# Patient Record
Sex: FEMALE | Race: WHITE | NOT HISPANIC OR LATINO
[De-identification: names, ages, dates, MRNs, and addresses within clinical notes are randomized per-mention and may not be internally consistent; named-entity substitution may affect disease eponyms.]

---

## 2017-05-17 ENCOUNTER — APPOINTMENT (OUTPATIENT)
Dept: OPHTHALMOLOGY | Facility: CLINIC | Age: 82
End: 2017-05-17

## 2017-05-17 DIAGNOSIS — H27.132 POSTERIOR DISLOCATION OF LENS, LEFT EYE: ICD-10-CM

## 2017-05-17 DIAGNOSIS — H35.352 CYSTOID MACULAR DEGENERATION, LEFT EYE: ICD-10-CM

## 2017-05-18 PROBLEM — H35.352 CME (CYSTOID MACULAR EDEMA), LEFT: Status: ACTIVE | Noted: 2017-05-18

## 2017-07-30 ENCOUNTER — INPATIENT (INPATIENT)
Facility: HOSPITAL | Age: 82
LOS: 4 days | Discharge: HOME | DRG: 644 | End: 2017-08-04
Attending: GENERAL ACUTE CARE HOSPITAL | Admitting: GENERAL ACUTE CARE HOSPITAL
Payer: MEDICARE

## 2017-07-30 VITALS
HEART RATE: 107 BPM | TEMPERATURE: 99 F | OXYGEN SATURATION: 99 % | SYSTOLIC BLOOD PRESSURE: 98 MMHG | RESPIRATION RATE: 19 BRPM | DIASTOLIC BLOOD PRESSURE: 62 MMHG

## 2017-07-30 DIAGNOSIS — I10 ESSENTIAL (PRIMARY) HYPERTENSION: ICD-10-CM

## 2017-07-30 DIAGNOSIS — N97.1 FEMALE INFERTILITY OF TUBAL ORIGIN: Chronic | ICD-10-CM

## 2017-07-30 DIAGNOSIS — M06.9 RHEUMATOID ARTHRITIS, UNSPECIFIED: ICD-10-CM

## 2017-07-30 DIAGNOSIS — J18.9 PNEUMONIA, UNSPECIFIED ORGANISM: ICD-10-CM

## 2017-07-30 DIAGNOSIS — E11.9 TYPE 2 DIABETES MELLITUS WITHOUT COMPLICATIONS: ICD-10-CM

## 2017-07-30 DIAGNOSIS — H26.40 UNSPECIFIED SECONDARY CATARACT: Chronic | ICD-10-CM

## 2017-07-30 DIAGNOSIS — Z29.9 ENCOUNTER FOR PROPHYLACTIC MEASURES, UNSPECIFIED: ICD-10-CM

## 2017-07-30 DIAGNOSIS — N18.4 CHRONIC KIDNEY DISEASE, STAGE 4 (SEVERE): ICD-10-CM

## 2017-07-30 LAB
ALBUMIN SERPL ELPH-MCNC: 4 G/DL — SIGNIFICANT CHANGE UP (ref 3.3–5)
ALP SERPL-CCNC: 57 U/L — SIGNIFICANT CHANGE UP (ref 40–120)
ALT FLD-CCNC: 13 U/L RC — SIGNIFICANT CHANGE UP (ref 10–45)
ANION GAP SERPL CALC-SCNC: 16 MMOL/L — SIGNIFICANT CHANGE UP (ref 5–17)
APPEARANCE UR: CLEAR — SIGNIFICANT CHANGE UP
APTT BLD: 30.1 SEC — SIGNIFICANT CHANGE UP (ref 27.5–37.4)
AST SERPL-CCNC: 16 U/L — SIGNIFICANT CHANGE UP (ref 10–40)
BASOPHILS # BLD AUTO: 0 K/UL — SIGNIFICANT CHANGE UP (ref 0–0.2)
BASOPHILS NFR BLD AUTO: 0 % — SIGNIFICANT CHANGE UP (ref 0–2)
BILIRUB SERPL-MCNC: 1.1 MG/DL — SIGNIFICANT CHANGE UP (ref 0.2–1.2)
BILIRUB UR-MCNC: NEGATIVE — SIGNIFICANT CHANGE UP
BUN SERPL-MCNC: 48 MG/DL — HIGH (ref 7–23)
CALCIUM SERPL-MCNC: 9.3 MG/DL — SIGNIFICANT CHANGE UP (ref 8.4–10.5)
CHLORIDE SERPL-SCNC: 96 MMOL/L — SIGNIFICANT CHANGE UP (ref 96–108)
CK MB BLD-MCNC: 4.5 % — HIGH (ref 0–3.5)
CK MB BLD-MCNC: 4.7 % — HIGH (ref 0–3.5)
CK MB CFR SERPL CALC: 2.8 NG/ML — SIGNIFICANT CHANGE UP (ref 0–3.8)
CK MB CFR SERPL CALC: 3 NG/ML — SIGNIFICANT CHANGE UP (ref 0–3.8)
CK SERPL-CCNC: 59 U/L — SIGNIFICANT CHANGE UP (ref 25–170)
CK SERPL-CCNC: 67 U/L — SIGNIFICANT CHANGE UP (ref 25–170)
CO2 SERPL-SCNC: 19 MMOL/L — LOW (ref 22–31)
COLOR SPEC: YELLOW — SIGNIFICANT CHANGE UP
CREAT SERPL-MCNC: 1.88 MG/DL — HIGH (ref 0.5–1.3)
DIFF PNL FLD: NEGATIVE — SIGNIFICANT CHANGE UP
EOSINOPHIL # BLD AUTO: 0 K/UL — SIGNIFICANT CHANGE UP (ref 0–0.5)
EOSINOPHIL NFR BLD AUTO: 0.4 % — SIGNIFICANT CHANGE UP (ref 0–6)
EPI CELLS # UR: SIGNIFICANT CHANGE UP /HPF
GAS PNL BLDV: SIGNIFICANT CHANGE UP
GLUCOSE SERPL-MCNC: 219 MG/DL — HIGH (ref 70–99)
GLUCOSE UR QL: NEGATIVE — SIGNIFICANT CHANGE UP
HCT VFR BLD CALC: 36.7 % — SIGNIFICANT CHANGE UP (ref 34.5–45)
HGB BLD-MCNC: 13.1 G/DL — SIGNIFICANT CHANGE UP (ref 11.5–15.5)
INR BLD: 1.17 RATIO — HIGH (ref 0.88–1.16)
KETONES UR-MCNC: NEGATIVE — SIGNIFICANT CHANGE UP
LEUKOCYTE ESTERASE UR-ACNC: NEGATIVE — SIGNIFICANT CHANGE UP
LYMPHOCYTES # BLD AUTO: 1.7 K/UL — SIGNIFICANT CHANGE UP (ref 1–3.3)
LYMPHOCYTES # BLD AUTO: 13.4 % — SIGNIFICANT CHANGE UP (ref 13–44)
MCHC RBC-ENTMCNC: 34.9 PG — HIGH (ref 27–34)
MCHC RBC-ENTMCNC: 35.7 GM/DL — SIGNIFICANT CHANGE UP (ref 32–36)
MCV RBC AUTO: 97.7 FL — SIGNIFICANT CHANGE UP (ref 80–100)
MONOCYTES # BLD AUTO: 0.7 K/UL — SIGNIFICANT CHANGE UP (ref 0–0.9)
MONOCYTES NFR BLD AUTO: 5.6 % — SIGNIFICANT CHANGE UP (ref 2–14)
NEUTROPHILS # BLD AUTO: 10.4 K/UL — HIGH (ref 1.8–7.4)
NEUTROPHILS NFR BLD AUTO: 80.6 % — HIGH (ref 43–77)
NITRITE UR-MCNC: NEGATIVE — SIGNIFICANT CHANGE UP
PH UR: 5.5 — SIGNIFICANT CHANGE UP (ref 5–8)
PLATELET # BLD AUTO: 182 K/UL — SIGNIFICANT CHANGE UP (ref 150–400)
POTASSIUM SERPL-MCNC: 4 MMOL/L — SIGNIFICANT CHANGE UP (ref 3.5–5.3)
POTASSIUM SERPL-SCNC: 4 MMOL/L — SIGNIFICANT CHANGE UP (ref 3.5–5.3)
PROT SERPL-MCNC: 7.5 G/DL — SIGNIFICANT CHANGE UP (ref 6–8.3)
PROT UR-MCNC: NEGATIVE — SIGNIFICANT CHANGE UP
PROTHROM AB SERPL-ACNC: 12.8 SEC — HIGH (ref 9.8–12.7)
RBC # BLD: 3.75 M/UL — LOW (ref 3.8–5.2)
RBC # FLD: 13.7 % — SIGNIFICANT CHANGE UP (ref 10.3–14.5)
RBC CASTS # UR COMP ASSIST: SIGNIFICANT CHANGE UP /HPF (ref 0–2)
SODIUM SERPL-SCNC: 131 MMOL/L — LOW (ref 135–145)
SP GR SPEC: 1.01 — SIGNIFICANT CHANGE UP (ref 1.01–1.02)
TROPONIN T SERPL-MCNC: <0.01 NG/ML — SIGNIFICANT CHANGE UP (ref 0–0.06)
TROPONIN T SERPL-MCNC: <0.01 NG/ML — SIGNIFICANT CHANGE UP (ref 0–0.06)
UROBILINOGEN FLD QL: NEGATIVE — SIGNIFICANT CHANGE UP
WBC # BLD: 12.8 K/UL — HIGH (ref 3.8–10.5)
WBC # FLD AUTO: 12.8 K/UL — HIGH (ref 3.8–10.5)
WBC UR QL: SIGNIFICANT CHANGE UP /HPF (ref 0–5)

## 2017-07-30 PROCEDURE — 99223 1ST HOSP IP/OBS HIGH 75: CPT

## 2017-07-30 PROCEDURE — 70450 CT HEAD/BRAIN W/O DYE: CPT | Mod: 26

## 2017-07-30 PROCEDURE — 71010: CPT | Mod: 26

## 2017-07-30 PROCEDURE — 99285 EMERGENCY DEPT VISIT HI MDM: CPT | Mod: 25,GC

## 2017-07-30 RX ORDER — AZITHROMYCIN 500 MG/1
500 TABLET, FILM COATED ORAL ONCE
Qty: 0 | Refills: 0 | Status: COMPLETED | OUTPATIENT
Start: 2017-07-30 | End: 2017-07-30

## 2017-07-30 RX ORDER — INSULIN LISPRO 100/ML
VIAL (ML) SUBCUTANEOUS
Qty: 0 | Refills: 0 | Status: DISCONTINUED | OUTPATIENT
Start: 2017-07-30 | End: 2017-08-04

## 2017-07-30 RX ORDER — SODIUM CHLORIDE 9 MG/ML
1000 INJECTION, SOLUTION INTRAVENOUS
Qty: 0 | Refills: 0 | Status: DISCONTINUED | OUTPATIENT
Start: 2017-07-30 | End: 2017-08-04

## 2017-07-30 RX ORDER — HEPARIN SODIUM 5000 [USP'U]/ML
5000 INJECTION INTRAVENOUS; SUBCUTANEOUS EVERY 8 HOURS
Qty: 0 | Refills: 0 | Status: DISCONTINUED | OUTPATIENT
Start: 2017-07-30 | End: 2017-08-04

## 2017-07-30 RX ORDER — PIPERACILLIN AND TAZOBACTAM 4; .5 G/20ML; G/20ML
3.38 INJECTION, POWDER, LYOPHILIZED, FOR SOLUTION INTRAVENOUS EVERY 12 HOURS
Qty: 0 | Refills: 0 | Status: DISCONTINUED | OUTPATIENT
Start: 2017-07-30 | End: 2017-07-31

## 2017-07-30 RX ORDER — METHOTREXATE 2.5 MG/1
0 TABLET ORAL
Qty: 0 | Refills: 0 | COMMUNITY

## 2017-07-30 RX ORDER — LACTOBACILLUS ACIDOPHILUS 100MM CELL
1 CAPSULE ORAL
Qty: 0 | Refills: 0 | Status: DISCONTINUED | OUTPATIENT
Start: 2017-07-30 | End: 2017-08-04

## 2017-07-30 RX ORDER — DEXTROSE 50 % IN WATER 50 %
25 SYRINGE (ML) INTRAVENOUS ONCE
Qty: 0 | Refills: 0 | Status: DISCONTINUED | OUTPATIENT
Start: 2017-07-30 | End: 2017-08-04

## 2017-07-30 RX ORDER — DEXTROSE 50 % IN WATER 50 %
12.5 SYRINGE (ML) INTRAVENOUS ONCE
Qty: 0 | Refills: 0 | Status: DISCONTINUED | OUTPATIENT
Start: 2017-07-30 | End: 2017-08-04

## 2017-07-30 RX ORDER — VANCOMYCIN HCL 1 G
VIAL (EA) INTRAVENOUS
Qty: 0 | Refills: 0 | Status: DISCONTINUED | OUTPATIENT
Start: 2017-07-30 | End: 2017-07-31

## 2017-07-30 RX ORDER — GLUCAGON INJECTION, SOLUTION 0.5 MG/.1ML
1 INJECTION, SOLUTION SUBCUTANEOUS ONCE
Qty: 0 | Refills: 0 | Status: DISCONTINUED | OUTPATIENT
Start: 2017-07-30 | End: 2017-08-04

## 2017-07-30 RX ORDER — PIPERACILLIN AND TAZOBACTAM 4; .5 G/20ML; G/20ML
3.38 INJECTION, POWDER, LYOPHILIZED, FOR SOLUTION INTRAVENOUS ONCE
Qty: 0 | Refills: 0 | Status: COMPLETED | OUTPATIENT
Start: 2017-07-30 | End: 2017-07-30

## 2017-07-30 RX ORDER — SODIUM CHLORIDE 9 MG/ML
1000 INJECTION INTRAMUSCULAR; INTRAVENOUS; SUBCUTANEOUS ONCE
Qty: 0 | Refills: 0 | Status: COMPLETED | OUTPATIENT
Start: 2017-07-30 | End: 2017-07-30

## 2017-07-30 RX ORDER — VANCOMYCIN HCL 1 G
1000 VIAL (EA) INTRAVENOUS EVERY 24 HOURS
Qty: 0 | Refills: 0 | Status: DISCONTINUED | OUTPATIENT
Start: 2017-07-31 | End: 2017-07-31

## 2017-07-30 RX ORDER — METHOTREXATE 2.5 MG/1
2.5 TABLET ORAL DAILY
Qty: 0 | Refills: 0 | Status: DISCONTINUED | OUTPATIENT
Start: 2017-07-30 | End: 2017-07-30

## 2017-07-30 RX ORDER — FOLIC ACID 0.8 MG
1 TABLET ORAL DAILY
Qty: 0 | Refills: 0 | Status: DISCONTINUED | OUTPATIENT
Start: 2017-07-30 | End: 2017-08-04

## 2017-07-30 RX ORDER — DEXTROSE 50 % IN WATER 50 %
1 SYRINGE (ML) INTRAVENOUS ONCE
Qty: 0 | Refills: 0 | Status: DISCONTINUED | OUTPATIENT
Start: 2017-07-30 | End: 2017-08-04

## 2017-07-30 RX ORDER — INSULIN GLARGINE 100 [IU]/ML
13 INJECTION, SOLUTION SUBCUTANEOUS AT BEDTIME
Qty: 0 | Refills: 0 | Status: DISCONTINUED | OUTPATIENT
Start: 2017-07-30 | End: 2017-08-03

## 2017-07-30 RX ORDER — INSULIN LISPRO 100/ML
VIAL (ML) SUBCUTANEOUS AT BEDTIME
Qty: 0 | Refills: 0 | Status: DISCONTINUED | OUTPATIENT
Start: 2017-07-30 | End: 2017-08-04

## 2017-07-30 RX ORDER — METHOTREXATE 2.5 MG/1
25 TABLET ORAL
Qty: 0 | Refills: 0 | COMMUNITY

## 2017-07-30 RX ORDER — ESCITALOPRAM OXALATE 10 MG/1
10 TABLET, FILM COATED ORAL DAILY
Qty: 0 | Refills: 0 | Status: DISCONTINUED | OUTPATIENT
Start: 2017-07-30 | End: 2017-08-04

## 2017-07-30 RX ORDER — CHOLECALCIFEROL (VITAMIN D3) 125 MCG
1000 CAPSULE ORAL DAILY
Qty: 0 | Refills: 0 | Status: DISCONTINUED | OUTPATIENT
Start: 2017-07-30 | End: 2017-08-04

## 2017-07-30 RX ORDER — CEFTRIAXONE 500 MG/1
1 INJECTION, POWDER, FOR SOLUTION INTRAMUSCULAR; INTRAVENOUS ONCE
Qty: 0 | Refills: 0 | Status: COMPLETED | OUTPATIENT
Start: 2017-07-30 | End: 2017-07-30

## 2017-07-30 RX ORDER — VANCOMYCIN HCL 1 G
1000 VIAL (EA) INTRAVENOUS ONCE
Qty: 0 | Refills: 0 | Status: COMPLETED | OUTPATIENT
Start: 2017-07-30 | End: 2017-07-30

## 2017-07-30 RX ADMIN — PIPERACILLIN AND TAZOBACTAM 200 GRAM(S): 4; .5 INJECTION, POWDER, LYOPHILIZED, FOR SOLUTION INTRAVENOUS at 22:08

## 2017-07-30 RX ADMIN — INSULIN GLARGINE 13 UNIT(S): 100 INJECTION, SOLUTION SUBCUTANEOUS at 22:21

## 2017-07-30 RX ADMIN — AZITHROMYCIN 250 MILLIGRAM(S): 500 TABLET, FILM COATED ORAL at 20:17

## 2017-07-30 RX ADMIN — CEFTRIAXONE 100 GRAM(S): 500 INJECTION, POWDER, FOR SOLUTION INTRAMUSCULAR; INTRAVENOUS at 19:37

## 2017-07-30 RX ADMIN — ESCITALOPRAM OXALATE 10 MILLIGRAM(S): 10 TABLET, FILM COATED ORAL at 22:20

## 2017-07-30 RX ADMIN — SODIUM CHLORIDE 1000 MILLILITER(S): 9 INJECTION INTRAMUSCULAR; INTRAVENOUS; SUBCUTANEOUS at 19:37

## 2017-07-30 RX ADMIN — Medication 250 MILLIGRAM(S): at 22:47

## 2017-07-30 RX ADMIN — Medication 1 MILLIGRAM(S): at 22:20

## 2017-07-30 RX ADMIN — HEPARIN SODIUM 5000 UNIT(S): 5000 INJECTION INTRAVENOUS; SUBCUTANEOUS at 22:20

## 2017-07-30 NOTE — ED PROVIDER NOTE - NS ED ROS FT
Sharonda Samson, DO: ROS: +lightheadedness, denies HA, fevers/chills, nausea/vomiting, chest pain, diaphoresis, abdominal pain, joint pain, neuro deficits, dysuria/hematuria, rash.

## 2017-07-30 NOTE — ED PROVIDER NOTE - OBJECTIVE STATEMENT
Sharonda Samson, DO: 85F with hx of DM, HTN, RA here for weakness & difficulty ambulating x 2 weeks. Pt unable to get out of bed today. Near-syncope Friday. +NB diarrhea Friday. Denies urinary frequency, urgency, dysuria, hematuria. No weight loss. Pt with mild SOB, worse with exertion. No recent changes in medications or diet. Gait described as short, shuffling gait when not weak. No confusion per family at bedside.    PMD: Dr. Yuriy Tobias.

## 2017-07-30 NOTE — H&P ADULT - PROBLEM SELECTOR PLAN 1
IV Zosyn and IV Vancomycin given patient's steroid and DMA requirements.  Follow up blood, urine assays.

## 2017-07-30 NOTE — ED ADULT NURSE REASSESSMENT NOTE - NS ED NURSE REASSESS COMMENT FT1
Pt straight catheterized using sterile technique.  Pt tolerated procedure well. Sterile specimen collected. UA and Culture sent. 200ml clear yellow urine drained.
1905: Report received from MYA MARX in [facility].

## 2017-07-30 NOTE — ED PROVIDER NOTE - MEDICAL DECISION MAKING DETAILS
Dr. Callejas Note: acute weakness with borderline temp, consider infectious causes, will require admission for likely PT and rehab

## 2017-07-30 NOTE — H&P ADULT - PROBLEM SELECTOR PLAN 2
Steroid and DMA dependent>>would clarify patient's METHOTREXATE dosing with patient's office MD in the AM.

## 2017-07-30 NOTE — H&P ADULT - HISTORY OF PRESENT ILLNESS
NIGHT HOSPITALIST:  Patient UNKNOWN to me previously, assigned to me at this point via the ER and by Dr. Tobias to admit this 86 y/o F--patient seen with spouse in attendance--patient with a history of essential HTN, type 2 diabetes mellitus, presumed asthma, RA with patient on disease modifying agents (DMA) with apparently outpatient parenteral chemotherapy for her RA, steroid dependent and maintained on MTX, last admission to [facility] in 2014 for poor controlled FS, with the patient self referring to [facility] following 2 weeks of generalized weakness and dizziness.  No LOC.  NO HA, no focal weakness.  Patient with an episode of chills for the past week.  No cough.  No chest pain/pressure.  Patient with poor exercise tolerance for several months but self limits activity to the house for the past few weeks.  No fever.  NO abdominal pain, no red blood per rectum or melena.  No diarrhea.  No back pain, no tearing back pain.  No hematuria, no dysuria.  No weight loss or anorexia.  No sick contacts or recent travel.  Remaining review of systems not contributory. NIGHT HOSPITALIST:  Patient UNKNOWN to me previously, assigned to me at this point via the ER and by Dr. Tobias to admit this 84 y/o F--patient seen with spouse in attendance--patient with a history of essential HTN, type 2 diabetes mellitus, presumed asthma, RA with patient on disease modifying agents (DMA) with apparently outpatient parenteral chemotherapy for her RA, steroid dependent and maintained on MTX, last admission to [facility] in 2014 for poor controlled FS, with the patient self referring to [facility] following 2 weeks of generalized weakness and dizziness.  No LOC.  NO HA, no focal weakness.  Patient with an episode of chills for the past week.  No cough.  No chest pain/pressure.  Patient with poor exercise tolerance for several months but self limits activity to the house for the past few weeks.  No fever.  NO abdominal pain, no red blood per rectum or melena.  No diarrhea.  No back pain, no tearing back pain.  No neck pain.  No hematuria, no dysuria.  No weight loss or anorexia.  No sick contacts or recent travel.  Remaining review of systems not contributory.

## 2017-07-30 NOTE — H&P ADULT - ASSESSMENT
NIGHT HOSPITALIST:  Presentation of patient with suspected pneumonia but patient on DMA and steroids in the setting of RA, but with protracted symptoms of generalized weakness>>will upgrade to telemetry to exclude cardiac equivalent in the setting of patient with type 2 diabetes mellitus and radiographic evidence of cerebrovascular disease>>will also upgrade IV antibiotics to IV Zosyn and IV vancomycin in the setting of immune suppression from patient's DMA.  Patient with evidence of chronic kidney disease stage 4>>will HOLD patient's ARB and will HOLD the metformin and sulfonylurea>>will dose conservative Lantus at 0.2 UNITS/KG/24H to avoid excess hyperglycemia.   Will have the DAY PROVIDER clarify patient's MTX dosing>>patient/spouse report she takes a daily dose of MTX (?) versus weekly for her RA.

## 2017-07-30 NOTE — ED PROVIDER NOTE - ATTENDING CONTRIBUTION TO CARE
Pt with 2 weeks of worsening weakness with malaise and unable to walk now.  No focal neuro deficits, more overall weakness, unable to stand due to weakness, nontender abdomen.

## 2017-07-30 NOTE — H&P ADULT - ATTENDING COMMENTS
NIGHT HOSPITALIST:  Patient / spouse aware of course and agree with plan/care as above.  Long term prognosis is guarded, given comorbidities.  Emotional support provided to patient/ spouse in attendance.  Care reviewed with covering NP.  Care assumed by Dr. Davis.

## 2017-07-30 NOTE — ED PROVIDER NOTE - PHYSICAL EXAMINATION
Sharonda Samson, DO:   Gen: Well appearing, NAD, AAOx3  Head: NCAT  HEENT: PERRL, MMM, normal conjunctiva, anicteric, neck supple  Lung: decreased at b/l bases otherwise CTAB, no adventitious sounds  CV: RRR, no murmurs, rubs or gallops  Abd: soft, NTND, no rebound or guarding, no CVAT  MSK: No edema, no visible deformities  Neuro: No focal neurologic deficits. CN II-XII intact. 5/5 strength and normal sensation in all extremities.  Skin: Warm and dry, no evidence of rash  Psych: normal mood and affect

## 2017-07-30 NOTE — H&P ADULT - NSHPPHYSICALEXAM_GEN_ALL_CORE
Physical exam with an elderly, non-toxic, chronically ill appearing F.  BP  117/67 Physical exam with an elderly, non-toxic, chronically ill appearing F.  BP  117/67  Afebrile.  HR  84.  RR 14.  95% on RA.  HEENT, PERRL, EOMI, bitemporal wasting.  Neck supple, chest with decreased breath sounds at the bases, cor s1 s2, declined breast exam.  Abdomen soft, normal bowel sounds, non-tender, nondistended.  Ext with diffuse sarcopenia.  Feet with no ulcers, poor nail hygiene.  Skin dry but intact.  Neurologic exam AxOx3.  Speech fluent.  Cognition intact.   UE/LE 5/5.

## 2017-07-30 NOTE — H&P ADULT - PROBLEM SELECTOR PLAN 5
Upgraded to telemetry to exclude cardiac equivalent.  Renal insufficiency as above>>metformin and sulfonylurea HELD.  Lantus at conservative 0.2 UNITS/kg/24H for glycemic control.

## 2017-07-30 NOTE — ED ADULT NURSE NOTE - OBJECTIVE STATEMENT
86 yo female, PMH DM, HTN, rheumatoid arthritis brought to ED by her family for generalized weakness. Patient has been growing progressively weaker over the last 2 weeks, today was unable to get out of bed or walk, so family brought patient to ED. +Near-syncopal episode on Friday (two days ago) while shopping. +Diarrhea intermittent this week. +SOB on exertion. +Bilateral leg pain, low back pain. No chest pain, fever/chills, dysuria, abdominal pain. No edema. No cough. No changes in medication.

## 2017-07-30 NOTE — ED PROVIDER NOTE - CARE PLAN
Principal Discharge DX:	CAP (community acquired pneumonia) Principal Discharge DX:	CAP (community acquired pneumonia)  Secondary Diagnosis:	Dehydration  Secondary Diagnosis:	Ambulatory dysfunction

## 2017-07-30 NOTE — H&P ADULT - NSHPLABSRESULTS_GEN_ALL_CORE
WBC 12.8K.  80% N.  Hgb 13.1.  Platelets of 182K.  INR 1.1.   K+ 4.0.  Random glucose of 219.  HCO3 19.  Cr 1.8.  Alb 4.0.  e GFR  24 consistent with chronic kidney disease stage 4.  U/A negative.  Chest radiograph reviewed with increased interstitial markings.  head CTT nondiagnostic but with evidence of cerebrovascular disease.  .  EKG tracing reviewed with NSR at 86 with RBBB and isolated Q III.

## 2017-07-31 DIAGNOSIS — J18.1 LOBAR PNEUMONIA, UNSPECIFIED ORGANISM: ICD-10-CM

## 2017-07-31 DIAGNOSIS — R42 DIZZINESS AND GIDDINESS: ICD-10-CM

## 2017-07-31 DIAGNOSIS — E87.1 HYPO-OSMOLALITY AND HYPONATREMIA: ICD-10-CM

## 2017-07-31 DIAGNOSIS — N17.9 ACUTE KIDNEY FAILURE, UNSPECIFIED: ICD-10-CM

## 2017-07-31 DIAGNOSIS — I10 ESSENTIAL (PRIMARY) HYPERTENSION: ICD-10-CM

## 2017-07-31 LAB
ALBUMIN SERPL ELPH-MCNC: 4 G/DL — SIGNIFICANT CHANGE UP (ref 3.3–5)
ALP SERPL-CCNC: 53 U/L — SIGNIFICANT CHANGE UP (ref 40–120)
ALT FLD-CCNC: 14 U/L RC — SIGNIFICANT CHANGE UP (ref 10–45)
ANION GAP SERPL CALC-SCNC: 15 MMOL/L — SIGNIFICANT CHANGE UP (ref 5–17)
AST SERPL-CCNC: 18 U/L — SIGNIFICANT CHANGE UP (ref 10–40)
BASOPHILS # BLD AUTO: 0 K/UL — SIGNIFICANT CHANGE UP (ref 0–0.2)
BASOPHILS NFR BLD AUTO: 0.3 % — SIGNIFICANT CHANGE UP (ref 0–2)
BILIRUB SERPL-MCNC: 0.8 MG/DL — SIGNIFICANT CHANGE UP (ref 0.2–1.2)
BUN SERPL-MCNC: 36 MG/DL — HIGH (ref 7–23)
CALCIUM SERPL-MCNC: 9.2 MG/DL — SIGNIFICANT CHANGE UP (ref 8.4–10.5)
CHLORIDE SERPL-SCNC: 100 MMOL/L — SIGNIFICANT CHANGE UP (ref 96–108)
CK MB BLD-MCNC: 2.2 % — SIGNIFICANT CHANGE UP (ref 0–3.5)
CK MB CFR SERPL CALC: 2.8 NG/ML — SIGNIFICANT CHANGE UP (ref 0–3.8)
CK MB CFR SERPL CALC: 3.4 NG/ML — SIGNIFICANT CHANGE UP (ref 0–3.8)
CK SERPL-CCNC: 155 U/L — SIGNIFICANT CHANGE UP (ref 25–170)
CK SERPL-CCNC: 61 U/L — SIGNIFICANT CHANGE UP (ref 25–170)
CO2 SERPL-SCNC: 22 MMOL/L — SIGNIFICANT CHANGE UP (ref 22–31)
CORTIS AM PEAK SERPL-MCNC: 2 UG/DL — LOW (ref 6–18.4)
CREAT ?TM UR-MCNC: 31 MG/DL — SIGNIFICANT CHANGE UP
CREAT ?TM UR-MCNC: 31 MG/DL — SIGNIFICANT CHANGE UP
CREAT SERPL-MCNC: 1.37 MG/DL — HIGH (ref 0.5–1.3)
EOSINOPHIL # BLD AUTO: 0.1 K/UL — SIGNIFICANT CHANGE UP (ref 0–0.5)
EOSINOPHIL NFR BLD AUTO: 1.2 % — SIGNIFICANT CHANGE UP (ref 0–6)
GLUCOSE SERPL-MCNC: 233 MG/DL — HIGH (ref 70–99)
HBA1C BLD-MCNC: 7.7 % — HIGH (ref 4–5.6)
HCT VFR BLD CALC: 37.1 % — SIGNIFICANT CHANGE UP (ref 34.5–45)
HGB BLD-MCNC: 13.1 G/DL — SIGNIFICANT CHANGE UP (ref 11.5–15.5)
LYMPHOCYTES # BLD AUTO: 1.8 K/UL — SIGNIFICANT CHANGE UP (ref 1–3.3)
LYMPHOCYTES # BLD AUTO: 19 % — SIGNIFICANT CHANGE UP (ref 13–44)
MCHC RBC-ENTMCNC: 34.8 PG — HIGH (ref 27–34)
MCHC RBC-ENTMCNC: 35.3 GM/DL — SIGNIFICANT CHANGE UP (ref 32–36)
MCV RBC AUTO: 98.6 FL — SIGNIFICANT CHANGE UP (ref 80–100)
MICROALBUMIN UR-MCNC: 0.8 MG/DL — SIGNIFICANT CHANGE UP
MICROALBUMIN/CREAT UR-RTO: 26 MG/G — SIGNIFICANT CHANGE UP (ref 0–30)
MONOCYTES # BLD AUTO: 0.8 K/UL — SIGNIFICANT CHANGE UP (ref 0–0.9)
MONOCYTES NFR BLD AUTO: 8.4 % — SIGNIFICANT CHANGE UP (ref 2–14)
NEUTROPHILS # BLD AUTO: 6.5 K/UL — SIGNIFICANT CHANGE UP (ref 1.8–7.4)
NEUTROPHILS NFR BLD AUTO: 71.1 % — SIGNIFICANT CHANGE UP (ref 43–77)
PLATELET # BLD AUTO: 170 K/UL — SIGNIFICANT CHANGE UP (ref 150–400)
POTASSIUM SERPL-MCNC: 4.2 MMOL/L — SIGNIFICANT CHANGE UP (ref 3.5–5.3)
POTASSIUM SERPL-SCNC: 4.2 MMOL/L — SIGNIFICANT CHANGE UP (ref 3.5–5.3)
PROT ?TM UR-MCNC: <4 MG/DL — SIGNIFICANT CHANGE UP (ref 0–12)
PROT SERPL-MCNC: 7.4 G/DL — SIGNIFICANT CHANGE UP (ref 6–8.3)
PROT/CREAT UR-RTO: SIGNIFICANT CHANGE UP (ref 0–0.2)
RBC # BLD: 3.76 M/UL — LOW (ref 3.8–5.2)
RBC # FLD: 13.7 % — SIGNIFICANT CHANGE UP (ref 10.3–14.5)
SODIUM SERPL-SCNC: 137 MMOL/L — SIGNIFICANT CHANGE UP (ref 135–145)
TROPONIN T SERPL-MCNC: <0.01 NG/ML — SIGNIFICANT CHANGE UP (ref 0–0.06)
TROPONIN T SERPL-MCNC: <0.01 NG/ML — SIGNIFICANT CHANGE UP (ref 0–0.06)
WBC # BLD: 9.2 K/UL — SIGNIFICANT CHANGE UP (ref 3.8–10.5)
WBC # FLD AUTO: 9.2 K/UL — SIGNIFICANT CHANGE UP (ref 3.8–10.5)

## 2017-07-31 PROCEDURE — 71250 CT THORAX DX C-: CPT | Mod: 26

## 2017-07-31 RX ORDER — SODIUM CHLORIDE 9 MG/ML
1000 INJECTION INTRAMUSCULAR; INTRAVENOUS; SUBCUTANEOUS
Qty: 0 | Refills: 0 | Status: DISCONTINUED | OUTPATIENT
Start: 2017-07-31 | End: 2017-07-31

## 2017-07-31 RX ORDER — HYDROCORTISONE 20 MG
50 TABLET ORAL EVERY 8 HOURS
Qty: 0 | Refills: 0 | Status: DISCONTINUED | OUTPATIENT
Start: 2017-07-31 | End: 2017-08-01

## 2017-07-31 RX ORDER — PIPERACILLIN AND TAZOBACTAM 4; .5 G/20ML; G/20ML
3.38 INJECTION, POWDER, LYOPHILIZED, FOR SOLUTION INTRAVENOUS EVERY 8 HOURS
Qty: 0 | Refills: 0 | Status: DISCONTINUED | OUTPATIENT
Start: 2017-07-31 | End: 2017-07-31

## 2017-07-31 RX ADMIN — ESCITALOPRAM OXALATE 10 MILLIGRAM(S): 10 TABLET, FILM COATED ORAL at 09:14

## 2017-07-31 RX ADMIN — Medication 1 TABLET(S): at 17:18

## 2017-07-31 RX ADMIN — HEPARIN SODIUM 5000 UNIT(S): 5000 INJECTION INTRAVENOUS; SUBCUTANEOUS at 21:47

## 2017-07-31 RX ADMIN — INSULIN GLARGINE 13 UNIT(S): 100 INJECTION, SOLUTION SUBCUTANEOUS at 21:47

## 2017-07-31 RX ADMIN — Medication 50 MILLIGRAM(S): at 21:47

## 2017-07-31 RX ADMIN — Medication 50 MILLIGRAM(S): at 14:17

## 2017-07-31 RX ADMIN — Medication 5 MILLIGRAM(S): at 06:16

## 2017-07-31 RX ADMIN — Medication 1 MILLIGRAM(S): at 09:13

## 2017-07-31 RX ADMIN — Medication 1 TABLET(S): at 09:10

## 2017-07-31 RX ADMIN — Medication 3: at 18:15

## 2017-07-31 RX ADMIN — PIPERACILLIN AND TAZOBACTAM 25 GRAM(S): 4; .5 INJECTION, POWDER, LYOPHILIZED, FOR SOLUTION INTRAVENOUS at 09:13

## 2017-07-31 RX ADMIN — Medication 1000 UNIT(S): at 09:14

## 2017-07-31 RX ADMIN — HEPARIN SODIUM 5000 UNIT(S): 5000 INJECTION INTRAVENOUS; SUBCUTANEOUS at 13:09

## 2017-07-31 RX ADMIN — HEPARIN SODIUM 5000 UNIT(S): 5000 INJECTION INTRAVENOUS; SUBCUTANEOUS at 06:16

## 2017-07-31 RX ADMIN — SODIUM CHLORIDE 60 MILLILITER(S): 9 INJECTION INTRAMUSCULAR; INTRAVENOUS; SUBCUTANEOUS at 14:14

## 2017-07-31 RX ADMIN — Medication 1: at 09:13

## 2017-07-31 RX ADMIN — Medication 2: at 21:48

## 2017-07-31 NOTE — PHYSICAL THERAPY INITIAL EVALUATION ADULT - ADDITIONAL COMMENTS
Pt lives with her spouse in a private house with 6 steps to enter, + handrail  and hen living area all one level. Pt lives with her spouse in a private house with 6 steps to enter, + handrail  and then living area all one level.

## 2017-07-31 NOTE — CONSULT NOTE ADULT - SUBJECTIVE AND OBJECTIVE BOX
HPI:   Patient is a 85y female with RA on prednisone and methotrexate admitted last night for complaints of weakness, dizziness, poor appetite, started on vanco zosyn we are called. She feels better today. She complains of chronic joint pain, some loose stool on occasion and some back pain that has resolved. she has no fever, her appetite is much better today.     REVIEW OF SYSTEMS:  All other review of systems negative (Comprehensive ROS)    PAST MEDICAL & SURGICAL HISTORY:  RA (rheumatoid arthritis)  Asthma  DM (diabetes mellitus), type 2  HTN (hypertension), benign  After-cataract of left eye  Tubal occlusion      Allergies    No Known Allergies    Intolerances        Antimicrobials Day #  :    Other Medications:  escitalopram 10 milliGRAM(s) Oral daily  folic acid 1 milliGRAM(s) Oral daily  cholecalciferol 1000 Unit(s) Oral daily  insulin lispro (HumaLOG) corrective regimen sliding scale   SubCutaneous three times a day before meals  insulin lispro (HumaLOG) corrective regimen sliding scale   SubCutaneous at bedtime  dextrose 5%. 1000 milliLiter(s) IV Continuous <Continuous>  dextrose Gel 1 Dose(s) Oral once PRN  dextrose 50% Injectable 12.5 Gram(s) IV Push once  dextrose 50% Injectable 25 Gram(s) IV Push once  dextrose 50% Injectable 25 Gram(s) IV Push once  glucagon  Injectable 1 milliGRAM(s) IntraMuscular once PRN  heparin  Injectable 5000 Unit(s) SubCutaneous every 8 hours  insulin glargine Injectable (LANTUS) 13 Unit(s) SubCutaneous at bedtime  lactobacillus acidophilus 1 Tablet(s) Oral two times a day with meals  hydrocortisone sodium succinate Injectable 50 milliGRAM(s) IV Push every 8 hours  sodium chloride 0.9%. 1000 milliLiter(s) IV Continuous <Continuous>      FAMILY HISTORY:  obesity    SOCIAL HISTORY:  Smoking: [ ]Yes [x ]No  ETOH: [ ]Yes [x ]No  Drug Use: [ ]Yes [ x]No   [ ] Single[ ]    T(F): 97.9 (17 @ 12:40), Max: 98.5 (17 @ 00:22)  HR: 81 (17 @ 16:46)  BP: 125/75 (17 @ 16:46)  RR: 18 (17 @ 12:40)  SpO2: 94% (17 @ 16:46)  Wt(kg): --    PHYSICAL EXAM:  General: alert, no acute distress  Eyes:  anicteric, no conjunctival injection, no discharge  Oropharynx: no lesions or injection 	  Neck: supple, without adenopathy  Lungs: clear to auscultation  Heart: regular rate and rhythm; no murmur, rubs or gallops  Abdomen: soft, nondistended, nontender, without mass or organomegaly  Skin: no lesions  Extremities: no clubbing, cyanosis, or edema  Neurologic: alert, oriented, moves all extremities    LAB RESULTS:                        13.1   9.2   )-----------( 170      ( 2017 14:41 )             37.1         137  |  100  |  36<H>  ----------------------------<  233<H>  4.2   |  22  |  1.37<H>    Ca    9.2      2017 14:22    TPro  7.4  /  Alb  4.0  /  TBili  0.8  /  DBili  x   /  AST  18  /  ALT  14  /  AlkPhos  53      LIVER FUNCTIONS - ( 2017 14:22 )  Alb: 4.0 g/dL / Pro: 7.4 g/dL / ALK PHOS: 53 U/L / ALT: 14 U/L RC / AST: 18 U/L / GGT: x           Urinalysis Basic - ( 2017 18:11 )    Color: Yellow / Appearance: Clear / S.011 / pH: x  Gluc: x / Ketone: Negative  / Bili: Negative / Urobili: Negative   Blood: x / Protein: Negative / Nitrite: Negative   Leuk Esterase: Negative / RBC: 0-2 /HPF / WBC 0-2 /HPF   Sq Epi: x / Non Sq Epi: OCC /HPF / Bacteria: x        MICROBIOLOGY:  RECENT CULTURES:        RADIOLOGY REVIEWED:    < from: CT Chest No Cont (17 @ 14:53) >  EXAM:  CT CHEST                            PROCEDURE DATE:  2017            INTERPRETATION:  CLINICAL INFORMATION: Shortness of breath    COMPARISON: CT Chest on 3/23/2014.    PROCEDURE:   CT of the Chest was performed without intravenous contrast.  Sagittal and coronal reformats were performed.      FINDINGS:    CHEST:     LUNGS AND LARGE AIRWAYS: Patent central airways. Bilateral upper lobe   reticular changes consistent with scarring with focal calcifications and   areas of air trapping. No parenchymal consolidation. Stable 6 mm region   of juxtapleural thickening in the right middle lobe.  PLEURA: No pleural effusion.  VESSELS: Normal left-sided aortic arch and descending aorta. No aneurysm.   Atherosclerotic changes.  HEART: Heart size is normal. No pericardial effusion. Atherosclerotic   calcifications involving the mitral annular, aortic valve and left   anterior descending coronary artery.  MEDIASTINUM AND SHILPA: No lymphadenopathy.  CHEST WALL AND LOWER NECK: Within normallimits.  VISUALIZED UPPER ABDOMEN: Within normal limits.  BONES: Multilevel spinal degenerative changes.     IMPRESSION:     Mosaic attenuation of the lungs with scarring as seen previously. Stable   6 mm region of juxtapleural thickening in the right middle lobe.    < from: CT Head No Cont (17 @ 18:09) >  XAM:  CT BRAIN                            PROCEDURE DATE:  2017            INTERPRETATION:  CLINICAL INFORMATION: Moderate to severe weakness for 3   weeks.    TECHNIQUE:  Serial axial images were obtained from the skull base to the   vertexwithout intravenous contrast. Coronal and sagittal reformatted   images were obtained.    COMPARISON: Head CT 2016    FINDINGS:     There is no ventriculomegaly, midline shift, mass effect, or acute   hemorrhage.     The ventricles, and sulci are prominent consistent with volume loss.    Confluent white matter hypodensities again noted compatible severe   microvascular ischemic change.     Mucous retention cyst versus polyp in the right maxillary sinus.  There   is no displaced calvarial fracture.     IMPRESSION:     1.  No acute intracranial hemorrhage, mass effect, or shift.  2.  Severe chronic microvascular ischemic changes.       < end of copied text >      Impression:    Patient with RA on prednisone and methotrexate admitted with complaints of dizziness and weakness but no localizing findings to support infection is apparent. She has some loose stool so will check stool studies but nothing ongoing. She could have a virus with relative adrenal insufficiency.      Recommendations:  Monitor off antibiotics  Check stool c and s o and p  follow up cultures  steroid taper  r/w dr moeller

## 2017-07-31 NOTE — PHYSICAL THERAPY INITIAL EVALUATION ADULT - PERTINENT HX OF CURRENT PROBLEM, REHAB EVAL
86 y/o F--patient seen with spouse in attendance--patient with a history of essential HTN, type 2 diabetes mellitus, presumed asthma, RA with patient on disease modifying agents (DMA) with apparently outpatient parenteral chemotherapy for her RA, steroid dependent and maintained on MTX, last admission to [facility] in 2014 for poor controlled FS, with the patient self referring to [facility] following 2 weeks of generalized weakness and dizziness. 86 y/o F patient with a history of essential HTN, type 2 diabetes mellitus, presumed asthma, RA with patient on disease modifying agents (DMA) with apparently outpatient parenteral chemotherapy for her RA, steroid dependent and maintained on MTX, last admission to [facility] in 2014 for poor controlled FS, with the patient self referring to [facility] following 2 weeks of generalized weakness and dizziness.

## 2017-07-31 NOTE — PROGRESS NOTE ADULT - SUBJECTIVE AND OBJECTIVE BOX
INITIAL [facility]   Pt seen and examined   chart vida   case discussed with Raffy Milton and Dr. Flannery   plan discussed with pt, art , and    Patient is a 85y old  Female who presents with a chief complaint of Two weeks of generalized weakness, dizziness, episode of chills (2017 21:28)  86 y/o fmeale with hx of RA on mtx , prednsion and iv infusion as o/p. DAVID MILTON admitted with 2 months hx of decreased po intake, weakness , fatigue, and dizziness that has been much more pronounced over the past one to two weeks.  no fever or chills , no N/V but has intermittent diarreah   no urinary sx   dizziness NOT vertigo and no cp/ palpiations   no focal neuro complaints     Review of Systems:  Review of Systems: SEE ABOVE.	  Other Review of Systems: All other review of systems negative, except as noted in HPI	  Allergies and Intolerances:        Allergies:  	No Known Allergies:   Home Medications:   · 	folic acid 1 mg oral tablet: 1 tab(s) orally once a day, Last Dose Taken:    · 	Januvia:  orally , Last Dose Taken:    · 	predniSONE 5 mg oral tablet: 1 tab(s) orally once a day, Last Dose Taken:    · 	Cozaar 50 mg oral tablet: 1 tab(s) orally once a day, Last Dose Taken:    · 	Lexapro 10 mg oral tablet: 1 tab(s) orally once a day, Last Dose Taken:    · 	Amaryl 1 mg oral tablet:  orally once a day (at bedtime), Last Dose Taken:    · 	Vitamin D3:  orally , Last Dose Taken:    · 	hydroCHLOROthiazide 25 mg oral tablet: 1 tab(s) orally once a day, Last Dose Taken:    · 	Glumetza 1000 mg oral tablet, extended release:  orally 2 times a day, Last Dose Taken:    Patient History:   Past Medical History:  Asthma    DM (diabetes mellitus), type 2    HTN (hypertension), benign    RA (rheumatoid arthritis).  Past Surgical History:  After-cataract of left eye    Tubal occlusion.    NO ETOH/No smoking  Vital Signs Last 24 Hrs  T(C): 36.6 (2017 12:40), Max: 37.7 (2017 17:30)  T(F): 97.9 (2017 12:40), Max: 99.9 (2017 17:30)  HR: 84 (2017 12:17) (78 - 88)  BP: 102/66 (2017 12:17) (101/65 - 119/79)  BP(mean): --  RR: 18 (2017 12:40) (16 - 18)  SpO2: 96% (2017 12:40) (95% - 98%)  CAPILLARY BLOOD GLUCOSE  138 (2017 13:09)  163 (2017 08:45)  221 (2017 20:45)           @ 07: @ 07:00  --------------------------------------------------------  IN: 100 mL / OUT: 600 mL / NET: -500 mL     @ 07: @ 16:06  --------------------------------------------------------  IN: 360 mL / OUT: 400 mL / NET: -40 mL        Physical Exam:    Daily Height in cm: 152.4 (2017 00:22)    Daily Weight in k.7 (2017 13:20)  General:  elderly in NAD   HEENT:  Nonicteric, PERRLA  CV:  RRR, S1S2   Lungs:  crackles on R base   more pronounce than L   Abdomen:  Soft, non-tender, no distended, positive BS  Extremities:  2+ pulses, no c/c, no edema  Skin:  Warm and dry, no rashes  :  No mason  Neuro:  AAOx3, non-focal      LABS:                        13.1   9.2   )-----------( 170      ( 2017 14:41 )             37.1         137  |  100  |  36<H>  ----------------------------<  233<H>  4.2   |  22  |  1.37<H>    Ca    9.2      2017 14:22    TPro  7.4  /  Alb  4.0  /  TBili  0.8  /  DBili  x   /  AST  18  /  ALT  14  /  AlkPhos  53  07-31    PT/INR - ( 2017 17:43 )   PT: 12.8 sec;   INR: 1.17 ratio         PTT - ( 2017 17:43 )  PTT:30.1 sec  Urinalysis Basic - ( 2017 18:11 )    Color: Yellow / Appearance: Clear / S.011 / pH: x  Gluc: x / Ketone: Negative  / Bili: Negative / Urobili: Negative   Blood: x / Protein: Negative / Nitrite: Negative   Leuk Esterase: Negative / RBC: 0-2 /HPF / WBC 0-2 /HPF   Sq Epi: x / Non Sq Epi: OCC /HPF / Bacteria: x              RADIOLOGY & ADDITIONAL TESTS:    ---------------------------------------------------------------------------  I personally reviewed: [  ]EKG   [  ]CXR    [  ] CT    [  ]Other  ---------------------------------------------------------------------------

## 2017-07-31 NOTE — DIETITIAN INITIAL EVALUATION ADULT. - NS AS NUTRI INTERV ED CONTENT
Heart Healthy Nutrition Therapy,  CHO counting, diabetes label reading tips, Weight Loss Tips/Priority modifications

## 2017-07-31 NOTE — CONSULT NOTE ADULT - SUBJECTIVE AND OBJECTIVE BOX
PULMONARY CONSULT  Abrahan Flannery MD  [phone]    Initial HPI on admission:  HPI:  NIGHT HOSPITALIST:  Patient UNKNOWN to me previously, assigned to me at this point via the ER and by Dr. Tobias to admit this 86 y/o F--patient seen with spouse in attendance--patient with a history of essential HTN, type 2 diabetes mellitus, presumed asthma, RA with patient on disease modifying agents (DMA) with apparently outpatient parenteral chemotherapy for her RA, steroid dependent and maintained on MTX, last admission to [place] in  for poor controlled FS, with the patient self referring to [place] following 2 weeks of generalized weakness and dizziness.  No LOC.  NO HA, no focal weakness.  Patient with an episode of chills for the past week.  No cough.  No chest pain/pressure.  Patient with poor exercise tolerance for several months but self limits activity to the house for the past few weeks.  No fever.  NO abdominal pain, no red blood per rectum or melena.  No diarrhea.  No back pain, no tearing back pain.  No neck pain.  No hematuria, no dysuria.  No weight loss or anorexia.  No sick contacts or recent travel.  Remaining review of systems not contributory. (2017 21:28)    PAST MEDICAL & SURGICAL HISTORY:  RA (rheumatoid arthritis)  Asthma  DM (diabetes mellitus), type 2  HTN (hypertension), benign  After-cataract of left eye  Tubal occlusion    Allergies    No Known Allergies    Intolerances      FAMILY HISTORY:    REVIEW OF SYSTEMS      General:	    Skin/Breast:  	  Ophthalmologic:  	  ENMT:	    Respiratory and Thorax:  	  Cardiovascular:	    Gastrointestinal:	    Genitourinary:	    Musculoskeletal:	    Neurological:	    Psychiatric:	    Hematology/Lymphatics:	    Endocrine:	    Allergic/Immunologic:	  Social history:     Review of Systems:  CONSTITUTIONAL: No fever, chills, or fatigue  EYES: No eye pain, visual disturbances, or discharge  ENMT:  No difficulty hearing, tinnitus, vertigo; No sinus or throat pain  NECK: No pain or stiffness  RESPIRATORY: Per above  CARDIOVASCULAR: No chest pain, palpitations, dizziness, or leg swelling  GASTROINTESTINAL: No abdominal or epigastric pain. No nausea, vomiting, or hematemesis; No diarrhea or constipation. No melena or hematochezia.  GENITOURINARY: No dysuria, frequency, hematuria, or incontinence  NEUROLOGICAL: No headaches, memory loss, loss of strength, numbness, or tremors  SKIN: No itching, burning, rashes, or lesions   MUSCULOSKELETAL: No joint pain or swelling; No muscle, back, or extremity pain  PSYCHIATRIC: No depression, anxiety, mood swings, or difficulty sleeping      Medications:  MEDICATIONS  (STANDING):  escitalopram 10 milliGRAM(s) Oral daily  folic acid 1 milliGRAM(s) Oral daily  cholecalciferol 1000 Unit(s) Oral daily  piperacillin/tazobactam IVPB. 3.375 Gram(s) IV Intermittent every 12 hours  vancomycin  IVPB   IV Intermittent   vancomycin  IVPB 1000 milliGRAM(s) IV Intermittent every 24 hours  insulin lispro (HumaLOG) corrective regimen sliding scale   SubCutaneous three times a day before meals  insulin lispro (HumaLOG) corrective regimen sliding scale   SubCutaneous at bedtime  dextrose 5%. 1000 milliLiter(s) (50 mL/Hr) IV Continuous <Continuous>  dextrose 50% Injectable 12.5 Gram(s) IV Push once  dextrose 50% Injectable 25 Gram(s) IV Push once  dextrose 50% Injectable 25 Gram(s) IV Push once  heparin  Injectable 5000 Unit(s) SubCutaneous every 8 hours  insulin glargine Injectable (LANTUS) 13 Unit(s) SubCutaneous at bedtime  lactobacillus acidophilus 1 Tablet(s) Oral two times a day with meals  hydrocortisone sodium succinate Injectable 50 milliGRAM(s) IV Push every 8 hours  sodium chloride 0.9%. 1000 milliLiter(s) (60 mL/Hr) IV Continuous <Continuous>    MEDICATIONS  (PRN):  dextrose Gel 1 Dose(s) Oral once PRN Blood Glucose LESS THAN 70 milliGRAM(s)/deciliter  glucagon  Injectable 1 milliGRAM(s) IntraMuscular once PRN Glucose LESS THAN 70 milligrams/deciliter    Vital Signs Last 24 Hrs  T(C): 36.6 (2017 12:40), Max: 37.7 (2017 17:30)  T(F): 97.9 (2017 12:40), Max: 99.9 (2017 17:30)  HR: 84 (2017 12:17) (78 - 107)  BP: 102/66 (2017 12:17) (98/62 - 119/79)  BP(mean): --  RR: 18 (2017 12:40) (16 - 19)  SpO2: 96% (2017 12:40) (95% - 99%)           @ 07: @ 07:00  --------------------------------------------------------  IN: 100 mL / OUT: 600 mL / NET: -500 mL      LABS:                        13.1   9.2   )-----------( 170      ( 2017 14:41 )             37.1         137  |  100  |  36<H>  ----------------------------<  233<H>  4.2   |  22  |  1.37<H>    Ca    9.2      2017 14:22    TPro  7.4  /  Alb  4.0  /  TBili  0.8  /  DBili  x   /  AST  18  /  ALT  14  /  AlkPhos  53        PT/INR - ( 2017 17:43 )   PT: 12.8 sec;   INR: 1.17 ratio         PTT - ( 2017 17:43 )  PTT:30.1 sec  Urinalysis Basic - ( 2017 18:11 )    Color: Yellow / Appearance: Clear / S.011 / pH: x  Gluc: x / Ketone: Negative  / Bili: Negative / Urobili: Negative   Blood: x / Protein: Negative / Nitrite: Negative   Leuk Esterase: Negative / RBC: 0-2 /HPF / WBC 0-2 /HPF   Sq Epi: x / Non Sq Epi: OCC /HPF / Bacteria: x      Procalcitonin, Serum: 0.07 ng/mL (17 @ 17:43)    Serum Pro-Brain Natriuretic Peptide: 134 pg/mL (17 @ 17:43)      CULTURES:        Physical Examination:    General: Non-toxic, no acute distress.      HEENT: Pupils equal, reactive to light.  Symmetric.    PULM: crackles R 1/4; no wheezing or rhonchi;    CVS: Regular rate and rhythm, no murmurs, rubs, or gallops    ABD: Soft, nondistended, nontender, normoactive bowel sounds, no masses    EXT: No edema, nontender    SKIN: Warm and well perfused, no rashes noted.    NEURO: Alert, oriented, interactive, nonfocal    RADIOLOGY REVIEWED PERSONALLY  CXR: limited AP: no gross consolidation    CT chest: Pending    TTE:      Assessment:    Plan:

## 2017-07-31 NOTE — CONSULT NOTE ADULT - ASSESSMENT
RA on chronic prednisone, methotrexate, ? rituxan c/o dizziness, weakness. No clinical or radiographic evidence of pneumonia. Would r/o rhuematoid lung disease. Of note, patient not on PCP prophylaxis.     REC:    CT chest ([facility])  Observe off antibiotics  ? TTE

## 2017-07-31 NOTE — PROGRESS NOTE ADULT - ASSESSMENT
Patient is a 85y old  Female who presents with a chief complaint of Two weeks of generalized weakness, dizziness, episode of chills (30 Jul 2017 21:28)  86 y/o fmeale with hx of RA on mtx , prednsion and iv infusion as o/p.  , HTN admitted with 2 months hx of decreased po intake, weakness , fatigue, and dizziness that has been much more pronounced over the past one to two weeks.  no fever or chills , no N/V but has intermittent diarreah   no urinary sx   dizziness NOT vertigo and no cp/ palpiations   no focal neuro complaints

## 2017-07-31 NOTE — CONSULT NOTE ADULT - SUBJECTIVE AND OBJECTIVE BOX
HPI:  NIGHT HOSPITALIST:  Patient UNKNOWN to me previously, assigned to me at this point via the ER and by Dr. Tobias to admit this 86 y/o F--patient seen with spouse in attendance--patient with a history of essential HTN, type 2 diabetes mellitus, presumed asthma, RA with patient on disease modifying agents (DMA) with apparently outpatient parenteral chemotherapy for her RA, steroid dependent and maintained on MTX, last admission to [place] in 2014 for poor controlled FS, with the patient self referring to [place] following 2 weeks of generalized weakness and dizziness.  No LOC.  NO HA, no focal weakness.  Patient with an episode of chills for the past week.  No cough.  No chest pain/pressure.  Patient with poor exercise tolerance for several months but self limits activity to the house for the past few weeks.  No fever.  NO abdominal pain, no red blood per rectum or melena.  No diarrhea.  No back pain, no tearing back pain.  No neck pain.  No hematuria, no dysuria.  No weight loss or anorexia.  No sick contacts or recent travel.  Remaining review of systems not contributory. (30 Jul 2017 21:28)    Patient has no history of adrenal insufficiency, has been on prednisone, admitted with hypotension, Has history of diabetes, no hypos.    PAST MEDICAL & SURGICAL HISTORY:  RA (rheumatoid arthritis)  Asthma  DM (diabetes mellitus), type 2  HTN (hypertension), benign  After-cataract of left eye  Tubal occlusion      FAMILY HISTORY:      Social History:    Outpatient Medications:    MEDICATIONS  (STANDING):  escitalopram 10 milliGRAM(s) Oral daily  folic acid 1 milliGRAM(s) Oral daily  cholecalciferol 1000 Unit(s) Oral daily  insulin lispro (HumaLOG) corrective regimen sliding scale   SubCutaneous three times a day before meals  insulin lispro (HumaLOG) corrective regimen sliding scale   SubCutaneous at bedtime  dextrose 5%. 1000 milliLiter(s) (50 mL/Hr) IV Continuous <Continuous>  dextrose 50% Injectable 12.5 Gram(s) IV Push once  dextrose 50% Injectable 25 Gram(s) IV Push once  dextrose 50% Injectable 25 Gram(s) IV Push once  heparin  Injectable 5000 Unit(s) SubCutaneous every 8 hours  insulin glargine Injectable (LANTUS) 13 Unit(s) SubCutaneous at bedtime  lactobacillus acidophilus 1 Tablet(s) Oral two times a day with meals  hydrocortisone sodium succinate Injectable 50 milliGRAM(s) IV Push every 8 hours  sodium chloride 0.9%. 1000 milliLiter(s) (60 mL/Hr) IV Continuous <Continuous>    MEDICATIONS  (PRN):  dextrose Gel 1 Dose(s) Oral once PRN Blood Glucose LESS THAN 70 milliGRAM(s)/deciliter  glucagon  Injectable 1 milliGRAM(s) IntraMuscular once PRN Glucose LESS THAN 70 milligrams/deciliter      Allergies    No Known Allergies    Intolerances      Review of Systems:  Constitutional: No fever, no chills  Eyes: No blurry vision  Neuro: No tremors  HEENT: No pain, no neck swelling  Cardiovascular: No chest pain, no palpitations  Respiratory: Has SOB, no cough  GI: No nausea, vomiting, abdominal pain  : No dysuria  Skin: no rash  MSK: Has leg swelling, no foot ulcers.  Psych: no depression  Endocrine: no polyuria, polydipsia    ALL OTHER SYSTEMS REVIEWED AND NEGATIVE    UNABLE TO OBTAIN    PHYSICAL EXAM:  VITALS: T(C): 36.6 (07-31-17 @ 12:40)  T(F): 97.9 (07-31-17 @ 12:40), Max: 98.5 (07-31-17 @ 00:22)  HR: 81 (07-31-17 @ 16:46) (78 - 86)  BP: 125/75 (07-31-17 @ 16:46) (101/65 - 125/75)  RR:  (17 - 18)  SpO2:  (94% - 98%)  Wt(kg): --  GENERAL: NAD, well-groomed, well-developed  EYES: No proptosis, no lid lag  HEENT:  Atraumatic, Normocephalic  THYROID: Normal size, no palpable nodules  RESPIRATORY: Clear to auscultation bilaterally; No rales, rhonchi, wheezing  CARDIOVASCULAR: Si S2, No murmurs;  GI: Soft, non distended, normal bowel sounds  SKIN: Dry, intact, No rashes or lesions  MUSCULOSKELETAL: Has BL lower extremity edema.  NEURO:  no tremor, sensation decreased in feet BL,    CAPILLARY BLOOD GLUCOSE  256 (07-31 @ 17:53)  138 (07-31 @ 13:09)  163 (07-31 @ 08:45)  221 (07-30 @ 20:45)                            13.1   9.2   )-----------( 170      ( 31 Jul 2017 14:41 )             37.1       07-31    137  |  100  |  36<H>  ----------------------------<  233<H>  4.2   |  22  |  1.37<H>    EGFR if : 41<L>  EGFR if non : 35<L>    Ca    9.2      07-31    TPro  7.4  /  Alb  4.0  /  TBili  0.8  /  DBili  x   /  AST  18  /  ALT  14  /  AlkPhos  53  07-31      Thyroid Function Tests:      Hemoglobin A1C, Whole Blood: 7.7 % <H> [4.0 - 5.6] (07-31-17 @ 08:50)          Radiology:

## 2017-07-31 NOTE — CONSULT NOTE ADULT - ASSESSMENT
Assessment  DMT2: 85y Female with DM T2 with hyperglycemia on insulin, blood sugars in acceptable range, no hypoglycemia,  eating meals.  ? Adrenal insufficiency: on iv steroids, stable.  HTN: On meds, controlled  Pneumonia: on Tx, stable.

## 2017-07-31 NOTE — PROGRESS NOTE ADULT - SUBJECTIVE AND OBJECTIVE BOX
[facility] NEPHROLOGY- CONSULTATION NOTE    85 year old female with history of below presents with weakness found to have elevated Scr. Patient denies any prior history of kidney disease and labs from 2016 reveal normal Scr. Patient does admit to poor PO intake prior to admission with diarrhea. Patient also taking losartan/HCTZ at home for h/o hypertension. Patient noted to be hypotensive in ER and started on IVF.    REVIEW OF SYSTEMS:  Gen: no changes in weight  HEENT: no rhinorrhea  Neck: no sore throat  Cards: no chest pain  Resp: no dyspnea  GI: no nausea or vomiting, + diarrhea now resolved  : no dysuria or hematuria  Vascular: no LE edema  Derm: no rashes  Neuro: no numbness/tingling    No Known Allergies      Home Medications Reviewed  Hospital Medications:   MEDICATIONS  (STANDING):  escitalopram 10 milliGRAM(s) Oral daily  folic acid 1 milliGRAM(s) Oral daily  cholecalciferol 1000 Unit(s) Oral daily  piperacillin/tazobactam IVPB. 3.375 Gram(s) IV Intermittent every 12 hours  vancomycin  IVPB   IV Intermittent   vancomycin  IVPB 1000 milliGRAM(s) IV Intermittent every 24 hours  insulin lispro (HumaLOG) corrective regimen sliding scale   SubCutaneous three times a day before meals  insulin lispro (HumaLOG) corrective regimen sliding scale   SubCutaneous at bedtime  dextrose 5%. 1000 milliLiter(s) (50 mL/Hr) IV Continuous <Continuous>  dextrose 50% Injectable 12.5 Gram(s) IV Push once  dextrose 50% Injectable 25 Gram(s) IV Push once  dextrose 50% Injectable 25 Gram(s) IV Push once  heparin  Injectable 5000 Unit(s) SubCutaneous every 8 hours  insulin glargine Injectable (LANTUS) 13 Unit(s) SubCutaneous at bedtime  lactobacillus acidophilus 1 Tablet(s) Oral two times a day with meals  hydrocortisone sodium succinate Injectable 50 milliGRAM(s) IV Push every 8 hours  sodium chloride 0.9%. 1000 milliLiter(s) (60 mL/Hr) IV Continuous <Continuous>      PAST MEDICAL & SURGICAL HISTORY:  RA (rheumatoid arthritis)  Asthma  DM (diabetes mellitus), type 2  HTN (hypertension), benign  After-cataract of left eye  Tubal occlusion      FAMILY HISTORY:      SOCIAL HISTORY:  Denies toxic substance use       VITALS:  T(F): 97.9 (17 @ 12:40), Max: 99.9 (17 @ 17:30)  HR: 84 (17 @ 12:17)  BP: 102/66 (17 @ 12:17)  RR: 18 (17 @ 12:40)  SpO2: 96% (17 @ 12:40)  Wt(kg): --     @ 07:01  -   @ 07:00  --------------------------------------------------------  IN: 100 mL / OUT: 600 mL / NET: -500 mL     @ 07:01  -   @ 16:13  --------------------------------------------------------  IN: 360 mL / OUT: 400 mL / NET: -40 mL      Height (cm): 152.4 ( @ 00:22)  Weight (kg): 63.8 ( @ 00:22)  BMI (kg/m2): 27.5 ( @ 00:22)  BSA (m2): 1.61 ( @ 00:22)    PHYSICAL EXAM:  Gen: NAD, calm  HEENT: MMM  Neck: no JVD  Cards: RRR, +S1/S2, no M/G/R  Resp: CTA B/L  GI: soft, NT/ND, NABS  : no CVA tenderness  Vascular: no LE edema B/L  Derm: no rashes  Neuro: non-focal      LABS:      137  |  100  |  36<H>  ----------------------------<  233<H>  4.2   |  22  |  1.37<H>    Ca    9.2      2017 14:22    TPro  7.4  /  Alb  4.0  /  TBili  0.8  /  DBili      /  AST  18  /  ALT  14  /  AlkPhos  53      Creatinine Trend: 1.37 <--, 1.88 <--                        13.1   9.2   )-----------( 170      ( 2017 14:41 )             37.1     Urine Studies:  Urinalysis Basic - ( 2017 18:11 )    Color: Yellow / Appearance: Clear / S.011 / pH:   Gluc:  / Ketone: Negative  / Bili: Negative / Urobili: Negative   Blood:  / Protein: Negative / Nitrite: Negative   Leuk Esterase: Negative / RBC: 0-2 /HPF / WBC 0-2 /HPF   Sq Epi:  / Non Sq Epi: OCC /HPF / Bacteria:       Creatinine, Random Urine: 31 mg/dL ( @ 06:08)  Creatinine, Random Urine: 31 mg/dL ( @ 06:08)

## 2017-07-31 NOTE — PHYSICAL THERAPY INITIAL EVALUATION ADULT - LEVEL OF INDEPENDENCE, REHAB EVAL
TBA rece'd in sitting; pt on hold for functonal b/c orthostatic earlier independent/independent per pt report, but pt declined to demonstrate

## 2017-08-01 DIAGNOSIS — E83.42 HYPOMAGNESEMIA: ICD-10-CM

## 2017-08-01 LAB
ALBUMIN SERPL ELPH-MCNC: 3.3 G/DL — SIGNIFICANT CHANGE UP (ref 3.3–5)
ALP SERPL-CCNC: 43 U/L — SIGNIFICANT CHANGE UP (ref 40–120)
ALT FLD-CCNC: 12 U/L RC — SIGNIFICANT CHANGE UP (ref 10–45)
ANION GAP SERPL CALC-SCNC: 14 MMOL/L — SIGNIFICANT CHANGE UP (ref 5–17)
ANION GAP SERPL CALC-SCNC: 14 MMOL/L — SIGNIFICANT CHANGE UP (ref 5–17)
AST SERPL-CCNC: 15 U/L — SIGNIFICANT CHANGE UP (ref 10–40)
BASOPHILS # BLD AUTO: 0 K/UL — SIGNIFICANT CHANGE UP (ref 0–0.2)
BASOPHILS NFR BLD AUTO: 0.2 % — SIGNIFICANT CHANGE UP (ref 0–2)
BILIRUB SERPL-MCNC: 0.6 MG/DL — SIGNIFICANT CHANGE UP (ref 0.2–1.2)
BUN SERPL-MCNC: 32 MG/DL — HIGH (ref 7–23)
BUN SERPL-MCNC: 34 MG/DL — HIGH (ref 7–23)
CALCIUM SERPL-MCNC: 8.5 MG/DL — SIGNIFICANT CHANGE UP (ref 8.4–10.5)
CALCIUM SERPL-MCNC: 8.7 MG/DL — SIGNIFICANT CHANGE UP (ref 8.4–10.5)
CHLORIDE SERPL-SCNC: 100 MMOL/L — SIGNIFICANT CHANGE UP (ref 96–108)
CHLORIDE SERPL-SCNC: 103 MMOL/L — SIGNIFICANT CHANGE UP (ref 96–108)
CO2 SERPL-SCNC: 20 MMOL/L — LOW (ref 22–31)
CO2 SERPL-SCNC: 21 MMOL/L — LOW (ref 22–31)
CREAT SERPL-MCNC: 1.05 MG/DL — SIGNIFICANT CHANGE UP (ref 0.5–1.3)
CREAT SERPL-MCNC: 1.13 MG/DL — SIGNIFICANT CHANGE UP (ref 0.5–1.3)
EOSINOPHIL # BLD AUTO: 0 K/UL — SIGNIFICANT CHANGE UP (ref 0–0.5)
EOSINOPHIL NFR BLD AUTO: 0.6 % — SIGNIFICANT CHANGE UP (ref 0–6)
GLUCOSE SERPL-MCNC: 237 MG/DL — HIGH (ref 70–99)
GLUCOSE SERPL-MCNC: 332 MG/DL — HIGH (ref 70–99)
HCT VFR BLD CALC: 31.1 % — LOW (ref 34.5–45)
HGB BLD-MCNC: 11.2 G/DL — LOW (ref 11.5–15.5)
LYMPHOCYTES # BLD AUTO: 1.6 K/UL — SIGNIFICANT CHANGE UP (ref 1–3.3)
LYMPHOCYTES # BLD AUTO: 22 % — SIGNIFICANT CHANGE UP (ref 13–44)
MAGNESIUM SERPL-MCNC: 1.3 MG/DL — LOW (ref 1.6–2.6)
MAGNESIUM SERPL-MCNC: 1.9 MG/DL — SIGNIFICANT CHANGE UP (ref 1.6–2.6)
MCHC RBC-ENTMCNC: 35.1 PG — HIGH (ref 27–34)
MCHC RBC-ENTMCNC: 35.9 GM/DL — SIGNIFICANT CHANGE UP (ref 32–36)
MCV RBC AUTO: 97.7 FL — SIGNIFICANT CHANGE UP (ref 80–100)
MONOCYTES # BLD AUTO: 0.6 K/UL — SIGNIFICANT CHANGE UP (ref 0–0.9)
MONOCYTES NFR BLD AUTO: 8 % — SIGNIFICANT CHANGE UP (ref 2–14)
NEUTROPHILS # BLD AUTO: 5.1 K/UL — SIGNIFICANT CHANGE UP (ref 1.8–7.4)
NEUTROPHILS NFR BLD AUTO: 69.3 % — SIGNIFICANT CHANGE UP (ref 43–77)
PHOSPHATE SERPL-MCNC: 2.7 MG/DL — SIGNIFICANT CHANGE UP (ref 2.5–4.5)
PLATELET # BLD AUTO: 142 K/UL — LOW (ref 150–400)
POTASSIUM SERPL-MCNC: 3.7 MMOL/L — SIGNIFICANT CHANGE UP (ref 3.5–5.3)
POTASSIUM SERPL-MCNC: 4 MMOL/L — SIGNIFICANT CHANGE UP (ref 3.5–5.3)
POTASSIUM SERPL-SCNC: 3.7 MMOL/L — SIGNIFICANT CHANGE UP (ref 3.5–5.3)
POTASSIUM SERPL-SCNC: 4 MMOL/L — SIGNIFICANT CHANGE UP (ref 3.5–5.3)
PROT SERPL-MCNC: 6.2 G/DL — SIGNIFICANT CHANGE UP (ref 6–8.3)
RBC # BLD: 3.18 M/UL — LOW (ref 3.8–5.2)
RBC # FLD: 13.6 % — SIGNIFICANT CHANGE UP (ref 10.3–14.5)
SODIUM SERPL-SCNC: 135 MMOL/L — SIGNIFICANT CHANGE UP (ref 135–145)
SODIUM SERPL-SCNC: 137 MMOL/L — SIGNIFICANT CHANGE UP (ref 135–145)
WBC # BLD: 7.4 K/UL — SIGNIFICANT CHANGE UP (ref 3.8–10.5)
WBC # FLD AUTO: 7.4 K/UL — SIGNIFICANT CHANGE UP (ref 3.8–10.5)

## 2017-08-01 PROCEDURE — 93306 TTE W/DOPPLER COMPLETE: CPT | Mod: 26

## 2017-08-01 RX ORDER — METHOTREXATE 2.5 MG/1
17.5 TABLET ORAL ONCE
Qty: 0 | Refills: 0 | Status: DISCONTINUED | OUTPATIENT
Start: 2017-08-01 | End: 2017-08-01

## 2017-08-01 RX ORDER — HYDROCORTISONE 20 MG
50 TABLET ORAL EVERY 24 HOURS
Qty: 0 | Refills: 0 | Status: DISCONTINUED | OUTPATIENT
Start: 2017-08-01 | End: 2017-08-02

## 2017-08-01 RX ORDER — MAGNESIUM SULFATE 500 MG/ML
2 VIAL (ML) INJECTION ONCE
Qty: 0 | Refills: 0 | Status: COMPLETED | OUTPATIENT
Start: 2017-08-01 | End: 2017-08-01

## 2017-08-01 RX ORDER — HYDROCORTISONE 20 MG
50 TABLET ORAL EVERY 12 HOURS
Qty: 0 | Refills: 0 | Status: DISCONTINUED | OUTPATIENT
Start: 2017-08-01 | End: 2017-08-01

## 2017-08-01 RX ORDER — METHOTREXATE 2.5 MG/1
17.5 TABLET ORAL
Qty: 0 | Refills: 0 | Status: CANCELLED | OUTPATIENT
Start: 2017-08-14 | End: 2017-08-04

## 2017-08-01 RX ORDER — METHOTREXATE 2.5 MG/1
17.5 TABLET ORAL ONCE
Qty: 0 | Refills: 0 | Status: COMPLETED | OUTPATIENT
Start: 2017-08-01 | End: 2017-08-01

## 2017-08-01 RX ORDER — INSULIN LISPRO 100/ML
5 VIAL (ML) SUBCUTANEOUS
Qty: 0 | Refills: 0 | Status: DISCONTINUED | OUTPATIENT
Start: 2017-08-01 | End: 2017-08-03

## 2017-08-01 RX ADMIN — Medication 50 MILLIGRAM(S): at 13:21

## 2017-08-01 RX ADMIN — ESCITALOPRAM OXALATE 10 MILLIGRAM(S): 10 TABLET, FILM COATED ORAL at 09:51

## 2017-08-01 RX ADMIN — Medication 5 UNIT(S): at 18:42

## 2017-08-01 RX ADMIN — Medication 1 MILLIGRAM(S): at 09:51

## 2017-08-01 RX ADMIN — HEPARIN SODIUM 5000 UNIT(S): 5000 INJECTION INTRAVENOUS; SUBCUTANEOUS at 05:56

## 2017-08-01 RX ADMIN — Medication 1 TABLET(S): at 09:51

## 2017-08-01 RX ADMIN — Medication 2: at 13:21

## 2017-08-01 RX ADMIN — Medication 1000 UNIT(S): at 09:51

## 2017-08-01 RX ADMIN — Medication 50 MILLIGRAM(S): at 05:56

## 2017-08-01 RX ADMIN — Medication 1: at 21:42

## 2017-08-01 RX ADMIN — Medication 50 GRAM(S): at 13:21

## 2017-08-01 RX ADMIN — Medication 2: at 09:50

## 2017-08-01 RX ADMIN — HEPARIN SODIUM 5000 UNIT(S): 5000 INJECTION INTRAVENOUS; SUBCUTANEOUS at 13:21

## 2017-08-01 RX ADMIN — HEPARIN SODIUM 5000 UNIT(S): 5000 INJECTION INTRAVENOUS; SUBCUTANEOUS at 21:42

## 2017-08-01 RX ADMIN — Medication 4: at 18:42

## 2017-08-01 RX ADMIN — Medication 1 TABLET(S): at 18:43

## 2017-08-01 RX ADMIN — INSULIN GLARGINE 13 UNIT(S): 100 INJECTION, SOLUTION SUBCUTANEOUS at 21:42

## 2017-08-01 RX ADMIN — METHOTREXATE 17.5 MILLIGRAM(S): 2.5 TABLET ORAL at 18:42

## 2017-08-01 NOTE — PROGRESS NOTE ADULT - SUBJECTIVE AND OBJECTIVE BOX
Patient is a 85y old  Female who presents with a chief complaint of Two weeks of generalized weakness, dizziness, episode of chills (2017 21:28)      INTERVAL HPI/OVERNIGHT EVENTS:    REVIEW OF SYSTEMS:    CONSTITUTIONAL: No weakness, fevers or chills  RESPIRATORY: No cough, wheezing,  No shortness of breath  CARDIOVASCULAR: No chest pain or palpitations  GASTROINTESTINAL: No abdominal pain . No nausea, vomiting, or hematemesis; No diarrhea or constipation. No melena or hematochezia.  GENITOURINARY: No dysuria, frequency or hematuria  NEUROLOGICAL: No numbness or weakness  SKIN: No itching, burning, rashes, or lesions       Medications:   MEDICATIONS  (STANDING):  escitalopram 10 milliGRAM(s) Oral daily  folic acid 1 milliGRAM(s) Oral daily  cholecalciferol 1000 Unit(s) Oral daily  insulin lispro (HumaLOG) corrective regimen sliding scale   SubCutaneous three times a day before meals  insulin lispro (HumaLOG) corrective regimen sliding scale   SubCutaneous at bedtime  dextrose 5%. 1000 milliLiter(s) (50 mL/Hr) IV Continuous <Continuous>  dextrose 50% Injectable 12.5 Gram(s) IV Push once  dextrose 50% Injectable 25 Gram(s) IV Push once  dextrose 50% Injectable 25 Gram(s) IV Push once  heparin  Injectable 5000 Unit(s) SubCutaneous every 8 hours  insulin glargine Injectable (LANTUS) 13 Unit(s) SubCutaneous at bedtime  lactobacillus acidophilus 1 Tablet(s) Oral two times a day with meals  insulin lispro Injectable (HumaLOG) 5 Unit(s) SubCutaneous three times a day before meals  hydrocortisone sodium succinate Injectable 50 milliGRAM(s) IV Push every 24 hours    MEDICATIONS  (PRN):  dextrose Gel 1 Dose(s) Oral once PRN Blood Glucose LESS THAN 70 milliGRAM(s)/deciliter  glucagon  Injectable 1 milliGRAM(s) IntraMuscular once PRN Glucose LESS THAN 70 milligrams/deciliter      Allergies    No Known Allergies    Intolerances          Vital Signs Last 24 Hrs  T(C): 36.8 (01 Aug 2017 21:41), Max: 36.8 (01 Aug 2017 21:41)  T(F): 98.2 (01 Aug 2017 21:41), Max: 98.2 (01 Aug 2017 21:41)  HR: 71 (01 Aug 2017 21:41) (71 - 82)  BP: 135/71 (01 Aug 2017 21:41) (118/71 - 135/83)  BP(mean): --  RR: 17 (01 Aug 2017 21:41) (17 - 18)  SpO2: 97% (01 Aug 2017 21:41) (95% - 97%)  CAPILLARY BLOOD GLUCOSE  262 (01 Aug 2017 21:11)  305 (01 Aug 2017 18:12)  233 (01 Aug 2017 13:06)  210 (01 Aug 2017 09:08)          07-31 @ : @ 07:00  --------------------------------------------------------  IN: 1360 mL / OUT: 600 mL / NET: 760 mL     @ : @ 22:38  --------------------------------------------------------  IN: 580 mL / OUT: 450 mL / NET: 130 mL        Physical Exam:      Daily Weight in k.8 (01 Aug 2017 06:19)  General:  , NAD  HEENT:  Nonicteric, PERRLA  CV:  RRR, S1S2   Lungs:  crackles at bases  Abdomen:  Soft, non-tender, no distended, positive BS  Extremities:  2+ pulses, no c/c, no edema  Skin:  Warm and dry, no rashes  :  No mason  Neuro:  AAOx3, non-focal, grossly intact      LABS:                        11.2   7.4   )-----------( 142      ( 01 Aug 2017 06:27 )             31.1         135  |  100  |  32<H>  ----------------------------<  332<H>  4.0   |  21<L>  |  1.13    Ca    8.7      01 Aug 2017 19:38  Phos  2.7     08  Mg     1.9         TPro  6.2  /  Alb  3.3  /  TBili  0.6  /  DBili  x   /  AST  15  /  ALT  12  /  AlkPhos  43                  RADIOLOGY & ADDITIONAL TESTS:    ---------------------------------------------------------------------------  I personally reviewed: [  ]EKG   [  ]CXR    [  ] CT    [  ]Other  ---------------------------------------------------------------------------

## 2017-08-01 NOTE — PROGRESS NOTE ADULT - SUBJECTIVE AND OBJECTIVE BOX
Chief complaint  Patient is a 85y old  Female who presents with a chief complaint of Two weeks of generalized weakness, dizziness, episode of chills (30 Jul 2017 21:28)   Review of systems  Patient in bed, comfortable, no fever,  no hypoglycemia.    Labs and Fingersticks    CAPILLARY BLOOD GLUCOSE  262 (01 Aug 2017 21:11)  305 (01 Aug 2017 18:12)  233 (01 Aug 2017 13:06)  210 (01 Aug 2017 09:08)  349 (31 Jul 2017 21:39)  256 (31 Jul 2017 17:53)  138 (31 Jul 2017 13:09)  163 (31 Jul 2017 08:45)  221 (30 Jul 2017 20:45)    Anion Gap, Serum: 14 (08-01 @ 19:38)  Anion Gap, Serum: 14 (08-01 @ 06:27)  Anion Gap, Serum: 15 (07-31 @ 14:22)    Hemoglobin A1C, Whole Blood: 7.7 <H> (07-31 @ 08:50)    Calcium, Total Serum: 8.7 (08-01 @ 19:38)  Calcium, Total Serum: 8.5 (08-01 @ 06:27)  Calcium, Total Serum: 9.2 (07-31 @ 14:22)  Albumin, Serum: 3.3 (08-01 @ 06:27)  Albumin, Serum: 4.0 (07-31 @ 14:22)    Alanine Aminotransferase (ALT/SGPT): 12 (08-01 @ 06:27)  Alanine Aminotransferase (ALT/SGPT): 14 (07-31 @ 14:22)  Alkaline Phosphatase, Serum: 43 (08-01 @ 06:27)  Alkaline Phosphatase, Serum: 53 (07-31 @ 14:22)  Aspartate Aminotransferase (AST/SGOT): 15 (08-01 @ 06:27)  Aspartate Aminotransferase (AST/SGOT): 18 (07-31 @ 14:22)        08-01    135  |  100  |  32<H>  ----------------------------<  332<H>  4.0   |  21<L>  |  1.13    Ca    8.7      01 Aug 2017 19:38  Phos  2.7     08-01  Mg     1.9     08-01    TPro  6.2  /  Alb  3.3  /  TBili  0.6  /  DBili  x   /  AST  15  /  ALT  12  /  AlkPhos  43  08-01                        11.2   7.4   )-----------( 142      ( 01 Aug 2017 06:27 )             31.1     Medications  MEDICATIONS  (STANDING):  escitalopram 10 milliGRAM(s) Oral daily  folic acid 1 milliGRAM(s) Oral daily  cholecalciferol 1000 Unit(s) Oral daily  insulin lispro (HumaLOG) corrective regimen sliding scale   SubCutaneous three times a day before meals  insulin lispro (HumaLOG) corrective regimen sliding scale   SubCutaneous at bedtime  dextrose 5%. 1000 milliLiter(s) (50 mL/Hr) IV Continuous <Continuous>  dextrose 50% Injectable 12.5 Gram(s) IV Push once  dextrose 50% Injectable 25 Gram(s) IV Push once  dextrose 50% Injectable 25 Gram(s) IV Push once  heparin  Injectable 5000 Unit(s) SubCutaneous every 8 hours  insulin glargine Injectable (LANTUS) 13 Unit(s) SubCutaneous at bedtime  lactobacillus acidophilus 1 Tablet(s) Oral two times a day with meals  insulin lispro Injectable (HumaLOG) 5 Unit(s) SubCutaneous three times a day before meals  hydrocortisone sodium succinate Injectable 50 milliGRAM(s) IV Push every 24 hours      Physical Exam  General: Patient comfortable in bed  Vital Signs Last 12 Hrs  T(F): 97.8 (08-01-17 @ 12:19), Max: 97.8 (08-01-17 @ 12:19)  HR: 82 (08-01-17 @ 12:19) (76 - 82)  BP: 135/83 (08-01-17 @ 12:19) (126/74 - 135/83)  BP(mean): --  RR: 18 (08-01-17 @ 12:19) (18 - 18)  SpO2: 95% (08-01-17 @ 12:19) (95% - 96%)  Neck: No palpable thyroid nodules.  CVS: S1S2, No murmurs  Respiratory: No wheezing, no crepitations  GI: Abdomen soft, bowel sounds positive  Musculoskeletal: Positive edema lower extremities bilaterally  Skin: No skin rashes, no ecchimosis    Diagnostics

## 2017-08-01 NOTE — PROGRESS NOTE ADULT - SUBJECTIVE AND OBJECTIVE BOX
Follow-up Pulm Progress Note  Abrahan Flannery MD  [phone]    No new respiratory events overnight.  Denies SOB/CP.   CT chest reviewed: Blateral upper lobe reticular opacities - see on 2014 CT - possible c/w RA associated frbrosis vs scarring  No evidence of pneumonia    Medications:  Vital Signs Last 24 Hrs  T(C): 36.6 (01 Aug 2017 12:19), Max: 36.6 (01 Aug 2017 04:38)  T(F): 97.8 (01 Aug 2017 12:19), Max: 97.8 (01 Aug 2017 04:38)  HR: 82 (01 Aug 2017 12:19) (76 - 86)  BP: 135/83 (01 Aug 2017 12:19) (109/58 - 135/83)  BP(mean): --  RR: 18 (01 Aug 2017 12:19) (18 - 18)  SpO2: 95% (01 Aug 2017 12:19) (94% - 96%)      07-31 @ 07:01  -  08-01 @ 07:00  --------------------------------------------------------  IN: 1360 mL / OUT: 600 mL / NET: 760 mL        LABS:                        11.2   7.4   )-----------( 142      ( 01 Aug 2017 06:27 )             31.1     08-01    137  |  103  |  34<H>  ----------------------------<  237<H>  3.7   |  20<L>  |  1.05    Ca    8.5      01 Aug 2017 06:27  Phos  2.7     08-01  Mg     1.3     08-01    TPro  6.2  /  Alb  3.3  /  TBili  0.6  /  DBili  x   /  AST  15  /  ALT  12  /  AlkPhos  43  08-01      PT/INR - ( 30 Jul 2017 17:43 )   PT: 12.8 sec;   INR: 1.17 ratio         PTT - ( 30 Jul 2017 17:43 )  PTT:30.1 sec  Procalcitonin, Serum: 0.07 ng/mL (07-30-17 @ 17:43)    Serum Pro-Brain Natriuretic Peptide: 134 pg/mL (07-30-17 @ 17:43)      CULTURES:        Physical Examination:  PULM:   CVS: Regular rate and rhythm, no murmurs, rubs, or gallops  ABD: Soft, non-tender  EXT:  No clubbing, cyanosis, or edema    RADIOLOGY REVIEWED  CXR:    CT chest:    TTE:

## 2017-08-01 NOTE — PROGRESS NOTE ADULT - ASSESSMENT
Assessment  DMT2: 85y Female with DM T2 with hyperglycemia on insulin, blood sugars in acceptable range, no hypoglycemia,  eating meals, feeling better.  ? Adrenal insufficiency: on iv steroids, stable.  HTN: On meds, controlled  Pneumonia: on Tx, stable.

## 2017-08-01 NOTE — CONSULT NOTE ADULT - SUBJECTIVE AND OBJECTIVE BOX
HISTORY OF PRESENT ILLNESS:  84 yo female with h/o RA; well controlled on MTx and prednisone, DM, HTN, Asthma.  she was admitted after episode of dizzyness and weakness; her weakness was progressing over a few weeks, and she was brought to ER when family found that she was unable to get OOB.  Denies current joint pains.  She is followed by a rheumatologist in [facility]; can't remember the name.  was placed on empiric Abx and stress dose hydrocortisone since admission and is feeling much improved.    PAST MEDICAL & SURGICAL HISTORY:  RA (rheumatoid arthritis)  Asthma  DM (diabetes mellitus), type 2  HTN (hypertension), benign  After-cataract of left eye  Tubal occlusion        MEDICATIONS  (STANDING):  escitalopram 10 milliGRAM(s) Oral daily  folic acid 1 milliGRAM(s) Oral daily  cholecalciferol 1000 Unit(s) Oral daily  insulin lispro (HumaLOG) corrective regimen sliding scale   SubCutaneous three times a day before meals  insulin lispro (HumaLOG) corrective regimen sliding scale   SubCutaneous at bedtime  dextrose 5%. 1000 milliLiter(s) (50 mL/Hr) IV Continuous <Continuous>  dextrose 50% Injectable 12.5 Gram(s) IV Push once  dextrose 50% Injectable 25 Gram(s) IV Push once  dextrose 50% Injectable 25 Gram(s) IV Push once  heparin  Injectable 5000 Unit(s) SubCutaneous every 8 hours  insulin glargine Injectable (LANTUS) 13 Unit(s) SubCutaneous at bedtime  lactobacillus acidophilus 1 Tablet(s) Oral two times a day with meals  hydrocortisone sodium succinate Injectable 50 milliGRAM(s) IV Push every 12 hours  insulin lispro Injectable (HumaLOG) 5 Unit(s) SubCutaneous three times a day before meals  methotrexate (Non - oncologic) 17.5 milliGRAM(s) Oral once    MEDICATIONS  (PRN):  dextrose Gel 1 Dose(s) Oral once PRN Blood Glucose LESS THAN 70 milliGRAM(s)/deciliter  glucagon  Injectable 1 milliGRAM(s) IntraMuscular once PRN Glucose LESS THAN 70 milligrams/deciliter      Allergies    No Known Allergies    Intolerances        PERTINENT MEDICATION HISTORY:    SOCIAL HISTORY:    FAMILY HISTORY:      Vital Signs Last 24 Hrs  T(C): 36.6 (01 Aug 2017 12:19), Max: 36.6 (01 Aug 2017 04:38)  T(F): 97.8 (01 Aug 2017 12:19), Max: 97.8 (01 Aug 2017 04:38)  HR: 82 (01 Aug 2017 12:19) (76 - 86)  BP: 135/83 (01 Aug 2017 12:19) (109/58 - 135/83)  BP(mean): --  RR: 18 (01 Aug 2017 12:19) (18 - 18)  SpO2: 95% (01 Aug 2017 12:19) (94% - 96%)    Daily     Daily Weight in k.8 (01 Aug 2017 06:19)    PHYSICAL EXAM:      Constitutional:  well appearing    Eyes:  EOMI    ENMT:    Neck:  supple, no adenopathy    Breasts:    Back:    Respiratory:    Cardiovascular:    Gastrointestinal:  abd-soft, nt, nd    Genitourinary:    Rectal:    Extremities:  No edema    Vascular:    Neurological:    Skin:    Lymph Nodes:    Musculoskeletal:  No active synovitis, +Hallux Valgus noted, non tender    Psychiatric:  appropriate      LABS:                        11.2   7.4   )-----------( 142      ( 01 Aug 2017 06:27 )             31.1     08-    137  |  103  |  34<H>  ----------------------------<  237<H>  3.7   |  20<L>  |  1.05    Ca    8.5      01 Aug 2017 06:27  Phos  2.7       Mg     1.3         TPro  6.2  /  Alb  3.3  /  TBili  0.6  /  DBili  x   /  AST  15  /  ALT  12  /  AlkPhos  43  08-          RADIOLOGY & ADDITIONAL STUDIES:  < from: CT Chest No Cont (17 @ 14:53) >  CHEST:     LUNGS AND LARGE AIRWAYS: Patent central airways. Bilateral upper lobe   reticular changes consistent with scarring with focal calcifications and   areas of air trapping. No parenchymal consolidation. Stable 6 mm region   of juxtapleural thickening in the right middle lobe.  PLEURA: No pleural effusion.  VESSELS: Normal left-sided aortic arch and descending aorta. No aneurysm.   Atherosclerotic changes.  HEART: Heart size is normal. No pericardial effusion. Atherosclerotic   calcifications involving the mitral annular, aortic valve and left   anterior descending coronary artery.  MEDIASTINUM AND SHILPA: No lymphadenopathy.  CHEST WALL AND LOWER NECK: Within normallimits.  VISUALIZED UPPER ABDOMEN: Within normal limits.  BONES: Multilevel spinal degenerative changes.     IMPRESSION:     Mosaic attenuation of the lungs with scarring as seen previously. Stable   6 mm region of juxtapleural thickening in the right middle lobe.        < end of copied text >

## 2017-08-01 NOTE — PROGRESS NOTE ADULT - ASSESSMENT
RA  Probalble mild fibrosis, RA assocated  No clinical or radiographic evidence of pneumonia    REC:    Outpatient PFT's nd pulm f/u with CT screening  No further pulmnary intervention at this time

## 2017-08-01 NOTE — PROGRESS NOTE ADULT - SUBJECTIVE AND OBJECTIVE BOX
[facility] NEPHROLOGY- PROGRESS NOTE    85 year old female h/o HTN presents with weakness found to have ADINA and hyponatremia.    REVIEW OF SYSTEMS:  Gen: no changes in weight  Cards: no chest pain  Resp: no dyspnea  GI: no nausea or vomiting or diarrhea  Vascular: no LE edema    No Known Allergies      Hospital Medications: Medications reviewed    VITALS:  T(F): 97.8 (08-01-17 @ 04:38), Max: 97.9 (07-31-17 @ 12:17)  HR: 80 (08-01-17 @ 04:38)  BP: 118/71 (08-01-17 @ 04:38)  RR: 18 (08-01-17 @ 04:38)  SpO2: 96% (08-01-17 @ 04:38)  Wt(kg): --  Height (cm): 152.4 (07-31 @ 00:22)  Weight (kg): 63.8 (07-31 @ 00:22)  BMI (kg/m2): 27.5 (07-31 @ 00:22)  BSA (m2): 1.61 (07-31 @ 00:22)    07-31 @ 07:01  -  08-01 @ 07:00  --------------------------------------------------------  IN: 1360 mL / OUT: 600 mL / NET: 760 mL      PHYSICAL EXAM:    Gen: NAD, calm  Cards: RRR, +S1/S2, no M/G/R  Resp: CTA B/L  GI: soft, NT/ND, NABS  Vascular: no LE edema B/L      LABS:  08-01    137  |  103  |  34<H>  ----------------------------<  237<H>  3.7   |  20<L>  |  1.05    Ca    8.5      01 Aug 2017 06:27  Phos  2.7     08-01  Mg     1.3     08-01    TPro  6.2  /  Alb  3.3  /  TBili  0.6  /  DBili      /  AST  15  /  ALT  12  /  AlkPhos  43  08-01    Creatinine Trend: 1.05 <--, 1.37 <--, 1.88 <--                        11.2   7.4   )-----------( 142      ( 01 Aug 2017 06:27 )             31.1

## 2017-08-01 NOTE — PROGRESS NOTE ADULT - SUBJECTIVE AND OBJECTIVE BOX
CC: f/u for feeling weak and dizzy    Patient reports she feels much better, no diarrhea, eating well    REVIEW OF SYSTEMS:  All other review of systems negative (Comprehensive ROS)    Antimicrobials Day #  :  off  Other Medications Reviewed    T(F): 97.8 (08-01-17 @ 12:19), Max: 97.8 (08-01-17 @ 04:38)  HR: 82 (08-01-17 @ 12:19)  BP: 135/83 (08-01-17 @ 12:19)  RR: 18 (08-01-17 @ 12:19)  SpO2: 95% (08-01-17 @ 12:19)  Wt(kg): --    PHYSICAL EXAM:  General: alert, no acute distress  Eyes:  anicteric, no conjunctival injection, no discharge  Oropharynx: no lesions or injection 	  Neck: supple, without adenopathy  Lungs: course to auscultation  Heart: regular rate and rhythm; 2/6 sys m  Abdomen: soft, nondistended, nontender, without mass or organomegaly  Skin: no lesions  Extremities: no clubbing, cyanosis, or edema  Neurologic: alert, oriented, moves all extremities    LAB RESULTS:                        11.2   7.4   )-----------( 142      ( 01 Aug 2017 06:27 )             31.1     08-01    137  |  103  |  34<H>  ----------------------------<  237<H>  3.7   |  20<L>  |  1.05    Ca    8.5      01 Aug 2017 06:27  Phos  2.7     08-01  Mg     1.3     08-01    TPro  6.2  /  Alb  3.3  /  TBili  0.6  /  DBili  x   /  AST  15  /  ALT  12  /  AlkPhos  43  08-01    LIVER FUNCTIONS - ( 01 Aug 2017 06:27 )  Alb: 3.3 g/dL / Pro: 6.2 g/dL / ALK PHOS: 43 U/L / ALT: 12 U/L RC / AST: 15 U/L / GGT: x             MICROBIOLOGY:  RECENT CULTURES:  Culture - Blood (07.31.17 @ 00:36)    Specimen Source: .Blood Blood    Culture Results:   No growth to date.    Urinalysis + Microscopic Examination (07.30.17 @ 18:11)    Urine Appearance: Clear    Urobilinogen: Negative    Blood, Urine: Negative    pH Urine: 5.5    Ketone - Urine: Negative    Leukocyte Esterase Concentration: Negative    Bilirubin: Negative    Color: Yellow    Glucose Qualitative, Urine: Negative    Nitrite: Negative    Protein, Urine: Negative    Specific [place]: 1.011    Red Blood Cell - Urine: 0-2 /HPF    White Blood Cell - Urine: 0-2 /HPF    Epithelial Cells: OCC /HPF        RADIOLOGY REVIEWED:    < from: CT Chest No Cont (07.31.17 @ 14:53) >  EXAM:  CT CHEST                            PROCEDURE DATE:  07/31/2017            INTERPRETATION:  CLINICAL INFORMATION: Shortness of breath    COMPARISON: CT Chest on 3/23/2014.    PROCEDURE:   CT of the Chest was performed without intravenous contrast.  Sagittal and coronal reformats were performed.      FINDINGS:    CHEST:     LUNGS AND LARGE AIRWAYS: Patent central airways. Bilateral upper lobe   reticular changes consistent with scarring with focal calcifications and   areas of air trapping. No parenchymal consolidation. Stable 6 mm region   of juxtapleural thickening in the right middle lobe.  PLEURA: No pleural effusion.  VESSELS: Normal left-sided aortic arch and descending aorta. No aneurysm.   Atherosclerotic changes.  HEART: Heart size is normal. No pericardial effusion. Atherosclerotic   calcifications involving the mitral annular, aortic valve and left   anterior descending coronary artery.  MEDIASTINUM AND SHILPA: No lymphadenopathy.  CHEST WALL AND LOWER NECK: Within normallimits.  VISUALIZED UPPER ABDOMEN: Within normal limits.  BONES: Multilevel spinal degenerative changes.     IMPRESSION:     Mosaic attenuation of the lungs with scarring as seen previously. Stable   6 mm region of juxtapleural thickening in the right middle lobe.          < end of copied text >    Assessment:  Patient with RA on prednisone and mtx admitted with nonspecific weakness and dizziness, no infection found but could have had a viral syndrome causing relative adrenal insufficiency  Plan:  Monitor off antibiotics

## 2017-08-02 DIAGNOSIS — E87.6 HYPOKALEMIA: ICD-10-CM

## 2017-08-02 LAB
ANION GAP SERPL CALC-SCNC: 10 MMOL/L — SIGNIFICANT CHANGE UP (ref 5–17)
BUN SERPL-MCNC: 30 MG/DL — HIGH (ref 7–23)
CALCIUM SERPL-MCNC: 8.6 MG/DL — SIGNIFICANT CHANGE UP (ref 8.4–10.5)
CHLORIDE SERPL-SCNC: 106 MMOL/L — SIGNIFICANT CHANGE UP (ref 96–108)
CO2 SERPL-SCNC: 25 MMOL/L — SIGNIFICANT CHANGE UP (ref 22–31)
CREAT SERPL-MCNC: 1.06 MG/DL — SIGNIFICANT CHANGE UP (ref 0.5–1.3)
GLUCOSE SERPL-MCNC: 105 MG/DL — HIGH (ref 70–99)
HCT VFR BLD CALC: 31.1 % — LOW (ref 34.5–45)
HGB BLD-MCNC: 11.3 G/DL — LOW (ref 11.5–15.5)
MAGNESIUM SERPL-MCNC: 1.7 MG/DL — SIGNIFICANT CHANGE UP (ref 1.6–2.6)
MCHC RBC-ENTMCNC: 35.6 PG — HIGH (ref 27–34)
MCHC RBC-ENTMCNC: 36.4 GM/DL — HIGH (ref 32–36)
MCV RBC AUTO: 97.7 FL — SIGNIFICANT CHANGE UP (ref 80–100)
PHOSPHATE SERPL-MCNC: 2.5 MG/DL — SIGNIFICANT CHANGE UP (ref 2.5–4.5)
PLATELET # BLD AUTO: 154 K/UL — SIGNIFICANT CHANGE UP (ref 150–400)
POTASSIUM SERPL-MCNC: 3.3 MMOL/L — LOW (ref 3.5–5.3)
POTASSIUM SERPL-SCNC: 3.3 MMOL/L — LOW (ref 3.5–5.3)
RBC # BLD: 3.19 M/UL — LOW (ref 3.8–5.2)
RBC # FLD: 13.4 % — SIGNIFICANT CHANGE UP (ref 10.3–14.5)
SODIUM SERPL-SCNC: 141 MMOL/L — SIGNIFICANT CHANGE UP (ref 135–145)
WBC # BLD: 11.4 K/UL — HIGH (ref 3.8–10.5)
WBC # FLD AUTO: 11.4 K/UL — HIGH (ref 3.8–10.5)

## 2017-08-02 RX ORDER — POTASSIUM CHLORIDE 20 MEQ
40 PACKET (EA) ORAL ONCE
Qty: 0 | Refills: 0 | Status: COMPLETED | OUTPATIENT
Start: 2017-08-02 | End: 2017-08-02

## 2017-08-02 RX ORDER — HYDROCORTISONE 20 MG
25 TABLET ORAL EVERY 12 HOURS
Qty: 0 | Refills: 0 | Status: DISCONTINUED | OUTPATIENT
Start: 2017-08-02 | End: 2017-08-03

## 2017-08-02 RX ADMIN — Medication 3: at 22:06

## 2017-08-02 RX ADMIN — Medication 1 TABLET(S): at 17:13

## 2017-08-02 RX ADMIN — Medication 1 MILLIGRAM(S): at 09:14

## 2017-08-02 RX ADMIN — Medication 5 UNIT(S): at 09:14

## 2017-08-02 RX ADMIN — HEPARIN SODIUM 5000 UNIT(S): 5000 INJECTION INTRAVENOUS; SUBCUTANEOUS at 05:30

## 2017-08-02 RX ADMIN — Medication 40 MILLIEQUIVALENT(S): at 13:22

## 2017-08-02 RX ADMIN — HEPARIN SODIUM 5000 UNIT(S): 5000 INJECTION INTRAVENOUS; SUBCUTANEOUS at 22:06

## 2017-08-02 RX ADMIN — Medication 50 MILLIGRAM(S): at 05:30

## 2017-08-02 RX ADMIN — Medication: at 09:17

## 2017-08-02 RX ADMIN — ESCITALOPRAM OXALATE 10 MILLIGRAM(S): 10 TABLET, FILM COATED ORAL at 09:14

## 2017-08-02 RX ADMIN — Medication 1000 UNIT(S): at 09:14

## 2017-08-02 RX ADMIN — INSULIN GLARGINE 13 UNIT(S): 100 INJECTION, SOLUTION SUBCUTANEOUS at 22:05

## 2017-08-02 RX ADMIN — HEPARIN SODIUM 5000 UNIT(S): 5000 INJECTION INTRAVENOUS; SUBCUTANEOUS at 13:23

## 2017-08-02 RX ADMIN — Medication 5 UNIT(S): at 18:44

## 2017-08-02 RX ADMIN — Medication 1 TABLET(S): at 09:13

## 2017-08-02 RX ADMIN — Medication 2: at 13:22

## 2017-08-02 RX ADMIN — Medication 25 MILLIGRAM(S): at 17:13

## 2017-08-02 RX ADMIN — Medication: at 18:44

## 2017-08-02 RX ADMIN — Medication 5 UNIT(S): at 13:22

## 2017-08-02 NOTE — PROGRESS NOTE ADULT - ASSESSMENT
Patient is a 85y old  Female who presents with a chief complaint of Two weeks of generalized weakness, dizziness, episode of chills (30 Jul 2017 21:28)  84 y/o fmeale with hx of RA on mtx , prednsion and iv infusion as o/p.  , HTN admitted with 2 months hx of decreased po intake, weakness , fatigue, and dizziness that has been much more pronounced over the past one to two weeks.  no fever or chills , no N/V but has intermittent diarreah   no urinary sx   dizziness NOT vertigo and no cp/ palpiations   no focal neuro complaints

## 2017-08-02 NOTE — PROGRESS NOTE ADULT - SUBJECTIVE AND OBJECTIVE BOX
Chief complaint  Patient is a 85y old  Female who presents with a chief complaint of Two weeks of generalized weakness, dizziness, episode of chills (30 Jul 2017 21:28)   Review of systems  Patient in bed, comfortable, no fever, patient feeling better, no hypoglycemia.    Labs and Fingersticks    CAPILLARY BLOOD GLUCOSE  229 (02 Aug 2017 17:50)  274 (02 Aug 2017 13:10)  162 (02 Aug 2017 08:47)  262 (01 Aug 2017 21:11)  305 (01 Aug 2017 18:12)  233 (01 Aug 2017 13:06)  210 (01 Aug 2017 09:08)  349 (31 Jul 2017 21:39)  256 (31 Jul 2017 17:53)  138 (31 Jul 2017 13:09)  163 (31 Jul 2017 08:45)  221 (30 Jul 2017 20:45)    Anion Gap, Serum: 10 (08-02 @ 06:27)  Anion Gap, Serum: 14 (08-01 @ 19:38)  Anion Gap, Serum: 14 (08-01 @ 06:27)      Calcium, Total Serum: 8.6 (08-02 @ 06:27)  Calcium, Total Serum: 8.7 (08-01 @ 19:38)  Calcium, Total Serum: 8.5 (08-01 @ 06:27)  Albumin, Serum: 3.3 (08-01 @ 06:27)    Alanine Aminotransferase (ALT/SGPT): 12 (08-01 @ 06:27)  Alkaline Phosphatase, Serum: 43 (08-01 @ 06:27)  Aspartate Aminotransferase (AST/SGOT): 15 (08-01 @ 06:27)        08-02    141  |  106  |  30<H>  ----------------------------<  105<H>  3.3<L>   |  25  |  1.06    Ca    8.6      02 Aug 2017 06:27  Phos  2.5     08-02  Mg     1.7     08-02    TPro  6.2  /  Alb  3.3  /  TBili  0.6  /  DBili  x   /  AST  15  /  ALT  12  /  AlkPhos  43  08-01                        11.3   11.4  )-----------( 154      ( 02 Aug 2017 06:27 )             31.1     Medications  MEDICATIONS  (STANDING):  escitalopram 10 milliGRAM(s) Oral daily  folic acid 1 milliGRAM(s) Oral daily  cholecalciferol 1000 Unit(s) Oral daily  insulin lispro (HumaLOG) corrective regimen sliding scale   SubCutaneous three times a day before meals  insulin lispro (HumaLOG) corrective regimen sliding scale   SubCutaneous at bedtime  dextrose 5%. 1000 milliLiter(s) (50 mL/Hr) IV Continuous <Continuous>  dextrose 50% Injectable 12.5 Gram(s) IV Push once  dextrose 50% Injectable 25 Gram(s) IV Push once  dextrose 50% Injectable 25 Gram(s) IV Push once  heparin  Injectable 5000 Unit(s) SubCutaneous every 8 hours  insulin glargine Injectable (LANTUS) 13 Unit(s) SubCutaneous at bedtime  lactobacillus acidophilus 1 Tablet(s) Oral two times a day with meals  insulin lispro Injectable (HumaLOG) 5 Unit(s) SubCutaneous three times a day before meals  hydrocortisone sodium succinate Injectable 25 milliGRAM(s) IV Push every 12 hours      Physical Exam  General: Patient comfortable in bed  Vital Signs Last 12 Hrs  T(F): 97.8 (08-02-17 @ 13:10), Max: 97.8 (08-02-17 @ 13:10)  HR: --  BP: --  BP(mean): --  RR: 18 (08-02-17 @ 13:10) (18 - 18)  SpO2: 95% (08-02-17 @ 13:10) (95% - 95%)  Neck: No palpable thyroid nodules.  CVS: S1S2, No murmurs  Respiratory: No wheezing, no crepitations  GI: Abdomen soft, bowel sounds positive  Musculoskeletal: Positive edema lower extremities bilaterally  Skin: No skin rashes, no ecchimosis    Diagnostics

## 2017-08-02 NOTE — PROGRESS NOTE ADULT - ASSESSMENT
Assessment  DMT2: 85y Female with DM T2 with hyperglycemia on insulin, blood sugars FS fluctuating, no hypoglycemia,  eating meals, feeling better.  ? Adrenal insufficiency: on iv steroids, stable.  HTN: On meds, controlled  Pneumonia: on Tx, stable.

## 2017-08-02 NOTE — PROGRESS NOTE ADULT - SUBJECTIVE AND OBJECTIVE BOX
Follow-up Pulm Progress Note  Abrahan Flannery MD  [phone]    No new respiratory events overnight.  Denies SOB/CP.   Feels well: without dizziness  CT chest reviewed: Blateral upper lobe reticular opacities - see on 2014 CT - possible c/w RA associated frbrosis vs scarring  No evidence of pneumonia    Vital Signs Last 24 Hrs  T(C): 36.8 (02 Aug 2017 04:22), Max: 36.8 (01 Aug 2017 21:41)  T(F): 98.3 (02 Aug 2017 04:22), Max: 98.3 (02 Aug 2017 04:22)  HR: 69 (02 Aug 2017 04:22) (69 - 82)  BP: 135/71 (01 Aug 2017 21:41) (135/71 - 135/83)  BP(mean): --  RR: 18 (02 Aug 2017 04:22) (17 - 18)  SpO2: 94% (02 Aug 2017 04:22) (94% - 97%)                      11.3   11.4  )-----------( 154      ( 02 Aug 2017 06:27 )             31.1     08-02    141  |  106  |  30<H>  ----------------------------<  105<H>  3.3<L>   |  25  |  1.06    Ca    8.6      02 Aug 2017 06:27  Phos  2.5     08-02  Mg     1.7     08-02    TPro  6.2  /  Alb  3.3  /  TBili  0.6  /  DBili  x   /  AST  15  /  ALT  12  /  AlkPhos  43  08-01        Physical Examination:  PULM:   CVS: Regular rate and rhythm, no murmurs, rubs, or gallops  ABD: Soft, non-tender  EXT:  No clubbing, cyanosis, or edema    RADIOLOGY REVIEWED  CXR:    CT chest:    TTE:

## 2017-08-02 NOTE — PROGRESS NOTE ADULT - SUBJECTIVE AND OBJECTIVE BOX
Patient is a 85y old  Female who presents with a chief complaint of Two weeks of generalized weakness, dizziness, episode of chills (2017 21:28)      INTERVAL HPI/OVERNIGHT EVENTS:    REVIEW OF SYSTEMS:    CONSTITUTIONAL: No weakness, fevers or chills  EYES/ENT: No visual changes  NECK: No pain or stiffness  RESPIRATORY: No cough, wheezing,  No shortness of breath  CARDIOVASCULAR: No chest pain or palpitations  GASTROINTESTINAL: No abdominal . No nausea, vomiting, or hematemesis; one episode of diarrhea   GENITOURINARY: No dysuria, frequency   NEUROLOGICAL: No numbness or weakness        Medications:   MEDICATIONS  (STANDING):  escitalopram 10 milliGRAM(s) Oral daily  folic acid 1 milliGRAM(s) Oral daily  cholecalciferol 1000 Unit(s) Oral daily  insulin lispro (HumaLOG) corrective regimen sliding scale   SubCutaneous three times a day before meals  insulin lispro (HumaLOG) corrective regimen sliding scale   SubCutaneous at bedtime  dextrose 5%. 1000 milliLiter(s) (50 mL/Hr) IV Continuous <Continuous>  dextrose 50% Injectable 12.5 Gram(s) IV Push once  dextrose 50% Injectable 25 Gram(s) IV Push once  dextrose 50% Injectable 25 Gram(s) IV Push once  heparin  Injectable 5000 Unit(s) SubCutaneous every 8 hours  insulin glargine Injectable (LANTUS) 13 Unit(s) SubCutaneous at bedtime  lactobacillus acidophilus 1 Tablet(s) Oral two times a day with meals  insulin lispro Injectable (HumaLOG) 5 Unit(s) SubCutaneous three times a day before meals  hydrocortisone sodium succinate Injectable 25 milliGRAM(s) IV Push every 12 hours    MEDICATIONS  (PRN):  dextrose Gel 1 Dose(s) Oral once PRN Blood Glucose LESS THAN 70 milliGRAM(s)/deciliter  glucagon  Injectable 1 milliGRAM(s) IntraMuscular once PRN Glucose LESS THAN 70 milligrams/deciliter      Allergies    No Known Allergies    Intolerances          Vital Signs Last 24 Hrs  T(C): 36.6 (02 Aug 2017 13:10), Max: 36.8 (01 Aug 2017 21:41)  T(F): 97.8 (02 Aug 2017 13:10), Max: 98.3 (02 Aug 2017 04:22)  HR: 69 (02 Aug 2017 04:22) (69 - 71)  BP: 135/71 (01 Aug 2017 21:41) (135/71 - 135/71)  BP(mean): --  RR: 18 (02 Aug 2017 13:10) (17 - 18)  SpO2: 95% (02 Aug 2017 13:10) (94% - 97%)  CAPILLARY BLOOD GLUCOSE  274 (02 Aug 2017 13:10)  162 (02 Aug 2017 08:47)  262 (01 Aug 2017 21:11)  305 (01 Aug 2017 18:12)           @ 07:  -   @ 07:00  --------------------------------------------------------  IN: 820 mL / OUT: 1150 mL / NET: -330 mL     @ 07:  -   @ 15:01  --------------------------------------------------------  IN: 600 mL / OUT: 0 mL / NET: 600 mL        Physical Exam:    Daily Weight in k.5 (02 Aug 2017 02:18)  General:  Well appearing, NAD  HEENT:  Nonicteric, PERRLA  CV:  RRR, S1S2   Lungs:  CTA   Abdomen:  Soft, non-tender, no distended, positive BS  Extremities:  2+ pulses, no c/c, no edema  Neuro:  AAOx3, non-focal, grossly intact      LABS:                        11.3   11.4  )-----------( 154      ( 02 Aug 2017 06:27 )             31.1     0802    141  |  106  |  30<H>  ----------------------------<  105<H>  3.3<L>   |  25  |  1.06    Ca    8.6      02 Aug 2017 06:27  Phos  2.5     0802  Mg     1.7     08    TPro  6.2  /  Alb  3.3  /  TBili  0.6  /  DBili  x   /  AST  15  /  ALT  12  /  AlkPhos  43  08                RADIOLOGY & ADDITIONAL TESTS:    ---------------------------------------------------------------------------  I personally reviewed: [  ]EKG   [  ]CXR    [  ] CT    [  ]Other  ---------------------------------------------------------------------------

## 2017-08-02 NOTE — PROGRESS NOTE ADULT - SUBJECTIVE AND OBJECTIVE BOX
[facility] NEPHROLOGY- PROGRESS NOTE    85 year old female h/o HTN presents with weakness found to have ADINA and hyponatremia.    REVIEW OF SYSTEMS:  Gen: no changes in weight  Cards: no chest pain  Resp: no dyspnea  GI: no nausea or vomiting, + diarrhea this morning  Vascular: no LE edema    No Known Allergies      Hospital Medications: Medications reviewed      VITALS:  T(F): 98.3 (08-02-17 @ 04:22), Max: 98.3 (08-02-17 @ 04:22)  HR: 69 (08-02-17 @ 04:22)  BP: 135/71 (08-01-17 @ 21:41)  RR: 18 (08-02-17 @ 04:22)  SpO2: 94% (08-02-17 @ 04:22)  Wt(kg): --    08-01 @ 07:01  -  08-02 @ 07:00  --------------------------------------------------------  IN: 820 mL / OUT: 1150 mL / NET: -330 mL      PHYSICAL EXAM:    Gen: NAD, calm  Cards: RRR, +S1/S2, no M/G/R  Resp: CTA B/L  GI: soft, NT/ND, NABS  Vascular: no LE edema B/L      LABS:  08-02    141  |  106  |  30<H>  ----------------------------<  105<H>  3.3<L>   |  25  |  1.06    Ca    8.6      02 Aug 2017 06:27  Phos  2.5     08-02  Mg     1.7     08-02    TPro  6.2  /  Alb  3.3  /  TBili  0.6  /  DBili      /  AST  15  /  ALT  12  /  AlkPhos  43  08-01    Creatinine Trend: 1.06 <--, 1.13 <--, 1.05 <--, 1.37 <--, 1.88 <--                        11.3   11.4  )-----------( 154      ( 02 Aug 2017 06:27 )             31.1

## 2017-08-02 NOTE — PROGRESS NOTE ADULT - ASSESSMENT
RA  Probalble mild fibrosis, RA assocated  No clinical or radiographic evidence of pneumonia    REC:    Outpatient PFT's nd pulm f/u with CT screening  No further pulmnary intervention at this time  OBserve off antibiotics

## 2017-08-03 LAB
ANION GAP SERPL CALC-SCNC: 12 MMOL/L — SIGNIFICANT CHANGE UP (ref 5–17)
BUN SERPL-MCNC: 26 MG/DL — HIGH (ref 7–23)
CALCIUM SERPL-MCNC: 8.5 MG/DL — SIGNIFICANT CHANGE UP (ref 8.4–10.5)
CHLORIDE SERPL-SCNC: 104 MMOL/L — SIGNIFICANT CHANGE UP (ref 96–108)
CO2 SERPL-SCNC: 23 MMOL/L — SIGNIFICANT CHANGE UP (ref 22–31)
CREAT SERPL-MCNC: 0.96 MG/DL — SIGNIFICANT CHANGE UP (ref 0.5–1.3)
GLUCOSE SERPL-MCNC: 230 MG/DL — HIGH (ref 70–99)
HCT VFR BLD CALC: 32.2 % — LOW (ref 34.5–45)
HGB BLD-MCNC: 11.3 G/DL — LOW (ref 11.5–15.5)
MCHC RBC-ENTMCNC: 34.4 PG — HIGH (ref 27–34)
MCHC RBC-ENTMCNC: 35.1 GM/DL — SIGNIFICANT CHANGE UP (ref 32–36)
MCV RBC AUTO: 98.1 FL — SIGNIFICANT CHANGE UP (ref 80–100)
PLATELET # BLD AUTO: 162 K/UL — SIGNIFICANT CHANGE UP (ref 150–400)
POTASSIUM SERPL-MCNC: 3.5 MMOL/L — SIGNIFICANT CHANGE UP (ref 3.5–5.3)
POTASSIUM SERPL-SCNC: 3.5 MMOL/L — SIGNIFICANT CHANGE UP (ref 3.5–5.3)
RBC # BLD: 3.28 M/UL — LOW (ref 3.8–5.2)
RBC # FLD: 13.5 % — SIGNIFICANT CHANGE UP (ref 10.3–14.5)
SODIUM SERPL-SCNC: 139 MMOL/L — SIGNIFICANT CHANGE UP (ref 135–145)
WBC # BLD: 11.2 K/UL — HIGH (ref 3.8–10.5)
WBC # FLD AUTO: 11.2 K/UL — HIGH (ref 3.8–10.5)

## 2017-08-03 RX ORDER — SODIUM CHLORIDE 9 MG/ML
1000 INJECTION, SOLUTION INTRAVENOUS
Qty: 0 | Refills: 0 | Status: DISCONTINUED | OUTPATIENT
Start: 2017-08-03 | End: 2017-08-04

## 2017-08-03 RX ORDER — HYDROCORTISONE 20 MG
20 TABLET ORAL DAILY
Qty: 0 | Refills: 0 | Status: DISCONTINUED | OUTPATIENT
Start: 2017-08-03 | End: 2017-08-04

## 2017-08-03 RX ORDER — POTASSIUM CHLORIDE 20 MEQ
20 PACKET (EA) ORAL ONCE
Qty: 0 | Refills: 0 | Status: COMPLETED | OUTPATIENT
Start: 2017-08-03 | End: 2017-08-03

## 2017-08-03 RX ORDER — INSULIN GLARGINE 100 [IU]/ML
18 INJECTION, SOLUTION SUBCUTANEOUS AT BEDTIME
Qty: 0 | Refills: 0 | Status: DISCONTINUED | OUTPATIENT
Start: 2017-08-03 | End: 2017-08-04

## 2017-08-03 RX ORDER — HYDROCORTISONE 20 MG
10 TABLET ORAL DAILY
Qty: 0 | Refills: 0 | Status: DISCONTINUED | OUTPATIENT
Start: 2017-08-03 | End: 2017-08-04

## 2017-08-03 RX ORDER — INSULIN LISPRO 100/ML
8 VIAL (ML) SUBCUTANEOUS
Qty: 0 | Refills: 0 | Status: DISCONTINUED | OUTPATIENT
Start: 2017-08-03 | End: 2017-08-04

## 2017-08-03 RX ADMIN — Medication 5 UNIT(S): at 08:57

## 2017-08-03 RX ADMIN — HEPARIN SODIUM 5000 UNIT(S): 5000 INJECTION INTRAVENOUS; SUBCUTANEOUS at 13:04

## 2017-08-03 RX ADMIN — HEPARIN SODIUM 5000 UNIT(S): 5000 INJECTION INTRAVENOUS; SUBCUTANEOUS at 05:45

## 2017-08-03 RX ADMIN — SODIUM CHLORIDE 60 MILLILITER(S): 9 INJECTION, SOLUTION INTRAVENOUS at 13:51

## 2017-08-03 RX ADMIN — Medication 8 UNIT(S): at 18:24

## 2017-08-03 RX ADMIN — Medication 1 MILLIGRAM(S): at 08:57

## 2017-08-03 RX ADMIN — Medication 1 TABLET(S): at 18:24

## 2017-08-03 RX ADMIN — HEPARIN SODIUM 5000 UNIT(S): 5000 INJECTION INTRAVENOUS; SUBCUTANEOUS at 21:39

## 2017-08-03 RX ADMIN — Medication 4: at 13:04

## 2017-08-03 RX ADMIN — Medication 2: at 21:38

## 2017-08-03 RX ADMIN — Medication 1000 UNIT(S): at 08:57

## 2017-08-03 RX ADMIN — Medication 1: at 08:57

## 2017-08-03 RX ADMIN — INSULIN GLARGINE 18 UNIT(S): 100 INJECTION, SOLUTION SUBCUTANEOUS at 21:39

## 2017-08-03 RX ADMIN — ESCITALOPRAM OXALATE 10 MILLIGRAM(S): 10 TABLET, FILM COATED ORAL at 08:57

## 2017-08-03 RX ADMIN — Medication 25 MILLIGRAM(S): at 05:45

## 2017-08-03 RX ADMIN — Medication 1 TABLET(S): at 08:57

## 2017-08-03 RX ADMIN — Medication 20 MILLIEQUIVALENT(S): at 13:51

## 2017-08-03 RX ADMIN — Medication 10 MILLIGRAM(S): at 17:11

## 2017-08-03 RX ADMIN — Medication 8 UNIT(S): at 13:05

## 2017-08-03 NOTE — PROGRESS NOTE ADULT - SUBJECTIVE AND OBJECTIVE BOX
[facility] [place]PHROLOGY- PROGRESS NOTE    85 year old female h/o HTN presents with weakness found to have ADINA and hyponatremia.    REVIEW OF SYSTEMS:  Gen: no changes in weight  Cards: no chest pain  Resp: no dyspnea  GI: no nausea or vomiting, no diarrhea  Vascular: no LE edema    No Known Allergies      Hospital Medications: Medications reviewed      VITALS:  T(F): 97.8 (08-03-17 @ 11:55), Max: 98.4 (08-03-17 @ 04:39)  HR: 91 (08-03-17 @ 11:55)  BP: 124/75 (08-03-17 @ 11:55)  RR: 18 (08-03-17 @ 11:55)  SpO2: 94% (08-03-17 @ 11:55)  Wt(kg): --    08-02 @ 07:01  -  08-03 @ 07:00  --------------------------------------------------------  IN: 1080 mL / OUT: 475 mL / NET: 605 mL    08-03 @ 07:01  -  08-03 @ 13:32  --------------------------------------------------------  IN: 240 mL / OUT: 300 mL / NET: -60 mL      PHYSICAL EXAM:    Gen: NAD, calm  Cards: RRR, +S1/S2, no M/G/R  Resp: mild R basilar rales  GI: soft, NT/ND, NABS  Vascular: no LE edema B/L      LABS:  08-03    139  |  104  |  26<H>  ----------------------------<  230<H>  3.5   |  23  |  0.96    Ca    8.5      03 Aug 2017 06:58  Phos  2.5     08-02  Mg     1.7     08-02      Creatinine Trend: 0.96 <--, 1.06 <--, 1.13 <--, 1.05 <--, 1.37 <--, 1.88 <--                        11.3   11.2  )-----------( 162      ( 03 Aug 2017 06:58 )             32.2

## 2017-08-03 NOTE — PROGRESS NOTE ADULT - SUBJECTIVE AND OBJECTIVE BOX
CC: f/u for weak and dizzy    Patient reports she feels fine    REVIEW OF SYSTEMS:  All other review of systems negative (Comprehensive ROS)    Antimicrobials Day #  :off    Other Medications Reviewed    T(F): 97.8 (08-03-17 @ 11:55), Max: 98.4 (08-03-17 @ 04:39)  HR: 91 (08-03-17 @ 11:55)  BP: 124/75 (08-03-17 @ 11:55)  RR: 18 (08-03-17 @ 11:55)  SpO2: 94% (08-03-17 @ 11:55)  Wt(kg): --    PHYSICAL EXAM:  General: alert, no acute distress  Eyes:  anicteric, no conjunctival injection, no discharge  Oropharynx: no lesions or injection 	  Neck: supple, without adenopathy  Lungs: clear to auscultation  Heart: regular rate and rhythm; no murmur, rubs or gallops  Abdomen: soft, nondistended, nontender, without mass or organomegaly  Skin: no lesions  Extremities: no clubbing, cyanosis, or edema  Neurologic: alert, oriented, moves all extremities    LAB RESULTS:                        11.3   11.2  )-----------( 162      ( 03 Aug 2017 06:58 )             32.2     08-03    139  |  104  |  26<H>  ----------------------------<  230<H>  3.5   |  23  |  0.96    Ca    8.5      03 Aug 2017 06:58  Phos  2.5     08-02  Mg     1.7     08-02          MICROBIOLOGY:  RECENT CULTURES:  Culture - Stool (08.02.17 @ 12:23)    Specimen Source: .Stool Feces    Culture Results:   No enteric pathogens to date: Final culture pending    Culture - Blood (07.31.17 @ 00:36)    Specimen Source: .Blood Blood    Culture Results:   No growth to date.        RADIOLOGY REVIEWED:  < from: CT Chest No Cont (07.31.17 @ 14:53) >  EXAM:  CT CHEST                            PROCEDURE DATE:  07/31/2017            INTERPRETATION:  CLINICAL INFORMATION: Shortness of breath    COMPARISON: CT Chest on 3/23/2014.    PROCEDURE:   CT of the Chest was performed without intravenous contrast.  Sagittal and coronal reformats were performed.      FINDINGS:    CHEST:     LUNGS AND LARGE AIRWAYS: Patent central airways. Bilateral upper lobe   reticular changes consistent with scarring with focal calcifications and   areas of air trapping. No parenchymal consolidation. Stable 6 mm region   of juxtapleural thickening in the right middle lobe.  PLEURA: No pleural effusion.  VESSELS: Normal left-sided aortic arch and descending aorta. No aneurysm.   Atherosclerotic changes.  HEART: Heart size is normal. No pericardial effusion. Atherosclerotic   calcifications involving the mitral annular, aortic valve and left   anterior descending coronary artery.  MEDIASTINUM AND SHILPA: No lymphadenopathy.  CHEST WALL AND LOWER NECK: Within normallimits.  VISUALIZED UPPER ABDOMEN: Within normal limits.  BONES: Multilevel spinal degenerative changes.     IMPRESSION:     Mosaic attenuation of the lungs with scarring as seen previously. Stable   6 mm region of juxtapleural thickening in the right middle lobe.      Assessment:  Patient with RA on mtx and steroids admitted with weakness and dizziness. No specific infection found. elevated wbc from stress steroid.   Plan:Monitor off antibiotics.

## 2017-08-03 NOTE — PROGRESS NOTE ADULT - SUBJECTIVE AND OBJECTIVE BOX
Follow-up Pulm Progress Note  Abrahan Flannery MD  [phone]    No new respiratory events overnight.  Denies SOB/CP.   Feels well: without dizziness  CT chest reviewed: Blateral upper lobe reticular opacities - see on 2014 CT - possible c/w RA associated frbrosis vs scarring  TTE: moderate AS  No evidence of pneumonia    Vital Signs Last 24 Hrs  T(C): 36.9 (03 Aug 2017 04:39), Max: 36.9 (03 Aug 2017 04:39)  T(F): 98.4 (03 Aug 2017 04:39), Max: 98.4 (03 Aug 2017 04:39)  HR: 69 (03 Aug 2017 04:39) (69 - 80)  BP: 138/73 (03 Aug 2017 04:39) (129/65 - 138/73)  BP(mean): --  RR: 18 (03 Aug 2017 04:39) (18 - 18)  SpO2: 97% (03 Aug 2017 04:39) (95% - 97%)                       11.3   11.2  )-----------( 162      ( 03 Aug 2017 06:58 )             32.2     08-03    139  |  104  |  26<H>  ----------------------------<  230<H>  3.5   |  23  |  0.96    Ca    8.5      03 Aug 2017 06:58  Phos  2.5     08-02  Mg     1.7     08-02        Physical Examination:  PULM:   CVS: Regular rate and rhythm, no murmurs, rubs, or gallops  ABD: Soft, non-tender  EXT:  No clubbing, cyanosis, or edema    RADIOLOGY REVIEWED  CXR:    CT chest:    TTE:

## 2017-08-03 NOTE — PROGRESS NOTE ADULT - SUBJECTIVE AND OBJECTIVE BOX
Patient is a 85y old  Female who presents with a chief complaint of Two weeks of generalized weakness, dizziness, episode of chills (2017 21:28)      INTERVAL HPI/OVERNIGHT EVENTS: none     REVIEW OF SYSTEMS:    CONSTITUTIONAL: No weakness, fevers or chills  RESPIRATORY: No cough, wheezing,  No shortness of breath  CARDIOVASCULAR: No chest pain or palpitations  GASTROINTESTINAL: No abdominal pain  No nausea, vomiting, or hematemesis; improved diarrhea   GENITOURINARY: No dysuria, frequency   NEUROLOGICAL: No numbness or weakness        Medications:   MEDICATIONS  (STANDING):  escitalopram 10 milliGRAM(s) Oral daily  folic acid 1 milliGRAM(s) Oral daily  cholecalciferol 1000 Unit(s) Oral daily  insulin lispro (HumaLOG) corrective regimen sliding scale   SubCutaneous three times a day before meals  insulin lispro (HumaLOG) corrective regimen sliding scale   SubCutaneous at bedtime  dextrose 5%. 1000 milliLiter(s) (50 mL/Hr) IV Continuous <Continuous>  dextrose 50% Injectable 12.5 Gram(s) IV Push once  dextrose 50% Injectable 25 Gram(s) IV Push once  dextrose 50% Injectable 25 Gram(s) IV Push once  heparin  Injectable 5000 Unit(s) SubCutaneous every 8 hours  lactobacillus acidophilus 1 Tablet(s) Oral two times a day with meals  hydrocortisone 20 milliGRAM(s) Oral daily  hydrocortisone 10 milliGRAM(s) Oral daily  insulin glargine Injectable (LANTUS) 18 Unit(s) SubCutaneous at bedtime  insulin lispro Injectable (HumaLOG) 8 Unit(s) SubCutaneous three times a day before meals  sodium chloride 0.45%. 1000 milliLiter(s) (60 mL/Hr) IV Continuous <Continuous>    MEDICATIONS  (PRN):  dextrose Gel 1 Dose(s) Oral once PRN Blood Glucose LESS THAN 70 milliGRAM(s)/deciliter  glucagon  Injectable 1 milliGRAM(s) IntraMuscular once PRN Glucose LESS THAN 70 milligrams/deciliter      Allergies    No Known Allergies    Intolerances          Vital Signs Last 24 Hrs  T(C): 36.6 (03 Aug 2017 11:55), Max: 36.9 (03 Aug 2017 04:39)  T(F): 97.8 (03 Aug 2017 11:55), Max: 98.4 (03 Aug 2017 04:39)  HR: 91 (03 Aug 2017 11:55) (69 - 91)  BP: 124/75 (03 Aug 2017 11:55) (124/75 - 138/73)  BP(mean): --  RR: 18 (03 Aug 2017 11:55) (18 - 18)  SpO2: 94% (03 Aug 2017 11:55) (94% - 97%)  CAPILLARY BLOOD GLUCOSE  347 (03 Aug 2017 12:57)  185 (03 Aug 2017 08:54)  377 (02 Aug 2017 21:25)  229 (02 Aug 2017 17:50)           @ 07: @ 07:00  --------------------------------------------------------  IN: 1080 mL / OUT: 475 mL / NET: 605 mL     @ 07: @ 16:05  --------------------------------------------------------  IN: 480 mL / OUT: 500 mL / NET: -20 mL        Physical Exam:    Daily     Daily Weight in k.8 (03 Aug 2017 04:39)  General:  Well appearing, NAD, not cachetic  HEENT:  Nonicteric, PERRLA  CV:  RRR, S1S2   Lungs:  CTA B/L, no wheezes, rales, rhonchi  Abdomen:  Soft, non-tender, no distended, positive BS  Extremities:  2+ pulses, no c/c, no edema  Skin:  Warm and dry, no rashes  :  No mason  Neuro:  AAOx3, non-focal, grossly intact      LABS:                        11.3   11.2  )-----------( 162      ( 03 Aug 2017 06:58 )             32.2         139  |  104  |  26<H>  ----------------------------<  230<H>  3.5   |  23  |  0.96    Ca    8.5      03 Aug 2017 06:58  Phos  2.5     0802  Mg     1.7     08-02                  RADIOLOGY & ADDITIONAL TESTS:    ---------------------------------------------------------------------------  I personally reviewed: [  ]EKG   [  ]CXR    [  ] CT    [  ]Other  --------------------------------------------------------------------------- Patient is a 85y old  Female who presents with a chief complaint of Two weeks of generalized weakness, dizziness, episode of chills (2017 21:28)      INTERVAL HPI/OVERNIGHT EVENTS: none     REVIEW OF SYSTEMS:  CONSTITUTIONAL: No weakness, fevers or chills  RESPIRATORY: No cough, wheezing,  No shortness of breath  CARDIOVASCULAR: No chest pain or palpitations  GASTROINTESTINAL: No abdominal pain  No nausea, vomiting, or hematemesis; improved diarrhea   GENITOURINARY: No dysuria, frequency   NEUROLOGICAL: No numbness or weakness        Medications:   MEDICATIONS  (STANDING):  escitalopram 10 milliGRAM(s) Oral daily  folic acid 1 milliGRAM(s) Oral daily  cholecalciferol 1000 Unit(s) Oral daily  insulin lispro (HumaLOG) corrective regimen sliding scale   SubCutaneous three times a day before meals  insulin lispro (HumaLOG) corrective regimen sliding scale   SubCutaneous at bedtime  dextrose 5%. 1000 milliLiter(s) (50 mL/Hr) IV Continuous <Continuous>  dextrose 50% Injectable 12.5 Gram(s) IV Push once  dextrose 50% Injectable 25 Gram(s) IV Push once  dextrose 50% Injectable 25 Gram(s) IV Push once  heparin  Injectable 5000 Unit(s) SubCutaneous every 8 hours  lactobacillus acidophilus 1 Tablet(s) Oral two times a day with meals  hydrocortisone 20 milliGRAM(s) Oral daily  hydrocortisone 10 milliGRAM(s) Oral daily  insulin glargine Injectable (LANTUS) 18 Unit(s) SubCutaneous at bedtime  insulin lispro Injectable (HumaLOG) 8 Unit(s) SubCutaneous three times a day before meals  sodium chloride 0.45%. 1000 milliLiter(s) (60 mL/Hr) IV Continuous <Continuous>    MEDICATIONS  (PRN):  dextrose Gel 1 Dose(s) Oral once PRN Blood Glucose LESS THAN 70 milliGRAM(s)/deciliter  glucagon  Injectable 1 milliGRAM(s) IntraMuscular once PRN Glucose LESS THAN 70 milligrams/deciliter      Allergies    No Known Allergies    Intolerances          Vital Signs Last 24 Hrs  T(C): 36.6 (03 Aug 2017 11:55), Max: 36.9 (03 Aug 2017 04:39)  T(F): 97.8 (03 Aug 2017 11:55), Max: 98.4 (03 Aug 2017 04:39)  HR: 91 (03 Aug 2017 11:55) (69 - 91)  BP: 124/75 (03 Aug 2017 11:55) (124/75 - 138/73)  BP(mean): --  RR: 18 (03 Aug 2017 11:55) (18 - 18)  SpO2: 94% (03 Aug 2017 11:55) (94% - 97%)  CAPILLARY BLOOD GLUCOSE  347 (03 Aug 2017 12:57)  185 (03 Aug 2017 08:54)  377 (02 Aug 2017 21:25)  229 (02 Aug 2017 17:50)           @ 07:  -   @ 07:00  --------------------------------------------------------  IN: 1080 mL / OUT: 475 mL / NET: 605 mL     @ 07:  -   @ 16:05  --------------------------------------------------------  IN: 480 mL / OUT: 500 mL / NET: -20 mL        Physical Exam:    Daily Weight in k.8 (03 Aug 2017 04:39)  General:  Well appearing, NAD  HEENT:  Nonicteric, PERRLA  CV:  RRR, S1S2   Lungs:  CTA B/L  Abdomen:  Soft, non-tender, no distended, positive BS  Extremities:  2+ pulses, no c/c, no edema  Neuro:  AAOx3, non-focal, grossly intact      LABS:                        11.3   11.2  )-----------( 162      ( 03 Aug 2017 06:58 )             32.2     0803    139  |  104  |  26<H>  ----------------------------<  230<H>  3.5   |  23  |  0.96    Ca    8.5      03 Aug 2017 06:58  Phos  2.5     08-02  Mg     1.7     08-02                  RADIOLOGY & ADDITIONAL TESTS:    ---------------------------------------------------------------------------  I personally reviewed: [  ]EKG   [  ]CXR    [  ] CT    [  ]Other  ---------------------------------------------------------------------------

## 2017-08-03 NOTE — PROGRESS NOTE ADULT - SUBJECTIVE AND OBJECTIVE BOX
Chief complaint  Patient is a 85y old  Female who presents with a chief complaint of Two weeks of generalized weakness, dizziness, episode of chills (30 Jul 2017 21:28)   Review of systems  Patient in bed, comfortable, no fever,  eating meals, feeling better, no hypoglycemia.    Labs and Fingersticks    CAPILLARY BLOOD GLUCOSE  347 (03 Aug 2017 12:57)  185 (03 Aug 2017 08:54)  377 (02 Aug 2017 21:25)  229 (02 Aug 2017 17:50)  274 (02 Aug 2017 13:10)  162 (02 Aug 2017 08:47)  262 (01 Aug 2017 21:11)  305 (01 Aug 2017 18:12)  233 (01 Aug 2017 13:06)  210 (01 Aug 2017 09:08)  349 (31 Jul 2017 21:39)  256 (31 Jul 2017 17:53)    Anion Gap, Serum: 12 (08-03 @ 06:58)  Anion Gap, Serum: 10 (08-02 @ 06:27)  Anion Gap, Serum: 14 (08-01 @ 19:38)      Calcium, Total Serum: 8.5 (08-03 @ 06:58)  Calcium, Total Serum: 8.6 (08-02 @ 06:27)  Calcium, Total Serum: 8.7 (08-01 @ 19:38)          08-03    139  |  104  |  26<H>  ----------------------------<  230<H>  3.5   |  23  |  0.96    Ca    8.5      03 Aug 2017 06:58  Phos  2.5     08-02  Mg     1.7     08-02                          11.3   11.2  )-----------( 162      ( 03 Aug 2017 06:58 )             32.2     Medications  MEDICATIONS  (STANDING):  escitalopram 10 milliGRAM(s) Oral daily  folic acid 1 milliGRAM(s) Oral daily  cholecalciferol 1000 Unit(s) Oral daily  insulin lispro (HumaLOG) corrective regimen sliding scale   SubCutaneous three times a day before meals  insulin lispro (HumaLOG) corrective regimen sliding scale   SubCutaneous at bedtime  dextrose 5%. 1000 milliLiter(s) (50 mL/Hr) IV Continuous <Continuous>  dextrose 50% Injectable 12.5 Gram(s) IV Push once  dextrose 50% Injectable 25 Gram(s) IV Push once  dextrose 50% Injectable 25 Gram(s) IV Push once  heparin  Injectable 5000 Unit(s) SubCutaneous every 8 hours  lactobacillus acidophilus 1 Tablet(s) Oral two times a day with meals  hydrocortisone 20 milliGRAM(s) Oral daily  hydrocortisone 10 milliGRAM(s) Oral daily  insulin glargine Injectable (LANTUS) 18 Unit(s) SubCutaneous at bedtime  insulin lispro Injectable (HumaLOG) 8 Unit(s) SubCutaneous three times a day before meals  sodium chloride 0.45%. 1000 milliLiter(s) (60 mL/Hr) IV Continuous <Continuous>      Physical Exam  General: Patient comfortable in bed  Vital Signs Last 12 Hrs  T(F): 97.8 (08-03-17 @ 11:55), Max: 98.4 (08-03-17 @ 04:39)  HR: 91 (08-03-17 @ 11:55) (69 - 91)  BP: 124/75 (08-03-17 @ 11:55) (124/75 - 138/73)  BP(mean): --  RR: 18 (08-03-17 @ 11:55) (18 - 18)  SpO2: 94% (08-03-17 @ 11:55) (94% - 97%)  Neck: No palpable thyroid nodules.  CVS: S1S2, No murmurs  Respiratory: No wheezing, no crepitations  GI: Abdomen soft, bowel sounds positive  Musculoskeletal: Positive edema lower extremities bilaterally  Skin: No skin rashes, no ecchimosis    Diagnostics

## 2017-08-04 ENCOUNTER — TRANSCRIPTION ENCOUNTER (OUTPATIENT)
Age: 82
End: 2017-08-04

## 2017-08-04 VITALS
OXYGEN SATURATION: 98 % | HEART RATE: 82 BPM | DIASTOLIC BLOOD PRESSURE: 82 MMHG | SYSTOLIC BLOOD PRESSURE: 130 MMHG | TEMPERATURE: 98 F | RESPIRATION RATE: 18 BRPM

## 2017-08-04 DIAGNOSIS — R09.89 OTHER SPECIFIED SYMPTOMS AND SIGNS INVOLVING THE CIRCULATORY AND RESPIRATORY SYSTEMS: ICD-10-CM

## 2017-08-04 LAB
ANION GAP SERPL CALC-SCNC: 10 MMOL/L — SIGNIFICANT CHANGE UP (ref 5–17)
BUN SERPL-MCNC: 23 MG/DL — SIGNIFICANT CHANGE UP (ref 7–23)
CALCIUM SERPL-MCNC: 8.4 MG/DL — SIGNIFICANT CHANGE UP (ref 8.4–10.5)
CHLORIDE SERPL-SCNC: 106 MMOL/L — SIGNIFICANT CHANGE UP (ref 96–108)
CO2 SERPL-SCNC: 26 MMOL/L — SIGNIFICANT CHANGE UP (ref 22–31)
CREAT SERPL-MCNC: 1.02 MG/DL — SIGNIFICANT CHANGE UP (ref 0.5–1.3)
CULTURE RESULTS: SIGNIFICANT CHANGE UP
GLUCOSE SERPL-MCNC: 149 MG/DL — HIGH (ref 70–99)
HCT VFR BLD CALC: 31.5 % — LOW (ref 34.5–45)
HGB BLD-MCNC: 11 G/DL — LOW (ref 11.5–15.5)
MCHC RBC-ENTMCNC: 34.4 PG — HIGH (ref 27–34)
MCHC RBC-ENTMCNC: 34.9 GM/DL — SIGNIFICANT CHANGE UP (ref 32–36)
MCV RBC AUTO: 98.4 FL — SIGNIFICANT CHANGE UP (ref 80–100)
PLATELET # BLD AUTO: 166 K/UL — SIGNIFICANT CHANGE UP (ref 150–400)
POTASSIUM SERPL-MCNC: 4 MMOL/L — SIGNIFICANT CHANGE UP (ref 3.5–5.3)
POTASSIUM SERPL-SCNC: 4 MMOL/L — SIGNIFICANT CHANGE UP (ref 3.5–5.3)
RBC # BLD: 3.2 M/UL — LOW (ref 3.8–5.2)
RBC # FLD: 13.5 % — SIGNIFICANT CHANGE UP (ref 10.3–14.5)
SODIUM SERPL-SCNC: 142 MMOL/L — SIGNIFICANT CHANGE UP (ref 135–145)
SPECIMEN SOURCE: SIGNIFICANT CHANGE UP
WBC # BLD: 10.5 K/UL — SIGNIFICANT CHANGE UP (ref 3.8–10.5)
WBC # FLD AUTO: 10.5 K/UL — SIGNIFICANT CHANGE UP (ref 3.8–10.5)

## 2017-08-04 RX ORDER — LOSARTAN POTASSIUM 100 MG/1
1 TABLET, FILM COATED ORAL
Qty: 0 | Refills: 0 | COMMUNITY

## 2017-08-04 RX ORDER — HYDROCORTISONE 20 MG
1 TABLET ORAL
Qty: 30 | Refills: 0 | OUTPATIENT
Start: 2017-08-04 | End: 2017-09-03

## 2017-08-04 RX ADMIN — Medication 1 TABLET(S): at 09:11

## 2017-08-04 RX ADMIN — ESCITALOPRAM OXALATE 10 MILLIGRAM(S): 10 TABLET, FILM COATED ORAL at 11:28

## 2017-08-04 RX ADMIN — Medication 20 MILLIGRAM(S): at 06:09

## 2017-08-04 RX ADMIN — HEPARIN SODIUM 5000 UNIT(S): 5000 INJECTION INTRAVENOUS; SUBCUTANEOUS at 13:14

## 2017-08-04 RX ADMIN — SODIUM CHLORIDE 60 MILLILITER(S): 9 INJECTION, SOLUTION INTRAVENOUS at 06:19

## 2017-08-04 RX ADMIN — Medication 8 UNIT(S): at 09:11

## 2017-08-04 RX ADMIN — Medication: at 13:14

## 2017-08-04 RX ADMIN — Medication 1 MILLIGRAM(S): at 11:28

## 2017-08-04 RX ADMIN — Medication 8 UNIT(S): at 13:14

## 2017-08-04 RX ADMIN — Medication 1000 UNIT(S): at 11:28

## 2017-08-04 RX ADMIN — HEPARIN SODIUM 5000 UNIT(S): 5000 INJECTION INTRAVENOUS; SUBCUTANEOUS at 06:09

## 2017-08-04 NOTE — DISCHARGE NOTE ADULT - MEDICATION SUMMARY - MEDICATIONS TO TAKE
I will START or STAY ON the medications listed below when I get home from the hospital:    hydrocortisone 10 mg oral tablet  -- 1 tab(s) by mouth once a day at 4PM  -- Indication: For Adrenal insufficiency    hydrocortisone 20 mg oral tablet  -- 1 tab(s) by mouth once a day in the AM  -- Indication: For Adrenal insufficiency    Lexapro 10 mg oral tablet  -- 1 tab(s) by mouth once a day  -- Indication: For Depression    Amaryl 1 mg oral tablet  --  by mouth once a day (at bedtime)  -- Indication: For Diabetes    Glumetza 1000 mg oral tablet, extended release  --  by mouth 2 times a day  -- Indication: For Diabetes    Januvia  -- 100 milligram(s) by mouth once a day  -- Indication: For Diabetes    methotrexate 2.5 mg oral tablet  -- 7 tab(s) by mouth once a week  -- Indication: For RA (rheumatoid arthritis)    Vitamin D3  --  by mouth   -- Indication: For supplement    folic acid 1 mg oral tablet  -- 1 tab(s) by mouth once a day  -- Indication: For supplement I will START or STAY ON the medications listed below when I get home from the hospital:    T.E.D compression stockings  -- Wear on both legs   -- Indication: For Need for prophylactic measure    hydrocortisone 10 mg oral tablet  -- 1 tab(s) by mouth once a day at 4PM  -- Indication: For Adrenal insufficiency    hydrocortisone 20 mg oral tablet  -- 1 tab(s) by mouth once a day in the AM  -- Indication: For Adrenal insufficiency    Lexapro 10 mg oral tablet  -- 1 tab(s) by mouth once a day  -- Indication: For Depression    Amaryl 1 mg oral tablet  --  by mouth once a day (at bedtime)  -- Indication: For Diabetes    Glumetza 1000 mg oral tablet, extended release  --  by mouth 2 times a day  -- Indication: For Diabetes    Januvia  -- 100 milligram(s) by mouth once a day  -- Indication: For Diabetes    methotrexate 2.5 mg oral tablet  -- 7 tab(s) by mouth once a week  -- Indication: For RA (rheumatoid arthritis)    Vitamin D3  --  by mouth   -- Indication: For supplement    folic acid 1 mg oral tablet  -- 1 tab(s) by mouth once a day  -- Indication: For supplement

## 2017-08-04 NOTE — PROGRESS NOTE ADULT - SUBJECTIVE AND OBJECTIVE BOX
Patient is a 85y old  Female who presents with a chief complaint of Two weeks of generalized weakness, dizziness, episode of chills (04 Aug 2017 11:55)      INTERVAL HPI/OVERNIGHT EVENTS:    REVIEW OF SYSTEMS:    CONSTITUTIONAL: No weakness, fevers or chills  RESPIRATORY: No cough, wheezing,  No shortness of breath  CARDIOVASCULAR: No chest pain or palpitations  GASTROINTESTINAL: No abdominal pain. No nausea, vomiting, or hematemesis; No diarrhea  GENITOURINARY: No dysuria, frequency   NEUROLOGICAL: No numbness or weakness        Medications:   MEDICATIONS  (STANDING):  escitalopram 10 milliGRAM(s) Oral daily  folic acid 1 milliGRAM(s) Oral daily  cholecalciferol 1000 Unit(s) Oral daily  insulin lispro (HumaLOG) corrective regimen sliding scale   SubCutaneous three times a day before meals  insulin lispro (HumaLOG) corrective regimen sliding scale   SubCutaneous at bedtime  dextrose 5%. 1000 milliLiter(s) (50 mL/Hr) IV Continuous <Continuous>  dextrose 50% Injectable 12.5 Gram(s) IV Push once  dextrose 50% Injectable 25 Gram(s) IV Push once  dextrose 50% Injectable 25 Gram(s) IV Push once  heparin  Injectable 5000 Unit(s) SubCutaneous every 8 hours  lactobacillus acidophilus 1 Tablet(s) Oral two times a day with meals  hydrocortisone 20 milliGRAM(s) Oral daily  hydrocortisone 10 milliGRAM(s) Oral daily  insulin glargine Injectable (LANTUS) 18 Unit(s) SubCutaneous at bedtime  insulin lispro Injectable (HumaLOG) 8 Unit(s) SubCutaneous three times a day before meals  sodium chloride 0.45%. 1000 milliLiter(s) (60 mL/Hr) IV Continuous <Continuous>    MEDICATIONS  (PRN):  dextrose Gel 1 Dose(s) Oral once PRN Blood Glucose LESS THAN 70 milliGRAM(s)/deciliter  glucagon  Injectable 1 milliGRAM(s) IntraMuscular once PRN Glucose LESS THAN 70 milligrams/deciliter      Allergies    No Known Allergies    Intolerances          Vital Signs Last 24 Hrs  T(C): 36.7 (04 Aug 2017 12:22), Max: 36.8 (03 Aug 2017 21:35)  T(F): 98.1 (04 Aug 2017 12:22), Max: 98.3 (03 Aug 2017 21:35)  HR: 82 (04 Aug 2017 12:22) (79 - 86)  BP: 130/82 (04 Aug 2017 12:22) (119/75 - 130/82)  BP(mean): --  RR: 18 (04 Aug 2017 12:22) (18 - 18)  SpO2: 98% (04 Aug 2017 12:22) (97% - 98%)  CAPILLARY BLOOD GLUCOSE  275 (04 Aug 2017 12:18)  130 (04 Aug 2017 08:20)  343 (03 Aug 2017 21:35)          08-03 @ 07:01  -  08-04 @ 07:00  --------------------------------------------------------  IN: 1920 mL / OUT: 900 mL / NET: 1020 mL    08-04 @ 07:01  -  08-04 @ 19:25  --------------------------------------------------------  IN: 360 mL / OUT: 500 mL / NET: -140 mL        Physical Exam:    General:  Well appearing, NAD  HEENT:  Nonicteric, PERRLA  CV:  RRR, S1S2   Lungs:  fine crackles at bases   Abdomen:  Soft, NT   Extremities:  2+ pulses, no c/c, no edema  Skin:  Warm and dry, no rashes  Neuro:  AAOx3, non-focal, grossly intact      LABS:                        11.0   10.5  )-----------( 166      ( 04 Aug 2017 07:17 )             31.5     08-04    142  |  106  |  23  ----------------------------<  149<H>  4.0   |  26  |  1.02    Ca    8.4      04 Aug 2017 07:17                  RADIOLOGY & ADDITIONAL TESTS:    ---------------------------------------------------------------------------  I personally reviewed: [  ]EKG   [  ]CXR    [  ] CT    [  ]Other  ---------------------------------------------------------------------------

## 2017-08-04 NOTE — CHART NOTE - NSCHARTNOTEFT_GEN_A_CORE
Patient is a 85y old  Female who presents with a chief complaint of Two weeks of generalized weakness, dizziness, episode of chills (04 Aug 2017 11:55)    Contacted by medical attending to initiate discharge for patient, patient medically cleared.      Vital Signs Last 24 Hrs  T(C): 36.7 (04 Aug 2017 12:22), Max: 36.8 (03 Aug 2017 21:35)  T(F): 98.1 (04 Aug 2017 12:22), Max: 98.3 (03 Aug 2017 21:35)  HR: 82 (04 Aug 2017 12:22) (79 - 86)  BP: 130/82 (04 Aug 2017 12:22) (119/75 - 130/82)  BP(mean): --  RR: 18 (04 Aug 2017 12:22) (18 - 18)  SpO2: 98% (04 Aug 2017 12:22) (97% - 98%)      Labs:                          11.0   10.5  )-----------( 166      ( 04 Aug 2017 07:17 )             31.5     08-04    142  |  106  |  23  ----------------------------<  149<H>  4.0   |  26  |  1.02    Ca    8.4      04 Aug 2017 07:17              Radiology:  TTE 8/1/17  1. Mitral annular calcification, otherwise normal mitral  valve. Minimal mitral regurgitation.  2. Calcified trileaflet aortic valve with decreased  opening. Peak transaortic valve gradient equals 16 mm Hg,  mean transaortic valve gradient equals 10 mm Hg, estimated  aortic valve area equals 1.5 sqcm (by continuity equation),  aortic valve velocity time integral equals 42 cm,  consistent with moderate aortic stenosis. Mild aortic  regurgitation.  3. Normal left ventricular internal dimensions and wall  thicknesses.  4. Normal left ventricular systolic function. No segmental  wall motion abnormalities.  5. Reversal of the E-A waves of the mitral inflow pattern  consistent with reduced compliance of the left ventricle.  6. The right ventricle is not well visualized; grossly  normal right ventricular systolic function.  *** Compared with echocardiogram of 3/24/2014, no  significant changes noted.      CT chest 7/31/17  Mosaic attenuation of the lungs with scarring as seen previously. Stable   6 mm region of juxtapleural thickening in the right middle lobe.    CT head 7/30/17  1.  No acute intracranial hemorrhage, mass effect, or shift.  2.  Severe chronic microvascular ischemic changes.         Physical Exam:  General: WN/WD NAD  Neurology: A&Ox3, nonfocal  Head:  Normocephalic, atraumatic  Respiratory: CTA B/L  CV: RRR, S1S2, no murmur  Abdominal: Soft, NT, ND no palpable mass  MSK: No edema, + peripheral pulses, FROM all 4 extremity        Discharge Note and Plan:  Follow up with primary care physician within 1 week from discharge for follow up BMP and further management of anti-hypertensive media  Follow up with rheumatologist within 1 week from discharge  Follow up with endocrinologist within 1 week for further management of steroid dosing and tapering  Follow up with pulmonologist within 2 weeks from discharge for outpatient PFTs and followup chest CT      STOP prednisone 5mg, follow up with rheumatologist and endocrinologist  STOP hydrochlorothiazide and     >  >  > Patient is a 85y old  Female who presents with a chief complaint of Two weeks of generalized weakness, dizziness, episode of chills (04 Aug 2017 11:55)    Contacted by medical attending to initiate discharge for patient, patient medically cleared.      Vital Signs Last 24 Hrs  T(C): 36.7 (04 Aug 2017 12:22), Max: 36.8 (03 Aug 2017 21:35)  T(F): 98.1 (04 Aug 2017 12:22), Max: 98.3 (03 Aug 2017 21:35)  HR: 82 (04 Aug 2017 12:22) (79 - 86)  BP: 130/82 (04 Aug 2017 12:22) (119/75 - 130/82)  BP(mean): --  RR: 18 (04 Aug 2017 12:22) (18 - 18)  SpO2: 98% (04 Aug 2017 12:22) (97% - 98%)      Labs:                          11.0   10.5  )-----------( 166      ( 04 Aug 2017 07:17 )             31.5     08-04    142  |  106  |  23  ----------------------------<  149<H>  4.0   |  26  |  1.02    Ca    8.4      04 Aug 2017 07:17              Radiology:  TTE 8/1/17  1. Mitral annular calcification, otherwise normal mitral  valve. Minimal mitral regurgitation.  2. Calcified trileaflet aortic valve with decreased  opening. Peak transaortic valve gradient equals 16 mm Hg,  mean transaortic valve gradient equals 10 mm Hg, estimated  aortic valve area equals 1.5 sqcm (by continuity equation),  aortic valve velocity time integral equals 42 cm,  consistent with moderate aortic stenosis. Mild aortic  regurgitation.  3. Normal left ventricular internal dimensions and wall  thicknesses.  4. Normal left ventricular systolic function. No segmental  wall motion abnormalities.  5. Reversal of the E-A waves of the mitral inflow pattern  consistent with reduced compliance of the left ventricle.  6. The right ventricle is not well visualized; grossly  normal right ventricular systolic function.  *** Compared with echocardiogram of 3/24/2014, no  significant changes noted.      CT chest 7/31/17  Mosaic attenuation of the lungs with scarring as seen previously. Stable   6 mm region of juxtapleural thickening in the right middle lobe.    CT head 7/30/17  1.  No acute intracranial hemorrhage, mass effect, or shift.  2.  Severe chronic microvascular ischemic changes.         Physical Exam:  General: WN/WD NAD  Neurology: A&Ox3, nonfocal  Head:  Normocephalic, atraumatic  Respiratory: CTA B/L  CV: RRR, S1S2, no murmur  Abdominal: Soft, NT, ND no palpable mass  MSK: No edema, + peripheral pulses, FROM all 4 extremity        Discharge Note and Plan:  Follow up with primary care physician within 1 week from discharge for follow up BMP and further management of anti-hypertensive medications  Follow up with rheumatologist within 1 week from discharge  Follow up with endocrinologist within 1 week for further management of steroid dosing and tapering  Follow up with pulmonologist within 2 weeks from discharge for outpatient PFTs and followup chest CT      STOP prednisone 5mg, follow up with rheumatologist and endocrinologist  STOP hydrochlorothiazide and Cozaar, follow up with outpatient PMD for further management of medications   Continue hydrocortisone 20mg AM QD and 10mg 4PM QD - follow up with endocrinology within 1 week for further tapering/dosing    Case and care of plan discussed with Dr. Davis (medicine attending) and agreed upon.      Shelbie Hughes PA-C  [id number]

## 2017-08-04 NOTE — PROGRESS NOTE ADULT - SUBJECTIVE AND OBJECTIVE BOX
Follow-up Pulm Progress Note  Abrahan Flannery MD  [phone]    No new respiratory events overnight.  Denies SOB/CP.   Feels well: without dizziness  CT chest reviewed: Blateral upper lobe reticular opacities - see on 2014 CT - possible c/w RA associated frbrosis vs scarring  TTE: moderate AS  No evidence of pneumonia    Vital Signs Last 24 Hrs  T(C): 36.7 (04 Aug 2017 12:22), Max: 36.8 (03 Aug 2017 21:35)  T(F): 98.1 (04 Aug 2017 12:22), Max: 98.3 (03 Aug 2017 21:35)  HR: 82 (04 Aug 2017 12:22) (79 - 86)  BP: 130/82 (04 Aug 2017 12:22) (119/75 - 130/82)  BP(mean): --  RR: 18 (04 Aug 2017 12:22) (18 - 18)  SpO2: 98% (04 Aug 2017 12:22) (97% - 98%)                         11.0   10.5  )-----------( 166      ( 04 Aug 2017 07:17 )             31.5       08-04    142  |  106  |  23  ----------------------------<  149<H>  4.0   |  26  |  1.02    Ca    8.4      04 Aug 2017 07:17          Physical Examination:  PULM:   CVS: Regular rate and rhythm, no murmurs, rubs, or gallops  ABD: Soft, non-tender  EXT:  No clubbing, cyanosis, or edema    RADIOLOGY REVIEWED  CXR:    CT chest:    TTE:

## 2017-08-04 NOTE — PROGRESS NOTE ADULT - PROBLEM SELECTOR PROBLEM 5
RA (rheumatoid arthritis)

## 2017-08-04 NOTE — PROGRESS NOTE ADULT - PROBLEM SELECTOR PROBLEM 4
Chronic kidney disease (CKD), stage IV (severe)
DM (diabetes mellitus), type 2
Hypokalemia
Hypomagnesemia
DM (diabetes mellitus), type 2
Lobar pneumonia

## 2017-08-04 NOTE — PROGRESS NOTE ADULT - PROBLEM SELECTOR PLAN 2
BP acceptable off of HCTZ/losartan and patient orthostatic negative for which IVF discontinued. Continue to hold anti-hypertensive medications for now. Monitor BP.
BP acceptable off of HCTZ/losartan and patient orthostatic this morning however asymptomatic. Keep off of anti-hypertensive medications at this time. Would allow higher resting systolic pressure to avoid hypotension when standing. Steroids as per endocrinology for possible adrenal insufficiency. Monitor BP.
BP acceptable off of HCTZ/losartan and patient orthostatic this morning however unclear if performed correctly. Will repeat orthostatics today. If patient remains orthostatic and symptomatic, can give gentle IV hydration. Monitor BP.
BP acceptable off of HCTZ/losartan. Keep off of anti-hypertensive medications at this time. Steroids as per endocrinology for possible adrenal insufficiency. Monitor BP.
Continue treatment, FU with primary team.
likely pre renal   much improved   renal inputL  hold ARB /HCTZ for now
likely pre renal   much improved   renal inputL  hold ARB /HCTZ for now
likely pre renal   much improved   renal inputL  hold ARB /HCTZ for now..discussed with Dr. Collins
likely pre renal   much improved   renal inputL  hold ARB /HCTZ for now..discussed with Dr. Collins
BP acceptable off of HCTZ/losartan. Continue to hold medications given resolving ADINA and low normal BP. Monitor BP.
likely pre renal   cont IVF and monitor Cr   renal inputL  hold ARB /HCTZ for now
Continue treatment, FU with primary team.

## 2017-08-04 NOTE — PROGRESS NOTE ADULT - SUBJECTIVE AND OBJECTIVE BOX
Chief complaint  Patient is a 85y old  Female who presents with a chief complaint of Two weeks of generalized weakness, dizziness, episode of chills (04 Aug 2017 11:55)   Review of systems  Patient in bed, comfortable, no fever, no hypoglycemia.    Labs and Fingersticks    CAPILLARY BLOOD GLUCOSE  275 (04 Aug 2017 12:18)  130 (04 Aug 2017 08:20)  343 (03 Aug 2017 21:35)  111 (03 Aug 2017 18:04)  347 (03 Aug 2017 12:57)  185 (03 Aug 2017 08:54)  377 (02 Aug 2017 21:25)  229 (02 Aug 2017 17:50)  274 (02 Aug 2017 13:10)  162 (02 Aug 2017 08:47)  262 (01 Aug 2017 21:11)  305 (01 Aug 2017 18:12)    Anion Gap, Serum: 10 (08-04 @ 07:17)  Anion Gap, Serum: 12 (08-03 @ 06:58)      Calcium, Total Serum: 8.4 (08-04 @ 07:17)  Calcium, Total Serum: 8.5 (08-03 @ 06:58)          08-04    142  |  106  |  23  ----------------------------<  149<H>  4.0   |  26  |  1.02    Ca    8.4      04 Aug 2017 07:17                          11.0   10.5  )-----------( 166      ( 04 Aug 2017 07:17 )             31.5     Medications  MEDICATIONS  (STANDING):  escitalopram 10 milliGRAM(s) Oral daily  folic acid 1 milliGRAM(s) Oral daily  cholecalciferol 1000 Unit(s) Oral daily  insulin lispro (HumaLOG) corrective regimen sliding scale   SubCutaneous three times a day before meals  insulin lispro (HumaLOG) corrective regimen sliding scale   SubCutaneous at bedtime  dextrose 5%. 1000 milliLiter(s) (50 mL/Hr) IV Continuous <Continuous>  dextrose 50% Injectable 12.5 Gram(s) IV Push once  dextrose 50% Injectable 25 Gram(s) IV Push once  dextrose 50% Injectable 25 Gram(s) IV Push once  heparin  Injectable 5000 Unit(s) SubCutaneous every 8 hours  lactobacillus acidophilus 1 Tablet(s) Oral two times a day with meals  hydrocortisone 20 milliGRAM(s) Oral daily  hydrocortisone 10 milliGRAM(s) Oral daily  insulin glargine Injectable (LANTUS) 18 Unit(s) SubCutaneous at bedtime  insulin lispro Injectable (HumaLOG) 8 Unit(s) SubCutaneous three times a day before meals  sodium chloride 0.45%. 1000 milliLiter(s) (60 mL/Hr) IV Continuous <Continuous>      Physical Exam  General: Patient comfortable in bed  Vital Signs Last 12 Hrs  T(F): 98.1 (08-04-17 @ 12:22), Max: 98.1 (08-04-17 @ 12:22)  HR: 82 (08-04-17 @ 12:22) (79 - 86)  BP: 130/82 (08-04-17 @ 12:22) (119/75 - 130/82)  BP(mean): --  RR: 18 (08-04-17 @ 12:22) (18 - 18)  SpO2: 98% (08-04-17 @ 12:22) (97% - 98%)  Neck: No palpable thyroid nodules.  CVS: S1S2, No murmurs  Respiratory: No wheezing, no crepitations  GI: Abdomen soft, bowel sounds positive  Musculoskeletal: Positive edema lower extremities bilaterally  Skin: No skin rashes, no ecchimosis    Diagnostics

## 2017-08-04 NOTE — DISCHARGE NOTE ADULT - ADDITIONAL INSTRUCTIONS
Follow up with your primary care physician within 1 week from discharge.   Follow up with endocrinology within 1 week from discharge to follow up on steroid dosing. Follow up with your primary care physician within 1 week from discharge.   Follow up with endocrinology within 1 week from discharge to follow up on steroid dosing.  Follow up with pulmonology within 2 weeks from discharge for PFT (pulmonary function test) and CT chest.a Follow up with your primary care physician within 1 week from discharge.   Follow up with endocrinology within 1 week from discharge to follow up on steroid dosing and further tapering.  Follow up with pulmonology within 2 weeks from discharge for PFT (pulmonary function test) and CT chest.  Follow up with rheumatologist within 1 week from discharge.

## 2017-08-04 NOTE — PROGRESS NOTE ADULT - PROBLEM SELECTOR PROBLEM 3
Hyponatremia
HTN (hypertension), benign
Hyponatremia
HTN (hypertension), benign
Hyponatremia

## 2017-08-04 NOTE — DISCHARGE NOTE ADULT - PLAN OF CARE
Resolved Follow up with your primary care physician within 1 week from discharge.   Follow up with Avoid taking (NSAIDs) - (ex: Ibuprofen, Advil, Celebrex, Naprosyn)  Avoid taking any nephrotoxic agents (can harm kidneys) - Intravenous contrast for diagnostic testing, combination cold medications.  Have all medications adjusted for your renal function by your Health Care Provider.  Blood pressure control is important.  Take all medication as prescribed. HgA1C this admission 7.7.  Make sure you get your HgA1c checked every three months.  If you take oral diabetes medications, check your blood glucose two times a day.  If you take insulin, check your blood glucose before meals and at bedtime.  It's important not to skip any meals.  Keep a log of your blood glucose results and always take it with you to your doctor appointments.  Keep a list of your current medications including injectables and over the counter medications and bring this medication list with you to all your doctor appointments.  If you have not seen your ophthalmologist this year call for appointment.  Check your feet daily for redness, sores, or openings. Do not self treat. If no improvement in two days call your primary care physician for an appointment. Follow up with your primary care physician within 1 week from discharge for further medication management.   Low salt diet  Activity as tolerated.  Take all medication as prescribed.  Follow up with your medical doctor for routine blood pressure monitoring at your next visit.  Notify your doctor if you have any of the following symptoms:   Dizziness, Lightheadedness, Blurry vision, Headache, Chest pain, Shortness of breath Follow up with your primary care physician within 1 week from discharge. Follow up with your primary care physician within 1 week from discharge.   Follow up with endocrinology within 1 week from discharge to follow up on steroid dosing. Follow up with pulmonology within 2 weeks from discharge for PFT (pulmonary function test) and CT chest. Resolved, secondary to adrenal insufficiency Follow up with your rheumatologist within 1 week from discharge.  STOP prednisone 5mg. Follow up with your primary care physician within 1 week from discharge.  STOP hydrochlorothiazide and Cozaar. Follow up with your primary care physician within 1 week from discharge.   Follow up with endocrinology within 1 week from discharge to follow up on steroid dosing and further tapering. Follow up with your rheumatologist within 1 week from discharge. continue with methroxate    STOP prednisone 5mg. Follow up with your primary care physician within 1 week from discharge.  STOP hydrochlorothiazide and Cozaar.  follow up outpatient to restart Follow up with your primary care physician within 1 week from discharge.   Follow up with endocrinology within 1 week from discharge to follow up on steroid dosing  ( cortef) and further tapering. Follow up with your primary care physician within 1 week from discharge for further medication management.   Low salt diet  Activity as tolerated.  Take all medication as prescribed.  Follow up with your medical doctor for routine blood pressure monitoring at your next visit.  Notify your doctor if you have any of the following symptoms:   Dizziness, Lightheadedness, Blurry vision, Headache, Chest pain, Shortness of breath  follow up outpatient regarding hypertensive medication Cozaar  HCTZ held secondary hyponatremia monitor BMP Follow up with your rheumatologist within 1 week from discharge. continue with methotrexate     STOP prednisone 5mg. Follow up with your primary care physician within 1 week from discharge.  STOP hydrochlorothiazide and Cozaar.  Follow up outpatient to restart medications. Follow up with your primary care physician within 1 week from discharge.   Follow up with endocrinology within 1 week from discharge to follow up on steroid dosing  (hydrocortisone) and further tapering. Follow up with your primary care physician within 1 week from discharge for further medication management.   Low salt diet  Activity as tolerated.  Take all medication as prescribed.  Follow up with your medical doctor for routine blood pressure monitoring at your next visit.  Notify your doctor if you have any of the following symptoms:   Dizziness, Lightheadedness, Blurry vision, Headache, Chest pain, Shortness of breath.  Follow up outpatient regarding hypertensive medication Cozaar.  HCTZ held secondary hyponatremia, monitor BMP. Follow up with your rheumatologist within 1 week from discharge. Continue with methotrexate.    STOP prednisone 5mg.

## 2017-08-04 NOTE — PROGRESS NOTE ADULT - SUBJECTIVE AND OBJECTIVE BOX
[facility] NEPHROLOGY- PROGRESS NOTE    85 year old female h/o HTN presents with weakness found to have ADINA and hyponatremia.    REVIEW OF SYSTEMS:  Gen: no changes in weight  Cards: no chest pain  Resp: no dyspnea  GI: no nausea or vomiting, no diarrhea  Vascular: no LE edema    No Known Allergies      Hospital Medications: Medications reviewed      VITALS:  T(F): 98.1 (08-04-17 @ 12:22), Max: 98.3 (08-03-17 @ 21:35)  HR: 82 (08-04-17 @ 12:22)  BP: 130/82 (08-04-17 @ 12:22)  RR: 18 (08-04-17 @ 12:22)  SpO2: 98% (08-04-17 @ 12:22)  Wt(kg): --    08-03 @ 07:01  -  08-04 @ 07:00  --------------------------------------------------------  IN: 1920 mL / OUT: 900 mL / NET: 1020 mL    08-04 @ 07:01  -  08-04 @ 14:26  --------------------------------------------------------  IN: 360 mL / OUT: 500 mL / NET: -140 mL      PHYSICAL EXAM:    Gen: NAD, calm  Cards: RRR, +S1/S2, no M/G/R  Resp: mild R basilar rales  GI: soft, NT/ND, NABS  Vascular: no LE edema B/L      LABS:  08-04    142  |  106  |  23  ----------------------------<  149<H>  4.0   |  26  |  1.02    Ca    8.4      04 Aug 2017 07:17      Creatinine Trend: 1.02 <--, 0.96 <--, 1.06 <--, 1.13 <--, 1.05 <--, 1.37 <--, 1.88 <--                        11.0   10.5  )-----------( 166      ( 04 Aug 2017 07:17 )             31.5

## 2017-08-04 NOTE — PROGRESS NOTE ADULT - PROBLEM SELECTOR PLAN 1
not vertigo   decreased po and increased lethargy/ decreased energy  no  infectious etiology . observe off abx . stool culture NTD    CT chest:Mosaic attenuation of the lungs with scarring as seen previously. Stable   6 mm region of juxtapleural thickening in the right middle lobe.  orthostatic hypotension:  improved wth IVF  . repeated with PT and was normal   adrenal insufficiency : cont  hydrocortisione taper  : discussed with dr Akbar
Resolving and likely hemodynamically mediated secondary to poor PO intake with diarrhea in setting of hypotension and ARB/HCTZ use. Avoid nephrotoxins.
Resolving and likely hemodynamically mediated secondary to poor PO intake with diarrhea in setting of hypotension and ARB/HCTZ use. Avoid nephrotoxins.
Resolving and likely hemodynamically mediated secondary to poor PO intake with diarrhea in setting of hypotension and ARB/HCTZ use. Avoid nephrotoxins. Defer imaging at this time.
Resolving and likely hemodynamically mediated secondary to poor PO intake with diarrhea in setting of hypotension and ARB/HCTZ use. Avoid nephrotoxins. Defer imaging given improving renal function.
Will continue current insulin regimen for now. Will continue monitoring FS, log, will Follow up.  Patient counseled for compliance with consistent low carb diet.
not vertigo   decreased po and increased lethargy/ decreased energy   doubt infectious etiology however pt immunosuppressed: observe off abx .   CT chest:Mosaic attenuation of the lungs with scarring as seen previously. Stable   6 mm region of juxtapleural thickening in the right middle lobe.  f/u  stool .   orthostatic hypotension:  IVF  . monitor  adrenal insufficiency : cont  hydrocortisione taper  : discussed with dr Akbar
not vertigo   decreased po and increased lethargy/ decreased energy   doubt infectious etiology however pt immunosuppressed: observe off abx . stool culture NTD    CT chest:Mosaic attenuation of the lungs with scarring as seen previously. Stable   6 mm region of juxtapleural thickening in the right middle lobe.  orthostatic hypotension:  IVF  . monitor  adrenal insufficiency : cont  hydrocortisione taper  : discussed with dr Akbar
not vertigo   decreased po and increased lethargy/ energy   doubt infectious etiology however pt immunosuppressed: observe off abx .   CT chest:.< from: CT Chest No Cont (07.31.17 @ 14:53) >    Mosaic attenuation of the lungs with scarring as seen previously. Stable   6 mm region of juxtapleural thickening in the right middle lobe.   if diarreah check stool . ID input . if CT neg will dc abx..  orthostatic hypotension:  IVF  . monitor  adrenal insufficiency : cont  hydrocortisione taper  : discussed with dr Akbar
Likely hemodynamically mediated secondary to poor PO intake with diarrhea in setting of hypotension and ARB/HCTZ use. UA bland without proteinuria. ADINA now resolving with IVF. Appetite improved and patient no longer complaining of diarrhea. Can discontinue IVF this evening and encourage PO intake. Avoid nephrotoxins. Defer imaging given improving renal function.
not vertigo   decreased po and increased lethargy/ energy   doubt infectious etiology however pt immunosuppressed: will cont abx and will CT chest. if diarreah check stool . ID input . if CT neg will dc abx..  orthostatic hypotension:  IVF  . monitor  adrenal insufficiency : will start hydrocortisione and f/u with endo
Will continue current insulin regimen for now. Will continue monitoring FS, log, will Follow up.  Patient counseled for compliance with consistent low carb diet.

## 2017-08-04 NOTE — PROGRESS NOTE ADULT - PROBLEM SELECTOR PLAN 3
2/2 HCTZ now resolved with IVF. Monitor serum sodium.
Steroids tapered, Will continue steroids Hydrocortisone 20mg po am and 10mg po pm.  May get DC on current steroid dose, FU with Endo in 1 week for further tapering and monitoring.  Discussed with patient and primary team.
Steroids tapered, Will decrease steroids to Hydrocortisone 20mg po am and 10mg po pm.  May get DC on current steroid dose, FU with Endo in 1 week for further tapering and monitoring.  Discussed with patient and primary team.
Steroids tapered, Will decrease steroids to Hydrocortisone 25mg po BID.
as above   monitor bp
2/2 HCTZ now resolved with IVF. Monitor serum sodium.
as above   monitor bp
On meds primary team following up

## 2017-08-04 NOTE — PROGRESS NOTE ADULT - PROBLEM SELECTOR PROBLEM 1
Acute kidney injury
DM (diabetes mellitus), type 2
Dizziness
Acute kidney injury
Dizziness

## 2017-08-04 NOTE — DISCHARGE NOTE ADULT - HOSPITAL COURSE
To be completed by attending. Patient is a 85y old  Female who presents with a chief complaint of Two weeks of generalized weakness, dizziness, episode of chills (30 Jul 2017 21:28)  86 y/o fmeale with hx of RA on mtx , prednsion and iv infusion as o/p. DAVID CHAPA admitted with 2 months hx of decreased po intake, weakness , fatigue, and dizziness that has been much more pronounced over the past one to two weeks.  no fever or chills , no N/V but has intermittent diarreah   no urinary sx   dizziness NOT vertigo and no cp/ palpiations   no focal neuro complaints       Problem/Plan - 1:  ·  Problem: Dizziness.  Plan: not vertigo   decreased po and increased lethargy/ decreased energy  no  infectious etiology . observe off abx . stool culture NTD    CT chest:Mosaic attenuation of the lungs with scarring as seen previously. Stable   6 mm region of juxtapleural thickening in the right middle lobe.  orthostatic hypotension:  improved wth IVF  . repeated with PT and was normal   adrenal insufficiency : cont  hydrocortisione taper  : discussed with dr Akbar.     Problem/Plan - 2:  ·  Problem: ADINA (acute kidney injury).  Plan: likely pre renal   much improved   renal inputL  hold ARB /HCTZ for now..discussed with Dr. Collins.     Problem/Plan - 3:  ·  Problem: HTN (hypertension), benign.  Plan: as above   monitor bp.     Problem/Plan - 4:  ·  Problem: DM (diabetes mellitus), type 2.  Plan: holding po meds and monitor FS.     Problem/Plan - 5:  ·  Problem: RA (rheumatoid arthritis).  Plan: on prednisone /  mtx and iv iinfusion as o/p.  appreciate Dr. Goldberg's input.. pt on steroid taper for adrenal insufficiency  cont same for now.     Problem/Plan - 6:  Problem: Crackle. Plan: crackles on exam ..mild   appreciated pul input   pft as o/p.  f/u with pul as o/p...? early involvement of lungs from RA ??.    Attending Attestation:   60 minutes spent on total encounter; more than 50% of the visit was spent counseling and/or coordinating care by the attending physician.     Plan discussed with pt and  .. NPs and called Dr. Tobias and discussed.

## 2017-08-04 NOTE — PROGRESS NOTE ADULT - NSHPATTENDINGPLANDISCUSS_GEN_ALL_CORE
pt     NP
pt    and riki
tennille  and    Dr. Collins
as abvoe
pt and  .. NPs and called Dr. Tobias and discussed

## 2017-08-04 NOTE — PROGRESS NOTE ADULT - ASSESSMENT
RA  Probalble mild fibrosis, RA assocated  No clinical or radiographic evidence of pneumonia    REC:    Outpatient PFT's nd pulm f/u with CT screening: Dr. Gordon [phone]  No further pulmnary intervention at this time  OBserve off antibiotics

## 2017-08-04 NOTE — DISCHARGE NOTE ADULT - PROVIDER TOKENS
TOKEN:'[id number]',TOKEN:'[id number]' TOKEN:'[id number]',TOKEN:'[id number]:'[id number]',FREE:[LAST:[Nik],FIRST:[Rose],PHONE:[phone]],FAX:[(   )    -],ADDRESS:[[address] [place] [address]]]

## 2017-08-04 NOTE — PROGRESS NOTE ADULT - ATTENDING COMMENTS
[facility] NEPHROLOGY  Jarrell Payne M.D.  Terry Collins D.O.  Kenia Blackburn M.D.  Ayah Longoria, MSN, ANP-C    Telephone: [phone]  Facsimile: [phone]    [address] [place] [address]
[facility] NEPHROLOGY  Jarrell Payne M.D.  Terry Collins D.O.  Kenia Blackburn M.D.  Ayah Longoria, MSN, ANP-C    Telephone: [phone]  Facsimile: [phone]    [address] [place] [address]
[facility] NEPHROLOGY  Jarrell Payne M.D.  Terry Collins D.O.  Kenia Blackburn M.D.  Ayah Longoria, MSN, ANP-C    Telephone: [phone]  Facsimile: [phone]    [address] [place] [address]
[facility] NEPHROLOGY  Jarrell Payne M.D.  Terry Collins D.O.  Kenia Blackburn M.D.  Ayah Longoria, MSN, ANP-C    Telephone: [phone]  Facsimile: [phone]    [address] [place] [address]
[facility] NEPHROLOGY  Jarrell Payne M.D.  Terry Collins D.O.  Kenia Blackburn M.D.  Ayah Longoria, MSN, ANP-C    Telephone: [phone]  Facsimile: [phone]    [address] [place] [address]

## 2017-08-04 NOTE — PROGRESS NOTE ADULT - PROBLEM SELECTOR PLAN 5
on prednisone /  mtx and iv iinfusion as o/p.  appreciate Dr. Goldberg's input
on prednisone /  mtx and iv iinfusion as o/p.  appreciate Dr. Goldberg's input
on prednisone /  mtx and iv iinfusion as o/p.  appreciate Dr. Goldberg's input.. pt on steroid taper for adrenal insufficiency  cont same for now
on prednisone /  mtx and iv iinfusion as o/p.  appreciate Dr. Goldberg's input.. pt on steroid taper for adrenal insufficiency  cont same for now
on prednisone 56  mtx and iv inmfucsion   [facility]

## 2017-08-04 NOTE — DISCHARGE NOTE ADULT - MEDICATION SUMMARY - MEDICATIONS TO STOP TAKING
I will STOP taking the medications listed below when I get home from the hospital:    predniSONE 5 mg oral tablet  -- 1 tab(s) by mouth once a day    Cozaar 50 mg oral tablet  -- 1 tab(s) by mouth once a day    hydroCHLOROthiazide 25 mg oral tablet  -- 1 tab(s) by mouth once a day    methotrexate 2.5 mg oral tablet  --  by mouth once a day

## 2017-08-04 NOTE — DISCHARGE NOTE ADULT - SECONDARY DIAGNOSIS.
ADINA (acute kidney injury) Chronic kidney disease (CKD), stage IV (severe) DM (diabetes mellitus), type 2 HTN (hypertension), benign RA (rheumatoid arthritis) Pulmonary fibrosis

## 2017-08-04 NOTE — PROGRESS NOTE ADULT - PROBLEM SELECTOR PLAN 6
crackles on exam ..mild   appreciated pul input   pft as o/p.  f/u with pul as o/p...? early involvement of lungs from RA ??

## 2017-08-04 NOTE — DISCHARGE NOTE ADULT - PATIENT PORTAL LINK FT
“You can access the Follow[employer] Patient Portal, offered by [facility], by registering with the following website: [email or web address]”

## 2017-08-04 NOTE — PROGRESS NOTE ADULT - PROBLEM SELECTOR PLAN 4
CKD: monitor labs, Renal FU
Likely secondary to diarrhea yesterday. Monitor serum potassium.
Likely secondary to diarrhea. Repleted. Monitor serum potassium.
Repleted. Monitor magnesium.
Resolved and likely secondary to diarrhea. Monitor serum potassium.
holding po meds and monitor FS
If zosyn continued for possible PNA. would change to Q8 dosing given improving renal function.
holding po meds and monitor FS
CKD: monitor labs, Renal FU

## 2017-08-04 NOTE — DISCHARGE NOTE ADULT - CARE PROVIDER_API CALL
Yuriy Tobias), Family Medicine  [address]  [address]  [place] [address]  Phone: [phone]  Fax: [phone]    Karan Akbar), EndocrinologyMetabDiabetes; Internal Medicine  [address] [place] [address]  Phone: [phone]  Fax: [phone] Yuriy Tobias (MD), Family Medicine  [address]  [address]  [place] [address]  Phone: [phone]  Fax: [phone]    Karan Akbar), EndocrinologyMetabDiabetes; Internal Medicine  [address] [place] [address]  Phone: [phone]  Fax: [phone]    Abrahan Flannery), Internal Medicine; Pulmonary Disease  [address]  [place] [address]  Phone: [phone]  Fax: [phone]    Rose Zaragoza  [facility] [address] [place] [address] [address] [address]  Phone: [phone]  Fax:

## 2017-08-04 NOTE — PROGRESS NOTE ADULT - PROBLEM SELECTOR PROBLEM 2
CAP (community acquired pneumonia)
ADINA (acute kidney injury)
CAP (community acquired pneumonia)
Hypertension, essential, benign
ADINA (acute kidney injury)
Hypertension, essential, benign

## 2017-08-04 NOTE — PROGRESS NOTE ADULT - PROVIDER SPECIALTY LIST ADULT
Endocrinology
Infectious Disease
Infectious Disease
Internal Medicine
Nephrology
Pulmonology

## 2017-08-05 LAB
CULTURE RESULTS: SIGNIFICANT CHANGE UP
CULTURE RESULTS: SIGNIFICANT CHANGE UP
SPECIMEN SOURCE: SIGNIFICANT CHANGE UP
SPECIMEN SOURCE: SIGNIFICANT CHANGE UP

## 2017-08-06 LAB
CULTURE RESULTS: SIGNIFICANT CHANGE UP
SPECIMEN SOURCE: SIGNIFICANT CHANGE UP

## 2017-10-18 ENCOUNTER — INPATIENT (INPATIENT)
Facility: HOSPITAL | Age: 82
LOS: 5 days | Discharge: NURSING, REHAB OR LONG-TERM CARE | End: 2017-10-24
Attending: GENERAL ACUTE CARE HOSPITAL | Admitting: GENERAL ACUTE CARE HOSPITAL
Payer: MEDICARE

## 2017-10-18 VITALS
SYSTOLIC BLOOD PRESSURE: 132 MMHG | HEART RATE: 95 BPM | TEMPERATURE: 99 F | RESPIRATION RATE: 18 BRPM | OXYGEN SATURATION: 96 % | DIASTOLIC BLOOD PRESSURE: 59 MMHG

## 2017-10-18 DIAGNOSIS — H26.40 UNSPECIFIED SECONDARY CATARACT: Chronic | ICD-10-CM

## 2017-10-18 DIAGNOSIS — N97.1 FEMALE INFERTILITY OF TUBAL ORIGIN: Chronic | ICD-10-CM

## 2017-10-18 DIAGNOSIS — R53.1 WEAKNESS: ICD-10-CM

## 2017-10-18 LAB
APPEARANCE UR: CLEAR — SIGNIFICANT CHANGE UP
B PERT DNA SPEC QL NAA+PROBE: SIGNIFICANT CHANGE UP
BASE EXCESS BLDV CALC-SCNC: -0.7 MMOL/L — SIGNIFICANT CHANGE UP
BASOPHILS # BLD AUTO: 0.04 K/UL — SIGNIFICANT CHANGE UP (ref 0–0.2)
BASOPHILS NFR BLD AUTO: 0.4 % — SIGNIFICANT CHANGE UP (ref 0–2)
BILIRUB UR-MCNC: NEGATIVE — SIGNIFICANT CHANGE UP
BLOOD GAS VENOUS - CREATININE: 1.01 MG/DL — SIGNIFICANT CHANGE UP (ref 0.5–1.3)
BLOOD UR QL VISUAL: NEGATIVE — SIGNIFICANT CHANGE UP
BUN SERPL-MCNC: 27 MG/DL — HIGH (ref 7–23)
C PNEUM DNA SPEC QL NAA+PROBE: NOT DETECTED — SIGNIFICANT CHANGE UP
CALCIUM SERPL-MCNC: 8.9 MG/DL — SIGNIFICANT CHANGE UP (ref 8.4–10.5)
CHLORIDE BLDV-SCNC: 103 MMOL/L — SIGNIFICANT CHANGE UP (ref 96–108)
CHLORIDE SERPL-SCNC: 99 MMOL/L — SIGNIFICANT CHANGE UP (ref 98–107)
CO2 SERPL-SCNC: 21 MMOL/L — LOW (ref 22–31)
COLOR SPEC: SIGNIFICANT CHANGE UP
CREAT SERPL-MCNC: 1.08 MG/DL — SIGNIFICANT CHANGE UP (ref 0.5–1.3)
EOSINOPHIL # BLD AUTO: 0.11 K/UL — SIGNIFICANT CHANGE UP (ref 0–0.5)
EOSINOPHIL NFR BLD AUTO: 1 % — SIGNIFICANT CHANGE UP (ref 0–6)
FLUAV H1 2009 PAND RNA SPEC QL NAA+PROBE: NOT DETECTED — SIGNIFICANT CHANGE UP
FLUAV H1 RNA SPEC QL NAA+PROBE: NOT DETECTED — SIGNIFICANT CHANGE UP
FLUAV H3 RNA SPEC QL NAA+PROBE: NOT DETECTED — SIGNIFICANT CHANGE UP
FLUAV SUBTYP SPEC NAA+PROBE: SIGNIFICANT CHANGE UP
FLUBV RNA SPEC QL NAA+PROBE: NOT DETECTED — SIGNIFICANT CHANGE UP
GAS PNL BLDV: 131 MMOL/L — LOW (ref 136–146)
GLUCOSE BLDV-MCNC: 163 — HIGH (ref 70–99)
GLUCOSE SERPL-MCNC: 155 MG/DL — HIGH (ref 70–99)
GLUCOSE UR-MCNC: NEGATIVE — SIGNIFICANT CHANGE UP
HADV DNA SPEC QL NAA+PROBE: NOT DETECTED — SIGNIFICANT CHANGE UP
HCO3 BLDV-SCNC: 23 MMOL/L — SIGNIFICANT CHANGE UP (ref 20–27)
HCOV 229E RNA SPEC QL NAA+PROBE: NOT DETECTED — SIGNIFICANT CHANGE UP
HCOV HKU1 RNA SPEC QL NAA+PROBE: NOT DETECTED — SIGNIFICANT CHANGE UP
HCOV NL63 RNA SPEC QL NAA+PROBE: NOT DETECTED — SIGNIFICANT CHANGE UP
HCOV OC43 RNA SPEC QL NAA+PROBE: NOT DETECTED — SIGNIFICANT CHANGE UP
HCT VFR BLD CALC: 32.8 % — LOW (ref 34.5–45)
HCT VFR BLDV CALC: 35.3 % — SIGNIFICANT CHANGE UP (ref 34.5–45)
HGB BLD-MCNC: 11.3 G/DL — LOW (ref 11.5–15.5)
HGB BLDV-MCNC: 11.4 G/DL — LOW (ref 11.5–15.5)
HMPV RNA SPEC QL NAA+PROBE: NOT DETECTED — SIGNIFICANT CHANGE UP
HPIV1 RNA SPEC QL NAA+PROBE: NOT DETECTED — SIGNIFICANT CHANGE UP
HPIV2 RNA SPEC QL NAA+PROBE: NOT DETECTED — SIGNIFICANT CHANGE UP
HPIV3 RNA SPEC QL NAA+PROBE: NOT DETECTED — SIGNIFICANT CHANGE UP
HPIV4 RNA SPEC QL NAA+PROBE: NOT DETECTED — SIGNIFICANT CHANGE UP
HYALINE CASTS # UR AUTO: SIGNIFICANT CHANGE UP (ref 0–?)
IMM GRANULOCYTES # BLD AUTO: 0.05 # — SIGNIFICANT CHANGE UP
IMM GRANULOCYTES NFR BLD AUTO: 0.5 % — SIGNIFICANT CHANGE UP (ref 0–1.5)
KETONES UR-MCNC: NEGATIVE — SIGNIFICANT CHANGE UP
LACTATE BLDV-MCNC: 1.6 MMOL/L — SIGNIFICANT CHANGE UP (ref 0.5–2)
LEUKOCYTE ESTERASE UR-ACNC: NEGATIVE — SIGNIFICANT CHANGE UP
LYMPHOCYTES # BLD AUTO: 0.94 K/UL — LOW (ref 1–3.3)
LYMPHOCYTES # BLD AUTO: 8.8 % — LOW (ref 13–44)
M PNEUMO DNA SPEC QL NAA+PROBE: NOT DETECTED — SIGNIFICANT CHANGE UP
MCHC RBC-ENTMCNC: 32.8 PG — SIGNIFICANT CHANGE UP (ref 27–34)
MCHC RBC-ENTMCNC: 34.5 % — SIGNIFICANT CHANGE UP (ref 32–36)
MCV RBC AUTO: 95.3 FL — SIGNIFICANT CHANGE UP (ref 80–100)
MONOCYTES # BLD AUTO: 0.23 K/UL — SIGNIFICANT CHANGE UP (ref 0–0.9)
MONOCYTES NFR BLD AUTO: 2.1 % — SIGNIFICANT CHANGE UP (ref 2–14)
MUCOUS THREADS # UR AUTO: SIGNIFICANT CHANGE UP
NEUTROPHILS # BLD AUTO: 9.33 K/UL — HIGH (ref 1.8–7.4)
NEUTROPHILS NFR BLD AUTO: 87.2 % — HIGH (ref 43–77)
NITRITE UR-MCNC: NEGATIVE — SIGNIFICANT CHANGE UP
NON-SQ EPI CELLS # UR AUTO: <1 — SIGNIFICANT CHANGE UP
NRBC # FLD: 0 — SIGNIFICANT CHANGE UP
PCO2 BLDV: 40 MMHG — LOW (ref 41–51)
PH BLDV: 7.39 PH — SIGNIFICANT CHANGE UP (ref 7.32–7.43)
PH UR: 5.5 — SIGNIFICANT CHANGE UP (ref 4.6–8)
PLATELET # BLD AUTO: 203 K/UL — SIGNIFICANT CHANGE UP (ref 150–400)
PMV BLD: 11 FL — SIGNIFICANT CHANGE UP (ref 7–13)
PO2 BLDV: 27 MMHG — LOW (ref 35–40)
POTASSIUM BLDV-SCNC: 4.2 MMOL/L — SIGNIFICANT CHANGE UP (ref 3.4–4.5)
POTASSIUM SERPL-MCNC: 4.7 MMOL/L — SIGNIFICANT CHANGE UP (ref 3.5–5.3)
POTASSIUM SERPL-SCNC: 4.7 MMOL/L — SIGNIFICANT CHANGE UP (ref 3.5–5.3)
PROT UR-MCNC: 10 — SIGNIFICANT CHANGE UP
RBC # BLD: 3.44 M/UL — LOW (ref 3.8–5.2)
RBC # FLD: 13.7 % — SIGNIFICANT CHANGE UP (ref 10.3–14.5)
RBC CASTS # UR COMP ASSIST: SIGNIFICANT CHANGE UP (ref 0–?)
RSV RNA SPEC QL NAA+PROBE: NOT DETECTED — SIGNIFICANT CHANGE UP
RV+EV RNA SPEC QL NAA+PROBE: POSITIVE — HIGH
SAO2 % BLDV: 46.4 % — LOW (ref 60–85)
SODIUM SERPL-SCNC: 135 MMOL/L — SIGNIFICANT CHANGE UP (ref 135–145)
SP GR SPEC: 1.01 — SIGNIFICANT CHANGE UP (ref 1–1.03)
SQUAMOUS # UR AUTO: SIGNIFICANT CHANGE UP
UROBILINOGEN FLD QL: NORMAL E.U. — SIGNIFICANT CHANGE UP (ref 0.1–0.2)
WBC # BLD: 10.7 K/UL — HIGH (ref 3.8–10.5)
WBC # FLD AUTO: 10.7 K/UL — HIGH (ref 3.8–10.5)
WBC UR QL: SIGNIFICANT CHANGE UP (ref 0–?)

## 2017-10-18 PROCEDURE — 71010: CPT | Mod: 26

## 2017-10-18 PROCEDURE — 99223 1ST HOSP IP/OBS HIGH 75: CPT

## 2017-10-18 RX ORDER — SODIUM CHLORIDE 9 MG/ML
1000 INJECTION INTRAMUSCULAR; INTRAVENOUS; SUBCUTANEOUS ONCE
Qty: 0 | Refills: 0 | Status: COMPLETED | OUTPATIENT
Start: 2017-10-18 | End: 2017-10-18

## 2017-10-18 RX ADMIN — SODIUM CHLORIDE 1000 MILLILITER(S): 9 INJECTION INTRAMUSCULAR; INTRAVENOUS; SUBCUTANEOUS at 20:29

## 2017-10-18 NOTE — H&P ADULT - PROBLEM SELECTOR PLAN 3
- humalog sliding scale.    - pt previously on januvia, metformin, and Amaryl.  Will confirm home meds in am with

## 2017-10-18 NOTE — ED ADULT NURSE NOTE - OBJECTIVE STATEMENT
pt is a 86 year old female with c/o generalized weakness, diarrhea and left wrist pain s/p fall yesterday. pt with right knee abrasion.  pt denies abdominal pain.

## 2017-10-18 NOTE — ED PROVIDER NOTE - MEDICAL DECISION MAKING DETAILS
86F w/ RA on mtx, prednisone, and infusions w/ noted lung scarring/fibrosis, moderate AS, HTN on HCTZ, & DM2 presents to [facility] for 2d of generalized weakness. no sirs, neg orthostatic, CXR w/o clear consolidation, RVP +, UA neg. will need conintued hydration and supportive management. Left wrist tender and will need Left wrist xray, no clear fracture on exam

## 2017-10-18 NOTE — ED PROVIDER NOTE - NS ED ROS FT
CONSTITUTIONAL: No fever, no chills  EYES: No eye pain, no visual disturbance  Mouth: no pain in mouth, no cavities  RESPIRATORY: No cough, No sob  CARDIOVASCULAR: No CP, no palpitations  GASTROINTESTINAL: no abdominal pain, no vomiting, has had nausea and diarrhea  GENITOURINARY: No dysuria, no hematuria  NEUROLOGICAL: No headaches, no weakness  SKIN: No itching, no rashes  MUSCULOSKELETAL: No joint pain, no joint swelling CONSTITUTIONAL: No fever, no chills  EYES: No eye pain, no visual disturbance  Mouth: no pain in mouth, no cavities  RESPIRATORY: No cough, No sob  CARDIOVASCULAR: No CP, no palpitations  GASTROINTESTINAL: no abdominal pain, no vomiting, has had nausea and diarrhea  GENITOURINARY: No dysuria, no hematuria  NEUROLOGICAL: No headaches, no focal weakness  SKIN: No itching, no rashes  MUSCULOSKELETAL: left wrist joint pain, no joint swelling

## 2017-10-18 NOTE — ED PROVIDER NOTE - OBJECTIVE STATEMENT
86F w/ RA on mtx, prednisone, and infusions w/ noted lung scarring/fibrosis, moderate AS, HTN on HCTZ, & DM2 presents to [facility] for 2d of generalized weakness. The patient reports feeling general muscle weakness such that she collapsed yesterday when walking with  and was lowered to floor striking only Left wrist. Then today she was unable to walk (typically ambulates well w/o assist device), and has loose non-bloody stool x2 episodes. She has had some generalized muscle aching but she has not experienced, fevers, chills, chest pain, palpitations, HA, confusion, one-sided weakness, cough, sob, abdominal pain, vomiting or dysuria. Her  believes that this is the same presentation from Aug2017 where she was severely dehydrated and is noted per chart to have received stress dose steroids. The patient on review has had negative colonoscopy, received influenza vaccination, not missed medications, not had sick contacts or recent travel and she saw her pmd last week who said everything was fine w/ her health

## 2017-10-18 NOTE — ED PROVIDER NOTE - CARE PLAN
Principal Discharge DX:	Weakness  Secondary Diagnosis:	Dehydration Principal Discharge DX:	Weakness  Goal:	continue with inpatient management and evaluation  Instructions for follow-up, activity and diet:	Will need continue supportive care weakness possibly 2/2 viral etiology  Secondary Diagnosis:	Dehydration

## 2017-10-18 NOTE — ED PROVIDER NOTE - PHYSICAL EXAMINATION
GENERAL: NAD, well-developed  HEAD:  Atraumatic, Normocephalic  EYES: EOMI, PERRLA, conjunctiva and sclera clear  Mouth: dry oropharynx, no lesions  NECK: Supple, no appreciable masses  Pulm: normal work of breathing, crackles in bases b/l  Card: S1&S2+, rrr, systolic murmur III/VI over LUSB  ABDOMEN: bs+, soft, nt, nd, no appreciable masses  EXTREMITIES:  2+ Peripheral Pulses, No clubbing, cyanosis, or edema  Neuro: appropriate to conversation, no focal deficits  SKIN: warm and dry, abrasion just below right kness GENERAL: NAD, well-developed  HEAD:  Atraumatic, Normocephalic  EYES: EOMI, PERRLA, conjunctiva and sclera clear  Mouth: dry oropharynx, no lesions  NECK: Supple, no appreciable masses  Pulm: normal work of breathing, crackles in bases b/l  Card: S1&S2+, rrr, systolic murmur III/VI over LUSB  ABDOMEN: bs+, soft, nt, nd, no appreciable masses  EXTREMITIES:  2+ Peripheral Pulses, No clubbing, cyanosis, or edema. patient is able to make fist w/ left hand has does not have point tenderness to palpation over bony structures of wrist, metacarpals or phalanges, no abrasion or ecchymosis over the wrist  Neuro: appropriate to conversation, no focal deficits  SKIN: warm and dry, abrasion just below right knee

## 2017-10-18 NOTE — ED PROVIDER NOTE - ATTENDING CONTRIBUTION TO CARE
Pertinent PMH/PSH/FHx/SHx and Review of Systems contained within:   86F pmh inclusive of RA on mtx, prednisone, and infusions w/ noted lung scarring/fibrosis, moderate AS, HTN on HCTZ, & DM2 presents to [facility] for 2d of generalized weakness, near syncope ystdy ("collapsed while walking and lowered to ground" hurting left wrist in the process). Pertinent PMH/PSH/FHx/SHx and Review of Systems contained within:   86F pmh inclusive of RA on mtx, prednisone, and infusions w/ noted lung scarring/fibrosis, moderate AS, HTN on HCTZ, & DM2 presents to [facility] for 2d of severe generalized weakness, near syncope ystdy ("collapsed while walking and lowered to ground" hurting left wrist in the process). Per daughter, has had fecal incontinence for a couple of months now and recently, many loose non-dark/non-bloody BMs. Denies abd pain. No fever/chills, No photophobia/eye pain/changes in vision, No ear pain/sore throat/dysphagia, No chest pain/palpitations, no SOB/cough/wheeze/stridor, No diarrhea, no dysuria/frequency/discharge, No neck/back pain, no rash, no focal neuro symptoms.  Gen: Alert, NAD  Head: NC, AT, PERRL, EOMI, normal lids/conjunctiva  ENT:  normal hearing, patent oropharynx without erythema/exudate; +dry mucous membranes, uvula midline  Neck: +supple, no tenderness/meningismus/JVD, +Trachea midline  Chest: no chest wall tenderness, equal chest rise  Pulm: Bilateral BS, normal resp effort, no wheeze/stridor/retractions  CV: RRR, +systolic murmur noted, +dist pulses  Abd: soft, NT/ND, +BS, no hepatosplenomegaly  Rectal: deferred  Mskel: no edema/erythema/cyanosis  Skin: no rash  Neuro: AAOx3, diffuse weakness (4/5 strength in all extremities but symmetrical), no sensory deficits noted, CN 2-12 intact  MDM:  85yo F pmh as above here for severe general weakness, diarrhea, and collapse ystdy. H&P most consistent with dehydration and weakness. PCR pending for eval for c.diff. Not eating/drinking at home. Fall risk at home. Will need admission for hydration, PT eval, and possibly case mngmt consult if not significantly improved.

## 2017-10-18 NOTE — ED ADULT TRIAGE NOTE - CHIEF COMPLAINT QUOTE
Patient brought to ER from home by EMS for weakness and has been here not too long ago for same symptoms and was dehydrated. Pt also c/o being shaky and has not been walking for the past two hours due to weakness,

## 2017-10-18 NOTE — H&P ADULT - NSHPLABSRESULTS_GEN_ALL_CORE
10-18    135  |  99  |  27<H>  ----------------------------<  155<H>  4.7   |  21<L>  |  1.08    Ca    8.9      18 Oct 2017 19:44                              11.3   10.70 )-----------( 203      ( 18 Oct 2017 19:44 )             32.8         Urinalysis Basic - ( 18 Oct 2017 21:35 )    Color: PLYEL / Appearance: CLEAR / S.013 / pH: 5.5  Gluc: NEGATIVE / Ketone: NEGATIVE  / Bili: NEGATIVE / Urobili: NORMAL E.U.   Blood: NEGATIVE / Protein: 10 / Nitrite: NEGATIVE   Leuk Esterase: NEGATIVE / RBC: 0-2 / WBC 0-2   Sq Epi: OCC / Non Sq Epi: x / Bacteria: x    CXR film reviewed: clear lungs

## 2017-10-18 NOTE — H&P ADULT - HISTORY OF PRESENT ILLNESS
85 y/o F with HTN, DM, asthma, RA on MTX p/w generalized weakness and fall.  Pt reports she has felt weak for several days.  2 days ago, while walking with her  on the sidewalk, pt 85 y/o F with HTN, DM, asthma, RA on MTX p/w generalized weakness and fall.  Pt reports she has had gradually worsening weakness for several days.  2 days ago, while walking with her  on the sidewalk, pt felt her legs give out, and was unable to support her weight.  She fell, breaking her fall with her hands.  Since then, she has not been able to ambulate on her own.  Prior to developing weakness, pt had been at baseline.  She reports appetite has been stable.  She had 1 episode of loose stool today, but none in prior days.  No fever, chills, dysuria, nausea or joint pain aside from wrist pain after fall.      In the ED, pt was given 1L NS. 87 y/o F with HTN, DM, asthma, RA on MTX and chronic corticosteroids p/w generalized weakness and fall.  Pt reports she has had gradually worsening weakness for several days.  2 days ago, while walking with her  on the sidewalk, pt felt her legs give out, and was unable to support her weight.  She fell, breaking her fall with her hands.  Since then, she has not been able to ambulate on her own.  Prior to developing weakness, pt had been at baseline.  She reports appetite has been stable.  She had 1 episode of loose stool today, but none in prior days.  No fever, chills, dysuria, nausea or joint pain aside from wrist pain after fall.      Pt had been admitted to [facility] in Aug for pneumonia, and at that time she was changed from prednisone to hydrocortisone.  She is unsure if she is currently taking prednisone again.      In the ED, pt was given 1L NS.

## 2017-10-18 NOTE — H&P ADULT - NSHPPHYSICALEXAM_GEN_ALL_CORE
Vital Signs Last 24 Hrs  T(C): 36.7 (18 Oct 2017 20:02), Max: 37.1 (18 Oct 2017 18:47)  T(F): 98 (18 Oct 2017 20:02), Max: 98.7 (18 Oct 2017 18:47)  HR: 91 (18 Oct 2017 20:02) (91 - 95)  BP: 146/66 (18 Oct 2017 20:02) (132/59 - 146/66)  BP(mean): --  RR: 17 (18 Oct 2017 20:02) (17 - 18)  SpO2: 100% (18 Oct 2017 20:02) (96% - 100%)    PHYSICAL EXAM:  GENERAL: No Acute Distress  HEAD:  Atraumatic, Normocephalic  EYES: conjunctiva and sclera clear  ENMT: dry mucous membranes   NECK: Supple  CHEST/LUNG: Clear to auscultation bilaterally  HEART: Regular rate and rhythm; No murmurs, rubs, or gallops  ABDOMEN: Soft, Nontender, Nondistended; Bowel sounds normal  EXTREMITIES:   No clubbing, cyanosis, or pedal edema  PSYCH: Normal Affect, Normal Behavior  SKIN: No rashes or lesions  MUSCULOSKELETAL: No joint swelling

## 2017-10-18 NOTE — H&P ADULT - PROBLEM SELECTOR PLAN 1
likely deconditioning with possible dehydration.  May also be some component of adrenal insufficiency if pt has stopped chronic prednisone  - PT in am  - gentle hydration  - will need to confirm whether or not pt is taking corticosteroids or if they were tapered off.   will bring med list in am

## 2017-10-19 DIAGNOSIS — M06.9 RHEUMATOID ARTHRITIS, UNSPECIFIED: ICD-10-CM

## 2017-10-19 DIAGNOSIS — E11.9 TYPE 2 DIABETES MELLITUS WITHOUT COMPLICATIONS: ICD-10-CM

## 2017-10-19 DIAGNOSIS — R53.1 WEAKNESS: ICD-10-CM

## 2017-10-19 DIAGNOSIS — J45.909 UNSPECIFIED ASTHMA, UNCOMPLICATED: ICD-10-CM

## 2017-10-19 DIAGNOSIS — I10 ESSENTIAL (PRIMARY) HYPERTENSION: ICD-10-CM

## 2017-10-19 DIAGNOSIS — Z29.9 ENCOUNTER FOR PROPHYLACTIC MEASURES, UNSPECIFIED: ICD-10-CM

## 2017-10-19 LAB
BUN SERPL-MCNC: 19 MG/DL — SIGNIFICANT CHANGE UP (ref 7–23)
CALCIUM SERPL-MCNC: 8.3 MG/DL — LOW (ref 8.4–10.5)
CHLORIDE SERPL-SCNC: 102 MMOL/L — SIGNIFICANT CHANGE UP (ref 98–107)
CO2 SERPL-SCNC: 20 MMOL/L — LOW (ref 22–31)
CREAT SERPL-MCNC: 0.89 MG/DL — SIGNIFICANT CHANGE UP (ref 0.5–1.3)
GLUCOSE BLDC GLUCOMTR-MCNC: 123 MG/DL — HIGH (ref 70–99)
GLUCOSE BLDC GLUCOMTR-MCNC: 153 MG/DL — HIGH (ref 70–99)
GLUCOSE BLDC GLUCOMTR-MCNC: 163 MG/DL — HIGH (ref 70–99)
GLUCOSE BLDC GLUCOMTR-MCNC: 185 MG/DL — HIGH (ref 70–99)
GLUCOSE BLDC GLUCOMTR-MCNC: 188 MG/DL — HIGH (ref 70–99)
GLUCOSE SERPL-MCNC: 133 MG/DL — HIGH (ref 70–99)
HCT VFR BLD CALC: 30.2 % — LOW (ref 34.5–45)
HGB BLD-MCNC: 10.3 G/DL — LOW (ref 11.5–15.5)
MAGNESIUM SERPL-MCNC: 1.2 MG/DL — LOW (ref 1.6–2.6)
MCHC RBC-ENTMCNC: 31.9 PG — SIGNIFICANT CHANGE UP (ref 27–34)
MCHC RBC-ENTMCNC: 34.1 % — SIGNIFICANT CHANGE UP (ref 32–36)
MCV RBC AUTO: 93.5 FL — SIGNIFICANT CHANGE UP (ref 80–100)
NRBC # FLD: 0 — SIGNIFICANT CHANGE UP
PLATELET # BLD AUTO: 188 K/UL — SIGNIFICANT CHANGE UP (ref 150–400)
PMV BLD: 10.5 FL — SIGNIFICANT CHANGE UP (ref 7–13)
POTASSIUM SERPL-MCNC: 3.8 MMOL/L — SIGNIFICANT CHANGE UP (ref 3.5–5.3)
POTASSIUM SERPL-SCNC: 3.8 MMOL/L — SIGNIFICANT CHANGE UP (ref 3.5–5.3)
RBC # BLD: 3.23 M/UL — LOW (ref 3.8–5.2)
RBC # FLD: 13.5 % — SIGNIFICANT CHANGE UP (ref 10.3–14.5)
SODIUM SERPL-SCNC: 136 MMOL/L — SIGNIFICANT CHANGE UP (ref 135–145)
WBC # BLD: 6.29 K/UL — SIGNIFICANT CHANGE UP (ref 3.8–10.5)
WBC # FLD AUTO: 6.29 K/UL — SIGNIFICANT CHANGE UP (ref 3.8–10.5)

## 2017-10-19 PROCEDURE — 71250 CT THORAX DX C-: CPT

## 2017-10-19 PROCEDURE — 83605 ASSAY OF LACTIC ACID: CPT

## 2017-10-19 PROCEDURE — 87046 STOOL CULTR AEROBIC BACT EA: CPT

## 2017-10-19 PROCEDURE — 87040 BLOOD CULTURE FOR BACTERIA: CPT

## 2017-10-19 PROCEDURE — 82947 ASSAY GLUCOSE BLOOD QUANT: CPT

## 2017-10-19 PROCEDURE — 83036 HEMOGLOBIN GLYCOSYLATED A1C: CPT

## 2017-10-19 PROCEDURE — 83735 ASSAY OF MAGNESIUM: CPT

## 2017-10-19 PROCEDURE — 51701 INSERT BLADDER CATHETER: CPT

## 2017-10-19 PROCEDURE — 82803 BLOOD GASES ANY COMBINATION: CPT

## 2017-10-19 PROCEDURE — 85730 THROMBOPLASTIN TIME PARTIAL: CPT

## 2017-10-19 PROCEDURE — 82553 CREATINE MB FRACTION: CPT

## 2017-10-19 PROCEDURE — 82330 ASSAY OF CALCIUM: CPT

## 2017-10-19 PROCEDURE — 80053 COMPREHEN METABOLIC PANEL: CPT

## 2017-10-19 PROCEDURE — 93005 ELECTROCARDIOGRAM TRACING: CPT | Mod: XU

## 2017-10-19 PROCEDURE — 85014 HEMATOCRIT: CPT

## 2017-10-19 PROCEDURE — 93306 TTE W/DOPPLER COMPLETE: CPT

## 2017-10-19 PROCEDURE — 96374 THER/PROPH/DIAG INJ IV PUSH: CPT | Mod: XU

## 2017-10-19 PROCEDURE — 84100 ASSAY OF PHOSPHORUS: CPT

## 2017-10-19 PROCEDURE — 99285 EMERGENCY DEPT VISIT HI MDM: CPT | Mod: 25

## 2017-10-19 PROCEDURE — 97530 THERAPEUTIC ACTIVITIES: CPT

## 2017-10-19 PROCEDURE — 85610 PROTHROMBIN TIME: CPT

## 2017-10-19 PROCEDURE — 84484 ASSAY OF TROPONIN QUANT: CPT

## 2017-10-19 PROCEDURE — 82533 TOTAL CORTISOL: CPT

## 2017-10-19 PROCEDURE — 70450 CT HEAD/BRAIN W/O DYE: CPT | Mod: 26

## 2017-10-19 PROCEDURE — 82550 ASSAY OF CK (CPK): CPT

## 2017-10-19 PROCEDURE — 81001 URINALYSIS AUTO W/SCOPE: CPT

## 2017-10-19 PROCEDURE — 71045 X-RAY EXAM CHEST 1 VIEW: CPT

## 2017-10-19 PROCEDURE — 87177 OVA AND PARASITES SMEARS: CPT

## 2017-10-19 PROCEDURE — 82043 UR ALBUMIN QUANTITATIVE: CPT

## 2017-10-19 PROCEDURE — 97116 GAIT TRAINING THERAPY: CPT

## 2017-10-19 PROCEDURE — 97161 PT EVAL LOW COMPLEX 20 MIN: CPT

## 2017-10-19 PROCEDURE — 84145 PROCALCITONIN (PCT): CPT

## 2017-10-19 PROCEDURE — 83880 ASSAY OF NATRIURETIC PEPTIDE: CPT

## 2017-10-19 PROCEDURE — 85027 COMPLETE CBC AUTOMATED: CPT

## 2017-10-19 PROCEDURE — 80048 BASIC METABOLIC PNL TOTAL CA: CPT

## 2017-10-19 PROCEDURE — 73100 X-RAY EXAM OF WRIST: CPT | Mod: 26,LT

## 2017-10-19 PROCEDURE — 70450 CT HEAD/BRAIN W/O DYE: CPT

## 2017-10-19 PROCEDURE — 84132 ASSAY OF SERUM POTASSIUM: CPT

## 2017-10-19 PROCEDURE — 84156 ASSAY OF PROTEIN URINE: CPT

## 2017-10-19 PROCEDURE — 84295 ASSAY OF SERUM SODIUM: CPT

## 2017-10-19 PROCEDURE — 82435 ASSAY OF BLOOD CHLORIDE: CPT

## 2017-10-19 PROCEDURE — 87045 FECES CULTURE AEROBIC BACT: CPT

## 2017-10-19 RX ORDER — DEXTROSE 50 % IN WATER 50 %
25 SYRINGE (ML) INTRAVENOUS ONCE
Qty: 0 | Refills: 0 | Status: DISCONTINUED | OUTPATIENT
Start: 2017-10-19 | End: 2017-10-24

## 2017-10-19 RX ORDER — SODIUM CHLORIDE 9 MG/ML
1000 INJECTION INTRAMUSCULAR; INTRAVENOUS; SUBCUTANEOUS
Qty: 0 | Refills: 0 | Status: DISCONTINUED | OUTPATIENT
Start: 2017-10-19 | End: 2017-10-24

## 2017-10-19 RX ORDER — GLUCAGON INJECTION, SOLUTION 0.5 MG/.1ML
1 INJECTION, SOLUTION SUBCUTANEOUS ONCE
Qty: 0 | Refills: 0 | Status: DISCONTINUED | OUTPATIENT
Start: 2017-10-19 | End: 2017-10-24

## 2017-10-19 RX ORDER — METFORMIN HYDROCHLORIDE 850 MG/1
0 TABLET ORAL
Qty: 0 | Refills: 0 | COMMUNITY

## 2017-10-19 RX ORDER — DEXTROSE 50 % IN WATER 50 %
12.5 SYRINGE (ML) INTRAVENOUS ONCE
Qty: 0 | Refills: 0 | Status: DISCONTINUED | OUTPATIENT
Start: 2017-10-19 | End: 2017-10-24

## 2017-10-19 RX ORDER — FOLIC ACID 0.8 MG
1 TABLET ORAL
Qty: 0 | Refills: 0 | COMMUNITY

## 2017-10-19 RX ORDER — INSULIN LISPRO 100/ML
VIAL (ML) SUBCUTANEOUS
Qty: 0 | Refills: 0 | Status: DISCONTINUED | OUTPATIENT
Start: 2017-10-19 | End: 2017-10-24

## 2017-10-19 RX ORDER — DEXTROSE 50 % IN WATER 50 %
1 SYRINGE (ML) INTRAVENOUS ONCE
Qty: 0 | Refills: 0 | Status: DISCONTINUED | OUTPATIENT
Start: 2017-10-19 | End: 2017-10-24

## 2017-10-19 RX ORDER — HEPARIN SODIUM 5000 [USP'U]/ML
5000 INJECTION INTRAVENOUS; SUBCUTANEOUS EVERY 8 HOURS
Qty: 0 | Refills: 0 | Status: DISCONTINUED | OUTPATIENT
Start: 2017-10-19 | End: 2017-10-24

## 2017-10-19 RX ORDER — GLIMEPIRIDE 1 MG
0 TABLET ORAL
Qty: 0 | Refills: 0 | COMMUNITY

## 2017-10-19 RX ORDER — CHOLECALCIFEROL (VITAMIN D3) 125 MCG
0 CAPSULE ORAL
Qty: 0 | Refills: 0 | COMMUNITY

## 2017-10-19 RX ORDER — SODIUM CHLORIDE 9 MG/ML
1000 INJECTION, SOLUTION INTRAVENOUS
Qty: 0 | Refills: 0 | Status: DISCONTINUED | OUTPATIENT
Start: 2017-10-19 | End: 2017-10-24

## 2017-10-19 RX ORDER — INSULIN LISPRO 100/ML
VIAL (ML) SUBCUTANEOUS AT BEDTIME
Qty: 0 | Refills: 0 | Status: DISCONTINUED | OUTPATIENT
Start: 2017-10-19 | End: 2017-10-24

## 2017-10-19 RX ADMIN — SODIUM CHLORIDE 75 MILLILITER(S): 9 INJECTION INTRAMUSCULAR; INTRAVENOUS; SUBCUTANEOUS at 04:51

## 2017-10-19 RX ADMIN — Medication 1: at 14:28

## 2017-10-19 RX ADMIN — HEPARIN SODIUM 5000 UNIT(S): 5000 INJECTION INTRAVENOUS; SUBCUTANEOUS at 14:28

## 2017-10-19 RX ADMIN — HEPARIN SODIUM 5000 UNIT(S): 5000 INJECTION INTRAVENOUS; SUBCUTANEOUS at 22:24

## 2017-10-19 RX ADMIN — Medication 1: at 17:28

## 2017-10-19 RX ADMIN — SODIUM CHLORIDE 75 MILLILITER(S): 9 INJECTION INTRAMUSCULAR; INTRAVENOUS; SUBCUTANEOUS at 22:25

## 2017-10-19 RX ADMIN — Medication 100 MILLIGRAM(S): at 20:29

## 2017-10-19 RX ADMIN — Medication 100 MILLIGRAM(S): at 14:28

## 2017-10-19 RX ADMIN — SODIUM CHLORIDE 75 MILLILITER(S): 9 INJECTION INTRAMUSCULAR; INTRAVENOUS; SUBCUTANEOUS at 14:28

## 2017-10-19 RX ADMIN — HEPARIN SODIUM 5000 UNIT(S): 5000 INJECTION INTRAVENOUS; SUBCUTANEOUS at 04:51

## 2017-10-19 RX ADMIN — Medication 1: at 10:12

## 2017-10-19 NOTE — PHYSICAL THERAPY INITIAL EVALUATION ADULT - PERTINENT HX OF CURRENT PROBLEM, REHAB EVAL
85 y/o F with HTN, DM, asthma, RA on MTX and chronic corticosteroids p/w generalized weakness and fall.

## 2017-10-19 NOTE — CONSULT NOTE ADULT - SUBJECTIVE AND OBJECTIVE BOX
HISTORY OF PRESENT ILLNESS:  85 yo female with long h/o RA; maintained on MTX and steroids.  She is a/w weakness and falling.  She denies having much joint pains, but has felt very weak, and is c/o inability to walk.    She recently stopped prednisone and has been taking hydrocortisone instead.  Her L wrist is painful, but she states when she fell that was the area that was injured.     PAST MEDICAL & SURGICAL HISTORY:  RA (rheumatoid arthritis)  Asthma  DM (diabetes mellitus), type 2  HTN (hypertension), benign  After-cataract of left eye  Tubal occlusion        MEDICATIONS  (STANDING):  dextrose 5%. 1000 milliLiter(s) (50 mL/Hr) IV Continuous <Continuous>  dextrose 50% Injectable 12.5 Gram(s) IV Push once  dextrose 50% Injectable 25 Gram(s) IV Push once  dextrose 50% Injectable 25 Gram(s) IV Push once  heparin  Injectable 5000 Unit(s) SubCutaneous every 8 hours  insulin lispro (HumaLOG) corrective regimen sliding scale   SubCutaneous three times a day before meals  insulin lispro (HumaLOG) corrective regimen sliding scale   SubCutaneous at bedtime  sodium chloride 0.9%. 1000 milliLiter(s) (75 mL/Hr) IV Continuous <Continuous>    MEDICATIONS  (PRN):  dextrose Gel 1 Dose(s) Oral once PRN Blood Glucose LESS THAN 70 milliGRAM(s)/deciliter  glucagon  Injectable 1 milliGRAM(s) IntraMuscular once PRN Glucose LESS THAN 70 milligrams/deciliter  guaiFENesin   Syrup  (Sugar-Free) 100 milliGRAM(s) Oral every 6 hours PRN Cough      Allergies    No Known Allergies    Intolerances        PERTINENT MEDICATION HISTORY:    SOCIAL HISTORY:    FAMILY HISTORY:  No pertinent family history in first degree relatives      Vital Signs Last 24 Hrs  T(C): 37.4 (19 Oct 2017 13:24), Max: 37.4 (19 Oct 2017 13:24)  T(F): 99.3 (19 Oct 2017 13:24), Max: 99.3 (19 Oct 2017 13:24)  HR: 86 (19 Oct 2017 13:24) (86 - 96)  BP: 140/74 (19 Oct 2017 13:24) (122/72 - 146/66)  BP(mean): --  RR: 18 (19 Oct 2017 13:24) (17 - 18)  SpO2: 96% (19 Oct 2017 13:24) (96% - 100%)    Daily Height in cm: 152.4 (19 Oct 2017 13:24)    Daily     PHYSICAL EXAM:      Constitutional:  fair appearing, somewhat somnolent, but answers questions    Eyes:  EOMI    ENMT:    Neck:  supple, no RAMONA or masses    Breasts:    Back:    Respiratory:    Cardiovascular:    Gastrointestinal:  abd soft, nt nd    Genitourinary:    Rectal:    Extremities:    Vascular:    Neurological:    Skin:  no rashes seen, some pallor    Lymph Nodes:    Musculoskeletal:  L wrist; painful ROM and palpation, no warmth, mild redness  No other active synovitis    Psychiatric:        LABS:                        10.3   6.29  )-----------( 188      ( 19 Oct 2017 06:00 )             30.2     10    136  |  102  |  19  ----------------------------<  133<H>  3.8   |  20<L>  |  0.89    Ca    8.3<L>      19 Oct 2017 06:00  Mg     1.2     10-19        Urinalysis Basic - ( 18 Oct 2017 21:35 )    Color: PLYEL / Appearance: CLEAR / S.013 / pH: 5.5  Gluc: NEGATIVE / Ketone: NEGATIVE  / Bili: NEGATIVE / Urobili: NORMAL E.U.   Blood: NEGATIVE / Protein: 10 / Nitrite: NEGATIVE   Leuk Esterase: NEGATIVE / RBC: 0-2 / WBC 0-2   Sq Epi: OCC / Non Sq Epi: x / Bacteria: x        RADIOLOGY & ADDITIONAL STUDIES:  < from: Xray Wrist 2 Views, Left (10.19.17 @ 11:51) >  MPRESSION:  No fractures or dislocations.    Faint amorphous calcific deposits in the TFC region compatible with   chondrocalcinosis, most commonly seen in association with CPPD. 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 Preserved   and normally aligned remaining joint spaces and no joint margin erosions.    Carpal bones normally aligned.    Neutral ulnar variance.    Generalized osteopenia otherwise no discrete lytic or blastic lesions.       < end of copied text >

## 2017-10-19 NOTE — PATIENT PROFILE ADULT. - REASON FOR ADMISSION
Pt complaints of weakness loose stools unable to walk Pt complaints of weakness loose stools unable to walk. S/p FALL AT HOME

## 2017-10-19 NOTE — PROGRESS NOTE ADULT - ASSESSMENT
84 y/o fmeale with hx of RA on mtx ,  and iv infusion  ( monthly simponi ) as o/p. , HTN recent admission to [facility] where she was admitted with weakness and thought to have been adreanally insufcient ( chronically was on steroid ) , treated with hydrocortisone and discharged with hydrocortisone taper and switched back to prednisone as o/p..pt had been taken off prednsione per her rhuem as o/p. 3 weeks ago ? .  Pt now presenting with weakness and two falls .  reports that her weakness has been ongoing for over a year however seems to have worsened over the past one to two weeks..   no fever or chills at home   + cough   + diarreah that resolved   + orhtostatic in ER   1- weakness /falls : unclear etiology but weakness is generalized . will check CT and consider MRI .. neuro consult and check B12 /TSH.  ? element of steroid myopathy however pt has been off steroids with no improvement but worsening ...   weakness liklely worsened with dehydratoin sec to diarreah and with resp viral iness    gentle hydration and montor orthostatics   consider repeating CXR   2- Wrist pain :s/p fall   check Xray   3- RA: called her rheum office .. she is out on  vacation    called Dr. Goldberg for consult .. cont MTX . folate   has been off steroids however will consider adding prednisone        consult endo for hx of adrenal insufficeincy and check cortisol in AM . cehck ESR /CRP and CK   4-HTN  : monitor for now   discussed with pt and later with  84 y/o fmeale with hx of RA on mtx ,  and iv infusion  ( monthly simponi ) as o/p. , HTN recent admission to [facility] where she was admitted with weakness and thought to have been adreanally insufcient ( chronically was on steroid ) , treated with hydrocortisone and discharged with hydrocortisone taper and switched back to prednisone as o/p..pt had been taken off prednsione per her rhuem as o/p. 3 weeks ago ? .  Pt now presenting with weakness and two falls .  reports that her weakness has been ongoing for over a year however seems to have worsened over the past one to two weeks..   no fever or chills at home   + cough   + diarreah that resolved   + orhtostatic in ER   1- weakness /falls : unclear etiology but weakness is generalized . will check CT and consider MRI .. neuro consult and check B12 /TSH.  ? element of steroid myopathy however pt has been off steroids with no improvement but worsening ...   weakness liklely worsened with dehydratoin sec to diarreah and with resp viral iness    gentle hydration and montor orthostatics   consider repeating CXR   2- Wrist pain :s/p fall   check Xray   3- RA: called her rheum office .. she is out on  vacation    called Dr. Goldberg for consult .. cont MTX . folate   has been off steroids however will consider adding prednisone        consult endo for hx of adrenal insufficeincy and check cortisol in AM . cehck ESR /CRP and CK   4-HTN  : monitor for now   5_ DM: hold po meds  and check A1c   monitor FS     RISS   discussed with pt and later with

## 2017-10-19 NOTE — CONSULT NOTE ADULT - ASSESSMENT
Assessment  DMT2: 86y Female with DM T2 with hyperglycemia on insulin, blood sugars fluctuating, no hypoglycemia,  eating meals,  non compliant with low carb diet.  Weakness: Being worked up.  HTN: Controlled, On med.

## 2017-10-19 NOTE — CONSULT NOTE ADULT - SUBJECTIVE AND OBJECTIVE BOX
HPI:  85 y/o F with HTN, DM, asthma, RA on MTX and chronic corticosteroids p/w generalized weakness and fall.  Pt reports she has had gradually worsening weakness for several days.  2 days ago, while walking with her  on the sidewalk, pt felt her legs give out, and was unable to support her weight.  She fell, breaking her fall with her hands.  Since then, she has not been able to ambulate on her own.  Prior to developing weakness, pt had been at baseline.  She reports appetite has been stable.  She had 1 episode of loose stool today, but none in prior days.  No fever, chills, dysuria, nausea or joint pain aside from wrist pain after fall.      Pt had been admitted to [facility] in Aug for pneumonia, and at that time she was changed from prednisone to hydrocortisone.  She is unsure if she is currently taking prednisone again.      In the ED, pt was given 1L NS. (18 Oct 2017 23:46)  Patient has history of diabetes, on oral meds at home,  family at bed side,  no recent hypoglycemic episodes, no polyuria polydipsia. Patient follows up with PCP for diabetes management.    PAST MEDICAL & SURGICAL HISTORY:  RA (rheumatoid arthritis)  Asthma  DM (diabetes mellitus), type 2  HTN (hypertension), benign  After-cataract of left eye  Tubal occlusion      FAMILY HISTORY:  No pertinent family history in first degree relatives      Social History:    Outpatient Medications:    MEDICATIONS  (STANDING):  dextrose 5%. 1000 milliLiter(s) (50 mL/Hr) IV Continuous <Continuous>  dextrose 50% Injectable 12.5 Gram(s) IV Push once  dextrose 50% Injectable 25 Gram(s) IV Push once  dextrose 50% Injectable 25 Gram(s) IV Push once  heparin  Injectable 5000 Unit(s) SubCutaneous every 8 hours  insulin lispro (HumaLOG) corrective regimen sliding scale   SubCutaneous three times a day before meals  insulin lispro (HumaLOG) corrective regimen sliding scale   SubCutaneous at bedtime  sodium chloride 0.9%. 1000 milliLiter(s) (75 mL/Hr) IV Continuous <Continuous>    MEDICATIONS  (PRN):  dextrose Gel 1 Dose(s) Oral once PRN Blood Glucose LESS THAN 70 milliGRAM(s)/deciliter  glucagon  Injectable 1 milliGRAM(s) IntraMuscular once PRN Glucose LESS THAN 70 milligrams/deciliter  guaiFENesin   Syrup  (Sugar-Free) 100 milliGRAM(s) Oral every 6 hours PRN Cough      Allergies    No Known Allergies    Intolerances      Review of Systems:  Constitutional: No fever, no chills  Eyes: No blurry vision  Neuro: No tremors  HEENT: No pain, no neck swelling  Cardiovascular: No chest pain, no palpitations  Respiratory: Has SOB, no cough  GI: No nausea, vomiting, abdominal pain  : No dysuria  Skin: no rash  MSK: Has leg swelling, no foot ulcers.  Psych: no depression  Endocrine: no polyuria, polydipsia    ALL OTHER SYSTEMS REVIEWED AND NEGATIVE    UNABLE TO OBTAIN    PHYSICAL EXAM:  VITALS: T(C): 37.6 (10-19-17 @ 21:25)  T(F): 99.7 (10-19-17 @ 21:25), Max: 99.7 (10-19-17 @ 21:25)  HR: 103 (10-19-17 @ 21:25) (86 - 103)  BP: 145/73 (10-19-17 @ 21:25) (122/72 - 145/73)  RR:  (18 - 18)  SpO2:  (96% - 99%)  Wt(kg): --  GENERAL: NAD, well-groomed, well-developed  EYES: No proptosis, no lid lag  HEENT:  Atraumatic, Normocephalic  THYROID: Normal size, no palpable nodules  RESPIRATORY: Clear to auscultation bilaterally; No rales, rhonchi, wheezing  CARDIOVASCULAR: Si S2, No murmurs;  GI: Soft, non distended, normal bowel sounds  SKIN: Dry, intact, No rashes or lesions  MUSCULOSKELETAL: Has BL lower extremity edema.  NEURO:  no tremor, sensation decreased in feet BL,    CAPILLARY BLOOD GLUCOSE  123 (10-19 @ 21:25)  153 (10-19 @ 13:54)                            10.3   6.29  )-----------( 188      ( 19 Oct 2017 06:00 )             30.2       10-19    136  |  102  |  19  ----------------------------<  133<H>  3.8   |  20<L>  |  0.89    EGFR if : 68  EGFR if non : 59    Ca    8.3<L>      10-19  Mg     1.2     10-19        Thyroid Function Tests:      Hemoglobin A1C, Whole Blood: 7.7 % <H> [4.0 - 5.6] (07-31-17 @ 08:50)          Radiology:

## 2017-10-20 LAB
ALBUMIN SERPL ELPH-MCNC: 3.2 G/DL — LOW (ref 3.3–5)
ALP SERPL-CCNC: 38 U/L — LOW (ref 40–120)
ALT FLD-CCNC: 8 U/L — SIGNIFICANT CHANGE UP (ref 4–33)
AST SERPL-CCNC: 15 U/L — SIGNIFICANT CHANGE UP (ref 4–32)
BASOPHILS # BLD AUTO: 0.03 K/UL — SIGNIFICANT CHANGE UP (ref 0–0.2)
BASOPHILS NFR BLD AUTO: 0.6 % — SIGNIFICANT CHANGE UP (ref 0–2)
BILIRUB SERPL-MCNC: 0.8 MG/DL — SIGNIFICANT CHANGE UP (ref 0.2–1.2)
BUN SERPL-MCNC: 13 MG/DL — SIGNIFICANT CHANGE UP (ref 7–23)
CALCIUM SERPL-MCNC: 8.2 MG/DL — LOW (ref 8.4–10.5)
CHLORIDE SERPL-SCNC: 103 MMOL/L — SIGNIFICANT CHANGE UP (ref 98–107)
CK SERPL-CCNC: 42 U/L — SIGNIFICANT CHANGE UP (ref 25–170)
CO2 SERPL-SCNC: 21 MMOL/L — LOW (ref 22–31)
CORTIS SERPL-MCNC: 11.9 UG/DL — SIGNIFICANT CHANGE UP (ref 2.7–18.4)
CREAT SERPL-MCNC: 0.89 MG/DL — SIGNIFICANT CHANGE UP (ref 0.5–1.3)
EOSINOPHIL # BLD AUTO: 0.13 K/UL — SIGNIFICANT CHANGE UP (ref 0–0.5)
EOSINOPHIL NFR BLD AUTO: 2.5 % — SIGNIFICANT CHANGE UP (ref 0–6)
ERYTHROCYTE [SEDIMENTATION RATE] IN BLOOD: 65 MM/HR — HIGH (ref 4–25)
FOLATE SERPL-MCNC: 18.7 NG/ML — SIGNIFICANT CHANGE UP (ref 4.7–20)
GLUCOSE BLDC GLUCOMTR-MCNC: 131 MG/DL — HIGH (ref 70–99)
GLUCOSE BLDC GLUCOMTR-MCNC: 143 MG/DL — HIGH (ref 70–99)
GLUCOSE BLDC GLUCOMTR-MCNC: 162 MG/DL — HIGH (ref 70–99)
GLUCOSE BLDC GLUCOMTR-MCNC: 187 MG/DL — HIGH (ref 70–99)
GLUCOSE SERPL-MCNC: 123 MG/DL — HIGH (ref 70–99)
HBA1C BLD-MCNC: 6.6 % — HIGH (ref 4–5.6)
HCT VFR BLD CALC: 30.7 % — LOW (ref 34.5–45)
HGB BLD-MCNC: 10.5 G/DL — LOW (ref 11.5–15.5)
IMM GRANULOCYTES # BLD AUTO: 0.02 # — SIGNIFICANT CHANGE UP
IMM GRANULOCYTES NFR BLD AUTO: 0.4 % — SIGNIFICANT CHANGE UP (ref 0–1.5)
IRON SATN MFR SERPL: 161 UG/DL — SIGNIFICANT CHANGE UP (ref 140–530)
IRON SATN MFR SERPL: 22 UG/DL — LOW (ref 30–160)
LYMPHOCYTES # BLD AUTO: 2.24 K/UL — SIGNIFICANT CHANGE UP (ref 1–3.3)
LYMPHOCYTES # BLD AUTO: 43.4 % — SIGNIFICANT CHANGE UP (ref 13–44)
MCHC RBC-ENTMCNC: 32.3 PG — SIGNIFICANT CHANGE UP (ref 27–34)
MCHC RBC-ENTMCNC: 34.2 % — SIGNIFICANT CHANGE UP (ref 32–36)
MCV RBC AUTO: 94.5 FL — SIGNIFICANT CHANGE UP (ref 80–100)
MONOCYTES # BLD AUTO: 0.2 K/UL — SIGNIFICANT CHANGE UP (ref 0–0.9)
MONOCYTES NFR BLD AUTO: 3.9 % — SIGNIFICANT CHANGE UP (ref 2–14)
NEUTROPHILS # BLD AUTO: 2.54 K/UL — SIGNIFICANT CHANGE UP (ref 1.8–7.4)
NEUTROPHILS NFR BLD AUTO: 49.2 % — SIGNIFICANT CHANGE UP (ref 43–77)
NRBC # FLD: 0 — SIGNIFICANT CHANGE UP
PHOSPHATE SERPL-MCNC: 2.5 MG/DL — SIGNIFICANT CHANGE UP (ref 2.5–4.5)
PLATELET # BLD AUTO: 187 K/UL — SIGNIFICANT CHANGE UP (ref 150–400)
PMV BLD: 10.8 FL — SIGNIFICANT CHANGE UP (ref 7–13)
POTASSIUM SERPL-MCNC: 3.7 MMOL/L — SIGNIFICANT CHANGE UP (ref 3.5–5.3)
POTASSIUM SERPL-SCNC: 3.7 MMOL/L — SIGNIFICANT CHANGE UP (ref 3.5–5.3)
PROCALCITONIN SERPL-MCNC: 0.09 NG/ML — HIGH (ref 0–0.04)
PROT SERPL-MCNC: 6.3 G/DL — SIGNIFICANT CHANGE UP (ref 6–8.3)
RBC # BLD: 3.25 M/UL — LOW (ref 3.8–5.2)
RBC # FLD: 13.6 % — SIGNIFICANT CHANGE UP (ref 10.3–14.5)
SODIUM SERPL-SCNC: 138 MMOL/L — SIGNIFICANT CHANGE UP (ref 135–145)
T4 FREE SERPL-MCNC: 1.33 NG/DL — SIGNIFICANT CHANGE UP (ref 0.9–1.8)
TSH SERPL-MCNC: 2.45 UIU/ML — SIGNIFICANT CHANGE UP (ref 0.27–4.2)
UIBC SERPL-MCNC: 139 UG/DL — SIGNIFICANT CHANGE UP (ref 110–370)
VIT B12 SERPL-MCNC: 252 PG/ML — SIGNIFICANT CHANGE UP (ref 200–900)
WBC # BLD: 5.16 K/UL — SIGNIFICANT CHANGE UP (ref 3.8–10.5)
WBC # FLD AUTO: 5.16 K/UL — SIGNIFICANT CHANGE UP (ref 3.8–10.5)

## 2017-10-20 RX ORDER — HALOPERIDOL DECANOATE 100 MG/ML
1 INJECTION INTRAMUSCULAR ONCE
Qty: 0 | Refills: 0 | Status: COMPLETED | OUTPATIENT
Start: 2017-10-20 | End: 2017-10-22

## 2017-10-20 RX ORDER — FOLIC ACID 0.8 MG
1 TABLET ORAL DAILY
Qty: 0 | Refills: 0 | Status: DISCONTINUED | OUTPATIENT
Start: 2017-10-20 | End: 2017-10-24

## 2017-10-20 RX ORDER — PREGABALIN 225 MG/1
1000 CAPSULE ORAL DAILY
Qty: 0 | Refills: 0 | Status: DISCONTINUED | OUTPATIENT
Start: 2017-10-20 | End: 2017-10-24

## 2017-10-20 RX ADMIN — HEPARIN SODIUM 5000 UNIT(S): 5000 INJECTION INTRAVENOUS; SUBCUTANEOUS at 14:35

## 2017-10-20 RX ADMIN — Medication 1: at 12:43

## 2017-10-20 RX ADMIN — Medication: at 17:37

## 2017-10-20 RX ADMIN — Medication 1 MILLIGRAM(S): at 12:43

## 2017-10-20 RX ADMIN — HEPARIN SODIUM 5000 UNIT(S): 5000 INJECTION INTRAVENOUS; SUBCUTANEOUS at 22:28

## 2017-10-20 RX ADMIN — Medication 100 MILLIGRAM(S): at 20:34

## 2017-10-20 RX ADMIN — Medication 100 MILLIGRAM(S): at 12:44

## 2017-10-20 RX ADMIN — HEPARIN SODIUM 5000 UNIT(S): 5000 INJECTION INTRAVENOUS; SUBCUTANEOUS at 06:12

## 2017-10-20 RX ADMIN — Medication 100 MILLIGRAM(S): at 06:11

## 2017-10-20 RX ADMIN — PREGABALIN 1000 MICROGRAM(S): 225 CAPSULE ORAL at 14:35

## 2017-10-20 NOTE — PROGRESS NOTE ADULT - ASSESSMENT
86 y/o fmeale with hx of RA on mtx ,  and iv infusion  ( monthly simponi ) as o/p. , HTN recent admission to [facility] where she was admitted with weakness and thought to have been adreanally insufcient ( chronically was on steroid ) , treated with hydrocortisone and discharged with hydrocortisone taper and switched back to prednisone as o/p..pt had been taken off prednsione per her rhuem as o/p. 3 weeks ago ? .  Pt now presenting with weakness and two falls .  reports that her weakness has been ongoing for over a year however seems to have worsened over the past one to two weeks..   no fever or chills at home   + cough   + diarreah that resolved   + orhtostatic in ER   1- weakness /falls : unclear etiology but weakness is generalized . CT neg .. neuro consult appreciated and noted .. will check MRI brain and C spine      B12 edficiency : will start supplement and will confirm with homocystien and MMA   f/u TSH.  ? element of steroid myopathy however pt has been off steroids with no improvement but worsening ...   weakness liklely worsened with dehydratoin sec to diarreah and with resp viral illness    gentle hydration and repeat  orthostatics   consider repeating CXR   2- Wrist pain :s/p fall   Xray no Fx    ? psuedogout   will consider prednsione   3- RA: called her rheum office .. she is out on  vacation    appreciate Dr. Goldberg for consult .. cont MTX . folate   has been off steroids however will consider adding prednisone          d/w Dr. Akbar : hx of adrenal insufficeincy however doubt adrenal insuffiency at this time  . cortisol in AM WNL . ESR elevation likley sec to underlying RA ...  CK : NL    4-HTN  : monitor for now   5_ DM: hold po meds  A1c :6.y6  monitor FS     RISS   discussed with pt and  at bedside   d/w NP  D/W

## 2017-10-21 ENCOUNTER — TRANSCRIPTION ENCOUNTER (OUTPATIENT)
Age: 82
End: 2017-10-21

## 2017-10-21 LAB
ALBUMIN SERPL ELPH-MCNC: 3.1 G/DL — LOW (ref 3.3–5)
ALP SERPL-CCNC: 38 U/L — LOW (ref 40–120)
ALT FLD-CCNC: 8 U/L — SIGNIFICANT CHANGE UP (ref 4–33)
AST SERPL-CCNC: 16 U/L — SIGNIFICANT CHANGE UP (ref 4–32)
BASOPHILS # BLD AUTO: 0.03 K/UL — SIGNIFICANT CHANGE UP (ref 0–0.2)
BASOPHILS NFR BLD AUTO: 0.5 % — SIGNIFICANT CHANGE UP (ref 0–2)
BILIRUB SERPL-MCNC: 0.7 MG/DL — SIGNIFICANT CHANGE UP (ref 0.2–1.2)
BUN SERPL-MCNC: 18 MG/DL — SIGNIFICANT CHANGE UP (ref 7–23)
CALCIUM SERPL-MCNC: 8.3 MG/DL — LOW (ref 8.4–10.5)
CHLORIDE SERPL-SCNC: 104 MMOL/L — SIGNIFICANT CHANGE UP (ref 98–107)
CO2 SERPL-SCNC: 21 MMOL/L — LOW (ref 22–31)
CREAT SERPL-MCNC: 0.91 MG/DL — SIGNIFICANT CHANGE UP (ref 0.5–1.3)
EOSINOPHIL # BLD AUTO: 0.21 K/UL — SIGNIFICANT CHANGE UP (ref 0–0.5)
EOSINOPHIL NFR BLD AUTO: 3.8 % — SIGNIFICANT CHANGE UP (ref 0–6)
GLUCOSE BLDC GLUCOMTR-MCNC: 136 MG/DL — HIGH (ref 70–99)
GLUCOSE BLDC GLUCOMTR-MCNC: 155 MG/DL — HIGH (ref 70–99)
GLUCOSE BLDC GLUCOMTR-MCNC: 172 MG/DL — HIGH (ref 70–99)
GLUCOSE BLDC GLUCOMTR-MCNC: 173 MG/DL — HIGH (ref 70–99)
GLUCOSE SERPL-MCNC: 142 MG/DL — HIGH (ref 70–99)
HCT VFR BLD CALC: 30.3 % — LOW (ref 34.5–45)
HCYS SERPL-MCNC: 8.8 UMOL/L — SIGNIFICANT CHANGE UP (ref 5–20)
HGB BLD-MCNC: 10.3 G/DL — LOW (ref 11.5–15.5)
IMM GRANULOCYTES # BLD AUTO: 0.02 # — SIGNIFICANT CHANGE UP
IMM GRANULOCYTES NFR BLD AUTO: 0.4 % — SIGNIFICANT CHANGE UP (ref 0–1.5)
LYMPHOCYTES # BLD AUTO: 2.44 K/UL — SIGNIFICANT CHANGE UP (ref 1–3.3)
LYMPHOCYTES # BLD AUTO: 43.7 % — SIGNIFICANT CHANGE UP (ref 13–44)
MCHC RBC-ENTMCNC: 32.1 PG — SIGNIFICANT CHANGE UP (ref 27–34)
MCHC RBC-ENTMCNC: 34 % — SIGNIFICANT CHANGE UP (ref 32–36)
MCV RBC AUTO: 94.4 FL — SIGNIFICANT CHANGE UP (ref 80–100)
MONOCYTES # BLD AUTO: 0.33 K/UL — SIGNIFICANT CHANGE UP (ref 0–0.9)
MONOCYTES NFR BLD AUTO: 5.9 % — SIGNIFICANT CHANGE UP (ref 2–14)
NEUTROPHILS # BLD AUTO: 2.55 K/UL — SIGNIFICANT CHANGE UP (ref 1.8–7.4)
NEUTROPHILS NFR BLD AUTO: 45.7 % — SIGNIFICANT CHANGE UP (ref 43–77)
NRBC # FLD: 0 — SIGNIFICANT CHANGE UP
PLATELET # BLD AUTO: 176 K/UL — SIGNIFICANT CHANGE UP (ref 150–400)
PMV BLD: 10.4 FL — SIGNIFICANT CHANGE UP (ref 7–13)
POTASSIUM SERPL-MCNC: 3.8 MMOL/L — SIGNIFICANT CHANGE UP (ref 3.5–5.3)
POTASSIUM SERPL-SCNC: 3.8 MMOL/L — SIGNIFICANT CHANGE UP (ref 3.5–5.3)
PROT SERPL-MCNC: 6.3 G/DL — SIGNIFICANT CHANGE UP (ref 6–8.3)
RBC # BLD: 3.21 M/UL — LOW (ref 3.8–5.2)
RBC # FLD: 13.5 % — SIGNIFICANT CHANGE UP (ref 10.3–14.5)
SODIUM SERPL-SCNC: 140 MMOL/L — SIGNIFICANT CHANGE UP (ref 135–145)
WBC # BLD: 5.58 K/UL — SIGNIFICANT CHANGE UP (ref 3.8–10.5)
WBC # FLD AUTO: 5.58 K/UL — SIGNIFICANT CHANGE UP (ref 3.8–10.5)

## 2017-10-21 RX ORDER — SODIUM CHLORIDE 9 MG/ML
1000 INJECTION INTRAMUSCULAR; INTRAVENOUS; SUBCUTANEOUS
Qty: 0 | Refills: 0 | Status: DISCONTINUED | OUTPATIENT
Start: 2017-10-21 | End: 2017-10-24

## 2017-10-21 RX ADMIN — HEPARIN SODIUM 5000 UNIT(S): 5000 INJECTION INTRAVENOUS; SUBCUTANEOUS at 22:32

## 2017-10-21 RX ADMIN — SODIUM CHLORIDE 75 MILLILITER(S): 9 INJECTION INTRAMUSCULAR; INTRAVENOUS; SUBCUTANEOUS at 23:54

## 2017-10-21 RX ADMIN — Medication 100 MILLIGRAM(S): at 20:29

## 2017-10-21 RX ADMIN — Medication 100 MILLIGRAM(S): at 05:11

## 2017-10-21 RX ADMIN — HEPARIN SODIUM 5000 UNIT(S): 5000 INJECTION INTRAVENOUS; SUBCUTANEOUS at 05:10

## 2017-10-21 RX ADMIN — HEPARIN SODIUM 5000 UNIT(S): 5000 INJECTION INTRAVENOUS; SUBCUTANEOUS at 13:42

## 2017-10-21 RX ADMIN — PREGABALIN 1000 MICROGRAM(S): 225 CAPSULE ORAL at 13:41

## 2017-10-21 RX ADMIN — Medication 1: at 08:52

## 2017-10-21 RX ADMIN — Medication 1 MILLIGRAM(S): at 13:26

## 2017-10-21 RX ADMIN — Medication 1: at 13:22

## 2017-10-21 RX ADMIN — Medication 100 MILLIGRAM(S): at 13:39

## 2017-10-21 NOTE — DISCHARGE NOTE ADULT - PLAN OF CARE
Continue to stay hydrated.  Continue with physical therapy. Your a1c is 6.6  Continue with diet management, off medications at this time. resolved Follow up with rheumatology outpatient within 2 weeks after discharge When you were admitted to the hospital your blood pressure decreased when you stood up.  Thigh high compression stockings to help with hypotension  Continue to monitor blood pressure- Follow up with your PCP Dr. Tobias for further Vitamin B12 injection Your a1c is 6.6  Continue with diet management, off medications at this time.  Follow insulin as ordered during patients admission as rehab  **** UPON DISCHARGE FROM REHAB AND GOES HOME ***** PLEASE RESTART JANUVIA 100MG ORAL DAILY AND GLUMETZA 100MG ORAL TWICE A DAY AND STOP INSULIN  FOLLOW UP WITH DR. PALM ENDOCRINOLOG[facility] Follow up with rheumatology outpatient within 2 weeks after discharge  Continue methotrexate weekly  Follow up with your rheumatologist for recommendations regarding continuing prednisone   If continuing prednisone please follow up need for need for alendronate vitamin b12 injections daily x 1 week started on october 20th to be completed october 27th THEN weekly x 1 month then follow up levels with PCP  Follow up with your PCP Dr. Tobias for further Vitamin B12 injection monitor blood pressure  losartan daily as ordered- during the hospital admission losartan was held due to viral syndrome and dehydration

## 2017-10-21 NOTE — DISCHARGE NOTE ADULT - SECONDARY DIAGNOSIS.
Type 2 diabetes mellitus without complication, without long-term current use of insulin Rheumatoid arthritis, involving unspecified site, unspecified rheumatoid factor presence Orthostatic hypotension B12 deficiency HTN (hypertension), benign

## 2017-10-21 NOTE — DISCHARGE NOTE ADULT - HOSPITAL COURSE
85 y/o F with HTN, DM, RA on chronic steroids and mtx p/w generalized weakness and a fall. 87 y/o F with HTN, DM, RA on chronic steroids and mtx p/w generalized weakness and a fall.   Generalized weakness.    likely deconditioning with possible dehydration.  May also be some component of adrenal insufficiency if pt has stopped chronic prednisone- PT rec rehab  +orthostatic upon admission- lower extremity TEDS - most likely 2/2 dehydration viral syndrome  RVP - positive entero virus- dehydration/viral syndrome- supportive care  Vitamin B12 deficiency - vitamin b12 supplementation - f/u with pcp outpatient for further management  Rheumatoid arthritis, involving unspecified site, unspecified rheumatoid factor presence.  -  will bring med list in am to confirm whether or not she is taking prednisone. - patient not on prednisone and will continue off prednisone with outpatient follow up with rheumatology  Type 2 diabetes mellitus without complication, without long-term current use of insulin.  - humalog sliding scale.  - pt previously on januvia, metformin, and Amaryl  -endocrine evaluation    dispo: rehab 87 y/o F with HTN, DM, RA on chronic steroids and mtx p/w generalized weakness and a fall.   Generalized weakness.    likely deconditioning with possible dehydration.  May also be some component of adrenal insufficiency if pt has stopped chronic prednisone- PT rec rehab  +orthostatic upon admission- lower extremity TEDS - most likely 2/2 dehydration viral syndrome  RVP - positive entero virus- dehydration/viral syndrome- supportive care  Vitamin B12 deficiency - vitamin b12 supplementation - f/u with pcp outpatient for further management  Rheumatoid arthritis, involving unspecified site, unspecified rheumatoid factor presence.  -  will bring med list in am to confirm whether or not she is taking prednisone. - patient not on prednisone and will continue off prednisone with outpatient follow up with rheumatology  Type 2 diabetes mellitus without complication, without long-term current use of insulin.  - humalog sliding scale.  - pt previously on januvia, metformin, and Amaryl  -endocrine evaluation - patient to be discharge on insulin to rehab then recommendations for po diabetic agents after rehab    dispo: rehab

## 2017-10-21 NOTE — PROGRESS NOTE ADULT - ASSESSMENT
86 y/o fmeale with hx of RA on mtx ,  and iv infusion  ( monthly simponi ) as o/p. , HTN recent admission to [facility] where she was admitted with weakness and thought to have been adreanally insufcient ( chronically was on steroid ) , treated with hydrocortisone and discharged with hydrocortisone taper and switched back to prednisone as o/p..pt had been taken off prednsione per her rhuem as o/p. 3 weeks ago ? .  Pt now presenting with weakness and two falls .  reports that her weakness has been ongoing for over a year however seems to have worsened over the past one to two weeks..   no fever or chills at home   + cough   + diarreah that resolved   + orhtostatic in ER   1- weakness /falls : unclear etiology but weakness is generalized . CT neg .. neuro consult appreciated and noted .. f/u MRI brain and C spine      B12 edficiency : cont  supplement and f/u homocystien and MMA    TSH  WNL   ? element of steroid myopathy however pt has been off steroids with no improvement but worsening ...   weakness likely worsened with dehydratoin sec to diarreah and with resp viral illness    gentle hydration given   orthostatics ...  ? underlying neuropathy   2- Wrist pain :s/p fall   Xray no Fx    ? psuedogout   will consider prednsione   3- RA: called her rheum office .. she is out on  vacation    appreciate Dr. Goldberg for consult .. cont MTX . folate   has been off steroids however will consider adding prednisone  if no significant imrpovement          hx of adrenal insufficeincy however doubt adrenal insuffiency at this time  . cortisol in AM WNL . ESR elevation likley sec to underlying RA ...  CK : NL    4-HTN  : monitor for now   5_ DM: hold po meds  A1c :6.y6  monitor FS     RISS

## 2017-10-21 NOTE — DISCHARGE NOTE ADULT - CARE PLAN
Principal Discharge DX:	Dehydration  Goal:	resolved  Instructions for follow-up, activity and diet:	Continue to stay hydrated.  Continue with physical therapy.  Secondary Diagnosis:	Type 2 diabetes mellitus without complication, without long-term current use of insulin  Instructions for follow-up, activity and diet:	Your a1c is 6.6  Continue with diet management, off medications at this time.  Secondary Diagnosis:	Rheumatoid arthritis, involving unspecified site, unspecified rheumatoid factor presence Principal Discharge DX:	Dehydration  Goal:	resolved  Instructions for follow-up, activity and diet:	Continue to stay hydrated.  Continue with physical therapy.  Secondary Diagnosis:	Type 2 diabetes mellitus without complication, without long-term current use of insulin  Instructions for follow-up, activity and diet:	Your a1c is 6.6  Continue with diet management, off medications at this time.  Secondary Diagnosis:	Rheumatoid arthritis, involving unspecified site, unspecified rheumatoid factor presence  Instructions for follow-up, activity and diet:	Follow up with rheumatology outpatient within 2 weeks after discharge  Secondary Diagnosis:	Orthostatic hypotension  Instructions for follow-up, activity and diet:	When you were admitted to the hospital your blood pressure decreased when you stood up.  Thigh high compression stockings to help with hypotension  Continue to monitor blood pressure-  Secondary Diagnosis:	B12 deficiency  Instructions for follow-up, activity and diet:	Follow up with your PCP Dr. Tobias for further Vitamin B12 injection Principal Discharge DX:	Dehydration  Goal:	resolved  Instructions for follow-up, activity and diet:	Continue to stay hydrated.  Continue with physical therapy.  Secondary Diagnosis:	Type 2 diabetes mellitus without complication, without long-term current use of insulin  Instructions for follow-up, activity and diet:	Your a1c is 6.6  Continue with diet management, off medications at this time.  Follow insulin as ordered during patients admission as rehab  **** UPON DISCHARGE FROM REHAB AND GOES HOME ***** PLEASE RESTART JANUVIA 100MG ORAL DAILY AND GLUMETZA 100MG ORAL TWICE A DAY AND STOP INSULIN  FOLLOW UP WITH DR. PALM ENDOCRINOLOGY  Secondary Diagnosis:	Rheumatoid arthritis, involving unspecified site, unspecified rheumatoid factor presence  Instructions for follow-up, activity and diet:	Follow up with rheumatology outpatient within 2 weeks after discharge  Continue methotrexate weekly  Follow up with your rheumatologist for recommendations regarding continuing prednisone   If continuing prednisone please follow up need for need for alendronate  Secondary Diagnosis:	Orthostatic hypotension  Instructions for follow-up, activity and diet:	When you were admitted to the hospital your blood pressure decreased when you stood up.  Thigh high compression stockings to help with hypotension  Continue to monitor blood pressure-  Secondary Diagnosis:	B12 deficiency  Instructions for follow-up, activity and diet:	vitamin b12 injections daily x 1 week started on october 20th to be completed october 27th THEN weekly x 1 month then follow up levels with PCP  Follow up with your PCP Dr. Tobias for further Vitamin B12 injection  Secondary Diagnosis:	HTN (hypertension), benign  Instructions for follow-up, activity and diet:	monitor blood pressure  losartan daily as ordered- during the hospital admission losartan was held due to viral syndrome and dehydration

## 2017-10-21 NOTE — DISCHARGE NOTE ADULT - MEDICATION SUMMARY - MEDICATIONS TO TAKE
I will START or STAY ON the medications listed below when I get home from the hospital:    losartan 50 mg oral tablet  -- 1 tab(s) by mouth once a day  -- Indication: For HTN (hypertension), benign    insulin lispro 100 units/mL subcutaneous solution  --  subcutaneous 3 times a day (before meals); 1 Unit(s) if Glucose 151 - 200  2 Unit(s) if Glucose 201 - 250  3 Unit(s) if Glucose 251 - 300  4 Unit(s) if Glucose 301 - 350  5 Unit(s) if Glucose 351 - 400  6 Unit(s) if Glucose Greater Than 400  -- Indication: For Type 2 diabetes mellitus without complication, without long-term current use of insulin    insulin lispro 100 units/mL subcutaneous solution  --  subcutaneous once a day (at bedtime); 0 Unit(s) if Glucose 0 - 250  1 Unit(s) if Glucose 251 - 300  2 Unit(s) if Glucose 301 - 350  3 Unit(s) if Glucose 351 - 400  4 Unit(s) if Glucose Greater Than 400  -- Indication: For Type 2 diabetes mellitus without complication, without long-term current use of insulin    insulin lispro 100 units/mL subcutaneous solution  -- 5 unit(s) subcutaneous 3 times a day (before meals)  -- Indication: For Type 2 diabetes mellitus without complication, without long-term current use of insulin    insulin glargine  -- 6 unit(s) subcutaneous once a day (at bedtime)  -- Indication: For Type 2 diabetes mellitus without complication, without long-term current use of insulin    methotrexate 2.5 mg oral tablet  -- 7 tab(s) by mouth once a week  -- Indication: For Rheumatoid arthritis, involving unspecified site, unspecified rheumatoid factor presence    Symbicort 160 mcg-4.5 mcg/inh inhalation aerosol  -- 2 puff(s) inhaled 2 times a day  -- Indication: For Asthma    Ventolin HFA 90 mcg/inh inhalation aerosol  -- 2 puff(s) inhaled 4 times a day, As Needed  -- Indication: For Asthma    Vitamin D2 50,000 intl units (1.25 mg) oral capsule  -- 1 cap(s) by mouth once a week  -- Indication: For supplement    folic acid 1 mg oral tablet  -- 2 tab(s) by mouth once a day  -- Indication: For supplement    cyanocobalamin  -- 1000 microgram(s) subcutaneous once a day through 10/27 then weekly for 1 month  -- Indication: For supplement

## 2017-10-21 NOTE — DISCHARGE NOTE ADULT - CARE PROVIDER_API CALL
Yuriy Tobias (MD), Family Medicine  [address]  [address]  [place] [address]  Phone: [phone]  Fax: [phone]    rheumatology,   Phone: (   )    -  Fax: (   )    -

## 2017-10-22 LAB
ALBUMIN SERPL ELPH-MCNC: 3 G/DL — LOW (ref 3.3–5)
ALP SERPL-CCNC: 39 U/L — LOW (ref 40–120)
ALT FLD-CCNC: 12 U/L — SIGNIFICANT CHANGE UP (ref 4–33)
AST SERPL-CCNC: 15 U/L — SIGNIFICANT CHANGE UP (ref 4–32)
BASOPHILS # BLD AUTO: 0.03 K/UL — SIGNIFICANT CHANGE UP (ref 0–0.2)
BASOPHILS NFR BLD AUTO: 0.6 % — SIGNIFICANT CHANGE UP (ref 0–2)
BILIRUB SERPL-MCNC: 0.7 MG/DL — SIGNIFICANT CHANGE UP (ref 0.2–1.2)
BUN SERPL-MCNC: 17 MG/DL — SIGNIFICANT CHANGE UP (ref 7–23)
CALCIUM SERPL-MCNC: 8.1 MG/DL — LOW (ref 8.4–10.5)
CHLORIDE SERPL-SCNC: 105 MMOL/L — SIGNIFICANT CHANGE UP (ref 98–107)
CO2 SERPL-SCNC: 22 MMOL/L — SIGNIFICANT CHANGE UP (ref 22–31)
CREAT SERPL-MCNC: 0.89 MG/DL — SIGNIFICANT CHANGE UP (ref 0.5–1.3)
EOSINOPHIL # BLD AUTO: 0.33 K/UL — SIGNIFICANT CHANGE UP (ref 0–0.5)
EOSINOPHIL NFR BLD AUTO: 6.5 % — HIGH (ref 0–6)
GLUCOSE BLDC GLUCOMTR-MCNC: 124 MG/DL — HIGH (ref 70–99)
GLUCOSE BLDC GLUCOMTR-MCNC: 153 MG/DL — HIGH (ref 70–99)
GLUCOSE BLDC GLUCOMTR-MCNC: 161 MG/DL — HIGH (ref 70–99)
GLUCOSE BLDC GLUCOMTR-MCNC: 175 MG/DL — HIGH (ref 70–99)
GLUCOSE SERPL-MCNC: 153 MG/DL — HIGH (ref 70–99)
HCT VFR BLD CALC: 29 % — LOW (ref 34.5–45)
HGB BLD-MCNC: 10.1 G/DL — LOW (ref 11.5–15.5)
IMM GRANULOCYTES # BLD AUTO: 0.01 # — SIGNIFICANT CHANGE UP
IMM GRANULOCYTES NFR BLD AUTO: 0.2 % — SIGNIFICANT CHANGE UP (ref 0–1.5)
LYMPHOCYTES # BLD AUTO: 2.19 K/UL — SIGNIFICANT CHANGE UP (ref 1–3.3)
LYMPHOCYTES # BLD AUTO: 43.1 % — SIGNIFICANT CHANGE UP (ref 13–44)
MCHC RBC-ENTMCNC: 32.8 PG — SIGNIFICANT CHANGE UP (ref 27–34)
MCHC RBC-ENTMCNC: 34.8 % — SIGNIFICANT CHANGE UP (ref 32–36)
MCV RBC AUTO: 94.2 FL — SIGNIFICANT CHANGE UP (ref 80–100)
MONOCYTES # BLD AUTO: 0.46 K/UL — SIGNIFICANT CHANGE UP (ref 0–0.9)
MONOCYTES NFR BLD AUTO: 9.1 % — SIGNIFICANT CHANGE UP (ref 2–14)
NEUTROPHILS # BLD AUTO: 2.06 K/UL — SIGNIFICANT CHANGE UP (ref 1.8–7.4)
NEUTROPHILS NFR BLD AUTO: 40.5 % — LOW (ref 43–77)
NRBC # FLD: 0 — SIGNIFICANT CHANGE UP
PLATELET # BLD AUTO: 164 K/UL — SIGNIFICANT CHANGE UP (ref 150–400)
PMV BLD: 10.8 FL — SIGNIFICANT CHANGE UP (ref 7–13)
POTASSIUM SERPL-MCNC: 3.6 MMOL/L — SIGNIFICANT CHANGE UP (ref 3.5–5.3)
POTASSIUM SERPL-SCNC: 3.6 MMOL/L — SIGNIFICANT CHANGE UP (ref 3.5–5.3)
PROT SERPL-MCNC: 6.1 G/DL — SIGNIFICANT CHANGE UP (ref 6–8.3)
RBC # BLD: 3.08 M/UL — LOW (ref 3.8–5.2)
RBC # FLD: 13.8 % — SIGNIFICANT CHANGE UP (ref 10.3–14.5)
SODIUM SERPL-SCNC: 139 MMOL/L — SIGNIFICANT CHANGE UP (ref 135–145)
WBC # BLD: 5.08 K/UL — SIGNIFICANT CHANGE UP (ref 3.8–10.5)
WBC # FLD AUTO: 5.08 K/UL — SIGNIFICANT CHANGE UP (ref 3.8–10.5)

## 2017-10-22 RX ADMIN — Medication 100 MILLIGRAM(S): at 04:00

## 2017-10-22 RX ADMIN — HALOPERIDOL DECANOATE 1 MILLIGRAM(S): 100 INJECTION INTRAMUSCULAR at 09:43

## 2017-10-22 RX ADMIN — HEPARIN SODIUM 5000 UNIT(S): 5000 INJECTION INTRAVENOUS; SUBCUTANEOUS at 05:46

## 2017-10-22 RX ADMIN — Medication 1: at 18:28

## 2017-10-22 RX ADMIN — HEPARIN SODIUM 5000 UNIT(S): 5000 INJECTION INTRAVENOUS; SUBCUTANEOUS at 21:32

## 2017-10-22 RX ADMIN — PREGABALIN 1000 MICROGRAM(S): 225 CAPSULE ORAL at 13:36

## 2017-10-22 RX ADMIN — Medication 1 MILLIGRAM(S): at 13:36

## 2017-10-22 RX ADMIN — Medication 1: at 09:04

## 2017-10-22 RX ADMIN — HEPARIN SODIUM 5000 UNIT(S): 5000 INJECTION INTRAVENOUS; SUBCUTANEOUS at 13:36

## 2017-10-23 LAB
ALBUMIN SERPL ELPH-MCNC: 3.1 G/DL — LOW (ref 3.3–5)
ALP SERPL-CCNC: 42 U/L — SIGNIFICANT CHANGE UP (ref 40–120)
ALT FLD-CCNC: 12 U/L — SIGNIFICANT CHANGE UP (ref 4–33)
AST SERPL-CCNC: 16 U/L — SIGNIFICANT CHANGE UP (ref 4–32)
BASOPHILS # BLD AUTO: 0.01 K/UL — SIGNIFICANT CHANGE UP (ref 0–0.2)
BASOPHILS NFR BLD AUTO: 0.2 % — SIGNIFICANT CHANGE UP (ref 0–2)
BILIRUB SERPL-MCNC: 0.6 MG/DL — SIGNIFICANT CHANGE UP (ref 0.2–1.2)
BUN SERPL-MCNC: 14 MG/DL — SIGNIFICANT CHANGE UP (ref 7–23)
CALCIUM SERPL-MCNC: 8.3 MG/DL — LOW (ref 8.4–10.5)
CHLORIDE SERPL-SCNC: 105 MMOL/L — SIGNIFICANT CHANGE UP (ref 98–107)
CO2 SERPL-SCNC: 22 MMOL/L — SIGNIFICANT CHANGE UP (ref 22–31)
CREAT SERPL-MCNC: 0.79 MG/DL — SIGNIFICANT CHANGE UP (ref 0.5–1.3)
EOSINOPHIL # BLD AUTO: 0.38 K/UL — SIGNIFICANT CHANGE UP (ref 0–0.5)
EOSINOPHIL NFR BLD AUTO: 6.2 % — HIGH (ref 0–6)
GLUCOSE BLDC GLUCOMTR-MCNC: 126 MG/DL — HIGH (ref 70–99)
GLUCOSE BLDC GLUCOMTR-MCNC: 153 MG/DL — HIGH (ref 70–99)
GLUCOSE BLDC GLUCOMTR-MCNC: 180 MG/DL — HIGH (ref 70–99)
GLUCOSE BLDC GLUCOMTR-MCNC: 262 MG/DL — HIGH (ref 70–99)
GLUCOSE SERPL-MCNC: 139 MG/DL — HIGH (ref 70–99)
HCT VFR BLD CALC: 28.9 % — LOW (ref 34.5–45)
HGB BLD-MCNC: 9.8 G/DL — LOW (ref 11.5–15.5)
IMM GRANULOCYTES # BLD AUTO: 0.02 # — SIGNIFICANT CHANGE UP
IMM GRANULOCYTES NFR BLD AUTO: 0.3 % — SIGNIFICANT CHANGE UP (ref 0–1.5)
LYMPHOCYTES # BLD AUTO: 2.27 K/UL — SIGNIFICANT CHANGE UP (ref 1–3.3)
LYMPHOCYTES # BLD AUTO: 37.3 % — SIGNIFICANT CHANGE UP (ref 13–44)
MCHC RBC-ENTMCNC: 32.2 PG — SIGNIFICANT CHANGE UP (ref 27–34)
MCHC RBC-ENTMCNC: 33.9 % — SIGNIFICANT CHANGE UP (ref 32–36)
MCV RBC AUTO: 95.1 FL — SIGNIFICANT CHANGE UP (ref 80–100)
MONOCYTES # BLD AUTO: 0.44 K/UL — SIGNIFICANT CHANGE UP (ref 0–0.9)
MONOCYTES NFR BLD AUTO: 7.2 % — SIGNIFICANT CHANGE UP (ref 2–14)
NEUTROPHILS # BLD AUTO: 2.97 K/UL — SIGNIFICANT CHANGE UP (ref 1.8–7.4)
NEUTROPHILS NFR BLD AUTO: 48.8 % — SIGNIFICANT CHANGE UP (ref 43–77)
NRBC # FLD: 0 — SIGNIFICANT CHANGE UP
PLATELET # BLD AUTO: 166 K/UL — SIGNIFICANT CHANGE UP (ref 150–400)
PMV BLD: 11 FL — SIGNIFICANT CHANGE UP (ref 7–13)
POTASSIUM SERPL-MCNC: 3.6 MMOL/L — SIGNIFICANT CHANGE UP (ref 3.5–5.3)
POTASSIUM SERPL-SCNC: 3.6 MMOL/L — SIGNIFICANT CHANGE UP (ref 3.5–5.3)
PROT SERPL-MCNC: 6 G/DL — SIGNIFICANT CHANGE UP (ref 6–8.3)
RBC # BLD: 3.04 M/UL — LOW (ref 3.8–5.2)
RBC # FLD: 13.8 % — SIGNIFICANT CHANGE UP (ref 10.3–14.5)
SODIUM SERPL-SCNC: 139 MMOL/L — SIGNIFICANT CHANGE UP (ref 135–145)
WBC # BLD: 6.09 K/UL — SIGNIFICANT CHANGE UP (ref 3.8–10.5)
WBC # FLD AUTO: 6.09 K/UL — SIGNIFICANT CHANGE UP (ref 3.8–10.5)

## 2017-10-23 PROCEDURE — 72141 MRI NECK SPINE W/O DYE: CPT | Mod: 26

## 2017-10-23 PROCEDURE — 70551 MRI BRAIN STEM W/O DYE: CPT | Mod: 26

## 2017-10-23 RX ADMIN — HEPARIN SODIUM 5000 UNIT(S): 5000 INJECTION INTRAVENOUS; SUBCUTANEOUS at 13:45

## 2017-10-23 RX ADMIN — HEPARIN SODIUM 5000 UNIT(S): 5000 INJECTION INTRAVENOUS; SUBCUTANEOUS at 05:10

## 2017-10-23 RX ADMIN — Medication 1: at 08:55

## 2017-10-23 RX ADMIN — PREGABALIN 1000 MICROGRAM(S): 225 CAPSULE ORAL at 12:45

## 2017-10-23 RX ADMIN — Medication 1 MILLIGRAM(S): at 12:45

## 2017-10-23 RX ADMIN — HEPARIN SODIUM 5000 UNIT(S): 5000 INJECTION INTRAVENOUS; SUBCUTANEOUS at 21:44

## 2017-10-23 RX ADMIN — Medication 3: at 12:45

## 2017-10-23 RX ADMIN — Medication 2 MILLIGRAM(S): at 09:18

## 2017-10-23 NOTE — CONSULT NOTE ADULT - ASSESSMENT
Moderate Aortic Stenosis  stable  would hold of to IVF to prevent acute decompensation    HTN  stable  cont to monitor    weakness and fall  fu with MRI and work up as per neurology

## 2017-10-23 NOTE — CONSULT NOTE ADULT - SUBJECTIVE AND OBJECTIVE BOX
CHIEF COMPLAINT:Patient is a 86y old  Female who presents with a chief complaint of Pt complaints of weakness loose stools unable to walk. S/p FALL AT HOME (21 Oct 2017 10:50)      HISTORY OF PRESENT ILLNESS:  this is a pleasant pt with history as below , recent hospitalization for adrenal insuf.   now presented for generalized weakness and fall  denies any chest pain, sob, palpitation, dizziness or syncope.     PAST MEDICAL & SURGICAL HISTORY:  RA (rheumatoid arthritis)  Asthma  DM (diabetes mellitus), type 2  HTN (hypertension), benign  After-cataract of left eye  Tubal occlusion          MEDICATIONS:  heparin  Injectable 5000 Unit(s) SubCutaneous every 8 hours      guaiFENesin   Syrup  (Sugar-Free) 100 milliGRAM(s) Oral every 6 hours PRN    LORazepam   Injectable 2 milliGRAM(s) IV Push once      dextrose 50% Injectable 12.5 Gram(s) IV Push once  dextrose 50% Injectable 25 Gram(s) IV Push once  dextrose 50% Injectable 25 Gram(s) IV Push once  dextrose Gel 1 Dose(s) Oral once PRN  glucagon  Injectable 1 milliGRAM(s) IntraMuscular once PRN  insulin lispro (HumaLOG) corrective regimen sliding scale   SubCutaneous three times a day before meals  insulin lispro (HumaLOG) corrective regimen sliding scale   SubCutaneous at bedtime    cyanocobalamin Injectable 1000 MICROGram(s) SubCutaneous daily  dextrose 5%. 1000 milliLiter(s) IV Continuous <Continuous>  folic acid 1 milliGRAM(s) Oral daily  sodium chloride 0.9%. 1000 milliLiter(s) IV Continuous <Continuous>  sodium chloride 0.9%. 1000 milliLiter(s) IV Continuous <Continuous>      FAMILY HISTORY:  No pertinent family history in first degree relatives      Non-contributory    SOCIAL HISTORY:    No tobacco, drugs or etoh    Allergies    No Known Allergies    Intolerances    	    REVIEW OF SYSTEMS:  as above  The rest of the 14 points ROS reviewed and except above they are unremarkable.        PHYSICAL EXAM:  T(C): 36.6 (10-23-17 @ 05:41), Max: 36.6 (10-22-17 @ 21:35)  HR: 80 (10-23-17 @ 05:41) (80 - 82)  BP: 120/78 (10-23-17 @ 05:41) (118/75 - 122/67)  RR: 18 (10-23-17 @ 05:41) (18 - 18)  SpO2: 98% (10-23-17 @ 05:41) (97% - 98%)  Wt(kg): --  I&O's Summary      Appearance: Normal	  HEENT:   Normal oral mucosa, PERRL, EOMI	  Cardiovascular: Normal S1 S2,    Murmur:   Neck: JVP normal  Respiratory: Lungs clear to auscultation  Gastrointestinal:  Soft, Non-tender, + BS	  Skin: normal   Neuro: No gross deficits.   Psychiatry:  Mood & affect appropriate  Ext: No edema    LABS/DATA:    TELEMETRY: 	    ECG:  	   	  CARDIAC MARKERS:      < from: Transthoracic Echocardiogram (08.01.17 @ 21:55) >  Conclusions:  1. Mitral annular calcification, otherwise normal mitral  valve. Minimal mitral regurgitation.  2. Calcified trileaflet aortic valve with decreased  opening. Peak transaortic valve gradient equals 16 mm Hg,  mean transaortic valve gradient equals 10 mm Hg, estimated  aortic valve area equals 1.5 sqcm (by continuity equation),  aortic valve velocity time integral equals 42 cm,  consistent with moderate aortic stenosis. Mild aortic  regurgitation.  3. Normal left ventricular internal dimensions and wall  thicknesses.  4. Normal left ventricular systolic function. No segmental  wall motion abnormalities.  5. Reversal of the E-A waves of the mitral inflow pattern  consistent with reduced compliance of the left ventricle.  6. The right ventricle is not well visualized; grossly  normal right ventricular systolic function.  *** Compared with echocardiogram of 3/24/2014, no  significant changes noted.    < end of copied text >                              9.8    6.09  )-----------( 166      ( 23 Oct 2017 05:59 )             28.9     10-22    139  |  105  |  17  ----------------------------<  153<H>  3.6   |  22  |  0.89    Ca    8.1<L>      22 Oct 2017 06:03    TPro  6.1  /  Alb  3.0<L>  /  TBili  0.7  /  DBili  x   /  AST  15  /  ALT  12  /  AlkPhos  39<L>  10-22    proBNP:   Lipid Profile:   HgA1c:   TSH:

## 2017-10-23 NOTE — PROGRESS NOTE ADULT - ASSESSMENT
86 y/o fmeale with hx of RA on mtx ,  and iv infusion  ( monthly simponi ) as o/p. , HTN recent admission to [facility] where she was admitted with weakness and thought to have been adreanally insufcient ( chronically was on steroid ) , treated with hydrocortisone and discharged with hydrocortisone taper and switched back to prednisone as o/p..pt had been taken off prednsione per her rhuem as o/p. 3 weeks ago ? .  Pt now presenting with weakness and two falls .  reports that her weakness has been ongoing for over a year however seems to have worsened over the past one to two weeks..   no fever or chills at home   + cough   + diarreah that resolved   + orhtostatic in ER   1- weakness /falls : unclear etiology but weakness is generalized . CT neg .. neuro consult appreciated and noted .. MRI brain and C spine  : No acute pathology  ..micovascular disease     B12 edficiency : cont  supplement and f/u homocystien and MMA    TSH  WNL   ? element of steroid myopathy however pt has been off steroids with no improvement but worsening ...   weakness likely worsened with dehydratoin sec to diarreah and with resp viral illness    s/p gentle hydration given   orthostatics ...  ? underlying neuropathy ..  will provide compression stockings   2- Wrist pain :s/p fall   Xray no Fx    ? psuedogout   will consider prednsione   3- RA: called her rheum office .. she is out on  vacation    appreciate Dr. Goldberg for consult .. cont MTX . folate   has been off steroids however will consider adding prednisone  if no significant imrpovement          hx of adrenal insufficeincy however doubt adrenal insuffiency at this time  . cortisol in AM WNL . ESR elevation likley sec to underlying RA ...  CK : NL    4-HTN  : monitor for now   5_ DM: hold po meds  A1c :6.y6  monitor FS     RISS     d/w  at length and discussed that pt would benefit from rehab however he does nt think pt will agree and he himself would rather her be at home...  d/w

## 2017-10-24 VITALS
SYSTOLIC BLOOD PRESSURE: 159 MMHG | TEMPERATURE: 98 F | OXYGEN SATURATION: 96 % | HEART RATE: 81 BPM | DIASTOLIC BLOOD PRESSURE: 84 MMHG | RESPIRATION RATE: 18 BRPM

## 2017-10-24 LAB
ALBUMIN SERPL ELPH-MCNC: 3.1 G/DL — LOW (ref 3.3–5)
ALP SERPL-CCNC: 44 U/L — SIGNIFICANT CHANGE UP (ref 40–120)
ALT FLD-CCNC: 12 U/L — SIGNIFICANT CHANGE UP (ref 4–33)
AST SERPL-CCNC: 17 U/L — SIGNIFICANT CHANGE UP (ref 4–32)
BASOPHILS # BLD AUTO: 0.02 K/UL — SIGNIFICANT CHANGE UP (ref 0–0.2)
BASOPHILS NFR BLD AUTO: 0.3 % — SIGNIFICANT CHANGE UP (ref 0–2)
BILIRUB SERPL-MCNC: 0.5 MG/DL — SIGNIFICANT CHANGE UP (ref 0.2–1.2)
BUN SERPL-MCNC: 14 MG/DL — SIGNIFICANT CHANGE UP (ref 7–23)
CALCIUM SERPL-MCNC: 8.2 MG/DL — LOW (ref 8.4–10.5)
CHLORIDE SERPL-SCNC: 107 MMOL/L — SIGNIFICANT CHANGE UP (ref 98–107)
CO2 SERPL-SCNC: 22 MMOL/L — SIGNIFICANT CHANGE UP (ref 22–31)
CREAT SERPL-MCNC: 0.8 MG/DL — SIGNIFICANT CHANGE UP (ref 0.5–1.3)
EOSINOPHIL # BLD AUTO: 0.28 K/UL — SIGNIFICANT CHANGE UP (ref 0–0.5)
EOSINOPHIL NFR BLD AUTO: 3.7 % — SIGNIFICANT CHANGE UP (ref 0–6)
GLUCOSE BLDC GLUCOMTR-MCNC: 144 MG/DL — HIGH (ref 70–99)
GLUCOSE BLDC GLUCOMTR-MCNC: 147 MG/DL — HIGH (ref 70–99)
GLUCOSE BLDC GLUCOMTR-MCNC: 250 MG/DL — HIGH (ref 70–99)
GLUCOSE SERPL-MCNC: 147 MG/DL — HIGH (ref 70–99)
HCT VFR BLD CALC: 29.1 % — LOW (ref 34.5–45)
HGB BLD-MCNC: 9.9 G/DL — LOW (ref 11.5–15.5)
IMM GRANULOCYTES # BLD AUTO: 0.04 # — SIGNIFICANT CHANGE UP
IMM GRANULOCYTES NFR BLD AUTO: 0.5 % — SIGNIFICANT CHANGE UP (ref 0–1.5)
LYMPHOCYTES # BLD AUTO: 2.69 K/UL — SIGNIFICANT CHANGE UP (ref 1–3.3)
LYMPHOCYTES # BLD AUTO: 35.5 % — SIGNIFICANT CHANGE UP (ref 13–44)
MCHC RBC-ENTMCNC: 32.5 PG — SIGNIFICANT CHANGE UP (ref 27–34)
MCHC RBC-ENTMCNC: 34 % — SIGNIFICANT CHANGE UP (ref 32–36)
MCV RBC AUTO: 95.4 FL — SIGNIFICANT CHANGE UP (ref 80–100)
MONOCYTES # BLD AUTO: 0.52 K/UL — SIGNIFICANT CHANGE UP (ref 0–0.9)
MONOCYTES NFR BLD AUTO: 6.9 % — SIGNIFICANT CHANGE UP (ref 2–14)
NEUTROPHILS # BLD AUTO: 4.02 K/UL — SIGNIFICANT CHANGE UP (ref 1.8–7.4)
NEUTROPHILS NFR BLD AUTO: 53.1 % — SIGNIFICANT CHANGE UP (ref 43–77)
NRBC # FLD: 0 — SIGNIFICANT CHANGE UP
PLATELET # BLD AUTO: 161 K/UL — SIGNIFICANT CHANGE UP (ref 150–400)
PMV BLD: 10.4 FL — SIGNIFICANT CHANGE UP (ref 7–13)
POTASSIUM SERPL-MCNC: 3.8 MMOL/L — SIGNIFICANT CHANGE UP (ref 3.5–5.3)
POTASSIUM SERPL-SCNC: 3.8 MMOL/L — SIGNIFICANT CHANGE UP (ref 3.5–5.3)
PROT SERPL-MCNC: 6.1 G/DL — SIGNIFICANT CHANGE UP (ref 6–8.3)
RBC # BLD: 3.05 M/UL — LOW (ref 3.8–5.2)
RBC # FLD: 13.7 % — SIGNIFICANT CHANGE UP (ref 10.3–14.5)
SODIUM SERPL-SCNC: 142 MMOL/L — SIGNIFICANT CHANGE UP (ref 135–145)
WBC # BLD: 7.57 K/UL — SIGNIFICANT CHANGE UP (ref 3.8–10.5)
WBC # FLD AUTO: 7.57 K/UL — SIGNIFICANT CHANGE UP (ref 3.8–10.5)

## 2017-10-24 RX ORDER — ESCITALOPRAM OXALATE 10 MG/1
1 TABLET, FILM COATED ORAL
Qty: 0 | Refills: 0 | COMMUNITY

## 2017-10-24 RX ORDER — INSULIN LISPRO 100/ML
0 VIAL (ML) SUBCUTANEOUS
Qty: 0 | Refills: 0 | COMMUNITY
Start: 2017-10-24

## 2017-10-24 RX ORDER — INSULIN LISPRO 100/ML
5 VIAL (ML) SUBCUTANEOUS
Qty: 0 | Refills: 0 | COMMUNITY
Start: 2017-10-24

## 2017-10-24 RX ORDER — METFORMIN HYDROCHLORIDE 850 MG/1
1 TABLET ORAL
Qty: 0 | Refills: 0 | COMMUNITY

## 2017-10-24 RX ORDER — ENOXAPARIN SODIUM 100 MG/ML
10 INJECTION SUBCUTANEOUS
Qty: 0 | Refills: 0 | COMMUNITY
Start: 2017-10-24

## 2017-10-24 RX ORDER — PREGABALIN 225 MG/1
1 CAPSULE ORAL
Qty: 0 | Refills: 0 | COMMUNITY
Start: 2017-10-24

## 2017-10-24 RX ORDER — GLIMEPIRIDE 1 MG
1 TABLET ORAL
Qty: 0 | Refills: 0 | COMMUNITY

## 2017-10-24 RX ORDER — ENOXAPARIN SODIUM 100 MG/ML
6 INJECTION SUBCUTANEOUS
Qty: 0 | Refills: 0 | COMMUNITY
Start: 2017-10-24

## 2017-10-24 RX ORDER — INSULIN GLARGINE 100 [IU]/ML
6 INJECTION, SOLUTION SUBCUTANEOUS
Qty: 0 | Refills: 0 | COMMUNITY
Start: 2017-10-24

## 2017-10-24 RX ORDER — INSULIN LISPRO 100/ML
5 VIAL (ML) SUBCUTANEOUS
Qty: 0 | Refills: 0 | Status: DISCONTINUED | OUTPATIENT
Start: 2017-10-24 | End: 2017-10-24

## 2017-10-24 RX ORDER — INSULIN LISPRO 100/ML
10 VIAL (ML) SUBCUTANEOUS
Qty: 0 | Refills: 0 | COMMUNITY
Start: 2017-10-24

## 2017-10-24 RX ORDER — ALENDRONATE SODIUM 70 MG/1
1 TABLET ORAL
Qty: 0 | Refills: 0 | COMMUNITY

## 2017-10-24 RX ORDER — ENOXAPARIN SODIUM 100 MG/ML
30 INJECTION SUBCUTANEOUS
Qty: 0 | Refills: 0 | COMMUNITY
Start: 2017-10-24

## 2017-10-24 RX ORDER — PREGABALIN 225 MG/1
1000 CAPSULE ORAL
Qty: 0 | Refills: 0 | COMMUNITY
Start: 2017-10-24

## 2017-10-24 RX ORDER — INSULIN GLARGINE 100 [IU]/ML
6 INJECTION, SOLUTION SUBCUTANEOUS AT BEDTIME
Qty: 0 | Refills: 0 | Status: DISCONTINUED | OUTPATIENT
Start: 2017-10-24 | End: 2017-10-24

## 2017-10-24 RX ADMIN — HEPARIN SODIUM 5000 UNIT(S): 5000 INJECTION INTRAVENOUS; SUBCUTANEOUS at 13:14

## 2017-10-24 RX ADMIN — Medication 1 MILLIGRAM(S): at 12:41

## 2017-10-24 RX ADMIN — PREGABALIN 1000 MICROGRAM(S): 225 CAPSULE ORAL at 12:41

## 2017-10-24 RX ADMIN — Medication 2: at 12:40

## 2017-10-24 RX ADMIN — HEPARIN SODIUM 5000 UNIT(S): 5000 INJECTION INTRAVENOUS; SUBCUTANEOUS at 05:00

## 2017-10-24 RX ADMIN — Medication 5 UNIT(S): at 17:16

## 2017-10-24 NOTE — PROGRESS NOTE ADULT - SUBJECTIVE AND OBJECTIVE BOX
Subjective: Patient seen and examined. No new events except as noted.     SUBJECTIVE/ROS:  No chest pain, or sob.       MEDICATIONS:  MEDICATIONS  (STANDING):  cyanocobalamin Injectable 1000 MICROGram(s) SubCutaneous daily  dextrose 5%. 1000 milliLiter(s) (50 mL/Hr) IV Continuous <Continuous>  dextrose 50% Injectable 12.5 Gram(s) IV Push once  dextrose 50% Injectable 25 Gram(s) IV Push once  dextrose 50% Injectable 25 Gram(s) IV Push once  folic acid 1 milliGRAM(s) Oral daily  heparin  Injectable 5000 Unit(s) SubCutaneous every 8 hours  insulin lispro (HumaLOG) corrective regimen sliding scale   SubCutaneous three times a day before meals  insulin lispro (HumaLOG) corrective regimen sliding scale   SubCutaneous at bedtime  sodium chloride 0.9%. 1000 milliLiter(s) (75 mL/Hr) IV Continuous <Continuous>  sodium chloride 0.9%. 1000 milliLiter(s) (75 mL/Hr) IV Continuous <Continuous>      PHYSICAL EXAM:  T(C): 36.6 (10-24-17 @ 04:59), Max: 36.9 (10-23-17 @ 13:14)  HR: 75 (10-24-17 @ 04:59) (75 - 87)  BP: 147/78 (10-24-17 @ 04:59) (121/75 - 148/82)  RR: 18 (10-24-17 @ 04:59) (18 - 18)  SpO2: 97% (10-24-17 @ 04:59) (96% - 98%)  Wt(kg): --  I&O's Summary      JVP: Normal  Neck: supple  Lung: clear   CV: S1 S2 , Murmur:  Abd: soft  Ext: No edema  neuro: Awake / alert  Psych: flat affect  Skin: normal       LABS/DATA:    CARDIAC MARKERS:                                9.9    7.57  )-----------( 161      ( 24 Oct 2017 06:20 )             29.1     10-24    142  |  107  |  14  ----------------------------<  147<H>  3.8   |  22  |  0.80    Ca    8.2<L>      24 Oct 2017 06:20    TPro  6.1  /  Alb  3.1<L>  /  TBili  0.5  /  DBili  x   /  AST  17  /  ALT  12  /  AlkPhos  44  10-24    proBNP:   Lipid Profile:   HgA1c:   TSH:     TELE:  EKG:
Chief complaint  Patient is a 86y old  Female who presents with a chief complaint of Pt complaints of weakness loose stools unable to walk. S/p FALL AT HOME (19 Oct 2017 03:47)   Review of systems  Patient in bed, comfortable, no fever,  no hypoglycemia.    Labs and Fingersticks    CAPILLARY BLOOD GLUCOSE  123 (19 Oct 2017 21:25)  153 (19 Oct 2017 13:54)      Hemoglobin A1C, Whole Blood: 6.6 <H> (10-20 @ 06:20)    Calcium, Total Serum: 8.2 <L> (10-20 @ 06:20)  Calcium, Total Serum: 8.3 <L> (10-19 @ 06:00)  Calcium, Total Serum: 8.9 (10-18 @ 19:44)  Albumin, Serum: 3.2 <L> (10-20 @ 06:20)    Alanine Aminotransferase (ALT/SGPT): 8 (10-20 @ 06:20)  Alkaline Phosphatase, Serum: 38 <L> (10-20 @ 06:20)  Aspartate Aminotransferase (AST/SGOT): 15 (10-20 @ 06:20)        10-20    138  |  103  |  13  ----------------------------<  123<H>  3.7   |  21<L>  |  0.89    Ca    8.2<L>      20 Oct 2017 06:20  Phos  2.5     10-20  Mg     1.2     10-19    TPro  6.3  /  Alb  3.2<L>  /  TBili  0.8  /  DBili  x   /  AST  15  /  ALT  8   /  AlkPhos  38<L>  10-20                        10.5   5.16  )-----------( 187      ( 20 Oct 2017 06:20 )             30.7     Medications  MEDICATIONS  (STANDING):  cyanocobalamin Injectable 1000 MICROGram(s) SubCutaneous daily  dextrose 5%. 1000 milliLiter(s) (50 mL/Hr) IV Continuous <Continuous>  dextrose 50% Injectable 12.5 Gram(s) IV Push once  dextrose 50% Injectable 25 Gram(s) IV Push once  dextrose 50% Injectable 25 Gram(s) IV Push once  folic acid 1 milliGRAM(s) Oral daily  haloperidol    Injectable 1 milliGRAM(s) IV Push once  heparin  Injectable 5000 Unit(s) SubCutaneous every 8 hours  insulin lispro (HumaLOG) corrective regimen sliding scale   SubCutaneous three times a day before meals  insulin lispro (HumaLOG) corrective regimen sliding scale   SubCutaneous at bedtime  sodium chloride 0.9%. 1000 milliLiter(s) (75 mL/Hr) IV Continuous <Continuous>      Physical Exam  General: Patient comfortable in bed  Vital Signs Last 12 Hrs  T(F): 98.7 (10-20-17 @ 14:39), Max: 99 (10-20-17 @ 06:07)  HR: 101 (10-20-17 @ 14:39) (88 - 101)  BP: 141/72 (10-20-17 @ 14:39) (128/75 - 141/72)  BP(mean): --  RR: 18 (10-20-17 @ 14:39) (18 - 18)  SpO2: 96% (10-20-17 @ 14:39) (95% - 96%)  Neck: No palpable thyroid nodules.  CVS: S1S2, No murmurs  Respiratory: No wheezing, no crepitations  GI: Abdomen soft, bowel sounds positive  Musculoskeletal: Positive edema lower extremities bilaterally  Skin: No skin rashes, no ecchymosis    Diagnostics
Chief complaint  Patient is a 86y old  Female who presents with a chief complaint of Pt complaints of weakness loose stools unable to walk. S/p FALL AT HOME (21 Oct 2017 10:50)   Review of systems  Patient in bed, comfortable, no fever,  no hypoglycemia.    Labs and Fingersticks    CAPILLARY BLOOD GLUCOSE  162 (20 Oct 2017 17:30)        Calcium, Total Serum: 8.3 <L> (10-23 @ 05:59)  Calcium, Total Serum: 8.1 <L> (10-22 @ 06:03)  Albumin, Serum: 3.1 <L> (10-23 @ 05:59)  Albumin, Serum: 3.0 <L> (10-22 @ 06:03)    Alanine Aminotransferase (ALT/SGPT): 12 (10-23 @ 05:59)  Alanine Aminotransferase (ALT/SGPT): 12 (10-22 @ 06:03)  Alkaline Phosphatase, Serum: 42 (10-23 @ 05:59)  Alkaline Phosphatase, Serum: 39 <L> (10-22 @ 06:03)  Aspartate Aminotransferase (AST/SGOT): 16 (10-23 @ 05:59)  Aspartate Aminotransferase (AST/SGOT): 15 (10-22 @ 06:03)        10-23    139  |  105  |  14  ----------------------------<  139<H>  3.6   |  22  |  0.79    Ca    8.3<L>      23 Oct 2017 05:59    TPro  6.0  /  Alb  3.1<L>  /  TBili  0.6  /  DBili  x   /  AST  16  /  ALT  12  /  AlkPhos  42  10-23                        9.8    6.09  )-----------( 166      ( 23 Oct 2017 05:59 )             28.9     Medications  MEDICATIONS  (STANDING):  cyanocobalamin Injectable 1000 MICROGram(s) SubCutaneous daily  dextrose 5%. 1000 milliLiter(s) (50 mL/Hr) IV Continuous <Continuous>  dextrose 50% Injectable 12.5 Gram(s) IV Push once  dextrose 50% Injectable 25 Gram(s) IV Push once  dextrose 50% Injectable 25 Gram(s) IV Push once  folic acid 1 milliGRAM(s) Oral daily  heparin  Injectable 5000 Unit(s) SubCutaneous every 8 hours  insulin lispro (HumaLOG) corrective regimen sliding scale   SubCutaneous three times a day before meals  insulin lispro (HumaLOG) corrective regimen sliding scale   SubCutaneous at bedtime  sodium chloride 0.9%. 1000 milliLiter(s) (75 mL/Hr) IV Continuous <Continuous>  sodium chloride 0.9%. 1000 milliLiter(s) (75 mL/Hr) IV Continuous <Continuous>      Physical Exam  General: Patient comfortable in bed  Vital Signs Last 12 Hrs  T(F): 98.4 (10-23-17 @ 13:14), Max: 98.4 (10-23-17 @ 13:14)  HR: 87 (10-23-17 @ 13:14) (80 - 87)  BP: 121/75 (10-23-17 @ 13:14) (120/78 - 122/78)  BP(mean): --  RR: 18 (10-23-17 @ 13:14) (18 - 18)  SpO2: 96% (10-23-17 @ 13:14) (96% - 98%)  Neck: No palpable thyroid nodules.  CVS: S1S2, No murmurs  Respiratory: No wheezing, no crepitations  GI: Abdomen soft, bowel sounds positive  Musculoskeletal: Positive edema lower extremities bilaterally  Skin: No skin rashes, no ecchymosis    Diagnostics
Chief complaint  Patient is a 86y old  Female who presents with a chief complaint of Pt complaints of weakness loose stools unable to walk. S/p FALL AT HOME (21 Oct 2017 10:50)   Review of systems  Patient in bed, comfortable, no fever,  no hypoglycemia.    Labs and Fingersticks    CAPILLARY BLOOD GLUCOSE  162 (20 Oct 2017 17:30)  123 (19 Oct 2017 21:25)        Calcium, Total Serum: 8.1 <L> (10-22 @ 06:03)  Calcium, Total Serum: 8.3 <L> (10-21 @ 07:15)  Albumin, Serum: 3.0 <L> (10-22 @ 06:03)  Albumin, Serum: 3.1 <L> (10-21 @ 07:15)    Alanine Aminotransferase (ALT/SGPT): 12 (10-22 @ 06:03)  Alanine Aminotransferase (ALT/SGPT): 8 (10-21 @ 07:15)  Alkaline Phosphatase, Serum: 39 <L> (10-22 @ 06:03)  Alkaline Phosphatase, Serum: 38 <L> (10-21 @ 07:15)  Aspartate Aminotransferase (AST/SGOT): 15 (10-22 @ 06:03)  Aspartate Aminotransferase (AST/SGOT): 16 (10-21 @ 07:15)        10-22    139  |  105  |  17  ----------------------------<  153<H>  3.6   |  22  |  0.89    Ca    8.1<L>      22 Oct 2017 06:03    TPro  6.1  /  Alb  3.0<L>  /  TBili  0.7  /  DBili  x   /  AST  15  /  ALT  12  /  AlkPhos  39<L>  10-22                        10.1   5.08  )-----------( 164      ( 22 Oct 2017 06:03 )             29.0     Medications  MEDICATIONS  (STANDING):  cyanocobalamin Injectable 1000 MICROGram(s) SubCutaneous daily  dextrose 5%. 1000 milliLiter(s) (50 mL/Hr) IV Continuous <Continuous>  dextrose 50% Injectable 12.5 Gram(s) IV Push once  dextrose 50% Injectable 25 Gram(s) IV Push once  dextrose 50% Injectable 25 Gram(s) IV Push once  folic acid 1 milliGRAM(s) Oral daily  heparin  Injectable 5000 Unit(s) SubCutaneous every 8 hours  insulin lispro (HumaLOG) corrective regimen sliding scale   SubCutaneous three times a day before meals  insulin lispro (HumaLOG) corrective regimen sliding scale   SubCutaneous at bedtime  LORazepam   Injectable 2 milliGRAM(s) IV Push once  sodium chloride 0.9%. 1000 milliLiter(s) (75 mL/Hr) IV Continuous <Continuous>  sodium chloride 0.9%. 1000 milliLiter(s) (75 mL/Hr) IV Continuous <Continuous>      Physical Exam  General: Patient comfortable in bed  Vital Signs Last 12 Hrs  T(F): 97.3 (10-22-17 @ 14:18), Max: 98.4 (10-22-17 @ 05:42)  HR: 80 (10-22-17 @ 14:18) (80 - 80)  BP: 118/75 (10-22-17 @ 14:18) (118/75 - 153/88)  BP(mean): --  RR: 18 (10-22-17 @ 14:18) (18 - 18)  SpO2: 97% (10-22-17 @ 14:18) (95% - 97%)  Neck: No palpable thyroid nodules.  CVS: S1S2, No murmurs  Respiratory: No wheezing, no crepitations  GI: Abdomen soft, bowel sounds positive  Musculoskeletal: Positive edema lower extremities bilaterally  Skin: No skin rashes, no ecchymosis    Diagnostics
Chief complaint  Patient is a 86y old  Female who presents with a chief complaint of Pt complaints of weakness loose stools unable to walk. S/p FALL AT HOME (21 Oct 2017 10:50)   Review of systems  Patient in bed, comfortable, no fever, no hypoglycemia.    Labs and Fingersticks    CAPILLARY BLOOD GLUCOSE  162 (20 Oct 2017 17:30)  123 (19 Oct 2017 21:25)  153 (19 Oct 2017 13:54)      Hemoglobin A1C, Whole Blood: 6.6 <H> (10-20 @ 06:20)    Calcium, Total Serum: 8.3 <L> (10-21 @ 07:15)  Calcium, Total Serum: 8.2 <L> (10-20 @ 06:20)  Albumin, Serum: 3.1 <L> (10-21 @ 07:15)  Albumin, Serum: 3.2 <L> (10-20 @ 06:20)    Alanine Aminotransferase (ALT/SGPT): 8 (10-21 @ 07:15)  Alanine Aminotransferase (ALT/SGPT): 8 (10-20 @ 06:20)  Alkaline Phosphatase, Serum: 38 <L> (10-21 @ 07:15)  Alkaline Phosphatase, Serum: 38 <L> (10-20 @ 06:20)  Aspartate Aminotransferase (AST/SGOT): 16 (10-21 @ 07:15)  Aspartate Aminotransferase (AST/SGOT): 15 (10-20 @ 06:20)        10-21    140  |  104  |  18  ----------------------------<  142<H>  3.8   |  21<L>  |  0.91    Ca    8.3<L>      21 Oct 2017 07:15  Phos  2.5     10-20    TPro  6.3  /  Alb  3.1<L>  /  TBili  0.7  /  DBili  x   /  AST  16  /  ALT  8   /  AlkPhos  38<L>  10-21                        10.3   5.58  )-----------( 176      ( 21 Oct 2017 07:15 )             30.3     Medications  MEDICATIONS  (STANDING):  cyanocobalamin Injectable 1000 MICROGram(s) SubCutaneous daily  dextrose 5%. 1000 milliLiter(s) (50 mL/Hr) IV Continuous <Continuous>  dextrose 50% Injectable 12.5 Gram(s) IV Push once  dextrose 50% Injectable 25 Gram(s) IV Push once  dextrose 50% Injectable 25 Gram(s) IV Push once  folic acid 1 milliGRAM(s) Oral daily  haloperidol    Injectable 1 milliGRAM(s) IV Push once  heparin  Injectable 5000 Unit(s) SubCutaneous every 8 hours  insulin lispro (HumaLOG) corrective regimen sliding scale   SubCutaneous three times a day before meals  insulin lispro (HumaLOG) corrective regimen sliding scale   SubCutaneous at bedtime  sodium chloride 0.9%. 1000 milliLiter(s) (75 mL/Hr) IV Continuous <Continuous>      Physical Exam  General: Patient comfortable in bed  Vital Signs Last 12 Hrs  T(F): 98.1 (10-21-17 @ 13:57), Max: 98.8 (10-21-17 @ 05:07)  HR: 89 (10-21-17 @ 13:57) (85 - 89)  BP: 123/65 (10-21-17 @ 13:57) (123/65 - 144/86)  BP(mean): --  RR: 18 (10-21-17 @ 13:57) (18 - 18)  SpO2: 95% (10-21-17 @ 13:57) (95% - 95%)  Neck: No palpable thyroid nodules.  CVS: S1S2, No murmurs  Respiratory: No wheezing, no crepitations  GI: Abdomen soft, bowel sounds positive  Musculoskeletal: Positive edema lower extremities bilaterally  Skin: No skin rashes, no ecchymosis    Diagnostics
Patient is a 86y old  Female who presents with a chief complaint of Pt complaints of weakness loose stools unable to walk. S/p FALL AT HOME (19 Oct 2017 03:47)                                                                   REVIEW OF SYSTEMS:     CONSTITUTIONAL: generalized weakness and unstediness   no fever or chills   EYES/ENT: No visual changes  NECK: No pain or stiffness  RESPIRATORY: + cough, no  wheezing,  No shortness of breath  CARDIOVASCULAR: No chest pain or palpitations  GASTROINTESTINAL: No abdominal pain  . No nausea, vomiting, resolved diarreah .  GENITOURINARY: No dysuria, frequency or hematuria  NEUROLOGICAL:no focal weakness but feels unsteady   SKIN: No itching, burning, rashes, or lesions  MSK : L wrist pain                                                                                                                                                                                                                                                                                  Medications:  MEDICATIONS  (STANDING):  dextrose 5%. 1000 milliLiter(s) (50 mL/Hr) IV Continuous <Continuous>  dextrose 50% Injectable 12.5 Gram(s) IV Push once  dextrose 50% Injectable 25 Gram(s) IV Push once  dextrose 50% Injectable 25 Gram(s) IV Push once  heparin  Injectable 5000 Unit(s) SubCutaneous every 8 hours  insulin lispro (HumaLOG) corrective regimen sliding scale   SubCutaneous three times a day before meals  insulin lispro (HumaLOG) corrective regimen sliding scale   SubCutaneous at bedtime  sodium chloride 0.9%. 1000 milliLiter(s) (75 mL/Hr) IV Continuous <Continuous>    MEDICATIONS  (PRN):  dextrose Gel 1 Dose(s) Oral once PRN Blood Glucose LESS THAN 70 milliGRAM(s)/deciliter  glucagon  Injectable 1 milliGRAM(s) IntraMuscular once PRN Glucose LESS THAN 70 milligrams/deciliter  guaiFENesin   Syrup  (Sugar-Free) 100 milliGRAM(s) Oral every 6 hours PRN Cough       Allergies    No Known Allergies    Intolerances      Vital Signs Last 24 Hrs  T(C): 37.4 (19 Oct 2017 13:24), Max: 37.4 (19 Oct 2017 13:24)  T(F): 99.3 (19 Oct 2017 13:24), Max: 99.3 (19 Oct 2017 13:24)  HR: 86 (19 Oct 2017 13:24) (86 - 96)  BP: 140/74 (19 Oct 2017 13:24) (122/72 - 146/66)  BP(mean): --  RR: 18 (19 Oct 2017 13:24) (17 - 18)  SpO2: 96% (19 Oct 2017 13:24) (96% - 100%)  CAPILLARY BLOOD GLUCOSE  153 (19 Oct 2017 13:54)      POCT Blood Glucose.: 185 mg/dL (19 Oct 2017 16:46)  POCT Blood Glucose.: 153 mg/dL (19 Oct 2017 13:51)  POCT Blood Glucose.: 188 mg/dL (19 Oct 2017 10:01)  POCT Blood Glucose.: 163 mg/dL (19 Oct 2017 09:14)      Physical Exam:    General:  elderly iin NAD   HEENT:  Nonicteric, PERRLA  CV:  RRR, S1S2   Lungs:  CTA B/L, no wheezes, rales, rhonchi  Abdomen:  Soft, non-tender, no distended, positive BS  Extremities:  2+ pulses, no c/c, no edema  Skin:  Warm and dry, no rashes  :  No mason  Neuro:  AAOx3, 4/5 all   unsteady when walking    no babinski   no nystagmus       MSK :L   wrist with limited ROM sec to pain   mild swelling                                                                                                                                                                                                                                                                                         LABS:                               10.3   6.29  )-----------( 188      ( 19 Oct 2017 06:00 )             30.2                      10-19    136  |  102  |  19  ----------------------------<  133<H>  3.8   |  20<L>  |  0.89    Ca    8.3<L>      19 Oct 2017 06:00  Mg     1.2     10-19                         RADIOLOGY & ADDITIONAL TESTS         I personally reviewed: [  ]EKG   [  ]CXR    [  ] CT
Patient is a 86y old  Female who presents with a chief complaint of Pt complaints of weakness loose stools unable to walk. S/p FALL AT HOME (19 Oct 2017 03:47)                                                               INTERVAL HPI/OVERNIGHT EVENTS: none     REVIEW OF SYSTEMS:     CONSTITUTIONAL: weakness is slowly improving   EYES/ENT: No visual changes , no ear ache   RESPIRATORY: + cough, No wheezing,  No shortness of breath  CARDIOVASCULAR: No chest pain or palpitations  GASTROINTESTINAL: No abdominal pain  . No nausea, vomiting, or hematemesis; No diarrhea or constipation. No melena or hematochezia.  GENITOURINARY: No dysuria, frequency or hematuria  NEUROLOGICAL: No numbness or focal  weakness                                                                                                                                                                                                                                                                                Medications:  MEDICATIONS  (STANDING):  cyanocobalamin Injectable 1000 MICROGram(s) SubCutaneous daily  dextrose 5%. 1000 milliLiter(s) (50 mL/Hr) IV Continuous <Continuous>  dextrose 50% Injectable 12.5 Gram(s) IV Push once  dextrose 50% Injectable 25 Gram(s) IV Push once  dextrose 50% Injectable 25 Gram(s) IV Push once  folic acid 1 milliGRAM(s) Oral daily  haloperidol    Injectable 1 milliGRAM(s) IV Push once  heparin  Injectable 5000 Unit(s) SubCutaneous every 8 hours  insulin lispro (HumaLOG) corrective regimen sliding scale   SubCutaneous three times a day before meals  insulin lispro (HumaLOG) corrective regimen sliding scale   SubCutaneous at bedtime  sodium chloride 0.9%. 1000 milliLiter(s) (75 mL/Hr) IV Continuous <Continuous>    MEDICATIONS  (PRN):  dextrose Gel 1 Dose(s) Oral once PRN Blood Glucose LESS THAN 70 milliGRAM(s)/deciliter  glucagon  Injectable 1 milliGRAM(s) IntraMuscular once PRN Glucose LESS THAN 70 milligrams/deciliter  guaiFENesin   Syrup  (Sugar-Free) 100 milliGRAM(s) Oral every 6 hours PRN Cough       Allergies    No Known Allergies    Intolerances      Vital Signs Last 24 Hrs  T(C): 37.1 (20 Oct 2017 14:39), Max: 37.6 (19 Oct 2017 21:25)  T(F): 98.7 (20 Oct 2017 14:39), Max: 99.7 (19 Oct 2017 21:25)  HR: 101 (20 Oct 2017 14:39) (88 - 103)  BP: 141/72 (20 Oct 2017 14:39) (128/75 - 145/73)  BP(mean): --  RR: 18 (20 Oct 2017 14:39) (18 - 18)  SpO2: 96% (20 Oct 2017 14:39) (95% - 96%)  CAPILLARY BLOOD GLUCOSE  123 (19 Oct 2017 21:25)      POCT Blood Glucose.: 162 mg/dL (20 Oct 2017 16:24)  POCT Blood Glucose.: 187 mg/dL (20 Oct 2017 12:34)  POCT Blood Glucose.: 131 mg/dL (20 Oct 2017 08:22)  POCT Blood Glucose.: 123 mg/dL (19 Oct 2017 21:17)      Physical Exam:    General:  Elderly in NAD   HEENT:  Nonicteric, PERRLA  CV:  RRR, S1S2   Lungs:  mild crackles at bases   Abdomen:  Soft, non-tender, no distended, positive BS  Extremities:  2+ pulses, no c/c, no edema  Skin:  Warm and dry, no rashes  :  No mason  Neuro:  AAOx3, non-focal, grossly intact                                                                                                                                                                                                                                                                                                LABS:                               10.5   5.16  )-----------( 187      ( 20 Oct 2017 06:20 )             30.7                      10-20    138  |  103  |  13  ----------------------------<  123<H>  3.7   |  21<L>  |  0.89    Ca    8.2<L>      20 Oct 2017 06:20  Phos  2.5     10-20  Mg     1.2     10-19    TPro  6.3  /  Alb  3.2<L>  /  TBili  0.8  /  DBili  x   /  AST  15  /  ALT  8   /  AlkPhos  38<L>  10-20                       RADIOLOGY & ADDITIONAL TESTS         I personally reviewed: [  ]EKG   [  ]CXR    [  ] CT
Patient is a 86y old  Female who presents with a chief complaint of Pt complaints of weakness loose stools unable to walk. S/p FALL AT HOME (21 Oct 2017 10:50)                                                               INTERVAL HPI/OVERNIGHT EVENTS:    REVIEW OF SYSTEMS:     sleeping post haldol for MRI    says she had been feeling better and was able to walk to bathroom on her own                                                                                                                                                                                                                                                                               Medications:  MEDICATIONS  (STANDING):  cyanocobalamin Injectable 1000 MICROGram(s) SubCutaneous daily  dextrose 5%. 1000 milliLiter(s) (50 mL/Hr) IV Continuous <Continuous>  dextrose 50% Injectable 12.5 Gram(s) IV Push once  dextrose 50% Injectable 25 Gram(s) IV Push once  dextrose 50% Injectable 25 Gram(s) IV Push once  folic acid 1 milliGRAM(s) Oral daily  heparin  Injectable 5000 Unit(s) SubCutaneous every 8 hours  insulin lispro (HumaLOG) corrective regimen sliding scale   SubCutaneous three times a day before meals  insulin lispro (HumaLOG) corrective regimen sliding scale   SubCutaneous at bedtime  sodium chloride 0.9%. 1000 milliLiter(s) (75 mL/Hr) IV Continuous <Continuous>  sodium chloride 0.9%. 1000 milliLiter(s) (75 mL/Hr) IV Continuous <Continuous>    MEDICATIONS  (PRN):  dextrose Gel 1 Dose(s) Oral once PRN Blood Glucose LESS THAN 70 milliGRAM(s)/deciliter  glucagon  Injectable 1 milliGRAM(s) IntraMuscular once PRN Glucose LESS THAN 70 milligrams/deciliter  guaiFENesin   Syrup  (Sugar-Free) 100 milliGRAM(s) Oral every 6 hours PRN Cough       Allergies    No Known Allergies    Intolerances      Vital Signs Last 24 Hrs  T(C): 36.9 (23 Oct 2017 13:14), Max: 36.9 (23 Oct 2017 13:14)  T(F): 98.4 (23 Oct 2017 13:14), Max: 98.4 (23 Oct 2017 13:14)  HR: 87 (23 Oct 2017 13:14) (80 - 87)  BP: 121/75 (23 Oct 2017 13:14) (120/78 - 122/78)  BP(mean): --  RR: 18 (23 Oct 2017 13:14) (18 - 18)  SpO2: 96% (23 Oct 2017 13:14) (96% - 98%)  CAPILLARY BLOOD GLUCOSE      POCT Blood Glucose.: 262 mg/dL (23 Oct 2017 11:57)  POCT Blood Glucose.: 153 mg/dL (23 Oct 2017 08:22)  POCT Blood Glucose.: 124 mg/dL (22 Oct 2017 22:28)      Physical Exam:   General:  NAD  sleeping   HEENT:  Nonicteric, PERRLA  CV:  RRR, S1S2   Lungs:  CTA B/L, no wheezes, rales, rhonchi  Abdomen:  Soft, non-tender, no distended, positive BS  Extremities:  2+ pulses, no c/c, no edema  Skin:  Warm and dry, no rashes  :  No mason  Neuro:  moving all extremities                                                                                                                                                                                                                                                                                         LABS:                               9.8    6.09  )-----------( 166      ( 23 Oct 2017 05:59 )             28.9                      10-23    139  |  105  |  14  ----------------------------<  139<H>  3.6   |  22  |  0.79    Ca    8.3<L>      23 Oct 2017 05:59    TPro  6.0  /  Alb  3.1<L>  /  TBili  0.6  /  DBili  x   /  AST  16  /  ALT  12  /  AlkPhos  42  10-23                       RADIOLOGY & ADDITIONAL TESTS         I personally reviewed: [  ]EKG   [  ]CXR    [  ] CT      A/P:         Discussed with :     [employer] consultants' Notes   Time spent :
Patient is a 86y old  Female who presents with a chief complaint of Pt complaints of weakness loose stools unable to walk. S/p FALL AT HOME (21 Oct 2017 10:50)      HPI:  pt seen, no new events    Vital Signs Last 24 Hrs  T(C): 36.9 (22 Oct 2017 05:42), Max: 36.9 (21 Oct 2017 22:28)  T(F): 98.4 (22 Oct 2017 05:42), Max: 98.4 (21 Oct 2017 22:28)  HR: 80 (22 Oct 2017 05:42) (80 - 89)  BP: 153/88 (22 Oct 2017 05:42) (123/65 - 153/88)  BP(mean): --  RR: 18 (22 Oct 2017 05:42) (18 - 18)  SpO2: 95% (22 Oct 2017 05:42) (95% - 96%)    Physical Exam    Mental Status- AAO x 3, speech fluent without dysarthria, memory and fund of knowledge intact  CN- 2-12 intact  Motor- 5/5 x 4 ext, nl bulk and tone  Sensory- intact Lt/PP/propriception  Coordination- No dysmetria UE/LE  Gait- deferred
Patient is a 86y old  Female who presents with a chief complaint of Pt complaints of weakness loose stools unable to walk. S/p FALL AT HOME (21 Oct 2017 10:50)      REVIEW OF SYSTEMS:     CONSTITUTIONAL: improving weakness, fevers or chills  RESPIRATORY:improved cough, wheezing,  No shortness of breath  CARDIOVASCULAR: No chest pain or palpitations  GASTROINTESTINAL: No abdominal pain  . No nausea, vomiting, or hematemesis; No diarrhea or constipation. No melena or hematochezia.  GENITOURINARY: No dysuria, frequency or hematuria  NEUROLOGICAL: No numbness or weakness                                                                                                                                                                                                                                                                                Medications:  MEDICATIONS  (STANDING):  cyanocobalamin Injectable 1000 MICROGram(s) SubCutaneous daily  dextrose 5%. 1000 milliLiter(s) (50 mL/Hr) IV Continuous <Continuous>  dextrose 50% Injectable 12.5 Gram(s) IV Push once  dextrose 50% Injectable 25 Gram(s) IV Push once  dextrose 50% Injectable 25 Gram(s) IV Push once  folic acid 1 milliGRAM(s) Oral daily  heparin  Injectable 5000 Unit(s) SubCutaneous every 8 hours  insulin lispro (HumaLOG) corrective regimen sliding scale   SubCutaneous three times a day before meals  insulin lispro (HumaLOG) corrective regimen sliding scale   SubCutaneous at bedtime  LORazepam   Injectable 2 milliGRAM(s) IV Push once  sodium chloride 0.9%. 1000 milliLiter(s) (75 mL/Hr) IV Continuous <Continuous>  sodium chloride 0.9%. 1000 milliLiter(s) (75 mL/Hr) IV Continuous <Continuous>    MEDICATIONS  (PRN):  dextrose Gel 1 Dose(s) Oral once PRN Blood Glucose LESS THAN 70 milliGRAM(s)/deciliter  glucagon  Injectable 1 milliGRAM(s) IntraMuscular once PRN Glucose LESS THAN 70 milligrams/deciliter  guaiFENesin   Syrup  (Sugar-Free) 100 milliGRAM(s) Oral every 6 hours PRN Cough       Allergies    No Known Allergies    Intolerances      Vital Signs Last 24 Hrs  T(C): 36.3 (22 Oct 2017 14:18), Max: 36.9 (21 Oct 2017 22:28)  T(F): 97.3 (22 Oct 2017 14:18), Max: 98.4 (21 Oct 2017 22:28)  HR: 80 (22 Oct 2017 14:18) (80 - 85)  BP: 118/75 (22 Oct 2017 14:18) (118/75 - 153/88)  BP(mean): --  RR: 18 (22 Oct 2017 14:18) (18 - 18)  SpO2: 97% (22 Oct 2017 14:18) (95% - 97%)  CAPILLARY BLOOD GLUCOSE      POCT Blood Glucose.: 153 mg/dL (22 Oct 2017 16:23)  POCT Blood Glucose.: 175 mg/dL (22 Oct 2017 11:49)  POCT Blood Glucose.: 161 mg/dL (22 Oct 2017 08:34)  POCT Blood Glucose.: 172 mg/dL (21 Oct 2017 22:15)      Physical Exam:    Daily Weight in k (22 Oct 2017 05:42)  General:  NAD   HEENT:  Nonicteric, PERRLA  CV:  RRR, S1S2   Lungs:  CTA B/L, no wheezes, rales, rhonchi  Abdomen:  Soft, non-tender, no distended, positive BS  Extremities:  2+ pulses, no c/c, no edema  Skin:  Warm and dry, no rashes  :  No mason  Neuro:  AAOx3, non-focal, grossly intact                                                                                                                                                                                                                                                                                                LABS:                               10.1   5.08  )-----------( 164      ( 22 Oct 2017 06:03 )             29.0                      10    139  |  105  |  17  ----------------------------<  153<H>  3.6   |  22  |  0.89    Ca    8.1<L>      22 Oct 2017 06:03    TPro  6.1  /  Alb  3.0<L>  /  TBili  0.7  /  DBili  x   /  AST  15  /  ALT  12  /  AlkPhos  39<L>  1022                       RADIOLOGY & ADDITIONAL TESTS         I personally reviewed: [  ]EKG   [  ]CXR    [  ] CT      A/P:         Discussed with :     Stefani consultants' Notes   Time spent :
Patient is a 86y old  Female who presents with a chief complaint of Pt complaints of weakness loose stools unable to walk. S/p FALL AT HOME (21 Oct 2017 10:50)      REVIEW OF SYSTEMS:    RESPIRATORY: improving  cough, No shortness of breath  CARDIOVASCULAR: No chest pain or palpitations  GASTROINTESTINAL: No abdominal pain  . No nausea, vomiting,+ constipation   GENITOURINARY: No dysuria, frequency  NEUROLOGICAL: No numbness or weakness                                                                                                                                                                                                                                                                                   Medications:  MEDICATIONS  (STANDING):  cyanocobalamin Injectable 1000 MICROGram(s) SubCutaneous daily  dextrose 5%. 1000 milliLiter(s) (50 mL/Hr) IV Continuous <Continuous>  dextrose 50% Injectable 12.5 Gram(s) IV Push once  dextrose 50% Injectable 25 Gram(s) IV Push once  dextrose 50% Injectable 25 Gram(s) IV Push once  folic acid 1 milliGRAM(s) Oral daily  haloperidol    Injectable 1 milliGRAM(s) IV Push once  heparin  Injectable 5000 Unit(s) SubCutaneous every 8 hours  insulin lispro (HumaLOG) corrective regimen sliding scale   SubCutaneous three times a day before meals  insulin lispro (HumaLOG) corrective regimen sliding scale   SubCutaneous at bedtime  sodium chloride 0.9%. 1000 milliLiter(s) (75 mL/Hr) IV Continuous <Continuous>    MEDICATIONS  (PRN):  dextrose Gel 1 Dose(s) Oral once PRN Blood Glucose LESS THAN 70 milliGRAM(s)/deciliter  glucagon  Injectable 1 milliGRAM(s) IntraMuscular once PRN Glucose LESS THAN 70 milligrams/deciliter  guaiFENesin   Syrup  (Sugar-Free) 100 milliGRAM(s) Oral every 6 hours PRN Cough       Allergies    No Known Allergies    Intolerances      Vital Signs Last 24 Hrs  T(C): 36.9 (21 Oct 2017 22:28), Max: 37.1 (21 Oct 2017 05:07)  T(F): 98.4 (21 Oct 2017 22:28), Max: 98.8 (21 Oct 2017 05:07)  HR: 85 (21 Oct 2017 22:28) (85 - 89)  BP: 140/79 (21 Oct 2017 22:28) (123/65 - 144/86)  BP(mean): --  RR: 18 (21 Oct 2017 22:28) (18 - 18)  SpO2: 96% (21 Oct 2017 22:28) (95% - 96%)  CAPILLARY BLOOD GLUCOSE      POCT Blood Glucose.: 172 mg/dL (21 Oct 2017 22:15)  POCT Blood Glucose.: 136 mg/dL (21 Oct 2017 16:19)  POCT Blood Glucose.: 173 mg/dL (21 Oct 2017 12:43)  POCT Blood Glucose.: 155 mg/dL (21 Oct 2017 08:24)  POCT Blood Glucose.: 143 mg/dL (20 Oct 2017 22:43)      Physical Exam:    General:  elderly NAD   HEENT:  Nonicteric, PERRLA  CV:  RRR, S1S2   Lungs:  CTA B/L, no wheezes, rales, rhonchi  Abdomen:  Soft, non-tender, no distended, positive BS  Extremities:  2+ pulses, no c/c, no edema  Skin:  Warm and dry, no rashes  :  No mason  Neuro:  AAOx3, non-focal, grossly intact                                                                                                                                                                                                                                                                                                LABS:                               10.3   5.58  )-----------( 176      ( 21 Oct 2017 07:15 )             30.3                      10-21    140  |  104  |  18  ----------------------------<  142<H>  3.8   |  21<L>  |  0.91    Ca    8.3<L>      21 Oct 2017 07:15  Phos  2.5     10-20    TPro  6.3  /  Alb  3.1<L>  /  TBili  0.7  /  DBili  x   /  AST  16  /  ALT  8   /  AlkPhos  38<L>  10-21                       RADIOLOGY & ADDITIONAL TESTS         I personally reviewed: [  ]EKG   [  ]CXR    [  ] CT      A/P:         Discussed with :     Stefani consultants' Notes   Time spent :
consult to be dictated  pt with ataxia, likely multifactorial.  plan  1. MRI brain/C spine if able  2. PT
Chief complaint  Patient is a 86y old  Female who presents with a chief complaint of Pt complaints of weakness loose stools unable to walk. S/p FALL AT HOME (21 Oct 2017 10:50)   Review of systems  Patient in bed, comfortable, no fever,  no hypoglycemia.    Labs and Fingersticks    CAPILLARY BLOOD GLUCOSE        Calcium, Total Serum: 8.2 <L> (10-24 @ 06:20)  Calcium, Total Serum: 8.3 <L> (10-23 @ 05:59)  Albumin, Serum: 3.1 <L> (10-24 @ 06:20)  Albumin, Serum: 3.1 <L> (10-23 @ 05:59)    Alanine Aminotransferase (ALT/SGPT): 12 (10-24 @ 06:20)  Alanine Aminotransferase (ALT/SGPT): 12 (10-23 @ 05:59)  Alkaline Phosphatase, Serum: 44 (10-24 @ 06:20)  Alkaline Phosphatase, Serum: 42 (10-23 @ 05:59)  Aspartate Aminotransferase (AST/SGOT): 17 (10-24 @ 06:20)  Aspartate Aminotransferase (AST/SGOT): 16 (10-23 @ 05:59)        10-24    142  |  107  |  14  ----------------------------<  147<H>  3.8   |  22  |  0.80    Ca    8.2<L>      24 Oct 2017 06:20    TPro  6.1  /  Alb  3.1<L>  /  TBili  0.5  /  DBili  x   /  AST  17  /  ALT  12  /  AlkPhos  44  10-24                        9.9    7.57  )-----------( 161      ( 24 Oct 2017 06:20 )             29.1     Medications  MEDICATIONS  (STANDING):  cyanocobalamin Injectable 1000 MICROGram(s) SubCutaneous daily  dextrose 5%. 1000 milliLiter(s) (50 mL/Hr) IV Continuous <Continuous>  dextrose 50% Injectable 12.5 Gram(s) IV Push once  dextrose 50% Injectable 25 Gram(s) IV Push once  dextrose 50% Injectable 25 Gram(s) IV Push once  folic acid 1 milliGRAM(s) Oral daily  heparin  Injectable 5000 Unit(s) SubCutaneous every 8 hours  insulin glargine Injectable (LANTUS) 6 Unit(s) SubCutaneous at bedtime  insulin lispro (HumaLOG) corrective regimen sliding scale   SubCutaneous three times a day before meals  insulin lispro (HumaLOG) corrective regimen sliding scale   SubCutaneous at bedtime  insulin lispro Injectable (HumaLOG) 5 Unit(s) SubCutaneous three times a day before meals  sodium chloride 0.9%. 1000 milliLiter(s) (75 mL/Hr) IV Continuous <Continuous>  sodium chloride 0.9%. 1000 milliLiter(s) (75 mL/Hr) IV Continuous <Continuous>      Physical Exam  General: Patient comfortable in bed  Vital Signs Last 12 Hrs  T(F): 99.3 (10-24-17 @ 13:09), Max: 99.3 (10-24-17 @ 13:09)  HR: 82 (10-24-17 @ 13:09) (75 - 82)  BP: 147/80 (10-24-17 @ 13:09) (147/78 - 147/80)  BP(mean): --  RR: 19 (10-24-17 @ 13:09) (18 - 19)  SpO2: 97% (10-24-17 @ 13:09) (97% - 97%)  Neck: No palpable thyroid nodules.  CVS: S1S2, No murmurs  Respiratory: No wheezing, no crepitations  GI: Abdomen soft, bowel sounds positive  Musculoskeletal: Positive edema lower extremities bilaterally  Skin: No skin rashes, no ecchymosis    Diagnostics
Patient is a 86y old  Female who presents with a chief complaint of Pt complaints of weakness loose stools unable to walk. S/p FALL AT HOME (21 Oct 2017 10:50)                                                               INTERVAL HPI/OVERNIGHT EVENTS:    REVIEW OF SYSTEMS:     CONSTITUTIONAL: No weakness, fevers or chills  RESPIRATORY: improving  cough, wheezing,  No shortness of breath  CARDIOVASCULAR: No chest pain or palpitations  GASTROINTESTINAL: No abdominal pain  . No nausea, vomiting, or hematemesis; No diarrhea or constipation. No melena or hematochezia.  GENITOURINARY: No dysuria, frequency or hematuria  NEUROLOGICAL: No numbness or weakness                                                                                                                                                                                                                                                                                 Medications:  MEDICATIONS  (STANDING):  cyanocobalamin Injectable 1000 MICROGram(s) SubCutaneous daily  dextrose 5%. 1000 milliLiter(s) (50 mL/Hr) IV Continuous <Continuous>  dextrose 50% Injectable 12.5 Gram(s) IV Push once  dextrose 50% Injectable 25 Gram(s) IV Push once  dextrose 50% Injectable 25 Gram(s) IV Push once  folic acid 1 milliGRAM(s) Oral daily  heparin  Injectable 5000 Unit(s) SubCutaneous every 8 hours  insulin glargine Injectable (LANTUS) 6 Unit(s) SubCutaneous at bedtime  insulin lispro (HumaLOG) corrective regimen sliding scale   SubCutaneous three times a day before meals  insulin lispro (HumaLOG) corrective regimen sliding scale   SubCutaneous at bedtime  insulin lispro Injectable (HumaLOG) 5 Unit(s) SubCutaneous three times a day before meals  sodium chloride 0.9%. 1000 milliLiter(s) (75 mL/Hr) IV Continuous <Continuous>  sodium chloride 0.9%. 1000 milliLiter(s) (75 mL/Hr) IV Continuous <Continuous>    MEDICATIONS  (PRN):  dextrose Gel 1 Dose(s) Oral once PRN Blood Glucose LESS THAN 70 milliGRAM(s)/deciliter  glucagon  Injectable 1 milliGRAM(s) IntraMuscular once PRN Glucose LESS THAN 70 milligrams/deciliter  guaiFENesin   Syrup  (Sugar-Free) 100 milliGRAM(s) Oral every 6 hours PRN Cough       Allergies    No Known Allergies    Intolerances      Vital Signs Last 24 Hrs  T(C): 37.4 (24 Oct 2017 13:09), Max: 37.4 (24 Oct 2017 13:09)  T(F): 99.3 (24 Oct 2017 13:09), Max: 99.3 (24 Oct 2017 13:09)  HR: 82 (24 Oct 2017 13:09) (75 - 82)  BP: 147/80 (24 Oct 2017 13:09) (147/78 - 148/82)  BP(mean): --  RR: 19 (24 Oct 2017 13:09) (18 - 19)  SpO2: 97% (24 Oct 2017 13:09) (97% - 98%)  CAPILLARY BLOOD GLUCOSE      POCT Blood Glucose.: 250 mg/dL (24 Oct 2017 12:00)  POCT Blood Glucose.: 147 mg/dL (24 Oct 2017 08:25)  POCT Blood Glucose.: 180 mg/dL (23 Oct 2017 22:11)  POCT Blood Glucose.: 126 mg/dL (23 Oct 2017 16:32)      Physical Exam:    General:  eldelry in NAD   HEENT:  Nonicteric, PERRLA  CV:  RRR, S1S2   Lungs:  mild crackles at bases  Abdomen:  Soft, non-tender, no distended, positive BS  Extremities:  2+ pulses, no c/c, no edema  Skin:  Warm and dry, no rashes  :  No mason  Neuro:  AAOx3, non-focal,                                                                                                                                                                                                                                                                                              LABS:                               9.9    7.57  )-----------( 161      ( 24 Oct 2017 06:20 )             29.1                      10-24    142  |  107  |  14  ----------------------------<  147<H>  3.8   |  22  |  0.80    Ca    8.2<L>      24 Oct 2017 06:20    TPro  6.1  /  Alb  3.1<L>  /  TBili  0.5  /  DBili  x   /  AST  17  /  ALT  12  /  AlkPhos  44  10-24                       RADIOLOGY & ADDITIONAL TESTS         I personally reviewed: [  ]EKG   [  ]CXR    [  ] CT

## 2017-10-24 NOTE — PROGRESS NOTE ADULT - ASSESSMENT
86 y/o fmeale with hx of RA on mtx ,  and iv infusion  ( monthly simponi ) as o/p. , HTN recent admission to [facility] where she was admitted with weakness and thought to have been adreanally insufcient ( chronically was on steroid ) , treated with hydrocortisone and discharged with hydrocortisone taper and switched back to prednisone as o/p..pt had been taken off prednsione per her rhuem as o/p. 3 weeks ago ? .  Pt now presenting with weakness and two falls .  reports that her weakness has been ongoing for over a year however seems to have worsened over the past one to two weeks..   no fever or chills at home   + cough   + diarreah that resolved   + orhtostatic in ER   1- weakness /falls : unclear etiology but weakness is generalized . CT neg .. neuro consult appreciated and noted .. MRI brain and C spine  : No acute pathology  ..micovascular disease     B12 edficiency : cont  supplement.. homocystien NL  and f/u  MMA    TSH  WNL   ? element of steroid myopathy however pt has been off steroids with no improvement but worsening ...   weakness likely worsened with dehydratoin sec to diarreah and with resp viral illness    s/p gentle hydration given   orthostatics..now improved  ...    ? underlying neuropathy ..  will provide compression stockings   2- Wrist pain :s/p fall   Xray no Fx    ? psuedogout   will consider prednsione   3- RA: called her rheum office .. she is out on  vacation    appreciate Dr. Goldberg for consult .. cont MTX . folate   has been off steroids however will consider adding prednisone  if no significant improvement          hx of adrenal insufficiency however doubt adrenal insuffiencey at this time  . cortisol in AM WNL . ESR elevation likley sec to underlying RA ...  CK : NL    will hold off on further steroids for now and need to f/u as o/p   4-HTN  : monitor for now   5_ DM: hold po meds  A1c :6.y6  monitor FS     RISS     d/w  and pt     they are agreeable to rehab   d/w NP and CM 86 y/o fmeale with hx of RA on mtx ,  and iv infusion  ( monthly simponi ) as o/p. , HTN recent admission to [facility] where she was admitted with weakness and thought to have been adreanally insufcient ( chronically was on steroid ) , treated with hydrocortisone and discharged with hydrocortisone taper and switched back to prednisone as o/p..pt had been taken off prednsione per her rhuem as o/p. 3 weeks ago ? .  Pt now presenting with weakness and two falls .  reports that her weakness has been ongoing for over a year however seems to have worsened over the past one to two weeks..   no fever or chills at home   + cough   + diarreah that resolved   + orhtostatic in ER   1- weakness /falls : unclear etiology but weakness is generalized . CT neg .. neuro consult appreciated and noted .. MRI brain and C spine  : No acute pathology  ..micovascular disease     B12 edficiency : cont  supplement.. homocystien NL  and f/u  MMA    TSH  WNL   ? element of steroid myopathy however pt has been off steroids with no improvement but worsening ...   weakness likely worsened with dehydratoin sec to diarreah and with resp viral illness    s/p gentle hydration given   orthostatics..now improved  ...    ? underlying neuropathy ..  will provide compression stockings   2- Wrist pain :s/p fall   Xray no Fx    ? psuedogout   will consider prednsione   3- RA: called her rheum office .. she is out on  vacation    appreciate Dr. Goldberg for consult .. cont MTX . folate   has been off steroids however will consider adding prednisone  if no significant improvement          hx of adrenal insufficiency however doubt adrenal insuffiencey at this time  . cortisol in AM WNL . ESR elevation likley sec to underlying RA ...  CK : NL    will hold off on further steroids for now and need to f/u as o/p   4-HTN  : monitor for now   5_ DM: hold po meds  A1c :6.y6  monitor FS     RISS   6- crackles .. likely sec to atelecatsis and mild congestion given initail need for IVF however pt is with good sat on RA .. will hold off on lasix and give IS     d/w  and pt     they are agreeable to rehab   d/w NP and CM

## 2017-10-24 NOTE — PROGRESS NOTE ADULT - PROBLEM SELECTOR PROBLEM 2
HTN (hypertension), benign

## 2017-10-24 NOTE — PROGRESS NOTE ADULT - PROVIDER SPECIALTY LIST ADULT
Endocrinology
Internal Medicine
Neurology
Neurology
Cardiology
Endocrinology
Internal Medicine

## 2017-10-24 NOTE — PROGRESS NOTE ADULT - PROBLEM SELECTOR PLAN 2
On meds primary team following up

## 2017-10-24 NOTE — PROGRESS NOTE ADULT - PROBLEM SELECTOR PLAN 1
Will continue current insulin regimen for now. Will continue monitoring FS, log, will Follow up.
Will continue current insulin regimen for now. Will continue monitoring FS, log, will Follow up. May DC to rehab on current insulin regimen.

## 2017-10-24 NOTE — PROGRESS NOTE ADULT - ASSESSMENT
Moderate Aortic Stenosis  stable  would hold of to IVF to prevent acute decompensation    HTN  stable  cont to monitor

## 2017-10-24 NOTE — PROGRESS NOTE ADULT - PROBLEM SELECTOR PROBLEM 1
Type 2 diabetes mellitus without complication, without long-term current use of insulin

## 2017-10-24 NOTE — PROGRESS NOTE ADULT - PROBLEM SELECTOR PLAN 3
Unlikely adrenal insufficiency, primary team FU

## 2017-10-26 LAB — METHYLMALONATE SERPL-SCNC: 176 NMOL/L — SIGNIFICANT CHANGE UP (ref 0–378)

## 2017-11-14 ENCOUNTER — EMERGENCY (EMERGENCY)
Facility: HOSPITAL | Age: 82
LOS: 1 days | Discharge: HOME | End: 2017-11-14
Attending: EMERGENCY MEDICINE | Admitting: EMERGENCY MEDICINE
Payer: MEDICARE

## 2017-11-14 VITALS
DIASTOLIC BLOOD PRESSURE: 91 MMHG | TEMPERATURE: 98 F | HEART RATE: 89 BPM | OXYGEN SATURATION: 94 % | RESPIRATION RATE: 18 BRPM | SYSTOLIC BLOOD PRESSURE: 157 MMHG

## 2017-11-14 VITALS
DIASTOLIC BLOOD PRESSURE: 96 MMHG | OXYGEN SATURATION: 100 % | RESPIRATION RATE: 16 BRPM | HEART RATE: 100 BPM | SYSTOLIC BLOOD PRESSURE: 210 MMHG

## 2017-11-14 DIAGNOSIS — H26.40 UNSPECIFIED SECONDARY CATARACT: Chronic | ICD-10-CM

## 2017-11-14 DIAGNOSIS — N97.1 FEMALE INFERTILITY OF TUBAL ORIGIN: Chronic | ICD-10-CM

## 2017-11-14 LAB
ALBUMIN SERPL ELPH-MCNC: 4 G/DL — SIGNIFICANT CHANGE UP (ref 3.3–5)
ALP SERPL-CCNC: 72 U/L — SIGNIFICANT CHANGE UP (ref 40–120)
ALT FLD-CCNC: 11 U/L RC — SIGNIFICANT CHANGE UP (ref 10–45)
ANION GAP SERPL CALC-SCNC: 15 MMOL/L — SIGNIFICANT CHANGE UP (ref 5–17)
APPEARANCE UR: CLEAR — SIGNIFICANT CHANGE UP
AST SERPL-CCNC: 15 U/L — SIGNIFICANT CHANGE UP (ref 10–40)
BASOPHILS # BLD AUTO: 0.1 K/UL — SIGNIFICANT CHANGE UP (ref 0–0.2)
BASOPHILS NFR BLD AUTO: 0.4 % — SIGNIFICANT CHANGE UP (ref 0–2)
BILIRUB SERPL-MCNC: 0.8 MG/DL — SIGNIFICANT CHANGE UP (ref 0.2–1.2)
BILIRUB UR-MCNC: NEGATIVE — SIGNIFICANT CHANGE UP
BUN SERPL-MCNC: 19 MG/DL — SIGNIFICANT CHANGE UP (ref 7–23)
CALCIUM SERPL-MCNC: 9.6 MG/DL — SIGNIFICANT CHANGE UP (ref 8.4–10.5)
CHLORIDE SERPL-SCNC: 99 MMOL/L — SIGNIFICANT CHANGE UP (ref 96–108)
CO2 SERPL-SCNC: 23 MMOL/L — SIGNIFICANT CHANGE UP (ref 22–31)
COLOR SPEC: COLORLESS — SIGNIFICANT CHANGE UP
CREAT SERPL-MCNC: 0.92 MG/DL — SIGNIFICANT CHANGE UP (ref 0.5–1.3)
DIFF PNL FLD: NEGATIVE — SIGNIFICANT CHANGE UP
EOSINOPHIL # BLD AUTO: 0.2 K/UL — SIGNIFICANT CHANGE UP (ref 0–0.5)
EOSINOPHIL NFR BLD AUTO: 1.3 % — SIGNIFICANT CHANGE UP (ref 0–6)
GAS PNL BLDV: SIGNIFICANT CHANGE UP
GLUCOSE SERPL-MCNC: 261 MG/DL — HIGH (ref 70–99)
GLUCOSE UR QL: NEGATIVE — SIGNIFICANT CHANGE UP
HCT VFR BLD CALC: 36 % — SIGNIFICANT CHANGE UP (ref 34.5–45)
HGB BLD-MCNC: 12.1 G/DL — SIGNIFICANT CHANGE UP (ref 11.5–15.5)
KETONES UR-MCNC: NEGATIVE — SIGNIFICANT CHANGE UP
LEUKOCYTE ESTERASE UR-ACNC: NEGATIVE — SIGNIFICANT CHANGE UP
LYMPHOCYTES # BLD AUTO: 13.5 % — SIGNIFICANT CHANGE UP (ref 13–44)
LYMPHOCYTES # BLD AUTO: 2.1 K/UL — SIGNIFICANT CHANGE UP (ref 1–3.3)
MCHC RBC-ENTMCNC: 32.4 PG — SIGNIFICANT CHANGE UP (ref 27–34)
MCHC RBC-ENTMCNC: 33.7 GM/DL — SIGNIFICANT CHANGE UP (ref 32–36)
MCV RBC AUTO: 96.1 FL — SIGNIFICANT CHANGE UP (ref 80–100)
MONOCYTES # BLD AUTO: 1.1 K/UL — HIGH (ref 0–0.9)
MONOCYTES NFR BLD AUTO: 7.1 % — SIGNIFICANT CHANGE UP (ref 2–14)
NEUTROPHILS # BLD AUTO: 11.9 K/UL — HIGH (ref 1.8–7.4)
NEUTROPHILS NFR BLD AUTO: 77.7 % — HIGH (ref 43–77)
NITRITE UR-MCNC: NEGATIVE — SIGNIFICANT CHANGE UP
PH UR: 6 — SIGNIFICANT CHANGE UP (ref 5–8)
PLATELET # BLD AUTO: 277 K/UL — SIGNIFICANT CHANGE UP (ref 150–400)
POTASSIUM SERPL-MCNC: 3.8 MMOL/L — SIGNIFICANT CHANGE UP (ref 3.5–5.3)
POTASSIUM SERPL-SCNC: 3.8 MMOL/L — SIGNIFICANT CHANGE UP (ref 3.5–5.3)
PROT SERPL-MCNC: 8.4 G/DL — HIGH (ref 6–8.3)
PROT UR-MCNC: SIGNIFICANT CHANGE UP
RBC # BLD: 3.74 M/UL — LOW (ref 3.8–5.2)
RBC # FLD: 13 % — SIGNIFICANT CHANGE UP (ref 10.3–14.5)
SODIUM SERPL-SCNC: 137 MMOL/L — SIGNIFICANT CHANGE UP (ref 135–145)
SP GR SPEC: 1 — LOW (ref 1.01–1.02)
UROBILINOGEN FLD QL: NEGATIVE — SIGNIFICANT CHANGE UP
WBC # BLD: 15.3 K/UL — HIGH (ref 3.8–10.5)
WBC # FLD AUTO: 15.3 K/UL — HIGH (ref 3.8–10.5)

## 2017-11-14 PROCEDURE — 71010: CPT | Mod: 26

## 2017-11-14 PROCEDURE — 87086 URINE CULTURE/COLONY COUNT: CPT

## 2017-11-14 PROCEDURE — 84443 ASSAY THYROID STIM HORMONE: CPT

## 2017-11-14 PROCEDURE — 85014 HEMATOCRIT: CPT

## 2017-11-14 PROCEDURE — 82962 GLUCOSE BLOOD TEST: CPT

## 2017-11-14 PROCEDURE — 84132 ASSAY OF SERUM POTASSIUM: CPT

## 2017-11-14 PROCEDURE — 93010 ELECTROCARDIOGRAM REPORT: CPT

## 2017-11-14 PROCEDURE — 99283 EMERGENCY DEPT VISIT LOW MDM: CPT | Mod: 25

## 2017-11-14 PROCEDURE — 82947 ASSAY GLUCOSE BLOOD QUANT: CPT

## 2017-11-14 PROCEDURE — 93005 ELECTROCARDIOGRAM TRACING: CPT

## 2017-11-14 PROCEDURE — 82435 ASSAY OF BLOOD CHLORIDE: CPT

## 2017-11-14 PROCEDURE — 85027 COMPLETE CBC AUTOMATED: CPT

## 2017-11-14 PROCEDURE — 80053 COMPREHEN METABOLIC PANEL: CPT

## 2017-11-14 PROCEDURE — 81003 URINALYSIS AUTO W/O SCOPE: CPT

## 2017-11-14 PROCEDURE — 83605 ASSAY OF LACTIC ACID: CPT

## 2017-11-14 PROCEDURE — 82330 ASSAY OF CALCIUM: CPT

## 2017-11-14 PROCEDURE — 99284 EMERGENCY DEPT VISIT MOD MDM: CPT | Mod: 25,GC

## 2017-11-14 PROCEDURE — 82803 BLOOD GASES ANY COMBINATION: CPT

## 2017-11-14 PROCEDURE — 84295 ASSAY OF SERUM SODIUM: CPT

## 2017-11-14 PROCEDURE — 71045 X-RAY EXAM CHEST 1 VIEW: CPT

## 2017-11-14 RX ORDER — SODIUM CHLORIDE 9 MG/ML
1000 INJECTION INTRAMUSCULAR; INTRAVENOUS; SUBCUTANEOUS ONCE
Qty: 0 | Refills: 0 | Status: COMPLETED | OUTPATIENT
Start: 2017-11-14 | End: 2017-11-14

## 2017-11-14 RX ADMIN — SODIUM CHLORIDE 1000 MILLILITER(S): 9 INJECTION INTRAMUSCULAR; INTRAVENOUS; SUBCUTANEOUS at 20:13

## 2017-11-14 NOTE — ED ADULT NURSE NOTE - [facility] ED NURSE DC INFO COMPLEXITY
Patient asked questions/Straightforward: Basic instructions, no meds, no home treatment/Verbalized Understanding

## 2017-11-14 NOTE — ED PROVIDER NOTE - MEDICAL DECISION MAKING DETAILS
86F PMH RA with pulmonary fibrosis,  HTN, T2DM p/w generalized weakness ddx includes UTI, PNA. Check CBC CMP VBG CXR UA reassess.

## 2017-11-14 NOTE — ED PROVIDER NOTE - PROGRESS NOTE DETAILS
Farhat PGY-2: Pt requesting medication for "nerves" and that she just doesn't "feel right" I explained the risks of prescribing medications like benzodiazepines in patients with recent falls and the patient and her  agreed to defer for now.  reports that the patient will be able to see her PMD tomorrow morning. Farhat PGY-2: I spoke with Dr. Davis (covering for pts PMD Dr. Tobias) about possible outpatient follow up tomorrow if UA is unremarkable and pt remains HD stable. Dr. Davis is going to contact Dr. Tobias now. Nilay Evans MD:  cxr clear without pneumonia, patient labs and ua within normal limits,  follow up with Dr. Davis/Jatinder tomorrow.  No immediate life threatening issues present on history or clinical exam. Patient is a safe disposition home, family has capacity and insight into their condition, no futher questions in the emergency department and will follow up with their doctor(s) this week. The family understands anticipatory guidance and was given strict return and follow up precautions.  The family has been informed of all concerning signs and symptoms to return to Emergency Department, the necessity to follow up with PMD/Clinic/follow up provided within 2 days was explained, and the family reports understanding of above with capacity and insight. Will  follow up on TSH labs

## 2017-11-14 NOTE — ED PROVIDER NOTE - OBJECTIVE STATEMENT
86F PMH HTN, RA 86F PMH HTN, RA, recent admission to [facility] for generalized weakness presenting s/p d/c from rehab with generalized weakness. Pt reports she just does not feel normal, has no will to walk around, and has chills. Denies abdominal pain, nausea, vomiting, diarrhea. Endorses chronic cough which has been occurring for months. Pt also c/o L wrist pain which has been ongoing for one month. No known sick contacts. No recent abx use

## 2017-11-14 NOTE — ED ADULT NURSE NOTE - OBJECTIVE STATEMENT
pt states " I just don't fell well. Pain to left hand (chronic since fall a few weeks ago). As per  she was sent home from rehab today and was more tired and good not get up and around as usual. No fevers/cold/n/v/d"   Pt A/O x 2 ( disoriented to date)

## 2017-11-14 NOTE — ED PROVIDER NOTE - ATTENDING CONTRIBUTION TO CARE
Patient with chief complaint of anxiety and warmth overnight after coming home from rehab after 3 weeks. patient has been in her usual state of health without other complaints other than persistent dry cough. no vomiting, mild nausea over 1 day. Patient has no stomach pain, no change in bowel habits.  mildly anxious, NAD, NCAT, MMM, Trachea midline, Normal conjunctiva, CTAB, Non-tachycardic, Normal perfusion, Soft, NTND, No rebound/guarding, No edema, No deformity of extremities, Appropriate, Cooperative, No rashes, CN grossly intact, Normal coordination, No focal motor or sensory deficits   will get iv, labs, fluids for mildly dry mm, urinalaysis and reassess  Will follow up on labs, analgesia, reassess and disposition as clinically indicated.

## 2017-11-14 NOTE — ED ADULT NURSE NOTE - CHPI ED SYMPTOMS NEG
no numbness/no nausea/no decreased eating/drinking/no fever/no chills/no vomiting/no tingling/no dizziness

## 2017-11-14 NOTE — ED PROVIDER NOTE - CARE PLAN
Instructions for follow-up, activity and diet:	Follow up with Dr. Jay tomorrow morning.  Return to the ED if you have any fever, chills, chest pain, abdominal pain, or any other concerning symptom.  Take tylenol 650mg every 6-8 hours as needed for pain. Instructions for follow-up, activity and diet:	Follow up with Dr. Jay tomorrow morning.  Return to the ED if you have any fever, chills, chest pain, abdominal pain, or any other concerning symptom.  Follow up with your medical doctor in 2-3 days or call our clinic at [phone] and state you were seen in the Emergency Department and would like to be seen in clinic.     Take Tylenol 1 g every six hours and supplement (if allowed by your physician) with ibuprofen 600 mg, with food, every six hours which can be taken three hours apart from the Tylenol to have a layered effect.    Drink at least 2 Liters or 64 Ounces of water each day (UNLESS you are supposed to restrict fluids or have a history of congestive heart failure (CHF)).    Return for any persistent, worsening symptoms, or ANY concerns at all. Principal Discharge DX:	Anxiety about health  Instructions for follow-up, activity and diet:	Follow up with Dr. Jay tomorrow morning.  Return to the ED if you have any fever, chills, chest pain, abdominal pain, or any other concerning symptom.  Follow up with your medical doctor in 2-3 days or call our clinic at [phone] and state you were seen in the Emergency Department and would like to be seen in clinic.     Take Tylenol 1 g every six hours and supplement (if allowed by your physician) with ibuprofen 600 mg, with food, every six hours which can be taken three hours apart from the Tylenol to have a layered effect.    Drink at least 2 Liters or 64 Ounces of water each day (UNLESS you are supposed to restrict fluids or have a history of congestive heart failure (CHF)).    Return for any persistent, worsening symptoms, or ANY concerns at all.

## 2017-11-14 NOTE — ED ADULT NURSE REASSESSMENT NOTE - NS ED NURSE REASSESS COMMENT FT1
pt and spouse made aware to be discharged home as per Dr Centeno- spouse calling nephew to assist taking pt and spouse home

## 2017-11-15 LAB
CULTURE RESULTS: NO GROWTH — SIGNIFICANT CHANGE UP
SPECIMEN SOURCE: SIGNIFICANT CHANGE UP
TSH SERPL-MCNC: 2.54 UIU/ML — SIGNIFICANT CHANGE UP (ref 0.27–4.2)

## 2018-02-12 ENCOUNTER — INPATIENT (INPATIENT)
Facility: HOSPITAL | Age: 83
LOS: 9 days | Discharge: HOME | DRG: 202 | End: 2018-02-22
Attending: GENERAL ACUTE CARE HOSPITAL | Admitting: GENERAL ACUTE CARE HOSPITAL
Payer: MEDICARE

## 2018-02-12 VITALS
WEIGHT: 160.06 LBS | SYSTOLIC BLOOD PRESSURE: 142 MMHG | HEART RATE: 94 BPM | DIASTOLIC BLOOD PRESSURE: 90 MMHG | RESPIRATION RATE: 18 BRPM

## 2018-02-12 DIAGNOSIS — H26.40 UNSPECIFIED SECONDARY CATARACT: Chronic | ICD-10-CM

## 2018-02-12 DIAGNOSIS — R53.1 WEAKNESS: ICD-10-CM

## 2018-02-12 DIAGNOSIS — N97.1 FEMALE INFERTILITY OF TUBAL ORIGIN: Chronic | ICD-10-CM

## 2018-02-12 LAB
ALBUMIN SERPL ELPH-MCNC: 3.1 G/DL — LOW (ref 3.3–5)
ALP SERPL-CCNC: 70 U/L — SIGNIFICANT CHANGE UP (ref 40–120)
ALT FLD-CCNC: 11 U/L RC — SIGNIFICANT CHANGE UP (ref 10–45)
ANION GAP SERPL CALC-SCNC: 14 MMOL/L — SIGNIFICANT CHANGE UP (ref 5–17)
APPEARANCE UR: CLEAR — SIGNIFICANT CHANGE UP
APTT BLD: 30.7 SEC — SIGNIFICANT CHANGE UP (ref 27.5–37.4)
AST SERPL-CCNC: 17 U/L — SIGNIFICANT CHANGE UP (ref 10–40)
BACTERIA # UR AUTO: ABNORMAL /HPF
BASE EXCESS BLDV CALC-SCNC: 0.4 MMOL/L — SIGNIFICANT CHANGE UP (ref -2–2)
BASOPHILS # BLD AUTO: 0 K/UL — SIGNIFICANT CHANGE UP (ref 0–0.2)
BASOPHILS NFR BLD AUTO: 0.1 % — SIGNIFICANT CHANGE UP (ref 0–2)
BILIRUB SERPL-MCNC: 0.6 MG/DL — SIGNIFICANT CHANGE UP (ref 0.2–1.2)
BILIRUB UR-MCNC: NEGATIVE — SIGNIFICANT CHANGE UP
BUN SERPL-MCNC: 22 MG/DL — SIGNIFICANT CHANGE UP (ref 7–23)
CA-I SERPL-SCNC: 1.16 MMOL/L — SIGNIFICANT CHANGE UP (ref 1.12–1.3)
CALCIUM SERPL-MCNC: 8.3 MG/DL — LOW (ref 8.4–10.5)
CHLORIDE BLDV-SCNC: 103 MMOL/L — SIGNIFICANT CHANGE UP (ref 96–108)
CHLORIDE SERPL-SCNC: 103 MMOL/L — SIGNIFICANT CHANGE UP (ref 96–108)
CK MB CFR SERPL CALC: 1.8 NG/ML — SIGNIFICANT CHANGE UP (ref 0–3.8)
CO2 BLDV-SCNC: 25 MMOL/L — SIGNIFICANT CHANGE UP (ref 22–30)
CO2 SERPL-SCNC: 20 MMOL/L — LOW (ref 22–31)
COLOR SPEC: YELLOW — SIGNIFICANT CHANGE UP
CREAT SERPL-MCNC: 0.92 MG/DL — SIGNIFICANT CHANGE UP (ref 0.5–1.3)
DIFF PNL FLD: NEGATIVE — SIGNIFICANT CHANGE UP
EOSINOPHIL # BLD AUTO: 0.1 K/UL — SIGNIFICANT CHANGE UP (ref 0–0.5)
EOSINOPHIL NFR BLD AUTO: 1.5 % — SIGNIFICANT CHANGE UP (ref 0–6)
EPI CELLS # UR: SIGNIFICANT CHANGE UP /HPF
GAS PNL BLDV: 133 MMOL/L — LOW (ref 136–145)
GAS PNL BLDV: SIGNIFICANT CHANGE UP
GAS PNL BLDV: SIGNIFICANT CHANGE UP
GLUCOSE BLDC GLUCOMTR-MCNC: 97 MG/DL — SIGNIFICANT CHANGE UP (ref 70–99)
GLUCOSE BLDV-MCNC: 122 MG/DL — HIGH (ref 70–99)
GLUCOSE SERPL-MCNC: 113 MG/DL — HIGH (ref 70–99)
GLUCOSE UR QL: NEGATIVE — SIGNIFICANT CHANGE UP
HCO3 BLDV-SCNC: 24 MMOL/L — SIGNIFICANT CHANGE UP (ref 21–29)
HCT VFR BLD CALC: 34.8 % — SIGNIFICANT CHANGE UP (ref 34.5–45)
HCT VFR BLDA CALC: 36 % — LOW (ref 39–50)
HGB BLD CALC-MCNC: 11.6 G/DL — SIGNIFICANT CHANGE UP (ref 11.5–15.5)
HGB BLD-MCNC: 11.8 G/DL — SIGNIFICANT CHANGE UP (ref 11.5–15.5)
HMPV RNA SPEC QL NAA+PROBE: DETECTED
HOROWITZ INDEX BLDV+IHG-RTO: SIGNIFICANT CHANGE UP
HYALINE CASTS # UR AUTO: ABNORMAL
INR BLD: 1.13 RATIO — SIGNIFICANT CHANGE UP (ref 0.88–1.16)
KETONES UR-MCNC: NEGATIVE — SIGNIFICANT CHANGE UP
LACTATE BLDV-MCNC: 1.5 MMOL/L — SIGNIFICANT CHANGE UP (ref 0.7–2)
LEUKOCYTE ESTERASE UR-ACNC: NEGATIVE — SIGNIFICANT CHANGE UP
LYMPHOCYTES # BLD AUTO: 1.4 K/UL — SIGNIFICANT CHANGE UP (ref 1–3.3)
LYMPHOCYTES # BLD AUTO: 15.4 % — SIGNIFICANT CHANGE UP (ref 13–44)
MCHC RBC-ENTMCNC: 31.1 PG — SIGNIFICANT CHANGE UP (ref 27–34)
MCHC RBC-ENTMCNC: 33.9 GM/DL — SIGNIFICANT CHANGE UP (ref 32–36)
MCV RBC AUTO: 91.7 FL — SIGNIFICANT CHANGE UP (ref 80–100)
MONOCYTES # BLD AUTO: 0.7 K/UL — SIGNIFICANT CHANGE UP (ref 0–0.9)
MONOCYTES NFR BLD AUTO: 8.3 % — SIGNIFICANT CHANGE UP (ref 2–14)
NEUTROPHILS # BLD AUTO: 6.7 K/UL — SIGNIFICANT CHANGE UP (ref 1.8–7.4)
NEUTROPHILS NFR BLD AUTO: 74.7 % — SIGNIFICANT CHANGE UP (ref 43–77)
NITRITE UR-MCNC: NEGATIVE — SIGNIFICANT CHANGE UP
PCO2 BLDV: 38 MMHG — SIGNIFICANT CHANGE UP (ref 35–50)
PH BLDV: 7.42 — SIGNIFICANT CHANGE UP (ref 7.35–7.45)
PH UR: 6 — SIGNIFICANT CHANGE UP (ref 5–8)
PLATELET # BLD AUTO: 185 K/UL — SIGNIFICANT CHANGE UP (ref 150–400)
PO2 BLDV: 64 MMHG — HIGH (ref 25–45)
POTASSIUM BLDV-SCNC: 3.7 MMOL/L — SIGNIFICANT CHANGE UP (ref 3.5–5)
POTASSIUM SERPL-MCNC: 3.7 MMOL/L — SIGNIFICANT CHANGE UP (ref 3.5–5.3)
POTASSIUM SERPL-SCNC: 3.7 MMOL/L — SIGNIFICANT CHANGE UP (ref 3.5–5.3)
PROT SERPL-MCNC: 7.4 G/DL — SIGNIFICANT CHANGE UP (ref 6–8.3)
PROT UR-MCNC: 30 MG/DL
PROTHROM AB SERPL-ACNC: 12.2 SEC — SIGNIFICANT CHANGE UP (ref 9.8–12.7)
RAPID RVP RESULT: DETECTED
RBC # BLD: 3.79 M/UL — LOW (ref 3.8–5.2)
RBC # FLD: 16 % — HIGH (ref 10.3–14.5)
RBC CASTS # UR COMP ASSIST: SIGNIFICANT CHANGE UP /HPF (ref 0–2)
SAO2 % BLDV: 92 % — HIGH (ref 67–88)
SODIUM SERPL-SCNC: 137 MMOL/L — SIGNIFICANT CHANGE UP (ref 135–145)
SP GR SPEC: 1.02 — SIGNIFICANT CHANGE UP (ref 1.01–1.02)
TROPONIN T SERPL-MCNC: <0.01 NG/ML — SIGNIFICANT CHANGE UP (ref 0–0.06)
UROBILINOGEN FLD QL: NEGATIVE — SIGNIFICANT CHANGE UP
WBC # BLD: 8.9 K/UL — SIGNIFICANT CHANGE UP (ref 3.8–10.5)
WBC # FLD AUTO: 8.9 K/UL — SIGNIFICANT CHANGE UP (ref 3.8–10.5)
WBC UR QL: SIGNIFICANT CHANGE UP /HPF (ref 0–5)

## 2018-02-12 PROCEDURE — 93010 ELECTROCARDIOGRAM REPORT: CPT

## 2018-02-12 PROCEDURE — 71045 X-RAY EXAM CHEST 1 VIEW: CPT | Mod: 26

## 2018-02-12 PROCEDURE — 99285 EMERGENCY DEPT VISIT HI MDM: CPT | Mod: 25

## 2018-02-12 PROCEDURE — 71250 CT THORAX DX C-: CPT | Mod: 26

## 2018-02-12 RX ORDER — DEXTROSE 50 % IN WATER 50 %
12.5 SYRINGE (ML) INTRAVENOUS ONCE
Qty: 0 | Refills: 0 | Status: DISCONTINUED | OUTPATIENT
Start: 2018-02-12 | End: 2018-02-22

## 2018-02-12 RX ORDER — GLUCAGON INJECTION, SOLUTION 0.5 MG/.1ML
1 INJECTION, SOLUTION SUBCUTANEOUS ONCE
Qty: 0 | Refills: 0 | Status: DISCONTINUED | OUTPATIENT
Start: 2018-02-12 | End: 2018-02-22

## 2018-02-12 RX ORDER — INSULIN LISPRO 100/ML
VIAL (ML) SUBCUTANEOUS
Qty: 0 | Refills: 0 | Status: DISCONTINUED | OUTPATIENT
Start: 2018-02-12 | End: 2018-02-22

## 2018-02-12 RX ORDER — GUAIFENESIN/DEXTROMETHORPHAN 600MG-30MG
15 TABLET, EXTENDED RELEASE 12 HR ORAL
Qty: 0 | Refills: 0 | COMMUNITY

## 2018-02-12 RX ORDER — INSULIN GLARGINE 100 [IU]/ML
3 INJECTION, SOLUTION SUBCUTANEOUS AT BEDTIME
Qty: 0 | Refills: 0 | Status: DISCONTINUED | OUTPATIENT
Start: 2018-02-12 | End: 2018-02-13

## 2018-02-12 RX ORDER — IPRATROPIUM/ALBUTEROL SULFATE 18-103MCG
3 AEROSOL WITH ADAPTER (GRAM) INHALATION EVERY 6 HOURS
Qty: 0 | Refills: 0 | Status: DISCONTINUED | OUTPATIENT
Start: 2018-02-12 | End: 2018-02-13

## 2018-02-12 RX ORDER — ONDANSETRON 8 MG/1
4 TABLET, FILM COATED ORAL EVERY 6 HOURS
Qty: 0 | Refills: 0 | Status: DISCONTINUED | OUTPATIENT
Start: 2018-02-12 | End: 2018-02-22

## 2018-02-12 RX ORDER — SODIUM CHLORIDE 9 MG/ML
1000 INJECTION INTRAMUSCULAR; INTRAVENOUS; SUBCUTANEOUS
Qty: 0 | Refills: 0 | Status: DISCONTINUED | OUTPATIENT
Start: 2018-02-12 | End: 2018-02-15

## 2018-02-12 RX ORDER — DEXTROSE 50 % IN WATER 50 %
25 SYRINGE (ML) INTRAVENOUS ONCE
Qty: 0 | Refills: 0 | Status: DISCONTINUED | OUTPATIENT
Start: 2018-02-12 | End: 2018-02-22

## 2018-02-12 RX ORDER — LOSARTAN POTASSIUM 100 MG/1
50 TABLET, FILM COATED ORAL DAILY
Qty: 0 | Refills: 0 | Status: DISCONTINUED | OUTPATIENT
Start: 2018-02-12 | End: 2018-02-20

## 2018-02-12 RX ORDER — SODIUM CHLORIDE 9 MG/ML
500 INJECTION INTRAMUSCULAR; INTRAVENOUS; SUBCUTANEOUS ONCE
Qty: 0 | Refills: 0 | Status: COMPLETED | OUTPATIENT
Start: 2018-02-12 | End: 2018-02-12

## 2018-02-12 RX ORDER — DEXTROSE 50 % IN WATER 50 %
1 SYRINGE (ML) INTRAVENOUS ONCE
Qty: 0 | Refills: 0 | Status: DISCONTINUED | OUTPATIENT
Start: 2018-02-12 | End: 2018-02-22

## 2018-02-12 RX ORDER — FOLIC ACID 0.8 MG
2 TABLET ORAL
Qty: 0 | Refills: 0 | COMMUNITY

## 2018-02-12 RX ORDER — ALBUTEROL 90 UG/1
2.5 AEROSOL, METERED ORAL ONCE
Qty: 0 | Refills: 0 | Status: COMPLETED | OUTPATIENT
Start: 2018-02-12 | End: 2018-02-12

## 2018-02-12 RX ORDER — ACETAMINOPHEN 500 MG
1000 TABLET ORAL ONCE
Qty: 0 | Refills: 0 | Status: COMPLETED | OUTPATIENT
Start: 2018-02-12 | End: 2018-02-12

## 2018-02-12 RX ORDER — INSULIN LISPRO 100/ML
VIAL (ML) SUBCUTANEOUS AT BEDTIME
Qty: 0 | Refills: 0 | Status: DISCONTINUED | OUTPATIENT
Start: 2018-02-12 | End: 2018-02-22

## 2018-02-12 RX ORDER — SODIUM CHLORIDE 9 MG/ML
1000 INJECTION, SOLUTION INTRAVENOUS
Qty: 0 | Refills: 0 | Status: DISCONTINUED | OUTPATIENT
Start: 2018-02-12 | End: 2018-02-22

## 2018-02-12 RX ORDER — ONDANSETRON 8 MG/1
4 TABLET, FILM COATED ORAL EVERY 6 HOURS
Qty: 0 | Refills: 0 | Status: DISCONTINUED | OUTPATIENT
Start: 2018-02-12 | End: 2018-02-12

## 2018-02-12 RX ORDER — HYDROCHLOROTHIAZIDE 25 MG
25 TABLET ORAL DAILY
Qty: 0 | Refills: 0 | Status: DISCONTINUED | OUTPATIENT
Start: 2018-02-12 | End: 2018-02-22

## 2018-02-12 RX ADMIN — SODIUM CHLORIDE 80 MILLILITER(S): 9 INJECTION INTRAMUSCULAR; INTRAVENOUS; SUBCUTANEOUS at 22:57

## 2018-02-12 RX ADMIN — Medication 400 MILLIGRAM(S): at 19:53

## 2018-02-12 RX ADMIN — ALBUTEROL 2.5 MILLIGRAM(S): 90 AEROSOL, METERED ORAL at 18:55

## 2018-02-12 RX ADMIN — SODIUM CHLORIDE 500 MILLILITER(S): 9 INJECTION INTRAMUSCULAR; INTRAVENOUS; SUBCUTANEOUS at 18:06

## 2018-02-12 NOTE — CONSULT NOTE ADULT - SUBJECTIVE AND OBJECTIVE BOX
Patient is a 86y old  Female who presents with a chief complaint of diarrhea    HPI: pt known from prior hospitalization, pt with loose stool.  no recent travel, ? abx.  pt with viral symptoms      PAST MEDICAL & SURGICAL HISTORY:  RA (rheumatoid arthritis)  Asthma  DM (diabetes mellitus), type 2  HTN (hypertension), benign  After-cataract of left eye  Tubal occlusion      MEDICATIONS  (STANDING):      Allergies    No Known Allergies    Intolerances        SOCIAL HISTORY:  Denies ETOh,Smoking,     FAMILY HISTORY:  No pertinent family history in first degree relatives      REVIEW OF SYSTEMS:    CONSTITUTIONAL: No weakness, fevers or chills  EYES/ENT: No visual changes;  No vertigo or throat pain   NECK: No pain or stiffness  RESPIRATORY: No cough, wheezing, hemoptysis; No shortness of breath  CARDIOVASCULAR: No chest pain or palpitations  GASTROINTESTINAL: No abdominal or epigastric pain. No nausea, vomiting, or hematemesis; No diarrhea or constipation. No melena or hematochezia.  GENITOURINARY: No dysuria, frequency or hematuria  NEUROLOGICAL: No numbness or weakness  SKIN: No itching, burning, rashes, or lesions   All other review of systems is negative unless indicated above.    VITAL:  T(C): , Max: 36.8 (02-12-18 @ 17:09)  T(F): , Max: 98.2 (02-12-18 @ 17:09)  HR: 100 (02-12-18 @ 17:09)  BP: 174/83 (02-12-18 @ 17:09)  BP(mean): --  RR: 18 (02-12-18 @ 17:09)  SpO2: 93% (02-12-18 @ 17:09)  Wt(kg): --    I and O's:        PHYSICAL EXAM:    Constitutional: NAD  HEENT: PERRLA,   Neck: No JVD  Respiratory: CTA B/L  Cardiovascular: S1 and S2  Gastrointestinal: BS+, soft, NT/ND  Extremities: No peripheral edema  Neurological: A/O x 3, no focal deficits  Psychiatric: Normal mood, normal affect  : No Maldonado  Skin: No rashes  Access: Not applicable  Back: No CVA tenderness    LABS:                RADIOLOGY & ADDITIONAL STUDIES:

## 2018-02-12 NOTE — H&P ADULT - HISTORY OF PRESENT ILLNESS
84 y/o fmeale with hx of RA on mtx , and was on prednisone ( no longer on steroids since 09/2017  and has not recieved iv infusion  ( monthly simponi ) in three months . also history of HTN and DM .. has been admitted twice over the past 6-8 months with  weakness . initial admit pt was thought ot have an element of adrenal insufficeiny when she was on steroids .. and pt was sent out on steroids with some improvement,.. later admitted as lij for weakness thought to be multifactorial sec to viral illness, dehydration , and leemt of steroid -induced myopathy. pt was sent to rehab and overall improved but never to baseline.. over the past three days pt has been having worsening generalized weakness , cough with production of sputum ( color?), decreased po intake and decreased mobility.  no focal neuro complaints  no CP/SOB   had had few episodes of N/V /D at home   no recent abx   no abdo pain or urinary Sx

## 2018-02-12 NOTE — ED ADULT NURSE NOTE - ED STAT RN HANDOFF DETAILS
report given to floor RN and pending transport Florina given report till pt leaves for [facility]

## 2018-02-12 NOTE — ED PROVIDER NOTE - PROGRESS NOTE DETAILS
Dr Davis at bedside stating he usually takes care of this patient, always presents with weakness and something different seems to be found each tme.

## 2018-02-12 NOTE — ED PROVIDER NOTE - OBJECTIVE STATEMENT
87 y/o female Hx of DM, HTN and RA who presents to the ED for generalized weakness. the patient lives at home with her  and has children come help often. she was recently discharged for the same symptoms and was discharged just prior to thanksgiving.  Over the last 3 days patient has become completely nonambulatory 2/2 weakness and was previously able to complete ADLs with walker. patient states she has had a mild cough over the last few days, mostly nonproductive. family reports associated tactile fever. no sick contacts. ROS positive for nausea without vomiting, 2 episodes of loose stool today, nonbloddy without abdominal pain.

## 2018-02-12 NOTE — H&P ADULT - ASSESSMENT
84 y/o fmeale with hx of RA on mtx , and was on prednisone ( no longer on steroids since 09/2017  and has not recieved iv infusion  ( monthly simponi ) in three months . also history of HTN and DM .. has been admitted twice over the past 6-8 months with  weakness . initial admit pt was thought ot have an element of adrenal insufficeiny when she was on steroids .. and pt was sent out on steroids with some improvement,.. later admitted as lij for weakness thought to be multifactorial sec to viral illness, dehydration , and leemt of steroid -induced myopathy. pt was sent to rehab and overall improved but never to baseline.. over the past three days pt has been having worsening generalized weakness , cough with production of sputum ( color?), decreased po intake and decreased mobility.  no focal neuro complaints  no CP/SOB   had had few episodes of N/V /D at home   no recent abx   no abdo pain or urinary Sx       1- cough  : RVP pending .. CXR no estrellita inflitrate however will check CT and check procalcitonin   hold off on antibiotics for now ..  check Legionella     2- N/V /D : seems to be part of viral illness   Zofran  and ppi       3- DM: cont insulin and moniotr FS     4- Decreased mobility :  nonfocal neuro exam  .. had had mri brain with no acute findings   consult neuro   in past pt was thought to might have had an element of steroid induced myopathy however now off prednisone     dvt proph

## 2018-02-12 NOTE — ED PROVIDER NOTE - ATTENDING CONTRIBUTION TO CARE
attending Alize: 86yF h/o DM, HTN and RA p/w generalized weakness and dry cough x 3 days. +sick contacts at home. Nonambulatory 2/2 weakness and was previously able to complete ADLs with walker. Tactile fever. +nausea without vomiting. 2 episodes of loose stool today, nonbloody without abdominal pain. On exam, febrile 100.7, lungs clear, abdomen soft/NT, dry MM. Will obtain labs including vbg with lactate and cultures, urinalysis, rvp, cxr and admit.

## 2018-02-12 NOTE — CONSULT NOTE ADULT - ASSESSMENT
labs pending, viral pannel.  check stool cx, o and p cdiff, will follow for you. will start lomotil if diarrhea worsens

## 2018-02-13 ENCOUNTER — TRANSCRIPTION ENCOUNTER (OUTPATIENT)
Age: 83
End: 2018-02-13

## 2018-02-13 LAB
24R-OH-CALCIDIOL SERPL-MCNC: 55.4 NG/ML — SIGNIFICANT CHANGE UP (ref 30–80)
ALBUMIN SERPL ELPH-MCNC: 3 G/DL — LOW (ref 3.3–5)
ALP SERPL-CCNC: 64 U/L — SIGNIFICANT CHANGE UP (ref 40–120)
ALT FLD-CCNC: 11 U/L — SIGNIFICANT CHANGE UP (ref 10–45)
ANION GAP SERPL CALC-SCNC: 12 MMOL/L — SIGNIFICANT CHANGE UP (ref 5–17)
AST SERPL-CCNC: 26 U/L — SIGNIFICANT CHANGE UP (ref 10–40)
BASOPHILS # BLD AUTO: 0.01 K/UL — SIGNIFICANT CHANGE UP (ref 0–0.2)
BASOPHILS NFR BLD AUTO: 0.1 % — SIGNIFICANT CHANGE UP (ref 0–2)
BILIRUB SERPL-MCNC: 0.5 MG/DL — SIGNIFICANT CHANGE UP (ref 0.2–1.2)
BUN SERPL-MCNC: 20 MG/DL — SIGNIFICANT CHANGE UP (ref 7–23)
CALCIUM SERPL-MCNC: 8.6 MG/DL — SIGNIFICANT CHANGE UP (ref 8.4–10.5)
CHLORIDE SERPL-SCNC: 104 MMOL/L — SIGNIFICANT CHANGE UP (ref 96–108)
CK SERPL-CCNC: 61 U/L — SIGNIFICANT CHANGE UP (ref 25–170)
CO2 SERPL-SCNC: 22 MMOL/L — SIGNIFICANT CHANGE UP (ref 22–31)
CREAT SERPL-MCNC: 1.04 MG/DL — SIGNIFICANT CHANGE UP (ref 0.5–1.3)
EOSINOPHIL # BLD AUTO: 0.32 K/UL — SIGNIFICANT CHANGE UP (ref 0–0.5)
EOSINOPHIL NFR BLD AUTO: 4.5 % — SIGNIFICANT CHANGE UP (ref 0–6)
FOLATE SERPL-MCNC: >20 NG/ML — SIGNIFICANT CHANGE UP (ref 4.8–24.2)
GLUCOSE BLDC GLUCOMTR-MCNC: 119 MG/DL — HIGH (ref 70–99)
GLUCOSE BLDC GLUCOMTR-MCNC: 129 MG/DL — HIGH (ref 70–99)
GLUCOSE BLDC GLUCOMTR-MCNC: 199 MG/DL — HIGH (ref 70–99)
GLUCOSE BLDC GLUCOMTR-MCNC: 98 MG/DL — SIGNIFICANT CHANGE UP (ref 70–99)
GLUCOSE BLDC GLUCOMTR-MCNC: 98 MG/DL — SIGNIFICANT CHANGE UP (ref 70–99)
GLUCOSE SERPL-MCNC: 78 MG/DL — SIGNIFICANT CHANGE UP (ref 70–99)
HBA1C BLD-MCNC: 6.2 % — HIGH (ref 4–5.6)
HCT VFR BLD CALC: 31.9 % — LOW (ref 34.5–45)
HGB BLD-MCNC: 10.3 G/DL — LOW (ref 11.5–15.5)
IMM GRANULOCYTES NFR BLD AUTO: 0.1 % — SIGNIFICANT CHANGE UP (ref 0–1.5)
LYMPHOCYTES # BLD AUTO: 2.71 K/UL — SIGNIFICANT CHANGE UP (ref 1–3.3)
LYMPHOCYTES # BLD AUTO: 37.8 % — SIGNIFICANT CHANGE UP (ref 13–44)
MAGNESIUM SERPL-MCNC: 1.7 MG/DL — SIGNIFICANT CHANGE UP (ref 1.6–2.6)
MCHC RBC-ENTMCNC: 28.9 PG — SIGNIFICANT CHANGE UP (ref 27–34)
MCHC RBC-ENTMCNC: 32.3 GM/DL — SIGNIFICANT CHANGE UP (ref 32–36)
MCV RBC AUTO: 89.4 FL — SIGNIFICANT CHANGE UP (ref 80–100)
MONOCYTES # BLD AUTO: 1.06 K/UL — HIGH (ref 0–0.9)
MONOCYTES NFR BLD AUTO: 14.8 % — HIGH (ref 2–14)
NEUTROPHILS # BLD AUTO: 3.05 K/UL — SIGNIFICANT CHANGE UP (ref 1.8–7.4)
NEUTROPHILS NFR BLD AUTO: 42.7 % — LOW (ref 43–77)
PHOSPHATE SERPL-MCNC: 3.9 MG/DL — SIGNIFICANT CHANGE UP (ref 2.5–4.5)
PLATELET # BLD AUTO: 174 K/UL — SIGNIFICANT CHANGE UP (ref 150–400)
POTASSIUM SERPL-MCNC: 3.9 MMOL/L — SIGNIFICANT CHANGE UP (ref 3.5–5.3)
POTASSIUM SERPL-SCNC: 3.9 MMOL/L — SIGNIFICANT CHANGE UP (ref 3.5–5.3)
PROT SERPL-MCNC: 6.9 G/DL — SIGNIFICANT CHANGE UP (ref 6–8.3)
RBC # BLD: 3.57 M/UL — LOW (ref 3.8–5.2)
RBC # FLD: 16.4 % — HIGH (ref 10.3–14.5)
SODIUM SERPL-SCNC: 138 MMOL/L — SIGNIFICANT CHANGE UP (ref 135–145)
T4 FREE SERPL-MCNC: 1.3 NG/DL — SIGNIFICANT CHANGE UP (ref 0.9–1.8)
TSH SERPL-MCNC: 2.42 UIU/ML — SIGNIFICANT CHANGE UP (ref 0.27–4.2)
VIT B12 SERPL-MCNC: 374 PG/ML — SIGNIFICANT CHANGE UP (ref 232–1245)
WBC # BLD: 7.16 K/UL — SIGNIFICANT CHANGE UP (ref 3.8–10.5)
WBC # FLD AUTO: 7.16 K/UL — SIGNIFICANT CHANGE UP (ref 3.8–10.5)

## 2018-02-13 RX ORDER — FOLIC ACID 0.8 MG
1 TABLET ORAL DAILY
Qty: 0 | Refills: 0 | Status: DISCONTINUED | OUTPATIENT
Start: 2018-02-13 | End: 2018-02-22

## 2018-02-13 RX ORDER — ERGOCALCIFEROL 1.25 MG/1
50000 CAPSULE ORAL
Qty: 0 | Refills: 0 | Status: DISCONTINUED | OUTPATIENT
Start: 2018-02-13 | End: 2018-02-15

## 2018-02-13 RX ORDER — BUDESONIDE, MICRONIZED 100 %
0.5 POWDER (GRAM) MISCELLANEOUS
Qty: 0 | Refills: 0 | Status: DISCONTINUED | OUTPATIENT
Start: 2018-02-13 | End: 2018-02-18

## 2018-02-13 RX ORDER — INSULIN GLARGINE 100 [IU]/ML
10 INJECTION, SOLUTION SUBCUTANEOUS AT BEDTIME
Qty: 0 | Refills: 0 | Status: DISCONTINUED | OUTPATIENT
Start: 2018-02-13 | End: 2018-02-16

## 2018-02-13 RX ORDER — INSULIN GLARGINE 100 [IU]/ML
3 INJECTION, SOLUTION SUBCUTANEOUS ONCE
Qty: 0 | Refills: 0 | Status: COMPLETED | OUTPATIENT
Start: 2018-02-13 | End: 2018-02-13

## 2018-02-13 RX ORDER — IPRATROPIUM/ALBUTEROL SULFATE 18-103MCG
3 AEROSOL WITH ADAPTER (GRAM) INHALATION
Qty: 0 | Refills: 0 | Status: DISCONTINUED | OUTPATIENT
Start: 2018-02-13 | End: 2018-02-18

## 2018-02-13 RX ORDER — PREGABALIN 225 MG/1
1000 CAPSULE ORAL DAILY
Qty: 0 | Refills: 0 | Status: DISCONTINUED | OUTPATIENT
Start: 2018-02-13 | End: 2018-02-22

## 2018-02-13 RX ORDER — METHOTREXATE 2.5 MG/1
17.5 TABLET ORAL
Qty: 0 | Refills: 0 | Status: DISCONTINUED | OUTPATIENT
Start: 2018-02-13 | End: 2018-02-20

## 2018-02-13 RX ADMIN — INSULIN GLARGINE 10 UNIT(S): 100 INJECTION, SOLUTION SUBCUTANEOUS at 22:35

## 2018-02-13 RX ADMIN — Medication 30 MILLIGRAM(S): at 17:27

## 2018-02-13 RX ADMIN — PREGABALIN 1000 MICROGRAM(S): 225 CAPSULE ORAL at 17:22

## 2018-02-13 RX ADMIN — Medication 3 MILLILITER(S): at 17:27

## 2018-02-13 RX ADMIN — Medication 100 MILLIGRAM(S): at 13:21

## 2018-02-13 RX ADMIN — Medication 3 MILLILITER(S): at 05:50

## 2018-02-13 RX ADMIN — Medication 0.5 MILLIGRAM(S): at 17:27

## 2018-02-13 RX ADMIN — Medication 1 MILLIGRAM(S): at 13:21

## 2018-02-13 RX ADMIN — Medication 3 MILLILITER(S): at 12:20

## 2018-02-13 RX ADMIN — Medication 100 MILLIGRAM(S): at 22:36

## 2018-02-13 RX ADMIN — INSULIN GLARGINE 3 UNIT(S): 100 INJECTION, SOLUTION SUBCUTANEOUS at 02:26

## 2018-02-13 RX ADMIN — Medication 25 MILLIGRAM(S): at 05:34

## 2018-02-13 RX ADMIN — LOSARTAN POTASSIUM 50 MILLIGRAM(S): 100 TABLET, FILM COATED ORAL at 05:34

## 2018-02-13 NOTE — CONSULT NOTE ADULT - SUBJECTIVE AND OBJECTIVE BOX
HPI:  84 y/o fmeale with hx of RA on mtx , and was on prednisone ( no longer on steroids since 09/2017  and has not recieved iv infusion  ( monthly simponi ) in three months . also history of HTN and DM .. has been admitted twice over the past 6-8 months with  weakness . initial admit pt was thought to have an element of adrenal insufficeiny when she was on steroids .. and pt was sent out on steroids with some improvement,.. later admitted at [facility] for weakness thought to be multifactorial sec to viral illness, dehydration , and element of steroid -induced myopathy. pt was sent to rehab and overall improved but never to baseline.. over the past three days pt has been having worsening generalized weakness , cough with production of sputum ( color?), decreased po intake and decreased mobility.    Pt reports weakness is generalized, however, legs weaker than arms.  Has multiple falls secondary weakness (?).  Last fall a week ago.  No LOC.     Review of Systems:  no CP/SOB   had had few episodes of N/V /D at home   no recent abx   no abdo pain or urinary Sx     PAST MEDICAL & SURGICAL HISTORY:  RA (rheumatoid arthritis)  Asthma  DM (diabetes mellitus), type 2  HTN (hypertension), benign  After-cataract of left eye  Tubal occlusion    MEDICATIONS  (STANDING):  dextrose 5%. 1000 milliLiter(s) (50 mL/Hr) IV Continuous <Continuous>  dextrose 50% Injectable 12.5 Gram(s) IV Push once  dextrose 50% Injectable 25 Gram(s) IV Push once  dextrose 50% Injectable 25 Gram(s) IV Push once  hydrochlorothiazide 25 milliGRAM(s) Oral daily  insulin glargine Injectable (LANTUS) 3 Unit(s) SubCutaneous at bedtime  insulin lispro (HumaLOG) corrective regimen sliding scale   SubCutaneous three times a day before meals  insulin lispro (HumaLOG) corrective regimen sliding scale   SubCutaneous at bedtime  losartan 50 milliGRAM(s) Oral daily  sodium chloride 0.9%. 1000 milliLiter(s) (80 mL/Hr) IV Continuous <Continuous>    MEDICATIONS  (PRN):  ALBUTerol/ipratropium for Nebulization 3 milliLiter(s) Nebulizer every 6 hours PRN Shortness of Breath and/or Wheezing  dextrose Gel 1 Dose(s) Oral once PRN Blood Glucose LESS THAN 70 milliGRAM(s)/deciliter  glucagon  Injectable 1 milliGRAM(s) IntraMuscular once PRN Glucose LESS THAN 70 milligrams/deciliter  ondansetron Injectable 4 milliGRAM(s) IV Push every 6 hours PRN Nausea and/or Vomiting    Allergies    No Known Allergies    FAMILY HISTORY:  No pertinent family history in first degree relatives      Social History  Lives at home with family, has been in rehabs     Vital Signs Last 24 Hrs  T(C): 36.6 (13 Feb 2018 04:03), Max: 38.2 (12 Feb 2018 18:16)  T(F): 97.8 (13 Feb 2018 04:03), Max: 100.7 (12 Feb 2018 18:16)  HR: 72 (13 Feb 2018 04:03) (72 - 100)    BP: 122/62 (13 Feb 2018 04:03) (122/62 - 179/89)  BP(mean): --  RR: 18 (13 Feb 2018 04:03) (18 - 18)  SpO2: 92% (13 Feb 2018 04:03) (92% - 97%)  Daily Height in cm: 152.4 (12 Feb 2018 23:58)    Daily       GENERAL PHYSICAL EXAM  All physical exam findings normal, except for those marked:  General:	well nourished, not acutely or chronically ill-appearing  HEENT:	normocephalic, atraumatic, clear conjunctiva   Cardiovascular:	normal S1, S2, no murmurs  Abdominal	:                    soft, ND, NT   Extremities:	no joint swelling     NEUROLOGIC EXAM  Mental Status:     Oriented to time/place/person; Good eye contact ; follow simple commands ;  Age appropriate language  and fund of  knowledge.  Cranial Nerves:   PERRL, EOMI, no facial asymmetry , V1-V3 intact , symmetric palate, tongue midline.   Eyes:			Normal: optic discs   Visual Fields:		Full visual field  Muscle Strength:	 5/5 in b/l UE with encouragement, 4/5 in b/l HF/HE, rest 5/5   Muscle Tone:	Normal tone  Deep Tendon Reflexes:         1+/4  : Biceps, Brachioradialis, Triceps Bilateral;  1+/4 : Pattelar, Ankle bilateral. No clonus.  Plantar Response:	Plantar reflexes flexion bilaterally  Sensation:		Intact to pain, light touch, temperature    Coordination/	No dysmetria in finger to nose test bilaterally  Cerebellum	  Tandem Gait/Romberg	with assistance     Lab Results:                        10.3   7.16  )-----------( 174      ( 13 Feb 2018 07:38 )             31.9     02-13    138  |  104  |  20  ----------------------------<  78  3.9   |  22  |  1.04    Ca    8.6      13 Feb 2018 07:49  Phos  3.9     02-13  Mg     1.7     02-13    TPro  6.9  /  Alb  3.0<L>  /  TBili  0.5  /  DBili  x   /  AST  26  /  ALT  11  /  AlkPhos  64  02-13    LIVER FUNCTIONS - ( 13 Feb 2018 07:49 )  Alb: 3.0 g/dL / Pro: 6.9 g/dL / ALK PHOS: 64 U/L / ALT: 11 U/L / AST: 26 U/L / GGT: x           PT/INR - ( 12 Feb 2018 18:28 )   PT: 12.2 sec;   INR: 1.13 ratio         PTT - ( 12 Feb 2018 18:28 )  PTT:30.7 sec

## 2018-02-13 NOTE — DISCHARGE NOTE ADULT - MEDICATION SUMMARY - MEDICATIONS TO STOP TAKING
I will STOP taking the medications listed below when I get home from the hospital:    losartan 50 mg oral tablet  -- 1 tab(s) by mouth once a day    metFORMIN 1000 mg oral tablet  -- 1 tab(s) by mouth 2 times a day    glimepiride 1 mg oral tablet  -- 1 tab(s) by mouth once a day    Tussin DM 10 mg-100 mg/5 mL oral liquid  -- 15 milliliter(s) by mouth 4 times a day, As Needed

## 2018-02-13 NOTE — PROGRESS NOTE ADULT - SUBJECTIVE AND OBJECTIVE BOX
Patient is a 86y old  Female who presents with a chief complaint of weakness   cough   diarreah (13 Feb 2018 12:58)                                                               INTERVAL HPI/OVERNIGHT EVENTS:    REVIEW OF SYSTEMS:     CONSTITUTIONAL: No weakness, fevers or chills  EYES/ENT: No visual changes , no ear ache   NECK: No pain or stiffness  RESPIRATORY: No cough, wheezing,  No shortness of breath  CARDIOVASCULAR: No chest pain or palpitations  GASTROINTESTINAL: No abdominal pain  . No nausea, vomiting, or hematemesis; No diarrhea or constipation. No melena or hematochezia.  GENITOURINARY: No dysuria, frequency or hematuria  NEUROLOGICAL: No numbness or weakness  SKIN: No itching, burning, rashes, or lesions                                                                                                                                                                                                                                                                                 Medications:  MEDICATIONS  (STANDING):  ALBUTerol/ipratropium for Nebulization 3 milliLiter(s) Nebulizer four times a day  benzonatate 100 milliGRAM(s) Oral three times a day  buDESOnide   0.5 milliGRAM(s) Respule 0.5 milliGRAM(s) Inhalation two times a day  cyanocobalamin 1000 MICROGram(s) Oral daily  dextrose 5%. 1000 milliLiter(s) (50 mL/Hr) IV Continuous <Continuous>  dextrose 50% Injectable 12.5 Gram(s) IV Push once  dextrose 50% Injectable 25 Gram(s) IV Push once  dextrose 50% Injectable 25 Gram(s) IV Push once  ergocalciferol 71128 Unit(s) Oral every week  folic acid 1 milliGRAM(s) Oral daily  hydrochlorothiazide 25 milliGRAM(s) Oral daily  insulin glargine Injectable (LANTUS) 10 Unit(s) SubCutaneous at bedtime  insulin lispro (HumaLOG) corrective regimen sliding scale   SubCutaneous three times a day before meals  insulin lispro (HumaLOG) corrective regimen sliding scale   SubCutaneous at bedtime  losartan 50 milliGRAM(s) Oral daily  methotrexate (Non - oncologic) 17.5 milliGRAM(s) Oral <User Schedule>  predniSONE   Tablet 30 milliGRAM(s) Oral two times a day  sodium chloride 0.9%. 1000 milliLiter(s) (80 mL/Hr) IV Continuous <Continuous>    MEDICATIONS  (PRN):  dextrose Gel 1 Dose(s) Oral once PRN Blood Glucose LESS THAN 70 milliGRAM(s)/deciliter  glucagon  Injectable 1 milliGRAM(s) IntraMuscular once PRN Glucose LESS THAN 70 milligrams/deciliter  HYDROcodone/homatropine Syrup 5 milliLiter(s) Oral at bedtime PRN Cough  ondansetron Injectable 4 milliGRAM(s) IV Push every 6 hours PRN Nausea and/or Vomiting       Allergies    No Known Allergies    Intolerances      Vital Signs Last 24 Hrs  T(C): 36.4 (14 Feb 2018 03:45), Max: 36.9 (13 Feb 2018 20:54)  T(F): 97.5 (14 Feb 2018 03:45), Max: 98.5 (13 Feb 2018 20:54)  HR: 80 (14 Feb 2018 03:45) (80 - 84)  BP: 126/82 (14 Feb 2018 03:45) (126/82 - 170/84)  BP(mean): --  RR: 18 (14 Feb 2018 03:45) (18 - 18)  SpO2: 96% (14 Feb 2018 03:45) (96% - 97%)  CAPILLARY BLOOD GLUCOSE      POCT Blood Glucose.: 199 mg/dL (13 Feb 2018 21:43)  POCT Blood Glucose.: 129 mg/dL (13 Feb 2018 17:18)  POCT Blood Glucose.: 119 mg/dL (13 Feb 2018 11:49)  POCT Blood Glucose.: 98 mg/dL (13 Feb 2018 08:01)      02-12 @ 07:01 - 02-13 @ 07:00  --------------------------------------------------------  IN: 0 mL / OUT: 50 mL / NET: -50 mL    02-13 @ 07:01  -  02-14 @ 06:04  --------------------------------------------------------  IN: 1450 mL / OUT: 0 mL / NET: 1450 mL      Physical Exam:    Daily     Daily   General:  Well appearing, NAD, not cachetic  HEENT:  Nonicteric, PERRLA  CV:  RRR, S1S2   Lungs:  CTA B/L, no wheezes, rales, rhonchi  Abdomen:  Soft, non-tender, no distended, positive BS  Extremities:  2+ pulses, no c/c, no edema  Skin:  Warm and dry, no rashes  :  No mason  Neuro:  AAOx3, non-focal, grossly intact                                                                                                                                                                                                                                                                                                LABS:                               10.3   7.16  )-----------( 174      ( 13 Feb 2018 07:38 )             31.9                      02-13    138  |  104  |  20  ----------------------------<  78  3.9   |  22  |  1.04    Ca    8.6      13 Feb 2018 07:49  Phos  3.9     02-13  Mg     1.7     02-13    TPro  6.9  /  Alb  3.0<L>  /  TBili  0.5  /  DBili  x   /  AST  26  /  ALT  11  /  AlkPhos  64  02-13                       RADIOLOGY & ADDITIONAL TESTS         I personally reviewed: [  ]EKG   [  ]CXR    [  ] CT      A/P:         Discussed with :     [employer] consultants' Notes   Time spent : Patient is a 86y old  Female who presents with a chief complaint of weakness   cough   diarreah (13 Feb 2018 12:58)                                                               INTERVAL HPI/OVERNIGHT EVENTS:    REVIEW OF SYSTEMS:     CONSTITUTIONAL:improving  weakness, no  fevers or chills  RESPIRATORY cough, wheezing,  No shortness of breath  CARDIOVASCULAR: No chest pain or palpitations  GASTROINTESTINAL: No abdominal pain  . No nausea, vomiting, or hematemesis; No diarrhea or constipation. No melena or hematochezia.  GENITOURINARY: No dysuria, frequency or hematuria  NEUROLOGICAL: No numbness or weakness                                                                                                                                                                                                                                                                                   Medications:  MEDICATIONS  (STANDING):  ALBUTerol/ipratropium for Nebulization 3 milliLiter(s) Nebulizer four times a day  benzonatate 100 milliGRAM(s) Oral three times a day  buDESOnide   0.5 milliGRAM(s) Respule 0.5 milliGRAM(s) Inhalation two times a day  cyanocobalamin 1000 MICROGram(s) Oral daily  dextrose 5%. 1000 milliLiter(s) (50 mL/Hr) IV Continuous <Continuous>  dextrose 50% Injectable 12.5 Gram(s) IV Push once  dextrose 50% Injectable 25 Gram(s) IV Push once  dextrose 50% Injectable 25 Gram(s) IV Push once  ergocalciferol 24008 Unit(s) Oral every week  folic acid 1 milliGRAM(s) Oral daily  hydrochlorothiazide 25 milliGRAM(s) Oral daily  insulin glargine Injectable (LANTUS) 10 Unit(s) SubCutaneous at bedtime  insulin lispro (HumaLOG) corrective regimen sliding scale   SubCutaneous three times a day before meals  insulin lispro (HumaLOG) corrective regimen sliding scale   SubCutaneous at bedtime  losartan 50 milliGRAM(s) Oral daily  methotrexate (Non - oncologic) 17.5 milliGRAM(s) Oral <User Schedule>  predniSONE   Tablet 30 milliGRAM(s) Oral two times a day  sodium chloride 0.9%. 1000 milliLiter(s) (80 mL/Hr) IV Continuous <Continuous>    MEDICATIONS  (PRN):  dextrose Gel 1 Dose(s) Oral once PRN Blood Glucose LESS THAN 70 milliGRAM(s)/deciliter  glucagon  Injectable 1 milliGRAM(s) IntraMuscular once PRN Glucose LESS THAN 70 milligrams/deciliter  HYDROcodone/homatropine Syrup 5 milliLiter(s) Oral at bedtime PRN Cough  ondansetron Injectable 4 milliGRAM(s) IV Push every 6 hours PRN Nausea and/or Vomiting       Allergies    No Known Allergies    Intolerances      Vital Signs Last 24 Hrs  afeb vitally stable       POCT Blood Glucose.: 199 mg/dL (13 Feb 2018 21:43)  POCT Blood Glucose.: 129 mg/dL (13 Feb 2018 17:18)  POCT Blood Glucose.: 119 mg/dL (13 Feb 2018 11:49)  POCT Blood Glucose.: 98 mg/dL (13 Feb 2018 08:01)            Physical Exam:      General:  elderly NAD ..weak  HEENT:  Nonicteric, PERRLA  CV:  RRR, S1S2   Lungs: ronchi / wheezing B   Abdomen:  Soft, non-tender, no distended, positive BS  Extremities:  non pitting  edema  Neuro:  AAOx3, non-focal, grossly intact                                                                                                                                                                                                                                                                                                LABS:                               10.3   7.16  )-----------( 174      ( 13 Feb 2018 07:38 )             31.9                      02-13    138  |  104  |  20  ----------------------------<  78  3.9   |  22  |  1.04    Ca    8.6      13 Feb 2018 07:49  Phos  3.9     02-13  Mg     1.7     02-13    TPro  6.9  /  Alb  3.0<L>  /  TBili  0.5  /  DBili  x   /  AST  26  /  ALT  11  /  AlkPhos  64  02-13

## 2018-02-13 NOTE — PROGRESS NOTE ADULT - ASSESSMENT
stable from GI perspective, symptoms likely related to acute viral illness, OK to advance diet as tolerates-lactose free

## 2018-02-13 NOTE — DISCHARGE NOTE ADULT - MEDICATION SUMMARY - MEDICATIONS TO TAKE
I will START or STAY ON the medications listed below when I get home from the hospital:    predniSONE 10 mg oral tablet  -- 2 tab by mouth on 2/22 6 PM  3 tab(s) oral - by mouth once a day x 2 days (start 2/23)  2 tab(s) oral - by mouth once a day x 2 days  1 tab(s) oral - by mouth once a day x 2 days  -- It is very important that you take or use this exactly as directed.  Do not skip doses or discontinue unless directed by your doctor.  Obtain medical advice before taking any non-prescription drugs as some may affect the action of this medication.  Take with food or milk.    -- Indication: For bronchitis    mirtazapine 15 mg oral tablet  -- 1 tab(s) by mouth once a day (at bedtime)  -- Indication: For Anxiety disorder    HumaLOG KwikPen 100 units/mL injectable solution  -- 10 unit(s) subcutaneous 3 times a day  -- Indication: For Diabetes    Lantus Solostar Pen 100 units/mL subcutaneous solution  -- 30 unit(s) subcutaneous once a day (at bedtime)  -- Indication: For Diabetes    Januvia 100 mg oral tablet  -- 1 tab(s) by mouth once a day  -- Indication: For Diabetes    methotrexate 2.5 mg oral tablet  -- 7 tab(s) by mouth once a week on Monday  -- Indication: For RA (rheumatoid arthritis)    benzonatate 100 mg oral capsule  -- 1 cap(s) by mouth 3 times a day, As Needed   -- Indication: For cough    alendronate 70 mg oral tablet  -- 1 tab(s) by mouth once a week on Monday  -- Indication: For osteoporosis    Symbicort 160 mcg-4.5 mcg/inh inhalation aerosol  -- 2 puff(s) inhaled 2 times a day  -- Indication: For bronchitis    Ventolin HFA 90 mcg/inh inhalation aerosol  -- 2 puff(s) inhaled 4 times a day, As Needed  -- Indication: For bronchitis    tiotropium 18 mcg inhalation capsule  -- 1 cap(s) inhaled once a day  -- Indication: For bronchitis    amLODIPine 5 mg oral tablet  -- 1 tab(s) by mouth once a day  -- Indication: For High BP    hydroCHLOROthiazide 25 mg oral tablet  -- 1 tab(s) by mouth once a day  -- Indication: For High BP    guaiFENesin 100 mg/5 mL oral liquid  -- 5 milliliter(s) by mouth every 6 hours  -- Indication: For cough    famotidine 20 mg oral tablet  -- 1 tab(s) by mouth once a day (at bedtime)  -- Indication: For gerd    senna oral tablet  -- 2 tab(s) by mouth once a day (at bedtime)  -- Indication: For constipation    polyethylene glycol 3350 oral powder for reconstitution  -- 17 gram(s) by mouth once a day  -- Indication: For constipation    pantoprazole 40 mg oral delayed release tablet  -- 1 tab(s) by mouth once a day   -- Indication: For gerd    cyanocobalamin 1000 mcg oral tablet  -- 1 tab(s) subcutaneous once a week on Monday  -- Indication: For supplemnet    folic acid 1 mg oral tablet  -- 1 tab(s) by mouth once a day  -- Indication: For supplement    Vitamin D2 50,000 intl units (1.25 mg) oral capsule  -- 1 cap(s) by mouth once a week on Monday  -- Indication: For supplement I will START or STAY ON the medications listed below when I get home from the hospital:    predniSONE 10 mg oral tablet  -- 2 tab by mouth on 2/22 6 PM  3 tab(s) oral - by mouth once a day x 2 days (start 2/23)  2 tab(s) oral - by mouth once a day x 2 days  1 tab(s) oral - by mouth once a day x 2 days  -- It is very important that you take or use this exactly as directed.  Do not skip doses or discontinue unless directed by your doctor.  Obtain medical advice before taking any non-prescription drugs as some may affect the action of this medication.  Take with food or milk.    -- Indication: For bronchitis    mirtazapine 15 mg oral tablet  -- 1 tab(s) by mouth once a day (at bedtime)  -- Indication: For Anxiety disorder    HumaLOG KwikPen 100 units/mL injectable solution  -- 10 unit(s) subcutaneous 3 times a day  -- Indication: For Diabetes    Lantus Solostar Pen 100 units/mL subcutaneous solution  -- 30 unit(s) subcutaneous once a day (at bedtime)  -- Indication: For Diabetes    Januvia 100 mg oral tablet  -- 1 tab(s) by mouth once a day  -- Indication: For Diabetes    methotrexate 2.5 mg oral tablet  -- 7 tab(s) by mouth once a week on Monday  -- Indication: For RA (rheumatoid arthritis)    benzonatate 100 mg oral capsule  -- 1 cap(s) by mouth 3 times a day, As Needed   -- Indication: For cough    alendronate 70 mg oral tablet  -- 1 tab(s) by mouth once a week on Monday  -- Indication: For osteoporosis    tiotropium 18 mcg inhalation capsule  -- 1 cap(s) inhaled once a day  -- Indication: For bronchitis    Symbicort 160 mcg-4.5 mcg/inh inhalation aerosol  -- 2 puff(s) inhaled 2 times a day  -- Indication: For bronchitis    Ventolin HFA 90 mcg/inh inhalation aerosol  -- 2 puff(s) inhaled 4 times a day, As Needed  -- Indication: For Asthma    amLODIPine 5 mg oral tablet  -- 1 tab(s) by mouth once a day  -- Indication: For High BP    hydroCHLOROthiazide 25 mg oral tablet  -- 1 tab(s) by mouth once a day  -- Indication: For High BP    guaiFENesin 100 mg/5 mL oral liquid  -- 5 milliliter(s) by mouth every 6 hours  -- Indication: For cough    famotidine 20 mg oral tablet  -- 1 tab(s) by mouth once a day (at bedtime)  -- Indication: For gerd    senna oral tablet  -- 2 tab(s) by mouth once a day (at bedtime)  -- Indication: For constipation    polyethylene glycol 3350 oral powder for reconstitution  -- 17 gram(s) by mouth once a day  -- Indication: For constipation    pantoprazole 40 mg oral delayed release tablet  -- 1 tab(s) by mouth once a day   -- Indication: For gerd    cyanocobalamin 1000 mcg oral tablet  -- 1 tab(s) subcutaneous once a week on Monday  -- Indication: For supplemnet    folic acid 1 mg oral tablet  -- 1 tab(s) by mouth once a day  -- Indication: For supplement    Vitamin D2 50,000 intl units (1.25 mg) oral capsule  -- 1 cap(s) by mouth once a week on Monday  -- Indication: For supplement

## 2018-02-13 NOTE — DISCHARGE NOTE ADULT - HOME CARE AGENCY
[facility] care  for RN visit to assess for home P/T for start of care the day after discharge

## 2018-02-13 NOTE — CONSULT NOTE ADULT - ASSESSMENT
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 - appears stable vs CT in 7/17  Mosaic pattern on CT: favor airtrapping vs vascular etiology    REC    Change to duoneb qid (not prn)  Prednisone 3o mg p bid - to taper rapidly per clinical status  Observe off abx: check PCT  Add pulmcort bid  Tesslon for initial cough management 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

## 2018-02-13 NOTE — CONSULT NOTE ADULT - SUBJECTIVE AND OBJECTIVE BOX
PULMONARY CONSULT  Abrahan Flannery MD  [phone]    Initial HPI on admission:  HPI:  86 y/o fmeale with hx of RA on mtx , and was on prednisone ( no longer on steroids since 2017  and has not recieved iv infusion  ( monthly simponi ) in three months . also history of HTN and DM .. has been admitted twice over the past 6-8 months with  weakness . initial admit pt was thought ot have an element of adrenal insufficeiny when she was on steroids .. and pt was sent out on steroids with some improvement,.. later admitted as lij for weakness thought to be multifactorial sec to viral illness, dehydration , and leemt of steroid -induced myopathy. pt was sent to rehab and overall improved but never to baseline.. over the past three days pt has been having worsening generalized weakness , cough with production of sputum ( color?), decreased po intake and decreased mobility.  no focal neuro complaints  no CP/SOB   had had few episodes of N/V /D at home   no recent abx   no abdo pain or urinary Sx (2018 20:58)    Patient has a history of mild intermittent adult asthma - rarely uses inhaler, and not aware of clear ppting triggers, ncluding allergy. She has c/o wheezing with onset of cough in 3-4 days PTA. She is followed by rheumatologist in [facility] for RA, and lissa history of RA associated ILD, depsite CT at [facility] in . Patient has a history of TB treated with unknown regimen 50+ years PTA    CT CHest: no significant change prior in : bilateral upper lober predominant reticular opacities, peripheral dominant with mosaic attenuation c/w airtrapping. No gross consolidationto sugg pneumonia.    PAST MEDICAL & SURGICAL HISTORY:  RA (rheumatoid arthritis)  Asthma  DM (diabetes mellitus), type 2  HTN (hypertension), benign  After-cataract of left eye  Tubal occlusion    Allergies    No Known Allergies    Intolerances      FAMILY HISTORY:  No pertinent family history in first degree relatives      Social history:      Review of Systems:  · Additional ROS	as above in HPI otherwise negative .	    Medications:  MEDICATIONS  (STANDING):  dextrose 5%. 1000 milliLiter(s) (50 mL/Hr) IV Continuous <Continuous>  dextrose 50% Injectable 12.5 Gram(s) IV Push once  dextrose 50% Injectable 25 Gram(s) IV Push once  dextrose 50% Injectable 25 Gram(s) IV Push once  hydrochlorothiazide 25 milliGRAM(s) Oral daily  insulin glargine Injectable (LANTUS) 3 Unit(s) SubCutaneous at bedtime  insulin lispro (HumaLOG) corrective regimen sliding scale   SubCutaneous three times a day before meals  insulin lispro (HumaLOG) corrective regimen sliding scale   SubCutaneous at bedtime  losartan 50 milliGRAM(s) Oral daily  sodium chloride 0.9%. 1000 milliLiter(s) (80 mL/Hr) IV Continuous <Continuous>    MEDICATIONS  (PRN):  ALBUTerol/ipratropium for Nebulization 3 milliLiter(s) Nebulizer every 6 hours PRN Shortness of Breath and/or Wheezing  dextrose Gel 1 Dose(s) Oral once PRN Blood Glucose LESS THAN 70 milliGRAM(s)/deciliter  glucagon  Injectable 1 milliGRAM(s) IntraMuscular once PRN Glucose LESS THAN 70 milligrams/deciliter  ondansetron Injectable 4 milliGRAM(s) IV Push every 6 hours PRN Nausea and/or Vomiting    Vital Signs Last 24 Hrs  T(C): 36.6 (2018 04:03), Max: 38.2 (2018 18:16)  T(F): 97.8 (2018 04:03), Max: 100.7 (2018 18:16)  HR: 72 (2018 04:03) (72 - 100)  BP: 122/62 (2018 04:03) (122/62 - 179/89)  BP(mean): --  RR: 18 (2018 04:03) (18 - 18)  SpO2: 92% (2018 04:03) (92% - 97%)           @ 07:01  -  -13 @ 07:00  --------------------------------------------------------  IN: 0 mL / OUT: 50 mL / NET: -50 mL      LABS:                        10.3   7.16  )-----------( 174      ( 2018 07:38 )             31.9         138  |  104  |  20  ----------------------------<  78  3.9   |  22  |  1.04    Ca    8.6      2018 07:49  Phos  3.9     02-  Mg     1.7     -13    TPro  6.9  /  Alb  3.0<L>  /  TBili  0.5  /  DBili  x   /  AST  26  /  ALT  11  /  AlkPhos  64  02-13      PT/INR - ( 2018 18:28 )   PT: 12.2 sec;   INR: 1.13 ratio         PTT - ( 2018 18:28 )  PTT:30.7 sec  Urinalysis Basic - ( 2018 22:51 )    Color: Yellow / Appearance: Clear / S.021 / pH: x  Gluc: x / Ketone: Negative  / Bili: Negative / Urobili: Negative   Blood: x / Protein: 30 mg/dL / Nitrite: Negative   Leuk Esterase: Negative / RBC: 0-2 /HPF / WBC 0-2 /HPF   Sq Epi: x / Non Sq Epi: OCC /HPF / Bacteria: Few /HPF      CULTURES:      Physical Examination:    General: No acute distress.      HEENT: Pupils equal, reactive to light.  Symmetric.    PULM: Clear to auscultation bilaterally, no significant sputum production    CVS: Regular rate and rhythm, no murmurs, rubs, or gallops    ABD: Soft, nondistended, nontender, normoactive bowel sounds, no masses    EXT: No edema, nontender    SKIN: Warm and well perfused, no rashes noted.    NEURO: Alert, oriented, interactive, nonfocal    RADIOLOGY REVIEWED PERSONALLY  CXR:    CT chest:    TTE:      Assessment:    Plan:

## 2018-02-13 NOTE — DISCHARGE NOTE ADULT - PLAN OF CARE
bronchitis resolves complete steroids as prescribed resolved  Get plenty of rest Follow up with cardiologist Follow up with Rheumatologist before restarting Methotrexate Follow up with PUlmonology continue current insulin regimen   check blood sugars 4 x daily  Follow up with Endocrinology in 2 weeks continue Remeron  Follow up with psychiatry HgA1c 6.2; have it checked every 3 months  continue current insulin regimen   check blood sugars 4 x daily  Follow up with Endocrinology in 2 weeks

## 2018-02-13 NOTE — PROGRESS NOTE ADULT - SUBJECTIVE AND OBJECTIVE BOX
INTERVAL HPI/OVERNIGHT EVENTS: diarrhea subsided, still with URI symptoms and weakness    MEDICATIONS  (STANDING):  dextrose 5%. 1000 milliLiter(s) (50 mL/Hr) IV Continuous <Continuous>  dextrose 50% Injectable 12.5 Gram(s) IV Push once  dextrose 50% Injectable 25 Gram(s) IV Push once  dextrose 50% Injectable 25 Gram(s) IV Push once  hydrochlorothiazide 25 milliGRAM(s) Oral daily  insulin glargine Injectable (LANTUS) 3 Unit(s) SubCutaneous at bedtime  insulin lispro (HumaLOG) corrective regimen sliding scale   SubCutaneous three times a day before meals  insulin lispro (HumaLOG) corrective regimen sliding scale   SubCutaneous at bedtime  losartan 50 milliGRAM(s) Oral daily  sodium chloride 0.9%. 1000 milliLiter(s) (80 mL/Hr) IV Continuous <Continuous>    MEDICATIONS  (PRN):  ALBUTerol/ipratropium for Nebulization 3 milliLiter(s) Nebulizer every 6 hours PRN Shortness of Breath and/or Wheezing  dextrose Gel 1 Dose(s) Oral once PRN Blood Glucose LESS THAN 70 milliGRAM(s)/deciliter  glucagon  Injectable 1 milliGRAM(s) IntraMuscular once PRN Glucose LESS THAN 70 milligrams/deciliter  ondansetron Injectable 4 milliGRAM(s) IV Push every 6 hours PRN Nausea and/or Vomiting      Allergies    No Known Allergies    Intolerances            PHYSICAL EXAM:   Vital Signs:  Vital Signs Last 24 Hrs  T(C): 36.6 (2018 04:03), Max: 38.2 (2018 18:16)  T(F): 97.8 (2018 04:03), Max: 100.7 (2018 18:16)  HR: 72 (2018 04:03) (72 - 100)  BP: 122/62 (2018 04:03) (122/62 - 179/89)  BP(mean): --  RR: 18 (2018 04:03) (18 - 18)  SpO2: 92% (2018 04:03) (92% - 97%)  Daily Height in cm: 152.4 (2018 23:58)    Daily     GENERAL:  no distress  HEENT:  NC/AT,  anicteric  CHEST:   no increased effort, breath sounds clear  HEART:  Regular rhythm  ABDOMEN:  Soft, non-tender, non-distended, normoactive bowel sounds,  no masses ,no hepato-splenomegaly, no signs of chronic liver disease  EXTEREMITIES:  no cyanosis      LABS:                        10.3   7.16  )-----------( 174      ( 2018 07:38 )             31.9     02-13    138  |  104  |  20  ----------------------------<  78  3.9   |  22  |  1.04    Ca    8.6      2018 07:49  Phos  3.9     02-13  Mg     1.7     02-13    TPro  6.9  /  Alb  3.0<L>  /  TBili  0.5  /  DBili  x   /  AST  26  /  ALT  11  /  AlkPhos  64  02-13    PT/INR - ( 2018 18:28 )   PT: 12.2 sec;   INR: 1.13 ratio         PTT - ( 2018 18:28 )  PTT:30.7 sec  Urinalysis Basic - ( 2018 22:51 )    Color: Yellow / Appearance: Clear / S.021 / pH: x  Gluc: x / Ketone: Negative  / Bili: Negative / Urobili: Negative   Blood: x / Protein: 30 mg/dL / Nitrite: Negative   Leuk Esterase: Negative / RBC: 0-2 /HPF / WBC 0-2 /HPF   Sq Epi: x / Non Sq Epi: OCC /HPF / Bacteria: Few /HPF        RADIOLOGY & ADDITIONAL TESTS:

## 2018-02-13 NOTE — DISCHARGE NOTE ADULT - HOSPITAL COURSE
84 y/o F with hx RA on fmeale with hx of RA on mtx , and was on prednisone ( no longer on steroids since 09/2017  and has not recieved iv infusion  ( monthly simponi ) in three months . also history of HTN and DM .. has been admitted twice over the past 6-8 months with  weakness . initial admit pt was thought ot have an element of adrenal insufficeiny when she was on steroids .. and pt was sent out on steroids with some improvement,.. later admitted as lij for weakness thought to be multifactorial sec to viral illness, dehydration , and leemt of steroid -induced myopathy. pt was sent to rehab and overall improved but never to baseline.. over the past three days pt has been having worsening generalized weakness , cough with production of sputum ( color?), decreased po intake and decreased mobility.  no focal neuro complaints  no CP/SOB   had had few episodes of N/V /D at home 84 y/o F with hx RA on  mtx , HTN, DM admitted with  worsening generalized weakness , cough with production of sputum decreased po intake and decreased mobility. had had few episodes of N/V /D at home ; Admitted with human meta pneumo viral tracheobronchitis with mild bronchospasm: Pt with RA with pulmonary fibrosis;  No clinical evidence bacterial pneumonia; seen and treated by pulmonary on steroids and nebulizers; pt with persistent coughing despite Rx; seen by speech and swallow; dysphagia and aspiration ruled out. OFF ARB: seen by cardiology for mod AS; BP labile; controlled on NOrvasc; Uncontrolled BS on steroids managed by Endocrine on lantus, humalog; seen by psychiatry for extreme anxiety; started on Remeron for generalized anxiety disorder; SOB, coughing improved; Discharged home with home PT

## 2018-02-13 NOTE — CONSULT NOTE ADULT - ASSESSMENT
HTN  labile  cont to monitor   on losartan    AS  moderate  stable  consider DC IVF if pt can tolerate PO and diarrhea resolves    hMPV   nebs  fu with pulm

## 2018-02-13 NOTE — DISCHARGE NOTE ADULT - CARE PLAN
Principal Discharge DX:	Tracheobronchitis  Goal:	bronchitis resolves  Assessment and plan of treatment:	complete steroids as prescribed  Secondary Diagnosis:	Viral illness  Assessment and plan of treatment:	resolved  Get plenty of rest  Secondary Diagnosis:	Aortic stenosis, moderate  Assessment and plan of treatment:	Follow up with cardiologist  Secondary Diagnosis:	RA (rheumatoid arthritis)  Assessment and plan of treatment:	Follow up with Rheumatologist before restarting Methotrexate  Secondary Diagnosis:	ILD (interstitial lung disease)  Assessment and plan of treatment:	Follow up with PUlmonology  Secondary Diagnosis:	Uncontrolled blood glucose  Assessment and plan of treatment:	continue current insulin regimen   check blood sugars 4 x daily  Follow up with Endocrinology in 2 weeks  Secondary Diagnosis:	Generalized anxiety disorder  Assessment and plan of treatment:	continue Remeron  Follow up with psychiatry Principal Discharge DX:	Tracheobronchitis  Goal:	bronchitis resolves  Assessment and plan of treatment:	complete steroids as prescribed  Secondary Diagnosis:	Viral illness  Assessment and plan of treatment:	resolved  Get plenty of rest  Secondary Diagnosis:	Aortic stenosis, moderate  Assessment and plan of treatment:	Follow up with cardiologist  Secondary Diagnosis:	RA (rheumatoid arthritis)  Assessment and plan of treatment:	Follow up with Rheumatologist before restarting Methotrexate  Secondary Diagnosis:	ILD (interstitial lung disease)  Assessment and plan of treatment:	Follow up with PUlmonology  Secondary Diagnosis:	Uncontrolled blood glucose  Assessment and plan of treatment:	HgA1c 6.2; have it checked every 3 months  continue current insulin regimen   check blood sugars 4 x daily  Follow up with Endocrinology in 2 weeks  Secondary Diagnosis:	Generalized anxiety disorder  Assessment and plan of treatment:	continue Remeron  Follow up with psychiatry

## 2018-02-13 NOTE — CONSULT NOTE ADULT - ASSESSMENT
Worsening weakness diff. includes steroid induced myopathy   -recommend checking CPK   -no new imaging studies, recommend MRI of the brain and MRI of the lumbar spine   -recommend EMG study as outpatient - [phone]  -PT/OT

## 2018-02-13 NOTE — DISCHARGE NOTE ADULT - CARE PROVIDER_API CALL
Jared Decker), Cardiovascular Disease; Internal Medicine  [address] [facility]  [address]  [place] [address]  Phone: [phone]  Fax: [phone]    Yuriy Tobias), Family Medicine  [address] [facility] [address]  [place] [address]  Phone: [phone]  Fax: [phone]    Karan Akbar), EndocrinologyMetabDiabetes; Internal Medicine  [address] [place] [address]  Phone: [phone]  Fax: [phone] Jared Decker), Cardiovascular Disease; Internal Medicine  [address] [facility]  [address]  [place] [address]  Phone: [phone]  Fax: [phone]    Yuriy Tobias), Family Medicine  [address] [facility]  [address]  [place] [address]  Phone: [phone]  Fax: [phone]    Karan Akbar), EndocrinologyMetabDiabetes; Internal Medicine  [address] [place] [address]  Phone: [phone]  Fax: [phone]    Brendan Posada), Psychiatry  [address] [address]  Phone: [phone]  Fax: [phone]

## 2018-02-13 NOTE — PROGRESS NOTE ADULT - ASSESSMENT
84 y/o fmeale with hx of RA on mtx , and was on prednisone ( no longer on steroids since 09/2017  and has not recieved iv infusion  ( monthly simponi ) in three months . also history of HTN and DM .. has been admitted twice over the past 6-8 months with  weakness . initial admit pt was thought ot have an element of adrenal insufficeiny when she was on steroids .. and pt was sent out on steroids with some improvement,.. later admitted as lij for weakness thought to be multifactorial sec to viral illness, dehydration , and leemt of steroid -induced myopathy. pt was sent to rehab and overall improved but never to baseline.. over the past three days pt has been having worsening generalized weakness , cough with production of sputum ( color?), decreased po intake and decreased mobility.  no focal neuro complaints  no CP/SOB   had had few episodes of N/V /D at home   no recent abx   no abdo pain or urinary Sx       1- Cough / weakness:  hMPV      d/w :  cont steroids , nebs   hold off on antibiotics for now ..    CT old findings with mosaic changes   2- N/V /D : seems to be part of viral illness ..improved    advance diet   Zofran  and ppi       3- DM: increase insulin now that pt able ot take po     4- Decreased mobility :  nonfocal neuro exam  .. had had mri brain with no acute findings   in past pt was thought to might have had an element of steroid induced myopathy however now off prednisone .. check CPK     dvt proph

## 2018-02-13 NOTE — DISCHARGE NOTE ADULT - PATIENT PORTAL LINK FT
You can access the [facility] Patient Portal, offered by [facility], by registering with the following website: [email or web address]

## 2018-02-13 NOTE — DISCHARGE NOTE ADULT - PROVIDER TOKENS
TOKEN:'[id number]',TOKEN:'[id number]:'[id number]' TOKEN:'[id number]:'[id number]:'[id number]',TOKEN:'[id number]'

## 2018-02-13 NOTE — DISCHARGE NOTE ADULT - SECONDARY DIAGNOSIS.
Viral illness Aortic stenosis, moderate RA (rheumatoid arthritis) ILD (interstitial lung disease) Uncontrolled blood glucose Generalized anxiety disorder

## 2018-02-13 NOTE — DISCHARGE NOTE ADULT - MEDICATION SUMMARY - MEDICATIONS TO CHANGE
I will SWITCH the dose or number of times a day I take the medications listed below when I get home from the hospital:    Lantus Solostar Pen 100 units/mL subcutaneous solution  -- 10 unit(s) subcutaneous once a day (at bedtime)    HumaLOG KwikPen 100 units/mL injectable solution  -- subcutaneous 3 times a day (sliding scale)

## 2018-02-13 NOTE — CONSULT NOTE ADULT - SUBJECTIVE AND OBJECTIVE BOX
CHIEF COMPLAINT:Patient is a 86y old  Female who presents with a chief complaint of weakness   cough   diarrhea (13 Feb 2018 12:58)      HISTORY OF PRESENT ILLNESS:  This is a pleasant woman with history as below admitted with weakness , cough found to have hMPV infection   no chest pain   no dizziness     PAST MEDICAL & SURGICAL HISTORY:  RA (rheumatoid arthritis)  Asthma  DM (diabetes mellitus), type 2  HTN (hypertension), benign  After-cataract of left eye  Tubal occlusion          MEDICATIONS:  hydrochlorothiazide 25 milliGRAM(s) Oral daily  losartan 50 milliGRAM(s) Oral daily      ALBUTerol/ipratropium for Nebulization 3 milliLiter(s) Nebulizer four times a day  benzonatate 100 milliGRAM(s) Oral three times a day  buDESOnide   0.5 milliGRAM(s) Respule 0.5 milliGRAM(s) Inhalation two times a day  HYDROcodone/homatropine Syrup 5 milliLiter(s) Oral at bedtime PRN    ondansetron Injectable 4 milliGRAM(s) IV Push every 6 hours PRN      dextrose 50% Injectable 12.5 Gram(s) IV Push once  dextrose 50% Injectable 25 Gram(s) IV Push once  dextrose 50% Injectable 25 Gram(s) IV Push once  dextrose Gel 1 Dose(s) Oral once PRN  glucagon  Injectable 1 milliGRAM(s) IntraMuscular once PRN  insulin glargine Injectable (LANTUS) 10 Unit(s) SubCutaneous at bedtime  insulin lispro (HumaLOG) corrective regimen sliding scale   SubCutaneous three times a day before meals  insulin lispro (HumaLOG) corrective regimen sliding scale   SubCutaneous at bedtime  predniSONE   Tablet 30 milliGRAM(s) Oral two times a day    cyanocobalamin 1000 MICROGram(s) Oral daily  dextrose 5%. 1000 milliLiter(s) IV Continuous <Continuous>  ergocalciferol 98500 Unit(s) Oral every week  folic acid 1 milliGRAM(s) Oral daily  methotrexate (Non - oncologic) 17.5 milliGRAM(s) Oral <User Schedule>  sodium chloride 0.9%. 1000 milliLiter(s) IV Continuous <Continuous>      FAMILY HISTORY:  No pertinent family history in first degree relatives      Non-contributory    SOCIAL HISTORY:    No tobacco, drugs or etoh    Allergies    No Known Allergies    Intolerances    	    REVIEW OF SYSTEMS:  as above  The rest of the 14 points ROS reviewed and except above they are unremarkable.        PHYSICAL EXAM:  T(C): 36.6 (02-13-18 @ 11:44), Max: 38.2 (02-12-18 @ 18:16)  HR: 84 (02-13-18 @ 11:44) (72 - 96)  BP: 152/78 (02-13-18 @ 11:44) (122/62 - 179/89)  RR: 18 (02-13-18 @ 11:44) (18 - 18)  SpO2: 97% (02-13-18 @ 11:44) (92% - 97%)  Wt(kg): --  I&O's Summary    12 Feb 2018 07:01  -  13 Feb 2018 07:00  --------------------------------------------------------  IN: 0 mL / OUT: 50 mL / NET: -50 mL    13 Feb 2018 07:01  -  13 Feb 2018 17:54  --------------------------------------------------------  IN: 480 mL / OUT: 0 mL / NET: 480 mL        Appearance: Normal	  HEENT:   Normal oral mucosa, PERRL, EOMI	  Cardiovascular: Normal S1 S2,    Murmur: 2/6 nubia at rusb   Neck: JVP normal  Respiratory: Lungs rhonchi   Gastrointestinal:  Soft, Non-tender, + BS	  Skin: normal   Neuro: No gross deficits.   Psychiatry:  Mood & affect appropriate  Ext: No edema    LABS/DATA:    TELEMETRY: 	    ECG:  	< from: 12 Lead ECG (02.12.18 @ 18:08) >    Diagnosis Line NORMAL SINUS RHYTHM  LOW VOLTAGE QRS  RIGHT BUNDLE BRANCH BLOCK  ABNORMAL ECG    < end of copied text >     	  CARDIAC MARKERS:  Troponin T, Serum: <0.01 ng/mL (02-12 @ 19:00)      < from: Transthoracic Echocardiogram (08.01.17 @ 21:55) >  1. Mitral annular calcification, otherwise normal mitral  valve. Minimal mitral regurgitation.  2. Calcified trileaflet aortic valve with decreased  opening. Peak transaortic valve gradient equals 16 mm Hg,  mean transaortic valve gradient equals 10 mm Hg, estimated  aortic valve area equals 1.5 sqcm (by continuity equation),  aortic valve velocity time integral equals 42 cm,  consistent with moderate aortic stenosis. Mild aortic  regurgitation.  3. Normal left ventricular internal dimensions and wall  thicknesses.  4. Normal left ventricular systolic function. No segmental  wall motion abnormalities.  5. Reversal of the E-A waves of the mitral inflow pattern  consistent with reduced compliance of the left ventricle.  6. The right ventricle is not well visualized; grossly  normal right ventricular systolic function.  *** Compared with echocardiogram of 3/24/2014, no  significant changes noted.    < end of copied text >                              10.3   7.16  )-----------( 174      ( 13 Feb 2018 07:38 )             31.9     02-13    138  |  104  |  20  ----------------------------<  78  3.9   |  22  |  1.04    Ca    8.6      13 Feb 2018 07:49  Phos  3.9     02-13  Mg     1.7     02-13    TPro  6.9  /  Alb  3.0<L>  /  TBili  0.5  /  DBili  x   /  AST  26  /  ALT  11  /  AlkPhos  64  02-13    proBNP:   Lipid Profile:   HgA1c: Hemoglobin A1C, Whole Blood: 6.2 % (02-13 @ 07:38)    TSH: Thyroid Stimulating Hormone, Serum: 2.42 uIU/mL (02-13 @ 07:42)

## 2018-02-13 NOTE — DISCHARGE NOTE ADULT - ADDITIONAL INSTRUCTIONS
Follow up with Pulmono Follow up with Pulmonology for pulmonary function testing and follow up on interstitial lung disease  Follow up with  ENdocrinology, cardiology and PMD and Psychiatry

## 2018-02-14 DIAGNOSIS — R53.1 WEAKNESS: ICD-10-CM

## 2018-02-14 DIAGNOSIS — M06.9 RHEUMATOID ARTHRITIS, UNSPECIFIED: ICD-10-CM

## 2018-02-14 LAB
CRP SERPL-MCNC: 5.6 MG/DL — HIGH (ref 0–0.4)
CULTURE RESULTS: NO GROWTH — SIGNIFICANT CHANGE UP
ERYTHROCYTE [SEDIMENTATION RATE] IN BLOOD: 59 MM/HR — HIGH (ref 0–20)
GLUCOSE BLDC GLUCOMTR-MCNC: 164 MG/DL — HIGH (ref 70–99)
GLUCOSE BLDC GLUCOMTR-MCNC: 202 MG/DL — HIGH (ref 70–99)
GLUCOSE BLDC GLUCOMTR-MCNC: 227 MG/DL — HIGH (ref 70–99)
GLUCOSE BLDC GLUCOMTR-MCNC: 229 MG/DL — HIGH (ref 70–99)
HCT VFR BLD CALC: 32.8 % — LOW (ref 34.5–45)
HGB BLD-MCNC: 10.9 G/DL — LOW (ref 11.5–15.5)
MCHC RBC-ENTMCNC: 29.9 PG — SIGNIFICANT CHANGE UP (ref 27–34)
MCHC RBC-ENTMCNC: 33.2 GM/DL — SIGNIFICANT CHANGE UP (ref 32–36)
MCV RBC AUTO: 89.9 FL — SIGNIFICANT CHANGE UP (ref 80–100)
PLATELET # BLD AUTO: 175 K/UL — SIGNIFICANT CHANGE UP (ref 150–400)
PROCALCITONIN SERPL-MCNC: 0.13 NG/ML — HIGH (ref 0–0.04)
RBC # BLD: 3.65 M/UL — LOW (ref 3.8–5.2)
RBC # FLD: 16.1 % — HIGH (ref 10.3–14.5)
SPECIMEN SOURCE: SIGNIFICANT CHANGE UP
WBC # BLD: 6.53 K/UL — SIGNIFICANT CHANGE UP (ref 3.8–10.5)
WBC # FLD AUTO: 6.53 K/UL — SIGNIFICANT CHANGE UP (ref 3.8–10.5)

## 2018-02-14 RX ORDER — HEPARIN SODIUM 5000 [USP'U]/ML
5000 INJECTION INTRAVENOUS; SUBCUTANEOUS EVERY 12 HOURS
Qty: 0 | Refills: 0 | Status: DISCONTINUED | OUTPATIENT
Start: 2018-02-14 | End: 2018-02-22

## 2018-02-14 RX ADMIN — Medication 100 MILLIGRAM(S): at 06:37

## 2018-02-14 RX ADMIN — Medication 100 MILLIGRAM(S): at 21:33

## 2018-02-14 RX ADMIN — Medication 0.5 MILLIGRAM(S): at 17:23

## 2018-02-14 RX ADMIN — ERGOCALCIFEROL 50000 UNIT(S): 1.25 CAPSULE ORAL at 11:47

## 2018-02-14 RX ADMIN — Medication 25 MILLIGRAM(S): at 06:37

## 2018-02-14 RX ADMIN — Medication 3 MILLILITER(S): at 06:37

## 2018-02-14 RX ADMIN — LOSARTAN POTASSIUM 50 MILLIGRAM(S): 100 TABLET, FILM COATED ORAL at 06:37

## 2018-02-14 RX ADMIN — Medication 3 MILLILITER(S): at 00:49

## 2018-02-14 RX ADMIN — Medication 3 MILLILITER(S): at 11:48

## 2018-02-14 RX ADMIN — PREGABALIN 1000 MICROGRAM(S): 225 CAPSULE ORAL at 11:46

## 2018-02-14 RX ADMIN — Medication 0.5 MILLIGRAM(S): at 06:37

## 2018-02-14 RX ADMIN — Medication 4: at 17:22

## 2018-02-14 RX ADMIN — Medication 2: at 08:11

## 2018-02-14 RX ADMIN — INSULIN GLARGINE 10 UNIT(S): 100 INJECTION, SOLUTION SUBCUTANEOUS at 21:32

## 2018-02-14 RX ADMIN — Medication 30 MILLIGRAM(S): at 17:23

## 2018-02-14 RX ADMIN — Medication 30 MILLIGRAM(S): at 06:37

## 2018-02-14 RX ADMIN — Medication 1 MILLIGRAM(S): at 11:47

## 2018-02-14 RX ADMIN — Medication 100 MILLIGRAM(S): at 14:30

## 2018-02-14 RX ADMIN — HEPARIN SODIUM 5000 UNIT(S): 5000 INJECTION INTRAVENOUS; SUBCUTANEOUS at 17:42

## 2018-02-14 RX ADMIN — Medication 4: at 12:50

## 2018-02-14 RX ADMIN — Medication 3 MILLILITER(S): at 17:23

## 2018-02-14 RX ADMIN — SODIUM CHLORIDE 80 MILLILITER(S): 9 INJECTION INTRAMUSCULAR; INTRAVENOUS; SUBCUTANEOUS at 14:30

## 2018-02-14 NOTE — PROGRESS NOTE ADULT - SUBJECTIVE AND OBJECTIVE BOX
RODERICK CORTEZ:[id number],   86yFemale followed for: diarrhea  No Known Allergies    PAST MEDICAL & SURGICAL HISTORY:  RA (rheumatoid arthritis)  Asthma  DM (diabetes mellitus), type 2  HTN (hypertension), benign  After-cataract of left eye  Tubal occlusion    FAMILY HISTORY:  No pertinent family history in first degree relatives    MEDICATIONS  (STANDING):  ALBUTerol/ipratropium for Nebulization 3 milliLiter(s) Nebulizer four times a day  benzonatate 100 milliGRAM(s) Oral three times a day  buDESOnide   0.5 milliGRAM(s) Respule 0.5 milliGRAM(s) Inhalation two times a day  cyanocobalamin 1000 MICROGram(s) Oral daily  dextrose 5%. 1000 milliLiter(s) (50 mL/Hr) IV Continuous <Continuous>  dextrose 50% Injectable 12.5 Gram(s) IV Push once  dextrose 50% Injectable 25 Gram(s) IV Push once  dextrose 50% Injectable 25 Gram(s) IV Push once  ergocalciferol 58092 Unit(s) Oral every week  folic acid 1 milliGRAM(s) Oral daily  heparin  Injectable 5000 Unit(s) SubCutaneous every 12 hours  hydrochlorothiazide 25 milliGRAM(s) Oral daily  insulin glargine Injectable (LANTUS) 10 Unit(s) SubCutaneous at bedtime  insulin lispro (HumaLOG) corrective regimen sliding scale   SubCutaneous three times a day before meals  insulin lispro (HumaLOG) corrective regimen sliding scale   SubCutaneous at bedtime  losartan 50 milliGRAM(s) Oral daily  methotrexate (Non - oncologic) 17.5 milliGRAM(s) Oral <User Schedule>  predniSONE   Tablet 30 milliGRAM(s) Oral two times a day  sodium chloride 0.9%. 1000 milliLiter(s) (80 mL/Hr) IV Continuous <Continuous>    MEDICATIONS  (PRN):  dextrose Gel 1 Dose(s) Oral once PRN Blood Glucose LESS THAN 70 milliGRAM(s)/deciliter  glucagon  Injectable 1 milliGRAM(s) IntraMuscular once PRN Glucose LESS THAN 70 milligrams/deciliter  HYDROcodone/homatropine Syrup 5 milliLiter(s) Oral at bedtime PRN Cough  ondansetron Injectable 4 milliGRAM(s) IV Push every 6 hours PRN Nausea and/or Vomiting      Vital Signs Last 24 Hrs  T(C): 36.4 (14 Feb 2018 03:45), Max: 36.9 (13 Feb 2018 20:54)  T(F): 97.5 (14 Feb 2018 03:45), Max: 98.5 (13 Feb 2018 20:54)  HR: 80 (14 Feb 2018 03:45) (80 - 84)  BP: 126/82 (14 Feb 2018 03:45) (126/82 - 170/84)  BP(mean): --  RR: 18 (14 Feb 2018 03:45) (18 - 18)  SpO2: 96% (14 Feb 2018 03:45) (96% - 97%)  nc/at  s1s2  cta  soft, nt, nd no guarding or rebound  no c/c/e    CBC Full  -  ( 14 Feb 2018 07:44 )  WBC Count : 6.53 K/uL  Hemoglobin : 10.9 g/dL  Hematocrit : 32.8 %  Platelet Count - Automated : 175 K/uL  Mean Cell Volume : 89.9 fl  Mean Cell Hemoglobin : 29.9 pg  Mean Cell Hemoglobin Concentration : 33.2 gm/dL  Auto Neutrophil # : x  Auto Lymphocyte # : x  Auto Monocyte # : x  Auto Eosinophil # : x  Auto Basophil # : x  Auto Neutrophil % : x  Auto Lymphocyte % : x  Auto Monocyte % : x  Auto Eosinophil % : x  Auto Basophil % : x    02-13    138  |  104  |  20  ----------------------------<  78  3.9   |  22  |  1.04    Ca    8.6      13 Feb 2018 07:49  Phos  3.9     02-13  Mg     1.7     02-13    TPro  6.9  /  Alb  3.0<L>  /  TBili  0.5  /  DBili  x   /  AST  26  /  ALT  11  /  AlkPhos  64  02-13    PT/INR - ( 12 Feb 2018 18:28 )   PT: 12.2 sec;   INR: 1.13 ratio         PTT - ( 12 Feb 2018 18:28 )  PTT:30.7 sec

## 2018-02-14 NOTE — PROGRESS NOTE ADULT - ASSESSMENT
86 y/o fmeale with hx of RA on mtx , and was on prednisone ( no longer on steroids since 09/2017  and has not recieved iv infusion  ( monthly simponi ) in three months . also history of HTN and DM .. has been admitted twice over the past 6-8 months with  weakness . initial admit pt was thought ot have an element of adrenal insufficeiny when she was on steroids .. and pt was sent out on steroids with some improvement,.. later admitted as lij for weakness thought to be multifactorial sec to viral illness, dehydration , and leemt of steroid -induced myopathy. pt was sent to rehab and overall improved but never to baseline.. over the past three days pt has been having worsening generalized weakness , cough with production of sputum ( color?), decreased po intake and decreased mobility.  no focal neuro complaints  no CP/SOB   had had few episodes of N/V /D at home   no recent abx   no abdo pain or urinary Sx       1- Cough / weakness:  hMPV     cont steroids , nebs   hold off on antibiotics for now ..    CT old findings with mosaic changes   2- N/V /D : seems to be part of viral illness ..improved   tolerating  diet   Zofran  and ppi       3- DM:cont insulin   4- Decreased mobility :  nonfocal neuro exam  .. had had mri brain with no acute findings   in past pt was thought to might have had an element of steroid induced myopathy however now off prednisone .. check CPK     dvt proph

## 2018-02-14 NOTE — PROGRESS NOTE ADULT - ASSESSMENT
HmPV viral tracheobronchitis with mild bronchospasm: clinically improving with prednisone  RA with pulmonary fribrosis  No clinical evidence bacterial pneumonia    REC    Continue pred 30 bid - will initiate taper per clinical status  Continue bronchodilators  Monitor O2 sats  Observe off abx

## 2018-02-14 NOTE — PROGRESS NOTE ADULT - SUBJECTIVE AND OBJECTIVE BOX
Follow-up Pulm Progress Note  Abrahan Flannery MD  [phone]    No new respiratory events overnight.    Feels better today: OOB with O2 sat 95% on RA  Wheezing improved    Medications:  Vital Signs Last 24 Hrs  T(C): 36.7 (14 Feb 2018 11:56), Max: 36.9 (13 Feb 2018 20:54)  T(F): 98 (14 Feb 2018 11:56), Max: 98.5 (13 Feb 2018 20:54)  HR: 79 (14 Feb 2018 11:56) (79 - 80)  BP: 159/85 (14 Feb 2018 11:56) (126/82 - 170/84)  BP(mean): --  RR: 18 (14 Feb 2018 11:56) (18 - 18)  SpO2: 96% (14 Feb 2018 11:56) (96% - 99%)      02-13 @ 07:01  -  02-14 @ 07:00  --------------------------------------------------------  IN: 1570 mL / OUT: 300 mL / NET: 1270 mL        LABS:                        10.9   6.53  )-----------( 175      ( 14 Feb 2018 07:44 )             32.8     02-13    138  |  104  |  20  ----------------------------<  78  3.9   |  22  |  1.04    Ca    8.6      13 Feb 2018 07:49  Phos  3.9     02-13  Mg     1.7     02-13    TPro  6.9  /  Alb  3.0<L>  /  TBili  0.5  /  DBili  x   /  AST  26  /  ALT  11  /  AlkPhos  64  02-13      PT/INR - ( 12 Feb 2018 18:28 )   PT: 12.2 sec;   INR: 1.13 ratio         PTT - ( 12 Feb 2018 18:28 )  PTT:30.7 sec    CULTURES:  Culture Results:   No growth (02-13 @ 03:07)  Culture Results:   No growth to date. (02-12 @ 22:01)  Culture Results:   No growth to date. (02-12 @ 22:01)    Most recent blood culture -- 02-13 @ 03:07   -- -- .Urine Clean Catch (Midstream) 02-13 @ 03:07  Most recent blood culture -- 02-12 @ 22:01   -- -- .Blood Blood 02-12 @ 22:01      Physical Examination:  PULM: Bilateral coarse exp rhonchi, rare wheeze  CVS: Regular rate and rhythm, no murmurs, rubs, or gallops  ABD: Soft, non-tender  EXT:  No clubbing, cyanosis, or edema    RADIOLOGY REVIEWED  CXR:    CT chest:    TTE:

## 2018-02-14 NOTE — PHYSICAL THERAPY INITIAL EVALUATION ADULT - GAIT DEVIATIONS NOTED, PT EVAL
decreased stride length/decreased weight-shifting ability/increased time in double stance/decreased fred/decreased step length

## 2018-02-14 NOTE — PHYSICAL THERAPY INITIAL EVALUATION ADULT - IMPAIRMENTS CONTRIBUTING TO GAIT DEVIATIONS, PT EVAL
impaired postural control/decreased strength/decrease endurance , intermittent min unsteady pt maintain balance on own with cg of 1/impaired balance

## 2018-02-14 NOTE — PHYSICAL THERAPY INITIAL EVALUATION ADULT - ADDITIONAL COMMENTS
lives with spouse in house with few steps with rail to enter , uses rolling walker to amb ,independent with ADL's ; pt has working glucometer and takes finger sticks independent ; decline naming caregiver lives with spouse in house with few steps with rail to enter , uses rolling walker to amb , pt report has women that assists her private hire everyday with adl's washing , dressing and spouse assist as need ;pt report she is never left alone always has someone to assist ;pt has walk in shower that was just done and grab bars being installed and has shower seat ; pt has working glucometer and takes finger sticks independent ; decline naming caregiver

## 2018-02-14 NOTE — PHYSICAL THERAPY INITIAL EVALUATION ADULT - IMPAIRED TRANSFERS: SIT/STAND, REHAB EVAL
impaired postural control/decreased strength/decrease endurance , vc x1 for proper hand placement with sit-stand transfer ; perform x 2 ; toilet transfer cg of 1/impaired balance

## 2018-02-14 NOTE — PHYSICAL THERAPY INITIAL EVALUATION ADULT - PRECAUTIONS/LIMITATIONS, REHAB EVAL
86 y/o fmeale with hx of RA on mtx ,h/o TB and treated in remote past  and was on prednisone ( no longer on steroids since 09/2017  and has not recieved iv infusion  ( monthly simponi ) in three months . also history of HTN and DM .. has been admitted twice over the past 6-8 months with  weakness . initial admit pt was thought ot have an element of adrenal insufficeiny when she was on steroids .. and pt was sent out on steroids with some improvement,.. later admitted as lij for weakness thought to be multifactorial sec to viral illness, dehydration , and leemt of steroid -induced myopathy. pt was sent to rehab and overall improved but never to baseline.. over the past three days pt has been having worsening generalized weakness , cough with production of sputum ( color?), decreased po intake and decreased mobility. 84 y/o fmeale with hx of RA on mtx ,h/o TB and treated in remote past  and was on prednisone ( no longer on steroids since 09/2017  and has not recieved iv infusion  ( monthly simponi ) in three months . also history of HTN and DM .. has been admitted twice over the past 6-8 months with  weakness . initial admit pt was thought ot have an element of adrenal insufficeiny when she was on steroids .. and pt was sent out on steroids with some improvement,.. later admitted as lij for weakness thought to be multifactorial sec to viral illness, dehydration , and leemt of steroid -induced myopathy. pt was sent to rehab and overall improved but never to baseline.. over the past three days pt has been having worsening generalized weakness , cough with production of sputum ( color?), decreased po intake and decreased mobility./isolation precautions

## 2018-02-14 NOTE — PHYSICAL THERAPY INITIAL EVALUATION ADULT - ASR EQUIP NEEDS DISCH PT EVAL
pt hs rolling walker , transport w/c , shower chair and functioning glucometer , pt has private assist of women for adl's assist with bath and dress and household adl's ; spouse assist as need ;pt report has family nieces that come in and asisst and check on daily ;pt report never left alone always someone with her to assist as need

## 2018-02-14 NOTE — PHYSICAL THERAPY INITIAL EVALUATION ADULT - GENERAL OBSERVATIONS, REHAB EVAL
pt received in bed nad pt on 2 L n o2 99% ,pt taken off nc O2 at rest 96% , pt amb with rolling walker as below 95 % during amb with assist no SOB , minimal coughing this am (pt report yesterday terrible day with coughing today better so far ) instructed to leave nc O2 off for now per rn Elisabeth has been off 45 min and remain at 95 % ; pt has nc O2 in reach if need; contact isolation precautions maintained

## 2018-02-14 NOTE — PHYSICAL THERAPY INITIAL EVALUATION ADULT - PERTINENT HX OF CURRENT PROBLEM, REHAB EVAL
CT CHEST 2/12/18 stable mosaic attenuation/pulmonary scar, chronic interstitial lung dz , 6 mm nodule juxtapleural thick RML unchanged ; EKG NSR, RBBB

## 2018-02-14 NOTE — PHYSICAL THERAPY INITIAL EVALUATION ADULT - TRANSFER SAFETY CONCERNS NOTED: SIT/STAND, REHAB EVAL
decreased balance during turns/decreased step length/losing balance/decreased weight-shifting ability

## 2018-02-14 NOTE — CONSULT NOTE ADULT - SUBJECTIVE AND OBJECTIVE BOX
HISTORY OF PRESENT ILLNESS:  86 yo female with long h/o RA, as well as asthma, diabetes, HTN, AS.  She is maintained on MTX 17.5mg/week  She p/w cough, low grade fever, weakness, diarrhea.  Her joints are feeling very well, though she is currently on high dose steroids due to asthma exac.  Had increased cough yesterday; today somewhat better    PAST MEDICAL & SURGICAL HISTORY:  RA (rheumatoid arthritis)  Asthma  DM (diabetes mellitus), type 2  HTN (hypertension), benign  After-cataract of left eye  Tubal occlusion        MEDICATIONS  (STANDING):  ALBUTerol/ipratropium for Nebulization 3 milliLiter(s) Nebulizer four times a day  benzonatate 100 milliGRAM(s) Oral three times a day  buDESOnide   0.5 milliGRAM(s) Respule 0.5 milliGRAM(s) Inhalation two times a day  cyanocobalamin 1000 MICROGram(s) Oral daily  dextrose 5%. 1000 milliLiter(s) (50 mL/Hr) IV Continuous <Continuous>  dextrose 50% Injectable 12.5 Gram(s) IV Push once  dextrose 50% Injectable 25 Gram(s) IV Push once  dextrose 50% Injectable 25 Gram(s) IV Push once  ergocalciferol 17586 Unit(s) Oral every week  folic acid 1 milliGRAM(s) Oral daily  heparin  Injectable 5000 Unit(s) SubCutaneous every 12 hours  hydrochlorothiazide 25 milliGRAM(s) Oral daily  insulin glargine Injectable (LANTUS) 10 Unit(s) SubCutaneous at bedtime  insulin lispro (HumaLOG) corrective regimen sliding scale   SubCutaneous three times a day before meals  insulin lispro (HumaLOG) corrective regimen sliding scale   SubCutaneous at bedtime  losartan 50 milliGRAM(s) Oral daily  methotrexate (Non - oncologic) 17.5 milliGRAM(s) Oral <User Schedule>  predniSONE   Tablet 30 milliGRAM(s) Oral two times a day  sodium chloride 0.9%. 1000 milliLiter(s) (80 mL/Hr) IV Continuous <Continuous>    MEDICATIONS  (PRN):  dextrose Gel 1 Dose(s) Oral once PRN Blood Glucose LESS THAN 70 milliGRAM(s)/deciliter  glucagon  Injectable 1 milliGRAM(s) IntraMuscular once PRN Glucose LESS THAN 70 milligrams/deciliter  HYDROcodone/homatropine Syrup 5 milliLiter(s) Oral at bedtime PRN Cough  ondansetron Injectable 4 milliGRAM(s) IV Push every 6 hours PRN Nausea and/or Vomiting      Allergies    No Known Allergies    Intolerances        PERTINENT MEDICATION HISTORY:    SOCIAL HISTORY:    FAMILY HISTORY:  No pertinent family history in first degree relatives      Vital Signs Last 24 Hrs  T(C): 36.4 (2018 03:45), Max: 36.9 (2018 20:54)  T(F): 97.5 (2018 03:45), Max: 98.5 (2018 20:54)  HR: 80 (2018 09:10) (80 - 84)  BP: 156/84 (2018 09:10) (126/82 - 170/84)  BP(mean): --  RR: 18 (2018 09:10) (18 - 18)  SpO2: 99% (2018 09:10) (96% - 99%)    Daily     Daily     PHYSICAL EXAM:      Constitutional:  well appearing  Eyes:    ENMT:    Neck:  supple, no masses    Breasts:    Back:    Respiratory:  Fair AE, +rales, +wheezes    Cardiovascular:  s1s2 regular    Gastrointestinal:  abd-soft nt, nd    Genitourinary:    Rectal:    Extremities:    Vascular:    Neurological:    Skin:    Lymph Nodes:    Musculoskeletal:  No active synovitis-good ROM of joints, no significant hand and wrist swelling    Psychiatric:  appropriate, alert      LABS:                        10.9   6.53  )-----------( 175      ( 2018 07:44 )             32.8     02-13    138  |  104  |  20  ----------------------------<  78  3.9   |  22  |  1.04    Ca    8.6      2018 07:49  Phos  3.9     02-13  Mg     1.7     02-13    TPro  6.9  /  Alb  3.0<L>  /  TBili  0.5  /  DBili  x   /  AST  26  /  ALT  11  /  AlkPhos  64  02-13    PT/INR - ( 2018 18:28 )   PT: 12.2 sec;   INR: 1.13 ratio         PTT - ( 2018 18:28 )  PTT:30.7 sec  Urinalysis Basic - ( 2018 22:51 )    Color: Yellow / Appearance: Clear / S.021 / pH: x  Gluc: x / Ketone: Negative  / Bili: Negative / Urobili: Negative   Blood: x / Protein: 30 mg/dL / Nitrite: Negative   Leuk Esterase: Negative / RBC: 0-2 /HPF / WBC 0-2 /HPF   Sq Epi: x / Non Sq Epi: OCC /HPF / Bacteria: Few /HPF        RADIOLOGY & ADDITIONAL STUDIES:

## 2018-02-14 NOTE — CONSULT NOTE ADULT - ASSESSMENT
Imp:  86 yo female with long h/o RA, a/w viral illness and asthma exac.  Arthritis currently under good control on MTX and especially now that she is on high dose steroids.    Rec:  continue MTX 17.5mg/week  Steroid taper as per pulmonary   can follow with rheum as outpt

## 2018-02-14 NOTE — PROGRESS NOTE ADULT - ASSESSMENT
HTN  labile  but elevated  consider adding norvasc for now and eventually BB given AS once her bronchitis is resolved     AS  moderate  stable  consider DC IVF if pt can tolerate PO and diarrhea resolves    hMPV   nebs  fu with pulm

## 2018-02-14 NOTE — PROGRESS NOTE ADULT - SUBJECTIVE AND OBJECTIVE BOX
Subjective: Patient seen and examined. No new events except as noted.     SUBJECTIVE/ROS:  has cough       MEDICATIONS:  MEDICATIONS  (STANDING):  ALBUTerol/ipratropium for Nebulization 3 milliLiter(s) Nebulizer four times a day  benzonatate 100 milliGRAM(s) Oral three times a day  buDESOnide   0.5 milliGRAM(s) Respule 0.5 milliGRAM(s) Inhalation two times a day  cyanocobalamin 1000 MICROGram(s) Oral daily  dextrose 5%. 1000 milliLiter(s) (50 mL/Hr) IV Continuous <Continuous>  dextrose 50% Injectable 12.5 Gram(s) IV Push once  dextrose 50% Injectable 25 Gram(s) IV Push once  dextrose 50% Injectable 25 Gram(s) IV Push once  ergocalciferol 91391 Unit(s) Oral every week  folic acid 1 milliGRAM(s) Oral daily  heparin  Injectable 5000 Unit(s) SubCutaneous every 12 hours  hydrochlorothiazide 25 milliGRAM(s) Oral daily  insulin glargine Injectable (LANTUS) 10 Unit(s) SubCutaneous at bedtime  insulin lispro (HumaLOG) corrective regimen sliding scale   SubCutaneous three times a day before meals  insulin lispro (HumaLOG) corrective regimen sliding scale   SubCutaneous at bedtime  losartan 50 milliGRAM(s) Oral daily  methotrexate (Non - oncologic) 17.5 milliGRAM(s) Oral <User Schedule>  predniSONE   Tablet 30 milliGRAM(s) Oral two times a day  sodium chloride 0.9%. 1000 milliLiter(s) (80 mL/Hr) IV Continuous <Continuous>      PHYSICAL EXAM:  T(C): 36.7 (02-14-18 @ 11:56), Max: 36.9 (02-13-18 @ 20:54)  HR: 79 (02-14-18 @ 11:56) (79 - 80)  BP: 159/85 (02-14-18 @ 11:56) (126/82 - 170/84)  RR: 18 (02-14-18 @ 11:56) (18 - 18)  SpO2: 96% (02-14-18 @ 11:56) (96% - 99%)  Wt(kg): --  I&O's Summary    13 Feb 2018 07:01  -  14 Feb 2018 07:00  --------------------------------------------------------  IN: 1570 mL / OUT: 300 mL / NET: 1270 mL    14 Feb 2018 07:01  -  14 Feb 2018 17:52  --------------------------------------------------------  IN: 480 mL / OUT: 0 mL / NET: 480 mL      JVP: Normal  Neck: supple  Lung: clear   CV: S1 S2 , Murmur:  Abd: soft  Ext: No edema  neuro: Awake / alert  Psych: flat affect  Skin: normal       LABS/DATA:    CARDIAC MARKERS:  CARDIAC MARKERS ( 13 Feb 2018 11:04 )  x     / x     / 61 U/L / x     / x      CARDIAC MARKERS ( 12 Feb 2018 19:00 )  x     / <0.01 ng/mL / x     / x     / 1.8 ng/mL                                10.9   6.53  )-----------( 175      ( 14 Feb 2018 07:44 )             32.8     02-13    138  |  104  |  20  ----------------------------<  78  3.9   |  22  |  1.04    Ca    8.6      13 Feb 2018 07:49  Phos  3.9     02-13  Mg     1.7     02-13    TPro  6.9  /  Alb  3.0<L>  /  TBili  0.5  /  DBili  x   /  AST  26  /  ALT  11  /  AlkPhos  64  02-13    proBNP:   Lipid Profile:   HgA1c:   TSH:     TELE:  EKG:

## 2018-02-14 NOTE — PHYSICAL THERAPY INITIAL EVALUATION ADULT - DISCHARGE DISPOSITION, PT EVAL
home w/ home PT/home w/ assist/to increase functional mobility , strength, balance , endurance , fall prevention education ,pt wear red non skid socks in session

## 2018-02-14 NOTE — PROGRESS NOTE ADULT - SUBJECTIVE AND OBJECTIVE BOX
Patient is a 86y old  Female who presents with a chief complaint of weakness   cough   diarreah (13 Feb 2018 12:58)      NEUROLOGIC EXAM  Mental Status:     Oriented to time/place/person; Good eye contact ; follow simple commands ;  Age appropriate language  and fund of  knowledge.  Cranial Nerves:   PERRL, EOMI, no facial asymmetry , V1-V3 intact , symmetric palate, tongue midline.   Muscle Strength:	5/5 in all extremities except b/l hip flexors 4/5  Muscle Tone:	Normal tone  Deep Tendon Reflexes:         1+/4  : Biceps, Brachioradialis, Triceps Bilateral;  2+/4 : Pattelar, Ankle bilateral. No clonus.  Plantar Response:	Plantar reflexes flexion bilaterally  Sensation:		Intact to pain, light touch, temperature and vibration throughout.     Tandem Gait/Romberg	not tested, patient states she was able to use the walker to walk to bathroom      Vital Signs Last 24 Hrs  T(C): 36.4 (14 Feb 2018 03:45), Max: 36.9 (13 Feb 2018 20:54)  T(F): 97.5 (14 Feb 2018 03:45), Max: 98.5 (13 Feb 2018 20:54)  HR: 80 (14 Feb 2018 09:10) (80 - 84)  BP: 156/84 (14 Feb 2018 09:10) (126/82 - 170/84)  BP(mean): --  RR: 18 (14 Feb 2018 09:10) (18 - 18)  SpO2: 99% (14 Feb 2018 09:10) (96% - 99%)  MEDICATIONS  (STANDING):  ALBUTerol/ipratropium for Nebulization 3 milliLiter(s) Nebulizer four times a day  benzonatate 100 milliGRAM(s) Oral three times a day  buDESOnide   0.5 milliGRAM(s) Respule 0.5 milliGRAM(s) Inhalation two times a day  cyanocobalamin 1000 MICROGram(s) Oral daily  dextrose 5%. 1000 milliLiter(s) (50 mL/Hr) IV Continuous <Continuous>  dextrose 50% Injectable 12.5 Gram(s) IV Push once  dextrose 50% Injectable 25 Gram(s) IV Push once  dextrose 50% Injectable 25 Gram(s) IV Push once  ergocalciferol 31803 Unit(s) Oral every week  folic acid 1 milliGRAM(s) Oral daily  heparin  Injectable 5000 Unit(s) SubCutaneous every 12 hours  hydrochlorothiazide 25 milliGRAM(s) Oral daily  insulin glargine Injectable (LANTUS) 10 Unit(s) SubCutaneous at bedtime  insulin lispro (HumaLOG) corrective regimen sliding scale   SubCutaneous three times a day before meals  insulin lispro (HumaLOG) corrective regimen sliding scale   SubCutaneous at bedtime  losartan 50 milliGRAM(s) Oral daily  methotrexate (Non - oncologic) 17.5 milliGRAM(s) Oral <User Schedule>  predniSONE   Tablet 30 milliGRAM(s) Oral two times a day  sodium chloride 0.9%. 1000 milliLiter(s) (80 mL/Hr) IV Continuous <Continuous>    MEDICATIONS  (PRN):  dextrose Gel 1 Dose(s) Oral once PRN Blood Glucose LESS THAN 70 milliGRAM(s)/deciliter  glucagon  Injectable 1 milliGRAM(s) IntraMuscular once PRN Glucose LESS THAN 70 milligrams/deciliter  HYDROcodone/homatropine Syrup 5 milliLiter(s) Oral at bedtime PRN Cough  ondansetron Injectable 4 milliGRAM(s) IV Push every 6 hours PRN Nausea and/or Vomiting    < from: MRI Head w/o Cont (10.23.17 @ 11:35) >    IMPRESSION:  Moderate to severe severity microvascular ischemic change.  No acute or subacute infarction.        < end of copied text >  < from: MRI Cervical Spine w/o Cont (10.23.17 @ 11:35) >      IMPRESSION:  The examination is limited by patient motion.  Multilevel degenerative spondylosis.  No cord compression.        < end of copied text >

## 2018-02-14 NOTE — PROGRESS NOTE ADULT - SUBJECTIVE AND OBJECTIVE BOX
Patient is a 86y old  Female who presents with a chief complaint of weakness   cough   diarreah (13 Feb 2018 12:58)                                                               INTERVAL HPI/OVERNIGHT EVENTS:    REVIEW OF SYSTEMS:     CONSTITUTIONAL: No weakness, fevers or chills  EYES/ENT: No visual changes , no ear ache   NECK: No pain or stiffness  RESPIRATORY: No cough, wheezing,  No shortness of breath  CARDIOVASCULAR: No chest pain or palpitations  GASTROINTESTINAL: No abdominal pain  . No nausea, vomiting, or hematemesis; No diarrhea or constipation. No melena or hematochezia.  GENITOURINARY: No dysuria, frequency or hematuria  NEUROLOGICAL: No numbness or weakness  SKIN: No itching, burning, rashes, or lesions                                                                                                                                                                                                                                                                                 Medications:  MEDICATIONS  (STANDING):  ALBUTerol/ipratropium for Nebulization 3 milliLiter(s) Nebulizer four times a day  benzonatate 100 milliGRAM(s) Oral three times a day  buDESOnide   0.5 milliGRAM(s) Respule 0.5 milliGRAM(s) Inhalation two times a day  cyanocobalamin 1000 MICROGram(s) Oral daily  dextrose 5%. 1000 milliLiter(s) (50 mL/Hr) IV Continuous <Continuous>  dextrose 50% Injectable 12.5 Gram(s) IV Push once  dextrose 50% Injectable 25 Gram(s) IV Push once  dextrose 50% Injectable 25 Gram(s) IV Push once  ergocalciferol 97667 Unit(s) Oral every week  folic acid 1 milliGRAM(s) Oral daily  hydrochlorothiazide 25 milliGRAM(s) Oral daily  insulin glargine Injectable (LANTUS) 10 Unit(s) SubCutaneous at bedtime  insulin lispro (HumaLOG) corrective regimen sliding scale   SubCutaneous three times a day before meals  insulin lispro (HumaLOG) corrective regimen sliding scale   SubCutaneous at bedtime  losartan 50 milliGRAM(s) Oral daily  methotrexate (Non - oncologic) 17.5 milliGRAM(s) Oral <User Schedule>  predniSONE   Tablet 30 milliGRAM(s) Oral two times a day  sodium chloride 0.9%. 1000 milliLiter(s) (80 mL/Hr) IV Continuous <Continuous>    MEDICATIONS  (PRN):  dextrose Gel 1 Dose(s) Oral once PRN Blood Glucose LESS THAN 70 milliGRAM(s)/deciliter  glucagon  Injectable 1 milliGRAM(s) IntraMuscular once PRN Glucose LESS THAN 70 milligrams/deciliter  HYDROcodone/homatropine Syrup 5 milliLiter(s) Oral at bedtime PRN Cough  ondansetron Injectable 4 milliGRAM(s) IV Push every 6 hours PRN Nausea and/or Vomiting       Allergies    No Known Allergies    Intolerances      Vital Signs Last 24 Hrs  T(C): 36.4 (14 Feb 2018 03:45), Max: 36.9 (13 Feb 2018 20:54)  T(F): 97.5 (14 Feb 2018 03:45), Max: 98.5 (13 Feb 2018 20:54)  HR: 80 (14 Feb 2018 03:45) (80 - 84)  BP: 126/82 (14 Feb 2018 03:45) (126/82 - 170/84)  BP(mean): --  RR: 18 (14 Feb 2018 03:45) (18 - 18)  SpO2: 96% (14 Feb 2018 03:45) (96% - 97%)  CAPILLARY BLOOD GLUCOSE      POCT Blood Glucose.: 199 mg/dL (13 Feb 2018 21:43)  POCT Blood Glucose.: 129 mg/dL (13 Feb 2018 17:18)  POCT Blood Glucose.: 119 mg/dL (13 Feb 2018 11:49)  POCT Blood Glucose.: 98 mg/dL (13 Feb 2018 08:01)      02-12 @ 07:01  -  02-13 @ 07:00  --------------------------------------------------------  IN: 0 mL / OUT: 50 mL / NET: -50 mL    02-13 @ 07:01  -  02-14 @ 06:03  --------------------------------------------------------  IN: 1450 mL / OUT: 0 mL / NET: 1450 mL      Physical Exam:    Daily     Daily   General:  Well appearing, NAD, not cachetic  HEENT:  Nonicteric, PERRLA  CV:  RRR, S1S2   Lungs:  CTA B/L, no wheezes, rales, rhonchi  Abdomen:  Soft, non-tender, no distended, positive BS  Extremities:  2+ pulses, no c/c, no edema  Skin:  Warm and dry, no rashes  :  No mason  Neuro:  AAOx3, non-focal, grossly intact                                                                                                                                                                                                                                                                                                LABS:                               10.3   7.16  )-----------( 174      ( 13 Feb 2018 07:38 )             31.9                      02-13    138  |  104  |  20  ----------------------------<  78  3.9   |  22  |  1.04    Ca    8.6      13 Feb 2018 07:49  Phos  3.9     02-13  Mg     1.7     02-13    TPro  6.9  /  Alb  3.0<L>  /  TBili  0.5  /  DBili  x   /  AST  26  /  ALT  11  /  AlkPhos  64  02-13                       RADIOLOGY & ADDITIONAL TESTS         I personally reviewed: [  ]EKG   [  ]CXR    [  ] CT      A/P:         Discussed with :     [employer] consultants' Notes   Time spent : Patient is a 86y old  Female who presents with a chief complaint of weakness   cough   diarreah (13 Feb 2018 12:58)                                                               INTERVAL HPI/OVERNIGHT EVENTS:    REVIEW OF SYSTEMS:     CONSTITUTIONAL: No weakness, fevers or chills  RESPIRATORY: improving  cough,  No shortness of breath  CARDIOVASCULAR: No chest pain or palpitations  GASTROINTESTINAL: No abdominal pain  . No nausea, vomiting,   GENITOURINARY: No dysuria, frequency   NEUROLOGICAL: No numbness or weakness  \                                                                                                                                                                                                                                                                                 Medications:  MEDICATIONS  (STANDING):  ALBUTerol/ipratropium for Nebulization 3 milliLiter(s) Nebulizer four times a day  benzonatate 100 milliGRAM(s) Oral three times a day  buDESOnide   0.5 milliGRAM(s) Respule 0.5 milliGRAM(s) Inhalation two times a day  cyanocobalamin 1000 MICROGram(s) Oral daily  dextrose 5%. 1000 milliLiter(s) (50 mL/Hr) IV Continuous <Continuous>  dextrose 50% Injectable 12.5 Gram(s) IV Push once  dextrose 50% Injectable 25 Gram(s) IV Push once  dextrose 50% Injectable 25 Gram(s) IV Push once  ergocalciferol 36583 Unit(s) Oral every week  folic acid 1 milliGRAM(s) Oral daily  hydrochlorothiazide 25 milliGRAM(s) Oral daily  insulin glargine Injectable (LANTUS) 10 Unit(s) SubCutaneous at bedtime  insulin lispro (HumaLOG) corrective regimen sliding scale   SubCutaneous three times a day before meals  insulin lispro (HumaLOG) corrective regimen sliding scale   SubCutaneous at bedtime  losartan 50 milliGRAM(s) Oral daily  methotrexate (Non - oncologic) 17.5 milliGRAM(s) Oral <User Schedule>  predniSONE   Tablet 30 milliGRAM(s) Oral two times a day  sodium chloride 0.9%. 1000 milliLiter(s) (80 mL/Hr) IV Continuous <Continuous>    MEDICATIONS  (PRN):  dextrose Gel 1 Dose(s) Oral once PRN Blood Glucose LESS THAN 70 milliGRAM(s)/deciliter  glucagon  Injectable 1 milliGRAM(s) IntraMuscular once PRN Glucose LESS THAN 70 milligrams/deciliter  HYDROcodone/homatropine Syrup 5 milliLiter(s) Oral at bedtime PRN Cough  ondansetron Injectable 4 milliGRAM(s) IV Push every 6 hours PRN Nausea and/or Vomiting       Allergies    No Known Allergies    Intolerances      Vital Signs Last 24 Hrs  T(C): 36.4 (14 Feb 2018 03:45), Max: 36.9 (13 Feb 2018 20:54)  T(F): 97.5 (14 Feb 2018 03:45), Max: 98.5 (13 Feb 2018 20:54)  HR: 80 (14 Feb 2018 03:45) (80 - 84)  BP: 126/82 (14 Feb 2018 03:45) (126/82 - 170/84)  BP(mean): --  RR: 18 (14 Feb 2018 03:45) (18 - 18)  SpO2: 96% (14 Feb 2018 03:45) (96% - 97%)  CAPILLARY BLOOD GLUCOSE      POCT Blood Glucose.: 199 mg/dL (13 Feb 2018 21:43)  POCT Blood Glucose.: 129 mg/dL (13 Feb 2018 17:18)  POCT Blood Glucose.: 119 mg/dL (13 Feb 2018 11:49)  POCT Blood Glucose.: 98 mg/dL (13 Feb 2018 08:01)      02-12 @ 07:01  -  02-13 @ 07:00  --------------------------------------------------------  IN: 0 mL / OUT: 50 mL / NET: -50 mL    02-13 @ 07:01  -  02-14 @ 06:03  --------------------------------------------------------  IN: 1450 mL / OUT: 0 mL / NET: 1450 mL      Physical Exam:    General:  elderly NAD   HEENT:  Nonicteric, PERRLA  CV:  RRR, S1S2   Lungs:  Ronchi b . wheezing   Abdomen:  Soft, non-tender, no distended, positive BS  Extremities: non pitting edema  Skin:  Warm and dry, no rashes  :  No mason  Neuro:  AAOx3, non-focal, grossly intact                                                                                                                                                                                                                                                                                                LABS:                               10.3   7.16  )-----------( 174      ( 13 Feb 2018 07:38 )             31.9                      02-13    138  |  104  |  20  ----------------------------<  78  3.9   |  22  |  1.04    Ca    8.6      13 Feb 2018 07:49  Phos  3.9     02-13  Mg     1.7     02-13    TPro  6.9  /  Alb  3.0<L>  /  TBili  0.5  /  DBili  x   /  AST  26  /  ALT  11  /  AlkPhos  64  02-13

## 2018-02-15 LAB
ALBUMIN SERPL ELPH-MCNC: 3.1 G/DL — LOW (ref 3.3–5)
ALP SERPL-CCNC: 63 U/L — SIGNIFICANT CHANGE UP (ref 40–120)
ALT FLD-CCNC: 14 U/L — SIGNIFICANT CHANGE UP (ref 10–45)
ANION GAP SERPL CALC-SCNC: 12 MMOL/L — SIGNIFICANT CHANGE UP (ref 5–17)
AST SERPL-CCNC: 20 U/L — SIGNIFICANT CHANGE UP (ref 10–40)
BASOPHILS # BLD AUTO: 0 K/UL — SIGNIFICANT CHANGE UP (ref 0–0.2)
BASOPHILS NFR BLD AUTO: 0 % — SIGNIFICANT CHANGE UP (ref 0–2)
BILIRUB SERPL-MCNC: 0.6 MG/DL — SIGNIFICANT CHANGE UP (ref 0.2–1.2)
BUN SERPL-MCNC: 19 MG/DL — SIGNIFICANT CHANGE UP (ref 7–23)
CALCIUM SERPL-MCNC: 8.8 MG/DL — SIGNIFICANT CHANGE UP (ref 8.4–10.5)
CHLORIDE SERPL-SCNC: 101 MMOL/L — SIGNIFICANT CHANGE UP (ref 96–108)
CO2 SERPL-SCNC: 24 MMOL/L — SIGNIFICANT CHANGE UP (ref 22–31)
CREAT SERPL-MCNC: 0.68 MG/DL — SIGNIFICANT CHANGE UP (ref 0.5–1.3)
EOSINOPHIL # BLD AUTO: 0.01 K/UL — SIGNIFICANT CHANGE UP (ref 0–0.5)
EOSINOPHIL NFR BLD AUTO: 0.1 % — SIGNIFICANT CHANGE UP (ref 0–6)
GLUCOSE BLDC GLUCOMTR-MCNC: 131 MG/DL — HIGH (ref 70–99)
GLUCOSE BLDC GLUCOMTR-MCNC: 160 MG/DL — HIGH (ref 70–99)
GLUCOSE BLDC GLUCOMTR-MCNC: 260 MG/DL — HIGH (ref 70–99)
GLUCOSE BLDC GLUCOMTR-MCNC: 260 MG/DL — HIGH (ref 70–99)
GLUCOSE SERPL-MCNC: 170 MG/DL — HIGH (ref 70–99)
GRAM STN FLD: SIGNIFICANT CHANGE UP
HCT VFR BLD CALC: 32.7 % — LOW (ref 34.5–45)
HGB BLD-MCNC: 11 G/DL — LOW (ref 11.5–15.5)
IMM GRANULOCYTES NFR BLD AUTO: 0.3 % — SIGNIFICANT CHANGE UP (ref 0–1.5)
LYMPHOCYTES # BLD AUTO: 1.34 K/UL — SIGNIFICANT CHANGE UP (ref 1–3.3)
LYMPHOCYTES # BLD AUTO: 11.4 % — LOW (ref 13–44)
MCHC RBC-ENTMCNC: 29.4 PG — SIGNIFICANT CHANGE UP (ref 27–34)
MCHC RBC-ENTMCNC: 33.6 GM/DL — SIGNIFICANT CHANGE UP (ref 32–36)
MCV RBC AUTO: 87.4 FL — SIGNIFICANT CHANGE UP (ref 80–100)
MONOCYTES # BLD AUTO: 0.54 K/UL — SIGNIFICANT CHANGE UP (ref 0–0.9)
MONOCYTES NFR BLD AUTO: 4.6 % — SIGNIFICANT CHANGE UP (ref 2–14)
NEUTROPHILS # BLD AUTO: 9.83 K/UL — HIGH (ref 1.8–7.4)
NEUTROPHILS NFR BLD AUTO: 83.6 % — HIGH (ref 43–77)
PHOSPHATE SERPL-MCNC: 2.4 MG/DL — LOW (ref 2.5–4.5)
PLATELET # BLD AUTO: 200 K/UL — SIGNIFICANT CHANGE UP (ref 150–400)
POTASSIUM SERPL-MCNC: 3.2 MMOL/L — LOW (ref 3.5–5.3)
POTASSIUM SERPL-SCNC: 3.2 MMOL/L — LOW (ref 3.5–5.3)
PROT SERPL-MCNC: 7.3 G/DL — SIGNIFICANT CHANGE UP (ref 6–8.3)
RBC # BLD: 3.74 M/UL — LOW (ref 3.8–5.2)
RBC # FLD: 16.2 % — HIGH (ref 10.3–14.5)
SODIUM SERPL-SCNC: 137 MMOL/L — SIGNIFICANT CHANGE UP (ref 135–145)
SPECIMEN SOURCE: SIGNIFICANT CHANGE UP
WBC # BLD: 11.75 K/UL — HIGH (ref 3.8–10.5)
WBC # FLD AUTO: 11.75 K/UL — HIGH (ref 3.8–10.5)

## 2018-02-15 RX ORDER — POLYETHYLENE GLYCOL 3350 17 G/17G
17 POWDER, FOR SOLUTION ORAL DAILY
Qty: 0 | Refills: 0 | Status: DISCONTINUED | OUTPATIENT
Start: 2018-02-15 | End: 2018-02-22

## 2018-02-15 RX ORDER — POTASSIUM PHOSPHATE, MONOBASIC POTASSIUM PHOSPHATE, DIBASIC 236; 224 MG/ML; MG/ML
15 INJECTION, SOLUTION INTRAVENOUS EVERY 6 HOURS
Qty: 0 | Refills: 0 | Status: COMPLETED | OUTPATIENT
Start: 2018-02-15 | End: 2018-02-15

## 2018-02-15 RX ORDER — POTASSIUM CHLORIDE 20 MEQ
40 PACKET (EA) ORAL EVERY 4 HOURS
Qty: 0 | Refills: 0 | Status: DISCONTINUED | OUTPATIENT
Start: 2018-02-15 | End: 2018-02-15

## 2018-02-15 RX ORDER — POTASSIUM CHLORIDE 20 MEQ
20 PACKET (EA) ORAL
Qty: 0 | Refills: 0 | Status: COMPLETED | OUTPATIENT
Start: 2018-02-15 | End: 2018-02-15

## 2018-02-15 RX ORDER — INSULIN LISPRO 100/ML
3 VIAL (ML) SUBCUTANEOUS
Qty: 0 | Refills: 0 | Status: DISCONTINUED | OUTPATIENT
Start: 2018-02-15 | End: 2018-02-16

## 2018-02-15 RX ADMIN — Medication 3 MILLILITER(S): at 05:13

## 2018-02-15 RX ADMIN — POTASSIUM PHOSPHATE, MONOBASIC POTASSIUM PHOSPHATE, DIBASIC 62.5 MILLIMOLE(S): 236; 224 INJECTION, SOLUTION INTRAVENOUS at 17:55

## 2018-02-15 RX ADMIN — Medication 1: at 22:27

## 2018-02-15 RX ADMIN — Medication 30 MILLIGRAM(S): at 17:22

## 2018-02-15 RX ADMIN — POTASSIUM PHOSPHATE, MONOBASIC POTASSIUM PHOSPHATE, DIBASIC 62.5 MILLIMOLE(S): 236; 224 INJECTION, SOLUTION INTRAVENOUS at 23:56

## 2018-02-15 RX ADMIN — Medication 30 MILLIGRAM(S): at 05:14

## 2018-02-15 RX ADMIN — Medication 100 MILLIGRAM(S): at 05:14

## 2018-02-15 RX ADMIN — Medication 3 MILLILITER(S): at 00:15

## 2018-02-15 RX ADMIN — Medication 20 MILLIEQUIVALENT(S): at 16:12

## 2018-02-15 RX ADMIN — HEPARIN SODIUM 5000 UNIT(S): 5000 INJECTION INTRAVENOUS; SUBCUTANEOUS at 17:22

## 2018-02-15 RX ADMIN — Medication 3 UNIT(S): at 12:07

## 2018-02-15 RX ADMIN — Medication 2: at 08:41

## 2018-02-15 RX ADMIN — Medication 0.5 MILLIGRAM(S): at 05:14

## 2018-02-15 RX ADMIN — Medication 3 UNIT(S): at 17:21

## 2018-02-15 RX ADMIN — Medication 100 MILLIGRAM(S): at 22:26

## 2018-02-15 RX ADMIN — SODIUM CHLORIDE 80 MILLILITER(S): 9 INJECTION INTRAMUSCULAR; INTRAVENOUS; SUBCUTANEOUS at 05:18

## 2018-02-15 RX ADMIN — INSULIN GLARGINE 10 UNIT(S): 100 INJECTION, SOLUTION SUBCUTANEOUS at 22:27

## 2018-02-15 RX ADMIN — Medication 20 MILLIEQUIVALENT(S): at 11:57

## 2018-02-15 RX ADMIN — PREGABALIN 1000 MICROGRAM(S): 225 CAPSULE ORAL at 11:58

## 2018-02-15 RX ADMIN — Medication 3 MILLILITER(S): at 11:57

## 2018-02-15 RX ADMIN — Medication 100 MILLIGRAM(S): at 16:12

## 2018-02-15 RX ADMIN — Medication 25 MILLIGRAM(S): at 05:14

## 2018-02-15 RX ADMIN — Medication 1 MILLIGRAM(S): at 11:57

## 2018-02-15 RX ADMIN — Medication 3 MILLILITER(S): at 17:23

## 2018-02-15 RX ADMIN — Medication 6: at 12:07

## 2018-02-15 RX ADMIN — HEPARIN SODIUM 5000 UNIT(S): 5000 INJECTION INTRAVENOUS; SUBCUTANEOUS at 05:14

## 2018-02-15 RX ADMIN — Medication 20 MILLIEQUIVALENT(S): at 17:22

## 2018-02-15 RX ADMIN — POLYETHYLENE GLYCOL 3350 17 GRAM(S): 17 POWDER, FOR SOLUTION ORAL at 11:57

## 2018-02-15 RX ADMIN — Medication 3 MILLILITER(S): at 23:56

## 2018-02-15 RX ADMIN — LOSARTAN POTASSIUM 50 MILLIGRAM(S): 100 TABLET, FILM COATED ORAL at 05:14

## 2018-02-15 RX ADMIN — Medication 0.5 MILLIGRAM(S): at 17:23

## 2018-02-15 NOTE — PROGRESS NOTE ADULT - SUBJECTIVE AND OBJECTIVE BOX
RODERICK CORTEZ:[id number],   86yFemale followed for: diarrhea  No Known Allergies    PAST MEDICAL & SURGICAL HISTORY:  RA (rheumatoid arthritis)  Asthma  DM (diabetes mellitus), type 2  HTN (hypertension), benign  After-cataract of left eye  Tubal occlusion    FAMILY HISTORY:  No pertinent family history in first degree relatives    MEDICATIONS  (STANDING):  ALBUTerol/ipratropium for Nebulization 3 milliLiter(s) Nebulizer four times a day  benzonatate 100 milliGRAM(s) Oral three times a day  buDESOnide   0.5 milliGRAM(s) Respule 0.5 milliGRAM(s) Inhalation two times a day  cyanocobalamin 1000 MICROGram(s) Oral daily  dextrose 5%. 1000 milliLiter(s) (50 mL/Hr) IV Continuous <Continuous>  dextrose 50% Injectable 12.5 Gram(s) IV Push once  dextrose 50% Injectable 25 Gram(s) IV Push once  dextrose 50% Injectable 25 Gram(s) IV Push once  ergocalciferol 69482 Unit(s) Oral every week  folic acid 1 milliGRAM(s) Oral daily  heparin  Injectable 5000 Unit(s) SubCutaneous every 12 hours  hydrochlorothiazide 25 milliGRAM(s) Oral daily  insulin glargine Injectable (LANTUS) 10 Unit(s) SubCutaneous at bedtime  insulin lispro (HumaLOG) corrective regimen sliding scale   SubCutaneous three times a day before meals  insulin lispro (HumaLOG) corrective regimen sliding scale   SubCutaneous at bedtime  insulin lispro Injectable (HumaLOG) 3 Unit(s) SubCutaneous three times a day before meals  losartan 50 milliGRAM(s) Oral daily  methotrexate (Non - oncologic) 17.5 milliGRAM(s) Oral <User Schedule>  predniSONE   Tablet 30 milliGRAM(s) Oral two times a day  sodium chloride 0.9%. 1000 milliLiter(s) (80 mL/Hr) IV Continuous <Continuous>    MEDICATIONS  (PRN):  dextrose Gel 1 Dose(s) Oral once PRN Blood Glucose LESS THAN 70 milliGRAM(s)/deciliter  glucagon  Injectable 1 milliGRAM(s) IntraMuscular once PRN Glucose LESS THAN 70 milligrams/deciliter  HYDROcodone/homatropine Syrup 5 milliLiter(s) Oral at bedtime PRN Cough  ondansetron Injectable 4 milliGRAM(s) IV Push every 6 hours PRN Nausea and/or Vomiting      Vital Signs Last 24 Hrs  T(C): 36.4 (15 Feb 2018 03:53), Max: 36.8 (14 Feb 2018 20:59)  T(F): 97.5 (15 Feb 2018 03:53), Max: 98.2 (14 Feb 2018 20:59)  HR: 74 (15 Feb 2018 03:53) (74 - 80)  BP: 165/74 (15 Feb 2018 03:53) (159/85 - 168/82)  BP(mean): --  RR: 18 (15 Feb 2018 03:53) (18 - 18)  SpO2: 95% (15 Feb 2018 03:53) (94% - 96%)  nc/at  s1s2  cta  soft, nt, nd no guarding or rebound  no c/c/e    CBC Full  -  ( 15 Feb 2018 07:28 )  WBC Count : 11.75 K/uL  Hemoglobin : 11.0 g/dL  Hematocrit : 32.7 %  Platelet Count - Automated : 200 K/uL  Mean Cell Volume : 87.4 fl  Mean Cell Hemoglobin : 29.4 pg  Mean Cell Hemoglobin Concentration : 33.6 gm/dL  Auto Neutrophil # : 9.83 K/uL  Auto Lymphocyte # : 1.34 K/uL  Auto Monocyte # : 0.54 K/uL  Auto Eosinophil # : 0.01 K/uL  Auto Basophil # : 0.00 K/uL  Auto Neutrophil % : 83.6 %  Auto Lymphocyte % : 11.4 %  Auto Monocyte % : 4.6 %  Auto Eosinophil % : 0.1 %  Auto Basophil % : 0.0 %    02-15    137  |  101  |  19  ----------------------------<  170<H>  3.2<L>   |  24  |  0.68    Ca    8.8      15 Feb 2018 07:25  Phos  2.4     02-15    TPro  7.3  /  Alb  3.1<L>  /  TBili  0.6  /  DBili  x   /  AST  20  /  ALT  14  /  AlkPhos  63  02-15

## 2018-02-15 NOTE — PROGRESS NOTE ADULT - SUBJECTIVE AND OBJECTIVE BOX
Patient is a 86y old  Female who presents with a chief complaint of weakness   cough   diarreah (13 Feb 2018 12:58)                                                               INTERVAL HPI/OVERNIGHT EVENTS: unabe to sleep sec to severe cough     REVIEW OF SYSTEMS:     CONSTITUTIONAL: improving weakness, no  fevers or chills  RESPIRATORY:  cough, wheezing,  No shortness of breath  CARDIOVASCULAR: No chest pain or palpitations  GASTROINTESTINAL: No abdominal pain  . No nausea, vomiting, diarreah  GENITOURINARY: No dysuria, frequency or hematuria  NEUROLOGICAL: No numbness or weakness                                                                                                                                                                                                                                                                                  Medications:  MEDICATIONS  (STANDING):  ALBUTerol/ipratropium for Nebulization 3 milliLiter(s) Nebulizer four times a day  benzonatate 100 milliGRAM(s) Oral three times a day  buDESOnide   0.5 milliGRAM(s) Respule 0.5 milliGRAM(s) Inhalation two times a day  cyanocobalamin 1000 MICROGram(s) Oral daily  dextrose 5%. 1000 milliLiter(s) (50 mL/Hr) IV Continuous <Continuous>  dextrose 50% Injectable 12.5 Gram(s) IV Push once  dextrose 50% Injectable 25 Gram(s) IV Push once  dextrose 50% Injectable 25 Gram(s) IV Push once  ergocalciferol 19239 Unit(s) Oral every week  folic acid 1 milliGRAM(s) Oral daily  heparin  Injectable 5000 Unit(s) SubCutaneous every 12 hours  hydrochlorothiazide 25 milliGRAM(s) Oral daily  HYDROcodone/homatropine Syrup 5 milliLiter(s) Oral at bedtime  insulin glargine Injectable (LANTUS) 10 Unit(s) SubCutaneous at bedtime  insulin lispro (HumaLOG) corrective regimen sliding scale   SubCutaneous three times a day before meals  insulin lispro (HumaLOG) corrective regimen sliding scale   SubCutaneous at bedtime  insulin lispro Injectable (HumaLOG) 3 Unit(s) SubCutaneous three times a day before meals  losartan 50 milliGRAM(s) Oral daily  methotrexate (Non - oncologic) 17.5 milliGRAM(s) Oral <User Schedule>  polyethylene glycol 3350 17 Gram(s) Oral daily  potassium chloride    Tablet ER 20 milliEquivalent(s) Oral every 2 hours  predniSONE   Tablet 30 milliGRAM(s) Oral two times a day    MEDICATIONS  (PRN):  dextrose Gel 1 Dose(s) Oral once PRN Blood Glucose LESS THAN 70 milliGRAM(s)/deciliter  glucagon  Injectable 1 milliGRAM(s) IntraMuscular once PRN Glucose LESS THAN 70 milligrams/deciliter  ondansetron Injectable 4 milliGRAM(s) IV Push every 6 hours PRN Nausea and/or Vomiting       Allergies    No Known Allergies    Intolerances      Vital Signs Last 24 Hrs  T(C): 36.6 (15 Feb 2018 14:03), Max: 36.8 (14 Feb 2018 20:59)  T(F): 97.8 (15 Feb 2018 14:03), Max: 98.2 (14 Feb 2018 20:59)  HR: 90 (15 Feb 2018 14:03) (74 - 90)  BP: 152/78 (15 Feb 2018 14:03) (152/78 - 168/82)  BP(mean): --  RR: 18 (15 Feb 2018 14:03) (18 - 18)  SpO2: 98% (15 Feb 2018 14:03) (94% - 98%)  CAPILLARY BLOOD GLUCOSE      POCT Blood Glucose.: 131 mg/dL (15 Feb 2018 16:39)  POCT Blood Glucose.: 260 mg/dL (15 Feb 2018 12:01)  POCT Blood Glucose.: 160 mg/dL (15 Feb 2018 08:00)  POCT Blood Glucose.: 227 mg/dL (14 Feb 2018 21:24)      02-14 @ 07:01  -  02-15 @ 07:00  --------------------------------------------------------  IN: 3000 mL / OUT: 700 mL / NET: 2300 mL    02-15 @ 07:01  -  02-15 @ 17:23  --------------------------------------------------------  IN: 480 mL / OUT: 1 mL / NET: 479 mL      Physical Exam:  NAD   General: NAD, not cachetic  HEENT:  Nonicteric, PERRLA  CV:  RRR, S1S2   Lungs:  B ronchi /wheezing  Abdomen:  Soft, non-tender, no distended, positive BS  Extremities:  non pitting edema   Skin:  Warm and dry, no rashes  :  No mason  Neuro:  AAOx3, non-focal, grossly intact                                                                                                                                                                                                                                                                                                LABS:                               11.0   11.75 )-----------( 200      ( 15 Feb 2018 07:28 )             32.7                      02-15    137  |  101  |  19  ----------------------------<  170<H>  3.2<L>   |  24  |  0.68    Ca    8.8      15 Feb 2018 07:25  Phos  2.4     02-15    TPro  7.3  /  Alb  3.1<L>  /  TBili  0.6  /  DBili  x   /  AST  20  /  ALT  14  /  AlkPhos  63  02-15

## 2018-02-15 NOTE — PROGRESS NOTE ADULT - ASSESSMENT
HmPV viral tracheobronchitis with mild bronchospasm: clinically improving with prednisone  RA with pulmonary fribrosis  No clinical evidence bacterial pneumonia  Cough    REC    Continue pred 30 bid - no taper yet  Continue bronchodilators  prn hycodan at night for cough  Monitor O2 sats  Observe off abx

## 2018-02-15 NOTE — PROGRESS NOTE ADULT - SUBJECTIVE AND OBJECTIVE BOX
Follow-up Pulm Progress Note  Abrahan Flannery MD  [phone]    No new respiratory events overnight.    COntinue to improve - feeling better, OOB with O2 sat 97% RA  c/o cough at night interfering with sleep  Dyspnea improved    Vital Signs Last 24 Hrs  T(C): 36.4 (15 Feb 2018 03:53), Max: 36.8 (14 Feb 2018 20:59)  T(F): 97.5 (15 Feb 2018 03:53), Max: 98.2 (14 Feb 2018 20:59)  HR: 74 (15 Feb 2018 03:53) (74 - 80)  BP: 165/74 (15 Feb 2018 03:53) (159/85 - 168/82)  BP(mean): --  RR: 18 (15 Feb 2018 03:53) (18 - 18)  SpO2: 95% (15 Feb 2018 03:53) (94% - 96%)                          11.0   11.75 )-----------( 200      ( 15 Feb 2018 07:28 )             32.7       02-15    137  |  101  |  19  ----------------------------<  170<H>  3.2<L>   |  24  |  0.68    Ca    8.8      15 Feb 2018 07:25  Phos  2.4     02-15    TPro  7.3  /  Alb  3.1<L>  /  TBili  0.6  /  DBili  x   /  AST  20  /  ALT  14  /  AlkPhos  63  02-15      CULTURES:  Culture Results:   No growth (02-13 @ 03:07)  Culture Results:   No growth to date. (02-12 @ 22:01)  Culture Results:   No growth to date. (02-12 @ 22:01)    Most recent blood culture -- 02-13 @ 03:07   -- -- .Urine Clean Catch (Midstream) 02-13 @ 03:07  Most recent blood culture -- 02-12 @ 22:01   -- -- .Blood Blood 02-12 @ 22:01      Physical Examination:  PULM: Bilateral coarse crackles; minimal exp wheeze  CVS: Regular rate and rhythm, no murmurs, rubs, or gallops  ABD: Soft, non-tender  EXT:  No clubbing, cyanosis, or edema    RADIOLOGY REVIEWED  CXR:    CT chest:    TTE:

## 2018-02-15 NOTE — PROGRESS NOTE ADULT - ASSESSMENT
86 y/o fmeale with hx of RA on mtx , and was on prednisone ( no longer on steroids since 09/2017  and has not recieved iv infusion  ( monthly simponi ) in three months . also history of HTN and DM .. has been admitted twice over the past 6-8 months with  weakness . initial admit pt was thought ot have an element of adrenal insufficeiny when she was on steroids .. and pt was sent out on steroids with some improvement,.. later admitted as lij for weakness thought to be multifactorial sec to viral illness, dehydration , and leemt of steroid -induced myopathy. pt was sent to rehab and overall improved but never to baseline.. over the past three days pt has been having worsening generalized weakness , cough with production of sputum ( color?), decreased po intake and decreased mobility.  no focal neuro complaints  no CP/SOB   had had few episodes of N/V /D at home   no recent abx   no abdo pain or urinary Sx       1- Cough / weakness:  hMPV     cont steroids , nebs   hold off on antibiotics for now ..    CT old findings with mosaic changes     2- N/V /D : seems to be part of viral illness ..improved   tolerating  diet   Zofran  and ppi       3- DM:cont insulin   4- Decreased mobility :  nonfocal neuro exam  .. had had mri brain with no acute findings   in past pt was thought to might have had an element of steroid induced myopathy however now off prednisone .. f/u  CPK  EMG as o/p.    5- HTN : appreciate cardio input .. d/c ivf and consider Norvasc  ( will hold off for now given non pitting edema )and  given AS will likely need BB  ..monitor for now      dvt proph 86 y/o fmeale with hx of RA on mtx , and was on prednisone ( no longer on steroids since 09/2017  and has not recieved iv infusion  ( monthly simponi ) in three months . also history of HTN and DM .. has been admitted twice over the past 6-8 months with  weakness . initial admit pt was thought ot have an element of adrenal insufficeiny when she was on steroids .. and pt was sent out on steroids with some improvement,.. later admitted as lij for weakness thought to be multifactorial sec to viral illness, dehydration , and leemt of steroid -induced myopathy. pt was sent to rehab and overall improved but never to baseline.. over the past three days pt has been having worsening generalized weakness , cough with production of sputum ( color?), decreased po intake and decreased mobility.  no focal neuro complaints  no CP/SOB   had had few episodes of N/V /D at home   no recent abx   no abdo pain or urinary Sx       1- Cough / weakness:  hMPV     cont steroids , nebs   hold off on antibiotics for now ..    CT old findings with mosaic changes     2- N/V /D : seems to be part of viral illness ..improved   tolerating  diet   Zofran  and ppi       3- DM:cont insulin   4- Decreased mobility :  nonfocal neuro exam  .. had had mri brain with no acute findings   in past pt was thought to might have had an element of steroid induced myopathy however now off prednisone .. f/u  CPK  EMG as o/p.    5- HTN : appreciate cardio input .. d/c ivf and consider Norvasc  ( will hold off for now given non pitting edema )and  given AS will likely need BB  ..monitor for now    6- electrolyte abn : replete K and mag   7- anemia : check ferritin .. servando aocd /iron def   dvt proph

## 2018-02-15 NOTE — PROGRESS NOTE ADULT - ASSESSMENT
diarrhea resolved, likely secondary to viral.  no symptoms now. will sign off . reconsult if needed.

## 2018-02-15 NOTE — PROGRESS NOTE ADULT - SUBJECTIVE AND OBJECTIVE BOX
Subjective: Patient seen and examined. No new events except as noted.     SUBJECTIVE/ROS:  had cough last night        MEDICATIONS:  MEDICATIONS  (STANDING):  ALBUTerol/ipratropium for Nebulization 3 milliLiter(s) Nebulizer four times a day  benzonatate 100 milliGRAM(s) Oral three times a day  buDESOnide   0.5 milliGRAM(s) Respule 0.5 milliGRAM(s) Inhalation two times a day  cyanocobalamin 1000 MICROGram(s) Oral daily  dextrose 5%. 1000 milliLiter(s) (50 mL/Hr) IV Continuous <Continuous>  dextrose 50% Injectable 12.5 Gram(s) IV Push once  dextrose 50% Injectable 25 Gram(s) IV Push once  dextrose 50% Injectable 25 Gram(s) IV Push once  ergocalciferol 83189 Unit(s) Oral every week  folic acid 1 milliGRAM(s) Oral daily  heparin  Injectable 5000 Unit(s) SubCutaneous every 12 hours  hydrochlorothiazide 25 milliGRAM(s) Oral daily  insulin glargine Injectable (LANTUS) 10 Unit(s) SubCutaneous at bedtime  insulin lispro (HumaLOG) corrective regimen sliding scale   SubCutaneous three times a day before meals  insulin lispro (HumaLOG) corrective regimen sliding scale   SubCutaneous at bedtime  insulin lispro Injectable (HumaLOG) 3 Unit(s) SubCutaneous three times a day before meals  losartan 50 milliGRAM(s) Oral daily  methotrexate (Non - oncologic) 17.5 milliGRAM(s) Oral <User Schedule>  predniSONE   Tablet 30 milliGRAM(s) Oral two times a day  sodium chloride 0.9%. 1000 milliLiter(s) (80 mL/Hr) IV Continuous <Continuous>      PHYSICAL EXAM:  T(C): 36.4 (02-15-18 @ 03:53), Max: 36.8 (02-14-18 @ 20:59)  HR: 74 (02-15-18 @ 03:53) (74 - 80)  BP: 165/74 (02-15-18 @ 03:53) (159/85 - 168/82)  RR: 18 (02-15-18 @ 03:53) (18 - 18)  SpO2: 95% (02-15-18 @ 03:53) (94% - 96%)  Wt(kg): --  I&O's Summary    14 Feb 2018 07:01  -  15 Feb 2018 07:00  --------------------------------------------------------  IN: 3000 mL / OUT: 700 mL / NET: 2300 mL      JVP: Normal  Neck: supple  Lung: rhonchi  CV: S1 S2 , Murmur:  Abd: soft  Ext: No edema  neuro: Awake / alert  Psych: flat affect  Skin: normal        LABS/DATA:    CARDIAC MARKERS:  CARDIAC MARKERS ( 13 Feb 2018 11:04 )  x     / x     / 61 U/L / x     / x      CARDIAC MARKERS ( 12 Feb 2018 19:00 )  x     / <0.01 ng/mL / x     / x     / 1.8 ng/mL                                11.0   11.75 )-----------( 200      ( 15 Feb 2018 07:28 )             32.7     02-15    137  |  101  |  19  ----------------------------<  170<H>  3.2<L>   |  24  |  0.68    Ca    8.8      15 Feb 2018 07:25  Phos  2.4     02-15    TPro  7.3  /  Alb  3.1<L>  /  TBili  0.6  /  DBili  x   /  AST  20  /  ALT  14  /  AlkPhos  63  02-15    proBNP:   Lipid Profile:   HgA1c:   TSH:     TELE:  EKG:

## 2018-02-16 DIAGNOSIS — E11.9 TYPE 2 DIABETES MELLITUS WITHOUT COMPLICATIONS: ICD-10-CM

## 2018-02-16 DIAGNOSIS — J45.909 UNSPECIFIED ASTHMA, UNCOMPLICATED: ICD-10-CM

## 2018-02-16 DIAGNOSIS — I10 ESSENTIAL (PRIMARY) HYPERTENSION: ICD-10-CM

## 2018-02-16 LAB
ALBUMIN SERPL ELPH-MCNC: 3.3 G/DL — SIGNIFICANT CHANGE UP (ref 3.3–5)
ALP SERPL-CCNC: 65 U/L — SIGNIFICANT CHANGE UP (ref 40–120)
ALT FLD-CCNC: 13 U/L — SIGNIFICANT CHANGE UP (ref 10–45)
ANION GAP SERPL CALC-SCNC: 14 MMOL/L — SIGNIFICANT CHANGE UP (ref 5–17)
AST SERPL-CCNC: 15 U/L — SIGNIFICANT CHANGE UP (ref 10–40)
BASOPHILS # BLD AUTO: 0 K/UL — SIGNIFICANT CHANGE UP (ref 0–0.2)
BASOPHILS NFR BLD AUTO: 0 % — SIGNIFICANT CHANGE UP (ref 0–2)
BILIRUB SERPL-MCNC: 0.5 MG/DL — SIGNIFICANT CHANGE UP (ref 0.2–1.2)
BUN SERPL-MCNC: 25 MG/DL — HIGH (ref 7–23)
CALCIUM SERPL-MCNC: 8.9 MG/DL — SIGNIFICANT CHANGE UP (ref 8.4–10.5)
CHLORIDE SERPL-SCNC: 102 MMOL/L — SIGNIFICANT CHANGE UP (ref 96–108)
CO2 SERPL-SCNC: 23 MMOL/L — SIGNIFICANT CHANGE UP (ref 22–31)
CREAT SERPL-MCNC: 0.85 MG/DL — SIGNIFICANT CHANGE UP (ref 0.5–1.3)
CULTURE RESULTS: SIGNIFICANT CHANGE UP
EOSINOPHIL # BLD AUTO: 0 K/UL — SIGNIFICANT CHANGE UP (ref 0–0.5)
EOSINOPHIL NFR BLD AUTO: 0 % — SIGNIFICANT CHANGE UP (ref 0–6)
FERRITIN SERPL-MCNC: 178 NG/ML — HIGH (ref 15–150)
FOLATE SERPL-MCNC: 19.5 NG/ML — SIGNIFICANT CHANGE UP (ref 4.8–24.2)
GLUCOSE BLDC GLUCOMTR-MCNC: 243 MG/DL — HIGH (ref 70–99)
GLUCOSE BLDC GLUCOMTR-MCNC: 271 MG/DL — HIGH (ref 70–99)
GLUCOSE BLDC GLUCOMTR-MCNC: 278 MG/DL — HIGH (ref 70–99)
GLUCOSE BLDC GLUCOMTR-MCNC: 278 MG/DL — HIGH (ref 70–99)
GLUCOSE SERPL-MCNC: 267 MG/DL — HIGH (ref 70–99)
HCT VFR BLD CALC: 32 % — LOW (ref 34.5–45)
HGB BLD-MCNC: 10.8 G/DL — LOW (ref 11.5–15.5)
IMM GRANULOCYTES NFR BLD AUTO: 0.2 % — SIGNIFICANT CHANGE UP (ref 0–1.5)
IRON SATN MFR SERPL: 19 % — SIGNIFICANT CHANGE UP (ref 14–50)
IRON SATN MFR SERPL: 19 % — SIGNIFICANT CHANGE UP (ref 14–50)
IRON SATN MFR SERPL: 37 UG/DL — SIGNIFICANT CHANGE UP (ref 30–160)
IRON SATN MFR SERPL: 38 UG/DL — SIGNIFICANT CHANGE UP (ref 30–160)
LYMPHOCYTES # BLD AUTO: 1.21 K/UL — SIGNIFICANT CHANGE UP (ref 1–3.3)
LYMPHOCYTES # BLD AUTO: 12.5 % — LOW (ref 13–44)
MCHC RBC-ENTMCNC: 29.9 PG — SIGNIFICANT CHANGE UP (ref 27–34)
MCHC RBC-ENTMCNC: 33.8 GM/DL — SIGNIFICANT CHANGE UP (ref 32–36)
MCV RBC AUTO: 88.6 FL — SIGNIFICANT CHANGE UP (ref 80–100)
MONOCYTES # BLD AUTO: 0.34 K/UL — SIGNIFICANT CHANGE UP (ref 0–0.9)
MONOCYTES NFR BLD AUTO: 3.5 % — SIGNIFICANT CHANGE UP (ref 2–14)
NEUTROPHILS # BLD AUTO: 8.09 K/UL — HIGH (ref 1.8–7.4)
NEUTROPHILS NFR BLD AUTO: 83.8 % — HIGH (ref 43–77)
NT-PROBNP SERPL-SCNC: 773 PG/ML — HIGH (ref 0–300)
PHOSPHATE SERPL-MCNC: 3.2 MG/DL — SIGNIFICANT CHANGE UP (ref 2.5–4.5)
PLATELET # BLD AUTO: 230 K/UL — SIGNIFICANT CHANGE UP (ref 150–400)
POTASSIUM SERPL-MCNC: 4.1 MMOL/L — SIGNIFICANT CHANGE UP (ref 3.5–5.3)
POTASSIUM SERPL-SCNC: 4.1 MMOL/L — SIGNIFICANT CHANGE UP (ref 3.5–5.3)
PROT SERPL-MCNC: 7.4 G/DL — SIGNIFICANT CHANGE UP (ref 6–8.3)
RBC # BLD: 3.61 M/UL — LOW (ref 3.8–5.2)
RBC # BLD: 3.61 M/UL — LOW (ref 3.8–5.2)
RBC # FLD: 16 % — HIGH (ref 10.3–14.5)
RETICS #: 35.7 K/UL — SIGNIFICANT CHANGE UP (ref 25–125)
RETICS/RBC NFR: 1 % — SIGNIFICANT CHANGE UP (ref 0.5–2.5)
SODIUM SERPL-SCNC: 139 MMOL/L — SIGNIFICANT CHANGE UP (ref 135–145)
SPECIMEN SOURCE: SIGNIFICANT CHANGE UP
TIBC SERPL-MCNC: 194 UG/DL — LOW (ref 220–430)
TIBC SERPL-MCNC: 195 UG/DL — LOW (ref 220–430)
TRANSFERRIN SERPL-MCNC: 146 MG/DL — LOW (ref 200–360)
UIBC SERPL-MCNC: 157 UG/DL — SIGNIFICANT CHANGE UP (ref 110–370)
UIBC SERPL-MCNC: 157 UG/DL — SIGNIFICANT CHANGE UP (ref 110–370)
VIT B12 SERPL-MCNC: 697 PG/ML — SIGNIFICANT CHANGE UP (ref 232–1245)
WBC # BLD: 9.66 K/UL — SIGNIFICANT CHANGE UP (ref 3.8–10.5)
WBC # FLD AUTO: 9.66 K/UL — SIGNIFICANT CHANGE UP (ref 3.8–10.5)

## 2018-02-16 RX ORDER — INSULIN GLARGINE 100 [IU]/ML
15 INJECTION, SOLUTION SUBCUTANEOUS AT BEDTIME
Qty: 0 | Refills: 0 | Status: DISCONTINUED | OUTPATIENT
Start: 2018-02-16 | End: 2018-02-17

## 2018-02-16 RX ORDER — AMLODIPINE BESYLATE 2.5 MG/1
5 TABLET ORAL DAILY
Qty: 0 | Refills: 0 | Status: DISCONTINUED | OUTPATIENT
Start: 2018-02-16 | End: 2018-02-16

## 2018-02-16 RX ORDER — INSULIN LISPRO 100/ML
6 VIAL (ML) SUBCUTANEOUS
Qty: 0 | Refills: 0 | Status: DISCONTINUED | OUTPATIENT
Start: 2018-02-16 | End: 2018-02-17

## 2018-02-16 RX ORDER — FUROSEMIDE 40 MG
20 TABLET ORAL ONCE
Qty: 0 | Refills: 0 | Status: COMPLETED | OUTPATIENT
Start: 2018-02-16 | End: 2018-02-16

## 2018-02-16 RX ORDER — AMLODIPINE BESYLATE 2.5 MG/1
5 TABLET ORAL DAILY
Qty: 0 | Refills: 0 | Status: DISCONTINUED | OUTPATIENT
Start: 2018-02-17 | End: 2018-02-22

## 2018-02-16 RX ORDER — AMLODIPINE BESYLATE 2.5 MG/1
5 TABLET ORAL ONCE
Qty: 0 | Refills: 0 | Status: COMPLETED | OUTPATIENT
Start: 2018-02-16 | End: 2018-02-16

## 2018-02-16 RX ADMIN — Medication 3 UNIT(S): at 08:32

## 2018-02-16 RX ADMIN — Medication 6 UNIT(S): at 12:39

## 2018-02-16 RX ADMIN — Medication 1: at 21:54

## 2018-02-16 RX ADMIN — Medication 3 MILLILITER(S): at 06:16

## 2018-02-16 RX ADMIN — Medication 3 MILLILITER(S): at 17:36

## 2018-02-16 RX ADMIN — Medication 6 UNIT(S): at 17:35

## 2018-02-16 RX ADMIN — Medication 0.5 MILLIGRAM(S): at 17:36

## 2018-02-16 RX ADMIN — Medication 6: at 17:35

## 2018-02-16 RX ADMIN — LOSARTAN POTASSIUM 50 MILLIGRAM(S): 100 TABLET, FILM COATED ORAL at 06:17

## 2018-02-16 RX ADMIN — Medication 3 MILLILITER(S): at 23:04

## 2018-02-16 RX ADMIN — PREGABALIN 1000 MICROGRAM(S): 225 CAPSULE ORAL at 12:40

## 2018-02-16 RX ADMIN — HEPARIN SODIUM 5000 UNIT(S): 5000 INJECTION INTRAVENOUS; SUBCUTANEOUS at 17:36

## 2018-02-16 RX ADMIN — AMLODIPINE BESYLATE 5 MILLIGRAM(S): 2.5 TABLET ORAL at 09:37

## 2018-02-16 RX ADMIN — Medication 30 MILLIGRAM(S): at 06:17

## 2018-02-16 RX ADMIN — Medication 3 MILLILITER(S): at 12:40

## 2018-02-16 RX ADMIN — Medication 25 MILLIGRAM(S): at 06:17

## 2018-02-16 RX ADMIN — Medication 100 MILLIGRAM(S): at 21:55

## 2018-02-16 RX ADMIN — POLYETHYLENE GLYCOL 3350 17 GRAM(S): 17 POWDER, FOR SOLUTION ORAL at 12:40

## 2018-02-16 RX ADMIN — Medication 1 MILLIGRAM(S): at 12:40

## 2018-02-16 RX ADMIN — INSULIN GLARGINE 15 UNIT(S): 100 INJECTION, SOLUTION SUBCUTANEOUS at 21:55

## 2018-02-16 RX ADMIN — Medication 4: at 08:32

## 2018-02-16 RX ADMIN — Medication 6: at 12:40

## 2018-02-16 RX ADMIN — Medication 20 MILLIGRAM(S): at 15:13

## 2018-02-16 RX ADMIN — Medication 100 MILLIGRAM(S): at 15:14

## 2018-02-16 RX ADMIN — Medication 0.5 MILLIGRAM(S): at 06:28

## 2018-02-16 RX ADMIN — Medication 100 MILLIGRAM(S): at 06:16

## 2018-02-16 RX ADMIN — Medication 20 MILLIGRAM(S): at 21:56

## 2018-02-16 RX ADMIN — HEPARIN SODIUM 5000 UNIT(S): 5000 INJECTION INTRAVENOUS; SUBCUTANEOUS at 06:17

## 2018-02-16 RX ADMIN — Medication 20 MILLIGRAM(S): at 17:38

## 2018-02-16 NOTE — PROGRESS NOTE ADULT - ASSESSMENT
86 y/o fmeale with hx of RA on mtx , and was on prednisone ( no longer on steroids since 09/2017  and has not recieved iv infusion  ( monthly simponi ) in three months . also history of HTN and DM .. has been admitted twice over the past 6-8 months with  weakness . initial admit pt was thought ot have an element of adrenal insufficeiny when she was on steroids .. and pt was sent out on steroids with some improvement,.. later admitted as lij for weakness thought to be multifactorial sec to viral illness, dehydration , and leemt of steroid -induced myopathy. pt was sent to rehab and overall improved but never to baseline.. over the past three days pt has been having worsening generalized weakness , cough with production of sputum ( color?), decreased po intake and decreased mobility.  no focal neuro complaints  no CP/SOB   had had few episodes of N/V /D at home   no recent abx   no abdo pain or urinary Sx       1- Cough / weakness:  hMPV     d/w Dr. Flannery : will change to IV steroids given persistent wheezing.. will check Bnp and consider small dose of lasix   hold off on antibiotics for now ..    CT old findings with mosaic changes       2- N/V /D : seems to be part of viral illness ..improved   tolerating  diet   Zofran  and ppi       3- DM:cont insulin and ajust accordingly .called endo cosult   4- Decreased mobility :  nonfocal neuro exam  .. had had mri brain with no acute findings   in past pt was thought to might have had an element of steroid induced myopathy however now off prednisone .. f/u  CPK  EMG as o/p.    5- HTN uncotrolled :           appreciate cardio input .. d/c ivf and add Norvasc .. will need BB  given AS  ..monitor for now    6- electrolyte abn : replete K and mag   7- anemia : servando aocd /iron def   dvt proph

## 2018-02-16 NOTE — PROGRESS NOTE ADULT - SUBJECTIVE AND OBJECTIVE BOX
Follow-up Pulm Progress Note  Abrahan Flannery MD  [phone]    AFebrile  c/o wome wheezing last night  today OOB, comfortable with SOB    Vital Signs Last 24 Hrs  T(C): 36.7 (16 Feb 2018 08:27), Max: 36.7 (16 Feb 2018 08:27)  T(F): 98 (16 Feb 2018 08:27), Max: 98 (16 Feb 2018 08:27)  HR: 87 (16 Feb 2018 08:27) (80 - 90)  BP: 180/96 (16 Feb 2018 08:27) (152/78 - 180/96)  BP(mean): --  RR: 20 (16 Feb 2018 08:27) (18 - 20)  SpO2: 98% (16 Feb 2018 08:27) (98% - 98%)                        10.8   9.66  )-----------( 230      ( 16 Feb 2018 07:29 )             32.0       02-16    139  |  102  |  25<H>  ----------------------------<  267<H>  4.1   |  23  |  0.85    Ca    8.9      16 Feb 2018 07:41  Phos  3.2     02-16    TPro  7.4  /  Alb  3.3  /  TBili  0.5  /  DBili  x   /  AST  15  /  ALT  13  /  AlkPhos  65  02-16      CULTURES:  Culture Results:   No growth (02-13 @ 03:07)  Culture Results:   No growth to date. (02-12 @ 22:01)  Culture Results:   No growth to date. (02-12 @ 22:01)    Most recent blood culture -- 02-13 @ 03:07   -- -- .Urine Clean Catch (Midstream) 02-13 @ 03:07  Most recent blood culture -- 02-12 @ 22:01   -- -- .Blood Blood 02-12 @ 22:01      Physical Examination:  PULM: Bilateral coarse crackles 1/3; no wheezing  CVS: Regular rate and rhythm, no murmurs, rubs, or gallops  ABD: Soft, non-tender  EXT:  No clubbing, cyanosis, or edema    RADIOLOGY REVIEWED  CXR:    CT chest:    TTE:

## 2018-02-16 NOTE — CONSULT NOTE ADULT - ASSESSMENT
Assessment  DMT2: 86y Female with DM T2 with hyperglycemia on insulin, blood sugars running high, on high dose steroids, no hypoglycemia,  eating meals,  non compliant with low carb diet.  Asthma: On treatment, improving.  HTN: Controlled, On med.

## 2018-02-16 NOTE — PROGRESS NOTE ADULT - ASSESSMENT
HmPV viral tracheobronchitis with mild bronchospasm: clinically improving with prednisone  RA with pulmonary fribrosis  No clinical evidence bacterial pneumonia  Cough    REC    Can convert to solumedrol 20 IV q8  Continue bronchodilators  prn hycodan at night for cough  Monitor O2 sats  DC planning 1-2 days HmPV viral tracheobronchitis with mild bronchospasm: clinically improving with prednisone  RA with pulmonary fribrosis  No clinical evidence bacterial pneumonia  Cough  Crackles related to chronic ILD    REC    Can convert to solumedrol 20 IV q8  Continue bronchodilators  prn hycodan at night for cough  Monitor O2 sats  DC planning 1-2 days

## 2018-02-16 NOTE — PROGRESS NOTE ADULT - ASSESSMENT
HTN  labile  stable    AS  moderate  stable  will give a dose of lasix given elevated BNP    hMPV   nebs  fu with pulm

## 2018-02-16 NOTE — PROGRESS NOTE ADULT - SUBJECTIVE AND OBJECTIVE BOX
Subjective: Patient seen and examined. No new events except as noted.     SUBJECTIVE/ROS:  cough      MEDICATIONS:  MEDICATIONS  (STANDING):  ALBUTerol/ipratropium for Nebulization 3 milliLiter(s) Nebulizer four times a day  amLODIPine   Tablet 5 milliGRAM(s) Oral daily  benzonatate 100 milliGRAM(s) Oral three times a day  buDESOnide   0.5 milliGRAM(s) Respule 0.5 milliGRAM(s) Inhalation two times a day  cyanocobalamin 1000 MICROGram(s) Oral daily  dextrose 5%. 1000 milliLiter(s) (50 mL/Hr) IV Continuous <Continuous>  dextrose 50% Injectable 12.5 Gram(s) IV Push once  dextrose 50% Injectable 25 Gram(s) IV Push once  dextrose 50% Injectable 25 Gram(s) IV Push once  folic acid 1 milliGRAM(s) Oral daily  heparin  Injectable 5000 Unit(s) SubCutaneous every 12 hours  hydrochlorothiazide 25 milliGRAM(s) Oral daily  HYDROcodone/homatropine Syrup 5 milliLiter(s) Oral at bedtime  insulin glargine Injectable (LANTUS) 15 Unit(s) SubCutaneous at bedtime  insulin lispro (HumaLOG) corrective regimen sliding scale   SubCutaneous three times a day before meals  insulin lispro (HumaLOG) corrective regimen sliding scale   SubCutaneous at bedtime  insulin lispro Injectable (HumaLOG) 6 Unit(s) SubCutaneous three times a day before meals  losartan 50 milliGRAM(s) Oral daily  methotrexate (Non - oncologic) 17.5 milliGRAM(s) Oral <User Schedule>  methylPREDNISolone sodium succinate Injectable 20 milliGRAM(s) IV Push every 8 hours  polyethylene glycol 3350 17 Gram(s) Oral daily      PHYSICAL EXAM:  T(C): 36.5 (02-16-18 @ 14:21), Max: 36.7 (02-16-18 @ 08:27)  HR: 93 (02-16-18 @ 14:21) (80 - 93)  BP: 146/83 (02-16-18 @ 14:21) (146/83 - 180/96)  RR: 18 (02-16-18 @ 14:21) (18 - 20)  SpO2: 96% (02-16-18 @ 14:21) (96% - 98%)  Wt(kg): --  I&O's Summary    15 Feb 2018 07:01  -  16 Feb 2018 07:00  --------------------------------------------------------  IN: 1280 mL / OUT: 1 mL / NET: 1279 mL    16 Feb 2018 07:01  -  16 Feb 2018 16:50  --------------------------------------------------------  IN: 480 mL / OUT: 0 mL / NET: 480 mL          Appearance: Normal	  HEENT:   Normal oral mucosa, PERRL, EOMI	  Cardiovascular: Normal S1 S2,    Murmur:   Neck: JVP normal  Respiratory: Lungs clear to auscultation  Gastrointestinal:  Soft, Non-tender, + BS	  Skin: normal   Neuro: No gross deficits.   Psychiatry:  Mood & affect appropriate  Ext: No edema      LABS/DATA:    CARDIAC MARKERS:                                10.8   9.66  )-----------( 230      ( 16 Feb 2018 07:29 )             32.0     02-16    139  |  102  |  25<H>  ----------------------------<  267<H>  4.1   |  23  |  0.85    Ca    8.9      16 Feb 2018 07:41  Phos  3.2     02-16    TPro  7.4  /  Alb  3.3  /  TBili  0.5  /  DBili  x   /  AST  15  /  ALT  13  /  AlkPhos  65  02-16    proBNP: Serum Pro-Brain Natriuretic Peptide: 773 pg/mL (02-16 @ 14:27)    Lipid Profile:   HgA1c:   TSH:     TELE:  EKG:

## 2018-02-16 NOTE — PROGRESS NOTE ADULT - ASSESSMENT
Weakness  steroid induced myopathy cannot be ruled out, can also be deconditioning from recent illness.     ESR normal for age, CPK normal, elevated CRP can be secondary to RA, contin. rheum f/u    recommend EMG as outpatient   if needed can do repeat imaging study as outpatient    pt encouraged to follow up in the office   PT

## 2018-02-16 NOTE — PROGRESS NOTE ADULT - SUBJECTIVE AND OBJECTIVE BOX
Patient is a 86y old  Female who presents with a chief complaint of weakness   cough   diarreah (13 Feb 2018 12:58)    Seen and examined.  No complaints.      PHYSICAL EXAM:  AAOx3  CN: II-XII intact   Motor: 5/5 IAE except HF 4+/5 b/l   Sensation: intact to light touch and temp   Reflex: 1/4 IAE   Coordination: FNF intact   Gait: ambulates with a walker     Vital Signs Last 24 Hrs  T(C): 36.7 (16 Feb 2018 08:27), Max: 36.7 (16 Feb 2018 08:27)  T(F): 98 (16 Feb 2018 08:27), Max: 98 (16 Feb 2018 08:27)  HR: 87 (16 Feb 2018 08:27) (80 - 90)  BP: 180/96 (16 Feb 2018 08:27) (152/78 - 180/96)  BP(mean): --  RR: 20 (16 Feb 2018 08:27) (18 - 20)  SpO2: 98% (16 Feb 2018 08:27) (98% - 98%)    MEDICATIONS  (STANDING):  ALBUTerol/ipratropium for Nebulization 3 milliLiter(s) Nebulizer four times a day  amLODIPine   Tablet 5 milliGRAM(s) Oral daily  benzonatate 100 milliGRAM(s) Oral three times a day  buDESOnide   0.5 milliGRAM(s) Respule 0.5 milliGRAM(s) Inhalation two times a day  cyanocobalamin 1000 MICROGram(s) Oral daily  dextrose 5%. 1000 milliLiter(s) (50 mL/Hr) IV Continuous <Continuous>  dextrose 50% Injectable 12.5 Gram(s) IV Push once  dextrose 50% Injectable 25 Gram(s) IV Push once  dextrose 50% Injectable 25 Gram(s) IV Push once  folic acid 1 milliGRAM(s) Oral daily  heparin  Injectable 5000 Unit(s) SubCutaneous every 12 hours  hydrochlorothiazide 25 milliGRAM(s) Oral daily  HYDROcodone/homatropine Syrup 5 milliLiter(s) Oral at bedtime  insulin glargine Injectable (LANTUS) 10 Unit(s) SubCutaneous at bedtime  insulin lispro (HumaLOG) corrective regimen sliding scale   SubCutaneous three times a day before meals  insulin lispro (HumaLOG) corrective regimen sliding scale   SubCutaneous at bedtime  insulin lispro Injectable (HumaLOG) 3 Unit(s) SubCutaneous three times a day before meals  losartan 50 milliGRAM(s) Oral daily  methotrexate (Non - oncologic) 17.5 milliGRAM(s) Oral <User Schedule>  polyethylene glycol 3350 17 Gram(s) Oral daily  predniSONE   Tablet 30 milliGRAM(s) Oral two times a day    MEDICATIONS  (PRN):  dextrose Gel 1 Dose(s) Oral once PRN Blood Glucose LESS THAN 70 milliGRAM(s)/deciliter  glucagon  Injectable 1 milliGRAM(s) IntraMuscular once PRN Glucose LESS THAN 70 milligrams/deciliter  ondansetron Injectable 4 milliGRAM(s) IV Push every 6 hours PRN Nausea and/or Vomiting    Labs:   Sedimentation Rate, Erythrocyte: 59 mm/hr (02.14.18 @ 13:05)  C-Reactive Protein, Serum: 5.60 mg/dL (02.14.18 @ 13:17)

## 2018-02-16 NOTE — PROGRESS NOTE ADULT - SUBJECTIVE AND OBJECTIVE BOX
Patient is a 86y old  Female who presents with a chief complaint of weakness   cough   diarreah (13 Feb 2018 12:58)                                                               INTERVAL HPI/OVERNIGHT EVENTS:    REVIEW OF SYSTEMS:     CONSTITUTIONAL: still with  weakness, no fevers or chills  RESPIRATORY: still with  cough, wheezing but No shortness of breath  CARDIOVASCULAR: No chest pain or palpitations  GASTROINTESTINAL: No abdominal pain  . No nausea, vomiting,   GENITOURINARY: No dysuria, frequency or hematuria  NEUROLOGICAL: No numbness or weakness  SKIN: No itching, burning, rashes, or lesions                                                                                                                                                                                                                                                                                 Medications:  MEDICATIONS  (STANDING):  ALBUTerol/ipratropium for Nebulization 3 milliLiter(s) Nebulizer four times a day  amLODIPine   Tablet 5 milliGRAM(s) Oral daily  benzonatate 100 milliGRAM(s) Oral three times a day  buDESOnide   0.5 milliGRAM(s) Respule 0.5 milliGRAM(s) Inhalation two times a day  cyanocobalamin 1000 MICROGram(s) Oral daily  dextrose 5%. 1000 milliLiter(s) (50 mL/Hr) IV Continuous <Continuous>  dextrose 50% Injectable 12.5 Gram(s) IV Push once  dextrose 50% Injectable 25 Gram(s) IV Push once  dextrose 50% Injectable 25 Gram(s) IV Push once  folic acid 1 milliGRAM(s) Oral daily  heparin  Injectable 5000 Unit(s) SubCutaneous every 12 hours  hydrochlorothiazide 25 milliGRAM(s) Oral daily  HYDROcodone/homatropine Syrup 5 milliLiter(s) Oral at bedtime  insulin glargine Injectable (LANTUS) 15 Unit(s) SubCutaneous at bedtime  insulin lispro (HumaLOG) corrective regimen sliding scale   SubCutaneous three times a day before meals  insulin lispro (HumaLOG) corrective regimen sliding scale   SubCutaneous at bedtime  insulin lispro Injectable (HumaLOG) 6 Unit(s) SubCutaneous three times a day before meals  losartan 50 milliGRAM(s) Oral daily  methotrexate (Non - oncologic) 17.5 milliGRAM(s) Oral <User Schedule>  methylPREDNISolone sodium succinate Injectable 20 milliGRAM(s) IV Push every 8 hours  polyethylene glycol 3350 17 Gram(s) Oral daily    MEDICATIONS  (PRN):  dextrose Gel 1 Dose(s) Oral once PRN Blood Glucose LESS THAN 70 milliGRAM(s)/deciliter  glucagon  Injectable 1 milliGRAM(s) IntraMuscular once PRN Glucose LESS THAN 70 milligrams/deciliter  ondansetron Injectable 4 milliGRAM(s) IV Push every 6 hours PRN Nausea and/or Vomiting       Allergies    No Known Allergies    Intolerances      Vital Signs Last 24 Hrs  T(C): 36.7 (16 Feb 2018 08:27), Max: 36.7 (16 Feb 2018 08:27)  T(F): 98 (16 Feb 2018 08:27), Max: 98 (16 Feb 2018 08:27)  HR: 87 (16 Feb 2018 08:27) (80 - 90)  BP: 180/96 (16 Feb 2018 08:27) (152/78 - 180/96)  BP(mean): --  RR: 20 (16 Feb 2018 08:27) (18 - 20)  SpO2: 98% (16 Feb 2018 08:27) (98% - 98%)  CAPILLARY BLOOD GLUCOSE      POCT Blood Glucose.: 278 mg/dL (16 Feb 2018 11:56)  POCT Blood Glucose.: 243 mg/dL (16 Feb 2018 08:18)  POCT Blood Glucose.: 260 mg/dL (15 Feb 2018 21:48)  POCT Blood Glucose.: 131 mg/dL (15 Feb 2018 16:39)      02-15 @ 07:01 - 02-16 @ 07:00  --------------------------------------------------------  IN: 1280 mL / OUT: 1 mL / NET: 1279 mL    02-16 @ 07:01 - 02-16 @ 13:44  --------------------------------------------------------  IN: 240 mL / OUT: 0 mL / NET: 240 mL      Physical Exam:    General:  NAD   HEENT:  Nonicteric, PERRLA  CV:  RRR, S1S2   Lungs:  B ronchi /exp wheezing   Abdomen:  Soft, non-tender, no distended, positive BS  Extremities:  2+ pulses, no c/c, no edema  Skin:  Warm and dry, no rashes  :  No mason  Neuro:  AAOx3, non-focal, grossly intact                                                                                                                                                                                                                                                                                                LABS:                               10.8   9.66  )-----------( 230      ( 16 Feb 2018 07:29 )             32.0                      02-16    139  |  102  |  25<H>  ----------------------------<  267<H>  4.1   |  23  |  0.85    Ca    8.9      16 Feb 2018 07:41  Phos  3.2     02-16    TPro  7.4  /  Alb  3.3  /  TBili  0.5  /  DBili  x   /  AST  15  /  ALT  13  /  AlkPhos  65  02-16

## 2018-02-16 NOTE — CONSULT NOTE ADULT - SUBJECTIVE AND OBJECTIVE BOX
HPI:  86 y/o fmeale with hx of RA on mtx , and was on prednisone ( no longer on steroids since 09/2017  and has not recieved iv infusion  ( monthly simponi ) in three months . also history of HTN and DM .. has been admitted twice over the past 6-8 months with  weakness . initial admit pt was thought ot have an element of adrenal insufficeiny when she was on steroids .. and pt was sent out on steroids with some improvement,.. later admitted as lij for weakness thought to be multifactorial sec to viral illness, dehydration , and leemt of steroid -induced myopathy. pt was sent to rehab and overall improved but never to baseline.. over the past three days pt has been having worsening generalized weakness , cough with production of sputum ( color?), decreased po intake and decreased mobility.  no focal neuro complaints  no CP/SOB   had had few episodes of N/V /D at home   no recent abx   no abdo pain or urinary Sx (12 Feb 2018 20:58)  Patient has history of diabetes, on insulin at home, no recent hypoglycemic episodes, no polyuria polydipsia. Patient follows up with PCP for diabetes management.    PAST MEDICAL & SURGICAL HISTORY:  RA (rheumatoid arthritis)  Asthma  DM (diabetes mellitus), type 2  HTN (hypertension), benign  After-cataract of left eye  Tubal occlusion      FAMILY HISTORY:  No pertinent family history in first degree relatives      Social History:    Outpatient Medications:    MEDICATIONS  (STANDING):  ALBUTerol/ipratropium for Nebulization 3 milliLiter(s) Nebulizer four times a day  amLODIPine   Tablet 5 milliGRAM(s) Oral daily  benzonatate 100 milliGRAM(s) Oral three times a day  buDESOnide   0.5 milliGRAM(s) Respule 0.5 milliGRAM(s) Inhalation two times a day  cyanocobalamin 1000 MICROGram(s) Oral daily  dextrose 5%. 1000 milliLiter(s) (50 mL/Hr) IV Continuous <Continuous>  dextrose 50% Injectable 12.5 Gram(s) IV Push once  dextrose 50% Injectable 25 Gram(s) IV Push once  dextrose 50% Injectable 25 Gram(s) IV Push once  folic acid 1 milliGRAM(s) Oral daily  heparin  Injectable 5000 Unit(s) SubCutaneous every 12 hours  hydrochlorothiazide 25 milliGRAM(s) Oral daily  HYDROcodone/homatropine Syrup 5 milliLiter(s) Oral at bedtime  insulin glargine Injectable (LANTUS) 15 Unit(s) SubCutaneous at bedtime  insulin lispro (HumaLOG) corrective regimen sliding scale   SubCutaneous three times a day before meals  insulin lispro (HumaLOG) corrective regimen sliding scale   SubCutaneous at bedtime  insulin lispro Injectable (HumaLOG) 6 Unit(s) SubCutaneous three times a day before meals  losartan 50 milliGRAM(s) Oral daily  methotrexate (Non - oncologic) 17.5 milliGRAM(s) Oral <User Schedule>  methylPREDNISolone sodium succinate Injectable 20 milliGRAM(s) IV Push every 8 hours  polyethylene glycol 3350 17 Gram(s) Oral daily    MEDICATIONS  (PRN):  dextrose Gel 1 Dose(s) Oral once PRN Blood Glucose LESS THAN 70 milliGRAM(s)/deciliter  glucagon  Injectable 1 milliGRAM(s) IntraMuscular once PRN Glucose LESS THAN 70 milligrams/deciliter  ondansetron Injectable 4 milliGRAM(s) IV Push every 6 hours PRN Nausea and/or Vomiting      Allergies    No Known Allergies    Intolerances      Review of Systems:  Constitutional: No fever, no chills  Eyes: No blurry vision  Neuro: No tremors  HEENT: No pain, no neck swelling  Cardiovascular: No chest pain, no palpitations  Respiratory: Has SOB, no cough  GI: No nausea, vomiting, abdominal pain  : No dysuria  Skin: no rash  MSK: Has leg swelling, no foot ulcers.  Psych: no depression  Endocrine: no polyuria, polydipsia    ALL OTHER SYSTEMS REVIEWED AND NEGATIVE    UNABLE TO OBTAIN    PHYSICAL EXAM:  VITALS: T(C): 36.5 (02-16-18 @ 14:21)  T(F): 97.7 (02-16-18 @ 14:21), Max: 98 (02-16-18 @ 08:27)  HR: 93 (02-16-18 @ 14:21) (80 - 93)  BP: 146/83 (02-16-18 @ 14:21) (146/83 - 180/96)  RR:  (18 - 20)  SpO2:  (96% - 98%)  Wt(kg): --  GENERAL: NAD, well-groomed, well-developed  EYES: No proptosis, no lid lag  HEENT:  Atraumatic, Normocephalic  THYROID: Normal size, no palpable nodules  RESPIRATORY: Clear to auscultation bilaterally; No rales, rhonchi, wheezing  CARDIOVASCULAR: Si S2, No murmurs;  GI: Soft, non distended, normal bowel sounds  SKIN: Dry, intact, No rashes or lesions  MUSCULOSKELETAL: Has BL lower extremity edema.  NEURO:  no tremor, sensation decreased in feet BL,    POCT Blood Glucose.: 278 mg/dL (02-16-18 @ 11:56)  POCT Blood Glucose.: 243 mg/dL (02-16-18 @ 08:18)  POCT Blood Glucose.: 260 mg/dL (02-15-18 @ 21:48)  POCT Blood Glucose.: 131 mg/dL (02-15-18 @ 16:39)  POCT Blood Glucose.: 260 mg/dL (02-15-18 @ 12:01)  POCT Blood Glucose.: 160 mg/dL (02-15-18 @ 08:00)  POCT Blood Glucose.: 227 mg/dL (02-14-18 @ 21:24)  POCT Blood Glucose.: 202 mg/dL (02-14-18 @ 17:10)  POCT Blood Glucose.: 229 mg/dL (02-14-18 @ 11:53)  POCT Blood Glucose.: 164 mg/dL (02-14-18 @ 07:41)  POCT Blood Glucose.: 199 mg/dL (02-13-18 @ 21:43)  POCT Blood Glucose.: 129 mg/dL (02-13-18 @ 17:18)                            10.8   9.66  )-----------( 230      ( 16 Feb 2018 07:29 )             32.0       02-16    139  |  102  |  25<H>  ----------------------------<  267<H>  4.1   |  23  |  0.85    EGFR if : 72  EGFR if non : 62    Ca    8.9      02-16  Phos  3.2     02-16    TPro  7.4  /  Alb  3.3  /  TBili  0.5  /  DBili  x   /  AST  15  /  ALT  13  /  AlkPhos  65  02-16      Thyroid Function Tests:  02-13 @ 07:42 TSH 2.42 FreeT4 1.3 T3 -- Anti TPO -- Anti Thyroglobulin Ab -- TSI --      Hemoglobin A1C, Whole Blood: 6.2 % <H> [4.0 - 5.6] (02-13-18 @ 07:38)          Radiology:

## 2018-02-17 LAB
ANION GAP SERPL CALC-SCNC: 15 MMOL/L — SIGNIFICANT CHANGE UP (ref 5–17)
BUN SERPL-MCNC: 33 MG/DL — HIGH (ref 7–23)
CALCIUM SERPL-MCNC: 9.3 MG/DL — SIGNIFICANT CHANGE UP (ref 8.4–10.5)
CHLORIDE SERPL-SCNC: 92 MMOL/L — LOW (ref 96–108)
CO2 SERPL-SCNC: 23 MMOL/L — SIGNIFICANT CHANGE UP (ref 22–31)
CREAT SERPL-MCNC: 0.93 MG/DL — SIGNIFICANT CHANGE UP (ref 0.5–1.3)
CULTURE RESULTS: SIGNIFICANT CHANGE UP
CULTURE RESULTS: SIGNIFICANT CHANGE UP
GLUCOSE BLDC GLUCOMTR-MCNC: 129 MG/DL — HIGH (ref 70–99)
GLUCOSE BLDC GLUCOMTR-MCNC: 307 MG/DL — HIGH (ref 70–99)
GLUCOSE BLDC GLUCOMTR-MCNC: 404 MG/DL — HIGH (ref 70–99)
GLUCOSE BLDC GLUCOMTR-MCNC: 414 MG/DL — HIGH (ref 70–99)
GLUCOSE BLDC GLUCOMTR-MCNC: 521 MG/DL — CRITICAL HIGH (ref 70–99)
GLUCOSE BLDC GLUCOMTR-MCNC: 555 MG/DL — CRITICAL HIGH (ref 70–99)
GLUCOSE BLDC GLUCOMTR-MCNC: 75 MG/DL — SIGNIFICANT CHANGE UP (ref 70–99)
GLUCOSE SERPL-MCNC: 527 MG/DL — CRITICAL HIGH (ref 70–99)
POTASSIUM SERPL-MCNC: 3.7 MMOL/L — SIGNIFICANT CHANGE UP (ref 3.5–5.3)
POTASSIUM SERPL-SCNC: 3.7 MMOL/L — SIGNIFICANT CHANGE UP (ref 3.5–5.3)
SODIUM SERPL-SCNC: 130 MMOL/L — LOW (ref 135–145)
SPECIMEN SOURCE: SIGNIFICANT CHANGE UP
SPECIMEN SOURCE: SIGNIFICANT CHANGE UP

## 2018-02-17 PROCEDURE — 93010 ELECTROCARDIOGRAM REPORT: CPT

## 2018-02-17 PROCEDURE — 71045 X-RAY EXAM CHEST 1 VIEW: CPT | Mod: 26

## 2018-02-17 RX ORDER — INSULIN LISPRO 100/ML
7 VIAL (ML) SUBCUTANEOUS
Qty: 0 | Refills: 0 | Status: DISCONTINUED | OUTPATIENT
Start: 2018-02-17 | End: 2018-02-22

## 2018-02-17 RX ORDER — INSULIN LISPRO 100/ML
12 VIAL (ML) SUBCUTANEOUS
Qty: 0 | Refills: 0 | Status: DISCONTINUED | OUTPATIENT
Start: 2018-02-17 | End: 2018-02-17

## 2018-02-17 RX ORDER — INSULIN LISPRO 100/ML
14 VIAL (ML) SUBCUTANEOUS
Qty: 0 | Refills: 0 | Status: DISCONTINUED | OUTPATIENT
Start: 2018-02-17 | End: 2018-02-17

## 2018-02-17 RX ORDER — INSULIN LISPRO 100/ML
14 VIAL (ML) SUBCUTANEOUS ONCE
Qty: 0 | Refills: 0 | Status: COMPLETED | OUTPATIENT
Start: 2018-02-17 | End: 2018-02-17

## 2018-02-17 RX ORDER — INSULIN GLARGINE 100 [IU]/ML
30 INJECTION, SOLUTION SUBCUTANEOUS AT BEDTIME
Qty: 0 | Refills: 0 | Status: DISCONTINUED | OUTPATIENT
Start: 2018-02-17 | End: 2018-02-22

## 2018-02-17 RX ORDER — DOCUSATE SODIUM 100 MG
100 CAPSULE ORAL THREE TIMES A DAY
Qty: 0 | Refills: 0 | Status: DISCONTINUED | OUTPATIENT
Start: 2018-02-17 | End: 2018-02-22

## 2018-02-17 RX ORDER — INSULIN LISPRO 100/ML
8 VIAL (ML) SUBCUTANEOUS
Qty: 0 | Refills: 0 | Status: DISCONTINUED | OUTPATIENT
Start: 2018-02-17 | End: 2018-02-17

## 2018-02-17 RX ORDER — INSULIN GLARGINE 100 [IU]/ML
32 INJECTION, SOLUTION SUBCUTANEOUS AT BEDTIME
Qty: 0 | Refills: 0 | Status: DISCONTINUED | OUTPATIENT
Start: 2018-02-17 | End: 2018-02-17

## 2018-02-17 RX ORDER — SENNA PLUS 8.6 MG/1
2 TABLET ORAL AT BEDTIME
Qty: 0 | Refills: 0 | Status: DISCONTINUED | OUTPATIENT
Start: 2018-02-17 | End: 2018-02-22

## 2018-02-17 RX ADMIN — HEPARIN SODIUM 5000 UNIT(S): 5000 INJECTION INTRAVENOUS; SUBCUTANEOUS at 17:19

## 2018-02-17 RX ADMIN — Medication 0.25 MILLIGRAM(S): at 16:57

## 2018-02-17 RX ADMIN — Medication 20 MILLIGRAM(S): at 05:09

## 2018-02-17 RX ADMIN — Medication 100 MILLIGRAM(S): at 19:20

## 2018-02-17 RX ADMIN — INSULIN GLARGINE 30 UNIT(S): 100 INJECTION, SOLUTION SUBCUTANEOUS at 21:45

## 2018-02-17 RX ADMIN — Medication 0.5 MILLIGRAM(S): at 19:20

## 2018-02-17 RX ADMIN — Medication 25 MILLIGRAM(S): at 05:09

## 2018-02-17 RX ADMIN — POLYETHYLENE GLYCOL 3350 17 GRAM(S): 17 POWDER, FOR SOLUTION ORAL at 14:52

## 2018-02-17 RX ADMIN — Medication 3 MILLILITER(S): at 17:19

## 2018-02-17 RX ADMIN — Medication 3 MILLILITER(S): at 23:57

## 2018-02-17 RX ADMIN — Medication 20 MILLIGRAM(S): at 14:51

## 2018-02-17 RX ADMIN — Medication 8: at 08:56

## 2018-02-17 RX ADMIN — Medication 14 UNIT(S): at 12:31

## 2018-02-17 RX ADMIN — Medication 1 MILLIGRAM(S): at 12:39

## 2018-02-17 RX ADMIN — Medication 20 MILLIGRAM(S): at 21:46

## 2018-02-17 RX ADMIN — SENNA PLUS 2 TABLET(S): 8.6 TABLET ORAL at 23:57

## 2018-02-17 RX ADMIN — Medication 100 MILLIGRAM(S): at 05:09

## 2018-02-17 RX ADMIN — Medication 3 MILLILITER(S): at 12:36

## 2018-02-17 RX ADMIN — Medication 100 MILLIGRAM(S): at 21:45

## 2018-02-17 RX ADMIN — Medication 0.5 MILLIGRAM(S): at 05:08

## 2018-02-17 RX ADMIN — Medication 6 UNIT(S): at 08:54

## 2018-02-17 RX ADMIN — PREGABALIN 1000 MICROGRAM(S): 225 CAPSULE ORAL at 12:36

## 2018-02-17 RX ADMIN — Medication 4: at 21:52

## 2018-02-17 RX ADMIN — Medication 100 MILLIGRAM(S): at 23:59

## 2018-02-17 RX ADMIN — Medication 3 MILLILITER(S): at 05:08

## 2018-02-17 RX ADMIN — Medication 100 MILLIGRAM(S): at 14:51

## 2018-02-17 RX ADMIN — LOSARTAN POTASSIUM 50 MILLIGRAM(S): 100 TABLET, FILM COATED ORAL at 05:09

## 2018-02-17 RX ADMIN — Medication 12: at 12:31

## 2018-02-17 RX ADMIN — AMLODIPINE BESYLATE 5 MILLIGRAM(S): 2.5 TABLET ORAL at 05:11

## 2018-02-17 RX ADMIN — HEPARIN SODIUM 5000 UNIT(S): 5000 INJECTION INTRAVENOUS; SUBCUTANEOUS at 05:08

## 2018-02-17 NOTE — PROGRESS NOTE ADULT - ASSESSMENT
Assessment  DMT2: 86y Female with DM T2 with hyperglycemia on insulin, blood sugars trending down, on high dose steroids, no hypoglycemia,  eating meals,  non compliant with low carb diet.  Asthma: On treatment, improving.  HTN: Controlled, On med.

## 2018-02-17 NOTE — PROGRESS NOTE ADULT - SUBJECTIVE AND OBJECTIVE BOX
Patient is a 86y old  Female who presents with a chief complaint of weakness   cough   diarreah (13 Feb 2018 12:58)                                                               INTERVAL HPI/OVERNIGHT EVENTS:  anxiety attack earlier ,,, uncontrolled HTN and an epiosde of cough speell followed by vomitting     REVIEW OF SYSTEMS:     CONSTITUTIONAL: No weakness, fevers or chills  RESPIRATORY: cough, wheezing,  No shortness of breath  CARDIOVASCULAR: No chest pain or palpitations  GASTROINTESTINAL: No abdominal pain  . No nausea, vomiting  GENITOURINARY: No dysuria, frequency   NEUROLOGICAL: No numbness or weakness                                                                                                                                                                                                                                                                                   Medications:  MEDICATIONS  (STANDING):  ALBUTerol/ipratropium for Nebulization 3 milliLiter(s) Nebulizer four times a day  amLODIPine   Tablet 5 milliGRAM(s) Oral daily  benzonatate 100 milliGRAM(s) Oral three times a day  buDESOnide   0.5 milliGRAM(s) Respule 0.5 milliGRAM(s) Inhalation two times a day  cyanocobalamin 1000 MICROGram(s) Oral daily  dextrose 5%. 1000 milliLiter(s) (50 mL/Hr) IV Continuous <Continuous>  dextrose 50% Injectable 12.5 Gram(s) IV Push once  dextrose 50% Injectable 25 Gram(s) IV Push once  dextrose 50% Injectable 25 Gram(s) IV Push once  folic acid 1 milliGRAM(s) Oral daily  guaiFENesin   Syrup  (Sugar-Free) 100 milliGRAM(s) Oral every 6 hours  heparin  Injectable 5000 Unit(s) SubCutaneous every 12 hours  hydrochlorothiazide 25 milliGRAM(s) Oral daily  HYDROcodone/homatropine Syrup 5 milliLiter(s) Oral at bedtime  insulin glargine Injectable (LANTUS) 30 Unit(s) SubCutaneous at bedtime  insulin lispro (HumaLOG) corrective regimen sliding scale   SubCutaneous three times a day before meals  insulin lispro (HumaLOG) corrective regimen sliding scale   SubCutaneous at bedtime  insulin lispro Injectable (HumaLOG) 7 Unit(s) SubCutaneous three times a day before meals  losartan 50 milliGRAM(s) Oral daily  methotrexate (Non - oncologic) 17.5 milliGRAM(s) Oral <User Schedule>  methylPREDNISolone sodium succinate Injectable 20 milliGRAM(s) IV Push every 8 hours  polyethylene glycol 3350 17 Gram(s) Oral daily    MEDICATIONS  (PRN):  dextrose Gel 1 Dose(s) Oral once PRN Blood Glucose LESS THAN 70 milliGRAM(s)/deciliter  glucagon  Injectable 1 milliGRAM(s) IntraMuscular once PRN Glucose LESS THAN 70 milligrams/deciliter  ondansetron Injectable 4 milliGRAM(s) IV Push every 6 hours PRN Nausea and/or Vomiting       Allergies    No Known Allergies    Intolerances      Vital Signs Last 24 Hrs  T(C): 37.2 (17 Feb 2018 20:50), Max: 37.2 (17 Feb 2018 20:50)  T(F): 99 (17 Feb 2018 20:50), Max: 99 (17 Feb 2018 20:50)  HR: 90 (17 Feb 2018 20:50) (79 - 102)  BP: 164/73 (17 Feb 2018 20:50) (148/82 - 187/101)  BP(mean): --  RR: 20 (17 Feb 2018 20:50) (18 - 20)  SpO2: 96% (17 Feb 2018 20:50) (94% - 97%)  CAPILLARY BLOOD GLUCOSE      POCT Blood Glucose.: 404 mg/dL (17 Feb 2018 21:36)  POCT Blood Glucose.: 414 mg/dL (17 Feb 2018 21:33)  POCT Blood Glucose.: 75 mg/dL (17 Feb 2018 16:56)  POCT Blood Glucose.: 129 mg/dL (17 Feb 2018 15:33)  POCT Blood Glucose.: 521 mg/dL (17 Feb 2018 11:51)  POCT Blood Glucose.: 555 mg/dL (17 Feb 2018 11:49)  POCT Blood Glucose.: 307 mg/dL (17 Feb 2018 07:48)      02-16 @ 07:01 - 02-17 @ 07:00  --------------------------------------------------------  IN: 860 mL / OUT: 0 mL / NET: 860 mL    02-17 @ 07:01 - 02-17 @ 23:19  --------------------------------------------------------  IN: 640 mL / OUT: 0 mL / NET: 640 mL      Physical Exam:    General: eldelry in NAD   HEENT:  Nonicteric, PERRLA  CV:  RRR, S1S2   Lungs:  scattered ronchi / wheezing   Abdomen:  Soft, non-tender, no distended, positive BS  Extremities:  no edema  Skin:  Warm and dry, no rashes  :  No mason  Neuro:  AAOx3, non-focal, grossly intact                                                                                                                                                                                                                                                                                                LABS:                               10.8   9.66  )-----------( 230      ( 16 Feb 2018 07:29 )             32.0                      02-17    130<L>  |  92<L>  |  33<H>  ----------------------------<  527<HH>  3.7   |  23  |  0.93    Ca    9.3      17 Feb 2018 13:46  Phos  3.2     02-16    TPro  7.4  /  Alb  3.3  /  TBili  0.5  /  DBili  x   /  AST  15  /  ALT  13  /  AlkPhos  65  02-16

## 2018-02-17 NOTE — PROGRESS NOTE ADULT - ASSESSMENT
HTN  labile  agree with addition of norvasc     AS  moderate  stable  s/p lasix     hMPV   nebs  fu with pulm

## 2018-02-17 NOTE — PROGRESS NOTE ADULT - ASSESSMENT
HmPV viral tracheobronchitis with mild bronchospasm: clinically improving with prednisone  RA with pulmonary fribrosis  No clinical evidence bacterial pneumonia  Cough  Crackles related to chronic ILD    REC    Continue solumedrol 20 IV q8: would convert to pred 30 bid and begin taper on 2/18  Continue bronchodilators  prn hycodan at night for cough  Monitor O2 sats  DC planning 1-2 days per primary team  Check sat ambulating on RA

## 2018-02-17 NOTE — PROGRESS NOTE ADULT - ASSESSMENT
84 y/o fmeale with hx of RA on mtx , and was on prednisone ( no longer on steroids since 09/2017  and has not recieved iv infusion  ( monthly simponi ) in three months . also history of HTN and DM .. has been admitted twice over the past 6-8 months with  weakness . initial admit pt was thought ot have an element of adrenal insufficeiny when she was on steroids .. and pt was sent out on steroids with some improvement,.. later admitted as lij for weakness thought to be multifactorial sec to viral illness, dehydration , and leemt of steroid -induced myopathy. pt was sent to rehab and overall improved but never to baseline.. over the past three days pt has been having worsening generalized weakness , cough with production of sputum ( color?), decreased po intake and decreased mobility.  no focal neuro complaints  no CP/SOB   had had few episodes of N/V /D at home   no recent abx   no abdo pain or urinary Sx       1- Cough / weakness:  hMPV     d/w Dr. Flannery : will change to IV steroids .. change to po in 24 hours most likely   hold off on antibiotics for now ..    CT old findings with mosaic changes       2- N/V /D : seems to be part of viral illness ..improved   tolerating  diet .. another episode today however post a cough-spell   Zofran  and ppi       3- DM 2 uncontrolled :   cont insulin and adjust per endo   4- Decreased mobility :  nonfocal neuro exam  .. had had mri brain with no acute findings   in past pt was thought to might have had an element of steroid induced myopathy however now off prednisone .. f/u  CPK  EMG as o/p.    5- HTN uncotrolled :           cont Norvasc  and consider increase  ...   will need BB  given AS  ..monitor for now    6- electrolyte abn : replete K and mag   7- anemia : likley aocd /iron def   8- AS : stable   f/u w cardio   9- anxiety /depression  : consult psych .. call    dvt proph

## 2018-02-17 NOTE — PROGRESS NOTE ADULT - SUBJECTIVE AND OBJECTIVE BOX
Subjective: Patient seen and examined. No new events except as noted.     SUBJECTIVE/ROS:  feels ok has still cough       MEDICATIONS:  MEDICATIONS  (STANDING):  ALBUTerol/ipratropium for Nebulization 3 milliLiter(s) Nebulizer four times a day  amLODIPine   Tablet 5 milliGRAM(s) Oral daily  benzonatate 100 milliGRAM(s) Oral three times a day  buDESOnide   0.5 milliGRAM(s) Respule 0.5 milliGRAM(s) Inhalation two times a day  cyanocobalamin 1000 MICROGram(s) Oral daily  dextrose 5%. 1000 milliLiter(s) (50 mL/Hr) IV Continuous <Continuous>  dextrose 50% Injectable 12.5 Gram(s) IV Push once  dextrose 50% Injectable 25 Gram(s) IV Push once  dextrose 50% Injectable 25 Gram(s) IV Push once  folic acid 1 milliGRAM(s) Oral daily  heparin  Injectable 5000 Unit(s) SubCutaneous every 12 hours  hydrochlorothiazide 25 milliGRAM(s) Oral daily  HYDROcodone/homatropine Syrup 5 milliLiter(s) Oral at bedtime  insulin glargine Injectable (LANTUS) 15 Unit(s) SubCutaneous at bedtime  insulin lispro (HumaLOG) corrective regimen sliding scale   SubCutaneous three times a day before meals  insulin lispro (HumaLOG) corrective regimen sliding scale   SubCutaneous at bedtime  insulin lispro Injectable (HumaLOG) 6 Unit(s) SubCutaneous three times a day before meals  losartan 50 milliGRAM(s) Oral daily  methotrexate (Non - oncologic) 17.5 milliGRAM(s) Oral <User Schedule>  methylPREDNISolone sodium succinate Injectable 20 milliGRAM(s) IV Push every 8 hours  polyethylene glycol 3350 17 Gram(s) Oral daily      PHYSICAL EXAM:  T(C): 36.6 (02-17-18 @ 03:47), Max: 36.8 (02-16-18 @ 21:30)  HR: 92 (02-17-18 @ 06:39) (79 - 93)  BP: 161/80 (02-17-18 @ 03:47) (146/83 - 161/80)  RR: 20 (02-17-18 @ 06:39) (18 - 20)  SpO2: 94% (02-17-18 @ 06:39) (94% - 96%)  Wt(kg): --  I&O's Summary    16 Feb 2018 07:01  -  17 Feb 2018 07:00  --------------------------------------------------------  IN: 860 mL / OUT: 0 mL / NET: 860 mL    17 Feb 2018 07:01  -  17 Feb 2018 11:58  --------------------------------------------------------  IN: 400 mL / OUT: 0 mL / NET: 400 mL      JVP: Normal  Neck: supple  Lung: clear   CV: S1 S2 , Murmur:  Abd: soft  Ext: No edema  neuro: Awake / alert  Psych: flat affect  Skin: normal       LABS/DATA:    CARDIAC MARKERS:                                10.8   9.66  )-----------( 230      ( 16 Feb 2018 07:29 )             32.0     02-16    139  |  102  |  25<H>  ----------------------------<  267<H>  4.1   |  23  |  0.85    Ca    8.9      16 Feb 2018 07:41  Phos  3.2     02-16    TPro  7.4  /  Alb  3.3  /  TBili  0.5  /  DBili  x   /  AST  15  /  ALT  13  /  AlkPhos  65  02-16    proBNP: Serum Pro-Brain Natriuretic Peptide: 773 pg/mL (02-16 @ 14:27)    Lipid Profile:   HgA1c:   TSH:     TELE:  EKG:

## 2018-02-17 NOTE — PROGRESS NOTE ADULT - SUBJECTIVE AND OBJECTIVE BOX
Chief complaint  Patient is a 86y old  Female who presents with a chief complaint of weakness   cough   diarreah (13 Feb 2018 12:58)   Review of systems  Patient in bed, looks comfortable, no fever, no hypoglycemia.    Labs and Fingersticks  CAPILLARY BLOOD GLUCOSE      POCT Blood Glucose.: 75 mg/dL (17 Feb 2018 16:56)  POCT Blood Glucose.: 129 mg/dL (17 Feb 2018 15:33)  POCT Blood Glucose.: 521 mg/dL (17 Feb 2018 11:51)  POCT Blood Glucose.: 555 mg/dL (17 Feb 2018 11:49)  POCT Blood Glucose.: 307 mg/dL (17 Feb 2018 07:48)  POCT Blood Glucose.: 271 mg/dL (16 Feb 2018 21:49)      Anion Gap, Serum: 15 (02-17 @ 13:46)  Anion Gap, Serum: 14 (02-16 @ 07:41)      Calcium, Total Serum: 9.3 (02-17 @ 13:46)  Calcium, Total Serum: 8.9 (02-16 @ 07:41)  Albumin, Serum: 3.3 (02-16 @ 07:41)    Alanine Aminotransferase (ALT/SGPT): 13 (02-16 @ 07:41)  Alkaline Phosphatase, Serum: 65 (02-16 @ 07:41)  Aspartate Aminotransferase (AST/SGOT): 15 (02-16 @ 07:41)        02-17    130<L>  |  92<L>  |  33<H>  ----------------------------<  527<HH>  3.7   |  23  |  0.93    Ca    9.3      17 Feb 2018 13:46  Phos  3.2     02-16    TPro  7.4  /  Alb  3.3  /  TBili  0.5  /  DBili  x   /  AST  15  /  ALT  13  /  AlkPhos  65  02-16                        10.8   9.66  )-----------( 230      ( 16 Feb 2018 07:29 )             32.0     Medications  MEDICATIONS  (STANDING):  ALBUTerol/ipratropium for Nebulization 3 milliLiter(s) Nebulizer four times a day  amLODIPine   Tablet 5 milliGRAM(s) Oral daily  benzonatate 100 milliGRAM(s) Oral three times a day  buDESOnide   0.5 milliGRAM(s) Respule 0.5 milliGRAM(s) Inhalation two times a day  cyanocobalamin 1000 MICROGram(s) Oral daily  dextrose 5%. 1000 milliLiter(s) (50 mL/Hr) IV Continuous <Continuous>  dextrose 50% Injectable 12.5 Gram(s) IV Push once  dextrose 50% Injectable 25 Gram(s) IV Push once  dextrose 50% Injectable 25 Gram(s) IV Push once  folic acid 1 milliGRAM(s) Oral daily  guaiFENesin   Syrup  (Sugar-Free) 100 milliGRAM(s) Oral every 6 hours  heparin  Injectable 5000 Unit(s) SubCutaneous every 12 hours  hydrochlorothiazide 25 milliGRAM(s) Oral daily  HYDROcodone/homatropine Syrup 5 milliLiter(s) Oral at bedtime  insulin glargine Injectable (LANTUS) 32 Unit(s) SubCutaneous at bedtime  insulin lispro (HumaLOG) corrective regimen sliding scale   SubCutaneous three times a day before meals  insulin lispro (HumaLOG) corrective regimen sliding scale   SubCutaneous at bedtime  insulin lispro Injectable (HumaLOG) 7 Unit(s) SubCutaneous three times a day before meals  losartan 50 milliGRAM(s) Oral daily  methotrexate (Non - oncologic) 17.5 milliGRAM(s) Oral <User Schedule>  methylPREDNISolone sodium succinate Injectable 20 milliGRAM(s) IV Push every 8 hours  polyethylene glycol 3350 17 Gram(s) Oral daily      Physical Exam  General: Patient comfortable in bed  Vital Signs Last 12 Hrs  T(F): 97.7 (02-17-18 @ 16:31), Max: 98.3 (02-17-18 @ 11:14)  HR: 102 (02-17-18 @ 16:31) (92 - 102)  BP: 158/93 (02-17-18 @ 17:35) (148/82 - 187/101)  BP(mean): --  RR: 18 (02-17-18 @ 17:35) (18 - 20)  SpO2: 97% (02-17-18 @ 17:35) (94% - 97%)  Neck: No palpable thyroid nodules.  CVS: S1S2, No murmurs  Respiratory: No wheezing, no crepitations  GI: Abdomen soft, bowel sounds positive  Musculoskeletal:  edema lower extremities.   Skin: No skin rashes, no ecchymosis    Diagnostics

## 2018-02-17 NOTE — CHART NOTE - NSCHARTNOTEFT_GEN_A_CORE
Asked to evaluate for tachycardia, dyspnea, anxiety: pt seen and examined; PT stating she does not feel well and wants to call her family; c/o anxiety, severe coughing spells, stating breathing is so,so...  Denies CP, palpitations, severe SOB.   Vital Signs Last 24 Hrs  T(C): 36.5 (17 Feb 2018 16:31), Max: 36.8 (16 Feb 2018 21:30)  T(F): 97.7 (17 Feb 2018 16:31), Max: 98.3 (17 Feb 2018 11:14)  HR: 102 (17 Feb 2018 16:31) (79 - 102)  BP: 187/101 (17 Feb 2018 16:31) (148/82 - 187/101)  BP(mean): --  RR: 18 (17 Feb 2018 16:31) (18 - 20)  SpO2: 97% (17 Feb 2018 16:31) (94% - 97%)      PHYSICAL EXAM:  General: mod distress  Respiratory: R upper lung exp wheeze, b/l crackles  Cardiovascular:S1S2 RRR  Gastrointestinal: +BS  Extremities:+1 edema  Neurological:AOx3      Assessment/plan  86 yr old F with RA with pul fibrosis, Mod AS admitted with HmPV viral tracheobronchitis with mild bronchospasm: clinically improving on soledrol now with HTN urgency with /101, tachycardia with  and SOB 2/2 underlying disease process Vs PE Vs acute anxiety  Ativan 0.25 mg PO x 1 dose  Rpt BP in 30 mts ( if still high hydralazine 10 mg IVP x 1 )  12 lead ekg  LE Duplex stat to r/o DVT  continue Duoneb/solumedrol/nebs  Attending aware

## 2018-02-17 NOTE — PROGRESS NOTE ADULT - SUBJECTIVE AND OBJECTIVE BOX
Follow-up Pulm Progress Note  Abrahan Flannery MD  [phone]    AFebrile  today OOB, comfortable with SOB  O2 sat 95% on RA  c/o ongoing dry cough  Cards f/u noted: additional lasix dose given    CULTURES:  Culture Results:   No growth (02-13 @ 03:07)  Culture Results:   No growth to date. (02-12 @ 22:01)  Culture Results:   No growth to date. (02-12 @ 22:01)    Most recent blood culture -- 02-13 @ 03:07   -- -- .Urine Clean Catch (Midstream) 02-13 @ 03:07  Most recent blood culture -- 02-12 @ 22:01   -- -- .Blood Blood 02-12 @ 22:01      Physical Examination:  PULM: Bilateral coarse crackles 1/3; few scattered rhonchi upper  CVS: Regular rate and rhythm, no murmurs, rubs, or gallops  ABD: Soft, non-tender  EXT:  No clubbing, cyanosis, or edema    RADIOLOGY REVIEWED  CXR:    CT chest:    TTE:

## 2018-02-18 DIAGNOSIS — K21.9 GASTRO-ESOPHAGEAL REFLUX DISEASE WITHOUT ESOPHAGITIS: ICD-10-CM

## 2018-02-18 LAB
ANION GAP SERPL CALC-SCNC: 14 MMOL/L — SIGNIFICANT CHANGE UP (ref 5–17)
BUN SERPL-MCNC: 32 MG/DL — HIGH (ref 7–23)
CALCIUM SERPL-MCNC: 9.6 MG/DL — SIGNIFICANT CHANGE UP (ref 8.4–10.5)
CHLORIDE SERPL-SCNC: 97 MMOL/L — SIGNIFICANT CHANGE UP (ref 96–108)
CO2 SERPL-SCNC: 25 MMOL/L — SIGNIFICANT CHANGE UP (ref 22–31)
CREAT SERPL-MCNC: 0.93 MG/DL — SIGNIFICANT CHANGE UP (ref 0.5–1.3)
GLUCOSE BLDC GLUCOMTR-MCNC: 109 MG/DL — HIGH (ref 70–99)
GLUCOSE BLDC GLUCOMTR-MCNC: 243 MG/DL — HIGH (ref 70–99)
GLUCOSE BLDC GLUCOMTR-MCNC: 247 MG/DL — HIGH (ref 70–99)
GLUCOSE BLDC GLUCOMTR-MCNC: 281 MG/DL — HIGH (ref 70–99)
GLUCOSE SERPL-MCNC: 288 MG/DL — HIGH (ref 70–99)
HCT VFR BLD CALC: 34.2 % — LOW (ref 34.5–45)
HGB BLD-MCNC: 11.6 G/DL — SIGNIFICANT CHANGE UP (ref 11.5–15.5)
MCHC RBC-ENTMCNC: 30.6 PG — SIGNIFICANT CHANGE UP (ref 27–34)
MCHC RBC-ENTMCNC: 33.9 GM/DL — SIGNIFICANT CHANGE UP (ref 32–36)
MCV RBC AUTO: 90.2 FL — SIGNIFICANT CHANGE UP (ref 80–100)
PLATELET # BLD AUTO: 245 K/UL — SIGNIFICANT CHANGE UP (ref 150–400)
POTASSIUM SERPL-MCNC: 4.3 MMOL/L — SIGNIFICANT CHANGE UP (ref 3.5–5.3)
POTASSIUM SERPL-SCNC: 4.3 MMOL/L — SIGNIFICANT CHANGE UP (ref 3.5–5.3)
RBC # BLD: 3.79 M/UL — LOW (ref 3.8–5.2)
RBC # FLD: 15.9 % — HIGH (ref 10.3–14.5)
SODIUM SERPL-SCNC: 136 MMOL/L — SIGNIFICANT CHANGE UP (ref 135–145)
WBC # BLD: 14.57 K/UL — HIGH (ref 3.8–10.5)
WBC # FLD AUTO: 14.57 K/UL — HIGH (ref 3.8–10.5)

## 2018-02-18 PROCEDURE — 70450 CT HEAD/BRAIN W/O DYE: CPT | Mod: 26

## 2018-02-18 PROCEDURE — 93010 ELECTROCARDIOGRAM REPORT: CPT

## 2018-02-18 PROCEDURE — 93970 EXTREMITY STUDY: CPT | Mod: 26

## 2018-02-18 RX ORDER — FUROSEMIDE 40 MG
40 TABLET ORAL ONCE
Qty: 0 | Refills: 0 | Status: COMPLETED | OUTPATIENT
Start: 2018-02-18 | End: 2018-02-18

## 2018-02-18 RX ORDER — IPRATROPIUM BROMIDE 0.2 MG/ML
500 SOLUTION, NON-ORAL INHALATION EVERY 6 HOURS
Qty: 0 | Refills: 0 | Status: DISCONTINUED | OUTPATIENT
Start: 2018-02-18 | End: 2018-02-19

## 2018-02-18 RX ORDER — PANTOPRAZOLE SODIUM 20 MG/1
40 TABLET, DELAYED RELEASE ORAL
Qty: 0 | Refills: 0 | Status: DISCONTINUED | OUTPATIENT
Start: 2018-02-18 | End: 2018-02-22

## 2018-02-18 RX ORDER — LEVALBUTEROL 1.25 MG/.5ML
0.63 SOLUTION, CONCENTRATE RESPIRATORY (INHALATION) EVERY 6 HOURS
Qty: 0 | Refills: 0 | Status: DISCONTINUED | OUTPATIENT
Start: 2018-02-18 | End: 2018-02-19

## 2018-02-18 RX ORDER — IPRATROPIUM/ALBUTEROL SULFATE 18-103MCG
3 AEROSOL WITH ADAPTER (GRAM) INHALATION EVERY 6 HOURS
Qty: 0 | Refills: 0 | Status: DISCONTINUED | OUTPATIENT
Start: 2018-02-18 | End: 2018-02-18

## 2018-02-18 RX ORDER — FAMOTIDINE 10 MG/ML
20 INJECTION INTRAVENOUS AT BEDTIME
Qty: 0 | Refills: 0 | Status: DISCONTINUED | OUTPATIENT
Start: 2018-02-18 | End: 2018-02-20

## 2018-02-18 RX ORDER — BUDESONIDE AND FORMOTEROL FUMARATE DIHYDRATE 160; 4.5 UG/1; UG/1
2 AEROSOL RESPIRATORY (INHALATION)
Qty: 0 | Refills: 0 | Status: DISCONTINUED | OUTPATIENT
Start: 2018-02-18 | End: 2018-02-19

## 2018-02-18 RX ORDER — BUDESONIDE AND FORMOTEROL FUMARATE DIHYDRATE 160; 4.5 UG/1; UG/1
2 AEROSOL RESPIRATORY (INHALATION)
Qty: 0 | Refills: 0 | Status: DISCONTINUED | OUTPATIENT
Start: 2018-02-18 | End: 2018-02-18

## 2018-02-18 RX ORDER — QUETIAPINE FUMARATE 200 MG/1
12.5 TABLET, FILM COATED ORAL AT BEDTIME
Qty: 0 | Refills: 0 | Status: DISCONTINUED | OUTPATIENT
Start: 2018-02-18 | End: 2018-02-20

## 2018-02-18 RX ORDER — QUETIAPINE FUMARATE 200 MG/1
25 TABLET, FILM COATED ORAL EVERY 12 HOURS
Qty: 0 | Refills: 0 | Status: DISCONTINUED | OUTPATIENT
Start: 2018-02-18 | End: 2018-02-20

## 2018-02-18 RX ADMIN — Medication 6: at 12:14

## 2018-02-18 RX ADMIN — LEVALBUTEROL 0.63 MILLIGRAM(S): 1.25 SOLUTION, CONCENTRATE RESPIRATORY (INHALATION) at 23:21

## 2018-02-18 RX ADMIN — Medication 3 MILLILITER(S): at 06:36

## 2018-02-18 RX ADMIN — Medication 100 MILLIGRAM(S): at 06:37

## 2018-02-18 RX ADMIN — AMLODIPINE BESYLATE 5 MILLIGRAM(S): 2.5 TABLET ORAL at 06:36

## 2018-02-18 RX ADMIN — FAMOTIDINE 20 MILLIGRAM(S): 10 INJECTION INTRAVENOUS at 21:39

## 2018-02-18 RX ADMIN — Medication 100 MILLIGRAM(S): at 14:33

## 2018-02-18 RX ADMIN — Medication 40 MILLIGRAM(S): at 12:30

## 2018-02-18 RX ADMIN — HEPARIN SODIUM 5000 UNIT(S): 5000 INJECTION INTRAVENOUS; SUBCUTANEOUS at 06:38

## 2018-02-18 RX ADMIN — PREGABALIN 1000 MICROGRAM(S): 225 CAPSULE ORAL at 12:20

## 2018-02-18 RX ADMIN — Medication 0.25 MILLIGRAM(S): at 16:59

## 2018-02-18 RX ADMIN — Medication 7 UNIT(S): at 12:15

## 2018-02-18 RX ADMIN — Medication 100 MILLIGRAM(S): at 06:38

## 2018-02-18 RX ADMIN — LOSARTAN POTASSIUM 50 MILLIGRAM(S): 100 TABLET, FILM COATED ORAL at 06:39

## 2018-02-18 RX ADMIN — QUETIAPINE FUMARATE 12.5 MILLIGRAM(S): 200 TABLET, FILM COATED ORAL at 22:50

## 2018-02-18 RX ADMIN — Medication 100 MILLIGRAM(S): at 21:38

## 2018-02-18 RX ADMIN — Medication 4: at 08:25

## 2018-02-18 RX ADMIN — POLYETHYLENE GLYCOL 3350 17 GRAM(S): 17 POWDER, FOR SOLUTION ORAL at 12:16

## 2018-02-18 RX ADMIN — Medication 100 MILLIGRAM(S): at 17:03

## 2018-02-18 RX ADMIN — Medication 1 MILLIGRAM(S): at 12:20

## 2018-02-18 RX ADMIN — Medication 7 UNIT(S): at 16:54

## 2018-02-18 RX ADMIN — Medication 500 MICROGRAM(S): at 21:42

## 2018-02-18 RX ADMIN — Medication 0.5 MILLIGRAM(S): at 06:36

## 2018-02-18 RX ADMIN — Medication 100 MILLIGRAM(S): at 12:19

## 2018-02-18 RX ADMIN — Medication 25 MILLIGRAM(S): at 06:39

## 2018-02-18 RX ADMIN — Medication 30 MILLIGRAM(S): at 19:06

## 2018-02-18 RX ADMIN — HEPARIN SODIUM 5000 UNIT(S): 5000 INJECTION INTRAVENOUS; SUBCUTANEOUS at 18:15

## 2018-02-18 RX ADMIN — BUDESONIDE AND FORMOTEROL FUMARATE DIHYDRATE 2 PUFF(S): 160; 4.5 AEROSOL RESPIRATORY (INHALATION) at 21:39

## 2018-02-18 RX ADMIN — Medication 4: at 16:54

## 2018-02-18 RX ADMIN — Medication 3 MILLILITER(S): at 17:02

## 2018-02-18 RX ADMIN — INSULIN GLARGINE 30 UNIT(S): 100 INJECTION, SOLUTION SUBCUTANEOUS at 21:59

## 2018-02-18 RX ADMIN — Medication 7 UNIT(S): at 08:25

## 2018-02-18 RX ADMIN — Medication 100 MILLIGRAM(S): at 23:21

## 2018-02-18 RX ADMIN — Medication 20 MILLIGRAM(S): at 06:38

## 2018-02-18 NOTE — CONSULT NOTE ADULT - ASSESSMENT
Delirium of unknown etiologies (? corticosteroids, ? hydrocodone/homatropine, ?metabolic)  Depression NOS (family conflict)  History of Generalized Anxiety Disorder  Rule out dementia     Recommend  D/C hydrocodone/homatropine. Avoid other delirogenic meds.  No antidepressant meds. Avoid benzodiazepines (last took Xanax mos. ago so no risk for withdrawal).  Check QTc and if under 500ms give Seroquel 12.5mg p.o. qhs and 25mg p.o. q12h prn agitation  Check B12, folate, TSH, UA, RPR, head CT  Will follow with you here. Thank you.    Brendan Posada M.D.  Psychiatry  [phone]

## 2018-02-18 NOTE — PROGRESS NOTE ADULT - SUBJECTIVE AND OBJECTIVE BOX
Subjective: Patient seen and examined. No new events except as noted.     SUBJECTIVE/ROS:  has sob last night  feels a bit better today       MEDICATIONS:  MEDICATIONS  (STANDING):  amLODIPine   Tablet 5 milliGRAM(s) Oral daily  benzonatate 100 milliGRAM(s) Oral three times a day  buDESOnide 160 MICROgram(s)/formoterol 4.5 MICROgram(s) Inhaler 2 Puff(s) Inhalation two times a day  cyanocobalamin 1000 MICROGram(s) Oral daily  dextrose 5%. 1000 milliLiter(s) (50 mL/Hr) IV Continuous <Continuous>  dextrose 50% Injectable 12.5 Gram(s) IV Push once  dextrose 50% Injectable 25 Gram(s) IV Push once  dextrose 50% Injectable 25 Gram(s) IV Push once  docusate sodium 100 milliGRAM(s) Oral three times a day  folic acid 1 milliGRAM(s) Oral daily  furosemide   Injectable 40 milliGRAM(s) IV Push once  guaiFENesin   Syrup  (Sugar-Free) 100 milliGRAM(s) Oral every 6 hours  heparin  Injectable 5000 Unit(s) SubCutaneous every 12 hours  hydrochlorothiazide 25 milliGRAM(s) Oral daily  HYDROcodone/homatropine Syrup 5 milliLiter(s) Oral at bedtime  insulin glargine Injectable (LANTUS) 30 Unit(s) SubCutaneous at bedtime  insulin lispro (HumaLOG) corrective regimen sliding scale   SubCutaneous three times a day before meals  insulin lispro (HumaLOG) corrective regimen sliding scale   SubCutaneous at bedtime  insulin lispro Injectable (HumaLOG) 7 Unit(s) SubCutaneous three times a day before meals  losartan 50 milliGRAM(s) Oral daily  methotrexate (Non - oncologic) 17.5 milliGRAM(s) Oral <User Schedule>  polyethylene glycol 3350 17 Gram(s) Oral daily  predniSONE   Tablet 30 milliGRAM(s) Oral two times a day  predniSONE   Tablet   Oral   senna 2 Tablet(s) Oral at bedtime      PHYSICAL EXAM:  T(C): 36.6 (02-18-18 @ 04:15), Max: 37.2 (02-17-18 @ 20:50)  HR: 78 (02-18-18 @ 04:15) (78 - 102)  BP: 157/83 (02-18-18 @ 04:15) (148/82 - 187/101)  RR: 18 (02-18-18 @ 04:15) (18 - 20)  SpO2: 94% (02-18-18 @ 04:15) (94% - 97%)  Wt(kg): --  I&O's Summary    17 Feb 2018 07:01  -  18 Feb 2018 07:00  --------------------------------------------------------  IN: 760 mL / OUT: 0 mL / NET: 760 mL    18 Feb 2018 07:01  -  18 Feb 2018 10:43  --------------------------------------------------------  IN: 400 mL / OUT: 0 mL / NET: 400 mL        JVP: Normal  Neck: supple  Lung: crackles   CV: S1 S2 , Murmur:  Abd: soft  Ext: No edema  neuro: Awake / alert  Psych: flat affect  Skin: normal       LABS/DATA:    CARDIAC MARKERS:                                11.6   14.57 )-----------( 245      ( 18 Feb 2018 06:33 )             34.2     02-18    136  |  97  |  32<H>  ----------------------------<  288<H>  4.3   |  25  |  0.93    Ca    9.6      18 Feb 2018 08:44      proBNP:   Lipid Profile:   HgA1c:   TSH:     TELE:  EKG:

## 2018-02-18 NOTE — PROGRESS NOTE ADULT - ASSESSMENT
HTN  labile  agree with addition of norvasc     AS  moderate  stable  has evidence of congestion on exam   will give lasix today 40 iv once     hMPV   nebs  fu with pulm

## 2018-02-18 NOTE — CONSULT NOTE ADULT - SUBJECTIVE AND OBJECTIVE BOX
Chart reviewed and patient seen by undersigned    Asked to consult for anxiety and depression    Historians include chart, the pt. (a poor historian who cannot recall ever taking psych. meds and denies past psych. history), the pt's  and granddaughter.    History of Present Illness  The patient is a 86 year old  HF. She was admitted here on 2/12/18 for cough and diarrhea. She suffers from rheumatoid arthritis, DM, HTN. Has had possible adrenal insufficiency, steroid induced myopathy, weakness, decreased p.o. intake (but ok now per family). Pt. still with frequent coughing of unknown etiology. The only neurovegetative sx of depression are decreased sleep and decreased energy. Family says pt has been confused since her admission here as she repeats questions and is forgetful. They deny she had memory loss until 6 mos. ago. They describe her as strong willed and a chronic worrier. For the latter, her family MD prescribed Xanax months ago but she soon discontinued it feeling it made her oversedated. Here, her BS was 525 yesterday and she received prn Ativan 0.25mg yesterday. Her Solumedrol was stopped today and she is now on Prednisone. Stressed by family conflicts.     Past Psychiatric History  Generalized anxiety disorder. Never suicidal/manic/psychotic. Never seen by psychiatry.     Psychosocial History  Lives with  and granddaughter. No cigarettes/ETOH/illicit drugs.     PAST MEDICAL & SURGICAL HISTORY:  RA (rheumatoid arthritis)  Asthma  DM (diabetes mellitus), type 2  HTN (hypertension), benign  After-cataract of left eye  Tubal occlusion      FAMILY HISTORY:  No pertinent family history in first degree relatives      Vital Signs Last 24 Hrs  T(C): 36.8 (18 Feb 2018 11:51), Max: 37.2 (17 Feb 2018 20:50)  T(F): 98.3 (18 Feb 2018 11:51), Max: 99 (17 Feb 2018 20:50)  HR: 84 (18 Feb 2018 11:51) (78 - 90)  BP: 149/84 (18 Feb 2018 11:51) (149/84 - 164/73)  BP(mean): --  RR: 18 (18 Feb 2018 11:51) (18 - 20)  SpO2: 94% (18 Feb 2018 11:51) (94% - 96%)                          11.6   14.57 )-----------( 245      ( 18 Feb 2018 06:33 )             34.2       02-18    136  |  97  |  32<H>  ----------------------------<  288<H>  4.3   |  25  |  0.93    Ca    9.6      18 Feb 2018 08:44              MEDICATIONS  (STA< from: MRI Head w/o Cont (10.23.17 @ 11:35) >    EXAM:  MRI BRAIN W O CONTRAST        PROCEDURE DATE:  Oct 23 2017         INTERPRETATION:  History: ataxia ataxia  ataxia.ADMDIAG1: R53.1 R53.1/    Technique: MRI of the brain was performed with and without contrast    Sagittal and axial T1, axial T2, FLAIR, SWI, diffusion-weighted images   and an ADC map were obtained.    COMPARISON: CT head dated 10/19/2017.    FINDINGS:  The ventricles and sulci are prominent, consistent with mild cerebral   volume loss. Confluent hemispheric white matter hyperintense T2/flair   signal abnormalities are consistent with moderate to severe severity   microvascular ischemic change. There is no intraparenchymal hematoma,   mass effect or midline shift. No abnormal extra-axial fluid collections   are present.There is no diffusion abnormality to suggest acute or   subacute infarction. Major flow-voids at the base of the brain follow   expected course and contour.    The calvarium is intact. The visualized intraorbital compartments,   paranasal sinuses andtympanomastoid cavities appear free of acute   disease.        IMPRESSION:  Moderate to severe severity microvascular ischemic change.  No acute or subacute infarction.        < end of copied text >  NDING):  amLODIPine   Tablet 5 milliGRAM(s) Oral daily  benzonatate 100 milliGRAM(s) Oral three times a day  buDESOnide 160 MICROgram(s)/formoterol 4.5 MICROgram(s) Inhaler 2 Puff(s) Inhalation two times a day  cyanocobalamin 1000 MICROGram(s) Oral daily  dextrose 5%. 1000 milliLiter(s) (50 mL/Hr) IV Continuous <Continuous>  dextrose 50% Injectable 12.5 Gram(s) IV Push once  dextrose 50% Injectable 25 Gram(s) IV Push once  dextrose 50% Injectable 25 Gram(s) IV Push once  docusate sodium 100 milliGRAM(s) Oral three times a day  folic acid 1 milliGRAM(s) Oral daily  guaiFENesin   Syrup  (Sugar-Free) 100 milliGRAM(s) Oral every 6 hours  heparin  Injectable 5000 Unit(s) SubCutaneous every 12 hours  hydrochlorothiazide 25 milliGRAM(s) Oral daily  HYDROcodone/homatropine Syrup 5 milliLiter(s) Oral at bedtime  insulin glargine Injectable (LANTUS) 30 Unit(s) SubCutaneous at bedtime  insulin lispro (HumaLOG) corrective regimen sliding scale   SubCutaneous three times a day before meals  insulin lispro (HumaLOG) corrective regimen sliding scale   SubCutaneous at bedtime  insulin lispro Injectable (HumaLOG) 7 Unit(s) SubCutaneous three times a day before meals  losartan 50 milliGRAM(s) Oral daily  methotrexate (Non - oncologic) 17.5 milliGRAM(s) Oral <User Schedule>  polyethylene glycol 3350 17 Gram(s) Oral daily  predniSONE   Tablet 30 milliGRAM(s) Oral two times a day  predniSONE   Tablet   Oral   senna 2 Tablet(s) Oral at bedtime    MEDICATIONS  (PRN):  ALBUTerol/ipratropium for Nebulization 3 milliLiter(s) Nebulizer every 6 hours PRN Shortness of Breath and/or Wheezing  dextrose Gel 1 Dose(s) Oral once PRN Blood Glucose LESS THAN 70 milliGRAM(s)/deciliter  glucagon  Injectable 1 milliGRAM(s) IntraMuscular once PRN Glucose LESS THAN 70 milligrams/deciliter  ondansetron Injectable 4 milliGRAM(s) IV Push every 6 hours PRN Nausea and/or Vomiting      Elderly HF in chair sitting up, calm, cooperative, alert and oriented x 1-2 (she says it is January, cannot tell me the year nor the name of the hospital) .  No psychomotor abnormalities. Insight and judgment are impaired.  Speech is coherent with normal rate and low volume. No hallucinations nor delusions. The patient denied suicidal and homicidal ideation and plan. Mood is dysphoric and anxious and affect constricted. Attention and concentration fair but impaired short term memory and long term memory. She cannot name the President of the [place].     Suicidal risk assessment  Risk factors include depression and confusion  Protective factors include no plan, no past attempts, no guns, no psychosis/substance abuse

## 2018-02-18 NOTE — CONSULT NOTE ADULT - SUBJECTIVE AND OBJECTIVE BOX
CC: chronic cough     HPI: 87 y/o female with PMhx of RA, HTN and DM .. has been admitted twice over the past 6-8 months with  weakness and viral illness, dehydration , and leemt of steroid -induced myopathy. Patient now reports that in the past three days she has been having worsening generalized weakness , cough with production of sputum ( color?), decreased po intake and decreased mobility. We are asked to evaluate ot for her chronic cough. Denies SOB/CP. Currently on pantoprazole BID.     PAST MEDICAL & SURGICAL HISTORY:  RA (rheumatoid arthritis)  Asthma  DM (diabetes mellitus), type 2  HTN (hypertension), benign  After-cataract of left eye  Tubal occlusion    Allergies    No Known Allergies    Intolerances      MEDICATIONS  (STANDING):  amLODIPine   Tablet 5 milliGRAM(s) Oral daily  benzonatate 100 milliGRAM(s) Oral three times a day  buDESOnide 160 MICROgram(s)/formoterol 4.5 MICROgram(s) Inhaler 2 Puff(s) Inhalation two times a day  cyanocobalamin 1000 MICROGram(s) Oral daily  dextrose 5%. 1000 milliLiter(s) (50 mL/Hr) IV Continuous <Continuous>  dextrose 50% Injectable 12.5 Gram(s) IV Push once  dextrose 50% Injectable 25 Gram(s) IV Push once  dextrose 50% Injectable 25 Gram(s) IV Push once  docusate sodium 100 milliGRAM(s) Oral three times a day  famotidine Injectable 20 milliGRAM(s) IV Push at bedtime  folic acid 1 milliGRAM(s) Oral daily  guaiFENesin   Syrup  (Sugar-Free) 100 milliGRAM(s) Oral every 6 hours  heparin  Injectable 5000 Unit(s) SubCutaneous every 12 hours  hydrochlorothiazide 25 milliGRAM(s) Oral daily  insulin glargine Injectable (LANTUS) 30 Unit(s) SubCutaneous at bedtime  insulin lispro (HumaLOG) corrective regimen sliding scale   SubCutaneous three times a day before meals  insulin lispro (HumaLOG) corrective regimen sliding scale   SubCutaneous at bedtime  insulin lispro Injectable (HumaLOG) 7 Unit(s) SubCutaneous three times a day before meals  losartan 50 milliGRAM(s) Oral daily  methotrexate (Non - oncologic) 17.5 milliGRAM(s) Oral <User Schedule>  pantoprazole    Tablet 40 milliGRAM(s) Oral two times a day before meals  polyethylene glycol 3350 17 Gram(s) Oral daily  predniSONE   Tablet 30 milliGRAM(s) Oral two times a day  predniSONE   Tablet   Oral   QUEtiapine 12.5 milliGRAM(s) Oral at bedtime  senna 2 Tablet(s) Oral at bedtime    MEDICATIONS  (PRN):  dextrose Gel 1 Dose(s) Oral once PRN Blood Glucose LESS THAN 70 milliGRAM(s)/deciliter  glucagon  Injectable 1 milliGRAM(s) IntraMuscular once PRN Glucose LESS THAN 70 milligrams/deciliter  ondansetron Injectable 4 milliGRAM(s) IV Push every 6 hours PRN Nausea and/or Vomiting  QUEtiapine 25 milliGRAM(s) Oral every 12 hours PRN agitation      ROS: ENT, GI, , CV, Pulm, Neuro, Psych, MS, Heme, Endo, Constitutional; all negative except as noted in HPI    Vital Signs Last 24 Hrs  T(C): 36.8 (18 Feb 2018 11:51), Max: 37.2 (17 Feb 2018 20:50)  T(F): 98.3 (18 Feb 2018 11:51), Max: 99 (17 Feb 2018 20:50)  HR: 84 (18 Feb 2018 11:51) (78 - 90)  BP: 149/84 (18 Feb 2018 11:51) (149/84 - 164/73)  BP(mean): --  RR: 18 (18 Feb 2018 11:51) (18 - 20)  SpO2: 94% (18 Feb 2018 11:51) (94% - 96%)                          11.6   14.57 )-----------( 245      ( 18 Feb 2018 06:33 )             34.2    02-18    136  |  97  |  32<H>  ----------------------------<  288<H>  4.3   |  25  |  0.93    Ca    9.6      18 Feb 2018 08:44         PHYSICAL EXAM:  Gen: NAD, well-developed  Head: Normocephalic, Atraumatic  Face: no edema/erythema/fluctuance, parotid glands soft without mass  Eyes: PERRL, EOMI, no scleral injection  Ears: Right - ear canal clear, TM intact without effusion            Left - ear canal clear, TM intact without effusion  Nose: Nares bilaterally patent, no discharge  Mouth: Mucosa moist, tongue/uvula midline, oropharynx clear  Neck: Flat, supple, no lymphadenopathy, trachea midline, no masses  Resp: no use of accessory muscles, no stridor  CV: no peripheral edema/cyanosis CC: chronic cough     HPI: 85 y/o female with PMhx of RA, HTN and DM .. has been admitted twice over the past 6-8 months with  weakness and viral illness, dehydration , and leemt of steroid -induced myopathy. Patient now reports that in the past three days she has been having worsening generalized weakness , cough with production of sputum, decreased po intake and decreased mobility. We are asked to evaluate ot for her chronic cough. Denies SOB/CP. Currently on pantoprazole BID.     PAST MEDICAL & SURGICAL HISTORY:  RA (rheumatoid arthritis)  Asthma  DM (diabetes mellitus), type 2  HTN (hypertension), benign  After-cataract of left eye  Tubal occlusion    Allergies    No Known Allergies    Intolerances      MEDICATIONS  (STANDING):  amLODIPine   Tablet 5 milliGRAM(s) Oral daily  benzonatate 100 milliGRAM(s) Oral three times a day  buDESOnide 160 MICROgram(s)/formoterol 4.5 MICROgram(s) Inhaler 2 Puff(s) Inhalation two times a day  cyanocobalamin 1000 MICROGram(s) Oral daily  dextrose 5%. 1000 milliLiter(s) (50 mL/Hr) IV Continuous <Continuous>  dextrose 50% Injectable 12.5 Gram(s) IV Push once  dextrose 50% Injectable 25 Gram(s) IV Push once  dextrose 50% Injectable 25 Gram(s) IV Push once  docusate sodium 100 milliGRAM(s) Oral three times a day  famotidine Injectable 20 milliGRAM(s) IV Push at bedtime  folic acid 1 milliGRAM(s) Oral daily  guaiFENesin   Syrup  (Sugar-Free) 100 milliGRAM(s) Oral every 6 hours  heparin  Injectable 5000 Unit(s) SubCutaneous every 12 hours  hydrochlorothiazide 25 milliGRAM(s) Oral daily  insulin glargine Injectable (LANTUS) 30 Unit(s) SubCutaneous at bedtime  insulin lispro (HumaLOG) corrective regimen sliding scale   SubCutaneous three times a day before meals  insulin lispro (HumaLOG) corrective regimen sliding scale   SubCutaneous at bedtime  insulin lispro Injectable (HumaLOG) 7 Unit(s) SubCutaneous three times a day before meals  losartan 50 milliGRAM(s) Oral daily  methotrexate (Non - oncologic) 17.5 milliGRAM(s) Oral <User Schedule>  pantoprazole    Tablet 40 milliGRAM(s) Oral two times a day before meals  polyethylene glycol 3350 17 Gram(s) Oral daily  predniSONE   Tablet 30 milliGRAM(s) Oral two times a day  predniSONE   Tablet   Oral   QUEtiapine 12.5 milliGRAM(s) Oral at bedtime  senna 2 Tablet(s) Oral at bedtime    MEDICATIONS  (PRN):  dextrose Gel 1 Dose(s) Oral once PRN Blood Glucose LESS THAN 70 milliGRAM(s)/deciliter  glucagon  Injectable 1 milliGRAM(s) IntraMuscular once PRN Glucose LESS THAN 70 milligrams/deciliter  ondansetron Injectable 4 milliGRAM(s) IV Push every 6 hours PRN Nausea and/or Vomiting  QUEtiapine 25 milliGRAM(s) Oral every 12 hours PRN agitation      ROS: ENT, GI, , CV, Pulm, Neuro, Psych, MS, Heme, Endo, Constitutional; all negative except as noted in HPI    Vital Signs Last 24 Hrs  T(C): 36.8 (18 Feb 2018 11:51), Max: 37.2 (17 Feb 2018 20:50)  T(F): 98.3 (18 Feb 2018 11:51), Max: 99 (17 Feb 2018 20:50)  HR: 84 (18 Feb 2018 11:51) (78 - 90)  BP: 149/84 (18 Feb 2018 11:51) (149/84 - 164/73)  BP(mean): --  RR: 18 (18 Feb 2018 11:51) (18 - 20)  SpO2: 94% (18 Feb 2018 11:51) (94% - 96%)                          11.6   14.57 )-----------( 245      ( 18 Feb 2018 06:33 )             34.2    02-18    136  |  97  |  32<H>  ----------------------------<  288<H>  4.3   |  25  |  0.93    Ca    9.6      18 Feb 2018 08:44         PHYSICAL EXAM:  Gen: NAD, well-developed  Head: Normocephalic, Atraumatic  Face: no edema/erythema/fluctuance, parotid glands soft without mass  Eyes: PERRL, EOMI, no scleral injection  Nose: Nares bilaterally patent, no discharge  Mouth: Mucosa moist, tongue/uvula midline, oropharynx clear  Neck: Flat, supple, no lymphadenopathy, trachea midline, no masses  Resp: no use of accessory muscles, no stridor  CV: no peripheral edema/cyanosis    Laryngoscopy: Scope #2 used: No bleeding in nasal pharynx or Oral pharynx.  No foreign body or pooling of secretions.  No glottic / supraglottic swelling.  Vocal cords mobile in intact b/l.  Airway patent. Noted with AE fold erythema and posterior larynx most likely representing Laryngopharyngeal Reflux

## 2018-02-18 NOTE — PROGRESS NOTE ADULT - SUBJECTIVE AND OBJECTIVE BOX
Patient is a 86y old  Female who presents with a chief complaint of weakness   cough   diarreah (13 Feb 2018 12:58)                                                               INTERVAL HPI/OVERNIGHT EVENTS:    REVIEW OF SYSTEMS:     CONSTITUTIONAL: No weakness, fevers or chills  EYES/ENT: No visual changes , no ear ache   NECK: No pain or stiffness  RESPIRATORY: No cough, wheezing,  No shortness of breath  CARDIOVASCULAR: No chest pain or palpitations  GASTROINTESTINAL: No abdominal pain  . No nausea, vomiting, or hematemesis; No diarrhea or constipation. No melena or hematochezia.  GENITOURINARY: No dysuria, frequency or hematuria  NEUROLOGICAL: No numbness or weakness  SKIN: No itching, burning, rashes, or lesions                                                                                                                                                                                                                                                                                 Medications:  MEDICATIONS  (STANDING):  amLODIPine   Tablet 5 milliGRAM(s) Oral daily  benzonatate 100 milliGRAM(s) Oral three times a day  buDESOnide  80 MICROgram(s)/formoterol 4.5 MICROgram(s) Inhaler 2 Puff(s) Inhalation two times a day  cyanocobalamin 1000 MICROGram(s) Oral daily  dextrose 5%. 1000 milliLiter(s) (50 mL/Hr) IV Continuous <Continuous>  dextrose 50% Injectable 12.5 Gram(s) IV Push once  dextrose 50% Injectable 25 Gram(s) IV Push once  dextrose 50% Injectable 25 Gram(s) IV Push once  docusate sodium 100 milliGRAM(s) Oral three times a day  famotidine Injectable 20 milliGRAM(s) IV Push at bedtime  folic acid 1 milliGRAM(s) Oral daily  guaiFENesin   Syrup  (Sugar-Free) 100 milliGRAM(s) Oral every 6 hours  heparin  Injectable 5000 Unit(s) SubCutaneous every 12 hours  hydrochlorothiazide 25 milliGRAM(s) Oral daily  insulin glargine Injectable (LANTUS) 30 Unit(s) SubCutaneous at bedtime  insulin lispro (HumaLOG) corrective regimen sliding scale   SubCutaneous three times a day before meals  insulin lispro (HumaLOG) corrective regimen sliding scale   SubCutaneous at bedtime  insulin lispro Injectable (HumaLOG) 7 Unit(s) SubCutaneous three times a day before meals  ipratropium    for Nebulization 500 MICROGram(s) Nebulizer every 6 hours  levalbuterol Inhalation 0.63 milliGRAM(s) Inhalation every 6 hours  losartan 50 milliGRAM(s) Oral daily  methotrexate (Non - oncologic) 17.5 milliGRAM(s) Oral <User Schedule>  pantoprazole    Tablet 40 milliGRAM(s) Oral two times a day before meals  polyethylene glycol 3350 17 Gram(s) Oral daily  predniSONE   Tablet 30 milliGRAM(s) Oral two times a day  predniSONE   Tablet   Oral   QUEtiapine 12.5 milliGRAM(s) Oral at bedtime  senna 2 Tablet(s) Oral at bedtime    MEDICATIONS  (PRN):  dextrose Gel 1 Dose(s) Oral once PRN Blood Glucose LESS THAN 70 milliGRAM(s)/deciliter  glucagon  Injectable 1 milliGRAM(s) IntraMuscular once PRN Glucose LESS THAN 70 milligrams/deciliter  ondansetron Injectable 4 milliGRAM(s) IV Push every 6 hours PRN Nausea and/or Vomiting  QUEtiapine 25 milliGRAM(s) Oral every 12 hours PRN agitation       Allergies    No Known Allergies    Intolerances      Vital Signs Last 24 Hrs  T(C): 37.2 (18 Feb 2018 21:26), Max: 37.2 (18 Feb 2018 21:26)  T(F): 99 (18 Feb 2018 21:26), Max: 99 (18 Feb 2018 21:26)  HR: 100 (18 Feb 2018 21:26) (78 - 100)  BP: 143/82 (18 Feb 2018 21:26) (143/82 - 157/83)  BP(mean): --  RR: 20 (18 Feb 2018 21:26) (18 - 20)  SpO2: 97% (18 Feb 2018 21:26) (94% - 97%)  CAPILLARY BLOOD GLUCOSE      POCT Blood Glucose.: 109 mg/dL (18 Feb 2018 21:50)  POCT Blood Glucose.: 243 mg/dL (18 Feb 2018 16:43)  POCT Blood Glucose.: 281 mg/dL (18 Feb 2018 11:27)  POCT Blood Glucose.: 247 mg/dL (18 Feb 2018 07:47)      02-17 @ 07:01  -  02-18 @ 07:00  --------------------------------------------------------  IN: 760 mL / OUT: 0 mL / NET: 760 mL    02-18 @ 07:01  -  02-18 @ 22:05  --------------------------------------------------------  IN: 1320 mL / OUT: 0 mL / NET: 1320 mL      Physical Exam:    Daily     Daily   General:  Well appearing, NAD, not cachetic  HEENT:  Nonicteric, PERRLA  CV:  RRR, S1S2   Lungs:  CTA B/L, no wheezes, rales, rhonchi  Abdomen:  Soft, non-tender, no distended, positive BS  Extremities:  2+ pulses, no c/c, no edema  Skin:  Warm and dry, no rashes  :  No mason  Neuro:  AAOx3, non-focal, grossly intact                                                                                                                                                                                                                                                                                                LABS:                               11.6   14.57 )-----------( 245      ( 18 Feb 2018 06:33 )             34.2                      02-18    136  |  97  |  32<H>  ----------------------------<  288<H>  4.3   |  25  |  0.93    Ca    9.6      18 Feb 2018 08:44                         RADIOLOGY & ADDITIONAL TESTS         I personally reviewed: [  ]EKG   [  ]CXR    [  ] CT      A/P:         Discussed with :     [employer] consultants' Notes   Time spent : Patient is a 86y old  Female who presents with a chief complaint of weakness   cough   diarreah (13 Feb 2018 12:58)                                                               INTERVAL HPI/OVERNIGHT EVENTS:    REVIEW OF SYSTEMS:     CONSTITUTIONAL: No weakness, fevers or chills  RESPIRATORY: still with cough, wheezing,  No shortness of breath  CARDIOVASCULAR: No chest pain or palpitations  GASTROINTESTINAL: No abdominal pain  . No nausea, vomiting,  GENITOURINARY: No dysuria, frequency or hematuria  NEUROLOGICAL: No numbness or weakness                                                                                                                                                                                                                                                                                 Medications:  MEDICATIONS  (STANDING):  amLODIPine   Tablet 5 milliGRAM(s) Oral daily  benzonatate 100 milliGRAM(s) Oral three times a day  buDESOnide  80 MICROgram(s)/formoterol 4.5 MICROgram(s) Inhaler 2 Puff(s) Inhalation two times a day  cyanocobalamin 1000 MICROGram(s) Oral daily  dextrose 5%. 1000 milliLiter(s) (50 mL/Hr) IV Continuous <Continuous>  dextrose 50% Injectable 12.5 Gram(s) IV Push once  dextrose 50% Injectable 25 Gram(s) IV Push once  dextrose 50% Injectable 25 Gram(s) IV Push once  docusate sodium 100 milliGRAM(s) Oral three times a day  famotidine Injectable 20 milliGRAM(s) IV Push at bedtime  folic acid 1 milliGRAM(s) Oral daily  guaiFENesin   Syrup  (Sugar-Free) 100 milliGRAM(s) Oral every 6 hours  heparin  Injectable 5000 Unit(s) SubCutaneous every 12 hours  hydrochlorothiazide 25 milliGRAM(s) Oral daily  insulin glargine Injectable (LANTUS) 30 Unit(s) SubCutaneous at bedtime  insulin lispro (HumaLOG) corrective regimen sliding scale   SubCutaneous three times a day before meals  insulin lispro (HumaLOG) corrective regimen sliding scale   SubCutaneous at bedtime  insulin lispro Injectable (HumaLOG) 7 Unit(s) SubCutaneous three times a day before meals  ipratropium    for Nebulization 500 MICROGram(s) Nebulizer every 6 hours  levalbuterol Inhalation 0.63 milliGRAM(s) Inhalation every 6 hours  losartan 50 milliGRAM(s) Oral daily  methotrexate (Non - oncologic) 17.5 milliGRAM(s) Oral <User Schedule>  pantoprazole    Tablet 40 milliGRAM(s) Oral two times a day before meals  polyethylene glycol 3350 17 Gram(s) Oral daily  predniSONE   Tablet 30 milliGRAM(s) Oral two times a day  predniSONE   Tablet   Oral   QUEtiapine 12.5 milliGRAM(s) Oral at bedtime  senna 2 Tablet(s) Oral at bedtime    MEDICATIONS  (PRN):  dextrose Gel 1 Dose(s) Oral once PRN Blood Glucose LESS THAN 70 milliGRAM(s)/deciliter  glucagon  Injectable 1 milliGRAM(s) IntraMuscular once PRN Glucose LESS THAN 70 milligrams/deciliter  ondansetron Injectable 4 milliGRAM(s) IV Push every 6 hours PRN Nausea and/or Vomiting  QUEtiapine 25 milliGRAM(s) Oral every 12 hours PRN agitation       Allergies    No Known Allergies    Intolerances      Vital Signs Last 24 Hrs  T(C): 37.2 (18 Feb 2018 21:26), Max: 37.2 (18 Feb 2018 21:26)  T(F): 99 (18 Feb 2018 21:26), Max: 99 (18 Feb 2018 21:26)  HR: 100 (18 Feb 2018 21:26) (78 - 100)  BP: 143/82 (18 Feb 2018 21:26) (143/82 - 157/83)  BP(mean): --  RR: 20 (18 Feb 2018 21:26) (18 - 20)  SpO2: 97% (18 Feb 2018 21:26) (94% - 97%)  CAPILLARY BLOOD GLUCOSE      POCT Blood Glucose.: 109 mg/dL (18 Feb 2018 21:50)  POCT Blood Glucose.: 243 mg/dL (18 Feb 2018 16:43)  POCT Blood Glucose.: 281 mg/dL (18 Feb 2018 11:27)  POCT Blood Glucose.: 247 mg/dL (18 Feb 2018 07:47)      02-17 @ 07:01 - 02-18 @ 07:00  --------------------------------------------------------  IN: 760 mL / OUT: 0 mL / NET: 760 mL    02-18 @ 07:01  - 02-18 @ 22:05  --------------------------------------------------------  IN: 1320 mL / OUT: 0 mL / NET: 1320 mL      Physical Exam:    General: Elderly  in NAD   HEENT:  Nonicteric, PERRLA  CV:  RRR, S1S2   Lungs:  crepirations Wheezing   Abdomen:  Soft, non-tender, no distended, positive BS  Extremities:  2+ pulses, no c/c, no edema  Skin:  Warm and dry, no rashes  :  No mason  Neuro:  AAOx3, non-focal, grossly intact                                                                                                                                                                                                                                                                                                LABS:                               11.6   14.57 )-----------( 245      ( 18 Feb 2018 06:33 )             34.2                      02-18    136  |  97  |  32<H>  ----------------------------<  288<H>  4.3   |  25  |  0.93    Ca    9.6      18 Feb 2018 08:44

## 2018-02-18 NOTE — CONSULT NOTE ADULT - PROBLEM SELECTOR RECOMMENDATION 9
- Recommend continue PPI  - small frequent meals  - Do not eat 3 hours prior to bedtime  HOB elevation  - Patient should f/u with Dr Pillai as an outpt after DC, [phone], [phone]
Will increase Lantus to 15 units at bed time.  Will increase Humalog to 6 units before each meal in addition to Humalog correction scale coverage.  Patient counseled for compliance with consistent low carb diet.

## 2018-02-18 NOTE — CONSULT NOTE ADULT - ASSESSMENT
85 y/o female who reports chronic cough. Laryngoscopy done at bedside which showed erythema to the AE folds and posterior larynx. This is consistent with laryngopharyngeal reflux. Patient airway patent

## 2018-02-18 NOTE — PROGRESS NOTE ADULT - SUBJECTIVE AND OBJECTIVE BOX
Chief complaint  Patient is a 86y old  Female who presents with a chief complaint of weakness   cough   diarreah (13 Feb 2018 12:58)   Review of systems  Patient in bed, looks comfortable, no fever, no hypoglycemia.    Labs and Fingersticks  CAPILLARY BLOOD GLUCOSE      POCT Blood Glucose.: 243 mg/dL (18 Feb 2018 16:43)  POCT Blood Glucose.: 281 mg/dL (18 Feb 2018 11:27)  POCT Blood Glucose.: 247 mg/dL (18 Feb 2018 07:47)  POCT Blood Glucose.: 404 mg/dL (17 Feb 2018 21:36)  POCT Blood Glucose.: 414 mg/dL (17 Feb 2018 21:33)      Anion Gap, Serum: 14 (02-18 @ 08:44)  Anion Gap, Serum: 15 (02-17 @ 13:46)      Calcium, Total Serum: 9.6 (02-18 @ 08:44)  Calcium, Total Serum: 9.3 (02-17 @ 13:46)          02-18    136  |  97  |  32<H>  ----------------------------<  288<H>  4.3   |  25  |  0.93    Ca    9.6      18 Feb 2018 08:44                          11.6   14.57 )-----------( 245      ( 18 Feb 2018 06:33 )             34.2     Medications  MEDICATIONS  (STANDING):  amLODIPine   Tablet 5 milliGRAM(s) Oral daily  benzonatate 100 milliGRAM(s) Oral three times a day  buDESOnide 160 MICROgram(s)/formoterol 4.5 MICROgram(s) Inhaler 2 Puff(s) Inhalation two times a day  cyanocobalamin 1000 MICROGram(s) Oral daily  dextrose 5%. 1000 milliLiter(s) (50 mL/Hr) IV Continuous <Continuous>  dextrose 50% Injectable 12.5 Gram(s) IV Push once  dextrose 50% Injectable 25 Gram(s) IV Push once  dextrose 50% Injectable 25 Gram(s) IV Push once  docusate sodium 100 milliGRAM(s) Oral three times a day  folic acid 1 milliGRAM(s) Oral daily  guaiFENesin   Syrup  (Sugar-Free) 100 milliGRAM(s) Oral every 6 hours  heparin  Injectable 5000 Unit(s) SubCutaneous every 12 hours  hydrochlorothiazide 25 milliGRAM(s) Oral daily  HYDROcodone/homatropine Syrup 5 milliLiter(s) Oral at bedtime  insulin glargine Injectable (LANTUS) 30 Unit(s) SubCutaneous at bedtime  insulin lispro (HumaLOG) corrective regimen sliding scale   SubCutaneous three times a day before meals  insulin lispro (HumaLOG) corrective regimen sliding scale   SubCutaneous at bedtime  insulin lispro Injectable (HumaLOG) 7 Unit(s) SubCutaneous three times a day before meals  losartan 50 milliGRAM(s) Oral daily  methotrexate (Non - oncologic) 17.5 milliGRAM(s) Oral <User Schedule>  polyethylene glycol 3350 17 Gram(s) Oral daily  predniSONE   Tablet 30 milliGRAM(s) Oral two times a day  predniSONE   Tablet   Oral   senna 2 Tablet(s) Oral at bedtime      Physical Exam  General: Patient comfortable in bed  Vital Signs Last 12 Hrs  T(F): 98.3 (02-18-18 @ 11:51), Max: 98.3 (02-18-18 @ 11:51)  HR: 84 (02-18-18 @ 11:51) (84 - 84)  BP: 149/84 (02-18-18 @ 11:51) (149/84 - 149/84)  BP(mean): --  RR: 18 (02-18-18 @ 11:51) (18 - 18)  SpO2: 94% (02-18-18 @ 11:51) (94% - 94%)  Neck: No palpable thyroid nodules.  CVS: S1S2, No murmurs  Respiratory: No wheezing, no crepitations  GI: Abdomen soft, bowel sounds positive  Musculoskeletal:  edema lower extremities.   Skin: No skin rashes, no ecchymosis    Diagnostics

## 2018-02-18 NOTE — PROGRESS NOTE ADULT - ASSESSMENT
86 y/o fmeale with hx of RA on mtx , and was on prednisone ( no longer on steroids since 09/2017  and has not recieved iv infusion  ( monthly simponi ) in three months . also history of HTN and DM .. has been admitted twice over the past 6-8 months with  weakness . initial admit pt was thought ot have an element of adrenal insufficeiny when she was on steroids .. and pt was sent out on steroids with some improvement,.. later admitted as lij for weakness thought to be multifactorial sec to viral illness, dehydration , and leemt of steroid -induced myopathy. pt was sent to rehab and overall improved but never to baseline.. over the past three days pt has been having worsening generalized weakness , cough with production of sputum ( color?), decreased po intake and decreased mobility.  no focal neuro complaints  no CP/SOB   had had few episodes of N/V /D at home   no recent abx   no abdo pain or urinary Sx       1- Cough / weakness:  hMPV     d/w Dr. Flannery : will change to IV steroids .. change to po in 24 hours most likely   hold off on antibiotics for now ..    CT old findings with mosaic changes       2- N/V /D : seems to be part of viral illness ..improved   tolerating  diet .. another episode today however post a cough-spell   Zofran  and ppi       3- DM 2 uncontrolled :   cont insulin and adjust per endo   4- Decreased mobility :  nonfocal neuro exam  .. had had mri brain with no acute findings   in past pt was thought to might have had an element of steroid induced myopathy however now off prednisone .. f/u  CPK  EMG as o/p.    5- HTN uncotrolled :           cont Norvasc  and consider increase  ...   will need BB  given AS  ..monitor for now    6- electrolyte abn : replete K and mag   7- anemia : likley aocd /iron def   8- AS : stable   f/u w cardio   9- anxiety /depression  : consult psych .. call    dvt proph 84 y/o fmeale with hx of RA on mtx , and was on prednisone ( no longer on steroids since 09/2017  and has not recieved iv infusion  ( monthly simponi ) in three months . also history of HTN and DM .. has been admitted twice over the past 6-8 months with  weakness . initial admit pt was thought ot have an element of adrenal insufficeiny when she was on steroids .. and pt was sent out on steroids with some improvement,.. later admitted as lij for weakness thought to be multifactorial sec to viral illness, dehydration , and leemt of steroid -induced myopathy. pt was sent to rehab and overall improved but never to baseline.. over the past three days pt has been having worsening generalized weakness , cough with production of sputum ( color?), decreased po intake and decreased mobility.  no focal neuro complaints  no CP/SOB   had had few episodes of N/V /D at home   no recent abx   no abdo pain or urinary Sx       1- Cough / weakness:  hMPV     agree wit change to Po prednisone   hold off on antibiotics for now ..    CT old findings with mosaic changes  cough is presistent ad servando sec to viral resp illness however will check Speech and swallow , ENT ..       2- N/V /D : seems to be part of viral illness ..improved   tolerating  diet .. another episode today however post a cough-spell   Zofran  and ppi       3- DM 2 uncontrolled :   cont insulin and adjust per endo   4- Decreased mobility :  nonfocal neuro exam  .. had had mri brain with no acute findings   in past pt was thought to might have had an element of steroid induced myopathy however now off prednisone ..  CPK: NL  EMG as o/p.    5- HTN uncotrolled :           cont Norvasc  and consider increase  ...   will need BB  given AS  ..monitor for now    6- electrolyte abn : replete K and mag   7- anemia : likley aocd /iron def   8- AS : stable   f/u w cardio   9- anxiety /depression  : consult psych .. call    dvt proph

## 2018-02-18 NOTE — CONSULT NOTE ADULT - CONSULT REASON
AS
Anxiety and depression
Chronic Cough
High Blood Sugars/DMT2
RA
diarrhea
weakness
Cough, HmPV viral infection, r/o pneumonia, RA associated ILD

## 2018-02-18 NOTE — CONSULT NOTE ADULT - CONSULT REQUESTED DATE/TIME
12-Feb-2018 18:08
13-Feb-2018 17:53
14-Feb-2018 09:50
16-Feb-2018 15:25
18-Feb-2018 17:44
18-Feb-2018 18:54
13-Feb-2018 08:00
13-Feb-2018 11:09

## 2018-02-19 LAB
APPEARANCE UR: CLEAR — SIGNIFICANT CHANGE UP
BILIRUB UR-MCNC: NEGATIVE — SIGNIFICANT CHANGE UP
COLOR SPEC: YELLOW — SIGNIFICANT CHANGE UP
DIFF PNL FLD: NEGATIVE — SIGNIFICANT CHANGE UP
FOLATE SERPL-MCNC: >20 NG/ML — SIGNIFICANT CHANGE UP (ref 4.8–24.2)
GLUCOSE BLDC GLUCOMTR-MCNC: 115 MG/DL — HIGH (ref 70–99)
GLUCOSE BLDC GLUCOMTR-MCNC: 216 MG/DL — HIGH (ref 70–99)
GLUCOSE BLDC GLUCOMTR-MCNC: 258 MG/DL — HIGH (ref 70–99)
GLUCOSE BLDC GLUCOMTR-MCNC: 325 MG/DL — HIGH (ref 70–99)
GLUCOSE UR QL: NEGATIVE MG/DL — SIGNIFICANT CHANGE UP
KETONES UR-MCNC: NEGATIVE — SIGNIFICANT CHANGE UP
LEUKOCYTE ESTERASE UR-ACNC: NEGATIVE — SIGNIFICANT CHANGE UP
NITRITE UR-MCNC: NEGATIVE — SIGNIFICANT CHANGE UP
PH UR: 5.5 — SIGNIFICANT CHANGE UP (ref 5–8)
PROT UR-MCNC: NEGATIVE MG/DL — SIGNIFICANT CHANGE UP
SP GR SPEC: 1.01 — SIGNIFICANT CHANGE UP (ref 1.01–1.02)
T PALLIDUM AB TITR SER: NEGATIVE — SIGNIFICANT CHANGE UP
UROBILINOGEN FLD QL: NEGATIVE MG/DL — SIGNIFICANT CHANGE UP
VIT B12 SERPL-MCNC: 939 PG/ML — SIGNIFICANT CHANGE UP (ref 232–1245)

## 2018-02-19 PROCEDURE — 93010 ELECTROCARDIOGRAM REPORT: CPT

## 2018-02-19 RX ORDER — TIOTROPIUM BROMIDE 18 UG/1
1 CAPSULE ORAL; RESPIRATORY (INHALATION) DAILY
Qty: 0 | Refills: 0 | Status: DISCONTINUED | OUTPATIENT
Start: 2018-02-19 | End: 2018-02-22

## 2018-02-19 RX ORDER — BUDESONIDE AND FORMOTEROL FUMARATE DIHYDRATE 160; 4.5 UG/1; UG/1
2 AEROSOL RESPIRATORY (INHALATION)
Qty: 0 | Refills: 0 | Status: DISCONTINUED | OUTPATIENT
Start: 2018-02-19 | End: 2018-02-22

## 2018-02-19 RX ORDER — IPRATROPIUM/ALBUTEROL SULFATE 18-103MCG
3 AEROSOL WITH ADAPTER (GRAM) INHALATION ONCE
Qty: 0 | Refills: 0 | Status: COMPLETED | OUTPATIENT
Start: 2018-02-19 | End: 2018-02-19

## 2018-02-19 RX ORDER — IPRATROPIUM BROMIDE 0.2 MG/ML
500 SOLUTION, NON-ORAL INHALATION ONCE
Qty: 0 | Refills: 0 | Status: COMPLETED | OUTPATIENT
Start: 2018-02-19 | End: 2018-02-19

## 2018-02-19 RX ADMIN — QUETIAPINE FUMARATE 12.5 MILLIGRAM(S): 200 TABLET, FILM COATED ORAL at 22:20

## 2018-02-19 RX ADMIN — Medication 1 MILLIGRAM(S): at 12:08

## 2018-02-19 RX ADMIN — LEVALBUTEROL 0.63 MILLIGRAM(S): 1.25 SOLUTION, CONCENTRATE RESPIRATORY (INHALATION) at 06:06

## 2018-02-19 RX ADMIN — QUETIAPINE FUMARATE 25 MILLIGRAM(S): 200 TABLET, FILM COATED ORAL at 19:31

## 2018-02-19 RX ADMIN — POLYETHYLENE GLYCOL 3350 17 GRAM(S): 17 POWDER, FOR SOLUTION ORAL at 12:08

## 2018-02-19 RX ADMIN — PREGABALIN 1000 MICROGRAM(S): 225 CAPSULE ORAL at 12:08

## 2018-02-19 RX ADMIN — LOSARTAN POTASSIUM 50 MILLIGRAM(S): 100 TABLET, FILM COATED ORAL at 06:04

## 2018-02-19 RX ADMIN — Medication 7 UNIT(S): at 12:07

## 2018-02-19 RX ADMIN — Medication 100 MILLIGRAM(S): at 06:05

## 2018-02-19 RX ADMIN — Medication 4: at 08:19

## 2018-02-19 RX ADMIN — Medication 100 MILLIGRAM(S): at 06:04

## 2018-02-19 RX ADMIN — Medication 6: at 17:26

## 2018-02-19 RX ADMIN — Medication 7 UNIT(S): at 08:19

## 2018-02-19 RX ADMIN — Medication 8: at 12:08

## 2018-02-19 RX ADMIN — TIOTROPIUM BROMIDE 1 CAPSULE(S): 18 CAPSULE ORAL; RESPIRATORY (INHALATION) at 18:49

## 2018-02-19 RX ADMIN — Medication 100 MILLIGRAM(S): at 22:16

## 2018-02-19 RX ADMIN — HEPARIN SODIUM 5000 UNIT(S): 5000 INJECTION INTRAVENOUS; SUBCUTANEOUS at 06:04

## 2018-02-19 RX ADMIN — PANTOPRAZOLE SODIUM 40 MILLIGRAM(S): 20 TABLET, DELAYED RELEASE ORAL at 17:28

## 2018-02-19 RX ADMIN — BUDESONIDE AND FORMOTEROL FUMARATE DIHYDRATE 2 PUFF(S): 160; 4.5 AEROSOL RESPIRATORY (INHALATION) at 17:28

## 2018-02-19 RX ADMIN — Medication 500 MICROGRAM(S): at 19:31

## 2018-02-19 RX ADMIN — Medication 100 MILLIGRAM(S): at 12:08

## 2018-02-19 RX ADMIN — FAMOTIDINE 20 MILLIGRAM(S): 10 INJECTION INTRAVENOUS at 22:42

## 2018-02-19 RX ADMIN — Medication 7 UNIT(S): at 17:26

## 2018-02-19 RX ADMIN — PANTOPRAZOLE SODIUM 40 MILLIGRAM(S): 20 TABLET, DELAYED RELEASE ORAL at 06:09

## 2018-02-19 RX ADMIN — INSULIN GLARGINE 30 UNIT(S): 100 INJECTION, SOLUTION SUBCUTANEOUS at 22:18

## 2018-02-19 RX ADMIN — HEPARIN SODIUM 5000 UNIT(S): 5000 INJECTION INTRAVENOUS; SUBCUTANEOUS at 17:29

## 2018-02-19 RX ADMIN — SENNA PLUS 2 TABLET(S): 8.6 TABLET ORAL at 22:16

## 2018-02-19 RX ADMIN — AMLODIPINE BESYLATE 5 MILLIGRAM(S): 2.5 TABLET ORAL at 06:04

## 2018-02-19 RX ADMIN — METHOTREXATE 17.5 MILLIGRAM(S): 2.5 TABLET ORAL at 12:06

## 2018-02-19 RX ADMIN — Medication 30 MILLIGRAM(S): at 06:10

## 2018-02-19 RX ADMIN — Medication 100 MILLIGRAM(S): at 17:29

## 2018-02-19 RX ADMIN — Medication 3 MILLILITER(S): at 19:31

## 2018-02-19 RX ADMIN — Medication 100 MILLIGRAM(S): at 12:05

## 2018-02-19 RX ADMIN — Medication 25 MILLIGRAM(S): at 06:05

## 2018-02-19 RX ADMIN — Medication 30 MILLIGRAM(S): at 17:29

## 2018-02-19 RX ADMIN — Medication 500 MICROGRAM(S): at 04:27

## 2018-02-19 NOTE — PROGRESS NOTE ADULT - ASSESSMENT
HmPV viral tracheobronchitis with mild bronchospasm: clinically improving with prednisone  RA with pulmonary fribrosis  No clinical evidence bacterial pneumonia  Cough  Crackles related to chronic ILD    REC    Complete prednisone taper  DC nebulizers: begin symbicort and spiriva  prn hycodan at night for cough  Monitor O2 sats  DC planning 1-2 days per primary team  Check sat ambulating on RA

## 2018-02-19 NOTE — PROGRESS NOTE ADULT - SUBJECTIVE AND OBJECTIVE BOX
Follow-up Pulm Progress Note  Abrahan Flannery MD  [phone]    AFebrile  today OOB, comfortable with SOB  O2 sat 95% on RA  Denies SOB or sign cough  IV steroids converted to predn taperon 2/18      Vital Signs Last 24 Hrs  T(C): 36.9 (19 Feb 2018 03:55), Max: 37.2 (18 Feb 2018 21:26)  T(F): 98.4 (19 Feb 2018 03:55), Max: 99 (18 Feb 2018 21:26)  HR: 85 (19 Feb 2018 03:55) (84 - 100)  BP: 121/79 (19 Feb 2018 03:55) (121/79 - 149/84)  BP(mean): --  RR: 18 (19 Feb 2018 03:55) (18 - 20)  SpO2: 99% (19 Feb 2018 03:55) (94% - 99%)                          11.6   14.57 )-----------( 245      ( 18 Feb 2018 06:33 )             34.2       02-18    136  |  97  |  32<H>  ----------------------------<  288<H>  4.3   |  25  |  0.93    Ca    9.6      18 Feb 2018 08:44      CULTURES:  Culture Results:   No growth (02-13 @ 03:07)  Culture Results:   No growth to date. (02-12 @ 22:01)  Culture Results:   No growth to date. (02-12 @ 22:01)    Most recent blood culture -- 02-13 @ 03:07   -- -- .Urine Clean Catch (Midstream) 02-13 @ 03:07  Most recent blood culture -- 02-12 @ 22:01   -- -- .Blood Blood 02-12 @ 22:01      Physical Examination:  PULM: Bilateral coarse crackles 1/3; few scattered rhonchi upper  CVS: Regular rate and rhythm, no murmurs, rubs, or gallops  ABD: Soft, non-tender  EXT:  No clubbing, cyanosis, or edema    RADIOLOGY REVIEWED  CXR:    CT chest:    TTE:

## 2018-02-19 NOTE — PROGRESS NOTE ADULT - ASSESSMENT
Cognitive disorder NOS- could represent a dementia as short term memory declining for the past 6 mos.   Better sleep/reduced coughing    Recommend  Continue with Seroquel 12.5mg qhs (hold if hypotensive or QTc 500ms or greater)  No antidepressant meds for now.  Following.    Brendan Posada M.D.  Psychiatry  [phone]

## 2018-02-19 NOTE — PROGRESS NOTE ADULT - ASSESSMENT
84 y/o fmeale with hx of RA on mtx , and was on prednisone ( no longer on steroids since 09/2017  and has not recieved iv infusion  ( monthly simponi ) in three months . also history of HTN and DM .. has been admitted twice over the past 6-8 months with  weakness . initial admit pt was thought ot have an element of adrenal insufficeiny when she was on steroids .. and pt was sent out on steroids with some improvement,.. later admitted as lij for weakness thought to be multifactorial sec to viral illness, dehydration , and leemt of steroid -induced myopathy. pt was sent to rehab and overall improved but never to baseline.. over the past three days pt has been having worsening generalized weakness , cough with production of sputum ( color?), decreased po intake and decreased mobility.  no focal neuro complaints  no CP/SOB   had had few episodes of N/V /D at home   no recent abx   no abdo pain or urinary Sx       1- Cough / weakness:  hMPV     agree wit change to Po prednisone   hold off on antibiotics for now ..    CT old findings with mosaic changes  cough is presistent ad servando sec to viral resp illness however f/u Speech and swallow , ENT: appreciate input  : servando GERD        2- N/V /D : seems to be part of viral illness ..improved   tolerating  diet .. another episode today however post a cough-spell   Zofran  and ppi       3- DM 2 uncontrolled :   cont insulin and adjust per endo   4- Decreased mobility :  nonfocal neuro exam  .. had had mri brain with no acute findings   in past pt was thought to might have had an element of steroid induced myopathy however now off prednisone ..  CPK: NL  EMG as o/p.    5- HTN : better controlle .     cont Norvasc  and consider increase  ...   will need BB  given AS  ..monitor for now    6- electrolyte abn : replete K and mag   7- anemia : servando aocd /iron def   8- AS : stable   f/u w cardio   9- anxiety /depression  :f/fu with psych   dvt proph

## 2018-02-19 NOTE — PROGRESS NOTE ADULT - ASSESSMENT
HTN  labile  agree with addition of norvasc     AS  moderate  stable    crackles likely due to chronic ILD     hMPV   nebs  fu with pulm

## 2018-02-19 NOTE — PROGRESS NOTE ADULT - SUBJECTIVE AND OBJECTIVE BOX
Events noted.  slept overnight here and says that after receiving Seroquel the pt. slept all night and stopped coughing. She ate today per .       Vital Signs Last 24 Hrs  T(C): 36.5 (19 Feb 2018 12:03), Max: 37.2 (18 Feb 2018 21:26)  T(F): 97.7 (19 Feb 2018 12:03), Max: 99 (18 Feb 2018 21:26)  HR: 89 (19 Feb 2018 12:03) (85 - 100)  BP: 99/61 (19 Feb 2018 12:03) (99/61 - 143/82)  BP(mean): --  RR: 18 (19 Feb 2018 12:03) (18 - 20)  SpO2: 96% (19 Feb 2018 12:03) (96% - 99%)                          11.6   14.57 )-----------( 245      ( 18 Feb 2018 06:33 )             34.2       02-18    136  |  97  |  32<H>  ----------------------------<  288<H>  4.3   |  25  |  0.93    Ca    9.6      18 Feb 2018 08:44        Vitamin B12, Serum: 939: Note: Reference Range Change on 12/18/2017. pg/mL (02.19.18 @ 07:41)    Folate, Serum: >20.0 ng/mL (02.19.18 @ 07:41)        < from: CT Head No Cont (02.18.18 @ 20:07) >    EXAM:  CT BRAIN                            PROCEDURE DATE:  02/18/2018            INTERPRETATION:  NONCONTRAST CT OF THE BRAIN DATED     CLINICAL HISTORY: Confusion.    TECHNIQUE: Axial CT images are obtained from the cranial vertex to the   skull base without the administration of IV contrast.    Comparison is made to the prior CT head study 10/19/2017.    FINDINGS:    There is no acute intra-axial or extra-axial hemorrhage. There is no mass   effect or shift of the midline. The basal cisternsare not effaced. There   is cerebral volume loss with prominence of the ventricles and sulci.   Advanced chronic ischemic changes are seen in the frontoparietal white   matter, unchanged compared to prior study. There is no CT evidence of a   large vascular territory infarct.    The regional soft tissues and bony calvarium are unremarkable. The   mastoid air cells are well aerated. Mild mucosal thickening of bilateral   maxillary sinuses containing trace air-fluid level. Correlate clinically   toassess for acute on chronic sinusitis. Mild mucous thickening of   bilateral ethmoid sinuses.    IMPRESSION:    No acute intracranial hemorrhage, mass effect, or CT evidence of a large   vascular territory infarct. Atrophy and stable advanced chronic  microvascular ischemic changes. Paranasal sinus disease as   described.Correlate clinically to assess for acute on chronic bilateral   maxillary sinusitis.     < end of copied text >      MEDICATIONS  (STANDING):  amLODIPine   Tablet 5 milliGRAM(s) Oral daily  benzonatate 100 milliGRAM(s) Oral three times a day  buDESOnide  80 MICROgram(s)/formoterol 4.5 MICROgram(s) Inhaler 2 Puff(s) Inhalation two times a day  cyanocobalamin 1000 MICROGram(s) Oral daily  dextrose 5%. 1000 milliLiter(s) (50 mL/Hr) IV Continuous <Continuous>  dextrose 50% Injectable 12.5 Gram(s) IV Push once  dextrose 50% Injectable 25 Gram(s) IV Push once  dextrose 50% Injectable 25 Gram(s) IV Push once  docusate sodium 100 milliGRAM(s) Oral three times a day  famotidine Injectable 20 milliGRAM(s) IV Push at bedtime  folic acid 1 milliGRAM(s) Oral daily  guaiFENesin   Syrup  (Sugar-Free) 100 milliGRAM(s) Oral every 6 hours  heparin  Injectable 5000 Unit(s) SubCutaneous every 12 hours  hydrochlorothiazide 25 milliGRAM(s) Oral daily  insulin glargine Injectable (LANTUS) 30 Unit(s) SubCutaneous at bedtime  insulin lispro (HumaLOG) corrective regimen sliding scale   SubCutaneous three times a day before meals  insulin lispro (HumaLOG) corrective regimen sliding scale   SubCutaneous at bedtime  insulin lispro Injectable (HumaLOG) 7 Unit(s) SubCutaneous three times a day before meals  losartan 50 milliGRAM(s) Oral daily  methotrexate (Non - oncologic) 17.5 milliGRAM(s) Oral <User Schedule>  pantoprazole    Tablet 40 milliGRAM(s) Oral two times a day before meals  polyethylene glycol 3350 17 Gram(s) Oral daily  predniSONE   Tablet 30 milliGRAM(s) Oral two times a day  predniSONE   Tablet   Oral   QUEtiapine 12.5 milliGRAM(s) Oral at bedtime  senna 2 Tablet(s) Oral at bedtime  tiotropium 18 MICROgram(s) Capsule 1 Capsule(s) Inhalation daily    MEDICATIONS  (PRN):  dextrose Gel 1 Dose(s) Oral once PRN Blood Glucose LESS THAN 70 milliGRAM(s)/deciliter  glucagon  Injectable 1 milliGRAM(s) IntraMuscular once PRN Glucose LESS THAN 70 milligrams/deciliter  ondansetron Injectable 4 milliGRAM(s) IV Push every 6 hours PRN Nausea and/or Vomiting  QUEtiapine 25 milliGRAM(s) Oral every 12 hours PRN agitation      Elderly HF in chair, calm, cooperative, alert and oriented x 1-2 (cannot name the hospital, says month is "October" and year is "1981")  .  No psychomotor abnormalities. Insight and judgment are fair. Speech is coherent with normal rate and volume. No hallucinations nor delusions. The patient denied suicidal and homicidal ideation and plan. Mood is euthymic and affect constricted. Attention and concentration fair but impaired short term memory (cannot recall name of hospital, month, year after I told her yesterday). Long term memory within normal limits.

## 2018-02-19 NOTE — PROGRESS NOTE ADULT - SUBJECTIVE AND OBJECTIVE BOX
Chief complaint  Patient is a 86y old  Female who presents with a chief complaint of weakness   cough   diarreah (13 Feb 2018 12:58)   Review of systems  Patient in bed, looks comfortable, no fever,  no hypoglycemia.    Labs and Fingersticks  CAPILLARY BLOOD GLUCOSE      POCT Blood Glucose.: 325 mg/dL (19 Feb 2018 11:50)  POCT Blood Glucose.: 216 mg/dL (19 Feb 2018 07:47)  POCT Blood Glucose.: 109 mg/dL (18 Feb 2018 21:50)  POCT Blood Glucose.: 243 mg/dL (18 Feb 2018 16:43)      Anion Gap, Serum: 14 (02-18 @ 08:44)      Calcium, Total Serum: 9.6 (02-18 @ 08:44)          02-18    136  |  97  |  32<H>  ----------------------------<  288<H>  4.3   |  25  |  0.93    Ca    9.6      18 Feb 2018 08:44                          11.6   14.57 )-----------( 245      ( 18 Feb 2018 06:33 )             34.2     Medications  MEDICATIONS  (STANDING):  amLODIPine   Tablet 5 milliGRAM(s) Oral daily  benzonatate 100 milliGRAM(s) Oral three times a day  buDESOnide  80 MICROgram(s)/formoterol 4.5 MICROgram(s) Inhaler 2 Puff(s) Inhalation two times a day  cyanocobalamin 1000 MICROGram(s) Oral daily  dextrose 5%. 1000 milliLiter(s) (50 mL/Hr) IV Continuous <Continuous>  dextrose 50% Injectable 12.5 Gram(s) IV Push once  dextrose 50% Injectable 25 Gram(s) IV Push once  dextrose 50% Injectable 25 Gram(s) IV Push once  docusate sodium 100 milliGRAM(s) Oral three times a day  famotidine Injectable 20 milliGRAM(s) IV Push at bedtime  folic acid 1 milliGRAM(s) Oral daily  guaiFENesin   Syrup  (Sugar-Free) 100 milliGRAM(s) Oral every 6 hours  heparin  Injectable 5000 Unit(s) SubCutaneous every 12 hours  hydrochlorothiazide 25 milliGRAM(s) Oral daily  insulin glargine Injectable (LANTUS) 30 Unit(s) SubCutaneous at bedtime  insulin lispro (HumaLOG) corrective regimen sliding scale   SubCutaneous three times a day before meals  insulin lispro (HumaLOG) corrective regimen sliding scale   SubCutaneous at bedtime  insulin lispro Injectable (HumaLOG) 7 Unit(s) SubCutaneous three times a day before meals  losartan 50 milliGRAM(s) Oral daily  methotrexate (Non - oncologic) 17.5 milliGRAM(s) Oral <User Schedule>  pantoprazole    Tablet 40 milliGRAM(s) Oral two times a day before meals  polyethylene glycol 3350 17 Gram(s) Oral daily  predniSONE   Tablet 30 milliGRAM(s) Oral two times a day  predniSONE   Tablet   Oral   QUEtiapine 12.5 milliGRAM(s) Oral at bedtime  senna 2 Tablet(s) Oral at bedtime  tiotropium 18 MICROgram(s) Capsule 1 Capsule(s) Inhalation daily      Physical Exam  General: Patient comfortable in bed  Vital Signs Last 12 Hrs  T(F): 97.7 (02-19-18 @ 12:03), Max: 98.4 (02-19-18 @ 03:55)  HR: 89 (02-19-18 @ 12:03) (85 - 89)  BP: 99/61 (02-19-18 @ 12:03) (99/61 - 121/79)  BP(mean): --  RR: 18 (02-19-18 @ 12:03) (18 - 18)  SpO2: 96% (02-19-18 @ 12:03) (96% - 99%)  Neck: No palpable thyroid nodules.  CVS: S1S2, No murmurs  Respiratory: No wheezing, no crepitations  GI: Abdomen soft, bowel sounds positive  Musculoskeletal:  edema lower extremities.   Skin: No skin rashes, no ecchymosis    Diagnostics

## 2018-02-19 NOTE — PROGRESS NOTE ADULT - SUBJECTIVE AND OBJECTIVE BOX
Subjective: Patient seen and examined. No new events except as noted.     SUBJECTIVE/ROS:  feels better       MEDICATIONS:  MEDICATIONS  (STANDING):  amLODIPine   Tablet 5 milliGRAM(s) Oral daily  benzonatate 100 milliGRAM(s) Oral three times a day  buDESOnide  80 MICROgram(s)/formoterol 4.5 MICROgram(s) Inhaler 2 Puff(s) Inhalation two times a day  cyanocobalamin 1000 MICROGram(s) Oral daily  dextrose 5%. 1000 milliLiter(s) (50 mL/Hr) IV Continuous <Continuous>  dextrose 50% Injectable 12.5 Gram(s) IV Push once  dextrose 50% Injectable 25 Gram(s) IV Push once  dextrose 50% Injectable 25 Gram(s) IV Push once  docusate sodium 100 milliGRAM(s) Oral three times a day  famotidine Injectable 20 milliGRAM(s) IV Push at bedtime  folic acid 1 milliGRAM(s) Oral daily  guaiFENesin   Syrup  (Sugar-Free) 100 milliGRAM(s) Oral every 6 hours  heparin  Injectable 5000 Unit(s) SubCutaneous every 12 hours  hydrochlorothiazide 25 milliGRAM(s) Oral daily  insulin glargine Injectable (LANTUS) 30 Unit(s) SubCutaneous at bedtime  insulin lispro (HumaLOG) corrective regimen sliding scale   SubCutaneous three times a day before meals  insulin lispro (HumaLOG) corrective regimen sliding scale   SubCutaneous at bedtime  insulin lispro Injectable (HumaLOG) 7 Unit(s) SubCutaneous three times a day before meals  losartan 50 milliGRAM(s) Oral daily  methotrexate (Non - oncologic) 17.5 milliGRAM(s) Oral <User Schedule>  pantoprazole    Tablet 40 milliGRAM(s) Oral two times a day before meals  polyethylene glycol 3350 17 Gram(s) Oral daily  predniSONE   Tablet 30 milliGRAM(s) Oral two times a day  predniSONE   Tablet   Oral   QUEtiapine 12.5 milliGRAM(s) Oral at bedtime  senna 2 Tablet(s) Oral at bedtime  tiotropium 18 MICROgram(s) Capsule 1 Capsule(s) Inhalation daily      PHYSICAL EXAM:  T(C): 36.9 (02-19-18 @ 03:55), Max: 37.2 (02-18-18 @ 21:26)  HR: 85 (02-19-18 @ 03:55) (84 - 100)  BP: 121/79 (02-19-18 @ 03:55) (121/79 - 149/84)  RR: 18 (02-19-18 @ 03:55) (18 - 20)  SpO2: 99% (02-19-18 @ 03:55) (94% - 99%)  Wt(kg): --  I&O's Summary    18 Feb 2018 07:01  -  19 Feb 2018 07:00  --------------------------------------------------------  IN: 1440 mL / OUT: 500 mL / NET: 940 mL    19 Feb 2018 07:01  -  19 Feb 2018 11:21  --------------------------------------------------------  IN: 240 mL / OUT: 0 mL / NET: 240 mL        JVP: Normal  Neck: supple  Lung: crackles   CV: S1 S2 , Murmur:  Abd: soft  Ext: No edema  neuro: Awake / alert  Psych: flat affect  Skin: normal       LABS/DATA:    CARDIAC MARKERS:                                11.6   14.57 )-----------( 245      ( 18 Feb 2018 06:33 )             34.2     02-18    136  |  97  |  32<H>  ----------------------------<  288<H>  4.3   |  25  |  0.93    Ca    9.6      18 Feb 2018 08:44      proBNP:   Lipid Profile:   HgA1c:   TSH:     TELE:  EKG:

## 2018-02-19 NOTE — PROGRESS NOTE ADULT - SUBJECTIVE AND OBJECTIVE BOX
Patient is a 86y old  Female who presents with a chief complaint of weakness   cough   diarreah (13 Feb 2018 12:58)                                                               INTERVAL HPI/OVERNIGHT EVENTS:    REVIEW OF SYSTEMS:     CONSTITUTIONAL: No weakness, fevers or chills  RESPIRATORY:  cough, wheezing,  No shortness of breath  CARDIOVASCULAR: No chest pain or palpitations  GASTROINTESTINAL: No abdominal pain  . No nausea, vomiting, no BM today   GENITOURINARY: No dysuria, frequency or hematuria  NEUROLOGICAL: No numbness or weakness                                                                                                                                                                                                                                                                                 Medications:  MEDICATIONS  (STANDING):  amLODIPine   Tablet 5 milliGRAM(s) Oral daily  benzonatate 100 milliGRAM(s) Oral three times a day  buDESOnide  80 MICROgram(s)/formoterol 4.5 MICROgram(s) Inhaler 2 Puff(s) Inhalation two times a day  cyanocobalamin 1000 MICROGram(s) Oral daily  dextrose 5%. 1000 milliLiter(s) (50 mL/Hr) IV Continuous <Continuous>  dextrose 50% Injectable 12.5 Gram(s) IV Push once  dextrose 50% Injectable 25 Gram(s) IV Push once  dextrose 50% Injectable 25 Gram(s) IV Push once  docusate sodium 100 milliGRAM(s) Oral three times a day  famotidine Injectable 20 milliGRAM(s) IV Push at bedtime  folic acid 1 milliGRAM(s) Oral daily  guaiFENesin   Syrup  (Sugar-Free) 100 milliGRAM(s) Oral every 6 hours  heparin  Injectable 5000 Unit(s) SubCutaneous every 12 hours  hydrochlorothiazide 25 milliGRAM(s) Oral daily  insulin glargine Injectable (LANTUS) 30 Unit(s) SubCutaneous at bedtime  insulin lispro (HumaLOG) corrective regimen sliding scale   SubCutaneous three times a day before meals  insulin lispro (HumaLOG) corrective regimen sliding scale   SubCutaneous at bedtime  insulin lispro Injectable (HumaLOG) 7 Unit(s) SubCutaneous three times a day before meals  losartan 50 milliGRAM(s) Oral daily  methotrexate (Non - oncologic) 17.5 milliGRAM(s) Oral <User Schedule>  pantoprazole    Tablet 40 milliGRAM(s) Oral two times a day before meals  polyethylene glycol 3350 17 Gram(s) Oral daily  predniSONE   Tablet 30 milliGRAM(s) Oral two times a day  predniSONE   Tablet   Oral   QUEtiapine 12.5 milliGRAM(s) Oral at bedtime  senna 2 Tablet(s) Oral at bedtime  tiotropium 18 MICROgram(s) Capsule 1 Capsule(s) Inhalation daily    MEDICATIONS  (PRN):  dextrose Gel 1 Dose(s) Oral once PRN Blood Glucose LESS THAN 70 milliGRAM(s)/deciliter  glucagon  Injectable 1 milliGRAM(s) IntraMuscular once PRN Glucose LESS THAN 70 milligrams/deciliter  ondansetron Injectable 4 milliGRAM(s) IV Push every 6 hours PRN Nausea and/or Vomiting  QUEtiapine 25 milliGRAM(s) Oral every 12 hours PRN agitation       Allergies    No Known Allergies    Intolerances      Vital Signs Last 24 Hrs  T(C): 36.8 (19 Feb 2018 20:52), Max: 36.9 (19 Feb 2018 03:55)  T(F): 98.3 (19 Feb 2018 20:52), Max: 98.4 (19 Feb 2018 03:55)  HR: 128 (19 Feb 2018 20:52) (85 - 128)  BP: 125/78 (19 Feb 2018 20:52) (99/61 - 125/78)  BP(mean): --  RR: 20 (19 Feb 2018 20:52) (18 - 20)  SpO2: 94% (19 Feb 2018 20:52) (94% - 99%)  CAPILLARY BLOOD GLUCOSE      POCT Blood Glucose.: 115 mg/dL (19 Feb 2018 21:53)  POCT Blood Glucose.: 258 mg/dL (19 Feb 2018 16:43)  POCT Blood Glucose.: 325 mg/dL (19 Feb 2018 11:50)  POCT Blood Glucose.: 216 mg/dL (19 Feb 2018 07:47)      02-18 @ 07:01 - 02-19 @ 07:00  --------------------------------------------------------  IN: 1440 mL / OUT: 500 mL / NET: 940 mL    02-19 @ 07:01  -  02-19 @ 23:25  --------------------------------------------------------  IN: 660 mL / OUT: 0 mL / NET: 660 mL      Physical Exam:       General:  elderly in NAD somewhat leathargic but improved   HEENT:  Nonicteric, PERRLA  CV:  RRR, S1S2   Lungs: crepitations b   Abdomen:  Soft, non-tender, no distended, positive BS  Extremities:  2+ pulses, no c/c, no edema  Skin:  Warm and dry, no rashes  :  No masno  Neuro:  AAOx3, non-focal, grossly intact                                                                                                                                                                                                                                                                                                LABS:                               11.6   14.57 )-----------( 245      ( 18 Feb 2018 06:33 )             34.2                      02-18    136  |  97  |  32<H>  ----------------------------<  288<H>  4.3   |  25  |  0.93    Ca    9.6      18 Feb 2018 08:44                         RADIOLOGY & ADDITIONAL TESTS         I personally reviewed: [  ]EKG   [  ]CXR    [  ] CT      A/P:         Discussed with :     [employer] consultants' Notes   Time spent :

## 2018-02-20 LAB
ANION GAP SERPL CALC-SCNC: 15 MMOL/L — SIGNIFICANT CHANGE UP (ref 5–17)
BUN SERPL-MCNC: 56 MG/DL — HIGH (ref 7–23)
CALCIUM SERPL-MCNC: 9 MG/DL — SIGNIFICANT CHANGE UP (ref 8.4–10.5)
CHLORIDE SERPL-SCNC: 91 MMOL/L — LOW (ref 96–108)
CO2 SERPL-SCNC: 25 MMOL/L — SIGNIFICANT CHANGE UP (ref 22–31)
CREAT SERPL-MCNC: 1.35 MG/DL — HIGH (ref 0.5–1.3)
GLUCOSE BLDC GLUCOMTR-MCNC: 137 MG/DL — HIGH (ref 70–99)
GLUCOSE BLDC GLUCOMTR-MCNC: 270 MG/DL — HIGH (ref 70–99)
GLUCOSE BLDC GLUCOMTR-MCNC: 319 MG/DL — HIGH (ref 70–99)
GLUCOSE BLDC GLUCOMTR-MCNC: 328 MG/DL — HIGH (ref 70–99)
GLUCOSE SERPL-MCNC: 352 MG/DL — HIGH (ref 70–99)
HCT VFR BLD CALC: 35.4 % — SIGNIFICANT CHANGE UP (ref 34.5–45)
HGB BLD-MCNC: 11.9 G/DL — SIGNIFICANT CHANGE UP (ref 11.5–15.5)
MCHC RBC-ENTMCNC: 29.8 PG — SIGNIFICANT CHANGE UP (ref 27–34)
MCHC RBC-ENTMCNC: 33.6 GM/DL — SIGNIFICANT CHANGE UP (ref 32–36)
MCV RBC AUTO: 88.7 FL — SIGNIFICANT CHANGE UP (ref 80–100)
PLATELET # BLD AUTO: 263 K/UL — SIGNIFICANT CHANGE UP (ref 150–400)
POTASSIUM SERPL-MCNC: 4 MMOL/L — SIGNIFICANT CHANGE UP (ref 3.5–5.3)
POTASSIUM SERPL-SCNC: 4 MMOL/L — SIGNIFICANT CHANGE UP (ref 3.5–5.3)
RBC # BLD: 3.99 M/UL — SIGNIFICANT CHANGE UP (ref 3.8–5.2)
RBC # FLD: 16.2 % — HIGH (ref 10.3–14.5)
SODIUM SERPL-SCNC: 131 MMOL/L — LOW (ref 135–145)
WBC # BLD: 26.6 K/UL — HIGH (ref 3.8–10.5)
WBC # FLD AUTO: 26.6 K/UL — HIGH (ref 3.8–10.5)

## 2018-02-20 RX ORDER — FAMOTIDINE 10 MG/ML
20 INJECTION INTRAVENOUS AT BEDTIME
Qty: 0 | Refills: 0 | Status: DISCONTINUED | OUTPATIENT
Start: 2018-02-20 | End: 2018-02-22

## 2018-02-20 RX ORDER — MIRTAZAPINE 45 MG/1
15 TABLET, ORALLY DISINTEGRATING ORAL AT BEDTIME
Qty: 0 | Refills: 0 | Status: DISCONTINUED | OUTPATIENT
Start: 2018-02-20 | End: 2018-02-22

## 2018-02-20 RX ADMIN — PREGABALIN 1000 MICROGRAM(S): 225 CAPSULE ORAL at 12:22

## 2018-02-20 RX ADMIN — Medication 100 MILLIGRAM(S): at 12:22

## 2018-02-20 RX ADMIN — Medication 100 MILLIGRAM(S): at 23:01

## 2018-02-20 RX ADMIN — Medication 6: at 17:11

## 2018-02-20 RX ADMIN — Medication 25 MILLIGRAM(S): at 07:14

## 2018-02-20 RX ADMIN — LOSARTAN POTASSIUM 50 MILLIGRAM(S): 100 TABLET, FILM COATED ORAL at 06:50

## 2018-02-20 RX ADMIN — Medication 8: at 08:21

## 2018-02-20 RX ADMIN — MIRTAZAPINE 15 MILLIGRAM(S): 45 TABLET, ORALLY DISINTEGRATING ORAL at 21:05

## 2018-02-20 RX ADMIN — Medication 100 MILLIGRAM(S): at 06:49

## 2018-02-20 RX ADMIN — Medication 100 MILLIGRAM(S): at 21:05

## 2018-02-20 RX ADMIN — Medication 7 UNIT(S): at 17:12

## 2018-02-20 RX ADMIN — Medication 1 MILLIGRAM(S): at 12:22

## 2018-02-20 RX ADMIN — TIOTROPIUM BROMIDE 1 CAPSULE(S): 18 CAPSULE ORAL; RESPIRATORY (INHALATION) at 12:22

## 2018-02-20 RX ADMIN — Medication 8: at 12:17

## 2018-02-20 RX ADMIN — INSULIN GLARGINE 30 UNIT(S): 100 INJECTION, SOLUTION SUBCUTANEOUS at 22:10

## 2018-02-20 RX ADMIN — AMLODIPINE BESYLATE 5 MILLIGRAM(S): 2.5 TABLET ORAL at 06:50

## 2018-02-20 RX ADMIN — FAMOTIDINE 20 MILLIGRAM(S): 10 INJECTION INTRAVENOUS at 21:05

## 2018-02-20 RX ADMIN — Medication 100 MILLIGRAM(S): at 18:31

## 2018-02-20 RX ADMIN — BUDESONIDE AND FORMOTEROL FUMARATE DIHYDRATE 2 PUFF(S): 160; 4.5 AEROSOL RESPIRATORY (INHALATION) at 06:51

## 2018-02-20 RX ADMIN — Medication 30 MILLIGRAM(S): at 06:50

## 2018-02-20 RX ADMIN — SENNA PLUS 2 TABLET(S): 8.6 TABLET ORAL at 21:05

## 2018-02-20 RX ADMIN — HEPARIN SODIUM 5000 UNIT(S): 5000 INJECTION INTRAVENOUS; SUBCUTANEOUS at 07:13

## 2018-02-20 RX ADMIN — Medication 100 MILLIGRAM(S): at 06:59

## 2018-02-20 RX ADMIN — PANTOPRAZOLE SODIUM 40 MILLIGRAM(S): 20 TABLET, DELAYED RELEASE ORAL at 16:37

## 2018-02-20 RX ADMIN — Medication 7 UNIT(S): at 12:18

## 2018-02-20 RX ADMIN — PANTOPRAZOLE SODIUM 40 MILLIGRAM(S): 20 TABLET, DELAYED RELEASE ORAL at 06:49

## 2018-02-20 RX ADMIN — HEPARIN SODIUM 5000 UNIT(S): 5000 INJECTION INTRAVENOUS; SUBCUTANEOUS at 18:31

## 2018-02-20 RX ADMIN — BUDESONIDE AND FORMOTEROL FUMARATE DIHYDRATE 2 PUFF(S): 160; 4.5 AEROSOL RESPIRATORY (INHALATION) at 18:33

## 2018-02-20 RX ADMIN — Medication 7 UNIT(S): at 08:22

## 2018-02-20 RX ADMIN — Medication 100 MILLIGRAM(S): at 07:13

## 2018-02-20 RX ADMIN — Medication 20 MILLIGRAM(S): at 18:33

## 2018-02-20 RX ADMIN — Medication 100 MILLIGRAM(S): at 14:33

## 2018-02-20 NOTE — PROGRESS NOTE ADULT - SUBJECTIVE AND OBJECTIVE BOX
Subjective: Patient seen and examined. No new events except as noted.     SUBJECTIVE/ROS:  still with sob       MEDICATIONS:  MEDICATIONS  (STANDING):  amLODIPine   Tablet 5 milliGRAM(s) Oral daily  benzonatate 100 milliGRAM(s) Oral three times a day  buDESOnide  80 MICROgram(s)/formoterol 4.5 MICROgram(s) Inhaler 2 Puff(s) Inhalation two times a day  cyanocobalamin 1000 MICROGram(s) Oral daily  dextrose 5%. 1000 milliLiter(s) (50 mL/Hr) IV Continuous <Continuous>  dextrose 50% Injectable 12.5 Gram(s) IV Push once  dextrose 50% Injectable 25 Gram(s) IV Push once  dextrose 50% Injectable 25 Gram(s) IV Push once  docusate sodium 100 milliGRAM(s) Oral three times a day  famotidine    Tablet 20 milliGRAM(s) Oral at bedtime  folic acid 1 milliGRAM(s) Oral daily  guaiFENesin   Syrup  (Sugar-Free) 100 milliGRAM(s) Oral every 6 hours  heparin  Injectable 5000 Unit(s) SubCutaneous every 12 hours  hydrochlorothiazide 25 milliGRAM(s) Oral daily  insulin glargine Injectable (LANTUS) 30 Unit(s) SubCutaneous at bedtime  insulin lispro (HumaLOG) corrective regimen sliding scale   SubCutaneous three times a day before meals  insulin lispro (HumaLOG) corrective regimen sliding scale   SubCutaneous at bedtime  insulin lispro Injectable (HumaLOG) 7 Unit(s) SubCutaneous three times a day before meals  losartan 50 milliGRAM(s) Oral daily  methotrexate (Non - oncologic) 17.5 milliGRAM(s) Oral <User Schedule>  mirtazapine 15 milliGRAM(s) Oral at bedtime  pantoprazole    Tablet 40 milliGRAM(s) Oral two times a day before meals  polyethylene glycol 3350 17 Gram(s) Oral daily  predniSONE   Tablet 20 milliGRAM(s) Oral two times a day  predniSONE   Tablet   Oral   senna 2 Tablet(s) Oral at bedtime  tiotropium 18 MICROgram(s) Capsule 1 Capsule(s) Inhalation daily      PHYSICAL EXAM:  T(C): 36.4 (02-20-18 @ 12:03), Max: 36.8 (02-19-18 @ 20:52)  HR: 82 (02-20-18 @ 12:03) (82 - 128)  BP: 112/69 (02-20-18 @ 12:03) (105/66 - 125/78)  RR: 18 (02-20-18 @ 12:03) (17 - 20)  SpO2: 95% (02-20-18 @ 12:03) (94% - 95%)  Wt(kg): --  I&O's Summary    19 Feb 2018 07:01  -  20 Feb 2018 07:00  --------------------------------------------------------  IN: 660 mL / OUT: 0 mL / NET: 660 mL    20 Feb 2018 07:01  -  20 Feb 2018 14:23  --------------------------------------------------------  IN: 720 mL / OUT: 0 mL / NET: 720 mL        JVP: Normal  Neck: supple  Lung: crackles   CV: S1 S2 , Murmur:  Abd: soft  Ext: No edema  neuro: Awake / alert  Psych: flat affect  Skin: normal       LABS/DATA:    CARDIAC MARKERS:                                11.9   26.60 )-----------( 263      ( 20 Feb 2018 07:18 )             35.4     02-20    131<L>  |  91<L>  |  56<H>  ----------------------------<  352<H>  4.0   |  25  |  1.35<H>    Ca    9.0      20 Feb 2018 09:00      proBNP:   Lipid Profile:   HgA1c:   TSH:     TELE:  EKG:

## 2018-02-20 NOTE — PROGRESS NOTE ADULT - ASSESSMENT
Dementia. Generalized anxiety disorder. Rule out elevated blood sugars from Seroquel and prednisone.     Recommend  D/C Seroquel. Start Remeron 15mg p.o. qhs.    Brendan Posada M.D.  Psychiatry  [phone]

## 2018-02-20 NOTE — PROGRESS NOTE ADULT - SUBJECTIVE AND OBJECTIVE BOX
Chief complaint  Patient is a 86y old  Female who presents with a chief complaint of weakness   cough   diarreah (13 Feb 2018 12:58)   Review of systems  Patient in bed, looks comfortable, no fever, no hypoglycemia.    Labs and Fingersticks  CAPILLARY BLOOD GLUCOSE      POCT Blood Glucose.: 319 mg/dL (20 Feb 2018 11:44)  POCT Blood Glucose.: 328 mg/dL (20 Feb 2018 07:59)  POCT Blood Glucose.: 115 mg/dL (19 Feb 2018 21:53)  POCT Blood Glucose.: 258 mg/dL (19 Feb 2018 16:43)      Anion Gap, Serum: 15 (02-20 @ 09:00)      Calcium, Total Serum: 9.0 (02-20 @ 09:00)          02-20    131<L>  |  91<L>  |  56<H>  ----------------------------<  352<H>  4.0   |  25  |  1.35<H>    Ca    9.0      20 Feb 2018 09:00                          11.9   26.60 )-----------( 263      ( 20 Feb 2018 07:18 )             35.4     Medications  MEDICATIONS  (STANDING):  amLODIPine   Tablet 5 milliGRAM(s) Oral daily  benzonatate 100 milliGRAM(s) Oral three times a day  buDESOnide  80 MICROgram(s)/formoterol 4.5 MICROgram(s) Inhaler 2 Puff(s) Inhalation two times a day  cyanocobalamin 1000 MICROGram(s) Oral daily  dextrose 5%. 1000 milliLiter(s) (50 mL/Hr) IV Continuous <Continuous>  dextrose 50% Injectable 12.5 Gram(s) IV Push once  dextrose 50% Injectable 25 Gram(s) IV Push once  dextrose 50% Injectable 25 Gram(s) IV Push once  docusate sodium 100 milliGRAM(s) Oral three times a day  famotidine    Tablet 20 milliGRAM(s) Oral at bedtime  folic acid 1 milliGRAM(s) Oral daily  guaiFENesin   Syrup  (Sugar-Free) 100 milliGRAM(s) Oral every 6 hours  heparin  Injectable 5000 Unit(s) SubCutaneous every 12 hours  hydrochlorothiazide 25 milliGRAM(s) Oral daily  insulin glargine Injectable (LANTUS) 30 Unit(s) SubCutaneous at bedtime  insulin lispro (HumaLOG) corrective regimen sliding scale   SubCutaneous three times a day before meals  insulin lispro (HumaLOG) corrective regimen sliding scale   SubCutaneous at bedtime  insulin lispro Injectable (HumaLOG) 7 Unit(s) SubCutaneous three times a day before meals  losartan 50 milliGRAM(s) Oral daily  methotrexate (Non - oncologic) 17.5 milliGRAM(s) Oral <User Schedule>  mirtazapine 15 milliGRAM(s) Oral at bedtime  pantoprazole    Tablet 40 milliGRAM(s) Oral two times a day before meals  polyethylene glycol 3350 17 Gram(s) Oral daily  predniSONE   Tablet 20 milliGRAM(s) Oral two times a day  predniSONE   Tablet   Oral   senna 2 Tablet(s) Oral at bedtime  tiotropium 18 MICROgram(s) Capsule 1 Capsule(s) Inhalation daily      Physical Exam  General: Patient comfortable in bed  Vital Signs Last 12 Hrs  T(F): 97.6 (02-20-18 @ 12:03), Max: 98 (02-20-18 @ 04:19)  HR: 82 (02-20-18 @ 12:03) (82 - 90)  BP: 112/69 (02-20-18 @ 12:03) (105/66 - 118/68)  BP(mean): --  RR: 18 (02-20-18 @ 12:03) (17 - 18)  SpO2: 95% (02-20-18 @ 12:03) (94% - 95%)  Neck: No palpable thyroid nodules.  CVS: S1S2, No murmurs  Respiratory: No wheezing, no crepitations  GI: Abdomen soft, bowel sounds positive  Musculoskeletal:  edema lower extremities.   Skin: No skin rashes, no ecchymosis    Diagnostics

## 2018-02-20 NOTE — PROGRESS NOTE ADULT - SUBJECTIVE AND OBJECTIVE BOX
Follow-up Pulm Progress Note  Abrahan Flannery MD  [phone]    AFebrile  today OOB, comfortable with SOB  Primary complaint is cough - family states it antedated current viral illness  Patient on methotrexate and cozaar      Vital Signs Last 24 Hrs  T(C): 36.4 (20 Feb 2018 12:03), Max: 36.8 (19 Feb 2018 20:52)  T(F): 97.6 (20 Feb 2018 12:03), Max: 98.3 (19 Feb 2018 20:52)  HR: 82 (20 Feb 2018 12:03) (82 - 128)  BP: 112/69 (20 Feb 2018 12:03) (105/66 - 125/78)  BP(mean): --  RR: 18 (20 Feb 2018 12:03) (17 - 20)  SpO2: 95% (20 Feb 2018 12:03) (94% - 95%)                              11.9   26.60 )-----------( 263      ( 20 Feb 2018 07:18 )             35.4       02-20    131<L>  |  91<L>  |  56<H>  ----------------------------<  352<H>  4.0   |  25  |  1.35<H>    Ca    9.0      20 Feb 2018 09:00        CULTURES:  Culture Results:   No growth (02-13 @ 03:07)  Culture Results:   No growth to date. (02-12 @ 22:01)  Culture Results:   No growth to date. (02-12 @ 22:01)    Most recent blood culture -- 02-13 @ 03:07   -- -- .Urine Clean Catch (Midstream) 02-13 @ 03:07  Most recent blood culture -- 02-12 @ 22:01   -- -- .Blood Blood 02-12 @ 22:01      Physical Examination:  PULM: Bilateral coarse crackles 1/4; no wheeze or rhonchi  CVS: Regular rate and rhythm, no murmurs, rubs, or gallops  ABD: Soft, non-tender  EXT:  No clubbing, cyanosis, or edema    RADIOLOGY REVIEWED  CXR:    CT chest:    TTE:

## 2018-02-20 NOTE — PROGRESS NOTE ADULT - ASSESSMENT
HmPV viral tracheobronchitis with mild bronchospasm: clinically improving with prednisone  RA with pulmonary fribrosis  No clinical evidence bacterial pneumonia  Cough  Crackles related to chronic ILD    REC    Complete prednisone taper  DC nebulizers: begin symbicort and spiriva  prn hycodan at night for cough  Monitor O2 sats  Consider DC cozaar vis a vis chronic cough  Rheumatology re-eval re methotrexate holiday given ILD on CT  ? Flonase trial  DC planning 1-2 days per primary team  Check sat ambulating on RA

## 2018-02-20 NOTE — PROGRESS NOTE ADULT - ASSESSMENT
84 y/o fmeale with hx of RA on mtx , and was on prednisone ( no longer on steroids since 09/2017  and has not recieved iv infusion  ( monthly simponi ) in three months . also history of HTN and DM .. has been admitted twice over the past 6-8 months with  weakness . initial admit pt was thought ot have an element of adrenal insufficeiny when she was on steroids .. and pt was sent out on steroids with some improvement,.. later admitted as lij for weakness thought to be multifactorial sec to viral illness, dehydration , and leemt of steroid -induced myopathy. pt was sent to rehab and overall improved but never to baseline.. over the past three days pt has been having worsening generalized weakness , cough with production of sputum ( color?), decreased po intake and decreased mobility.  no focal neuro complaints  no CP/SOB   had had few episodes of N/V /D at home   no recent abx   no abdo pain or urinary Sx       1- Cough / weakness:  hMPV    cont  prednisone   hold off on antibiotics ..    CT old findings with mosaic changes  cough is presistent ad servando sec to viral resp illness however f/u Speech and swallow , ENT: appreciate input  : servando GERD   d/w  : ? ILD sec to MTX toxicity ?   jtley given stable CT over time while pt on meds .. d/c losartan given this might contribute to cough   d/w Dr.Goldberg : will consider to dc MTX and monitor        2- N/V /D : seems to be part of viral illness ..improved   tolerating  diet .. another episode today however post a cough-spell   Zofran  and ppi       3- DM 2 uncontrolled :   cont insulin and adjust per endo ....  unconctrolled FS given on prednisone ..   4- Decreased mobility :  nonfocal neuro exam  .. had had mri brain with no acute findings   in past pt was thought to might have had an element of steroid induced myopathy however now off prednisone ..  CPK: NL  EMG as o/p.    5- HTN : better controlled .     cont Norvasc     will need BB  given AS  ..monitor for now    6- electrolyte abn : replete K and mag   7- anemia : servando aocd /iron def   8- AS : stable   f/u w cardio   9- anxiety /depression  :d/w  : remeron added   seroquel dced     dvt proph

## 2018-02-20 NOTE — PROGRESS NOTE ADULT - SUBJECTIVE AND OBJECTIVE BOX
Patient is a 86y old  Female who presents with a chief complaint of weakness   cough   diarreah (13 Feb 2018 12:58)                                                             REVIEW OF SYSTEMS:     CONSTITUTIONAL: No weakness, fevers or chills  RESPIRATORY: still with  cough, wheezing,  No shortness of breath  CARDIOVASCULAR: No chest pain or palpitations  GASTROINTESTINAL: No abdominal pain  . No nausea, vomiting  GENITOURINARY: No dysuria, frequency or hematuria  NEUROLOGICAL: No numbness or weakness                                                                                                                                                                                                                                                                                  Medications:  MEDICATIONS  (STANDING):  amLODIPine   Tablet 5 milliGRAM(s) Oral daily  benzonatate 100 milliGRAM(s) Oral three times a day  buDESOnide  80 MICROgram(s)/formoterol 4.5 MICROgram(s) Inhaler 2 Puff(s) Inhalation two times a day  cyanocobalamin 1000 MICROGram(s) Oral daily  dextrose 5%. 1000 milliLiter(s) (50 mL/Hr) IV Continuous <Continuous>  dextrose 50% Injectable 12.5 Gram(s) IV Push once  dextrose 50% Injectable 25 Gram(s) IV Push once  dextrose 50% Injectable 25 Gram(s) IV Push once  docusate sodium 100 milliGRAM(s) Oral three times a day  famotidine    Tablet 20 milliGRAM(s) Oral at bedtime  folic acid 1 milliGRAM(s) Oral daily  guaiFENesin   Syrup  (Sugar-Free) 100 milliGRAM(s) Oral every 6 hours  heparin  Injectable 5000 Unit(s) SubCutaneous every 12 hours  hydrochlorothiazide 25 milliGRAM(s) Oral daily  insulin glargine Injectable (LANTUS) 30 Unit(s) SubCutaneous at bedtime  insulin lispro (HumaLOG) corrective regimen sliding scale   SubCutaneous three times a day before meals  insulin lispro (HumaLOG) corrective regimen sliding scale   SubCutaneous at bedtime  insulin lispro Injectable (HumaLOG) 7 Unit(s) SubCutaneous three times a day before meals  losartan 50 milliGRAM(s) Oral daily  methotrexate (Non - oncologic) 17.5 milliGRAM(s) Oral <User Schedule>  mirtazapine 15 milliGRAM(s) Oral at bedtime  pantoprazole    Tablet 40 milliGRAM(s) Oral two times a day before meals  polyethylene glycol 3350 17 Gram(s) Oral daily  predniSONE   Tablet 20 milliGRAM(s) Oral two times a day  predniSONE   Tablet   Oral   senna 2 Tablet(s) Oral at bedtime  tiotropium 18 MICROgram(s) Capsule 1 Capsule(s) Inhalation daily    MEDICATIONS  (PRN):  dextrose Gel 1 Dose(s) Oral once PRN Blood Glucose LESS THAN 70 milliGRAM(s)/deciliter  glucagon  Injectable 1 milliGRAM(s) IntraMuscular once PRN Glucose LESS THAN 70 milligrams/deciliter  ondansetron Injectable 4 milliGRAM(s) IV Push every 6 hours PRN Nausea and/or Vomiting       Allergies    No Known Allergies    Intolerances      Vital Signs Last 24 Hrs  T(C): 36.8 (20 Feb 2018 20:46), Max: 36.8 (20 Feb 2018 20:46)  T(F): 98.3 (20 Feb 2018 20:46), Max: 98.3 (20 Feb 2018 20:46)  HR: 74 (20 Feb 2018 20:46) (74 - 90)  BP: 124/78 (20 Feb 2018 20:46) (105/66 - 124/78)  BP(mean): --  RR: 19 (20 Feb 2018 20:46) (17 - 19)  SpO2: 94% (20 Feb 2018 20:46) (94% - 95%)  CAPILLARY BLOOD GLUCOSE      POCT Blood Glucose.: 137 mg/dL (20 Feb 2018 21:55)  POCT Blood Glucose.: 270 mg/dL (20 Feb 2018 16:52)  POCT Blood Glucose.: 319 mg/dL (20 Feb 2018 11:44)  POCT Blood Glucose.: 328 mg/dL (20 Feb 2018 07:59)      02-19 @ 07:01 - 02-20 @ 07:00  --------------------------------------------------------  IN: 660 mL / OUT: 0 mL / NET: 660 mL    02-20 @ 07:01 - 02-20 @ 22:23  --------------------------------------------------------  IN: 720 mL / OUT: 0 mL / NET: 720 mL      Physical Exam:    General:  Well appearing, NAD  HEENT:  Nonicteric, PERRLA  CV:  RRR, S1S2   Lungs: ronchi at bases  Abdomen:  Soft, non-tender, no distended, positive BS  Extremities:  2+ pulses, no c/c, no edema  Skin:  Warm and dry, no rashes  :  No mason  Neuro:  AAOx3, non-focal, grossly intact                                                                                                                                                                                                                                                                                                LABS:                               11.9   26.60 )-----------( 263      ( 20 Feb 2018 07:18 )             35.4                      02-20    131<L>  |  91<L>  |  56<H>  ----------------------------<  352<H>  4.0   |  25  |  1.35<H>    Ca    9.0      20 Feb 2018 09:00

## 2018-02-20 NOTE — PROGRESS NOTE ADULT - SUBJECTIVE AND OBJECTIVE BOX
Follow-up Pulm Progress Note  Abrahan Flannery MD  [phone]    AFebrile  today OOB, comfortable with SOB  O2 sat 95% on RA  Primary complaint is cough    Vital Signs Last 24 Hrs  T(C): 36.4 (20 Feb 2018 12:03), Max: 36.8 (19 Feb 2018 20:52)  T(F): 97.6 (20 Feb 2018 12:03), Max: 98.3 (19 Feb 2018 20:52)  HR: 82 (20 Feb 2018 12:03) (82 - 128)  BP: 112/69 (20 Feb 2018 12:03) (105/66 - 125/78)  BP(mean): --  RR: 18 (20 Feb 2018 12:03) (17 - 20)  SpO2: 95% (20 Feb 2018 12:03) (94% - 95%)                          11.9   26.60 )-----------( 263      ( 20 Feb 2018 07:18 )             35.4       02-20    131<L>  |  91<L>  |  56<H>  ----------------------------<  352<H>  4.0   |  25  |  1.35<H>    Ca    9.0      20 Feb 2018 09:00          CULTURES:  Culture Results:   No growth (02-13 @ 03:07)  Culture Results:   No growth to date. (02-12 @ 22:01)  Culture Results:   No growth to date. (02-12 @ 22:01)    Most recent blood culture -- 02-13 @ 03:07   -- -- .Urine Clean Catch (Midstream) 02-13 @ 03:07  Most recent blood culture -- 02-12 @ 22:01   -- -- .Blood Blood 02-12 @ 22:01      Physical Examination:  PULM: Bilateral coarse crackles 1/3; few scattered rhonchi upper  CVS: Regular rate and rhythm, no murmurs, rubs, or gallops  ABD: Soft, non-tender  EXT:  No clubbing, cyanosis, or edema    RADIOLOGY REVIEWED  CXR:    CT chest:    TTE:

## 2018-02-20 NOTE — PROGRESS NOTE ADULT - SUBJECTIVE AND OBJECTIVE BOX
Follow-up Pulm Progress Note  Abrahan Flannery MD  [phone]    AFebrile  today OOB, comfortable with SOB  O2 sat 95% on RA  Denies SOB or sign cough  IV steroids converted to predn taperon 2/18      Vital Signs Last 24 Hrs  T(C): 36.4 (20 Feb 2018 12:03), Max: 36.8 (19 Feb 2018 20:52)  T(F): 97.6 (20 Feb 2018 12:03), Max: 98.3 (19 Feb 2018 20:52)  HR: 82 (20 Feb 2018 12:03) (82 - 128)  BP: 112/69 (20 Feb 2018 12:03) (105/66 - 125/78)  BP(mean): --  RR: 18 (20 Feb 2018 12:03) (17 - 20)  SpO2: 95% (20 Feb 2018 12:03) (94% - 95%)                              11.9   26.60 )-----------( 263      ( 20 Feb 2018 07:18 )             35.4       02-20    131<L>  |  91<L>  |  56<H>  ----------------------------<  352<H>  4.0   |  25  |  1.35<H>    Ca    9.0      20 Feb 2018 09:00        CULTURES:  Culture Results:   No growth (02-13 @ 03:07)  Culture Results:   No growth to date. (02-12 @ 22:01)  Culture Results:   No growth to date. (02-12 @ 22:01)    Most recent blood culture -- 02-13 @ 03:07   -- -- .Urine Clean Catch (Midstream) 02-13 @ 03:07  Most recent blood culture -- 02-12 @ 22:01   -- -- .Blood Blood 02-12 @ 22:01      Physical Examination:  PULM: Bilateral coarse crackles 1/3; few scattered rhonchi upper  CVS: Regular rate and rhythm, no murmurs, rubs, or gallops  ABD: Soft, non-tender  EXT:  No clubbing, cyanosis, or edema    RADIOLOGY REVIEWED  CXR:    CT chest:    TTE:

## 2018-02-20 NOTE — PROGRESS NOTE ADULT - ASSESSMENT
Assessment  DMT2: 86y Female with DM T2 with hyperglycemia on insulin, blood sugars trending down, on high dose steroids, being tapered now, no hypoglycemia,  eating meals,  non compliant with low carb diet.  Asthma: On treatment, improving.  HTN: Controlled, On med.

## 2018-02-20 NOTE — PROGRESS NOTE ADULT - ASSESSMENT
HmPV viral tracheobronchitis with mild bronchospasm: clinically improving with prednisone  RA with pulmonary fribrosis  No clinical evidence bacterial pneumonia  Cough  Crackles related to chronic ILD    REC    Complete prednisone taper  DC nebulizers: begin symbicort and spiriva  prn hycodan at night for cough  COnsider DC cozaar  Consider holiday from methotrexate

## 2018-02-20 NOTE — PROGRESS NOTE ADULT - SUBJECTIVE AND OBJECTIVE BOX
Events noted. Did not sleep well last night as she worries (chronically) over her family members. Eats well per  at bedside.       Vital Signs Last 24 Hrs  T(C): 36.4 (20 Feb 2018 12:03), Max: 36.8 (19 Feb 2018 20:52)  T(F): 97.6 (20 Feb 2018 12:03), Max: 98.3 (19 Feb 2018 20:52)  HR: 82 (20 Feb 2018 12:03) (82 - 128)  BP: 112/69 (20 Feb 2018 12:03) (105/66 - 125/78)  BP(mean): --  RR: 18 (20 Feb 2018 12:03) (17 - 20)  SpO2: 95% (20 Feb 2018 12:03) (94% - 95%)                          11.9   26.60 )-----------( 263      ( 20 Feb 2018 07:18 )             35.4       02-20    131<L>  |  91<L>  |  56<H>  ----------------------------<  352<H>  4.0   |  25  |  1.35<H>    Ca    9.0      20 Feb 2018 09:00                MEDICATIONS  (STANDING):  amLODIPine   Tablet 5 milliGRAM(s) Oral daily  benzonatate 100 milliGRAM(s) Oral three times a day  buDESOnide  80 MICROgram(s)/formoterol 4.5 MICROgram(s) Inhaler 2 Puff(s) Inhalation two times a day  cyanocobalamin 1000 MICROGram(s) Oral daily  dextrose 5%. 1000 milliLiter(s) (50 mL/Hr) IV Continuous <Continuous>  dextrose 50% Injectable 12.5 Gram(s) IV Push once  dextrose 50% Injectable 25 Gram(s) IV Push once  dextrose 50% Injectable 25 Gram(s) IV Push once  docusate sodium 100 milliGRAM(s) Oral three times a day  famotidine    Tablet 20 milliGRAM(s) Oral at bedtime  folic acid 1 milliGRAM(s) Oral daily  guaiFENesin   Syrup  (Sugar-Free) 100 milliGRAM(s) Oral every 6 hours  heparin  Injectable 5000 Unit(s) SubCutaneous every 12 hours  hydrochlorothiazide 25 milliGRAM(s) Oral daily  insulin glargine Injectable (LANTUS) 30 Unit(s) SubCutaneous at bedtime  insulin lispro (HumaLOG) corrective regimen sliding scale   SubCutaneous three times a day before meals  insulin lispro (HumaLOG) corrective regimen sliding scale   SubCutaneous at bedtime  insulin lispro Injectable (HumaLOG) 7 Unit(s) SubCutaneous three times a day before meals  losartan 50 milliGRAM(s) Oral daily  methotrexate (Non - oncologic) 17.5 milliGRAM(s) Oral <User Schedule>  pantoprazole    Tablet 40 milliGRAM(s) Oral two times a day before meals  polyethylene glycol 3350 17 Gram(s) Oral daily  predniSONE   Tablet 20 milliGRAM(s) Oral two times a day  predniSONE   Tablet   Oral   QUEtiapine 12.5 milliGRAM(s) Oral at bedtime  senna 2 Tablet(s) Oral at bedtime  tiotropium 18 MICROgram(s) Capsule 1 Capsule(s) Inhalation daily    MEDICATIONS  (PRN):  dextrose Gel 1 Dose(s) Oral once PRN Blood Glucose LESS THAN 70 milliGRAM(s)/deciliter  glucagon  Injectable 1 milliGRAM(s) IntraMuscular once PRN Glucose LESS THAN 70 milligrams/deciliter  ondansetron Injectable 4 milliGRAM(s) IV Push every 6 hours PRN Nausea and/or Vomiting  QUEtiapine 25 milliGRAM(s) Oral every 12 hours PRN agitation      Elderly WF in bed, calm, cooperative, alert and oriented x 1-2 (not to hospital name nor year)  .  No psychomotor abnormalities. Insight and judgment are fair. Speech is coherent with normal rate and volume. No hallucinations nor delusions. The patient denied suicidal and homicidal ideation and plan. Mood is anxious and affect full range and appropriate. Attention and concentration fair but impaired short term memory. Long term memory within normal limits.

## 2018-02-21 LAB
GLUCOSE BLDC GLUCOMTR-MCNC: 213 MG/DL — HIGH (ref 70–99)
GLUCOSE BLDC GLUCOMTR-MCNC: 256 MG/DL — HIGH (ref 70–99)
GLUCOSE BLDC GLUCOMTR-MCNC: 306 MG/DL — HIGH (ref 70–99)
GLUCOSE BLDC GLUCOMTR-MCNC: 425 MG/DL — HIGH (ref 70–99)
GLUCOSE BLDC GLUCOMTR-MCNC: 451 MG/DL — CRITICAL HIGH (ref 70–99)
HCT VFR BLD CALC: 36.2 % — SIGNIFICANT CHANGE UP (ref 34.5–45)
HGB BLD-MCNC: 12.1 G/DL — SIGNIFICANT CHANGE UP (ref 11.5–15.5)
MCHC RBC-ENTMCNC: 29.7 PG — SIGNIFICANT CHANGE UP (ref 27–34)
MCHC RBC-ENTMCNC: 33.4 GM/DL — SIGNIFICANT CHANGE UP (ref 32–36)
MCV RBC AUTO: 88.7 FL — SIGNIFICANT CHANGE UP (ref 80–100)
PLATELET # BLD AUTO: 297 K/UL — SIGNIFICANT CHANGE UP (ref 150–400)
RBC # BLD: 4.08 M/UL — SIGNIFICANT CHANGE UP (ref 3.8–5.2)
RBC # FLD: 16 % — HIGH (ref 10.3–14.5)
WBC # BLD: 17.66 K/UL — HIGH (ref 3.8–10.5)
WBC # FLD AUTO: 17.66 K/UL — HIGH (ref 3.8–10.5)

## 2018-02-21 PROCEDURE — 74230 X-RAY XM SWLNG FUNCJ C+: CPT | Mod: 26

## 2018-02-21 RX ADMIN — Medication 12: at 12:21

## 2018-02-21 RX ADMIN — PANTOPRAZOLE SODIUM 40 MILLIGRAM(S): 20 TABLET, DELAYED RELEASE ORAL at 17:21

## 2018-02-21 RX ADMIN — BUDESONIDE AND FORMOTEROL FUMARATE DIHYDRATE 2 PUFF(S): 160; 4.5 AEROSOL RESPIRATORY (INHALATION) at 17:23

## 2018-02-21 RX ADMIN — Medication 7 UNIT(S): at 08:04

## 2018-02-21 RX ADMIN — Medication 100 MILLIGRAM(S): at 13:14

## 2018-02-21 RX ADMIN — Medication 25 MILLIGRAM(S): at 05:35

## 2018-02-21 RX ADMIN — Medication 100 MILLIGRAM(S): at 05:35

## 2018-02-21 RX ADMIN — TIOTROPIUM BROMIDE 1 CAPSULE(S): 18 CAPSULE ORAL; RESPIRATORY (INHALATION) at 12:26

## 2018-02-21 RX ADMIN — BUDESONIDE AND FORMOTEROL FUMARATE DIHYDRATE 2 PUFF(S): 160; 4.5 AEROSOL RESPIRATORY (INHALATION) at 05:35

## 2018-02-21 RX ADMIN — SENNA PLUS 2 TABLET(S): 8.6 TABLET ORAL at 21:23

## 2018-02-21 RX ADMIN — HEPARIN SODIUM 5000 UNIT(S): 5000 INJECTION INTRAVENOUS; SUBCUTANEOUS at 05:38

## 2018-02-21 RX ADMIN — Medication 8: at 17:20

## 2018-02-21 RX ADMIN — Medication 100 MILLIGRAM(S): at 12:24

## 2018-02-21 RX ADMIN — INSULIN GLARGINE 30 UNIT(S): 100 INJECTION, SOLUTION SUBCUTANEOUS at 21:58

## 2018-02-21 RX ADMIN — Medication 4: at 08:04

## 2018-02-21 RX ADMIN — Medication 1 MILLIGRAM(S): at 12:24

## 2018-02-21 RX ADMIN — MIRTAZAPINE 15 MILLIGRAM(S): 45 TABLET, ORALLY DISINTEGRATING ORAL at 21:23

## 2018-02-21 RX ADMIN — Medication 7 UNIT(S): at 12:21

## 2018-02-21 RX ADMIN — Medication 7 UNIT(S): at 17:20

## 2018-02-21 RX ADMIN — Medication 100 MILLIGRAM(S): at 21:23

## 2018-02-21 RX ADMIN — FAMOTIDINE 20 MILLIGRAM(S): 10 INJECTION INTRAVENOUS at 21:23

## 2018-02-21 RX ADMIN — AMLODIPINE BESYLATE 5 MILLIGRAM(S): 2.5 TABLET ORAL at 05:35

## 2018-02-21 RX ADMIN — PREGABALIN 1000 MICROGRAM(S): 225 CAPSULE ORAL at 12:24

## 2018-02-21 RX ADMIN — HEPARIN SODIUM 5000 UNIT(S): 5000 INJECTION INTRAVENOUS; SUBCUTANEOUS at 17:25

## 2018-02-21 RX ADMIN — Medication 100 MILLIGRAM(S): at 23:07

## 2018-02-21 RX ADMIN — Medication 20 MILLIGRAM(S): at 17:22

## 2018-02-21 RX ADMIN — Medication 20 MILLIGRAM(S): at 05:35

## 2018-02-21 RX ADMIN — Medication 100 MILLIGRAM(S): at 17:22

## 2018-02-21 RX ADMIN — Medication 1: at 21:59

## 2018-02-21 RX ADMIN — PANTOPRAZOLE SODIUM 40 MILLIGRAM(S): 20 TABLET, DELAYED RELEASE ORAL at 05:36

## 2018-02-21 NOTE — SWALLOW BEDSIDE ASSESSMENT ADULT - SLP PERTINENT HISTORY OF CURRENT PROBLEM
84 y/o female with hx of RA on mtx, and was on prednisone (no longer on steroids since 09/2017 and has not recieved iv infusion  ( monthly simponi ) in three months . also history of HTN and DM .. has been admitted twice over the past 6-8 months with  weakness . initial admit pt was thought ot have an element of adrenal insufficeiny when she was on steroids .. and pt was sent out on steroids with some improvement,.. later admitted as lij for weakness thought to be multifactorial sec to viral illness, dehydration , and leemt of steroid -induced myopathy. pt was sent to rehab and overall improved but never to baseline.. over the past three days pt has been having worsening generalized weakness , cough with production of sputum ( color?), decreased po intake and decreased mobility. See history in addendum below

## 2018-02-21 NOTE — SWALLOW VFSS/MBS ASSESSMENT ADULT - ORAL PHASE
within functional limits/Residue in oral cavity/Reduced anterior - posterior transport within functional limits Silent laryngeal penetration before the swallow over the laryngeal surface of the epiglottis, shallow, mild, with retrieval/Reduced anterior - posterior transport/Residue in oral cavity

## 2018-02-21 NOTE — SWALLOW BEDSIDE ASSESSMENT ADULT - ASR SWALLOW ASPIRATION MONITOR
gurgly voice/upper respiratory infection/fever/pneumonia/change of breathing pattern/cough/Monitor for s/s aspiration/laryngeal penetration. If noted:  D/C p.o. intake, provide non-oral nutrition/hydration/meds, and contact this service @ [phone]/throat clearing

## 2018-02-21 NOTE — SWALLOW BEDSIDE ASSESSMENT ADULT - PHARYNGEAL PHASE
Cough post oral intake/Decreased laryngeal elevation Cough post oral intake/Decreased laryngeal elevation/Delayed cough post oral intake

## 2018-02-21 NOTE — PROGRESS NOTE ADULT - SUBJECTIVE AND OBJECTIVE BOX
Follow-up Pulm Progress Note  Abrahan Flannery MD  [phone]    AFebrile  Notes reviewed  today OOB, comfortable with SOB, less coughing    Vital Signs Last 24 Hrs  T(C): 36.5 (21 Feb 2018 11:58), Max: 36.8 (20 Feb 2018 20:46)  T(F): 97.7 (21 Feb 2018 11:58), Max: 98.3 (20 Feb 2018 20:46)  HR: 85 (21 Feb 2018 11:58) (74 - 85)  BP: 121/74 (21 Feb 2018 11:58) (121/74 - 129/70)  BP(mean): --  RR: 18 (21 Feb 2018 11:58) (17 - 19)  SpO2: 97% (21 Feb 2018 11:58) (93% - 97%)                          12.1   17.66 )-----------( 297      ( 21 Feb 2018 07:35 )             36.2       02-20    131<L>  |  91<L>  |  56<H>  ----------------------------<  352<H>  4.0   |  25  |  1.35<H>    Ca    9.0      20 Feb 2018 09:00        CULTURES:  Culture Results:   No growth (02-13 @ 03:07)  Culture Results:   No growth to date. (02-12 @ 22:01)  Culture Results:   No growth to date. (02-12 @ 22:01)    Most recent blood culture -- 02-13 @ 03:07   -- -- .Urine Clean Catch (Midstream) 02-13 @ 03:07  Most recent blood culture -- 02-12 @ 22:01   -- -- .Blood Blood 02-12 @ 22:01      Physical Examination:  PULM: Bilateral coarse crackles 1/4; no wheeze or rhonchi  CVS: Regular rate and rhythm, no murmurs, rubs, or gallops  ABD: Soft, non-tender  EXT:  No clubbing, cyanosis, or edema    RADIOLOGY REVIEWED  CXR:    CT chest:    TTE:

## 2018-02-21 NOTE — SWALLOW VFSS/MBS ASSESSMENT ADULT - NS SWALLOW VFSS REC ASPIR MON
change of breathing pattern/upper respiratory infection/Monitor for s/s aspiration/laryngeal penetration. If noted:  D/C p.o. intake, provide non-oral nutrition/hydration/meds, and contact this service @ [phone]/fever/pneumonia/throat clearing/cough/gurgly voice

## 2018-02-21 NOTE — PROGRESS NOTE ADULT - SUBJECTIVE AND OBJECTIVE BOX
Chief complaint  Patient is a 86y old  Female who presents with a chief complaint of weakness   cough   diarrhea (13 Feb 2018 12:58)   Review of systems  Patient in bed, looks comfortable, no fever,   c/o cough, no hypoglycemia.    Labs and Fingersticks  CAPILLARY BLOOD GLUCOSE      POCT Blood Glucose.: 213 mg/dL (21 Feb 2018 07:56)  POCT Blood Glucose.: 137 mg/dL (20 Feb 2018 21:55)  POCT Blood Glucose.: 270 mg/dL (20 Feb 2018 16:52)  POCT Blood Glucose.: 319 mg/dL (20 Feb 2018 11:44)      Anion Gap, Serum: 15 (02-20 @ 09:00)      Calcium, Total Serum: 9.0 (02-20 @ 09:00)          02-20    131<L>  |  91<L>  |  56<H>  ----------------------------<  352<H>  4.0   |  25  |  1.35<H>    Ca    9.0      20 Feb 2018 09:00                          12.1   17.66 )-----------( 297      ( 21 Feb 2018 07:35 )             36.2     Medications  MEDICATIONS  (STANDING):  amLODIPine   Tablet 5 milliGRAM(s) Oral daily  benzonatate 100 milliGRAM(s) Oral three times a day  buDESOnide  80 MICROgram(s)/formoterol 4.5 MICROgram(s) Inhaler 2 Puff(s) Inhalation two times a day  cyanocobalamin 1000 MICROGram(s) Oral daily  dextrose 5%. 1000 milliLiter(s) (50 mL/Hr) IV Continuous <Continuous>  dextrose 50% Injectable 12.5 Gram(s) IV Push once  dextrose 50% Injectable 25 Gram(s) IV Push once  dextrose 50% Injectable 25 Gram(s) IV Push once  docusate sodium 100 milliGRAM(s) Oral three times a day  famotidine    Tablet 20 milliGRAM(s) Oral at bedtime  folic acid 1 milliGRAM(s) Oral daily  guaiFENesin   Syrup  (Sugar-Free) 100 milliGRAM(s) Oral every 6 hours  heparin  Injectable 5000 Unit(s) SubCutaneous every 12 hours  hydrochlorothiazide 25 milliGRAM(s) Oral daily  insulin glargine Injectable (LANTUS) 30 Unit(s) SubCutaneous at bedtime  insulin lispro (HumaLOG) corrective regimen sliding scale   SubCutaneous three times a day before meals  insulin lispro (HumaLOG) corrective regimen sliding scale   SubCutaneous at bedtime  insulin lispro Injectable (HumaLOG) 7 Unit(s) SubCutaneous three times a day before meals  mirtazapine 15 milliGRAM(s) Oral at bedtime  pantoprazole    Tablet 40 milliGRAM(s) Oral two times a day before meals  polyethylene glycol 3350 17 Gram(s) Oral daily  predniSONE   Tablet 20 milliGRAM(s) Oral two times a day  predniSONE   Tablet   Oral   senna 2 Tablet(s) Oral at bedtime  tiotropium 18 MICROgram(s) Capsule 1 Capsule(s) Inhalation daily      Physical Exam  General: Patient comfortable in bed  Vital Signs Last 12 Hrs  T(F): 97.7 (02-21-18 @ 04:30), Max: 97.7 (02-21-18 @ 04:30)  HR: 78 (02-21-18 @ 04:30) (78 - 78)  BP: 129/70 (02-21-18 @ 04:30) (129/70 - 129/70)  BP(mean): --  RR: 17 (02-21-18 @ 04:30) (17 - 17)  SpO2: 93% (02-21-18 @ 04:30) (93% - 93%)  Neck: No palpable thyroid nodules.  CVS: S1S2, No murmurs  Respiratory: No wheezing, no crepitations  GI: Abdomen soft, bowel sounds positive  Musculoskeletal:  edema lower extremities.   Skin: No skin rashes, no ecchymosis    Diagnostics

## 2018-02-21 NOTE — PROGRESS NOTE ADULT - ASSESSMENT
84 y/o fmeale with hx of RA on mtx , and was on prednisone ( no longer on steroids since 09/2017  and has not recieved iv infusion  ( monthly simponi ) in three months . also history of HTN and DM .. has been admitted twice over the past 6-8 months with  weakness . initial admit pt was thought ot have an element of adrenal insufficeiny when she was on steroids .. and pt was sent out on steroids with some improvement,.. later admitted as lij for weakness thought to be multifactorial sec to viral illness, dehydration , and leemt of steroid -induced myopathy. pt was sent to rehab and overall improved but never to baseline.. over the past three days pt has been having worsening generalized weakness , cough with production of sputum ( color?), decreased po intake and decreased mobility.  no focal neuro complaints  no CP/SOB   had had few episodes of N/V /D at home   no recent abx   no abdo pain or urinary Sx       1- Cough / weakness:  hMPV    cont  prednisone   hold off on antibiotics ..    CT old findings with mosaic changes  cough is presistent ad servando sec to viral resp illness however need to r/o other etiology:     speech and swallow : apprecite input : regular diet    ENT: appreciate input  : servando GERD   d/w  : ? ILD sec to MTX toxicity ?   yaima given stable CT over time while pt on meds .. d/c losartan given this might contribute to cough   d/w Dr.Goldberg : f/u official consult        2- N/V /D : seems to be part of viral illness ..improved   tolerating  diet .. another episode today however post a cough-spell   Zofran  and ppi       3- DM 2 uncontrolled :   cont insulin and adjust per endo ....  unconctrolled FS given on prednisone ..   4- Decreased mobility :  nonfocal neuro exam  .. had had mri brain with no acute findings   in past pt was thought to might have had an element of steroid induced myopathy however now off prednisone ..  CPK: NL  EMG as o/p.    5- HTN : better controlled .     cont Norvasc     will need BB  given AS  ..monitor for now    6- electrolyte abn : replete K and mag   7- anemia : servando aocd /iron def   8- AS : stable   f/u w cardio   9- anxiety /depression  :d/w  : remeron added   seroquel dced     dvt proph

## 2018-02-21 NOTE — SWALLOW VFSS/MBS ASSESSMENT ADULT - LARYNGEAL PENETRATION DURING THE SWALLOW - SILENT
Mild/Trace/with retrieval Mild/Trace/varying in degree of depth from shallow to deep, with retrieval. Laryngeal penetration is deeper in the laryngeal vestibule and of greater amount via straw sip. Trace/shallow, with retrieval

## 2018-02-21 NOTE — SWALLOW BEDSIDE ASSESSMENT ADULT - COMMENTS
Hx cont: no focal neuro complaints, no CP/SOB, had had few episodes of N/V /D at home, no recent abx, no abdo pain or urinary Sx. CXR no estrellita infiltrate. Decreased mobility: nonfocal neuro exam had mri brain with no acute findings consult neuro. GI on board for diarrhea. Neuro: Worsening weakness diff. includes steroid induced myopathy. Rx: checking CPK, MRI of the brain and MRI of the lumbar spine, EMG study. 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 2/13 GI: stable from GI perspective, symptoms likely related to acute viral illness, OK to advance diet as tolerates-lactose free. Cards on board given AS, HTN uncontrolled. 2/14 Neuro: CPK is normal.  Possible steroid induced myopathy cannot be ruled out.  Can also be deconditioning from recent illness. Rheum: Arthritis currently under good control on MTX and especially now that she is on high dose steroids. Pulm: No clinical evidence bacterial pneumonia. Clinically improving with prednisone. Hold off abx. 2/16 Endocrine on board for hyperglycemia. Pt with episode of tachycardia, dyspnea, anxiety, severe coughing spells  followed by vomiting per Medicine. 2/18 Psych consulted for anxiety and depression. Per psych, Delirium of unknown etiologies (? corticosteroids, ? hydrocodone/homatropine, ?metabolic), Depression NOS (family conflict), History of Generalized Anxiety Disorder, Rule out dementia. ENT consulted for chronic cough. On pantoprazole BID. No bleeding in nasal pharynx or Oral pharynx.  No foreign body or pooling of secretions.  No glottic / supraglottic swelling.  Vocal cords mobile in intact b/l.  Airway patent. Noted with AE fold erythema and posterior larynx most likely representing Laryngopharyngeal Reflux. RX: continue PPI, small frequent mealsDo not eat 3 hours prior to bedtime  HOB elevation

## 2018-02-21 NOTE — PROGRESS NOTE ADULT - SUBJECTIVE AND OBJECTIVE BOX
INTERVAL HPI/OVERNIGHT EVENTS:  Pt remains with cough, overall feeling OK.  No joint pains    MEDICATIONS  (STANDING):  amLODIPine   Tablet 5 milliGRAM(s) Oral daily  benzonatate 100 milliGRAM(s) Oral three times a day  buDESOnide  80 MICROgram(s)/formoterol 4.5 MICROgram(s) Inhaler 2 Puff(s) Inhalation two times a day  cyanocobalamin 1000 MICROGram(s) Oral daily  dextrose 5%. 1000 milliLiter(s) (50 mL/Hr) IV Continuous <Continuous>  dextrose 50% Injectable 12.5 Gram(s) IV Push once  dextrose 50% Injectable 25 Gram(s) IV Push once  dextrose 50% Injectable 25 Gram(s) IV Push once  docusate sodium 100 milliGRAM(s) Oral three times a day  famotidine    Tablet 20 milliGRAM(s) Oral at bedtime  folic acid 1 milliGRAM(s) Oral daily  guaiFENesin   Syrup  (Sugar-Free) 100 milliGRAM(s) Oral every 6 hours  heparin  Injectable 5000 Unit(s) SubCutaneous every 12 hours  hydrochlorothiazide 25 milliGRAM(s) Oral daily  insulin glargine Injectable (LANTUS) 30 Unit(s) SubCutaneous at bedtime  insulin lispro (HumaLOG) corrective regimen sliding scale   SubCutaneous three times a day before meals  insulin lispro (HumaLOG) corrective regimen sliding scale   SubCutaneous at bedtime  insulin lispro Injectable (HumaLOG) 7 Unit(s) SubCutaneous three times a day before meals  mirtazapine 15 milliGRAM(s) Oral at bedtime  pantoprazole    Tablet 40 milliGRAM(s) Oral two times a day before meals  polyethylene glycol 3350 17 Gram(s) Oral daily  predniSONE   Tablet 20 milliGRAM(s) Oral two times a day  predniSONE   Tablet   Oral   senna 2 Tablet(s) Oral at bedtime  tiotropium 18 MICROgram(s) Capsule 1 Capsule(s) Inhalation daily    MEDICATIONS  (PRN):  dextrose Gel 1 Dose(s) Oral once PRN Blood Glucose LESS THAN 70 milliGRAM(s)/deciliter  glucagon  Injectable 1 milliGRAM(s) IntraMuscular once PRN Glucose LESS THAN 70 milligrams/deciliter  ondansetron Injectable 4 milliGRAM(s) IV Push every 6 hours PRN Nausea and/or Vomiting      Allergies    No Known Allergies    Intolerances        REVIEW OF SYSTEMS    General:	    Skin/Breast:  	  Ophthalmologic:  	  ENMT:	    Respiratory and Thorax:  	  Cardiovascular:	    Gastrointestinal:	    Genitourinary:	    Musculoskeletal:	    Neurological:	    Psychiatric:	    Hematology/Lymphatics:    Vital Signs Last 24 Hrs  T(C): 36.5 (21 Feb 2018 11:58), Max: 36.8 (20 Feb 2018 20:46)  T(F): 97.7 (21 Feb 2018 11:58), Max: 98.3 (20 Feb 2018 20:46)  HR: 85 (21 Feb 2018 11:58) (74 - 85)  BP: 121/74 (21 Feb 2018 11:58) (121/74 - 129/70)  BP(mean): --  RR: 18 (21 Feb 2018 11:58) (17 - 19)  SpO2: 97% (21 Feb 2018 11:58) (93% - 97%)      PHYSICAL EXAM:    Constitutional:  well appearing    Eyes:    ENMT:    Neck:    Back:    Respiratory:    Cardiovascular:    Gastrointestinal:    Extremities:  no edema    Vascular:    Neurological:    Skin:    Lymph Nodes:    Musculoskeletal:  No active synovitis    Psychiatric:      LABS:                        12.1   17.66 )-----------( 297      ( 21 Feb 2018 07:35 )             36.2     02-20    131<L>  |  91<L>  |  56<H>  ----------------------------<  352<H>  4.0   |  25  |  1.35<H>        Ca    9.0      20 Feb 2018 09:00            RADIOLOGY & ADDITIONAL TESTS:  < from: CT Chest No Cont (02.12.18 @ 21:11) >  IMPRESSION:   Stable mosaic attenuation and pulmonary scarring.Findings are   nonspecific, likely in keeping with chronic interstitial lung disease. 6   mm nodular juxtapleural thickening in the right middle lobe, unchanged.     < end of copied text >

## 2018-02-21 NOTE — PROVIDER CONTACT NOTE (OTHER) - BACKGROUND
Pt admitted for weakness and RVP positive HMPV. Pt had s/p speech and swallow evaluation before hand.

## 2018-02-21 NOTE — SWALLOW BEDSIDE ASSESSMENT ADULT - SWALLOW EVAL: DIAGNOSIS
Pt presents with a baseline cough which makes subjective assessment difficulty to a degree. Pt with mildly reduced hyolaryngeal elevation upon palpation. Oral phase of the swallow grossly within functional limits with mildly reduced A-P transport and oral residue post swallow, eliminated with liquid wash. Pt requires objective assessment to r/o aspiration given baseline cough. Pt presents with a baseline cough which makes subjective assessment difficulty to a degree. Pt with mildly reduced hyolaryngeal elevation upon palpation. Oral phase of the swallow grossly within functional limits with mildly reduced A-P transport and oral residue post swallow, eliminated with liquid wash. Given baseline cough, unable to assess for aspiration subjectively. Pt requires objective assessment to r/o aspiration.

## 2018-02-21 NOTE — PROGRESS NOTE ADULT - SUBJECTIVE AND OBJECTIVE BOX
Events noted. No complaints. Got some sleep (she says disrupted from hospital routine and being awakened by staff). Eats well. No agitation/psychosis.       Vital Signs Last 24 Hrs  T(C): 36.5 (21 Feb 2018 04:30), Max: 36.8 (20 Feb 2018 20:46)  T(F): 97.7 (21 Feb 2018 04:30), Max: 98.3 (20 Feb 2018 20:46)  HR: 78 (21 Feb 2018 04:30) (74 - 82)  BP: 129/70 (21 Feb 2018 04:30) (112/69 - 129/70)  BP(mean): --  RR: 17 (21 Feb 2018 04:30) (17 - 19)  SpO2: 93% (21 Feb 2018 04:30) (93% - 95%)                          12.1   17.66 )-----------( 297      ( 21 Feb 2018 07:35 )             36.2       02-20    131<L>  |  91<L>  |  56<H>  ----------------------------<  352<H>  4.0   |  25  |  1.35<H>    Ca    9.0      20 Feb 2018 09:00                MEDICATIONS  (STANDING):  amLODIPine   Tablet 5 milliGRAM(s) Oral daily  benzonatate 100 milliGRAM(s) Oral three times a day  buDESOnide  80 MICROgram(s)/formoterol 4.5 MICROgram(s) Inhaler 2 Puff(s) Inhalation two times a day  cyanocobalamin 1000 MICROGram(s) Oral daily  dextrose 5%. 1000 milliLiter(s) (50 mL/Hr) IV Continuous <Continuous>  dextrose 50% Injectable 12.5 Gram(s) IV Push once  dextrose 50% Injectable 25 Gram(s) IV Push once  dextrose 50% Injectable 25 Gram(s) IV Push once  docusate sodium 100 milliGRAM(s) Oral three times a day  famotidine    Tablet 20 milliGRAM(s) Oral at bedtime  folic acid 1 milliGRAM(s) Oral daily  guaiFENesin   Syrup  (Sugar-Free) 100 milliGRAM(s) Oral every 6 hours  heparin  Injectable 5000 Unit(s) SubCutaneous every 12 hours  hydrochlorothiazide 25 milliGRAM(s) Oral daily  insulin glargine Injectable (LANTUS) 30 Unit(s) SubCutaneous at bedtime  insulin lispro (HumaLOG) corrective regimen sliding scale   SubCutaneous three times a day before meals  insulin lispro (HumaLOG) corrective regimen sliding scale   SubCutaneous at bedtime  insulin lispro Injectable (HumaLOG) 7 Unit(s) SubCutaneous three times a day before meals  mirtazapine 15 milliGRAM(s) Oral at bedtime  pantoprazole    Tablet 40 milliGRAM(s) Oral two times a day before meals  polyethylene glycol 3350 17 Gram(s) Oral daily  predniSONE   Tablet 20 milliGRAM(s) Oral two times a day  predniSONE   Tablet   Oral   senna 2 Tablet(s) Oral at bedtime  tiotropium 18 MICROgram(s) Capsule 1 Capsule(s) Inhalation daily    MEDICATIONS  (PRN):  dextrose Gel 1 Dose(s) Oral once PRN Blood Glucose LESS THAN 70 milliGRAM(s)/deciliter  glucagon  Injectable 1 milliGRAM(s) IntraMuscular once PRN Glucose LESS THAN 70 milligrams/deciliter  ondansetron Injectable 4 milliGRAM(s) IV Push every 6 hours PRN Nausea and/or Vomiting      Elderly HF in chair, looks tired but is calm, cooperative, alert and oriented x 2 (cannot state the year).  No psychomotor abnormalities. Insight and judgment are fair. Speech is coherent with normal rate and volume. No hallucinations nor delusions. The patient denied suicidal and homicidal ideation and plan. Mood is "ok" and affect constricted.  Attention and concentration fair but impaired short term memory. Long term memory within normal limits.

## 2018-02-21 NOTE — PROGRESS NOTE ADULT - ASSESSMENT
Dementia  Rule out fatigue secondary to hyponatremia    Recommend  Correct hyponatremia  Hold Mirtazepine if Na falls below 130  Following.      Brendan Posada M.D.  Psychiatry  [phone]

## 2018-02-21 NOTE — SWALLOW BEDSIDE ASSESSMENT ADULT - SLP GENERAL OBSERVATIONS
Pt received OOB in chair. 3L NC. Grossly A&Ox3. Paucity of verbal output, required increased response time intermittently. + command following.

## 2018-02-21 NOTE — PROGRESS NOTE ADULT - SUBJECTIVE AND OBJECTIVE BOX
Patient is a 86y old  Female who presents with a chief complaint of weakness   cough   diarreah (2018 12:58)                                                               INTERVAL HPI/OVERNIGHT EVENTS:    REVIEW OF SYSTEMS:     CONSTITUTIONAL: No weakness, fevers or chills  RESPIRATORY: cough, wheezing,  No shortness of breath  CARDIOVASCULAR: No chest pain or palpitations  GASTROINTESTINAL: No abdominal pain  . No nausea, vomiting,   GENITOURINARY: No dysuria, frequency   NEUROLOGICAL: No numbness or weakness                                                                                                                                                                                                                                                                                   Medications:  MEDICATIONS  (STANDING):  amLODIPine   Tablet 5 milliGRAM(s) Oral daily  benzonatate 100 milliGRAM(s) Oral three times a day  buDESOnide  80 MICROgram(s)/formoterol 4.5 MICROgram(s) Inhaler 2 Puff(s) Inhalation two times a day  cyanocobalamin 1000 MICROGram(s) Oral daily  dextrose 5%. 1000 milliLiter(s) (50 mL/Hr) IV Continuous <Continuous>  dextrose 50% Injectable 12.5 Gram(s) IV Push once  dextrose 50% Injectable 25 Gram(s) IV Push once  dextrose 50% Injectable 25 Gram(s) IV Push once  docusate sodium 100 milliGRAM(s) Oral three times a day  famotidine    Tablet 20 milliGRAM(s) Oral at bedtime  folic acid 1 milliGRAM(s) Oral daily  guaiFENesin   Syrup  (Sugar-Free) 100 milliGRAM(s) Oral every 6 hours  heparin  Injectable 5000 Unit(s) SubCutaneous every 12 hours  hydrochlorothiazide 25 milliGRAM(s) Oral daily  insulin glargine Injectable (LANTUS) 30 Unit(s) SubCutaneous at bedtime  insulin lispro (HumaLOG) corrective regimen sliding scale   SubCutaneous three times a day before meals  insulin lispro (HumaLOG) corrective regimen sliding scale   SubCutaneous at bedtime  insulin lispro Injectable (HumaLOG) 7 Unit(s) SubCutaneous three times a day before meals  mirtazapine 15 milliGRAM(s) Oral at bedtime  pantoprazole    Tablet 40 milliGRAM(s) Oral two times a day before meals  polyethylene glycol 3350 17 Gram(s) Oral daily  predniSONE   Tablet 20 milliGRAM(s) Oral two times a day  predniSONE   Tablet   Oral   senna 2 Tablet(s) Oral at bedtime  tiotropium 18 MICROgram(s) Capsule 1 Capsule(s) Inhalation daily    MEDICATIONS  (PRN):  dextrose Gel 1 Dose(s) Oral once PRN Blood Glucose LESS THAN 70 milliGRAM(s)/deciliter  glucagon  Injectable 1 milliGRAM(s) IntraMuscular once PRN Glucose LESS THAN 70 milligrams/deciliter  ondansetron Injectable 4 milliGRAM(s) IV Push every 6 hours PRN Nausea and/or Vomiting       Allergies    No Known Allergies    Intolerances      Vital Signs Last 24 Hrs  T(C): 36.5 (2018 11:58), Max: 36.8 (2018 20:46)  T(F): 97.7 (2018 11:58), Max: 98.3 (2018 20:46)  HR: 85 (2018 11:58) (74 - 85)  BP: 121/74 (2018 11:58) (121/74 - 129/70)  BP(mean): --  RR: 18 (2018 11:58) (17 - 19)  SpO2: 97% (2018 11:58) (93% - 97%)  CAPILLARY BLOOD GLUCOSE      POCT Blood Glucose.: 306 mg/dL (2018 17:02)  POCT Blood Glucose.: 425 mg/dL (2018 12:17)  POCT Blood Glucose.: 451 mg/dL (2018 12:14)  POCT Blood Glucose.: 213 mg/dL (2018 07:56)  POCT Blood Glucose.: 137 mg/dL (2018 21:55)       @ 07: @ 07:00  --------------------------------------------------------  IN: 960 mL / OUT: 0 mL / NET: 960 mL     @ 07:  -   @ 18:27  --------------------------------------------------------  IN: 840 mL / OUT: 0 mL / NET: 840 mL      Physical Exam:    Daily Weight in k.2 (2018 11:11)  General:NAD   HEENT:  Nonicteric, PERRLA  CV:  RRR, S1S2   Lungs:  crackles at bases   Abdomen:  Soft, non-tender, no distended, positive BS  Extremities:   no edema  Skin:  Warm and dry, no rashes  :  No mason  Neuro:  AAOx3, non-focal, grossly intact                                                                                                                                                                                                                                                                                                LABS:                               12.1   17.66 )-----------( 297      ( 2018 07:35 )             36.2                      02-20    131<L>  |  91<L>  |  56<H>  ----------------------------<  352<H>  4.0   |  25  |  1.35<H>    Ca    9.0      2018 09:00

## 2018-02-21 NOTE — SWALLOW BEDSIDE ASSESSMENT ADULT - ASR SWALLOW RECOMMEND DIAG
VFSS/MBS/MD, please write order for Modified Barium Swallow Study. Will schedule exam upon receipt of order in Radiology.

## 2018-02-21 NOTE — PROGRESS NOTE ADULT - ASSESSMENT
Impr;  86 yo female with h/o RA.  Very well controlled though she is currently on steroids due to pulm dz.  admitted with viral illness.  Based on CT comparison to last year it does not appear as though pt has MTX toxicity in the lungs.    Rec:  OK to hold MTX for now due to viral infection and lack of RA activity, however,  I would recommend we restart the MTX in the future once she is improved and steroids are tapered

## 2018-02-21 NOTE — PROGRESS NOTE ADULT - SUBJECTIVE AND OBJECTIVE BOX
Subjective: Patient seen and examined. No new events except as noted.     SUBJECTIVE/ROS:  cough       MEDICATIONS:  MEDICATIONS  (STANDING):  amLODIPine   Tablet 5 milliGRAM(s) Oral daily  benzonatate 100 milliGRAM(s) Oral three times a day  buDESOnide  80 MICROgram(s)/formoterol 4.5 MICROgram(s) Inhaler 2 Puff(s) Inhalation two times a day  cyanocobalamin 1000 MICROGram(s) Oral daily  dextrose 5%. 1000 milliLiter(s) (50 mL/Hr) IV Continuous <Continuous>  dextrose 50% Injectable 12.5 Gram(s) IV Push once  dextrose 50% Injectable 25 Gram(s) IV Push once  dextrose 50% Injectable 25 Gram(s) IV Push once  docusate sodium 100 milliGRAM(s) Oral three times a day  famotidine    Tablet 20 milliGRAM(s) Oral at bedtime  folic acid 1 milliGRAM(s) Oral daily  guaiFENesin   Syrup  (Sugar-Free) 100 milliGRAM(s) Oral every 6 hours  heparin  Injectable 5000 Unit(s) SubCutaneous every 12 hours  hydrochlorothiazide 25 milliGRAM(s) Oral daily  insulin glargine Injectable (LANTUS) 30 Unit(s) SubCutaneous at bedtime  insulin lispro (HumaLOG) corrective regimen sliding scale   SubCutaneous three times a day before meals  insulin lispro (HumaLOG) corrective regimen sliding scale   SubCutaneous at bedtime  insulin lispro Injectable (HumaLOG) 7 Unit(s) SubCutaneous three times a day before meals  mirtazapine 15 milliGRAM(s) Oral at bedtime  pantoprazole    Tablet 40 milliGRAM(s) Oral two times a day before meals  polyethylene glycol 3350 17 Gram(s) Oral daily  predniSONE   Tablet 20 milliGRAM(s) Oral two times a day  predniSONE   Tablet   Oral   senna 2 Tablet(s) Oral at bedtime  tiotropium 18 MICROgram(s) Capsule 1 Capsule(s) Inhalation daily      PHYSICAL EXAM:  T(C): 36.5 (02-21-18 @ 11:58), Max: 36.8 (02-20-18 @ 20:46)  HR: 85 (02-21-18 @ 11:58) (74 - 85)  BP: 121/74 (02-21-18 @ 11:58) (121/74 - 129/70)  RR: 18 (02-21-18 @ 11:58) (17 - 19)  SpO2: 97% (02-21-18 @ 11:58) (93% - 97%)  Wt(kg): --  I&O's Summary    20 Feb 2018 07:01  -  21 Feb 2018 07:00  --------------------------------------------------------  IN: 960 mL / OUT: 0 mL / NET: 960 mL    21 Feb 2018 07:01  -  21 Feb 2018 18:37  --------------------------------------------------------  IN: 840 mL / OUT: 0 mL / NET: 840 mL        JVP: Normal  Neck: supple  Lung: crackles   CV: S1 S2 , Murmur:  Abd: soft  Ext: No edema  neuro: Awake / alert  Psych: flat affect  Skin: normal       LABS/DATA:    CARDIAC MARKERS:                                12.1   17.66 )-----------( 297      ( 21 Feb 2018 07:35 )             36.2     02-20    131<L>  |  91<L>  |  56<H>  ----------------------------<  352<H>  4.0   |  25  |  1.35<H>    Ca    9.0      20 Feb 2018 09:00      proBNP:   Lipid Profile:   HgA1c:   TSH:     TELE:  EKG:

## 2018-02-21 NOTE — SWALLOW BEDSIDE ASSESSMENT ADULT - SWALLOW EVAL: RECOMMENDED FEEDING/EATING TECHNIQUES
alternate food with liquid/allow for swallow between intakes/position upright (90 degrees)/maintain upright posture during/after eating for 30 mins/small sips/bites

## 2018-02-21 NOTE — SWALLOW BEDSIDE ASSESSMENT ADULT - ORAL PHASE
Within functional limits Decreased anterior-posterior movement of the bolus/Palatal stasis/Lingual stasis

## 2018-02-21 NOTE — SWALLOW VFSS/MBS ASSESSMENT ADULT - RECOMMENDED FEEDING/EATING TECHNIQUES
small sips/bites/allow for swallow between intakes/no straws/maintain upright posture during/after eating for 30 mins/position upright (90 degrees)

## 2018-02-21 NOTE — PROGRESS NOTE ADULT - ASSESSMENT
HTN  labile  stable     AS  moderate  stable    cough   crackles likely due to chronic ILD   fu with  pulm

## 2018-02-21 NOTE — SWALLOW BEDSIDE ASSESSMENT ADULT - SLP PRECAUTIONS/LIMITATIONS: VISION
impaired/Partially impaired: cannot see medication labels or newsprint, but can see obstacles in path, and the surrounding layout; can count fingers at arm's length

## 2018-02-21 NOTE — SWALLOW VFSS/MBS ASSESSMENT ADULT - DIAGNOSTIC IMPRESSIONS
Pt presents with an oropharyngeal dysphagia characterized by mildly reduced A-P transport, mild to moderate pharyngeal residue post swallow and silent laryngeal penetration with retrieval across consistencies. Laryngeal penetration is deeper into the laryngeal vestibule with thin liquid via straw. Pt presents with an oropharyngeal dysphagia characterized by mildly reduced A-P transport, mild to moderate pharyngeal residue post swallow and silent laryngeal penetration with retrieval across consistencies. Laryngeal penetration is deeper into the laryngeal vestibule with thin liquid via straw. No aspiration during this exam. Pt presents with an oropharyngeal dysphagia characterized by mildly reduced A-P transport, mild to moderate pharyngeal residue post swallow and silent laryngeal penetration with retrieval across consistencies. Laryngeal penetration is deeper into the laryngeal vestibule with thin liquid via straw. No aspiration during this exam. Of note, pt coughing during PO trials. Coughing did not appear related to laryngeal penetration/aspiration under fluoro. Pt presents with an oropharyngeal dysphagia characterized by mildly reduced A-P transport, mild to moderate pharyngeal residue post swallow and silent laryngeal penetration with retrieval across consistencies. Laryngeal penetration is deeper into the laryngeal vestibule and of greater amount with thin liquid via straw. Airway protection is improved with small, single cup sips. No aspiration during this exam. Of note, coughing observed throughout exam in absence of laryngeal penetration/aspiration.   Disorders: reduced lingual strength/ROM/Rate of motion, reduced BOT to posterior pharyngeal wall contact, reduced hyo-laryngeal excursion, reduced laryngeal closure, signs of reduced supraglottic sensation.

## 2018-02-21 NOTE — SWALLOW BEDSIDE ASSESSMENT ADULT - SWALLOW EVAL: RECOMMENDED DIET
Continue Regular texture diet pending MBS. MD to consider continuing Regular texture diet pending MBS.

## 2018-02-21 NOTE — PROGRESS NOTE ADULT - ASSESSMENT
HmPV viral tracheobronchitis with mild bronchospasm: clinically improving with prednisone  RA with pulmonary fribrosis  No clinical evidence bacterial pneumonia  Cough  Crackles related to chronic ILD    REC    Complete prednisone taper  DC nebulizers: begin symbicort and spiriva  prn hycodan at night for cough  Monitor O2 sats  DC planning 1-2 days per primary team  Check sat ambulating on RA  Outpatient PFT's and CT monitoring of ILD

## 2018-02-22 VITALS — OXYGEN SATURATION: 97 %

## 2018-02-22 LAB
ANION GAP SERPL CALC-SCNC: 13 MMOL/L — SIGNIFICANT CHANGE UP (ref 5–17)
BUN SERPL-MCNC: 51 MG/DL — HIGH (ref 7–23)
CALCIUM SERPL-MCNC: 9.1 MG/DL — SIGNIFICANT CHANGE UP (ref 8.4–10.5)
CHLORIDE SERPL-SCNC: 97 MMOL/L — SIGNIFICANT CHANGE UP (ref 96–108)
CO2 SERPL-SCNC: 26 MMOL/L — SIGNIFICANT CHANGE UP (ref 22–31)
CREAT SERPL-MCNC: 1.1 MG/DL — SIGNIFICANT CHANGE UP (ref 0.5–1.3)
GLUCOSE BLDC GLUCOMTR-MCNC: 250 MG/DL — HIGH (ref 70–99)
GLUCOSE BLDC GLUCOMTR-MCNC: 318 MG/DL — HIGH (ref 70–99)
GLUCOSE SERPL-MCNC: 278 MG/DL — HIGH (ref 70–99)
HCT VFR BLD CALC: 35.9 % — SIGNIFICANT CHANGE UP (ref 34.5–45)
HGB BLD-MCNC: 12.3 G/DL — SIGNIFICANT CHANGE UP (ref 11.5–15.5)
MCHC RBC-ENTMCNC: 31.8 PG — SIGNIFICANT CHANGE UP (ref 27–34)
MCHC RBC-ENTMCNC: 34.3 GM/DL — SIGNIFICANT CHANGE UP (ref 32–36)
MCV RBC AUTO: 92.6 FL — SIGNIFICANT CHANGE UP (ref 80–100)
PLATELET # BLD AUTO: 295 K/UL — SIGNIFICANT CHANGE UP (ref 150–400)
POTASSIUM SERPL-MCNC: 3.6 MMOL/L — SIGNIFICANT CHANGE UP (ref 3.5–5.3)
POTASSIUM SERPL-SCNC: 3.6 MMOL/L — SIGNIFICANT CHANGE UP (ref 3.5–5.3)
RBC # BLD: 3.88 M/UL — SIGNIFICANT CHANGE UP (ref 3.8–5.2)
RBC # FLD: 15.7 % — HIGH (ref 10.3–14.5)
SODIUM SERPL-SCNC: 136 MMOL/L — SIGNIFICANT CHANGE UP (ref 135–145)
WBC # BLD: 16 K/UL — HIGH (ref 3.8–10.5)
WBC # FLD AUTO: 16 K/UL — HIGH (ref 3.8–10.5)

## 2018-02-22 PROCEDURE — 82728 ASSAY OF FERRITIN: CPT

## 2018-02-22 PROCEDURE — 83880 ASSAY OF NATRIURETIC PEPTIDE: CPT

## 2018-02-22 PROCEDURE — 87040 BLOOD CULTURE FOR BACTERIA: CPT

## 2018-02-22 PROCEDURE — 87798 DETECT AGENT NOS DNA AMP: CPT

## 2018-02-22 PROCEDURE — 84443 ASSAY THYROID STIM HORMONE: CPT

## 2018-02-22 PROCEDURE — 81001 URINALYSIS AUTO W/SCOPE: CPT

## 2018-02-22 PROCEDURE — 84439 ASSAY OF FREE THYROXINE: CPT

## 2018-02-22 PROCEDURE — 97116 GAIT TRAINING THERAPY: CPT

## 2018-02-22 PROCEDURE — 71250 CT THORAX DX C-: CPT

## 2018-02-22 PROCEDURE — 82962 GLUCOSE BLOOD TEST: CPT

## 2018-02-22 PROCEDURE — 82947 ASSAY GLUCOSE BLOOD QUANT: CPT

## 2018-02-22 PROCEDURE — 97162 PT EVAL MOD COMPLEX 30 MIN: CPT

## 2018-02-22 PROCEDURE — 83036 HEMOGLOBIN GLYCOSYLATED A1C: CPT

## 2018-02-22 PROCEDURE — 84100 ASSAY OF PHOSPHORUS: CPT

## 2018-02-22 PROCEDURE — 80048 BASIC METABOLIC PNL TOTAL CA: CPT

## 2018-02-22 PROCEDURE — 81003 URINALYSIS AUTO W/O SCOPE: CPT

## 2018-02-22 PROCEDURE — 92610 EVALUATE SWALLOWING FUNCTION: CPT

## 2018-02-22 PROCEDURE — 83605 ASSAY OF LACTIC ACID: CPT

## 2018-02-22 PROCEDURE — 85730 THROMBOPLASTIN TIME PARTIAL: CPT

## 2018-02-22 PROCEDURE — 93970 EXTREMITY STUDY: CPT

## 2018-02-22 PROCEDURE — 87086 URINE CULTURE/COLONY COUNT: CPT

## 2018-02-22 PROCEDURE — 97530 THERAPEUTIC ACTIVITIES: CPT

## 2018-02-22 PROCEDURE — 74230 X-RAY XM SWLNG FUNCJ C+: CPT

## 2018-02-22 PROCEDURE — 51701 INSERT BLADDER CATHETER: CPT

## 2018-02-22 PROCEDURE — 84295 ASSAY OF SERUM SODIUM: CPT

## 2018-02-22 PROCEDURE — 99285 EMERGENCY DEPT VISIT HI MDM: CPT | Mod: 25

## 2018-02-22 PROCEDURE — 84484 ASSAY OF TROPONIN QUANT: CPT

## 2018-02-22 PROCEDURE — 80053 COMPREHEN METABOLIC PANEL: CPT

## 2018-02-22 PROCEDURE — 82435 ASSAY OF BLOOD CHLORIDE: CPT

## 2018-02-22 PROCEDURE — 84145 PROCALCITONIN (PCT): CPT

## 2018-02-22 PROCEDURE — 85027 COMPLETE CBC AUTOMATED: CPT

## 2018-02-22 PROCEDURE — 70450 CT HEAD/BRAIN W/O DYE: CPT

## 2018-02-22 PROCEDURE — 85652 RBC SED RATE AUTOMATED: CPT

## 2018-02-22 PROCEDURE — 85045 AUTOMATED RETICULOCYTE COUNT: CPT

## 2018-02-22 PROCEDURE — 82330 ASSAY OF CALCIUM: CPT

## 2018-02-22 PROCEDURE — 83550 IRON BINDING TEST: CPT

## 2018-02-22 PROCEDURE — 87486 CHLMYD PNEUM DNA AMP PROBE: CPT

## 2018-02-22 PROCEDURE — 84466 ASSAY OF TRANSFERRIN: CPT

## 2018-02-22 PROCEDURE — 92611 MOTION FLUOROSCOPY/SWALLOW: CPT

## 2018-02-22 PROCEDURE — 71045 X-RAY EXAM CHEST 1 VIEW: CPT

## 2018-02-22 PROCEDURE — 82306 VITAMIN D 25 HYDROXY: CPT

## 2018-02-22 PROCEDURE — 83735 ASSAY OF MAGNESIUM: CPT

## 2018-02-22 PROCEDURE — 82803 BLOOD GASES ANY COMBINATION: CPT

## 2018-02-22 PROCEDURE — 94640 AIRWAY INHALATION TREATMENT: CPT

## 2018-02-22 PROCEDURE — 87581 M.PNEUMON DNA AMP PROBE: CPT

## 2018-02-22 PROCEDURE — 87633 RESP VIRUS 12-25 TARGETS: CPT

## 2018-02-22 PROCEDURE — 85014 HEMATOCRIT: CPT

## 2018-02-22 PROCEDURE — 93005 ELECTROCARDIOGRAM TRACING: CPT

## 2018-02-22 PROCEDURE — 82607 VITAMIN B-12: CPT

## 2018-02-22 PROCEDURE — 84132 ASSAY OF SERUM POTASSIUM: CPT

## 2018-02-22 PROCEDURE — 85610 PROTHROMBIN TIME: CPT

## 2018-02-22 PROCEDURE — 86140 C-REACTIVE PROTEIN: CPT

## 2018-02-22 PROCEDURE — 86780 TREPONEMA PALLIDUM: CPT

## 2018-02-22 PROCEDURE — 82553 CREATINE MB FRACTION: CPT

## 2018-02-22 PROCEDURE — 87070 CULTURE OTHR SPECIMN AEROBIC: CPT

## 2018-02-22 PROCEDURE — 82746 ASSAY OF FOLIC ACID SERUM: CPT

## 2018-02-22 PROCEDURE — 96374 THER/PROPH/DIAG INJ IV PUSH: CPT | Mod: XU

## 2018-02-22 PROCEDURE — 82550 ASSAY OF CK (CPK): CPT

## 2018-02-22 RX ORDER — ALBUTEROL 90 UG/1
2 AEROSOL, METERED ORAL
Qty: 1 | Refills: 0 | OUTPATIENT
Start: 2018-02-22 | End: 2018-03-23

## 2018-02-22 RX ORDER — MIRTAZAPINE 45 MG/1
1 TABLET, ORALLY DISINTEGRATING ORAL
Qty: 30 | Refills: 0 | OUTPATIENT
Start: 2018-02-22 | End: 2018-03-23

## 2018-02-22 RX ORDER — SENNA PLUS 8.6 MG/1
2 TABLET ORAL
Qty: 0 | Refills: 0 | COMMUNITY
Start: 2018-02-22

## 2018-02-22 RX ORDER — TIOTROPIUM BROMIDE 18 UG/1
1 CAPSULE ORAL; RESPIRATORY (INHALATION)
Qty: 1 | Refills: 0 | OUTPATIENT
Start: 2018-02-22 | End: 2018-03-23

## 2018-02-22 RX ORDER — GUAIFENESIN/DEXTROMETHORPHAN 600MG-30MG
15 TABLET, EXTENDED RELEASE 12 HR ORAL
Qty: 0 | Refills: 0 | COMMUNITY

## 2018-02-22 RX ORDER — AMLODIPINE BESYLATE 2.5 MG/1
1 TABLET ORAL
Qty: 30 | Refills: 0
Start: 2018-02-22 | End: 2018-03-23

## 2018-02-22 RX ORDER — METFORMIN HYDROCHLORIDE 850 MG/1
1 TABLET ORAL
Qty: 0 | Refills: 0 | COMMUNITY

## 2018-02-22 RX ORDER — ALBUTEROL 90 UG/1
2 AEROSOL, METERED ORAL
Qty: 0 | Refills: 0 | COMMUNITY

## 2018-02-22 RX ORDER — LOSARTAN POTASSIUM 100 MG/1
1 TABLET, FILM COATED ORAL
Qty: 0 | Refills: 0 | COMMUNITY

## 2018-02-22 RX ORDER — FAMOTIDINE 10 MG/ML
1 INJECTION INTRAVENOUS
Qty: 0 | Refills: 0 | COMMUNITY
Start: 2018-02-22

## 2018-02-22 RX ORDER — INSULIN LISPRO 100/ML
10 VIAL (ML) SUBCUTANEOUS
Qty: 0 | Refills: 0 | Status: DISCONTINUED | OUTPATIENT
Start: 2018-02-22 | End: 2018-02-22

## 2018-02-22 RX ORDER — PANTOPRAZOLE SODIUM 20 MG/1
1 TABLET, DELAYED RELEASE ORAL
Qty: 30 | Refills: 0 | OUTPATIENT
Start: 2018-02-22 | End: 2018-03-23

## 2018-02-22 RX ORDER — GLIMEPIRIDE 1 MG
1 TABLET ORAL
Qty: 0 | Refills: 0 | COMMUNITY

## 2018-02-22 RX ORDER — POLYETHYLENE GLYCOL 3350 17 G/17G
17 POWDER, FOR SOLUTION ORAL
Qty: 0 | Refills: 0 | COMMUNITY
Start: 2018-02-22

## 2018-02-22 RX ADMIN — AMLODIPINE BESYLATE 5 MILLIGRAM(S): 2.5 TABLET ORAL at 05:29

## 2018-02-22 RX ADMIN — TIOTROPIUM BROMIDE 1 CAPSULE(S): 18 CAPSULE ORAL; RESPIRATORY (INHALATION) at 12:37

## 2018-02-22 RX ADMIN — Medication 100 MILLIGRAM(S): at 05:30

## 2018-02-22 RX ADMIN — Medication 25 MILLIGRAM(S): at 05:29

## 2018-02-22 RX ADMIN — PANTOPRAZOLE SODIUM 40 MILLIGRAM(S): 20 TABLET, DELAYED RELEASE ORAL at 05:30

## 2018-02-22 RX ADMIN — BUDESONIDE AND FORMOTEROL FUMARATE DIHYDRATE 2 PUFF(S): 160; 4.5 AEROSOL RESPIRATORY (INHALATION) at 05:29

## 2018-02-22 RX ADMIN — Medication 100 MILLIGRAM(S): at 12:37

## 2018-02-22 RX ADMIN — HEPARIN SODIUM 5000 UNIT(S): 5000 INJECTION INTRAVENOUS; SUBCUTANEOUS at 05:30

## 2018-02-22 RX ADMIN — Medication 1 MILLIGRAM(S): at 12:37

## 2018-02-22 RX ADMIN — Medication 20 MILLIGRAM(S): at 05:29

## 2018-02-22 RX ADMIN — Medication 100 MILLIGRAM(S): at 12:40

## 2018-02-22 RX ADMIN — Medication 8: at 12:36

## 2018-02-22 RX ADMIN — Medication 4: at 08:31

## 2018-02-22 RX ADMIN — Medication 7 UNIT(S): at 08:31

## 2018-02-22 RX ADMIN — Medication 10 UNIT(S): at 12:36

## 2018-02-22 RX ADMIN — PREGABALIN 1000 MICROGRAM(S): 225 CAPSULE ORAL at 12:36

## 2018-02-22 NOTE — PROGRESS NOTE ADULT - NSHPATTENDINGPLANDISCUSS_GEN_ALL_CORE
pt ,    NP and nurse
tennille ,  TIFFANIE and Dr. Flannery
NP
TIFFANIE and Dr. Davis
as above
pt,  , 
pt,  ,  psych, pul , rhuem
pt,  , NP, 
pt and 
pt,  ,  , NP
pt,  ,  ,NP and nurse..called ENT

## 2018-02-22 NOTE — PROGRESS NOTE ADULT - PROBLEM SELECTOR PLAN 2
Continue treatment, FU primary team.
Continue treatment, FU primary team.
Rheum f/u.
Continue treatment, FU primary team.

## 2018-02-22 NOTE — PROGRESS NOTE ADULT - ASSESSMENT
84 y/o fmeale with hx of RA on mtx , and was on prednisone ( no longer on steroids since 09/2017  and has not recieved iv infusion  ( monthly simponi ) in three months . also history of HTN and DM .. has been admitted twice over the past 6-8 months with  weakness . initial admit pt was thought ot have an element of adrenal insufficeiny when she was on steroids .. and pt was sent out on steroids with some improvement,.. later admitted as lij for weakness thought to be multifactorial sec to viral illness, dehydration , and leemt of steroid -induced myopathy. pt was sent to rehab and overall improved but never to baseline.. over the past three days pt has been having worsening generalized weakness , cough with production of sputum ( color?), decreased po intake and decreased mobility.  no focal neuro complaints  no CP/SOB   had had few episodes of N/V /D at home   no recent abx   no abdo pain or urinary Sx       1- Cough / weakness: sec to  hMPV    cont  prednisone with taper as o/p.  hold off on antibiotics ..    CT old findings with mosaic changes  cough is presistent ad servando sec to viral resp illness   ruled out other etiology:  speech and swallow : appreciate input : no aspiration noted.. cont  regular diet    ENT: appreciate input  : servando GERD.. on ppi    d/w  : ? ILD sec to MTX toxicity ? :   yaima given stable CT over time while pt on meds .. d/daron losartan given this might contribute to cough   d/w Dr.Goldberg :can hold MTX until viral illness improves and restart as o/p.      2- N/V /D : seems to be part of viral illness ..improved   tolerating  diet .. another episode today however post a cough-spell   Zofran  and ppi     3- DM 2 uncontrolled :   d/w  cont insulin per recommendations and f/u as o/p.   4- Decreased mobility :  nonfocal neuro exam  .. had had mri brain with no acute findings   in past pt was thought to might have had an element of steroid induced myopathy however now off prednisone ..  CPK: NL  EMG as o/p.    5- HTN : better controlled .     cont Norvasc     will need BB  given AS  ..monitor for now  ..can address as o/p. f/u with    6- electrolyte abn : monnitor  K and mag   7- anemia : likley aocd /iron def   8- AS : stable   f/u w cardio   9- anxiety /depression  :d/w  : remeron added with good response    seroquel dced     dvt proph   d/c today   d/w pt and  at length   d/w Dr. Tobias , Dr. Posada and NP .

## 2018-02-22 NOTE — PROGRESS NOTE ADULT - PROBLEM SELECTOR PROBLEM 3
HTN (hypertension), benign

## 2018-02-22 NOTE — PROGRESS NOTE ADULT - PROBLEM SELECTOR PLAN 3
On meds primary team following up

## 2018-02-22 NOTE — PROGRESS NOTE ADULT - PROBLEM SELECTOR PROBLEM 1
DM (diabetes mellitus), type 2
DM (diabetes mellitus), type 2
Weakness
DM (diabetes mellitus), type 2

## 2018-02-22 NOTE — PROGRESS NOTE ADULT - ASSESSMENT
Assessment  DMT2: 86y Female with DM T2 with hyperglycemia on insulin, blood sugars still high, on high dose steroids, being tapered now, no hypoglycemia,  eating meals,  non compliant with low carb diet.  Asthma: On treatment, improving.  HTN: Controlled, On med.

## 2018-02-22 NOTE — PROGRESS NOTE ADULT - PROBLEM SELECTOR PLAN 1
Will decrease Lantus to 30 units at bed time.  Will decrease Humalog to 7 units before each meal in addition to Humalog correction scale coverage.  Patient counseled for compliance with consistent low carb diet.
Will increase Humalog to 10u before each meal, continue insulin coverage and Lantus regimen for now. Will continue monitoring FS, log, will Follow up. DC home on current insulin regimen, FU 2 weeks, discussed with patient and family.  Patient counseled for compliance with consistent low carb diet.
generalized, able to ambulate with assistance.  F/u ESR, CRP.  CPK is normal.  Possible steroid induced myopathy cannot be ruled out.  Can also be deconditioning from recent illness.  recommend EMG as outpatient.  PT.
Will continue current insulin regimen for now. Will continue monitoring FS, log, will Follow up.  Patient counseled for compliance with consistent low carb diet.
Will continue current insulin regimen for now. Will continue monitoring FS, log, will Follow up.  Patient counseled for compliance with consistent low carb diet.
Will continue current insulin regimen for now. Will continue monitoring FS, log, will Follow up. DC home on current insulin regimen, FU 2 weeks, discussed with patient and family.  Patient counseled for compliance with consistent low carb diet.
Will continue current insulin regimen for now. Will continue monitoring FS, log, will Follow up. DC home on current insulin regimen, FU 2 weeks, discussed with patient and family.  Patient counseled for compliance with consistent low carb diet.

## 2018-02-22 NOTE — PROGRESS NOTE ADULT - SUBJECTIVE AND OBJECTIVE BOX
Subjective: Patient seen and examined. No new events except as noted.     SUBJECTIVE/ROS:  feels better       MEDICATIONS:  MEDICATIONS  (STANDING):  amLODIPine   Tablet 5 milliGRAM(s) Oral daily  benzonatate 100 milliGRAM(s) Oral three times a day  buDESOnide  80 MICROgram(s)/formoterol 4.5 MICROgram(s) Inhaler 2 Puff(s) Inhalation two times a day  cyanocobalamin 1000 MICROGram(s) Oral daily  dextrose 5%. 1000 milliLiter(s) (50 mL/Hr) IV Continuous <Continuous>  dextrose 50% Injectable 12.5 Gram(s) IV Push once  dextrose 50% Injectable 25 Gram(s) IV Push once  dextrose 50% Injectable 25 Gram(s) IV Push once  docusate sodium 100 milliGRAM(s) Oral three times a day  famotidine    Tablet 20 milliGRAM(s) Oral at bedtime  folic acid 1 milliGRAM(s) Oral daily  guaiFENesin   Syrup  (Sugar-Free) 100 milliGRAM(s) Oral every 6 hours  heparin  Injectable 5000 Unit(s) SubCutaneous every 12 hours  hydrochlorothiazide 25 milliGRAM(s) Oral daily  insulin glargine Injectable (LANTUS) 30 Unit(s) SubCutaneous at bedtime  insulin lispro (HumaLOG) corrective regimen sliding scale   SubCutaneous three times a day before meals  insulin lispro (HumaLOG) corrective regimen sliding scale   SubCutaneous at bedtime  insulin lispro Injectable (HumaLOG) 10 Unit(s) SubCutaneous three times a day before meals  mirtazapine 15 milliGRAM(s) Oral at bedtime  pantoprazole    Tablet 40 milliGRAM(s) Oral two times a day before meals  polyethylene glycol 3350 17 Gram(s) Oral daily  predniSONE   Tablet 20 milliGRAM(s) Oral two times a day  predniSONE   Tablet   Oral   senna 2 Tablet(s) Oral at bedtime  tiotropium 18 MICROgram(s) Capsule 1 Capsule(s) Inhalation daily      PHYSICAL EXAM:  T(C): 36.4 (02-22-18 @ 11:45), Max: 36.6 (02-22-18 @ 04:16)  HR: 81 (02-22-18 @ 11:45) (78 - 87)  BP: 117/72 (02-22-18 @ 11:45) (115/72 - 121/74)  RR: 18 (02-22-18 @ 11:45) (18 - 18)  SpO2: 97% (02-22-18 @ 11:52) (95% - 97%)  Wt(kg): --  I&O's Summary    21 Feb 2018 07:01  -  22 Feb 2018 07:00  --------------------------------------------------------  IN: 960 mL / OUT: 0 mL / NET: 960 mL    22 Feb 2018 07:01  -  22 Feb 2018 16:30  --------------------------------------------------------  IN: 480 mL / OUT: 0 mL / NET: 480 mL          Appearance: Normal	  HEENT:   Normal oral mucosa, PERRL, EOMI	  Cardiovascular: Normal S1 S2,    Murmur:   Neck: JVP normal  Respiratory: Lungs clear to auscultation  Gastrointestinal:  Soft, Non-tender, + BS	  Skin: normal   Neuro: No gross deficits.   Psychiatry:  Mood & affect appropriate  Ext: No edema      LABS/DATA:    CARDIAC MARKERS:                                12.3   16.0  )-----------( 295      ( 22 Feb 2018 05:41 )             35.9     02-22    136  |  97  |  51<H>  ----------------------------<  278<H>  3.6   |  26  |  1.10    Ca    9.1      22 Feb 2018 05:41      proBNP:   Lipid Profile:   HgA1c:   TSH:     TELE:  EKG:

## 2018-02-22 NOTE — PROGRESS NOTE ADULT - PROVIDER SPECIALTY LIST ADULT
Cardiology
Endocrinology
Gastroenterology
Internal Medicine
Neurology
Neurology
Psychiatry
Psychiatry
Pulmonology
Rheumatology
Internal Medicine
Internal Medicine
Psychiatry
Endocrinology
Endocrinology

## 2018-02-22 NOTE — PROGRESS NOTE ADULT - SUBJECTIVE AND OBJECTIVE BOX
Patient is a 86y old  Female who presents with a chief complaint of weakness   cough (13 Feb 2018 12:58)                                                               INTERVAL HPI/OVERNIGHT EVENTS: slept better     REVIEW OF SYSTEMS:     CONSTITUTIONAL: No weakness, fevers or chills  RESPIRATORY: improving cough,  No shortness of breath  CARDIOVASCULAR: No chest pain or palpitations  GASTROINTESTINAL: No abdominal pain  . No nausea, vomiting, or hematemesis; No diarrhea or constipation. No melena or hematochezia.  GENITOURINARY: No dysuria, frequency or hematuria  NEUROLOGICAL: No numbness or weakness                                                                                                                                                                                                                                                                           Medications  MEDICATIONS  (STANDING):  amLODIPine   Tablet 5 milliGRAM(s) Oral daily  benzonatate 100 milliGRAM(s) Oral three times a day  buDESOnide  80 MICROgram(s)/formoterol 4.5 MICROgram(s) Inhaler 2 Puff(s) Inhalation two times a day  cyanocobalamin 1000 MICROGram(s) Oral daily  dextrose 5%. 1000 milliLiter(s) (50 mL/Hr) IV Continuous <Continuous>  dextrose 50% Injectable 12.5 Gram(s) IV Push once  dextrose 50% Injectable 25 Gram(s) IV Push once  dextrose 50% Injectable 25 Gram(s) IV Push once  docusate sodium 100 milliGRAM(s) Oral three times a day  famotidine    Tablet 20 milliGRAM(s) Oral at bedtime  folic acid 1 milliGRAM(s) Oral daily  guaiFENesin   Syrup  (Sugar-Free) 100 milliGRAM(s) Oral every 6 hours  heparin  Injectable 5000 Unit(s) SubCutaneous every 12 hours  hydrochlorothiazide 25 milliGRAM(s) Oral daily  insulin glargine Injectable (LANTUS) 30 Unit(s) SubCutaneous at bedtime  insulin lispro (HumaLOG) corrective regimen sliding scale   SubCutaneous three times a day before meals  insulin lispro (HumaLOG) corrective regimen sliding scale   SubCutaneous at bedtime  insulin lispro Injectable (HumaLOG) 10 Unit(s) SubCutaneous three times a day before meals  mirtazapine 15 milliGRAM(s) Oral at bedtime  pantoprazole    Tablet 40 milliGRAM(s) Oral two times a day before meals  polyethylene glycol 3350 17 Gram(s) Oral daily  predniSONE   Tablet 20 milliGRAM(s) Oral two times a day  predniSONE   Tablet   Oral   senna 2 Tablet(s) Oral at bedtime  tiotropium 18 MICROgram(s) Capsule 1 Capsule(s) Inhalation daily            Physical Exam:  NAD elderly   vitally stable afeb   General:  NAD   HEENT:  Nonicteric, PERRLA  CV:  RRR, S1S2   Lungs:  scattered ronchi  Abdomen:  Soft, non-tender, no distended, positive BS  Extremities:  , no c/c, no edema  Skin:  Warm and dry, no rashes  :  No mason  Neuro:  AAOx3, non-focal, grossly intact                                                                                                                                                                                                                                                                                                LABS:                                                  02-22    136  |  97  |  51<H>  ----------------------------<  278<H>  3.6   |  26  |  1.10    Ca    9.1      22 Feb 2018 05:41                          12.3   16.0  )-----------( 295      ( 22 Feb 2018 05:41 )             35.9

## 2018-03-26 NOTE — CONSULT NOTE ADULT - ASSESSMENT
Patient is having uti symptoms   pharmacy checked Imp: 84 yo female with h/o RA; has been maintained on steroids and MTX, and is currently without any active synovitis.  A/W progressive subjective weakness and dizziness.  Unclear exact etiology.  Seems to be feeling better on Abx and hydrocortisone.     Rec:  Would lower steroids:  Decrease hydrocortisone to daily, and change over to prednisone 10mg/day starting tomorrow.    Can f/u with primary rheum as outpt. for further taper. Imp: 84 yo female with h/o RA; has been maintained on steroids and MTX, and is currently without any active synovitis.  A/W progressive subjective weakness and dizziness.  Unclear exact etiology.  Seems to be feeling better on Abx and hydrocortisone.     Rec:  Would lower steroids:  Decrease hydrocortisone to daily, and change over to prednisone 10mg/day starting tomorrow.    Continue MTX 17.5mg/week  Can f/u with primary rheum as outpt. for further taper.

## 2018-06-20 NOTE — PATIENT PROFILE ADULT. - INFLUENZA IMMUNIZATION DATE:
Ears: no ear pain and no hearing problems.Nose: no nasal congestion and no nasal drainage.Mouth/Throat: no dysphagia, no hoarseness and no throat pain.Neck: no lumps, no pain, no stiffness and no swollen glands.
September 2017

## 2018-07-14 ENCOUNTER — INPATIENT (INPATIENT)
Facility: HOSPITAL | Age: 83
LOS: 3 days | Discharge: HOME | DRG: 378 | End: 2018-07-18
Attending: GENERAL ACUTE CARE HOSPITAL | Admitting: INTERNAL MEDICINE
Payer: MEDICARE

## 2018-07-14 VITALS
DIASTOLIC BLOOD PRESSURE: 61 MMHG | SYSTOLIC BLOOD PRESSURE: 102 MMHG | RESPIRATION RATE: 20 BRPM | TEMPERATURE: 98 F | WEIGHT: 130.07 LBS | HEART RATE: 102 BPM | OXYGEN SATURATION: 96 %

## 2018-07-14 DIAGNOSIS — K92.2 GASTROINTESTINAL HEMORRHAGE, UNSPECIFIED: ICD-10-CM

## 2018-07-14 DIAGNOSIS — H26.40 UNSPECIFIED SECONDARY CATARACT: Chronic | ICD-10-CM

## 2018-07-14 DIAGNOSIS — M06.9 RHEUMATOID ARTHRITIS, UNSPECIFIED: ICD-10-CM

## 2018-07-14 DIAGNOSIS — N97.1 FEMALE INFERTILITY OF TUBAL ORIGIN: Chronic | ICD-10-CM

## 2018-07-14 DIAGNOSIS — I10 ESSENTIAL (PRIMARY) HYPERTENSION: ICD-10-CM

## 2018-07-14 DIAGNOSIS — J45.909 UNSPECIFIED ASTHMA, UNCOMPLICATED: ICD-10-CM

## 2018-07-14 DIAGNOSIS — E11.9 TYPE 2 DIABETES MELLITUS WITHOUT COMPLICATIONS: ICD-10-CM

## 2018-07-14 LAB
ALBUMIN SERPL ELPH-MCNC: 3.7 G/DL — SIGNIFICANT CHANGE UP (ref 3.3–5)
ALP SERPL-CCNC: 56 U/L — SIGNIFICANT CHANGE UP (ref 40–120)
ALT FLD-CCNC: 13 U/L — SIGNIFICANT CHANGE UP (ref 10–45)
ANION GAP SERPL CALC-SCNC: 12 MMOL/L — SIGNIFICANT CHANGE UP (ref 5–17)
APTT BLD: 29.1 SEC — SIGNIFICANT CHANGE UP (ref 27.5–37.4)
AST SERPL-CCNC: 16 U/L — SIGNIFICANT CHANGE UP (ref 10–40)
BASE EXCESS BLDV CALC-SCNC: 0.2 MMOL/L — SIGNIFICANT CHANGE UP (ref -2–2)
BASOPHILS # BLD AUTO: 0.1 K/UL — SIGNIFICANT CHANGE UP (ref 0–0.2)
BASOPHILS NFR BLD AUTO: 0.8 % — SIGNIFICANT CHANGE UP (ref 0–2)
BILIRUB SERPL-MCNC: 0.6 MG/DL — SIGNIFICANT CHANGE UP (ref 0.2–1.2)
BLD GP AB SCN SERPL QL: NEGATIVE — SIGNIFICANT CHANGE UP
BUN SERPL-MCNC: 45 MG/DL — HIGH (ref 7–23)
CA-I SERPL-SCNC: 1.22 MMOL/L — SIGNIFICANT CHANGE UP (ref 1.12–1.3)
CALCIUM SERPL-MCNC: 8.8 MG/DL — SIGNIFICANT CHANGE UP (ref 8.4–10.5)
CHLORIDE BLDV-SCNC: 108 MMOL/L — SIGNIFICANT CHANGE UP (ref 96–108)
CHLORIDE SERPL-SCNC: 104 MMOL/L — SIGNIFICANT CHANGE UP (ref 96–108)
CO2 BLDV-SCNC: 26 MMOL/L — SIGNIFICANT CHANGE UP (ref 22–30)
CO2 SERPL-SCNC: 23 MMOL/L — SIGNIFICANT CHANGE UP (ref 22–31)
CREAT SERPL-MCNC: 1.11 MG/DL — SIGNIFICANT CHANGE UP (ref 0.5–1.3)
EOSINOPHIL # BLD AUTO: 0.1 K/UL — SIGNIFICANT CHANGE UP (ref 0–0.5)
EOSINOPHIL NFR BLD AUTO: 1.1 % — SIGNIFICANT CHANGE UP (ref 0–6)
GAS PNL BLDV: 138 MMOL/L — SIGNIFICANT CHANGE UP (ref 136–145)
GAS PNL BLDV: SIGNIFICANT CHANGE UP
GAS PNL BLDV: SIGNIFICANT CHANGE UP
GLUCOSE BLDC GLUCOMTR-MCNC: 155 MG/DL — HIGH (ref 70–99)
GLUCOSE BLDV-MCNC: 230 MG/DL — HIGH (ref 70–99)
GLUCOSE SERPL-MCNC: 261 MG/DL — HIGH (ref 70–99)
HCO3 BLDV-SCNC: 25 MMOL/L — SIGNIFICANT CHANGE UP (ref 21–29)
HCT VFR BLD CALC: 20.3 % — CRITICAL LOW (ref 34.5–45)
HCT VFR BLD CALC: 23 % — LOW (ref 34.5–45)
HCT VFR BLD CALC: 23.5 % — LOW (ref 34.5–45)
HCT VFR BLDA CALC: 25 % — LOW (ref 39–50)
HGB BLD CALC-MCNC: 8 G/DL — LOW (ref 11.5–15.5)
HGB BLD-MCNC: 7.2 G/DL — LOW (ref 11.5–15.5)
HGB BLD-MCNC: 8.1 G/DL — LOW (ref 11.5–15.5)
HGB BLD-MCNC: 8.4 G/DL — LOW (ref 11.5–15.5)
HOROWITZ INDEX BLDV+IHG-RTO: SIGNIFICANT CHANGE UP
INR BLD: 1.15 RATIO — SIGNIFICANT CHANGE UP (ref 0.88–1.16)
LACTATE BLDV-MCNC: 1.5 MMOL/L — SIGNIFICANT CHANGE UP (ref 0.7–2)
LYMPHOCYTES # BLD AUTO: 1.6 K/UL — SIGNIFICANT CHANGE UP (ref 1–3.3)
LYMPHOCYTES # BLD AUTO: 15.4 % — SIGNIFICANT CHANGE UP (ref 13–44)
MCHC RBC-ENTMCNC: 31.2 PG — SIGNIFICANT CHANGE UP (ref 27–34)
MCHC RBC-ENTMCNC: 33.2 PG — SIGNIFICANT CHANGE UP (ref 27–34)
MCHC RBC-ENTMCNC: 34.5 GM/DL — SIGNIFICANT CHANGE UP (ref 32–36)
MCHC RBC-ENTMCNC: 34.9 PG — HIGH (ref 27–34)
MCHC RBC-ENTMCNC: 35.7 GM/DL — SIGNIFICANT CHANGE UP (ref 32–36)
MCHC RBC-ENTMCNC: 36.4 GM/DL — HIGH (ref 32–36)
MCV RBC AUTO: 90.4 FL — SIGNIFICANT CHANGE UP (ref 80–100)
MCV RBC AUTO: 93.2 FL — SIGNIFICANT CHANGE UP (ref 80–100)
MCV RBC AUTO: 95.9 FL — SIGNIFICANT CHANGE UP (ref 80–100)
MONOCYTES # BLD AUTO: 0.4 K/UL — SIGNIFICANT CHANGE UP (ref 0–0.9)
MONOCYTES NFR BLD AUTO: 3.6 % — SIGNIFICANT CHANGE UP (ref 2–14)
NEUTROPHILS # BLD AUTO: 8.4 K/UL — HIGH (ref 1.8–7.4)
NEUTROPHILS NFR BLD AUTO: 79.1 % — HIGH (ref 43–77)
PCO2 BLDV: 45 MMHG — SIGNIFICANT CHANGE UP (ref 35–50)
PH BLDV: 7.36 — SIGNIFICANT CHANGE UP (ref 7.35–7.45)
PLATELET # BLD AUTO: 144 K/UL — LOW (ref 150–400)
PLATELET # BLD AUTO: 158 K/UL — SIGNIFICANT CHANGE UP (ref 150–400)
PLATELET # BLD AUTO: 192 K/UL — SIGNIFICANT CHANGE UP (ref 150–400)
PO2 BLDV: 23 MMHG — LOW (ref 25–45)
POTASSIUM BLDV-SCNC: 4.3 MMOL/L — SIGNIFICANT CHANGE UP (ref 3.5–5.3)
POTASSIUM SERPL-MCNC: 4.4 MMOL/L — SIGNIFICANT CHANGE UP (ref 3.5–5.3)
POTASSIUM SERPL-SCNC: 4.4 MMOL/L — SIGNIFICANT CHANGE UP (ref 3.5–5.3)
PROT SERPL-MCNC: 6.8 G/DL — SIGNIFICANT CHANGE UP (ref 6–8.3)
PROTHROM AB SERPL-ACNC: 12.6 SEC — SIGNIFICANT CHANGE UP (ref 9.8–12.7)
RBC # BLD: 2.18 M/UL — LOW (ref 3.8–5.2)
RBC # BLD: 2.39 M/UL — LOW (ref 3.8–5.2)
RBC # BLD: 2.6 M/UL — LOW (ref 3.8–5.2)
RBC # FLD: 14.6 % — HIGH (ref 10.3–14.5)
RBC # FLD: 15.2 % — HIGH (ref 10.3–14.5)
RBC # FLD: 16.5 % — HIGH (ref 10.3–14.5)
RH IG SCN BLD-IMP: NEGATIVE — SIGNIFICANT CHANGE UP
RH IG SCN BLD-IMP: NEGATIVE — SIGNIFICANT CHANGE UP
SAO2 % BLDV: 30 % — LOW (ref 67–88)
SODIUM SERPL-SCNC: 139 MMOL/L — SIGNIFICANT CHANGE UP (ref 135–145)
WBC # BLD: 10.6 K/UL — HIGH (ref 3.8–10.5)
WBC # BLD: 14.4 K/UL — HIGH (ref 3.8–10.5)
WBC # BLD: 9.9 K/UL — SIGNIFICANT CHANGE UP (ref 3.8–10.5)
WBC # FLD AUTO: 10.6 K/UL — HIGH (ref 3.8–10.5)
WBC # FLD AUTO: 14.4 K/UL — HIGH (ref 3.8–10.5)
WBC # FLD AUTO: 9.9 K/UL — SIGNIFICANT CHANGE UP (ref 3.8–10.5)

## 2018-07-14 PROCEDURE — 99285 EMERGENCY DEPT VISIT HI MDM: CPT | Mod: 25,GC

## 2018-07-14 PROCEDURE — 74177 CT ABD & PELVIS W/CONTRAST: CPT | Mod: 26

## 2018-07-14 PROCEDURE — 93010 ELECTROCARDIOGRAM REPORT: CPT

## 2018-07-14 PROCEDURE — 99231 SBSQ HOSP IP/OBS SF/LOW 25: CPT

## 2018-07-14 RX ORDER — INSULIN LISPRO 100/ML
VIAL (ML) SUBCUTANEOUS
Qty: 0 | Refills: 0 | Status: DISCONTINUED | OUTPATIENT
Start: 2018-07-14 | End: 2018-07-14

## 2018-07-14 RX ORDER — MIRTAZAPINE 45 MG/1
15 TABLET, ORALLY DISINTEGRATING ORAL AT BEDTIME
Qty: 0 | Refills: 0 | Status: DISCONTINUED | OUTPATIENT
Start: 2018-07-14 | End: 2018-07-16

## 2018-07-14 RX ORDER — DEXTROSE 50 % IN WATER 50 %
25 SYRINGE (ML) INTRAVENOUS ONCE
Qty: 0 | Refills: 0 | Status: DISCONTINUED | OUTPATIENT
Start: 2018-07-14 | End: 2018-07-18

## 2018-07-14 RX ORDER — SODIUM CHLORIDE 9 MG/ML
1000 INJECTION, SOLUTION INTRAVENOUS
Qty: 0 | Refills: 0 | Status: DISCONTINUED | OUTPATIENT
Start: 2018-07-14 | End: 2018-07-18

## 2018-07-14 RX ORDER — TIOTROPIUM BROMIDE 18 UG/1
1 CAPSULE ORAL; RESPIRATORY (INHALATION) DAILY
Qty: 0 | Refills: 0 | Status: DISCONTINUED | OUTPATIENT
Start: 2018-07-14 | End: 2018-07-16

## 2018-07-14 RX ORDER — BUDESONIDE AND FORMOTEROL FUMARATE DIHYDRATE 160; 4.5 UG/1; UG/1
2 AEROSOL RESPIRATORY (INHALATION)
Qty: 0 | Refills: 0 | Status: DISCONTINUED | OUTPATIENT
Start: 2018-07-14 | End: 2018-07-18

## 2018-07-14 RX ORDER — SODIUM CHLORIDE 9 MG/ML
1000 INJECTION INTRAMUSCULAR; INTRAVENOUS; SUBCUTANEOUS
Qty: 0 | Refills: 0 | Status: DISCONTINUED | OUTPATIENT
Start: 2018-07-14 | End: 2018-07-18

## 2018-07-14 RX ORDER — DEXTROSE 50 % IN WATER 50 %
15 SYRINGE (ML) INTRAVENOUS ONCE
Qty: 0 | Refills: 0 | Status: DISCONTINUED | OUTPATIENT
Start: 2018-07-14 | End: 2018-07-18

## 2018-07-14 RX ORDER — DEXTROSE 50 % IN WATER 50 %
12.5 SYRINGE (ML) INTRAVENOUS ONCE
Qty: 0 | Refills: 0 | Status: DISCONTINUED | OUTPATIENT
Start: 2018-07-14 | End: 2018-07-18

## 2018-07-14 RX ORDER — ONDANSETRON 8 MG/1
4 TABLET, FILM COATED ORAL EVERY 6 HOURS
Qty: 0 | Refills: 0 | Status: DISCONTINUED | OUTPATIENT
Start: 2018-07-14 | End: 2018-07-18

## 2018-07-14 RX ORDER — SODIUM CHLORIDE 9 MG/ML
500 INJECTION INTRAMUSCULAR; INTRAVENOUS; SUBCUTANEOUS ONCE
Qty: 0 | Refills: 0 | Status: COMPLETED | OUTPATIENT
Start: 2018-07-14 | End: 2018-07-14

## 2018-07-14 RX ORDER — PANTOPRAZOLE SODIUM 20 MG/1
40 TABLET, DELAYED RELEASE ORAL
Qty: 0 | Refills: 0 | Status: DISCONTINUED | OUTPATIENT
Start: 2018-07-14 | End: 2018-07-18

## 2018-07-14 RX ORDER — ALBUTEROL 90 UG/1
2 AEROSOL, METERED ORAL EVERY 6 HOURS
Qty: 0 | Refills: 0 | Status: DISCONTINUED | OUTPATIENT
Start: 2018-07-14 | End: 2018-07-18

## 2018-07-14 RX ORDER — INSULIN LISPRO 100/ML
VIAL (ML) SUBCUTANEOUS EVERY 6 HOURS
Qty: 0 | Refills: 0 | Status: DISCONTINUED | OUTPATIENT
Start: 2018-07-14 | End: 2018-07-17

## 2018-07-14 RX ORDER — GLUCAGON INJECTION, SOLUTION 0.5 MG/.1ML
1 INJECTION, SOLUTION SUBCUTANEOUS ONCE
Qty: 0 | Refills: 0 | Status: DISCONTINUED | OUTPATIENT
Start: 2018-07-14 | End: 2018-07-18

## 2018-07-14 RX ADMIN — BUDESONIDE AND FORMOTEROL FUMARATE DIHYDRATE 2 PUFF(S): 160; 4.5 AEROSOL RESPIRATORY (INHALATION) at 17:24

## 2018-07-14 RX ADMIN — PANTOPRAZOLE SODIUM 40 MILLIGRAM(S): 20 TABLET, DELAYED RELEASE ORAL at 17:18

## 2018-07-14 RX ADMIN — SODIUM CHLORIDE 500 MILLILITER(S): 9 INJECTION INTRAMUSCULAR; INTRAVENOUS; SUBCUTANEOUS at 09:57

## 2018-07-14 RX ADMIN — ALBUTEROL 2 PUFF(S): 90 AEROSOL, METERED ORAL at 17:19

## 2018-07-14 RX ADMIN — MIRTAZAPINE 15 MILLIGRAM(S): 45 TABLET, ORALLY DISINTEGRATING ORAL at 23:38

## 2018-07-14 RX ADMIN — Medication 1: at 17:33

## 2018-07-14 NOTE — ED ADULT NURSE NOTE - OBJECTIVE STATEMENT
86 year old female presents to ED c/o blood in stool x 2 days with no nausea, dizziness, chest pain.  No other complaints. no fever. Lung sounds clear. Abd nondistended nontender. Skin warm and dry. family at bedside.

## 2018-07-14 NOTE — CONSULT NOTE ADULT - SUBJECTIVE AND OBJECTIVE BOX
Patient is a 86y Female     Patient is a 86y old  Female who presents with a chief complaint of painless BRBPR, 1 episode rer day for the past 2 days.  No prior h/o GI bleeding. Last colonoscopy 5 years ago-h/o polyps, doesnt recall being told of diverticulosis. no melena or UGI symptoms. No NSAIDs.     HPI:      PAST MEDICAL & SURGICAL HISTORY:  RA (rheumatoid arthritis)  Asthma  DM (diabetes mellitus), type 2  HTN (hypertension), benign  After-cataract of left eye  Tubal occlusion      MEDICATIONS  (STANDING):  pantoprazole  Injectable 40 milliGRAM(s) IV Push two times a day  sodium chloride 0.9%. 1000 milliLiter(s) (75 mL/Hr) IV Continuous <Continuous>      Allergies    No Known Allergies    Intolerances        SOCIAL HISTORY:  Denies ETOh,Smoking,     FAMILY HISTORY:  No pertinent family history in first degree relatives      REVIEW OF SYSTEMS:    CONSTITUTIONAL: No weakness, fevers or chills  EYES/ENT: No visual changes;  No vertigo or throat pain   NECK: No pain or stiffness  RESPIRATORY: No cough, wheezing, hemoptysis; No shortness of breath  CARDIOVASCULAR: No chest pain or palpitations  GASTROINTESTINAL: No abdominal or epigastric pain. No nausea, vomiting, or hematemesis; No diarrhea or constipation. No melena .  GENITOURINARY: No dysuria, frequency or hematuria  NEUROLOGICAL: No numbness or weakness  SKIN: No itching, burning, rashes, or lesions   All other review of systems is negative unless indicated above.  rectal per ED note  VITAL:  T(C): , Max: 36.8 (07-14-18 @ 09:04)  T(F): , Max: 98.2 (07-14-18 @ 09:04)  HR: 94 (07-14-18 @ 13:28)  BP: 110/68 (07-14-18 @ 13:28)  BP(mean): --  RR: 18 (07-14-18 @ 13:28)  SpO2: 98% (07-14-18 @ 13:28)  Wt(kg): --    I and O's:      Weight (kg): 59 (07-14 @ 09:04)    PHYSICAL EXAM:    Constitutional: NAD  HEENT: PERRLA,   Neck: No JVD  Respiratory: CTA B/L  Cardiovascular: S1 and S2  Gastrointestinal: BS+, soft, mild LLQ tenderness, non-peritoneal  Extremities: No peripheral edema  Neurological: A/O x 3, no focal deficits  Psychiatric: Normal mood, normal affect  : No Maldonado  Skin: No rashes  Access: Not applicable  Back: No CVA tenderness    LABS:                        8.4    10.6  )-----------( 192      ( 14 Jul 2018 09:50 )             23.0     07-14    139  |  104  |  45<H>  ----------------------------<  261<H>  4.4   |  23  |  1.11    Ca    8.8      14 Jul 2018 09:50    TPro  6.8  /  Alb  3.7  /  TBili  0.6  /  DBili  x   /  AST  16  /  ALT  13  /  AlkPhos  56  07-14          RADIOLOGY & ADDITIONAL STUDIES:

## 2018-07-14 NOTE — H&P ADULT - ASSESSMENT
86F w/ pmh HTN, HLD, RA (methotrexate), no blood thinners, p/w bloody stools x 2 days, assc w/ lightheadedness and nausea. No vomit, fever, dysuria. +chr L abd pain that is unchanged. Never has had this before. No bloody emesis / cough. Last colonoscopy 3 yrs ago, says result was normal. No significant recent weight loss.

## 2018-07-14 NOTE — ED PROVIDER NOTE - PHYSICAL EXAMINATION
*GEN:   comfortable, in no acute distress, AOx3    ///    *EYES:   pupils equally round and reactive to light, extra-occular movements intact    ///    *HEENT:   airway patent, moist mucosal membranes    ///    *CV:   regular rate and rhythm    ///    *RESP:   clear to auscultation bilaterally, non-labored    ///    *ABD:   soft, non-tender    ///    *:   RECTAL EXAM: chaperone MYA Reynolds; no external hemorrhoids, good sphincter tone; gross blood recovered hemoccult positive     ///    *MSK:   no MSK tenderness or limited ROM    ///    *SKIN:   dry, intact    ///    *NEURO:   AOx3, no focal weakness or loss of sensation, gait normal

## 2018-07-14 NOTE — CONSULT NOTE ADULT - ASSESSMENT
Most likely LGI bleed due to diverticulosis, no symptoms to suggest ischemic colitis, colon cancer/polyp/avm/hemorrhoids/stercoral colitis with ulcer other possibly causes, doubt UGI source  NPO, serial CBCs, transfusional support  CT scan abd/pelvis  bleeding scan if has active bleeding  discussed eventual colonooscopy

## 2018-07-14 NOTE — H&P ADULT - NSHPPHYSICALEXAM_GEN_ALL_CORE
General: WN/WD NAD  PERRLA  Neurology: A&Ox3, nonfocal, STATON x 4  Respiratory: CTA B/L  CV: RRR, S1S2, no murmurs, rubs or gallops  Abdominal: Soft, NT, ND +BS, Last BM  Extremities: No edema, + peripheral pulses  Skin Normal

## 2018-07-14 NOTE — ED PROVIDER NOTE - PROGRESS NOTE DETAILS
Qamar Light PGY2: Hb 8.4 vitals nl 112/54 HR87; left message w/ Dr. Davis's cell to admit Qamar Light PGY2: no callback / response after sevearal calls, paged hospitalist Qamar Light PGY2: no callback / response after several calls, d/w hospitalist declined admission, left another message w/ dr. moeller and paged Hospitalist decline admission

## 2018-07-14 NOTE — H&P ADULT - NSHPLABSRESULTS_GEN_ALL_CORE
Lab Results:  CBC  CBC Full  -  ( 14 Jul 2018 09:50 )  WBC Count : 10.6 K/uL  Hemoglobin : 8.4 g/dL  Hematocrit : 23.0 %  Platelet Count - Automated : 192 K/uL  Mean Cell Volume : 95.9 fl  Mean Cell Hemoglobin : 34.9 pg  Mean Cell Hemoglobin Concentration : 36.4 gm/dL  Auto Neutrophil # : 8.4 K/uL  Auto Lymphocyte # : 1.6 K/uL  Auto Monocyte # : 0.4 K/uL  Auto Eosinophil # : 0.1 K/uL  Auto Basophil # : 0.1 K/uL  Auto Neutrophil % : 79.1 %  Auto Lymphocyte % : 15.4 %  Auto Monocyte % : 3.6 %  Auto Eosinophil % : 1.1 %  Auto Basophil % : 0.8 %    .		Differential:	[] Automated		[] Manual  Chemistry                        8.4    10.6  )-----------( 192      ( 14 Jul 2018 09:50 )             23.0     07-14    139  |  104  |  45<H>  ----------------------------<  261<H>  4.4   |  23  |  1.11    Ca    8.8      14 Jul 2018 09:50    TPro  6.8  /  Alb  3.7  /  TBili  0.6  /  DBili  x   /  AST  16  /  ALT  13  /  AlkPhos  56  07-14    LIVER FUNCTIONS - ( 14 Jul 2018 09:50 )  Alb: 3.7 g/dL / Pro: 6.8 g/dL / ALK PHOS: 56 U/L / ALT: 13 U/L / AST: 16 U/L / GGT: x           PT/INR - ( 14 Jul 2018 09:50 )   PT: 12.6 sec;   INR: 1.15 ratio         PTT - ( 14 Jul 2018 09:50 )  PTT:29.1 sec          MICROBIOLOGY/CULTURES:      RADIOLOGY RESULTS:

## 2018-07-14 NOTE — CHART NOTE - NSCHARTNOTEFT_GEN_A_CORE
Notified by RN; pt. had another episode of GIB. Pt. originally admitted w/ GIB. CBC STAT sent to lab. Hb returned at 7.2. D/w Dr. Obrien. Will transfuse 2U of PRBC now. Keep Hb>8. CBC q6h ordered. Continue to monitor pt. throughout the night. F/u w/ primary team in AM.    -Nina Allen PA-C. #[id number].

## 2018-07-15 LAB
ALBUMIN SERPL ELPH-MCNC: 3.2 G/DL — LOW (ref 3.3–5)
ALP SERPL-CCNC: 45 U/L — SIGNIFICANT CHANGE UP (ref 40–120)
ALT FLD-CCNC: 10 U/L — SIGNIFICANT CHANGE UP (ref 10–45)
ANION GAP SERPL CALC-SCNC: 11 MMOL/L — SIGNIFICANT CHANGE UP (ref 5–17)
AST SERPL-CCNC: 13 U/L — SIGNIFICANT CHANGE UP (ref 10–40)
BILIRUB SERPL-MCNC: 2.7 MG/DL — HIGH (ref 0.2–1.2)
BUN SERPL-MCNC: 34 MG/DL — HIGH (ref 7–23)
CALCIUM SERPL-MCNC: 7.8 MG/DL — LOW (ref 8.4–10.5)
CHLORIDE SERPL-SCNC: 107 MMOL/L — SIGNIFICANT CHANGE UP (ref 96–108)
CO2 SERPL-SCNC: 22 MMOL/L — SIGNIFICANT CHANGE UP (ref 22–31)
CREAT SERPL-MCNC: 0.97 MG/DL — SIGNIFICANT CHANGE UP (ref 0.5–1.3)
GLUCOSE BLDC GLUCOMTR-MCNC: 157 MG/DL — HIGH (ref 70–99)
GLUCOSE BLDC GLUCOMTR-MCNC: 162 MG/DL — HIGH (ref 70–99)
GLUCOSE BLDC GLUCOMTR-MCNC: 170 MG/DL — HIGH (ref 70–99)
GLUCOSE BLDC GLUCOMTR-MCNC: 175 MG/DL — HIGH (ref 70–99)
GLUCOSE BLDC GLUCOMTR-MCNC: 211 MG/DL — HIGH (ref 70–99)
GLUCOSE SERPL-MCNC: 156 MG/DL — HIGH (ref 70–99)
HBA1C BLD-MCNC: 6.1 % — HIGH (ref 4–5.6)
HCT VFR BLD CALC: 23.6 % — LOW (ref 34.5–45)
HCT VFR BLD CALC: 24.4 % — LOW (ref 34.5–45)
HCT VFR BLD CALC: 27 % — LOW (ref 34.5–45)
HCT VFR BLD CALC: 27.1 % — LOW (ref 34.5–45)
HGB BLD-MCNC: 8.6 G/DL — LOW (ref 11.5–15.5)
HGB BLD-MCNC: 9.2 G/DL — LOW (ref 11.5–15.5)
HGB BLD-MCNC: 9.7 G/DL — LOW (ref 11.5–15.5)
HGB BLD-MCNC: 9.7 G/DL — LOW (ref 11.5–15.5)
MCHC RBC-ENTMCNC: 31.3 PG — SIGNIFICANT CHANGE UP (ref 27–34)
MCHC RBC-ENTMCNC: 32.6 PG — SIGNIFICANT CHANGE UP (ref 27–34)
MCHC RBC-ENTMCNC: 33.3 PG — SIGNIFICANT CHANGE UP (ref 27–34)
MCHC RBC-ENTMCNC: 34.4 PG — HIGH (ref 27–34)
MCHC RBC-ENTMCNC: 35.8 GM/DL — SIGNIFICANT CHANGE UP (ref 32–36)
MCHC RBC-ENTMCNC: 35.9 GM/DL — SIGNIFICANT CHANGE UP (ref 32–36)
MCHC RBC-ENTMCNC: 36.4 GM/DL — HIGH (ref 32–36)
MCHC RBC-ENTMCNC: 37.8 GM/DL — HIGH (ref 32–36)
MCV RBC AUTO: 87.1 FL — SIGNIFICANT CHANGE UP (ref 80–100)
MCV RBC AUTO: 91 FL — SIGNIFICANT CHANGE UP (ref 80–100)
MCV RBC AUTO: 91.2 FL — SIGNIFICANT CHANGE UP (ref 80–100)
MCV RBC AUTO: 91.3 FL — SIGNIFICANT CHANGE UP (ref 80–100)
PLATELET # BLD AUTO: 104 K/UL — LOW (ref 150–400)
PLATELET # BLD AUTO: 110 K/UL — LOW (ref 150–400)
PLATELET # BLD AUTO: 115 K/UL — LOW (ref 150–400)
PLATELET # BLD AUTO: 120 K/UL — LOW (ref 150–400)
POTASSIUM SERPL-MCNC: 4 MMOL/L — SIGNIFICANT CHANGE UP (ref 3.5–5.3)
POTASSIUM SERPL-SCNC: 4 MMOL/L — SIGNIFICANT CHANGE UP (ref 3.5–5.3)
PROT SERPL-MCNC: 5.4 G/DL — LOW (ref 6–8.3)
RBC # BLD: 2.59 M/UL — LOW (ref 3.8–5.2)
RBC # BLD: 2.67 M/UL — LOW (ref 3.8–5.2)
RBC # BLD: 2.98 M/UL — LOW (ref 3.8–5.2)
RBC # BLD: 3.1 M/UL — LOW (ref 3.8–5.2)
RBC # FLD: 14.7 % — HIGH (ref 10.3–14.5)
RBC # FLD: 14.7 % — HIGH (ref 10.3–14.5)
RBC # FLD: 14.8 % — HIGH (ref 10.3–14.5)
RBC # FLD: 16.2 % — HIGH (ref 10.3–14.5)
SODIUM SERPL-SCNC: 140 MMOL/L — SIGNIFICANT CHANGE UP (ref 135–145)
WBC # BLD: 10 K/UL — SIGNIFICANT CHANGE UP (ref 3.8–10.5)
WBC # BLD: 10 K/UL — SIGNIFICANT CHANGE UP (ref 3.8–10.5)
WBC # BLD: 8.82 K/UL — SIGNIFICANT CHANGE UP (ref 3.8–10.5)
WBC # BLD: 9.7 K/UL — SIGNIFICANT CHANGE UP (ref 3.8–10.5)
WBC # FLD AUTO: 10 K/UL — SIGNIFICANT CHANGE UP (ref 3.8–10.5)
WBC # FLD AUTO: 10 K/UL — SIGNIFICANT CHANGE UP (ref 3.8–10.5)
WBC # FLD AUTO: 8.82 K/UL — SIGNIFICANT CHANGE UP (ref 3.8–10.5)
WBC # FLD AUTO: 9.7 K/UL — SIGNIFICANT CHANGE UP (ref 3.8–10.5)

## 2018-07-15 RX ADMIN — PANTOPRAZOLE SODIUM 40 MILLIGRAM(S): 20 TABLET, DELAYED RELEASE ORAL at 17:45

## 2018-07-15 RX ADMIN — PANTOPRAZOLE SODIUM 40 MILLIGRAM(S): 20 TABLET, DELAYED RELEASE ORAL at 05:36

## 2018-07-15 RX ADMIN — BUDESONIDE AND FORMOTEROL FUMARATE DIHYDRATE 2 PUFF(S): 160; 4.5 AEROSOL RESPIRATORY (INHALATION) at 17:45

## 2018-07-15 RX ADMIN — Medication 2: at 01:29

## 2018-07-15 RX ADMIN — Medication 1: at 23:51

## 2018-07-15 RX ADMIN — TIOTROPIUM BROMIDE 1 CAPSULE(S): 18 CAPSULE ORAL; RESPIRATORY (INHALATION) at 12:57

## 2018-07-15 RX ADMIN — BUDESONIDE AND FORMOTEROL FUMARATE DIHYDRATE 2 PUFF(S): 160; 4.5 AEROSOL RESPIRATORY (INHALATION) at 05:37

## 2018-07-15 RX ADMIN — Medication 1: at 18:12

## 2018-07-15 RX ADMIN — SODIUM CHLORIDE 75 MILLILITER(S): 9 INJECTION INTRAMUSCULAR; INTRAVENOUS; SUBCUTANEOUS at 05:37

## 2018-07-15 RX ADMIN — Medication 1: at 05:46

## 2018-07-15 RX ADMIN — MIRTAZAPINE 15 MILLIGRAM(S): 45 TABLET, ORALLY DISINTEGRATING ORAL at 21:22

## 2018-07-15 RX ADMIN — SODIUM CHLORIDE 75 MILLILITER(S): 9 INJECTION INTRAMUSCULAR; INTRAVENOUS; SUBCUTANEOUS at 21:22

## 2018-07-15 RX ADMIN — Medication 1: at 12:57

## 2018-07-15 NOTE — PROGRESS NOTE ADULT - ASSESSMENT
repeat CBC stable, possibly old blood   would follow closely today, if has ongoing bleeding then would call IR for angiogram  keep NPO  eventual colonoscopy pending course  most likely diagnosis is still diverticular bleeding but CT raises concern for synchronous cancers, she also reports a FH of crohns

## 2018-07-15 NOTE — CONSULT NOTE ADULT - ASSESSMENT
AS  moderate  clinically stable    GIB  likely diverticular bleed  fu with GI for work up     DM  Monitor finger stick. Insulin coverage. Diabetic education and Diabetic diet. Consider nutrition consultation.    ILD  on nebs  consider pulm eval

## 2018-07-15 NOTE — PROGRESS NOTE ADULT - ASSESSMENT
86 y/o fmeale with hx of RA on mtx , and was on prednisone ( no longer on steroids since 09/2017  and has not recieved iv infusion  ( monthly simponi ) in three months . also history of HTN and DM .. has been admitted twice over the past 6-8 months with  weakness . initial admit pt was thought ot have an element of adrenal insufficeiny when she was on steroids .. and pt was sent out on steroids with some improvement,.. later admitted as lij for weakness thought to be multifactorial sec to viral illness, dehydration , and leemt of steroid -induced myopathy.more recently admitted with weakness sec to hMPV and now admitted with acute GIB     1- acuet blood loss anemia : s/p transfusion     f/u repeat CBC     2- acute GI : likely diverticultur   if another episode will check Bleedings[facility] and arrange for IR intervention otherwise will need colonoscopy given CT findings of possible malignancy     3- DM: monitor FS   4- HTN: monitor BP closely   5- AS:  f/u with cardio (  )

## 2018-07-15 NOTE — PROGRESS NOTE ADULT - SUBJECTIVE AND OBJECTIVE BOX
Patient is a 86y old  Female who presents with a chief complaint of BRBPR (14 Jul 2018 16:08)                                                                REVIEW OF SYSTEMS:     CONSTITUTIONAL: No weakness, fevers or chills  RESPIRATORY: No cough, wheezing,  No shortness of breath  CARDIOVASCULAR: No chest pain or palpitations  GASTROINTESTINAL: No abdominal pain  . No nausea, vomiting, or hematemesis; No diarrhea or constipation. No melena or hematochezia.  GENITOURINARY: No dysuria, frequency or hematuria  NEUROLOGICAL: No numbness or weakness                                                                                                                                                                                                                                                                                   Medications:  MEDICATIONS  (STANDING):  buDESOnide 160 MICROgram(s)/formoterol 4.5 MICROgram(s) Inhaler 2 Puff(s) Inhalation two times a day  dextrose 5%. 1000 milliLiter(s) (50 mL/Hr) IV Continuous <Continuous>  dextrose 50% Injectable 12.5 Gram(s) IV Push once  dextrose 50% Injectable 25 Gram(s) IV Push once  dextrose 50% Injectable 25 Gram(s) IV Push once  insulin lispro (HumaLOG) corrective regimen sliding scale   SubCutaneous every 6 hours  mirtazapine 15 milliGRAM(s) Oral at bedtime  pantoprazole  Injectable 40 milliGRAM(s) IV Push two times a day  sodium chloride 0.9%. 1000 milliLiter(s) (75 mL/Hr) IV Continuous <Continuous>  tiotropium 18 MICROgram(s) Capsule 1 Capsule(s) Inhalation daily    MEDICATIONS  (PRN):  ALBUTerol    90 MICROgram(s) HFA Inhaler 2 Puff(s) Inhalation every 6 hours PRN Bronchospasm  dextrose 40% Gel 15 Gram(s) Oral once PRN Blood Glucose LESS THAN 70 milliGRAM(s)/deciliter  glucagon  Injectable 1 milliGRAM(s) IntraMuscular once PRN Glucose LESS THAN 70 milligrams/deciliter  ondansetron Injectable 4 milliGRAM(s) IV Push every 6 hours PRN Nausea       Allergies    No Known Allergies    Intolerances      Vital Signs Last 24 Hrs  T(C): 36.7 (15 Jul 2018 13:12), Max: 36.8 (14 Jul 2018 19:48)  T(F): 98.1 (15 Jul 2018 13:12), Max: 98.3 (15 Jul 2018 11:03)  HR: 77 (15 Jul 2018 13:12) (72 - 93)  BP: 110/63 (15 Jul 2018 13:12) (100/62 - 118/68)  BP(mean): --  RR: 16 (15 Jul 2018 13:12) (16 - 18)  SpO2: 96% (15 Jul 2018 13:12) (96% - 97%)  CAPILLARY BLOOD GLUCOSE      POCT Blood Glucose.: 175 mg/dL (15 Jul 2018 12:06)  POCT Blood Glucose.: 162 mg/dL (15 Jul 2018 05:43)  POCT Blood Glucose.: 211 mg/dL (15 Jul 2018 01:26)      Physical Exam:    General: NAD  HEENT:  Nonicteric, PERRLA  CV:  RRR, S1S2   Lungs:  CTA B  Abdomen:  Soft, non-tender, no distended, positive BS  Extremities:  2+ pulses, no c/c, no edema  Skin:  Warm and dry, no rashes  :  No mason  Neuro:  AAOx3, non-focal, grossly intact                                                                                                                                                                                                                                                                                                LABS:                               9.2    10.0  )-----------( 104      ( 15 Jul 2018 12:43 )             24.4                      07-15    140  |  107  |  34<H>  ----------------------------<  156<H>  4.0   |  22  |  0.97    Ca    7.8<L>      15 Jul 2018 06:45    TPro  5.4<L>  /  Alb  3.2<L>  /  TBili  2.7<H>  /  DBili  x   /  AST  13  /  ALT  10  /  AlkPhos  45  07-15

## 2018-07-15 NOTE — PROGRESS NOTE ADULT - SUBJECTIVE AND OBJECTIVE BOX
INTERVAL HPI/OVERNIGHT EVENTS: Hb decreased to 7, transfused with appropriate response, had another bout of BRBPR earlier today, CT scan results discussed    MEDICATIONS  (STANDING):  buDESOnide 160 MICROgram(s)/formoterol 4.5 MICROgram(s) Inhaler 2 Puff(s) Inhalation two times a day  dextrose 5%. 1000 milliLiter(s) (50 mL/Hr) IV Continuous <Continuous>  dextrose 50% Injectable 12.5 Gram(s) IV Push once  dextrose 50% Injectable 25 Gram(s) IV Push once  dextrose 50% Injectable 25 Gram(s) IV Push once  insulin lispro (HumaLOG) corrective regimen sliding scale   SubCutaneous every 6 hours  mirtazapine 15 milliGRAM(s) Oral at bedtime  pantoprazole  Injectable 40 milliGRAM(s) IV Push two times a day  sodium chloride 0.9%. 1000 milliLiter(s) (75 mL/Hr) IV Continuous <Continuous>  tiotropium 18 MICROgram(s) Capsule 1 Capsule(s) Inhalation daily    MEDICATIONS  (PRN):  ALBUTerol    90 MICROgram(s) HFA Inhaler 2 Puff(s) Inhalation every 6 hours PRN Bronchospasm  dextrose 40% Gel 15 Gram(s) Oral once PRN Blood Glucose LESS THAN 70 milliGRAM(s)/deciliter  glucagon  Injectable 1 milliGRAM(s) IntraMuscular once PRN Glucose LESS THAN 70 milligrams/deciliter  ondansetron Injectable 4 milliGRAM(s) IV Push every 6 hours PRN Nausea      Allergies    No Known Allergies    Intolerances            PHYSICAL EXAM:   Vital Signs:  Vital Signs Last 24 Hrs  T(C): 36.7 (15 Jul 2018 13:12), Max: 36.8 (14 Jul 2018 19:48)  T(F): 98.1 (15 Jul 2018 13:12), Max: 98.3 (15 Jul 2018 11:03)  HR: 77 (15 Jul 2018 13:12) (72 - 93)  BP: 110/63 (15 Jul 2018 13:12) (100/62 - 118/68)  BP(mean): --  RR: 16 (15 Jul 2018 13:12) (16 - 18)  SpO2: 96% (15 Jul 2018 13:12) (96% - 97%)  Daily     Daily     GENERAL:  no distress  HEENT:  NC/AT,  anicteric  CHEST:   no increased effort, breath sounds clear  HEART:  Regular rhythm  ABDOMEN:  Soft, non-tender, non-distended, normoactive bowel sounds,  no masses ,no hepato-splenomegaly, no signs of chronic liver disease  EXTEREMITIES:  no cyanosis      LABS:                        9.2    10.0  )-----------( 104      ( 15 Jul 2018 12:43 )             24.4     07-15    140  |  107  |  34<H>  ----------------------------<  156<H>  4.0   |  22  |  0.97    Ca    7.8<L>      15 Jul 2018 06:45    TPro  5.4<L>  /  Alb  3.2<L>  /  TBili  2.7<H>  /  DBili  x   /  AST  13  /  ALT  10  /  AlkPhos  45  07-15    PT/INR - ( 14 Jul 2018 09:50 )   PT: 12.6 sec;   INR: 1.15 ratio         PTT - ( 14 Jul 2018 09:50 )  PTT:29.1 sec      RADIOLOGY & ADDITIONAL TESTS:

## 2018-07-15 NOTE — CONSULT NOTE ADULT - SUBJECTIVE AND OBJECTIVE BOX
CHIEF COMPLAINT:Patient is a 86y old  Female who presents with a chief complaint of BRBPR (14 Jul 2018 16:08)      HISTORY OF PRESENT ILLNESS:  This is a pleasant 86 female with history as below, Aortic Stenosis admitted with GIB possible diverticulosis   denies any chest pain, sob, palpitation, dizziness or syncope.      PAST MEDICAL & SURGICAL HISTORY:  RA (rheumatoid arthritis)  Asthma  DM (diabetes mellitus), type 2  HTN (hypertension), benign  After-cataract of left eye  Tubal occlusion          MEDICATIONS:      ALBUTerol    90 MICROgram(s) HFA Inhaler 2 Puff(s) Inhalation every 6 hours PRN  buDESOnide 160 MICROgram(s)/formoterol 4.5 MICROgram(s) Inhaler 2 Puff(s) Inhalation two times a day  tiotropium 18 MICROgram(s) Capsule 1 Capsule(s) Inhalation daily    mirtazapine 15 milliGRAM(s) Oral at bedtime  ondansetron Injectable 4 milliGRAM(s) IV Push every 6 hours PRN    pantoprazole  Injectable 40 milliGRAM(s) IV Push two times a day    dextrose 40% Gel 15 Gram(s) Oral once PRN  dextrose 50% Injectable 12.5 Gram(s) IV Push once  dextrose 50% Injectable 25 Gram(s) IV Push once  dextrose 50% Injectable 25 Gram(s) IV Push once  glucagon  Injectable 1 milliGRAM(s) IntraMuscular once PRN  insulin lispro (HumaLOG) corrective regimen sliding scale   SubCutaneous every 6 hours    dextrose 5%. 1000 milliLiter(s) IV Continuous <Continuous>  sodium chloride 0.9%. 1000 milliLiter(s) IV Continuous <Continuous>      FAMILY HISTORY:  No pertinent family history in first degree relatives      Non-contributory    SOCIAL HISTORY:    No tobacco, drugs or etoh    Allergies    No Known Allergies    Intolerances    	    REVIEW OF SYSTEMS:  as above  The rest of the 14 points ROS reviewed and except above they are unremarkable.        PHYSICAL EXAM:  T(C): 36.7 (07-15-18 @ 13:12), Max: 36.8 (07-14-18 @ 19:48)  HR: 77 (07-15-18 @ 13:12) (72 - 93)  BP: 110/63 (07-15-18 @ 13:12) (100/62 - 118/68)  RR: 16 (07-15-18 @ 13:12) (16 - 18)  SpO2: 96% (07-15-18 @ 13:12) (96% - 97%)  Wt(kg): --  I&O's Summary      JVP: Normal  Neck: supple  Lung: crackles   CV: S1 S2 , Murmur:  Abd: soft  Ext: No edema  neuro: Awake / alert  Psych: flat affect  Skin: normal     LABS/DATA:    TELEMETRY: 	    ECG:  	< from: 12 Lead ECG (07.14.18 @ 10:02) >  Diagnosis Line SINUS RHYTHM WITH OCCASIONAL PREMATURE VENTRICULAR COMPLEXES  RIGHT BUNDLE BRANCH BLOCK  ABNORMAL ECG    < end of copied text >     	  CARDIAC MARKERS:                                      9.2    10.0  )-----------( 104      ( 15 Jul 2018 12:43 )             24.4     07-15    140  |  107  |  34<H>  ----------------------------<  156<H>  4.0   |  22  |  0.97    Ca    7.8<L>      15 Jul 2018 06:45    TPro  5.4<L>  /  Alb  3.2<L>  /  TBili  2.7<H>  /  DBili  x   /  AST  13  /  ALT  10  /  AlkPhos  45  07-15    proBNP:   Lipid Profile:   HgA1c: Hemoglobin A1C, Whole Blood: 6.1 % (07-15 @ 08:25)    TSH:         < from: Transthoracic Echocardiogram (08.01.17 @ 21:55) >  Conclusions:  1. Mitral annular calcification, otherwise normal mitral  valve. Minimal mitral regurgitation.  2. Calcified trileaflet aortic valve with decreased  opening. Peak transaortic valve gradient equals 16 mm Hg,  mean transaortic valve gradient equals 10 mm Hg, estimated  aortic valve area equals 1.5 sqcm (by continuity equation),  aortic valve velocity time integral equals 42 cm,  consistent with moderate aortic stenosis. Mild aortic  regurgitation.  3. Normal left ventricular internal dimensions and wall  thicknesses.  4. Normal left ventricular systolic function. No segmental  wall motion abnormalities.  5. Reversal of the E-A waves of the mitral inflow pattern  consistent with reduced compliance of the left ventricle.  6. The right ventricle is not well visualized; grossly  normal right ventricular systolic function.  *** Compared with echocardiogram of 3/24/2014, no  significant changes noted.  -------------------------------------------    < end of copied text >  < from: CT Chest No Cont (02.12.18 @ 21:11) >  Stable mosaic attenuation and pulmonary scarring.Findings are   nonspecific, likely in keeping with chronic interstitial lung disease. 6   mm nodular juxtapleural thickening in the right middle lobe, unchanged.     < end of copied text >

## 2018-07-16 ENCOUNTER — TRANSCRIPTION ENCOUNTER (OUTPATIENT)
Age: 83
End: 2018-07-16

## 2018-07-16 LAB
GLUCOSE BLDC GLUCOMTR-MCNC: 106 MG/DL — HIGH (ref 70–99)
GLUCOSE BLDC GLUCOMTR-MCNC: 108 MG/DL — HIGH (ref 70–99)
GLUCOSE BLDC GLUCOMTR-MCNC: 137 MG/DL — HIGH (ref 70–99)
GLUCOSE BLDC GLUCOMTR-MCNC: 140 MG/DL — HIGH (ref 70–99)
GLUCOSE BLDC GLUCOMTR-MCNC: 158 MG/DL — HIGH (ref 70–99)
HCT VFR BLD CALC: 21.8 % — LOW (ref 34.5–45)
HCT VFR BLD CALC: 26.4 % — LOW (ref 34.5–45)
HCT VFR BLD CALC: 26.7 % — LOW (ref 34.5–45)
HCT VFR BLD CALC: 27.9 % — LOW (ref 34.5–45)
HCT VFR BLD CALC: 27.9 % — LOW (ref 34.5–45)
HGB BLD-MCNC: 10 G/DL — LOW (ref 11.5–15.5)
HGB BLD-MCNC: 7.7 G/DL — LOW (ref 11.5–15.5)
HGB BLD-MCNC: 9.1 G/DL — LOW (ref 11.5–15.5)
HGB BLD-MCNC: 9.8 G/DL — LOW (ref 11.5–15.5)
HGB BLD-MCNC: 9.9 G/DL — LOW (ref 11.5–15.5)
MCHC RBC-ENTMCNC: 31.1 PG — SIGNIFICANT CHANGE UP (ref 27–34)
MCHC RBC-ENTMCNC: 31.7 PG — SIGNIFICANT CHANGE UP (ref 27–34)
MCHC RBC-ENTMCNC: 32.5 PG — SIGNIFICANT CHANGE UP (ref 27–34)
MCHC RBC-ENTMCNC: 32.7 PG — SIGNIFICANT CHANGE UP (ref 27–34)
MCHC RBC-ENTMCNC: 33.1 PG — SIGNIFICANT CHANGE UP (ref 27–34)
MCHC RBC-ENTMCNC: 34.5 GM/DL — SIGNIFICANT CHANGE UP (ref 32–36)
MCHC RBC-ENTMCNC: 35.3 GM/DL — SIGNIFICANT CHANGE UP (ref 32–36)
MCHC RBC-ENTMCNC: 35.4 GM/DL — SIGNIFICANT CHANGE UP (ref 32–36)
MCHC RBC-ENTMCNC: 35.9 GM/DL — SIGNIFICANT CHANGE UP (ref 32–36)
MCHC RBC-ENTMCNC: 36.5 GM/DL — HIGH (ref 32–36)
MCV RBC AUTO: 89.8 FL — SIGNIFICANT CHANGE UP (ref 80–100)
MCV RBC AUTO: 90.3 FL — SIGNIFICANT CHANGE UP (ref 80–100)
MCV RBC AUTO: 90.5 FL — SIGNIFICANT CHANGE UP (ref 80–100)
MCV RBC AUTO: 90.6 FL — SIGNIFICANT CHANGE UP (ref 80–100)
MCV RBC AUTO: 92.2 FL — SIGNIFICANT CHANGE UP (ref 80–100)
PLATELET # BLD AUTO: 116 K/UL — LOW (ref 150–400)
PLATELET # BLD AUTO: 118 K/UL — LOW (ref 150–400)
PLATELET # BLD AUTO: 134 K/UL — LOW (ref 150–400)
PLATELET # BLD AUTO: 137 K/UL — LOW (ref 150–400)
PLATELET # BLD AUTO: 144 K/UL — LOW (ref 150–400)
RBC # BLD: 2.36 M/UL — LOW (ref 3.8–5.2)
RBC # BLD: 2.93 M/UL — LOW (ref 3.8–5.2)
RBC # BLD: 2.95 M/UL — LOW (ref 3.8–5.2)
RBC # BLD: 3.08 M/UL — LOW (ref 3.8–5.2)
RBC # BLD: 3.11 M/UL — LOW (ref 3.8–5.2)
RBC # FLD: 14.7 % — HIGH (ref 10.3–14.5)
RBC # FLD: 14.8 % — HIGH (ref 10.3–14.5)
RBC # FLD: 14.9 % — HIGH (ref 10.3–14.5)
RBC # FLD: 15 % — HIGH (ref 10.3–14.5)
RBC # FLD: 15.3 % — HIGH (ref 10.3–14.5)
WBC # BLD: 11.2 K/UL — HIGH (ref 3.8–10.5)
WBC # BLD: 7.9 K/UL — SIGNIFICANT CHANGE UP (ref 3.8–10.5)
WBC # BLD: 9 K/UL — SIGNIFICANT CHANGE UP (ref 3.8–10.5)
WBC # BLD: 9.7 K/UL — SIGNIFICANT CHANGE UP (ref 3.8–10.5)
WBC # BLD: 9.7 K/UL — SIGNIFICANT CHANGE UP (ref 3.8–10.5)
WBC # FLD AUTO: 11.2 K/UL — HIGH (ref 3.8–10.5)
WBC # FLD AUTO: 7.9 K/UL — SIGNIFICANT CHANGE UP (ref 3.8–10.5)
WBC # FLD AUTO: 9 K/UL — SIGNIFICANT CHANGE UP (ref 3.8–10.5)
WBC # FLD AUTO: 9.7 K/UL — SIGNIFICANT CHANGE UP (ref 3.8–10.5)
WBC # FLD AUTO: 9.7 K/UL — SIGNIFICANT CHANGE UP (ref 3.8–10.5)

## 2018-07-16 PROCEDURE — 99232 SBSQ HOSP IP/OBS MODERATE 35: CPT

## 2018-07-16 PROCEDURE — 78278 ACUTE GI BLOOD LOSS IMAGING: CPT | Mod: 26

## 2018-07-16 RX ORDER — SOD SULF/SODIUM/NAHCO3/KCL/PEG
4000 SOLUTION, RECONSTITUTED, ORAL ORAL ONCE
Qty: 0 | Refills: 0 | Status: COMPLETED | OUTPATIENT
Start: 2018-07-16 | End: 2018-07-16

## 2018-07-16 RX ORDER — FOLIC ACID 0.8 MG
1 TABLET ORAL
Qty: 0 | Refills: 0 | COMMUNITY

## 2018-07-16 RX ORDER — FOLIC ACID 0.8 MG
2 TABLET ORAL
Qty: 0 | Refills: 0 | COMMUNITY

## 2018-07-16 RX ADMIN — PANTOPRAZOLE SODIUM 40 MILLIGRAM(S): 20 TABLET, DELAYED RELEASE ORAL at 05:28

## 2018-07-16 RX ADMIN — Medication 4000 MILLILITER(S): at 13:01

## 2018-07-16 RX ADMIN — BUDESONIDE AND FORMOTEROL FUMARATE DIHYDRATE 2 PUFF(S): 160; 4.5 AEROSOL RESPIRATORY (INHALATION) at 18:36

## 2018-07-16 RX ADMIN — PANTOPRAZOLE SODIUM 40 MILLIGRAM(S): 20 TABLET, DELAYED RELEASE ORAL at 18:36

## 2018-07-16 RX ADMIN — TIOTROPIUM BROMIDE 1 CAPSULE(S): 18 CAPSULE ORAL; RESPIRATORY (INHALATION) at 12:31

## 2018-07-16 RX ADMIN — SODIUM CHLORIDE 75 MILLILITER(S): 9 INJECTION INTRAMUSCULAR; INTRAVENOUS; SUBCUTANEOUS at 05:28

## 2018-07-16 RX ADMIN — BUDESONIDE AND FORMOTEROL FUMARATE DIHYDRATE 2 PUFF(S): 160; 4.5 AEROSOL RESPIRATORY (INHALATION) at 05:28

## 2018-07-16 NOTE — PROGRESS NOTE ADULT - ASSESSMENT
AS  moderate  clinically stable    GIB  likely diverticular bleed  fu with GI for work up   s/p transfusion overnight     DM  Monitor finger stick. Insulin coverage. Diabetic education and Diabetic diet. Consider nutrition consultation.    ILD  on nebs  consider pulm eval

## 2018-07-16 NOTE — DISCHARGE NOTE ADULT - SECONDARY DIAGNOSIS.
Type 2 diabetes mellitus without complication, with long-term current use of insulin HTN (hypertension), benign

## 2018-07-16 NOTE — DISCHARGE NOTE ADULT - CARE PLAN
Principal Discharge DX:	Gastrointestinal hemorrhage associated with intestinal diverticulosis  Goal:	resolution of symptoms  Assessment and plan of treatment:	There are 2 common types of GI Bleed, Upper GI Bleed and Lower GI Bleed.  Upper GI Bleed affects the esophagus, stomach, and first part of the small intestine. Lower GI Bleed affects the colon and rectum.  Upper GI Bleed signs and symptoms to notify your Health Care Provider are vomiting blood, or coffee ground vomitus, and bowel movements that look like black tar.  Lower GI Bleed signs and symptoms to notify your health care provider are bright red bloody bowel movements.   Take your medications as prescribed by your Gastroenterologist.  If you have had an Endoscopy or Colonoscopy, follow up with your Gastroenterologist for Pathology results.  Avoid NSAIDs unless your Health Care Provider tells you that it is ok (Aspirin, Ibuprofen, Advil, Motrin, Aleve).  Follow up with your Gastroenterologist within 1-2 weeks of discharge.  Secondary Diagnosis:	Type 2 diabetes mellitus without complication, with long-term current use of insulin  Assessment and plan of treatment:	HgA1C this admission -6.1  Make sure you get your HgA1c checked every three months.  If you take oral diabetes medications, check your blood glucose two times a day.  If you take insulin, check your blood glucose before meals and at bedtime.  It's important not to skip any meals.  Keep a log of your blood glucose results and always take it with you to your doctor appointments.  Keep a list of your current medications including injectables and over the counter medications and bring this medication list with you to all your doctor appointments.  If you have not seen your ophthalmologist this year call for appointment.  Check your feet daily for redness, sores, or openings. Do not self treat. If no improvement in two days call your primary care physician for an appointment.  Low blood sugar (hypoglycemia) is a blood sugar below 70mg/dl. Check your blood sugar if you feel signs/symptoms of hypoglycemia. If your blood sugar is below 70 take 15 grams of carbohydrates (ex 4 oz of apple juice, 3-4 glucose tablets, or 4-6 oz of regular soda) wait 15 minutes and repeat blood sugar to make sure it comes up above 70.  If your blood sugar is above 70 and you are due for a meal, have a meal.  If you are not due for a meal have a snack.  This snack helps keeps your blood sugar at a safe range.  Secondary Diagnosis:	HTN (hypertension), benign  Assessment and plan of treatment:	Take medications for your blood pressure as recommended.  Eat a heart healthy diet that is low in saturated fats and salt, and includes whole grains, fruits, vegetables and lean protein   Exercise regularly (consult with your physician or cardiologist first); maintain a heart healthy weight.   If you smoke - quit (A resource to help you stop smoking is the [facility] – phone number [phone].). Continue to follow with your primary physician or cardiologist.   Seek medical help for dizziness, Lightheadedness, Blurry vision, Headache, Chest pain, Shortness of breath  Follow up with your medical doctor to establish long term blood pressure treatment goals.

## 2018-07-16 NOTE — CHART NOTE - NSCHARTNOTEFT_GEN_A_CORE
Per Unit pharmacist who reviewed home meds with pt: pt stopped taking Remeron  more than week ago, and has also stopped using Spiriva at home.       Christi Diaz NP Medicine [id number]

## 2018-07-16 NOTE — PROGRESS NOTE ADULT - SUBJECTIVE AND OBJECTIVE BOX
Subjective: Patient seen and examined. No new events except as noted.     SUBJECTIVE/ROS:  No chest pain, dyspnea, palpitation, or dizziness.       MEDICATIONS:  MEDICATIONS  (STANDING):  buDESOnide 160 MICROgram(s)/formoterol 4.5 MICROgram(s) Inhaler 2 Puff(s) Inhalation two times a day  dextrose 5%. 1000 milliLiter(s) (50 mL/Hr) IV Continuous <Continuous>  dextrose 50% Injectable 12.5 Gram(s) IV Push once  dextrose 50% Injectable 25 Gram(s) IV Push once  dextrose 50% Injectable 25 Gram(s) IV Push once  insulin lispro (HumaLOG) corrective regimen sliding scale   SubCutaneous every 6 hours  mirtazapine 15 milliGRAM(s) Oral at bedtime  pantoprazole  Injectable 40 milliGRAM(s) IV Push two times a day  sodium chloride 0.9%. 1000 milliLiter(s) (75 mL/Hr) IV Continuous <Continuous>  tiotropium 18 MICROgram(s) Capsule 1 Capsule(s) Inhalation daily      PHYSICAL EXAM:  T(C): 36.4 (07-16-18 @ 09:13), Max: 36.8 (07-15-18 @ 11:03)  HR: 88 (07-16-18 @ 09:13) (72 - 92)  BP: 124/74 (07-16-18 @ 09:13) (97/63 - 124/74)  RR: 18 (07-16-18 @ 09:13) (16 - 18)  SpO2: 98% (07-16-18 @ 09:13) (96% - 98%)  Wt(kg): --  I&O's Summary        Appearance: Normal	  HEENT:   Normal oral mucosa, PERRL, EOMI	  Cardiovascular: Normal S1 S2,    Murmur:   Neck: JVP normal  Respiratory: Lungs clear to auscultation  Gastrointestinal:  Soft, Non-tender, + BS	  Skin: normal   Neuro: No gross deficits.   Psychiatry:  Mood & affect appropriate  Ext: No edema      LABS/DATA:    CARDIAC MARKERS:                                9.1    7.9   )-----------( 118      ( 16 Jul 2018 07:12 )             26.4     07-15    140  |  107  |  34<H>  ----------------------------<  156<H>  4.0   |  22  |  0.97    Ca    7.8<L>      15 Jul 2018 06:45    TPro  5.4<L>  /  Alb  3.2<L>  /  TBili  2.7<H>  /  DBili  x   /  AST  13  /  ALT  10  /  AlkPhos  45  07-15    proBNP:   Lipid Profile:   HgA1c:   TSH:     TELE:  EKG:

## 2018-07-16 NOTE — DISCHARGE NOTE ADULT - CARE PROVIDER_API CALL
Jas Vera (DO), Gastroenterology; Internal Medicine  [address] [place] [address]  Phone: [phone]  Fax: [phone]

## 2018-07-16 NOTE — CHART NOTE - NSCHARTNOTEFT_GEN_A_CORE
Called by RN for midnight CBC results 7.7/21.8. Pt seen and examined at bedside.   Pt is A&Ox3, awake and alert, pleasant, pale, asymptomatic. Denies any chest pain, SOB, Palpitations, lightheadedness, fever, chills.   Denies any bloody or melanotic stools this evening. Denies any abdominal pain or N/V.   Called by RN for fever of     . Pt seen and examined at bedside. Denies any chest pain, palpitations, SOB, abdominal pain, headache or urinary symptoms.     Vital Signs Last 24 Hrs  T(C): 36.5 (15 Jul 2018 23:48), Max: 36.8 (15 Jul 2018 11:03)  T(F): 97.7 (15 Jul 2018 23:48), Max: 98.3 (15 Jul 2018 11:03)  HR: 89 (15 Jul 2018 23:48) (72 - 92)  BP: 101/64 (15 Jul 2018 23:48) (97/63 - 118/68)  BP(mean): --  RR: 18 (15 Jul 2018 23:48) (16 - 18)  SpO2: 97% (15 Jul 2018 23:48) (96% - 97%)                        7.7    9.0   )-----------( 116      ( 16 Jul 2018 00:05 )             21.8     07-15    140  |  107  |  34<H>  ----------------------------<  156<H>  4.0   |  22  |  0.97    Ca    7.8<L>      15 Jul 2018 06:45    TPro  5.4<L>  /  Alb  3.2<L>  /  TBili  2.7<H>  /  DBili  x   /  AST  13  /  ALT  10  /  AlkPhos  45  07-15        REVIEW OF SYSTEMS:    as per HPI    PHYSICAL EXAM:     General: NAD, non-toxic appearance, pale  Neuro: NC, AT, PERRLA, no focal deficits  CV: S1 S2 RRR  Resp: B/L Lungs CTA, nonlabored  Abd: soft, NT, ND, + BS X4 quadrants  Ext: no edema, +PP b/l LE, warm to touch     Assessment & Plan     86F w/ pmh HTN, HLD, RA (methotrexate), no blood thinners, p/w bloody stools x 2 days, assc w/ lightheadedness and nausea. No vomit, fever, dysuria. +chr L abd pain that is unchanged. Never has had this before. No bloody emesis / cough. Last colonoscopy 3 yrs ago, says result was normal. No significant recent weight loss.     Now admitted with weakness sec to hMPV and now admitted with acute GIB     Anemia due to GIB.     Serial CBCs.   Vitals Q 4   Transfuse 1 unit PRBCs now - discussed with attending  Continue IVF for hydration as ordered  GI following     F/U with primary team in am    Ashly Ford, ANP-BC  [id number]

## 2018-07-16 NOTE — DISCHARGE NOTE ADULT - PLAN OF CARE
resolution of symptoms There are 2 common types of GI Bleed, Upper GI Bleed and Lower GI Bleed.  Upper GI Bleed affects the esophagus, stomach, and first part of the small intestine. Lower GI Bleed affects the colon and rectum.  Upper GI Bleed signs and symptoms to notify your Health Care Provider are vomiting blood, or coffee ground vomitus, and bowel movements that look like black tar.  Lower GI Bleed signs and symptoms to notify your health care provider are bright red bloody bowel movements.   Take your medications as prescribed by your Gastroenterologist.  If you have had an Endoscopy or Colonoscopy, follow up with your Gastroenterologist for Pathology results.  Avoid NSAIDs unless your Health Care Provider tells you that it is ok (Aspirin, Ibuprofen, Advil, Motrin, Aleve).  Follow up with your Gastroenterologist within 1-2 weeks of discharge. HgA1C this admission -6.1  Make sure you get your HgA1c checked every three months.  If you take oral diabetes medications, check your blood glucose two times a day.  If you take insulin, check your blood glucose before meals and at bedtime.  It's important not to skip any meals.  Keep a log of your blood glucose results and always take it with you to your doctor appointments.  Keep a list of your current medications including injectables and over the counter medications and bring this medication list with you to all your doctor appointments.  If you have not seen your ophthalmologist this year call for appointment.  Check your feet daily for redness, sores, or openings. Do not self treat. If no improvement in two days call your primary care physician for an appointment.  Low blood sugar (hypoglycemia) is a blood sugar below 70mg/dl. Check your blood sugar if you feel signs/symptoms of hypoglycemia. If your blood sugar is below 70 take 15 grams of carbohydrates (ex 4 oz of apple juice, 3-4 glucose tablets, or 4-6 oz of regular soda) wait 15 minutes and repeat blood sugar to make sure it comes up above 70.  If your blood sugar is above 70 and you are due for a meal, have a meal.  If you are not due for a meal have a snack.  This snack helps keeps your blood sugar at a safe range. Take medications for your blood pressure as recommended.  Eat a heart healthy diet that is low in saturated fats and salt, and includes whole grains, fruits, vegetables and lean protein   Exercise regularly (consult with your physician or cardiologist first); maintain a heart healthy weight.   If you smoke - quit (A resource to help you stop smoking is the [facility] – phone number 906-735-3264.). Continue to follow with your primary physician or cardiologist.   Seek medical help for dizziness, Lightheadedness, Blurry vision, Headache, Chest pain, Shortness of breath  Follow up with your medical doctor to establish long term blood pressure treatment goals.

## 2018-07-16 NOTE — PROGRESS NOTE ADULT - SUBJECTIVE AND OBJECTIVE BOX
Patient is a 86y old  Female who presents with a chief complaint of BRBPR (16 Jul 2018 11:35)                                                               INTERVAL HPI/OVERNIGHT EVENTS: nofurther bleeding episodes     REVIEW OF SYSTEMS:     CONSTITUTIONAL: No weakness, fevers or chills  RESPIRATORY: No cough, wheezing,  No shortness of breath  CARDIOVASCULAR: No chest pain or palpitations  GASTROINTESTINAL: No abdominal pain  . No nausea, vomiting, or hematemesis; No diarrhea or constipation. No melena or hematochezia.  GENITOURINARY: No dysuria, frequency or hematuria  NEUROLOGICAL: No numbness or weakness                                                                                                                                                                                                                                                                           Medications:  MEDICATIONS  (STANDING):  buDESOnide 160 MICROgram(s)/formoterol 4.5 MICROgram(s) Inhaler 2 Puff(s) Inhalation two times a day  dextrose 5%. 1000 milliLiter(s) (50 mL/Hr) IV Continuous <Continuous>  dextrose 50% Injectable 12.5 Gram(s) IV Push once  dextrose 50% Injectable 25 Gram(s) IV Push once  dextrose 50% Injectable 25 Gram(s) IV Push once  insulin lispro (HumaLOG) corrective regimen sliding scale   SubCutaneous every 6 hours  pantoprazole  Injectable 40 milliGRAM(s) IV Push two times a day  sodium chloride 0.9%. 1000 milliLiter(s) (75 mL/Hr) IV Continuous <Continuous>    MEDICATIONS  (PRN):  ALBUTerol    90 MICROgram(s) HFA Inhaler 2 Puff(s) Inhalation every 6 hours PRN Bronchospasm  dextrose 40% Gel 15 Gram(s) Oral once PRN Blood Glucose LESS THAN 70 milliGRAM(s)/deciliter  glucagon  Injectable 1 milliGRAM(s) IntraMuscular once PRN Glucose LESS THAN 70 milligrams/deciliter  ondansetron Injectable 4 milliGRAM(s) IV Push every 6 hours PRN Nausea       Allergies    No Known Allergies    Intolerances      Vital Signs Last 24 Hrs  T(C): 36.5 (16 Jul 2018 16:23), Max: 36.7 (15 Jul 2018 21:18)  T(F): 97.7 (16 Jul 2018 16:23), Max: 98.1 (15 Jul 2018 21:18)  HR: 90 (16 Jul 2018 16:23) (85 - 92)  BP: 145/78 (16 Jul 2018 16:23) (97/63 - 145/78)  BP(mean): --  RR: 18 (16 Jul 2018 16:23) (18 - 18)  SpO2: 96% (16 Jul 2018 16:23) (96% - 98%)  CAPILLARY BLOOD GLUCOSE      POCT Blood Glucose.: 108 mg/dL (16 Jul 2018 18:04)  POCT Blood Glucose.: 140 mg/dL (16 Jul 2018 12:22)  POCT Blood Glucose.: 137 mg/dL (16 Jul 2018 06:04)  POCT Blood Glucose.: 170 mg/dL (15 Jul 2018 23:36)      Physical Exam:    General:  Well appearing, NAD, not cachetic  HEENT:  Nonicteric, PERRLA  CV:  RRR, S1S2   Lungs:  CTA B/L, no wheezes, rales, rhonchi  Abdomen:  Soft, non-tender, no distended, positive BS  Extremities:  2+ pulses, no c/c, no edema  Skin:  Warm and dry, no rashes  :  No mason  Neuro:  AAOx3, non-focal, grossly intact                                                                                                                                                                                                                                                                                                LABS:                               10.0   9.7   )-----------( 134      ( 16 Jul 2018 14:13 )             27.9                      07-15    140  |  107  |  34<H>  ----------------------------<  156<H>  4.0   |  22  |  0.97    Ca    7.8<L>      15 Jul 2018 06:45    TPro  5.4<L>  /  Alb  3.2<L>  /  TBili  2.7<H>  /  DBili  x   /  AST  13  /  ALT  10  /  AlkPhos  45  07-15

## 2018-07-16 NOTE — PROGRESS NOTE ADULT - SUBJECTIVE AND OBJECTIVE BOX
RODERICK CORTEZ:[id number],   86yFemale followed for:  No Known Allergies    PAST MEDICAL & SURGICAL HISTORY:  RA (rheumatoid arthritis)  Asthma  DM (diabetes mellitus), type 2  HTN (hypertension), benign  After-cataract of left eye  Tubal occlusion    FAMILY HISTORY:  No pertinent family history in first degree relatives    MEDICATIONS  (STANDING):  buDESOnide 160 MICROgram(s)/formoterol 4.5 MICROgram(s) Inhaler 2 Puff(s) Inhalation two times a day  dextrose 5%. 1000 milliLiter(s) (50 mL/Hr) IV Continuous <Continuous>  dextrose 50% Injectable 12.5 Gram(s) IV Push once  dextrose 50% Injectable 25 Gram(s) IV Push once  dextrose 50% Injectable 25 Gram(s) IV Push once  insulin lispro (HumaLOG) corrective regimen sliding scale   SubCutaneous every 6 hours  mirtazapine 15 milliGRAM(s) Oral at bedtime  pantoprazole  Injectable 40 milliGRAM(s) IV Push two times a day  sodium chloride 0.9%. 1000 milliLiter(s) (75 mL/Hr) IV Continuous <Continuous>  tiotropium 18 MICROgram(s) Capsule 1 Capsule(s) Inhalation daily    MEDICATIONS  (PRN):  ALBUTerol    90 MICROgram(s) HFA Inhaler 2 Puff(s) Inhalation every 6 hours PRN Bronchospasm  dextrose 40% Gel 15 Gram(s) Oral once PRN Blood Glucose LESS THAN 70 milliGRAM(s)/deciliter  glucagon  Injectable 1 milliGRAM(s) IntraMuscular once PRN Glucose LESS THAN 70 milligrams/deciliter  ondansetron Injectable 4 milliGRAM(s) IV Push every 6 hours PRN Nausea      Vital Signs Last 24 Hrs  T(C): 36.4 (16 Jul 2018 09:13), Max: 36.8 (15 Jul 2018 11:03)  T(F): 97.6 (16 Jul 2018 09:13), Max: 98.3 (15 Jul 2018 11:03)  HR: 88 (16 Jul 2018 09:13) (72 - 92)  BP: 124/74 (16 Jul 2018 09:13) (97/63 - 124/74)  BP(mean): --  RR: 18 (16 Jul 2018 09:13) (16 - 18)  SpO2: 98% (16 Jul 2018 09:13) (96% - 98%)  nc/at  s1s2  cta  soft, nt, nd no guarding or rebound  no c/c/e    CBC Full  -  ( 16 Jul 2018 07:12 )  WBC Count : 7.9 K/uL  Hemoglobin : 9.1 g/dL  Hematocrit : 26.4 %  Platelet Count - Automated : 118 K/uL  Mean Cell Volume : 90.3 fl  Mean Cell Hemoglobin : 31.1 pg  Mean Cell Hemoglobin Concentration : 34.5 gm/dL  Auto Neutrophil # : x  Auto Lymphocyte # : x  Auto Monocyte # : x  Auto Eosinophil # : x  Auto Basophil # : x  Auto Neutrophil % : x  Auto Lymphocyte % : x  Auto Monocyte % : x  Auto Eosinophil % : x  Auto Basophil % : x    07-15    140  |  107  |  34<H>  ----------------------------<  156<H>  4.0   |  22  |  0.97    Ca    7.8<L>      15 Jul 2018 06:45    TPro  5.4<L>  /  Alb  3.2<L>  /  TBili  2.7<H>  /  DBili  x   /  AST  13  /  ALT  10  /  AlkPhos  45  07-15    PT/INR - ( 14 Jul 2018 09:50 )   PT: 12.6 sec;   INR: 1.15 ratio         PTT - ( 14 Jul 2018 09:50 )  PTT:29.1 sec

## 2018-07-16 NOTE — CHART NOTE - NSCHARTNOTEFT_GEN_A_CORE
Pt receiving Colon prep, now having bloody BMs x3-4, large liquid volume. Pt AOX3, denies dizziness, weakness, palpitations, abdominal pain, SOB. vital signs: stable : 151/74-45-32-Spo2 97% on RA. Complete Blood Count (07.16.18 @ 14:13)    WBC Count: 9.7 K/uL    RBC Count: 3.08 M/uL    Hemoglobin: 10.0 g/dL    Hematocrit: 27.9 %    Mean Cell Volume: 90.6 fl    Mean Cell Hemoglobin: 32.5 pg    Mean Cell Hemoglobin Conc: 35.9 gm/dL    Red Cell Distrib Width: 15.0 %    Platelet Count - Automated: 134 K/uL    D/w attending Dr. Davis. Will send pt for bleeding scan.   -CBC q 6h. Transfuse to keep Hgb 8.5-9.0   -Monitor and call for worsening condition.  D/w pt [place] [id number] Pt receiving Colon prep, now having  maroon color bloody BMs x3-4, large liquid volume. Pt AOX3, denies dizziness, weakness, palpitations, abdominal pain, SOB. vital signs: stable : 151/81-66-71-Spo2 97% on RA. Complete Blood Count (07.16.18 @ 14:13)    WBC Count: 9.7 K/uL    RBC Count: 3.08 M/uL    Hemoglobin: 10.0 g/dL    Hematocrit: 27.9 %    Mean Cell Volume: 90.6 fl    Mean Cell Hemoglobin: 32.5 pg    Mean Cell Hemoglobin Conc: 35.9 gm/dL    Red Cell Distrib Width: 15.0 %    Platelet Count - Automated: 134 K/uL    D/w attending Dr. Davis. Will send pt for bleeding scan. [facility] dept notified .  -CBC q 6h. Transfuse to keep Hgb 8.5-9.0   -Monitor and call for worsening condition.  D/w pt and RN.  Christi Diaz NP Medicine [id number]    addendum [id number]  STAT repeat CBC: Complete Blood Count (07.16.18 @ 18:39)    WBC Count: 9.7 K/uL    RBC Count: 3.11 M/uL    Hemoglobin: 9.9 g/dL    Hematocrit: 27.9 %    Mean Cell Volume: 89.8 fl    Mean Cell Hemoglobin: 31.7 pg    Mean Cell Hemoglobin Conc: 35.3 gm/dL    Red Cell Distrib Width: 14.9 %    Platelet Count - Automated: 137 K/uL

## 2018-07-16 NOTE — PROGRESS NOTE ADULT - ASSESSMENT
ct scan reviewed.  three areas of hyperemiea.  Unclear if this is from active bleeding or colonic lesion.  will prep for colonosocpy tomorrow.

## 2018-07-16 NOTE — DISCHARGE NOTE ADULT - HOSPITAL COURSE
86 y/o female with hx of RA on mtx , and was on prednisone (no longer on steroids since 09/2017  and has not recieved iv infusion (monthly simponi ) in three months . also history of HTN and DM .. has been admitted twice over the past 6-8 months with weakness . initial admit pt was thought ot have an element of adrenal insufficieny when she was on steroids .. and pt was sent out on steroids with some improvement,.. later admitted as lij for weakness thought to be multifactorial sec to viral illness, dehydration , and steroid -induced myopathy. more recently admitted with weakness sec to hMPV and now admitted with acute GIB. Received blood transfusion. s/p colonoscopy showing diverticulosis. H/H stable. d/c 84 y/o female with hx of RA on mtx , and was on prednisone (no longer on steroids since 09/2017  and has not recieved iv infusion (monthly simponi ) in three months . also history of HTN and DM .. has been admitted twice over the past 6-8 months with weakness . initial admit pt was thought ot have an element of adrenal insufficieny when she was on steroids .. and pt was sent out on steroids with some improvement,.. later admitted as lij for weakness thought to be multifactorial sec to viral illness, dehydration , and steroid -induced myopathy. more recently admitted with weakness sec to hMPV and now admitted with acute GIB. Received blood transfusion. s/p colonoscopy showing diverticulosis. H/H stable. d/c home.

## 2018-07-16 NOTE — DISCHARGE NOTE ADULT - PATIENT PORTAL LINK FT
You can access the [facility] Patient Portal, offered by [facility], by registering with the following website: [email or web address]

## 2018-07-16 NOTE — PROGRESS NOTE ADULT - ASSESSMENT
84 y/o fmeale with hx of RA on mtx , and was on prednisone ( no longer on steroids since 09/2017  and has not recieved iv infusion  ( monthly simponi ) in three months . also history of HTN and DM .. has been admitted twice over the past 6-8 months with  weakness . initial admit pt was thought ot have an element of adrenal insufficeiny when she was on steroids .. and pt was sent out on steroids with some improvement,.. later admitted as lij for weakness thought to be multifactorial sec to viral illness, dehydration , and leemt of steroid -induced myopathy.more recently admitted with weakness sec to hMPV and now admitted with acute GIB     1- acuet blood loss anemia : s/p transfusion         2- acute GI : likely diverticultur   if another episode will check Bleeding scan and arrange for IR intervention otherwise will need colonoscopy given CT findings of possible malignancy       3- DM: monitor FS   4- HTN: monitor BP closely   5- AS:  stable 86 y/o fmeale with hx of RA on mtx , and was on prednisone ( no longer on steroids since 09/2017  and has not recieved iv infusion  ( monthly simponi ) in three months . also history of HTN and DM .. has been admitted twice over the past 6-8 months with  weakness . initial admit pt was thought ot have an element of adrenal insufficeiny when she was on steroids .. and pt was sent out on steroids with some improvement,.. later admitted as lij for weakness thought to be multifactorial sec to viral illness, dehydration , and leemt of steroid -induced myopathy.more recently admitted with weakness sec to hMPV and now admitted with acute GIB     1- acuet blood loss anemia : s/p transfusion         2- acute GI : likely diverticultur   plan for colonoscopy given CT findings of possible malignancy   d/w / Chuy       3- DM: monitor FS   4- HTN: monitor BP closely   5- AS:  stable

## 2018-07-16 NOTE — DISCHARGE NOTE ADULT - MEDICATION SUMMARY - MEDICATIONS TO TAKE
I will START or STAY ON the medications listed below when I get home from the hospital:    losartan 50 mg oral tablet  -- 1 tab(s) by mouth once a day  -- Indication: For HTN (hypertension), benign    Amaryl 1 mg oral tablet  -- 1 tab(s) by mouth once a day at bedtime  -- Indication: For Diabetes    Januvia 100 mg oral tablet  -- 1 tab(s) by mouth once a day  -- Indication: For Diabetes    methotrexate 2.5 mg oral tablet  -- 7 tab(s) by mouth once a week  -- Indication: For RA (rheumatoid arthritis)    alendronate 70 mg oral tablet  -- 1 tab(s) by mouth once a week  -- Indication: For osteoporosis     Symbicort 160 mcg-4.5 mcg/inh inhalation aerosol  -- 2 puff(s) inhaled 2 times a day  -- Indication: For Asthma    Ventolin HFA 90 mcg/inh inhalation aerosol  -- 2 puff(s) inhaled 4 times a day, As Needed  -- Indication: For Asthma    hydroCHLOROthiazide 25 mg oral tablet  -- 1 tab(s) by mouth once a day  -- Indication: For HTN (hypertension), benign    pantoprazole 40 mg oral delayed release tablet  -- 1 tab(s) by mouth 2 times a day  -- Indication: For Gerd    folic acid 1 mg oral tablet  -- 1 tab(s) by mouth twice daily  -- Indication: For vitamin    cyanocobalamin 1000 mcg oral tablet  -- 1 tablet by mouth every other week  -- Indication: For vitamin    Vitamin D2 50,000 intl units (1.25 mg) oral capsule  -- 1 cap(s) by mouth once a week  -- Indication: For vitamin I will START or STAY ON the medications listed below when I get home from the hospital:    losartan 50 mg oral tablet  -- 1 tab(s) by mouth once a day  -- Indication: For HTN (hypertension), benign    Amaryl 1 mg oral tablet  -- 1 tab(s) by mouth once a day at bedtime  -- Indication: For Diabetes    Januvia 100 mg oral tablet  -- 1 tab(s) by mouth once a day  -- Indication: For Diabetes    Lantus Solostar Pen 100 units/mL subcutaneous solution  -- 6 unit(s) subcutaneous once a day (at bedtime),  -- Indication: For lantus    methotrexate 2.5 mg oral tablet  -- 7 tab(s) by mouth once a week  -- Indication: For RA (rheumatoid arthritis)    alendronate 70 mg oral tablet  -- 1 tab(s) by mouth once a week  -- Indication: For osteoporosis     Symbicort 160 mcg-4.5 mcg/inh inhalation aerosol  -- 2 puff(s) inhaled 2 times a day  -- Indication: For Asthma    Ventolin HFA 90 mcg/inh inhalation aerosol  -- 2 puff(s) inhaled 4 times a day, As Needed  -- Indication: For Asthma    hydroCHLOROthiazide 25 mg oral tablet  -- 1 tab(s) by mouth once a day  -- Indication: For HTN (hypertension), benign    pantoprazole 40 mg oral delayed release tablet  -- 1 tab(s) by mouth 2 times a day  -- Indication: For Gerd    folic acid 1 mg oral tablet  -- 1 tab(s) by mouth twice daily  -- Indication: For vitamin    cyanocobalamin 1000 mcg oral tablet  -- 1 tablet by mouth every other week  -- Indication: For vitamin    Vitamin D2 50,000 intl units (1.25 mg) oral capsule  -- 1 cap(s) by mouth once a week  -- Indication: For vitamin

## 2018-07-17 LAB
ANION GAP SERPL CALC-SCNC: 19 MMOL/L — HIGH (ref 5–17)
APPEARANCE UR: CLEAR — SIGNIFICANT CHANGE UP
BILIRUB UR-MCNC: NEGATIVE — SIGNIFICANT CHANGE UP
BUN SERPL-MCNC: 17 MG/DL — SIGNIFICANT CHANGE UP (ref 7–23)
CALCIUM SERPL-MCNC: 7.6 MG/DL — LOW (ref 8.4–10.5)
CHLORIDE SERPL-SCNC: 107 MMOL/L — SIGNIFICANT CHANGE UP (ref 96–108)
CO2 SERPL-SCNC: 17 MMOL/L — LOW (ref 22–31)
COLOR SPEC: SIGNIFICANT CHANGE UP
CREAT SERPL-MCNC: 0.83 MG/DL — SIGNIFICANT CHANGE UP (ref 0.5–1.3)
DIFF PNL FLD: ABNORMAL
EPI CELLS # UR: SIGNIFICANT CHANGE UP /HPF
GLUCOSE BLDC GLUCOMTR-MCNC: 180 MG/DL — HIGH (ref 70–99)
GLUCOSE BLDC GLUCOMTR-MCNC: 221 MG/DL — HIGH (ref 70–99)
GLUCOSE BLDC GLUCOMTR-MCNC: 98 MG/DL — SIGNIFICANT CHANGE UP (ref 70–99)
GLUCOSE SERPL-MCNC: 91 MG/DL — SIGNIFICANT CHANGE UP (ref 70–99)
GLUCOSE UR QL: NEGATIVE — SIGNIFICANT CHANGE UP
HCT VFR BLD CALC: 26.1 % — LOW (ref 34.5–45)
HCT VFR BLD CALC: 27.7 % — LOW (ref 34.5–45)
HGB BLD-MCNC: 8.8 G/DL — LOW (ref 11.5–15.5)
HGB BLD-MCNC: 9.8 G/DL — LOW (ref 11.5–15.5)
KETONES UR-MCNC: ABNORMAL
LEUKOCYTE ESTERASE UR-ACNC: NEGATIVE — SIGNIFICANT CHANGE UP
MAGNESIUM SERPL-MCNC: 1.5 MG/DL — LOW (ref 1.6–2.6)
MCHC RBC-ENTMCNC: 30.4 PG — SIGNIFICANT CHANGE UP (ref 27–34)
MCHC RBC-ENTMCNC: 32.3 PG — SIGNIFICANT CHANGE UP (ref 27–34)
MCHC RBC-ENTMCNC: 33.6 GM/DL — SIGNIFICANT CHANGE UP (ref 32–36)
MCHC RBC-ENTMCNC: 35.4 GM/DL — SIGNIFICANT CHANGE UP (ref 32–36)
MCV RBC AUTO: 90.4 FL — SIGNIFICANT CHANGE UP (ref 80–100)
MCV RBC AUTO: 91.3 FL — SIGNIFICANT CHANGE UP (ref 80–100)
NITRITE UR-MCNC: NEGATIVE — SIGNIFICANT CHANGE UP
PH UR: 5.5 — SIGNIFICANT CHANGE UP (ref 5–8)
PLATELET # BLD AUTO: 140 K/UL — LOW (ref 150–400)
PLATELET # BLD AUTO: 155 K/UL — SIGNIFICANT CHANGE UP (ref 150–400)
POTASSIUM SERPL-MCNC: 3.7 MMOL/L — SIGNIFICANT CHANGE UP (ref 3.5–5.3)
POTASSIUM SERPL-SCNC: 3.7 MMOL/L — SIGNIFICANT CHANGE UP (ref 3.5–5.3)
PROT UR-MCNC: NEGATIVE — SIGNIFICANT CHANGE UP
RBC # BLD: 2.89 M/UL — LOW (ref 3.8–5.2)
RBC # BLD: 3.03 M/UL — LOW (ref 3.8–5.2)
RBC # FLD: 15 % — HIGH (ref 10.3–14.5)
RBC # FLD: 15.3 % — HIGH (ref 10.3–14.5)
RBC CASTS # UR COMP ASSIST: SIGNIFICANT CHANGE UP /HPF (ref 0–2)
SODIUM SERPL-SCNC: 143 MMOL/L — SIGNIFICANT CHANGE UP (ref 135–145)
SP GR SPEC: 1.01 — SIGNIFICANT CHANGE UP (ref 1.01–1.02)
UROBILINOGEN FLD QL: NEGATIVE — SIGNIFICANT CHANGE UP
WBC # BLD: 13.2 K/UL — HIGH (ref 3.8–10.5)
WBC # BLD: 8 K/UL — SIGNIFICANT CHANGE UP (ref 3.8–10.5)
WBC # FLD AUTO: 13.2 K/UL — HIGH (ref 3.8–10.5)
WBC # FLD AUTO: 8 K/UL — SIGNIFICANT CHANGE UP (ref 3.8–10.5)
WBC UR QL: SIGNIFICANT CHANGE UP /HPF (ref 0–5)

## 2018-07-17 RX ORDER — MAGNESIUM SULFATE 500 MG/ML
1 VIAL (ML) INJECTION ONCE
Qty: 0 | Refills: 0 | Status: COMPLETED | OUTPATIENT
Start: 2018-07-17 | End: 2018-07-17

## 2018-07-17 RX ORDER — INSULIN LISPRO 100/ML
VIAL (ML) SUBCUTANEOUS AT BEDTIME
Qty: 0 | Refills: 0 | Status: DISCONTINUED | OUTPATIENT
Start: 2018-07-17 | End: 2018-07-18

## 2018-07-17 RX ORDER — INSULIN LISPRO 100/ML
VIAL (ML) SUBCUTANEOUS
Qty: 0 | Refills: 0 | Status: DISCONTINUED | OUTPATIENT
Start: 2018-07-17 | End: 2018-07-18

## 2018-07-17 RX ADMIN — Medication 100 GRAM(S): at 14:28

## 2018-07-17 RX ADMIN — PANTOPRAZOLE SODIUM 40 MILLIGRAM(S): 20 TABLET, DELAYED RELEASE ORAL at 17:11

## 2018-07-17 RX ADMIN — BUDESONIDE AND FORMOTEROL FUMARATE DIHYDRATE 2 PUFF(S): 160; 4.5 AEROSOL RESPIRATORY (INHALATION) at 17:11

## 2018-07-17 RX ADMIN — Medication 1: at 17:10

## 2018-07-17 RX ADMIN — PANTOPRAZOLE SODIUM 40 MILLIGRAM(S): 20 TABLET, DELAYED RELEASE ORAL at 05:48

## 2018-07-17 RX ADMIN — SODIUM CHLORIDE 75 MILLILITER(S): 9 INJECTION INTRAMUSCULAR; INTRAVENOUS; SUBCUTANEOUS at 05:52

## 2018-07-17 RX ADMIN — Medication 1 ENEMA: at 10:57

## 2018-07-17 RX ADMIN — BUDESONIDE AND FORMOTEROL FUMARATE DIHYDRATE 2 PUFF(S): 160; 4.5 AEROSOL RESPIRATORY (INHALATION) at 05:48

## 2018-07-17 NOTE — PROGRESS NOTE ADULT - ASSESSMENT
AS  moderate  clinically stable  outpt follow up     GIB  likely diverticular bleed  fu with GI for work up   H/H stable     DM  Monitor finger stick. Insulin coverage. Diabetic education and Diabetic diet. Consider nutrition consultation.

## 2018-07-17 NOTE — PROGRESS NOTE ADULT - SUBJECTIVE AND OBJECTIVE BOX
Pre-Endoscopy Evaluation      Referring Physician:  Dr. Davis                                  Procedure: Colonoscopy    Indication for Procedure: GIB    Sedation by Anesthesia [x]    PAST MEDICAL & SURGICAL HISTORY:  Moderate Aortic Stenosis  RA (rheumatoid arthritis)  Asthma  DM (diabetes mellitus), type 2  HTN (hypertension), benign  After-cataract of left eye  Tubal occlusion      PMH of Gastroparesis [ ]  Gastric Surgery [ ]  Gastric Outlet Obstruction [ ]    Allergies:    No Known Allergies    Intolerances    Latex allergy: [ ] yes [x] no    Medications:MEDICATIONS  (STANDING):  buDESOnide 160 MICROgram(s)/formoterol 4.5 MICROgram(s) Inhaler 2 Puff(s) Inhalation two times a day  dextrose 5%. 1000 milliLiter(s) (50 mL/Hr) IV Continuous <Continuous>  dextrose 50% Injectable 12.5 Gram(s) IV Push once  dextrose 50% Injectable 25 Gram(s) IV Push once  dextrose 50% Injectable 25 Gram(s) IV Push once  insulin lispro (HumaLOG) corrective regimen sliding scale   SubCutaneous every 6 hours  magnesium sulfate  IVPB 1 Gram(s) IV Intermittent once  pantoprazole  Injectable 40 milliGRAM(s) IV Push two times a day  sodium chloride 0.9%. 1000 milliLiter(s) (75 mL/Hr) IV Continuous <Continuous>    MEDICATIONS  (PRN):  ALBUTerol    90 MICROgram(s) HFA Inhaler 2 Puff(s) Inhalation every 6 hours PRN Bronchospasm  dextrose 40% Gel 15 Gram(s) Oral once PRN Blood Glucose LESS THAN 70 milliGRAM(s)/deciliter  glucagon  Injectable 1 milliGRAM(s) IntraMuscular once PRN Glucose LESS THAN 70 milligrams/deciliter  ondansetron Injectable 4 milliGRAM(s) IV Push every 6 hours PRN Nausea      Smoking: [ ] yes  [x] no    AICD/PPM: [ ] yes   [x] no    Pertinent lab data:                        8.8    8.0   )-----------( 140      ( 17 Jul 2018 07:06 )             26.1     07-17    143  |  107  |  17  ----------------------------<  91  3.7   |  17<L>  |  0.83    Ca    7.6<L>      17 Jul 2018 07:02  Mg     1.5     07-17    CAPILLARY BLOOD GLUCOSE  POCT Blood Glucose.: 98 mg/dL (17 Jul 2018 06:08)      < from: Transthoracic Echocardiogram (08.01.17 @ 21:55) >    Fractional short: 42 %  EF (Teicholtz): 73 %  Doppler Peak Velocity (m/sec): AoV=2.0  ------------------------------------------------------------------------  Observations:  Mitral Valve: Mitral annular calcification, otherwise  normal mitral valve. Minimal mitral regurgitation.  Aortic Valve/Aorta: Calcified trileaflet aortic valve with  decreased opening. Peak transaortic valve gradient equals  16 mm Hg, mean transaortic valve gradient equals 10 mm Hg,  estimated aortic valve area equals 1.5 sqcm (by continuity  equation), aortic valve velocity time integral equals 42  cm, consistent with moderate aortic stenosis. Mild aortic  regurgitation.  Peak left ventricular outflow tract  gradient equals 3 mm Hg, mean gradient is equal to 2 mm Hg,  LVOT velocity time integral equals 20 cm.  Aortic Root: 2.7 cm.  LVOT diameter: 2 cm.  Left Atrium: Normal leftatrium.  LA volume index = 26  cc/m2.  Left Ventricle: Normal left ventricular systolic function.  No segmental wall motion abnormalities. Normal left  ventricular internal dimensions and wall thicknesses.  Reversal of the E-A waves of the mitral inflow pattern  consistent with reduced compliance of the left ventricle.  Right Heart: Normal right atrium. The right ventricle is  not well visualized; grossly normal right ventricular  systolic function. Normal tricuspid valve. Minimal  tricuspid regurgitation. Normal pulmonic valve. Minimal  pulmonic regurgitation.  Pericardium/Pleura: Normal pericardium with no pericardial  effusion.  Hemodynamic: Estimated right atrial pressure is 8 mm Hg.  Inadequate tricuspid regurgitation Doppler envelope  precludes estimation of RVSP.  ------------------------------------------------------------------------  Conclusions:  1. Mitral annular calcification, otherwise normal mitral  valve. Minimal mitral regurgitation.  2. Calcified trileaflet aortic valve with decreased  opening. Peak transaortic valve gradient equals 16 mm Hg,  mean transaortic valve gradient equals 10 mm Hg, estimated  aortic valve area equals 1.5 sqcm (by continuity equation),  aortic valve velocity time integral equals 42 cm,  consistent with moderate aortic stenosis. Mild aortic  regurgitation.  3. Normal left ventricular internal dimensions and wall  thicknesses.  4. Normal left ventricular systolic function. No segmental  wall motion abnormalities.  5. Reversal of the E-A waves of the mitral inflow pattern  consistent with reduced compliance of the left ventricle.  6. The right ventricle is not well visualized; grossly  normal right ventricular systolic function.  *** Compared with echocardiogram of 3/24/2014, no  significant changes noted.    < end of copied text >        Physical Examination:  Daily Height in cm: 152.4 (17 Jul 2018 05:01)    Daily   Vital Signs Last 24 Hrs  T(C): 36.7 (17 Jul 2018 11:52), Max: 37 (17 Jul 2018 04:55)  T(F): 98.1 (17 Jul 2018 11:52), Max: 98.6 (17 Jul 2018 04:55)  HR: 97 (17 Jul 2018 11:52) (82 - 97)  BP: 135/71 (17 Jul 2018 11:52) (128/75 - 151/76)  BP(mean): --  RR: 18 (17 Jul 2018 11:52) (17 - 18)  SpO2: 98% (17 Jul 2018 11:52) (96% - 99%)    Drug Dosing Weight  Height (cm): 152.4 (17 Jul 2018 05:01)  Weight (kg): 65 (17 Jul 2018 05:01)  BMI (kg/m2): 28 (17 Jul 2018 05:01)  BSA (m2): 1.62 (17 Jul 2018 05:01)    Constitutional: NAD  Neck:  No JVD  Respiratory: CTAB/L  Cardiovascular: S1 and S2  Gastrointestinal: BS+, soft, NT/ND  Extremities: No peripheral edema  Neurological: A/O x 3, no focal deficits  : No Maldonado  Skin: No rashes    Comments:    ASA Class: I [ ]  II [ ]  III [X]  IV [ ]    The patient is a suitable candidate for the planned procedure unless box checked [ ]  No, explain:

## 2018-07-17 NOTE — PROGRESS NOTE ADULT - SUBJECTIVE AND OBJECTIVE BOX
INTERVAL HPI/OVERNIGHT EVENTS: stable overnight, had BRB with prep-discussed with Dr. Davis last night-bleeding scan neg and Hb down 1 gm overnight, no further bleeding this am    MEDICATIONS  (STANDING):  buDESOnide 160 MICROgram(s)/formoterol 4.5 MICROgram(s) Inhaler 2 Puff(s) Inhalation two times a day  dextrose 5%. 1000 milliLiter(s) (50 mL/Hr) IV Continuous <Continuous>  dextrose 50% Injectable 12.5 Gram(s) IV Push once  dextrose 50% Injectable 25 Gram(s) IV Push once  dextrose 50% Injectable 25 Gram(s) IV Push once  insulin lispro (HumaLOG) corrective regimen sliding scale   SubCutaneous every 6 hours  pantoprazole  Injectable 40 milliGRAM(s) IV Push two times a day  sodium chloride 0.9%. 1000 milliLiter(s) (75 mL/Hr) IV Continuous <Continuous>    MEDICATIONS  (PRN):  ALBUTerol    90 MICROgram(s) HFA Inhaler 2 Puff(s) Inhalation every 6 hours PRN Bronchospasm  dextrose 40% Gel 15 Gram(s) Oral once PRN Blood Glucose LESS THAN 70 milliGRAM(s)/deciliter  glucagon  Injectable 1 milliGRAM(s) IntraMuscular once PRN Glucose LESS THAN 70 milligrams/deciliter  ondansetron Injectable 4 milliGRAM(s) IV Push every 6 hours PRN Nausea      Allergies    No Known Allergies    Intolerances            PHYSICAL EXAM:   Vital Signs:  Vital Signs Last 24 Hrs  T(C): 37 (17 Jul 2018 04:55), Max: 37 (17 Jul 2018 04:55)  T(F): 98.6 (17 Jul 2018 04:55), Max: 98.6 (17 Jul 2018 04:55)  HR: 82 (17 Jul 2018 04:55) (82 - 90)  BP: 135/68 (17 Jul 2018 04:55) (124/74 - 151/76)  BP(mean): --  RR: 17 (17 Jul 2018 04:55) (17 - 18)  SpO2: 97% (17 Jul 2018 04:55) (96% - 99%)  Daily Height in cm: 152.4 (17 Jul 2018 05:01)    Daily     GENERAL:  no distress  HEENT:  NC/AT,  anicteric  CHEST:   no increased effort, breath sounds clear  HEART:  Regular rhythm  ABDOMEN:  Soft, non-tender, non-distended, normoactive bowel sounds,  no masses ,no hepato-splenomegaly, no signs of chronic liver disease  EXTEREMITIES:  no cyanosis      LABS:                        8.8    8.0   )-----------( 140      ( 17 Jul 2018 07:06 )             26.1     07-17    143  |  107  |  17  ----------------------------<  91  3.7   |  17<L>  |  0.83    Ca    7.6<L>      17 Jul 2018 07:02  Mg     1.5     07-17            RADIOLOGY & ADDITIONAL TESTS:

## 2018-07-17 NOTE — PROGRESS NOTE ADULT - SUBJECTIVE AND OBJECTIVE BOX
Patient is a 86y old  Female who presents with a chief complaint of BRBPR (16 Jul 2018 11:35)                                                               INTERVAL HPI/OVERNIGHT EVENTS: had a bloody morvement last night     REVIEW OF SYSTEMS:     CONSTITUTIONAL: No weakness, fevers or chills  RESPIRATORY: No cough, wheezing,  No shortness of breath  CARDIOVASCULAR: No chest pain or palpitations  GASTROINTESTINAL: No abdominal pain  . No nausea, vomiting, or hematemesis; No diarrhea or constipation. No melena or hematochezia.  GENITOURINARY: No dysuria, frequency or hematuria  NEUROLOGICAL: No numbness or weakness                                                                                                                                                                                                                                                                                Medications:  MEDICATIONS  (STANDING):  buDESOnide 160 MICROgram(s)/formoterol 4.5 MICROgram(s) Inhaler 2 Puff(s) Inhalation two times a day  dextrose 5%. 1000 milliLiter(s) (50 mL/Hr) IV Continuous <Continuous>  dextrose 50% Injectable 12.5 Gram(s) IV Push once  dextrose 50% Injectable 25 Gram(s) IV Push once  dextrose 50% Injectable 25 Gram(s) IV Push once  insulin lispro (HumaLOG) corrective regimen sliding scale   SubCutaneous every 6 hours  pantoprazole  Injectable 40 milliGRAM(s) IV Push two times a day  sodium chloride 0.9%. 1000 milliLiter(s) (75 mL/Hr) IV Continuous <Continuous>    MEDICATIONS  (PRN):  ALBUTerol    90 MICROgram(s) HFA Inhaler 2 Puff(s) Inhalation every 6 hours PRN Bronchospasm  dextrose 40% Gel 15 Gram(s) Oral once PRN Blood Glucose LESS THAN 70 milliGRAM(s)/deciliter  glucagon  Injectable 1 milliGRAM(s) IntraMuscular once PRN Glucose LESS THAN 70 milligrams/deciliter  ondansetron Injectable 4 milliGRAM(s) IV Push every 6 hours PRN Nausea       Allergies    No Known Allergies    Intolerances      Vital Signs Last 24 Hrs  T(C): 36.7 (17 Jul 2018 11:52), Max: 37 (17 Jul 2018 04:55)  T(F): 98.1 (17 Jul 2018 11:52), Max: 98.6 (17 Jul 2018 04:55)  HR: 97 (17 Jul 2018 11:52) (82 - 97)  BP: 135/71 (17 Jul 2018 11:52) (128/75 - 151/76)  BP(mean): --  RR: 18 (17 Jul 2018 11:52) (17 - 18)  SpO2: 98% (17 Jul 2018 11:52) (96% - 99%)  CAPILLARY BLOOD GLUCOSE      POCT Blood Glucose.: 98 mg/dL (17 Jul 2018 06:08)  POCT Blood Glucose.: 106 mg/dL (16 Jul 2018 23:33)  POCT Blood Glucose.: 108 mg/dL (16 Jul 2018 18:04)      07-16 @ 07:01  -  07-17 @ 07:00  --------------------------------------------------------  IN: 900 mL / OUT: 1050 mL / NET: -150 mL    07-17 @ 07:01  -  07-17 @ 15:14  --------------------------------------------------------  IN: 0 mL / OUT: 300 mL / NET: -300 mL      Physical Exam:    Daily Height in cm: 152.4 (17 Jul 2018 05:01)    General:  Well appearing, NAD, not cachetic  HEENT:  Nonicteric, PERRLA  CV:  RRR, S1S2   Lungs:  CTA B/L, no wheezes, rales, rhonchi  Abdomen:  Soft, non-tender, no distended, positive BS  Extremities:  2+ pulses, no c/c, no edema  Skin:  Warm and dry, no rashes  :  No mason  Neuro:  AAOx3, non-focal, grossly intact                                                                                                                                                                                                                                                                                                LABS:                               8.8    8.0   )-----------( 140      ( 17 Jul 2018 07:06 )             26.1                      07-17    143  |  107  |  17  ----------------------------<  91  3.7   |  17<L>  |  0.83    Ca    7.6<L>      17 Jul 2018 07:02  Mg     1.5     07-17                         RADIOLOGY & ADDITIONAL TESTS         I personally reviewed: [  ]EKG   [  ]CXR    [  ] CT      A/P:         Discussed with :     [employer] consultants' Notes   Time spent :

## 2018-07-17 NOTE — PROGRESS NOTE ADULT - SUBJECTIVE AND OBJECTIVE BOX
Subjective: Patient seen and examined. No new events except as noted.     SUBJECTIVE/ROS:    feels better     MEDICATIONS:  MEDICATIONS  (STANDING):  buDESOnide 160 MICROgram(s)/formoterol 4.5 MICROgram(s) Inhaler 2 Puff(s) Inhalation two times a day  dextrose 5%. 1000 milliLiter(s) (50 mL/Hr) IV Continuous <Continuous>  dextrose 50% Injectable 12.5 Gram(s) IV Push once  dextrose 50% Injectable 25 Gram(s) IV Push once  dextrose 50% Injectable 25 Gram(s) IV Push once  insulin lispro (HumaLOG) corrective regimen sliding scale   SubCutaneous every 6 hours  pantoprazole  Injectable 40 milliGRAM(s) IV Push two times a day  sodium chloride 0.9%. 1000 milliLiter(s) (75 mL/Hr) IV Continuous <Continuous>      PHYSICAL EXAM:  T(C): 36.7 (07-17-18 @ 11:52), Max: 37 (07-17-18 @ 04:55)  HR: 97 (07-17-18 @ 11:52) (82 - 97)  BP: 135/71 (07-17-18 @ 11:52) (128/75 - 151/76)  RR: 18 (07-17-18 @ 11:52) (17 - 18)  SpO2: 98% (07-17-18 @ 11:52) (97% - 99%)  Wt(kg): --  I&O's Summary    16 Jul 2018 07:01  -  17 Jul 2018 07:00  --------------------------------------------------------  IN: 900 mL / OUT: 1050 mL / NET: -150 mL    17 Jul 2018 07:01  -  17 Jul 2018 16:36  --------------------------------------------------------  IN: 0 mL / OUT: 300 mL / NET: -300 mL      Height (cm): 152.4 (07-17 @ 05:01)  Weight (kg): 65 (07-17 @ 05:01)  BMI (kg/m2): 28 (07-17 @ 05:01)  BSA (m2): 1.62 (07-17 @ 05:01)    Appearance: Normal	  HEENT:   Normal oral mucosa, PERRL, EOMI	  Cardiovascular: Normal S1 S2,    Murmur:   Neck: JVP normal  Respiratory: Lungs clear to auscultation  Gastrointestinal:  Soft, Non-tender, + BS	  Skin: normal   Neuro: No gross deficits.   Psychiatry:  Mood & affect appropriate  Ext: No edema      LABS/DATA:    CARDIAC MARKERS:                                9.8    13.2  )-----------( 155      ( 17 Jul 2018 15:56 )             27.7     07-17    143  |  107  |  17  ----------------------------<  91  3.7   |  17<L>  |  0.83    Ca    7.6<L>      17 Jul 2018 07:02  Mg     1.5     07-17      proBNP:   Lipid Profile:   HgA1c:   TSH:     TELE:  EKG:

## 2018-07-17 NOTE — PROGRESS NOTE ADULT - ASSESSMENT
86 y/o fmeale with hx of RA on mtx , and was on prednisone ( no longer on steroids since 09/2017  and has not recieved iv infusion  ( monthly simponi ) in three months . also history of HTN and DM .. has been admitted twice over the past 6-8 months with  weakness . initial admit pt was thought ot have an element of adrenal insufficeiny when she was on steroids .. and pt was sent out on steroids with some improvement,.. later admitted as lij for weakness thought to be multifactorial sec to viral illness, dehydration , and leemt of steroid -induced myopathy.more recently admitted with weakness sec to hMPV and now admitted with acute GIB     1- acuet blood loss anemia : s/p transfusion    monitor H/H       2- acute GI : likely diverticultar   plan for colonoscopy given CT findings of possible malignancy   d/w / Chuy       3- DM: monitor FS   4- HTN: monitor BP closely   5- AS:  stable

## 2018-07-18 VITALS — RESPIRATION RATE: 18 BRPM | HEART RATE: 82 BPM

## 2018-07-18 DIAGNOSIS — E11.9 TYPE 2 DIABETES MELLITUS WITHOUT COMPLICATIONS: ICD-10-CM

## 2018-07-18 DIAGNOSIS — K57.91 DIVERTICULOSIS OF INTESTINE, PART UNSPECIFIED, WITHOUT PERFORATION OR ABSCESS WITH BLEEDING: ICD-10-CM

## 2018-07-18 LAB
ANION GAP SERPL CALC-SCNC: 12 MMOL/L — SIGNIFICANT CHANGE UP (ref 5–17)
BUN SERPL-MCNC: 13 MG/DL — SIGNIFICANT CHANGE UP (ref 7–23)
CALCIUM SERPL-MCNC: 7.5 MG/DL — LOW (ref 8.4–10.5)
CHLORIDE SERPL-SCNC: 111 MMOL/L — HIGH (ref 96–108)
CO2 SERPL-SCNC: 19 MMOL/L — LOW (ref 22–31)
CREAT SERPL-MCNC: 0.89 MG/DL — SIGNIFICANT CHANGE UP (ref 0.5–1.3)
GLUCOSE BLDC GLUCOMTR-MCNC: 180 MG/DL — HIGH (ref 70–99)
GLUCOSE BLDC GLUCOMTR-MCNC: 208 MG/DL — HIGH (ref 70–99)
GLUCOSE BLDC GLUCOMTR-MCNC: 306 MG/DL — HIGH (ref 70–99)
GLUCOSE SERPL-MCNC: 170 MG/DL — HIGH (ref 70–99)
HCT VFR BLD CALC: 24.5 % — LOW (ref 34.5–45)
HCT VFR BLD CALC: 26.5 % — LOW (ref 34.5–45)
HGB BLD-MCNC: 8.2 G/DL — LOW (ref 11.5–15.5)
HGB BLD-MCNC: 9.4 G/DL — LOW (ref 11.5–15.5)
MAGNESIUM SERPL-MCNC: 1.6 MG/DL — SIGNIFICANT CHANGE UP (ref 1.6–2.6)
MCHC RBC-ENTMCNC: 29.5 PG — SIGNIFICANT CHANGE UP (ref 27–34)
MCHC RBC-ENTMCNC: 32.2 PG — SIGNIFICANT CHANGE UP (ref 27–34)
MCHC RBC-ENTMCNC: 33.5 GM/DL — SIGNIFICANT CHANGE UP (ref 32–36)
MCHC RBC-ENTMCNC: 35.4 GM/DL — SIGNIFICANT CHANGE UP (ref 32–36)
MCV RBC AUTO: 88.1 FL — SIGNIFICANT CHANGE UP (ref 80–100)
MCV RBC AUTO: 91 FL — SIGNIFICANT CHANGE UP (ref 80–100)
PLATELET # BLD AUTO: 163 K/UL — SIGNIFICANT CHANGE UP (ref 150–400)
PLATELET # BLD AUTO: 186 K/UL — SIGNIFICANT CHANGE UP (ref 150–400)
POTASSIUM SERPL-MCNC: 3.7 MMOL/L — SIGNIFICANT CHANGE UP (ref 3.5–5.3)
POTASSIUM SERPL-SCNC: 3.7 MMOL/L — SIGNIFICANT CHANGE UP (ref 3.5–5.3)
RBC # BLD: 2.78 M/UL — LOW (ref 3.8–5.2)
RBC # BLD: 2.92 M/UL — LOW (ref 3.8–5.2)
RBC # FLD: 15.9 % — HIGH (ref 10.3–14.5)
RBC # FLD: 17.2 % — HIGH (ref 10.3–14.5)
SODIUM SERPL-SCNC: 142 MMOL/L — SIGNIFICANT CHANGE UP (ref 135–145)
WBC # BLD: 11.4 K/UL — HIGH (ref 3.8–10.5)
WBC # BLD: 9.43 K/UL — SIGNIFICANT CHANGE UP (ref 3.8–10.5)
WBC # FLD AUTO: 11.4 K/UL — HIGH (ref 3.8–10.5)
WBC # FLD AUTO: 9.43 K/UL — SIGNIFICANT CHANGE UP (ref 3.8–10.5)

## 2018-07-18 PROCEDURE — 99285 EMERGENCY DEPT VISIT HI MDM: CPT | Mod: 25

## 2018-07-18 PROCEDURE — 36430 TRANSFUSION BLD/BLD COMPNT: CPT

## 2018-07-18 PROCEDURE — P9040: CPT

## 2018-07-18 PROCEDURE — 83605 ASSAY OF LACTIC ACID: CPT

## 2018-07-18 PROCEDURE — 82330 ASSAY OF CALCIUM: CPT

## 2018-07-18 PROCEDURE — 94640 AIRWAY INHALATION TREATMENT: CPT

## 2018-07-18 PROCEDURE — 86901 BLOOD TYPING SEROLOGIC RH(D): CPT

## 2018-07-18 PROCEDURE — 74177 CT ABD & PELVIS W/CONTRAST: CPT

## 2018-07-18 PROCEDURE — P9016: CPT

## 2018-07-18 PROCEDURE — 85730 THROMBOPLASTIN TIME PARTIAL: CPT

## 2018-07-18 PROCEDURE — 82272 OCCULT BLD FECES 1-3 TESTS: CPT

## 2018-07-18 PROCEDURE — 86850 RBC ANTIBODY SCREEN: CPT

## 2018-07-18 PROCEDURE — 83036 HEMOGLOBIN GLYCOSYLATED A1C: CPT

## 2018-07-18 PROCEDURE — 85014 HEMATOCRIT: CPT

## 2018-07-18 PROCEDURE — 78278 ACUTE GI BLOOD LOSS IMAGING: CPT

## 2018-07-18 PROCEDURE — 80048 BASIC METABOLIC PNL TOTAL CA: CPT

## 2018-07-18 PROCEDURE — 86923 COMPATIBILITY TEST ELECTRIC: CPT

## 2018-07-18 PROCEDURE — 85027 COMPLETE CBC AUTOMATED: CPT

## 2018-07-18 PROCEDURE — 82962 GLUCOSE BLOOD TEST: CPT

## 2018-07-18 PROCEDURE — A9560: CPT

## 2018-07-18 PROCEDURE — 83735 ASSAY OF MAGNESIUM: CPT

## 2018-07-18 PROCEDURE — 81001 URINALYSIS AUTO W/SCOPE: CPT

## 2018-07-18 PROCEDURE — 80053 COMPREHEN METABOLIC PANEL: CPT

## 2018-07-18 PROCEDURE — 82435 ASSAY OF BLOOD CHLORIDE: CPT

## 2018-07-18 PROCEDURE — 86900 BLOOD TYPING SEROLOGIC ABO: CPT

## 2018-07-18 PROCEDURE — 82947 ASSAY GLUCOSE BLOOD QUANT: CPT

## 2018-07-18 PROCEDURE — 84132 ASSAY OF SERUM POTASSIUM: CPT

## 2018-07-18 PROCEDURE — 82803 BLOOD GASES ANY COMBINATION: CPT

## 2018-07-18 PROCEDURE — 93005 ELECTROCARDIOGRAM TRACING: CPT

## 2018-07-18 PROCEDURE — 85610 PROTHROMBIN TIME: CPT

## 2018-07-18 PROCEDURE — 84295 ASSAY OF SERUM SODIUM: CPT

## 2018-07-18 PROCEDURE — 97161 PT EVAL LOW COMPLEX 20 MIN: CPT

## 2018-07-18 RX ORDER — PANTOPRAZOLE SODIUM 20 MG/1
1 TABLET, DELAYED RELEASE ORAL
Qty: 60 | Refills: 0 | OUTPATIENT
Start: 2018-07-18 | End: 2018-08-16

## 2018-07-18 RX ORDER — PANTOPRAZOLE SODIUM 20 MG/1
1 TABLET, DELAYED RELEASE ORAL
Qty: 0 | Refills: 0 | COMMUNITY
Start: 2018-07-18

## 2018-07-18 RX ORDER — PANTOPRAZOLE SODIUM 20 MG/1
40 TABLET, DELAYED RELEASE ORAL
Qty: 0 | Refills: 0 | Status: DISCONTINUED | OUTPATIENT
Start: 2018-07-18 | End: 2018-07-18

## 2018-07-18 RX ADMIN — SODIUM CHLORIDE 75 MILLILITER(S): 9 INJECTION INTRAMUSCULAR; INTRAVENOUS; SUBCUTANEOUS at 06:14

## 2018-07-18 RX ADMIN — Medication 2: at 16:59

## 2018-07-18 RX ADMIN — Medication 4: at 12:21

## 2018-07-18 RX ADMIN — BUDESONIDE AND FORMOTEROL FUMARATE DIHYDRATE 2 PUFF(S): 160; 4.5 AEROSOL RESPIRATORY (INHALATION) at 06:13

## 2018-07-18 RX ADMIN — PANTOPRAZOLE SODIUM 40 MILLIGRAM(S): 20 TABLET, DELAYED RELEASE ORAL at 06:14

## 2018-07-18 RX ADMIN — Medication 1: at 08:33

## 2018-07-18 NOTE — PHYSICAL THERAPY INITIAL EVALUATION ADULT - PERTINENT HX OF CURRENT PROBLEM, REHAB EVAL
86F w/ pmh HTN, HLD, RA (methotrexate), no blood thinners, p/w bloody stools x 2 days, assc w/ lightheadedness and nausea. No vomit, fever, dysuria. +chr L abd pain that is unchanged. Never has had this before. No bloody emesis / cough. Last colonoscopy 3 yrs ago, says result was normal. No significant recent weight loss

## 2018-07-18 NOTE — PROGRESS NOTE ADULT - SUBJECTIVE AND OBJECTIVE BOX
Subjective: Patient seen and examined. No new events except as noted.     SUBJECTIVE/ROS:        MEDICATIONS:  MEDICATIONS  (STANDING):  buDESOnide 160 MICROgram(s)/formoterol 4.5 MICROgram(s) Inhaler 2 Puff(s) Inhalation two times a day  dextrose 5%. 1000 milliLiter(s) (50 mL/Hr) IV Continuous <Continuous>  dextrose 50% Injectable 12.5 Gram(s) IV Push once  dextrose 50% Injectable 25 Gram(s) IV Push once  dextrose 50% Injectable 25 Gram(s) IV Push once  insulin lispro (HumaLOG) corrective regimen sliding scale   SubCutaneous three times a day before meals  insulin lispro (HumaLOG) corrective regimen sliding scale   SubCutaneous at bedtime  pantoprazole    Tablet 40 milliGRAM(s) Oral two times a day  sodium chloride 0.9%. 1000 milliLiter(s) (75 mL/Hr) IV Continuous <Continuous>      PHYSICAL EXAM:  T(C): 36.7 (07-18-18 @ 11:58), Max: 36.8 (07-18-18 @ 04:53)  HR: 82 (07-18-18 @ 14:47) (82 - 88)  BP: 129/77 (07-18-18 @ 11:58) (111/58 - 146/75)  RR: 18 (07-18-18 @ 14:47) (18 - 18)  SpO2: 96% (07-18-18 @ 11:58) (93% - 96%)  Wt(kg): --  I&O's Summary    17 Jul 2018 07:01  -  18 Jul 2018 07:00  --------------------------------------------------------  IN: 900 mL / OUT: 950 mL / NET: -50 mL    18 Jul 2018 07:01  -  18 Jul 2018 16:29  --------------------------------------------------------  IN: 760 mL / OUT: 700 mL / NET: 60 mL          JVP: Normal  Neck: supple  Lung: clear   CV: S1 S2 , Murmur:  Abd: soft  Ext: No edema  neuro: Awake / alert  Psych: flat affect  Skin: normal       LABS/DATA:    CARDIAC MARKERS:                                9.4    11.4  )-----------( 186      ( 18 Jul 2018 12:50 )             26.5     07-18    142  |  111<H>  |  13  ----------------------------<  170<H>  3.7   |  19<L>  |  0.89    Ca    7.5<L>      18 Jul 2018 05:56  Mg     1.6     07-18      proBNP:   Lipid Profile:   HgA1c:   TSH:     TELE:  EKG:

## 2018-07-18 NOTE — PHYSICAL THERAPY INITIAL EVALUATION ADULT - ADDITIONAL COMMENTS
86 year old female who PTA pt was living in a PH + Stairs (3-4 steps to enter) living on first floor and was independent in all functional mobility and ADL's. RW for gait as needed.

## 2018-07-18 NOTE — PHYSICAL THERAPY INITIAL EVALUATION ADULT - CRITERIA FOR SKILLED THERAPEUTIC INTERVENTIONS
impairments found/risk reduction/prevention/anticipated discharge recommendation/rehab potential/therapy frequency/predicted duration of therapy intervention/functional limitations in following categories/anticipated equipment needs at discharge

## 2018-07-18 NOTE — CONSULT NOTE ADULT - ASSESSMENT
Assessment  DMT2: 86y Female with DM T2 with hyperglycemia on insulin, blood sugars improving, no hypoglycemic episode,  eating meals,  non compliant with low carb diet.  GIB: H&H stable, monitored.  HTN: Controlled, On med.

## 2018-07-18 NOTE — CONSULT NOTE ADULT - SUBJECTIVE AND OBJECTIVE BOX
HPI:  86F w/ pmh HTN, HLD, RA (methotrexate), no blood thinners, p/w bloody stools x 2 days, assc w/ lightheadedness and nausea. No vomit, fever, dysuria. +chr L abd pain that is unchanged. Never has had this before. No bloody emesis / cough. Last colonoscopy 3 yrs ago, says result was normal. No significant recent weight loss. (14 Jul 2018 16:08)  Patient has history of diabetes, on insulin at home, no recent hypoglycemic episodes, no polyuria polydipsia. Patient follows up with PCP for diabetes management.    PAST MEDICAL & SURGICAL HISTORY:  RA (rheumatoid arthritis)  Asthma  DM (diabetes mellitus), type 2  HTN (hypertension), benign  After-cataract of left eye  Tubal occlusion      FAMILY HISTORY:  No pertinent family history in first degree relatives      Social History:    Outpatient Medications:    MEDICATIONS  (STANDING):  buDESOnide 160 MICROgram(s)/formoterol 4.5 MICROgram(s) Inhaler 2 Puff(s) Inhalation two times a day  dextrose 5%. 1000 milliLiter(s) (50 mL/Hr) IV Continuous <Continuous>  dextrose 50% Injectable 12.5 Gram(s) IV Push once  dextrose 50% Injectable 25 Gram(s) IV Push once  dextrose 50% Injectable 25 Gram(s) IV Push once  insulin lispro (HumaLOG) corrective regimen sliding scale   SubCutaneous three times a day before meals  insulin lispro (HumaLOG) corrective regimen sliding scale   SubCutaneous at bedtime  pantoprazole    Tablet 40 milliGRAM(s) Oral two times a day  sodium chloride 0.9%. 1000 milliLiter(s) (75 mL/Hr) IV Continuous <Continuous>    MEDICATIONS  (PRN):  ALBUTerol    90 MICROgram(s) HFA Inhaler 2 Puff(s) Inhalation every 6 hours PRN Bronchospasm  dextrose 40% Gel 15 Gram(s) Oral once PRN Blood Glucose LESS THAN 70 milliGRAM(s)/deciliter  glucagon  Injectable 1 milliGRAM(s) IntraMuscular once PRN Glucose LESS THAN 70 milligrams/deciliter  ondansetron Injectable 4 milliGRAM(s) IV Push every 6 hours PRN Nausea      Allergies    No Known Allergies    Intolerances      Review of Systems:  Constitutional: No fever, no chills  Eyes: No blurry vision  Neuro: No tremors  HEENT: No pain, no neck swelling  Cardiovascular: No chest pain, no palpitations  Respiratory: Has SOB, no cough  GI: No nausea, vomiting, abdominal pain  : No dysuria  Skin: no rash  MSK: Has leg swelling.  Psych: no depression  Endocrine: no polyuria, polydipsia    ALL OTHER SYSTEMS REVIEWED AND NEGATIVE    UNABLE TO OBTAIN    PHYSICAL EXAM:  VITALS: T(C): 36.7 (07-18-18 @ 11:58)  T(F): 98.1 (07-18-18 @ 11:58), Max: 98.3 (07-18-18 @ 04:53)  HR: 82 (07-18-18 @ 14:47) (82 - 88)  BP: 129/77 (07-18-18 @ 11:58) (111/58 - 146/75)  RR:  (18 - 18)  SpO2:  (93% - 96%)  Wt(kg): --  GENERAL: NAD, well-groomed, well-developed  EYES: No proptosis, no lid lag  HEENT:  Atraumatic, Normocephalic  THYROID: Normal size, no palpable nodules  RESPIRATORY: Clear to auscultation bilaterally; No rales, rhonchi, wheezing  CARDIOVASCULAR: Si S2, No murmurs;  GI: Soft, non distended, normal bowel sounds  SKIN: Dry, intact, No rashes or lesions  MUSCULOSKELETAL: Has BL lower extremity edema.  NEURO:  no tremor, sensation decreased in feet BL,    POCT Blood Glucose.: 306 mg/dL (07-18-18 @ 12:09)  POCT Blood Glucose.: 180 mg/dL (07-18-18 @ 08:10)  POCT Blood Glucose.: 221 mg/dL (07-17-18 @ 21:57)  POCT Blood Glucose.: 180 mg/dL (07-17-18 @ 16:51)  POCT Blood Glucose.: 98 mg/dL (07-17-18 @ 06:08)  POCT Blood Glucose.: 106 mg/dL (07-16-18 @ 23:33)  POCT Blood Glucose.: 108 mg/dL (07-16-18 @ 18:04)  POCT Blood Glucose.: 140 mg/dL (07-16-18 @ 12:22)  POCT Blood Glucose.: 137 mg/dL (07-16-18 @ 06:04)  POCT Blood Glucose.: 170 mg/dL (07-15-18 @ 23:36)  POCT Blood Glucose.: 157 mg/dL (07-15-18 @ 17:59)  POCT Blood Glucose.: 158 mg/dL (07-15-18 @ 17:06)                            9.4    11.4  )-----------( 186      ( 18 Jul 2018 12:50 )             26.5       07-18    142  |  111<H>  |  13  ----------------------------<  170<H>  3.7   |  19<L>  |  0.89    EGFR if : 68  EGFR if non : 59<L>    Ca    7.5<L>      07-18  Mg     1.6     07-18        Thyroid Function Tests:      Hemoglobin A1C, Whole Blood: 6.1 % <H> [4.0 - 5.6] (07-15-18 @ 08:25)          Radiology:

## 2018-07-18 NOTE — PHYSICAL THERAPY INITIAL EVALUATION ADULT - IMPAIRMENTS FOUND, PT EVAL
joint integrity and mobility/gait, locomotion, and balance/muscle strength/aerobic capacity/endurance

## 2018-07-18 NOTE — PROGRESS NOTE ADULT - SUBJECTIVE AND OBJECTIVE BOX
Patient is a 86y old  Female who presents with a chief complaint of BRBPR (16 Jul 2018 11:35)                                                               INTERVAL HPI/OVERNIGHT EVENTS:    REVIEW OF SYSTEMS:     CONSTITUTIONAL: No weakness, fevers or chillss  RESPIRATORY: No cough, wheezing,  No shortness of breath  CARDIOVASCULAR: No chest pain or palpitations  GASTROINTESTINAL: No abdominal pain  . No nausea, vomiting, or hematemesis; No diarrhea or constipation. No melena or hematochezia.  GENITOURINARY: No dysuria, frequency or hematuria  NEUROLOGICAL: No numbness or weakness                                                                                                                                                                                                                                                                             Medications:  MEDICATIONS  (STANDING):  buDESOnide 160 MICROgram(s)/formoterol 4.5 MICROgram(s) Inhaler 2 Puff(s) Inhalation two times a day  dextrose 5%. 1000 milliLiter(s) (50 mL/Hr) IV Continuous <Continuous>  dextrose 50% Injectable 12.5 Gram(s) IV Push once  dextrose 50% Injectable 25 Gram(s) IV Push once  dextrose 50% Injectable 25 Gram(s) IV Push once  insulin lispro (HumaLOG) corrective regimen sliding scale   SubCutaneous three times a day before meals  insulin lispro (HumaLOG) corrective regimen sliding scale   SubCutaneous at bedtime  pantoprazole    Tablet 40 milliGRAM(s) Oral two times a day  sodium chloride 0.9%. 1000 milliLiter(s) (75 mL/Hr) IV Continuous <Continuous>    MEDICATIONS  (PRN):  ALBUTerol    90 MICROgram(s) HFA Inhaler 2 Puff(s) Inhalation every 6 hours PRN Bronchospasm  dextrose 40% Gel 15 Gram(s) Oral once PRN Blood Glucose LESS THAN 70 milliGRAM(s)/deciliter  glucagon  Injectable 1 milliGRAM(s) IntraMuscular once PRN Glucose LESS THAN 70 milligrams/deciliter  ondansetron Injectable 4 milliGRAM(s) IV Push every 6 hours PRN Nausea       Allergies    No Known Allergies    Intolerances      Vital Signs Last 24 Hrs  T(C): 36.7 (18 Jul 2018 11:58), Max: 36.8 (18 Jul 2018 04:53)  T(F): 98.1 (18 Jul 2018 11:58), Max: 98.3 (18 Jul 2018 04:53)  HR: 82 (18 Jul 2018 14:47) (82 - 88)  BP: 129/77 (18 Jul 2018 11:58) (129/77 - 146/75)  BP(mean): --  RR: 18 (18 Jul 2018 14:47) (18 - 18)  SpO2: 96% (18 Jul 2018 11:58) (96% - 96%)  CAPILLARY BLOOD GLUCOSE      POCT Blood Glucose.: 208 mg/dL (18 Jul 2018 16:32)  POCT Blood Glucose.: 306 mg/dL (18 Jul 2018 12:09)  POCT Blood Glucose.: 180 mg/dL (18 Jul 2018 08:10)      07-17 @ 07:01 - 07-18 @ 07:00  --------------------------------------------------------  IN: 900 mL / OUT: 950 mL / NET: -50 mL    07-18 @ 07:01 - 07-18 @ 22:14  --------------------------------------------------------  IN: 760 mL / OUT: 700 mL / NET: 60 mL      Physical Exam:    General:  NAD  HEENT:  Nonicteric, PERRLA  CV:  RRR, S1S2   Lungs:  CTA B/L, no wheezes, rales, rhonchi  Abdomen:  Soft, non-tender, no distended, positive BS  Extremities:  2+ pulses, no c/c, no edema  Skin:  Warm and dry, no rashes  :  No mason  Neuro:  AAOx3, non-focal, grossly intact                                                                                                                                                                                                                                                                                                LABS:                               9.4    11.4  )-----------( 186      ( 18 Jul 2018 12:50 )             26.5                      07-18    142  |  111<H>  |  13  ----------------------------<  170<H>  3.7   |  19<L>  |  0.89    Ca    7.5<L>      18 Jul 2018 05:56  Mg     1.6     07-18

## 2018-07-18 NOTE — PROGRESS NOTE ADULT - ASSESSMENT
AS  moderate  clinically stable  outpt follow up     GIB  likely diverticular bleed  fu with GI   H/H stable     DM  Monitor finger stick. Insulin coverage. Diabetic education and Diabetic diet. Consider nutrition consultation.

## 2018-07-18 NOTE — PHYSICAL THERAPY INITIAL EVALUATION ADULT - ASR WT BEARING STATUS EVAL
7/17	s/p colon:  Diverticulosis in the sigmoid colon., Bleeding scan: Normal GI bleeding scan. No evidence of active bleeding site

## 2018-07-18 NOTE — PROGRESS NOTE ADULT - SUBJECTIVE AND OBJECTIVE BOX
INTERVAL HPI/OVERNIGHT EVENTS: stable overnight, no further bleeding, tolerating PO, colonoscopy results discussed    MEDICATIONS  (STANDING):  buDESOnide 160 MICROgram(s)/formoterol 4.5 MICROgram(s) Inhaler 2 Puff(s) Inhalation two times a day  dextrose 5%. 1000 milliLiter(s) (50 mL/Hr) IV Continuous <Continuous>  dextrose 50% Injectable 12.5 Gram(s) IV Push once  dextrose 50% Injectable 25 Gram(s) IV Push once  dextrose 50% Injectable 25 Gram(s) IV Push once  insulin lispro (HumaLOG) corrective regimen sliding scale   SubCutaneous three times a day before meals  insulin lispro (HumaLOG) corrective regimen sliding scale   SubCutaneous at bedtime  pantoprazole  Injectable 40 milliGRAM(s) IV Push two times a day  sodium chloride 0.9%. 1000 milliLiter(s) (75 mL/Hr) IV Continuous <Continuous>    MEDICATIONS  (PRN):  ALBUTerol    90 MICROgram(s) HFA Inhaler 2 Puff(s) Inhalation every 6 hours PRN Bronchospasm  dextrose 40% Gel 15 Gram(s) Oral once PRN Blood Glucose LESS THAN 70 milliGRAM(s)/deciliter  glucagon  Injectable 1 milliGRAM(s) IntraMuscular once PRN Glucose LESS THAN 70 milligrams/deciliter  ondansetron Injectable 4 milliGRAM(s) IV Push every 6 hours PRN Nausea      Allergies    No Known Allergies    Intolerances            PHYSICAL EXAM:   Vital Signs:  Vital Signs Last 24 Hrs  T(C): 36.8 (2018 04:53), Max: 36.8 (2018 04:53)  T(F): 98.3 (2018 04:53), Max: 98.3 (2018 04:53)  HR: 85 (2018 04:53) (85 - 97)  BP: 146/75 (2018 04:53) (111/58 - 146/75)  BP(mean): --  RR: 18 (2018 04:53) (18 - 18)  SpO2: 96% (2018 04:53) (93% - 98%)  Daily     Daily     GENERAL:  no distress  HEENT:  NC/AT,  anicteric  CHEST:   no increased effort, breath sounds clear  HEART:  Regular rhythm  ABDOMEN:  Soft, non-tender, non-distended, normoactive bowel sounds,  no masses ,no hepato-splenomegaly, no signs of chronic liver disease  EXTEREMITIES:  no cyanosis      LABS:                        9.8    13.2  )-----------( 155      ( 2018 15:56 )             27.7     07-18    142  |  111<H>  |  13  ----------------------------<  170<H>  3.7   |  19<L>  |  0.89    Ca    7.5<L>      2018 05:56  Mg     1.6             Urinalysis Basic - ( 2018 09:30 )    Color: PL Yellow / Appearance: Clear / S.014 / pH: x  Gluc: x / Ketone: Large  / Bili: Negative / Urobili: Negative   Blood: x / Protein: Negative / Nitrite: Negative   Leuk Esterase: Negative / RBC: 0-2 /HPF / WBC 3-5 /HPF   Sq Epi: x / Non Sq Epi: OCC /HPF / Bacteria: x        RADIOLOGY & ADDITIONAL TESTS:

## 2018-07-18 NOTE — PHYSICAL THERAPY INITIAL EVALUATION ADULT - PRECAUTIONS/LIMITATIONS, REHAB EVAL
CT scan: 7/14 IMPRESSION: Colonic diverticulosis without evidence of diverticulitis. Stool noted within the rectum.2 sites of arterial hyperenhancement at the rectosigmoid junction and in the ascending colon likely corresponding to the source of the patient's lower GI bleeding and suspicious for neoplasm./fall precautions

## 2018-07-18 NOTE — PROGRESS NOTE ADULT - ASSESSMENT
86 y/o fmeale with hx of RA on mtx , and was on prednisone ( no longer on steroids since 09/2017  and has not recieved iv infusion  ( monthly simponi ) in three months . also history of HTN and DM .. has been admitted twice over the past 6-8 months with  weakness . initial admit pt was thought ot have an element of adrenal insufficeiny when she was on steroids .. and pt was sent out on steroids with some improvement,.. later admitted as lij for weakness thought to be multifactorial sec to viral illness, dehydration , and leemt of steroid -induced myopathy.more recently admitted with weakness sec to hMPV and now admitted with acute GIB     1- acuet blood loss anemia : s/p transfusion    monitor H/H   stable H/H    2- acute GI : likely diverticultar   d/w / Chuy   < from: Colonoscopy (07.17.18 @ 11:35) >  Findings:       Multiple smalland large-mouthed diverticula were found in the sigmoid colon.                                                                                                        Impression:          - Diverticulosis in the sigmoid colon.                       - No specimens collected.  Recommendation:      - Resume previous diet.        3- DM: monitor FS   4- HTN: monitor BP closely   5- AS:  stable \    d/w home   discussed wih pt krysta chua NP

## 2018-10-28 NOTE — PATIENT PROFILE ADULT. - SOCIAL CONCERNS
Presents with SOB; LLL pneumonia identified on CXR.    Initiate treatment regimen for CAP, which includes a beta lactam and macrolide.   IV Rocephin and IV azithromycin initiated in ED-continue  Nebulizer treatments         None

## 2018-12-28 ENCOUNTER — INPATIENT (INPATIENT)
Facility: HOSPITAL | Age: 83
LOS: 4 days | Discharge: HOME | DRG: 872 | End: 2019-01-02
Attending: GENERAL ACUTE CARE HOSPITAL | Admitting: GENERAL ACUTE CARE HOSPITAL
Payer: MEDICARE

## 2018-12-28 VITALS
RESPIRATION RATE: 20 BRPM | HEIGHT: 60 IN | DIASTOLIC BLOOD PRESSURE: 78 MMHG | OXYGEN SATURATION: 96 % | TEMPERATURE: 98 F | HEART RATE: 104 BPM | SYSTOLIC BLOOD PRESSURE: 137 MMHG | WEIGHT: 130.07 LBS

## 2018-12-28 DIAGNOSIS — R05 COUGH: ICD-10-CM

## 2018-12-28 DIAGNOSIS — N97.1 FEMALE INFERTILITY OF TUBAL ORIGIN: Chronic | ICD-10-CM

## 2018-12-28 DIAGNOSIS — J45.909 UNSPECIFIED ASTHMA, UNCOMPLICATED: ICD-10-CM

## 2018-12-28 DIAGNOSIS — E11.9 TYPE 2 DIABETES MELLITUS WITHOUT COMPLICATIONS: ICD-10-CM

## 2018-12-28 DIAGNOSIS — H26.40 UNSPECIFIED SECONDARY CATARACT: Chronic | ICD-10-CM

## 2018-12-28 DIAGNOSIS — I10 ESSENTIAL (PRIMARY) HYPERTENSION: ICD-10-CM

## 2018-12-28 LAB
ALBUMIN SERPL ELPH-MCNC: 3.1 G/DL — LOW (ref 3.3–5)
ALBUMIN SERPL ELPH-MCNC: 3.5 G/DL — SIGNIFICANT CHANGE UP (ref 3.3–5)
ALP SERPL-CCNC: 54 U/L — SIGNIFICANT CHANGE UP (ref 40–120)
ALP SERPL-CCNC: 62 U/L — SIGNIFICANT CHANGE UP (ref 40–120)
ALT FLD-CCNC: 8 U/L — LOW (ref 10–45)
ALT FLD-CCNC: 9 U/L — LOW (ref 10–45)
ANION GAP SERPL CALC-SCNC: 13 MMOL/L — SIGNIFICANT CHANGE UP (ref 5–17)
ANION GAP SERPL CALC-SCNC: 14 MMOL/L — SIGNIFICANT CHANGE UP (ref 5–17)
APPEARANCE UR: CLEAR — SIGNIFICANT CHANGE UP
AST SERPL-CCNC: 6 U/L — LOW (ref 10–40)
AST SERPL-CCNC: 7 U/L — LOW (ref 10–40)
BACTERIA # UR AUTO: NEGATIVE — SIGNIFICANT CHANGE UP
BASOPHILS # BLD AUTO: 0 K/UL — SIGNIFICANT CHANGE UP (ref 0–0.2)
BASOPHILS # BLD AUTO: 0.1 K/UL — SIGNIFICANT CHANGE UP (ref 0–0.2)
BASOPHILS NFR BLD AUTO: 0.3 % — SIGNIFICANT CHANGE UP (ref 0–2)
BASOPHILS NFR BLD AUTO: 0.4 % — SIGNIFICANT CHANGE UP (ref 0–2)
BILIRUB SERPL-MCNC: 0.6 MG/DL — SIGNIFICANT CHANGE UP (ref 0.2–1.2)
BILIRUB SERPL-MCNC: 0.6 MG/DL — SIGNIFICANT CHANGE UP (ref 0.2–1.2)
BILIRUB UR-MCNC: NEGATIVE — SIGNIFICANT CHANGE UP
BUN SERPL-MCNC: 21 MG/DL — SIGNIFICANT CHANGE UP (ref 7–23)
BUN SERPL-MCNC: 23 MG/DL — SIGNIFICANT CHANGE UP (ref 7–23)
CALCIUM SERPL-MCNC: 8.4 MG/DL — SIGNIFICANT CHANGE UP (ref 8.4–10.5)
CALCIUM SERPL-MCNC: 9 MG/DL — SIGNIFICANT CHANGE UP (ref 8.4–10.5)
CHLORIDE SERPL-SCNC: 100 MMOL/L — SIGNIFICANT CHANGE UP (ref 96–108)
CHLORIDE SERPL-SCNC: 99 MMOL/L — SIGNIFICANT CHANGE UP (ref 96–108)
CO2 SERPL-SCNC: 23 MMOL/L — SIGNIFICANT CHANGE UP (ref 22–31)
CO2 SERPL-SCNC: 23 MMOL/L — SIGNIFICANT CHANGE UP (ref 22–31)
COLOR SPEC: SIGNIFICANT CHANGE UP
CREAT SERPL-MCNC: 0.81 MG/DL — SIGNIFICANT CHANGE UP (ref 0.5–1.3)
CREAT SERPL-MCNC: 0.82 MG/DL — SIGNIFICANT CHANGE UP (ref 0.5–1.3)
DIFF PNL FLD: NEGATIVE — SIGNIFICANT CHANGE UP
EOSINOPHIL # BLD AUTO: 0.1 K/UL — SIGNIFICANT CHANGE UP (ref 0–0.5)
EOSINOPHIL # BLD AUTO: 0.1 K/UL — SIGNIFICANT CHANGE UP (ref 0–0.5)
EOSINOPHIL NFR BLD AUTO: 0.7 % — SIGNIFICANT CHANGE UP (ref 0–6)
EOSINOPHIL NFR BLD AUTO: 1 % — SIGNIFICANT CHANGE UP (ref 0–6)
EPI CELLS # UR: 1 /HPF — SIGNIFICANT CHANGE UP
ERYTHROCYTE [SEDIMENTATION RATE] IN BLOOD: 113 MM/HR — HIGH (ref 0–20)
GLUCOSE BLDC GLUCOMTR-MCNC: 296 MG/DL — HIGH (ref 70–99)
GLUCOSE SERPL-MCNC: 171 MG/DL — HIGH (ref 70–99)
GLUCOSE SERPL-MCNC: 219 MG/DL — HIGH (ref 70–99)
GLUCOSE UR QL: ABNORMAL
HBA1C BLD-MCNC: 8 % — HIGH (ref 4–5.6)
HBA1C BLD-MCNC: 8.2 % — HIGH (ref 4–5.6)
HCOV OC43 RNA SPEC QL NAA+PROBE: DETECTED
HCT VFR BLD CALC: 29.6 % — LOW (ref 34.5–45)
HCT VFR BLD CALC: 31.9 % — LOW (ref 34.5–45)
HGB BLD-MCNC: 10.8 G/DL — LOW (ref 11.5–15.5)
HGB BLD-MCNC: 9.9 G/DL — LOW (ref 11.5–15.5)
HYALINE CASTS # UR AUTO: 1 /LPF — SIGNIFICANT CHANGE UP (ref 0–2)
KETONES UR-MCNC: SIGNIFICANT CHANGE UP
LEUKOCYTE ESTERASE UR-ACNC: ABNORMAL
LYMPHOCYTES # BLD AUTO: 1.4 K/UL — SIGNIFICANT CHANGE UP (ref 1–3.3)
LYMPHOCYTES # BLD AUTO: 13.3 % — SIGNIFICANT CHANGE UP (ref 13–44)
LYMPHOCYTES # BLD AUTO: 16 % — SIGNIFICANT CHANGE UP (ref 13–44)
LYMPHOCYTES # BLD AUTO: 2.3 K/UL — SIGNIFICANT CHANGE UP (ref 1–3.3)
MCHC RBC-ENTMCNC: 29.5 PG — SIGNIFICANT CHANGE UP (ref 27–34)
MCHC RBC-ENTMCNC: 29.8 PG — SIGNIFICANT CHANGE UP (ref 27–34)
MCHC RBC-ENTMCNC: 33.5 GM/DL — SIGNIFICANT CHANGE UP (ref 32–36)
MCHC RBC-ENTMCNC: 34 GM/DL — SIGNIFICANT CHANGE UP (ref 32–36)
MCV RBC AUTO: 87.7 FL — SIGNIFICANT CHANGE UP (ref 80–100)
MCV RBC AUTO: 88.2 FL — SIGNIFICANT CHANGE UP (ref 80–100)
MONOCYTES # BLD AUTO: 0.2 K/UL — SIGNIFICANT CHANGE UP (ref 0–0.9)
MONOCYTES # BLD AUTO: 0.9 K/UL — SIGNIFICANT CHANGE UP (ref 0–0.9)
MONOCYTES NFR BLD AUTO: 2.2 % — SIGNIFICANT CHANGE UP (ref 2–14)
MONOCYTES NFR BLD AUTO: 6.5 % — SIGNIFICANT CHANGE UP (ref 2–14)
NEUTROPHILS # BLD AUTO: 11.1 K/UL — HIGH (ref 1.8–7.4)
NEUTROPHILS # BLD AUTO: 9 K/UL — HIGH (ref 1.8–7.4)
NEUTROPHILS NFR BLD AUTO: 76.1 % — SIGNIFICANT CHANGE UP (ref 43–77)
NEUTROPHILS NFR BLD AUTO: 83.5 % — HIGH (ref 43–77)
NITRITE UR-MCNC: NEGATIVE — SIGNIFICANT CHANGE UP
PH UR: 5 — SIGNIFICANT CHANGE UP (ref 5–8)
PLATELET # BLD AUTO: 288 K/UL — SIGNIFICANT CHANGE UP (ref 150–400)
PLATELET # BLD AUTO: 312 K/UL — SIGNIFICANT CHANGE UP (ref 150–400)
POTASSIUM SERPL-MCNC: 3.9 MMOL/L — SIGNIFICANT CHANGE UP (ref 3.5–5.3)
POTASSIUM SERPL-MCNC: 4.1 MMOL/L — SIGNIFICANT CHANGE UP (ref 3.5–5.3)
POTASSIUM SERPL-SCNC: 3.9 MMOL/L — SIGNIFICANT CHANGE UP (ref 3.5–5.3)
POTASSIUM SERPL-SCNC: 4.1 MMOL/L — SIGNIFICANT CHANGE UP (ref 3.5–5.3)
PROT SERPL-MCNC: 7.7 G/DL — SIGNIFICANT CHANGE UP (ref 6–8.3)
PROT SERPL-MCNC: 8.5 G/DL — HIGH (ref 6–8.3)
PROT UR-MCNC: ABNORMAL
RAPID RVP RESULT: DETECTED
RBC # BLD: 3.35 M/UL — LOW (ref 3.8–5.2)
RBC # BLD: 3.64 M/UL — LOW (ref 3.8–5.2)
RBC # FLD: 13.4 % — SIGNIFICANT CHANGE UP (ref 10.3–14.5)
RBC # FLD: 13.5 % — SIGNIFICANT CHANGE UP (ref 10.3–14.5)
RBC CASTS # UR COMP ASSIST: 1 /HPF — SIGNIFICANT CHANGE UP (ref 0–4)
SODIUM SERPL-SCNC: 136 MMOL/L — SIGNIFICANT CHANGE UP (ref 135–145)
SODIUM SERPL-SCNC: 136 MMOL/L — SIGNIFICANT CHANGE UP (ref 135–145)
SP GR SPEC: 1.01 — SIGNIFICANT CHANGE UP (ref 1.01–1.02)
UROBILINOGEN FLD QL: NEGATIVE — SIGNIFICANT CHANGE UP
WBC # BLD: 10.8 K/UL — HIGH (ref 3.8–10.5)
WBC # BLD: 14.6 K/UL — HIGH (ref 3.8–10.5)
WBC # FLD AUTO: 10.8 K/UL — HIGH (ref 3.8–10.5)
WBC # FLD AUTO: 14.6 K/UL — HIGH (ref 3.8–10.5)
WBC UR QL: 4 /HPF — SIGNIFICANT CHANGE UP (ref 0–5)

## 2018-12-28 PROCEDURE — 99285 EMERGENCY DEPT VISIT HI MDM: CPT

## 2018-12-28 PROCEDURE — 71045 X-RAY EXAM CHEST 1 VIEW: CPT | Mod: 26

## 2018-12-28 PROCEDURE — 71250 CT THORAX DX C-: CPT | Mod: 26

## 2018-12-28 RX ORDER — HYDROCHLOROTHIAZIDE 25 MG
25 TABLET ORAL DAILY
Qty: 0 | Refills: 0 | Status: DISCONTINUED | OUTPATIENT
Start: 2018-12-28 | End: 2019-01-02

## 2018-12-28 RX ORDER — HYDROCORTISONE 20 MG
100 TABLET ORAL ONCE
Qty: 0 | Refills: 0 | Status: COMPLETED | OUTPATIENT
Start: 2018-12-28 | End: 2018-12-28

## 2018-12-28 RX ORDER — PANTOPRAZOLE SODIUM 20 MG/1
40 TABLET, DELAYED RELEASE ORAL
Qty: 0 | Refills: 0 | Status: DISCONTINUED | OUTPATIENT
Start: 2018-12-28 | End: 2019-01-02

## 2018-12-28 RX ORDER — SODIUM CHLORIDE 9 MG/ML
1000 INJECTION INTRAMUSCULAR; INTRAVENOUS; SUBCUTANEOUS ONCE
Qty: 0 | Refills: 0 | Status: COMPLETED | OUTPATIENT
Start: 2018-12-28 | End: 2018-12-28

## 2018-12-28 RX ORDER — SODIUM CHLORIDE 9 MG/ML
1000 INJECTION, SOLUTION INTRAVENOUS
Qty: 0 | Refills: 0 | Status: DISCONTINUED | OUTPATIENT
Start: 2018-12-28 | End: 2019-01-02

## 2018-12-28 RX ORDER — INSULIN LISPRO 100/ML
VIAL (ML) SUBCUTANEOUS AT BEDTIME
Qty: 0 | Refills: 0 | Status: DISCONTINUED | OUTPATIENT
Start: 2018-12-28 | End: 2019-01-02

## 2018-12-28 RX ORDER — DEXTROSE 50 % IN WATER 50 %
15 SYRINGE (ML) INTRAVENOUS ONCE
Qty: 0 | Refills: 0 | Status: DISCONTINUED | OUTPATIENT
Start: 2018-12-28 | End: 2019-01-02

## 2018-12-28 RX ORDER — DEXTROSE 50 % IN WATER 50 %
25 SYRINGE (ML) INTRAVENOUS ONCE
Qty: 0 | Refills: 0 | Status: DISCONTINUED | OUTPATIENT
Start: 2018-12-28 | End: 2019-01-02

## 2018-12-28 RX ORDER — ENOXAPARIN SODIUM 100 MG/ML
6 INJECTION SUBCUTANEOUS
Qty: 0 | Refills: 0 | COMMUNITY

## 2018-12-28 RX ORDER — PREGABALIN 225 MG/1
1000 CAPSULE ORAL DAILY
Qty: 0 | Refills: 0 | Status: DISCONTINUED | OUTPATIENT
Start: 2018-12-28 | End: 2019-01-02

## 2018-12-28 RX ORDER — INSULIN LISPRO 100/ML
VIAL (ML) SUBCUTANEOUS
Qty: 0 | Refills: 0 | Status: DISCONTINUED | OUTPATIENT
Start: 2018-12-28 | End: 2018-12-29

## 2018-12-28 RX ORDER — ALBUTEROL 90 UG/1
2 AEROSOL, METERED ORAL EVERY 6 HOURS
Qty: 0 | Refills: 0 | Status: DISCONTINUED | OUTPATIENT
Start: 2018-12-28 | End: 2019-01-02

## 2018-12-28 RX ORDER — FOLIC ACID 0.8 MG
1 TABLET ORAL DAILY
Qty: 0 | Refills: 0 | Status: DISCONTINUED | OUTPATIENT
Start: 2018-12-28 | End: 2019-01-02

## 2018-12-28 RX ORDER — DEXTROSE 50 % IN WATER 50 %
12.5 SYRINGE (ML) INTRAVENOUS ONCE
Qty: 0 | Refills: 0 | Status: DISCONTINUED | OUTPATIENT
Start: 2018-12-28 | End: 2019-01-02

## 2018-12-28 RX ORDER — METHOTREXATE 2.5 MG/1
7 TABLET ORAL
Qty: 0 | Refills: 0 | COMMUNITY

## 2018-12-28 RX ORDER — BUDESONIDE AND FORMOTEROL FUMARATE DIHYDRATE 160; 4.5 UG/1; UG/1
2 AEROSOL RESPIRATORY (INHALATION)
Qty: 0 | Refills: 0 | Status: DISCONTINUED | OUTPATIENT
Start: 2018-12-28 | End: 2019-01-02

## 2018-12-28 RX ORDER — ERGOCALCIFEROL 1.25 MG/1
50000 CAPSULE ORAL
Qty: 0 | Refills: 0 | Status: DISCONTINUED | OUTPATIENT
Start: 2018-12-28 | End: 2019-01-02

## 2018-12-28 RX ORDER — ACETAMINOPHEN 500 MG
650 TABLET ORAL ONCE
Qty: 0 | Refills: 0 | Status: COMPLETED | OUTPATIENT
Start: 2018-12-28 | End: 2018-12-28

## 2018-12-28 RX ORDER — HEPARIN SODIUM 5000 [USP'U]/ML
5000 INJECTION INTRAVENOUS; SUBCUTANEOUS EVERY 12 HOURS
Qty: 0 | Refills: 0 | Status: DISCONTINUED | OUTPATIENT
Start: 2018-12-28 | End: 2019-01-02

## 2018-12-28 RX ORDER — METHOTREXATE 2.5 MG/1
17.5 TABLET ORAL
Qty: 0 | Refills: 0 | Status: DISCONTINUED | OUTPATIENT
Start: 2018-12-28 | End: 2019-01-02

## 2018-12-28 RX ORDER — GLUCAGON INJECTION, SOLUTION 0.5 MG/.1ML
1 INJECTION, SOLUTION SUBCUTANEOUS ONCE
Qty: 0 | Refills: 0 | Status: DISCONTINUED | OUTPATIENT
Start: 2018-12-28 | End: 2019-01-02

## 2018-12-28 RX ORDER — LOSARTAN POTASSIUM 100 MG/1
50 TABLET, FILM COATED ORAL DAILY
Qty: 0 | Refills: 0 | Status: DISCONTINUED | OUTPATIENT
Start: 2018-12-28 | End: 2019-01-02

## 2018-12-28 RX ADMIN — PREGABALIN 1000 MICROGRAM(S): 225 CAPSULE ORAL at 16:42

## 2018-12-28 RX ADMIN — Medication 25 MILLIGRAM(S): at 16:42

## 2018-12-28 RX ADMIN — Medication 3: at 22:17

## 2018-12-28 RX ADMIN — Medication 650 MILLIGRAM(S): at 16:25

## 2018-12-28 RX ADMIN — HEPARIN SODIUM 5000 UNIT(S): 5000 INJECTION INTRAVENOUS; SUBCUTANEOUS at 19:25

## 2018-12-28 RX ADMIN — LOSARTAN POTASSIUM 50 MILLIGRAM(S): 100 TABLET, FILM COATED ORAL at 16:42

## 2018-12-28 RX ADMIN — Medication 650 MILLIGRAM(S): at 11:23

## 2018-12-28 RX ADMIN — BUDESONIDE AND FORMOTEROL FUMARATE DIHYDRATE 2 PUFF(S): 160; 4.5 AEROSOL RESPIRATORY (INHALATION) at 16:40

## 2018-12-28 RX ADMIN — Medication 3: at 18:44

## 2018-12-28 RX ADMIN — Medication 1 MILLIGRAM(S): at 16:42

## 2018-12-28 RX ADMIN — Medication 100 MILLIGRAM(S): at 13:29

## 2018-12-28 RX ADMIN — SODIUM CHLORIDE 1000 MILLILITER(S): 9 INJECTION INTRAMUSCULAR; INTRAVENOUS; SUBCUTANEOUS at 11:24

## 2018-12-28 RX ADMIN — PANTOPRAZOLE SODIUM 40 MILLIGRAM(S): 20 TABLET, DELAYED RELEASE ORAL at 16:41

## 2018-12-28 NOTE — ED PROVIDER NOTE - MEDICAL DECISION MAKING DETAILS
87F with persistent cough x2 weeks with abx course last week. Joint pain consistent with patients RA. No focal findings concerning for joint infection

## 2018-12-28 NOTE — CONSULT NOTE ADULT - SUBJECTIVE AND OBJECTIVE BOX
HPI:   Patient is a 87y female with RA on mtx maybe but not seen on 's list of meds, h/o gi bleed from diverticula this past summer requiring hospital stay, who new returns with acute worsening of polyarthralgia, especially the neck and worsening of a cough she has for months as well. She had a course of oral antibiotics last week because she had a fever but that has improved. She does not bring up any sputum or blood when she coughs. She has no chest pain, nausea, abdo pain, ha, sob, dysuria, hematuria. None of her joints are actually swollen. She denies taking any steroids recently. She has at baseline evidence of ILD on a ct chest from a year ago. No sick contacts, no travel, does not go out except to the doctor. She has a lot of trouble ambulating and uses a walker the past couple of months. j    REVIEW OF SYSTEMS:  All other review of systems negative (Comprehensive ROS)    PAST MEDICAL & SURGICAL HISTORY:  RA (rheumatoid arthritis)  Asthma  DM (diabetes mellitus), type 2  HTN (hypertension), benign  After-cataract of left eye  Tubal occlusion      Allergies    No Known Allergies    Intolerances        Antimicrobials Day #  :    Other Medications:  ALBUTerol    90 MICROgram(s) HFA Inhaler 2 Puff(s) Inhalation every 6 hours PRN  buDESOnide 160 MICROgram(s)/formoterol 4.5 MICROgram(s) Inhaler 2 Puff(s) Inhalation two times a day  cyanocobalamin 1000 MICROGram(s) Oral daily  dextrose 40% Gel 15 Gram(s) Oral once PRN  dextrose 5%. 1000 milliLiter(s) IV Continuous <Continuous>  dextrose 50% Injectable 12.5 Gram(s) IV Push once  dextrose 50% Injectable 25 Gram(s) IV Push once  dextrose 50% Injectable 25 Gram(s) IV Push once  ergocalciferol 00910 Unit(s) Oral every week  folic acid 1 milliGRAM(s) Oral daily  glucagon  Injectable 1 milliGRAM(s) IntraMuscular once PRN  heparin  Injectable 5000 Unit(s) SubCutaneous every 12 hours  hydrochlorothiazide 25 milliGRAM(s) Oral daily  insulin lispro (HumaLOG) corrective regimen sliding scale   SubCutaneous three times a day before meals  insulin lispro (HumaLOG) corrective regimen sliding scale   SubCutaneous at bedtime  losartan 50 milliGRAM(s) Oral daily  methotrexate 17.5 milliGRAM(s) Oral every week  pantoprazole    Tablet 40 milliGRAM(s) Oral two times a day      FAMILY HISTORY:  No pertinent family history in first degree relatives      SOCIAL HISTORY:  Smoking: [ ]Yes [ ]No  ETOH: [ ]Yes [ ]No  Drug Use: [ ]Yes [ ]No   [ ] Single[ ]    T(F): 98.3 (12-28-18 @ 13:32), Max: 100.2 (12-28-18 @ 11:32)  HR: 88 (12-28-18 @ 13:32)  BP: 128/73 (12-28-18 @ 13:32)  RR: 18 (12-28-18 @ 13:32)  SpO2: 95% (12-28-18 @ 13:32)  Wt(kg): --    PHYSICAL EXAM:  General: alert, no acute distress  Eyes:  anicteric, no conjunctival injection, no discharge  Oropharynx: no lesions or injection 	  Neck: supple, without adenopathy  Lungs: bronchial bs left lower lung area to auscultation  Heart: regular rate and rhythm; no murmur, rubs or gallops  Abdomen: soft, nondistended, nontender, without mass or organomegaly  Skin: no lesions  Extremities: no clubbing, cyanosis, or edema  Neurologic: alert, oriented, moves all extremities    LAB RESULTS:                        10.8   14.6  )-----------( 312      ( 28 Dec 2018 11:47 )             31.9     12-28    136  |  100  |  21  ----------------------------<  171<H>  3.9   |  23  |  0.81    Ca    8.4      28 Dec 2018 14:50    TPro  7.7  /  Alb  3.1<L>  /  TBili  0.6  /  DBili  x   /  AST  7<L>  /  ALT  8<L>  /  AlkPhos  54  12-28    LIVER FUNCTIONS - ( 28 Dec 2018 14:50 )  Alb: 3.1 g/dL / Pro: 7.7 g/dL / ALK PHOS: 54 U/L / ALT: 8 U/L / AST: 7 U/L / GGT: x               MICROBIOLOGY:  RECENT CULTURES:        RADIOLOGY REVIEWED:  < from: Xray Chest 1 View- PORTABLE-Urgent (12.28.18 @ 13:08) >  Impression:    The heart is normal in size. Increased interstitial markings seen   throughout both lung compatible with chronic lung changes. Correlate with   CT scan that was performed February 12, 2018. No acute consolidation   could be identified on the current study.      < end of copied text >          Impression:  Elderly woman with RA on maybe mtx , last hospital stay in July for gi bleed, ILD , took unknown abx about a week ago for cough and fever now with diffuse arthralgia including the neck, worsening of chronic cough with an episode of post tussive vomiting and on exam no estrellita joint swelling or neck tenderness but has egophony of the the left lower lung field. She has mild leukocytosis and cxr just shows chronic ild. She cough have now a component of bacterial pneumonia given leukocytosis with egophony of the left lung field area. She could be having a flare of RA .     Recommendations:  Await ct chest  Check RVP  Follow up blood cultures  If ct shows consolidation will start iv ceftriaxone and doxycycline  would check cortisol level once off further steroids in the morning

## 2018-12-28 NOTE — CONSULT NOTE ADULT - SUBJECTIVE AND OBJECTIVE BOX
PULMONARY CONSULT  Abrahan Flannery MD  [phone]    Initial HPI on admission:  HPI:  84 y/o fmeale with hx of RA on mtx , and was on prednisone ( no longer on steroids since 09/2017  and has not recieved iv infusion  ( monthly simponi ) in three months . also history of HTN and DM .. has been admitted twice over the past 6-8 months with  weakness . initial admit pt was thought ot have an element of adrenal insufficeiny when she was on steroids .. and pt was sent out on steroids with some improvement,.. later admitted as lij for weakness thought to be multifactorial sec to viral illness, dehydration , and leemt of steroid -induced myopathy.more recently admitted with weakness sec to hMPV and most recently admitted with  acute GIB ( diverticular )..  Pt now admitted with generlzied weakness, polyarthiritc pains and cough..  Pt has been feeling weak and has been having difficulty ambulating for sometime now.. and over the past one week she developed a cough, fever and was treated with a course of abx which she completed yesterday .. since pt did not respond completely she was brought be her  ..  pt c/o of joint pains of neck, wirsts and ankles   most pronounced in R wrist and L ankle with swelling   no fever ( but noted to have fever in ED of 100.2 )   no chills   still persistent cough though somewhat improving   no CP/SOB   had an episode of cvomitting however post severe episode of cough   no urianry sx   in ED was given steroids for suspicion of RA flare (28 Dec 2018 14:24)      BRIEF HOSPITAL COURSE: ***    PAST MEDICAL & SURGICAL HISTORY:  RA (rheumatoid arthritis)  Asthma  DM (diabetes mellitus), type 2  HTN (hypertension), benign  After-cataract of left eye  Tubal occlusion    Allergies    No Known Allergies    Intolerances      FAMILY HISTORY:  No pertinent family history in first degree relatives    REVIEW OF SYSTEMS      General:	    Skin/Breast:  	  Ophthalmologic:  	  ENMT:	    Respiratory and Thorax:  	  Cardiovascular:	    Gastrointestinal:	    Genitourinary:	    Musculoskeletal:	    Neurological:	    Psychiatric:	    Hematology/Lymphatics:	    Endocrine:	    Allergic/Immunologic:	  Social history:       Medications:  MEDICATIONS  (STANDING):  buDESOnide 160 MICROgram(s)/formoterol 4.5 MICROgram(s) Inhaler 2 Puff(s) Inhalation two times a day  cyanocobalamin 1000 MICROGram(s) Oral daily  dextrose 5%. 1000 milliLiter(s) (50 mL/Hr) IV Continuous <Continuous>  dextrose 50% Injectable 12.5 Gram(s) IV Push once  dextrose 50% Injectable 25 Gram(s) IV Push once  dextrose 50% Injectable 25 Gram(s) IV Push once  ergocalciferol 23704 Unit(s) Oral every week  folic acid 1 milliGRAM(s) Oral daily  heparin  Injectable 5000 Unit(s) SubCutaneous every 12 hours  hydrochlorothiazide 25 milliGRAM(s) Oral daily  insulin lispro (HumaLOG) corrective regimen sliding scale   SubCutaneous three times a day before meals  insulin lispro (HumaLOG) corrective regimen sliding scale   SubCutaneous at bedtime  losartan 50 milliGRAM(s) Oral daily  methotrexate 17.5 milliGRAM(s) Oral every week  pantoprazole    Tablet 40 milliGRAM(s) Oral two times a day    MEDICATIONS  (PRN):  ALBUTerol    90 MICROgram(s) HFA Inhaler 2 Puff(s) Inhalation every 6 hours PRN Bronchospasm  dextrose 40% Gel 15 Gram(s) Oral once PRN Blood Glucose LESS THAN 70 milliGRAM(s)/deciliter  glucagon  Injectable 1 milliGRAM(s) IntraMuscular once PRN Glucose LESS THAN 70 milligrams/deciliter    Vital Signs Last 24 Hrs  T(C): 36.7 (28 Dec 2018 16:33), Max: 37.9 (28 Dec 2018 11:32)  T(F): 98.1 (28 Dec 2018 16:33), Max: 100.2 (28 Dec 2018 11:32)  HR: 88 (28 Dec 2018 16:33) (88 - 104)  BP: 128/73 (28 Dec 2018 16:33) (128/73 - 140/90)  BP(mean): --  RR: 18 (28 Dec 2018 16:33) (18 - 20)  SpO2: 95% (28 Dec 2018 16:33) (95% - 96%)          LABS:                        10.8   14.6  )-----------( 312      ( 28 Dec 2018 11:47 )             31.9     12-28    136  |  100  |  21  ----------------------------<  171<H>  3.9   |  23  |  0.81    Ca    8.4      28 Dec 2018 14:50    TPro  7.7  /  Alb  3.1<L>  /  TBili  0.6  /  DBili  x   /  AST  7<L>  /  ALT  8<L>  /  AlkPhos  54  12-28          Procalcitonin, Serum: 0.12 ng/mL (12-28-18 @ 11:56)      Physical Examination:    General: No acute distress.      HEENT: Pupils equal, reactive to light.  Symmetric.    PULM: Clear to auscultation bilaterally, no significant sputum production    CVS: Regular rate and rhythm, no murmurs, rubs, or gallops    ABD: Soft, nondistended, nontender, normoactive bowel sounds, no masses    EXT: No edema, nontender    SKIN: Warm and well perfused, no rashes noted.    NEURO: Alert, oriented, interactive, nonfocal    RADIOLOGY REVIEWED PERSONALLY  CXR:    CT chest:    TTE:      Assessment:    Plan: PULMONARY CONSULT  Abrahan Flannery MD  [phone]    Initial HPI on admission:  HPI:  86 y/o fmeale with hx of RA on mtx , and was on prednisone ( no longer on steroids since 09/2017  and has not recieved iv infusion  ( monthly simponi ) in three months . also history of HTN and DM .. has been admitted twice over the past 6-8 months with  weakness . initial admit pt was thought ot have an element of adrenal insufficeiny when she was on steroids .. and pt was sent out on steroids with some improvement,.. later admitted as lij for weakness thought to be multifactorial sec to viral illness, dehydration , and leemt of steroid -induced myopathy.more recently admitted with weakness sec to hMPV and most recently admitted with  acute GIB ( diverticular )..  Pt now admitted with generlzied weakness, polyarthiritc pains and cough..  Pt has been feeling weak and has been having difficulty ambulating for sometime now.. and over the past one week she developed a cough, fever and was treated with a course of abx which she completed yesterday .. since pt did not respond completely she was brought be her  pt c/o of joint pains of neck, wirsts and ankles most pronounced in R wrist and L ankle with swelling no fever ( but noted to have fever in ED of 100.2 ) no chills still persistent cough though somewhat mproving no CP/SOB had an episode of cvomitting however post severe episode of cough   no urianry sx in ED was given steroids for suspicion of RA flare (28 Dec 2018 14:24)    Patient is a poor historian, was seen unattended by family in ER. She presented to the ER with 2 sets of complaints: 1) increasing joint pain over days to week PTA primarily in hands, neck, and 2) dry cough over 2-3 weeks PTA. She completed several days of outpatient antibiotics, not sure when finished nor type. She denied fever, chills, or URI symptoms. Cough is dry and non productive.  Patient has a history of mild intermittent adult asthma - rarely uses inhaler, and not aware of clear ppting triggers, including allergy. She is followed by rheumatologist in [facility] for RA, and lissa history of RA associated ILD, depsite CT at [facility] in 7/17 which showed peripheral reticular opacities, upper lober predominant, c/w RA assosciated ILD and/or MTX toxicity (chronic variant).  Patient has a history of TB treated with unknown regimen 50+ years PTA. She said she is followed by a pulmonologist, though was unable to provide name or details. She is unclear about current RA regimen: had been onn TNF antagonist (simponi) in past, methotrexate (appears to have been stopped since prior admissions given ILD) and prednisone. She was admitted in 2/2018 with dyspnea and asthma exacerbation: CT at time showed mosaic attenuation (likely due to air trapping) and evidence of pulmonary fibrosis.     PAST MEDICAL & SURGICAL HISTORY:  RA (rheumatoid arthritis)  Asthma  DM (diabetes mellitus), type 2  HTN (hypertension), benign  After-cataract of left eye  Tubal occlusion    Allergies    No Known Allergies    Intolerances    FAMILY HISTORY:  No pertinent family history in first degree relatives    Social history:       Medications:  MEDICATIONS  (STANDING):  buDESOnide 160 MICROgram(s)/formoterol 4.5 MICROgram(s) Inhaler 2 Puff(s) Inhalation two times a day  cyanocobalamin 1000 MICROGram(s) Oral daily  dextrose 5%. 1000 milliLiter(s) (50 mL/Hr) IV Continuous <Continuous>  dextrose 50% Injectable 12.5 Gram(s) IV Push once  dextrose 50% Injectable 25 Gram(s) IV Push once  dextrose 50% Injectable 25 Gram(s) IV Push once  ergocalciferol 82835 Unit(s) Oral every week  folic acid 1 milliGRAM(s) Oral daily  heparin  Injectable 5000 Unit(s) SubCutaneous every 12 hours  hydrochlorothiazide 25 milliGRAM(s) Oral daily  insulin lispro (HumaLOG) corrective regimen sliding scale   SubCutaneous three times a day before meals  insulin lispro (HumaLOG) corrective regimen sliding scale   SubCutaneous at bedtime  losartan 50 milliGRAM(s) Oral daily  methotrexate 17.5 milliGRAM(s) Oral every week  pantoprazole    Tablet 40 milliGRAM(s) Oral two times a day    MEDICATIONS  (PRN):  ALBUTerol    90 MICROgram(s) HFA Inhaler 2 Puff(s) Inhalation every 6 hours PRN Bronchospasm  dextrose 40% Gel 15 Gram(s) Oral once PRN Blood Glucose LESS THAN 70 milliGRAM(s)/deciliter  glucagon  Injectable 1 milliGRAM(s) IntraMuscular once PRN Glucose LESS THAN 70 milligrams/deciliter    Vital Signs Last 24 Hrs  T(C): 36.7 (28 Dec 2018 16:33), Max: 37.9 (28 Dec 2018 11:32)  T(F): 98.1 (28 Dec 2018 16:33), Max: 100.2 (28 Dec 2018 11:32)  HR: 88 (28 Dec 2018 16:33) (88 - 104)  BP: 128/73 (28 Dec 2018 16:33) (128/73 - 140/90)  BP(mean): --  RR: 18 (28 Dec 2018 16:33) (18 - 20)  SpO2: 95% (28 Dec 2018 16:33) (95% - 96%)    LABS:                        10.8   14.6  )-----------( 312      ( 28 Dec 2018 11:47 )             31.9     12-28    136  |  100  |  21  ----------------------------<  171<H>  3.9   |  23  |  0.81    Ca    8.4      28 Dec 2018 14:50    TPro  7.7  /  Alb  3.1<L>  /  TBili  0.6  /  DBili  x   /  AST  7<L>  /  ALT  8<L>  /  AlkPhos  54  12-28      Procalcitonin, Serum: 0.12 ng/mL (12-28-18 @ 11:56)    Physical Examination:    General: Non toxic, No acute distress.      HEENT: Pupils equal, reactive to light.  Symmetric.    PULM: Bibasilar crackles 1/3: no wheeze, rhonchi, or signs of consolidation    CVS: Regular rate and rhythm, no murmurs, rubs, or gallops    ABD: Soft, nondistended, nontender, normoactive bowel sounds, no masses    EXT: No edema, nontender    SKIN: Warm and well perfused, no rashes noted.    NEURO: Alert, oriented, interactive, nonfocal    RADIOLOGY REVIEWED PERSONALLY  CXR:    CT chest:    INTERPRETATION:  CLINICAL INFORMATION: Persistent cough.    COMPARISON: CT chest from 2/12/2018.    PROCEDURE:   CT of the Chest was performed without intravenous contrast.  Sagittal and coronal reformats were performed.  Maximum Intensity Projection images were generated.    FINDINGS:    CHEST:     LUNGS AND LARGE AIRWAYS: Patent central airways.   Peripheral linear and   reticular opacities are unchanged from prior study. A 4 mm juxtapleural   nodule in the right middle lobe (2:63) is unchanged from prior study.   Multiple calcified granuloma are unchanged.    PLEURA: No pleural effusion.    VESSELS: Calcific atherosclerosis of aorta and coronary arteries.    HEART: Heart size is normal. No pericardial effusion. Annular   calcification of mitral valve. Aortic valve calcification.    MEDIASTINUM AND SHILPA: No lymphadenopathy.    CHEST WALL AND LOWER NECK: Within normal limits.    VISUALIZED UPPER ABDOMEN: Within normal limits.    BONES: Degenerative changes of spine.    IMPRESSION:     There is no pneumonia.    Peripheral linear and reticular opacities are unchanged.    TTE:    PROCEDURE: Transthoracic echocardiogram with 2-D, M-Mode  and complete spectral and color flow Doppler.  INDICATION: Syncope and collapse (R55)  ------------------------------------------------------------------------  Dimensions:    Normal Values:  LA:     3.4    2.0 - 4.0 cm  Ao:     2.7    2.0 - 3.8 cm  SEPTUM: 0.7    0.6 - 1.2 cm  PWT:    0.7    0.6 - 1.1 cm  LVIDd:  4.3    3.0 - 5.6 cm  LVIDs:  2.5    1.8 - 4.0 cm  Derived variables:  LVMI: 54 g/m2  RWT: 0.32  Fractional short: 42 %  EF (Teicholtz): 73 %  Doppler Peak Velocity (m/sec): AoV=2.0  ------------------------------------------------------------------------  Observations:  Mitral Valve: Mitral annular calcification, otherwise  normal mitral valve. Minimal mitral regurgitation.  Aortic Valve/Aorta: Calcified trileaflet aortic valve with  decreased opening. Peak transaortic valve gradient equals  16 mm Hg, mean transaortic valve gradient equals 10 mm Hg,  estimated aortic valve area equals 1.5 sqcm (by continuity  equation), aortic valve velocity time integral equals 42  cm, consistent with moderate aortic stenosis. Mild aortic  regurgitation.  Peak left ventricular outflow tract  gradient equals 3 mm Hg, mean gradient is equal to 2 mm Hg,  LVOT velocity time integral equals 20 cm.  Aortic Root: 2.7 cm.  LVOT diameter: 2 cm.  Left Atrium: Normal leftatrium.  LA volume index = 26  cc/m2.  Left Ventricle: Normal left ventricular systolic function.  No segmental wall motion abnormalities. Normal left  ventricular internal dimensions and wall thicknesses.  Reversal of the E-A waves of the mitral inflow pattern  consistent with reduced compliance of the left ventricle.  Right Heart: Normal right atrium. The right ventricle is  not well visualized; grossly normal right ventricular  systolic function. Normal tricuspid valve. Minimal  tricuspid regurgitation. Normal pulmonic valve. Minimal  pulmonic regurgitation.  Pericardium/Pleura: Normal pericardium with no pericardial  effusion.  Hemodynamic: Estimated right atrial pressure is 8 mm Hg.  Inadequate tricuspid regurgitation Doppler envelope  precludes estimation of RVSP.  ------------------------------------------------------------------------  Conclusions:  1. Mitral annular calcification, otherwise normal mitral  valve. Minimal mitral regurgitation.  2. Calcified trileaflet aortic valve with decreased  opening. Peak transaortic valve gradient equals 16 mm Hg,  mean transaortic valve gradient equals 10 mm Hg, estimated  aortic valve area equals 1.5 sqcm (by continuity equation),  aortic valve velocity time integral equals 42 cm,  consistent with moderate aortic stenosis. Mild aortic  regurgitation.  3. Normal left ventricular internal dimensions and wall  thicknesses.  4. Normal left ventricular systolic function. No segmental  wall motion abnormalities.  5. Reversal of the E-A waves of the mitral inflow pattern  consistent with reduced compliance of the left ventricle.  6. The right ventricle is not well visualized; grossly  normal right ventricular systolic function.

## 2018-12-28 NOTE — H&P ADULT - HISTORY OF PRESENT ILLNESS
84 y/o fmeale with hx of RA on mtx , and was on prednisone ( no longer on steroids since 09/2017  and has not recieved iv infusion  ( monthly simponi ) in three months . also history of HTN and DM .. has been admitted twice over the past 6-8 months with  weakness . initial admit pt was thought ot have an element of adrenal insufficeiny when she was on steroids .. and pt was sent out on steroids with some improvement,.. later admitted as lij for weakness thought to be multifactorial sec to viral illness, dehydration , and leemt of steroid -induced myopathy.more recently admitted with weakness sec to hMPV and most recently admitted with  acute GIB ( diverticular )..  Pt now admitted with generlzied weakness, polyarthiritc pains and cough..  Pt has been feeling weak and has been having difficulty ambulating for sometime now.. and over the past one week she developed a cough, fever and was treated with a course of abx which she completed yesterday .. since pt did not respond completely she was brought be her  ..  pt c/o of joint pains of neck, wirsts and ankles   most pronounced in R wrist and L ankle with swelling   no fever ( but noted to have fever in ED of 100.2 )   no chills   still persistent cough though somewhat improving   no CP/SOB   had an episode of cvomitting however post severe episode of cough   no urianry sx   in ED was given steroids for suspicion of RA flare

## 2018-12-28 NOTE — ED ADULT NURSE NOTE - NSIMPLEMENTINTERV_GEN_ALL_ED
Implemented All Fall Risk Interventions:  [place] to call system. Call bell, personal items and telephone within reach. Instruct patient to call for assistance. Room bathroom lighting operational. Non-slip footwear when patient is off stretcher. Physically safe environment: no spills, clutter or unnecessary equipment. Stretcher in lowest position, wheels locked, appropriate side rails in place. Provide visual cue, wrist band, yellow gown, etc. Monitor gait and stability. Monitor for mental status changes and reorient to person, place, and time. Review medications for side effects contributing to fall risk. Reinforce activity limits and safety measures with patient and family.

## 2018-12-28 NOTE — ED PROVIDER NOTE - ATTENDING CONTRIBUTION TO CARE
I have seen and evaluated this patient with the resident.   I agree with the findings  unless other wise stated.  I have made appropriate changes in documentations where needed, After my face to face bedside evaluation, I am further  notinF with persistent cough x2 weeks with abx course last week. Joint pain consistent with patients RA. No focal findings concerning for joint infection

## 2018-12-28 NOTE — H&P ADULT - ASSESSMENT
86 y/o fmeale with hx of RA on mtx , and was on prednisone ( no longer on steroids since 09/2017  and has not recieved iv infusion  ( monthly simponi ) in three months . also history of HTN and DM .. has been admitted twice over the past 6-8 months with  weakness . initial admit pt was thought ot have an element of adrenal insufficeiny when she was on steroids .. and pt was sent out on steroids with some improvement,.. later admitted as lij for weakness thought to be multifactorial sec to viral illness, dehydration , and leemt of steroid -induced myopathy.more recently admitted with weakness sec to hMPV and most recently admitted with  acute GIB ( diverticular )..  Pt now admitted with generlzied weakness, polyarthiritc pains and cough..  Pt has been feeling weak and has been having difficulty ambulating for sometime now.. and over the past one week she developed a cough, fever and was treated with a course of abx which she completed yesterday .. since pt did not respond completely she was brought be her  ..  pt c/o of joint pains of neck, wirsts and ankles   most pronounced in R wrist and L ankle with swelling   no fever ( but noted to have fever in ED of 100.2 )   no chills   still persistent cough though somewhat improving   no CP/SOB   had an episode of cvomitting however post severe episode of cough   no urianry sx   in ED was given steroids for suspicion of RA flare     1- sepsis : pt with fever and elevated WBC .. mild elevateion of procal however was treated w abx as of yesterday   will pan culture   will consult ID   check CT  hold off on further abx     2- Arhtarlegia : generlized with swelling of R wirst and L ankle     given steroids in ED   consider further steroids   check ESR /CRP   rhuem inpu t  PT OOB    3- DM : cont insulin     4- HTN cont meds

## 2018-12-28 NOTE — ED ADULT NURSE NOTE - OBJECTIVE STATEMENT
Pt is a 87 Pt is a 88 yo female with the co whole body pain and cough for several weeks. Pt states that a few weeks ago she was having a productive cough with green sputum and now for the past week she has been having a persistent dry cough. She was seen by her PMD a week ago and placed on an antibiotic (unsure which one) and cough syrup which she denies helping. Pts  at bedside and reports this as a chronic issue for pt. She denies any CP or SOB no N/V/D no fever or chills.

## 2018-12-28 NOTE — PROGRESS NOTE ADULT - SUBJECTIVE AND OBJECTIVE BOX
HPI:  84 y/o fmeale with hx of RA on mtx , and was on prednisone ( no longer on steroids since 09/2017  and has not recieved iv infusion  ( monthly simponi ) in three months . also history of HTN and DM .. has been admitted twice over the past 6-8 months with  weakness . initial admit pt was thought ot have an element of adrenal insufficeiny when she was on steroids .. and pt was sent out on steroids with some improvement,.. later admitted as lij for weakness thought to be multifactorial sec to viral illness, dehydration , and leemt of steroid -induced myopathy.more recently admitted with weakness sec to hMPV and most recently admitted with  acute GIB ( diverticular )..  Pt now admitted with generlzied weakness, polyarthiritc pains and cough..  Pt has been feeling weak and has been having difficulty ambulating for sometime now.. and over the past one week she developed a cough, fever and was treated with a course of abx which she completed yesterday .. since pt did not respond completely she was brought be her  ..  pt c/o of joint pains of neck, wirsts and ankles   most pronounced in R wrist and L ankle with swelling   no fever ( but noted to have fever in ED of 100.2 )   no chills   still persistent cough though somewhat improving   no CP/SOB   had an episode of cvomitting however post severe episode of cough   no urianry sx   in ED was given steroids for suspicion of RA flare (28 Dec 2018 14:24)  Patient has history of diabetes, on insulin at home, no recent hypoglycemic episodes, no polyuria polydipsia. Patient follows up with PCP for diabetes management.    PAST MEDICAL & SURGICAL HISTORY:  RA (rheumatoid arthritis)  Asthma  DM (diabetes mellitus), type 2  HTN (hypertension), benign  After-cataract of left eye  Tubal occlusion      FAMILY HISTORY:  No pertinent family history in first degree relatives      Social History:    Outpatient Medications:    MEDICATIONS  (STANDING):  buDESOnide 160 MICROgram(s)/formoterol 4.5 MICROgram(s) Inhaler 2 Puff(s) Inhalation two times a day  cyanocobalamin 1000 MICROGram(s) Oral daily  dextrose 5%. 1000 milliLiter(s) (50 mL/Hr) IV Continuous <Continuous>  dextrose 50% Injectable 12.5 Gram(s) IV Push once  dextrose 50% Injectable 25 Gram(s) IV Push once  dextrose 50% Injectable 25 Gram(s) IV Push once  ergocalciferol 96621 Unit(s) Oral every week  folic acid 1 milliGRAM(s) Oral daily  heparin  Injectable 5000 Unit(s) SubCutaneous every 12 hours  hydrochlorothiazide 25 milliGRAM(s) Oral daily  insulin lispro (HumaLOG) corrective regimen sliding scale   SubCutaneous three times a day before meals  insulin lispro (HumaLOG) corrective regimen sliding scale   SubCutaneous at bedtime  losartan 50 milliGRAM(s) Oral daily  methotrexate 17.5 milliGRAM(s) Oral every week  pantoprazole    Tablet 40 milliGRAM(s) Oral two times a day    MEDICATIONS  (PRN):  ALBUTerol    90 MICROgram(s) HFA Inhaler 2 Puff(s) Inhalation every 6 hours PRN Bronchospasm  dextrose 40% Gel 15 Gram(s) Oral once PRN Blood Glucose LESS THAN 70 milliGRAM(s)/deciliter  glucagon  Injectable 1 milliGRAM(s) IntraMuscular once PRN Glucose LESS THAN 70 milligrams/deciliter      Allergies    No Known Allergies    Intolerances      Review of Systems:  Constitutional: No fever, no chills  Eyes: No blurry vision  Neuro: No tremors  HEENT: No pain, no neck swelling  Cardiovascular: No chest pain, no palpitations  Respiratory: Has SOB, no cough  GI: No nausea, vomiting, abdominal pain  : No dysuria  Skin: no rash  MSK: Has leg swelling.  Psych: no depression  Endocrine: no polyuria, polydipsia    ALL OTHER SYSTEMS REVIEWED AND NEGATIVE    UNABLE TO OBTAIN    PHYSICAL EXAM:  VITALS: T(C): 36.7 (12-28-18 @ 16:33)  T(F): 98.1 (12-28-18 @ 16:33), Max: 100.2 (12-28-18 @ 11:32)  HR: 88 (12-28-18 @ 16:33) (88 - 104)  BP: 128/73 (12-28-18 @ 16:33) (128/73 - 140/90)  RR:  (18 - 20)  SpO2:  (95% - 96%)  Wt(kg): --  GENERAL: NAD, well-groomed, well-developed  EYES: No proptosis, no lid lag  HEENT:  Atraumatic, Normocephalic  THYROID: Normal size, no palpable nodules  RESPIRATORY: Clear to auscultation bilaterally; No rales, rhonchi, wheezing  CARDIOVASCULAR: Si S2, No murmurs;  GI: Soft, non distended, normal bowel sounds  SKIN: Dry, intact, No rashes or lesions  MUSCULOSKELETAL: Has BL lower extremity edema.  NEURO:  no tremor, sensation decreased in feet BL,                              10.8   14.6  )-----------( 312      ( 28 Dec 2018 11:47 )             31.9       12-28    136  |  100  |  21  ----------------------------<  171<H>  3.9   |  23  |  0.81    EGFR if : 76  EGFR if non : 65    Ca    8.4      12-28    TPro  7.7  /  Alb  3.1<L>  /  TBili  0.6  /  DBili  x   /  AST  7<L>  /  ALT  8<L>  /  AlkPhos  54  12-28      Thyroid Function Tests:              Radiology:

## 2018-12-28 NOTE — CONSULT NOTE ADULT - ASSESSMENT
Rheumatoid arthritis, s/p methotrexate, prednisone, TNF inhibitor, now admitted with subacute worsening of joint pain - suggestive of RA flair  Rheumatoid Arthritis associated pulmonary fibrosis: Current CT without significant change since CT in 2/2018. Unclear if still on methotrexate: will d/w outpatient rheumatologist/family - we had suggested it be DC'd given ILD on prior CT during last admissions  Dry cough - may be post viral; no clinical or CT evidence of pneumonia. Patient is currently afebrile and non toxic. Cough may also be related to ILD  Moderate AS and diastolic dysfunction on prior TTE: does not appear to be in CHF at this time  Mild intermittent asthma, with evidence of air trapping on prior CT's: currently without overt bronchospasm    REC    See no indication for antibiotics: would observe  Check viral PCR  Duoneb qid  Rheumatology evaluation RE assessment of presumed RA flair Rheumatoid arthritis, s/p methotrexate, prednisone, TNF inhibitor, now admitted with subacute worsening of joint pain - suggestive of RA flair  Rheumatoid Arthritis associated pulmonary fibrosis: Current CT without significant change since CT in 2/2018. Unclear if still on methotrexate: will d/w outpatient rheumatologist/family - we had suggested it be DC'd given ILD on prior CT during last admissions  Dry cough - may be post viral; no clinical or CT evidence of pneumonia. Patient is currently afebrile and non toxic. Cough may also be related to ILD  Moderate AS and diastolic dysfunction on prior TTE: does not appear to be in CHF at this time  Mild intermittent asthma, with evidence of air trapping on prior CT's: currently without overt bronchospasm    REC    See no indication for antibiotics: would observe  No need for systemic steroids for lung at this time  Check viral PCR  Continue symbicort, add spiriva  Rheumatology evaluation RE assessment of presumed RA flair

## 2018-12-28 NOTE — ED PROVIDER NOTE - OBJECTIVE STATEMENT
87F with pmh HTN, HLD, RA presenting with complaints of diffuse joint pain on b/l hands, arms, shoulders along with persistent dry cough x 2weeks. Patient saw her PMD last week prescribed an antibiotic course which she completed. No fever, chills, cp, sob, n/v/d, dizziness, headache, dysuria

## 2018-12-28 NOTE — PROGRESS NOTE ADULT - ASSESSMENT
Assessment  DMT2: 87y Female with DM T2 with hyperglycemia was on insulin, blood sugars running high, no hypoglycemic episode,  eating meals,  non compliant with low carb diet.  Asthma/Cough: On medications,  no chest pain, stable, monitored.  HTN: Controlled, no headaches, on medications.        Karan Akbar MD  Cell: [phone]  Office: [phone]

## 2018-12-29 LAB
GLUCOSE BLDC GLUCOMTR-MCNC: 147 MG/DL — HIGH (ref 70–99)
GLUCOSE BLDC GLUCOMTR-MCNC: 208 MG/DL — HIGH (ref 70–99)
GLUCOSE BLDC GLUCOMTR-MCNC: 282 MG/DL — HIGH (ref 70–99)
GLUCOSE BLDC GLUCOMTR-MCNC: 421 MG/DL — HIGH (ref 70–99)
GLUCOSE BLDC GLUCOMTR-MCNC: 466 MG/DL — CRITICAL HIGH (ref 70–99)

## 2018-12-29 RX ORDER — TIOTROPIUM BROMIDE 18 UG/1
1 CAPSULE ORAL; RESPIRATORY (INHALATION) DAILY
Qty: 0 | Refills: 0 | Status: DISCONTINUED | OUTPATIENT
Start: 2018-12-29 | End: 2019-01-02

## 2018-12-29 RX ORDER — INSULIN LISPRO 100/ML
8 VIAL (ML) SUBCUTANEOUS ONCE
Qty: 0 | Refills: 0 | Status: COMPLETED | OUTPATIENT
Start: 2018-12-29 | End: 2018-12-29

## 2018-12-29 RX ORDER — INSULIN LISPRO 100/ML
VIAL (ML) SUBCUTANEOUS
Qty: 0 | Refills: 0 | Status: DISCONTINUED | OUTPATIENT
Start: 2018-12-29 | End: 2019-01-02

## 2018-12-29 RX ORDER — INSULIN LISPRO 100/ML
6 VIAL (ML) SUBCUTANEOUS
Qty: 0 | Refills: 0 | Status: DISCONTINUED | OUTPATIENT
Start: 2018-12-29 | End: 2019-01-02

## 2018-12-29 RX ORDER — INSULIN LISPRO 100/ML
8 VIAL (ML) SUBCUTANEOUS
Qty: 0 | Refills: 0 | Status: COMPLETED | OUTPATIENT
Start: 2018-12-29 | End: 2018-12-29

## 2018-12-29 RX ORDER — INSULIN GLARGINE 100 [IU]/ML
12 INJECTION, SOLUTION SUBCUTANEOUS AT BEDTIME
Qty: 0 | Refills: 0 | Status: DISCONTINUED | OUTPATIENT
Start: 2018-12-29 | End: 2019-01-02

## 2018-12-29 RX ADMIN — Medication 200 MILLIGRAM(S): at 23:18

## 2018-12-29 RX ADMIN — HEPARIN SODIUM 5000 UNIT(S): 5000 INJECTION INTRAVENOUS; SUBCUTANEOUS at 17:07

## 2018-12-29 RX ADMIN — LOSARTAN POTASSIUM 50 MILLIGRAM(S): 100 TABLET, FILM COATED ORAL at 05:44

## 2018-12-29 RX ADMIN — Medication 8 UNIT(S): at 17:07

## 2018-12-29 RX ADMIN — HEPARIN SODIUM 5000 UNIT(S): 5000 INJECTION INTRAVENOUS; SUBCUTANEOUS at 05:44

## 2018-12-29 RX ADMIN — TIOTROPIUM BROMIDE 1 CAPSULE(S): 18 CAPSULE ORAL; RESPIRATORY (INHALATION) at 17:06

## 2018-12-29 RX ADMIN — Medication 8 UNIT(S): at 12:43

## 2018-12-29 RX ADMIN — Medication 3: at 17:06

## 2018-12-29 RX ADMIN — BUDESONIDE AND FORMOTEROL FUMARATE DIHYDRATE 2 PUFF(S): 160; 4.5 AEROSOL RESPIRATORY (INHALATION) at 05:43

## 2018-12-29 RX ADMIN — INSULIN GLARGINE 12 UNIT(S): 100 INJECTION, SOLUTION SUBCUTANEOUS at 22:16

## 2018-12-29 RX ADMIN — PREGABALIN 1000 MICROGRAM(S): 225 CAPSULE ORAL at 11:41

## 2018-12-29 RX ADMIN — BUDESONIDE AND FORMOTEROL FUMARATE DIHYDRATE 2 PUFF(S): 160; 4.5 AEROSOL RESPIRATORY (INHALATION) at 17:07

## 2018-12-29 RX ADMIN — Medication 40 MILLIGRAM(S): at 05:43

## 2018-12-29 RX ADMIN — Medication 25 MILLIGRAM(S): at 05:44

## 2018-12-29 RX ADMIN — Medication 2: at 08:14

## 2018-12-29 RX ADMIN — Medication 6: at 12:43

## 2018-12-29 RX ADMIN — Medication 1 MILLIGRAM(S): at 11:41

## 2018-12-29 RX ADMIN — PANTOPRAZOLE SODIUM 40 MILLIGRAM(S): 20 TABLET, DELAYED RELEASE ORAL at 05:44

## 2018-12-29 RX ADMIN — PANTOPRAZOLE SODIUM 40 MILLIGRAM(S): 20 TABLET, DELAYED RELEASE ORAL at 17:07

## 2018-12-29 NOTE — CONSULT NOTE ADULT - SUBJECTIVE AND OBJECTIVE BOX
CHIEF COMPLAINT:Patient is a 87y old  Female who presents with a chief complaint of BRBPR (28 Dec 2018 17:15)      HISTORY OF PRESENT ILLNESS:  This is a pleasant woman with history as below presented with weakness, generalized pain, fever  No chest pain, dyspnea, palpitation, or dizziness.       PAST MEDICAL & SURGICAL HISTORY:  RA (rheumatoid arthritis)  Asthma  DM (diabetes mellitus), type 2  HTN (hypertension), benign  After-cataract of left eye  Tubal occlusion          MEDICATIONS:  heparin  Injectable 5000 Unit(s) SubCutaneous every 12 hours  hydrochlorothiazide 25 milliGRAM(s) Oral daily  losartan 50 milliGRAM(s) Oral daily      ALBUTerol    90 MICROgram(s) HFA Inhaler 2 Puff(s) Inhalation every 6 hours PRN  buDESOnide 160 MICROgram(s)/formoterol 4.5 MICROgram(s) Inhaler 2 Puff(s) Inhalation two times a day      pantoprazole    Tablet 40 milliGRAM(s) Oral two times a day    dextrose 40% Gel 15 Gram(s) Oral once PRN  dextrose 50% Injectable 12.5 Gram(s) IV Push once  dextrose 50% Injectable 25 Gram(s) IV Push once  dextrose 50% Injectable 25 Gram(s) IV Push once  glucagon  Injectable 1 milliGRAM(s) IntraMuscular once PRN  insulin lispro (HumaLOG) corrective regimen sliding scale   SubCutaneous three times a day before meals  insulin lispro (HumaLOG) corrective regimen sliding scale   SubCutaneous at bedtime    cyanocobalamin 1000 MICROGram(s) Oral daily  dextrose 5%. 1000 milliLiter(s) IV Continuous <Continuous>  ergocalciferol 77491 Unit(s) Oral every week  folic acid 1 milliGRAM(s) Oral daily  methotrexate 17.5 milliGRAM(s) Oral every week      FAMILY HISTORY:  No pertinent family history in first degree relatives      Non-contributory    SOCIAL HISTORY:    No tobacco, drugs or etoh    Allergies    No Known Allergies    Intolerances    	    REVIEW OF SYSTEMS:  as above  The rest of the 14 points ROS reviewed and except above they are unremarkable.        PHYSICAL EXAM:  T(C): 36.2 (12-29-18 @ 04:05), Max: 37.9 (12-28-18 @ 11:32)  HR: 76 (12-29-18 @ 04:05) (76 - 104)  BP: 149/79 (12-29-18 @ 04:05) (124/64 - 149/79)  RR: 18 (12-29-18 @ 04:05) (18 - 20)  SpO2: 94% (12-29-18 @ 04:05) (92% - 96%)  Wt(kg): --  I&O's Summary    28 Dec 2018 07:01  -  29 Dec 2018 07:00  --------------------------------------------------------  IN: 0 mL / OUT: 100 mL / NET: -100 mL      JVP: Normal  Neck: supple  Lung: clear   CV: S1 S2 , Murmur:  Abd: soft  Ext: No edema  neuro: Awake / alert  Psych: flat affect  Skin: normal      LABS/DATA:    TELEMETRY: 	    ECG:  	   	  CARDIAC MARKERS:                                      9.9    10.8  )-----------( 288      ( 28 Dec 2018 17:22 )             29.6     12-28    136  |  100  |  21  ----------------------------<  171<H>  3.9   |  23  |  0.81    Ca    8.4      28 Dec 2018 14:50    TPro  7.7  /  Alb  3.1<L>  /  TBili  0.6  /  DBili  x   /  AST  7<L>  /  ALT  8<L>  /  AlkPhos  54  12-28    proBNP:   Lipid Profile:   HgA1c: Hemoglobin A1C, Whole Blood: 8.0 % (12-28 @ 21:27)  Hemoglobin A1C, Whole Blood: 8.2 % (12-28 @ 20:18)    TSH:       < from: 12 Lead ECG (12.28.18 @ 11:17) >  Diagnosis Line NORMAL SINUS RHYTHM  RIGHT BUNDLE BRANCH BLOCK  ABNORMAL ECG    < end of copied text >  < from: Transthoracic Echocardiogram (08.01.17 @ 21:55) >  Conclusions:  1. Mitral annular calcification, otherwise normal mitral  valve. Minimal mitral regurgitation.  2. Calcified trileaflet aortic valve with decreased  opening. Peak transaortic valve gradient equals 16 mm Hg,  mean transaortic valve gradient equals 10 mm Hg, estimated  aortic valve area equals 1.5 sqcm (by continuity equation),  aortic valve velocity time integral equals 42 cm,  consistent with moderate aortic stenosis. Mild aortic  regurgitation.  3. Normal left ventricular internal dimensions and wall  thicknesses.  4. Normal left ventricular systolic function. No segmental  wall motion abnormalities.  5. Reversal of the E-A waves of the mitral inflow pattern  consistent with reduced compliance of the left ventricle.  6. The right ventricle is not well visualized; grossly  normal right ventricular systolic function.  *** Compared with echocardiogram of 3/24/2014, no  significant changes noted.    < end of copied text >

## 2018-12-29 NOTE — PROGRESS NOTE ADULT - ASSESSMENT
Coronvirus tracheobronchitis  Mild intermittent asthma: currently without sign bronchospasm  RA, acute flare  Pulmonary fibrosis: likely RA related  Crackles on exam likely ILD related    REC    Observe off abx  Continue symbicort; add spiriva  Poor candidate for methotrexate - should be addressed with [facility] rheumtologist next week

## 2018-12-29 NOTE — PROGRESS NOTE ADULT - ASSESSMENT
Assessment  DMT2: 87y Female with DM T2 with hyperglycemia was on insulin, blood sugars running high, had high dose steroids, no hypoglycemic episode,  eating meals,  non compliant with low carb diet.  Asthma/Cough: On medications,  no chest pain, stable, monitored.  HTN: Controlled, no headaches, on medications.        Karan Akbar MD  Cell: [phone]  Office: [phone]

## 2018-12-29 NOTE — PROGRESS NOTE ADULT - SUBJECTIVE AND OBJECTIVE BOX
CC: f/u for cough and fever    Patient reports: she has a dry cough and low grade fever    REVIEW OF SYSTEMS:  All other review of systems negative (Comprehensive ROS): polyarthralgia    Antimicrobials Day #  :off    Other Medications Reviewed    T(F): 97.2 (18 @ 04:05), Max: 100.2 (18 @ 11:32)  HR: 76 (18 @ 04:05)  BP: 149/79 (18 @ 04:05)  RR: 18 (18 @ 04:05)  SpO2: 94% (18 @ 04:05)  Wt(kg): --    PHYSICAL EXAM:  General: alert, no acute distress  Eyes:  anicteric, no conjunctival injection, no discharge  Oropharynx: no lesions or injection 	  Neck: supple, without adenopathy  Lungs: coarse   Heart: regular rate and rhythm; +nubia  Abdomen: soft, nondistended, nontender, without mass or organomegaly  Skin: no lesions  Extremities: no clubbing, cyanosis, or edema  Neurologic: alert, oriented, moves all extremities    LAB RESULTS:                        9.9    10.8  )-----------( 288      ( 28 Dec 2018 17:22 )             29.6   , PC .12      136  |  100  |  21  ----------------------------<  171<H>  3.9   |  23  |  0.81    Ca    8.4      28 Dec 2018 14:50    TPro  7.7  /  Alb  3.1<L>  /  TBili  0.6  /  DBili  x   /  AST  7<L>  /  ALT  8<L>  /  AlkPhos  54      LIVER FUNCTIONS - ( 28 Dec 2018 14:50 )  Alb: 3.1 g/dL / Pro: 7.7 g/dL / ALK PHOS: 54 U/L / ALT: 8 U/L / AST: 7 U/L / GGT: x           Urinalysis Basic - ( 28 Dec 2018 19:42 )    Color: Light Yellow / Appearance: Clear / S.011 / pH: x  Gluc: x / Ketone: Trace  / Bili: Negative / Urobili: Negative   Blood: x / Protein: Trace / Nitrite: Negative   Leuk Esterase: Moderate / RBC: 1 /hpf / WBC 4 /hpf   Sq Epi: x / Non Sq Epi: 1 /hpf / Bacteria: Negative      MICROBIOLOGY:  RECENT CULTURES:  RVP coronavirus    RADIOLOGY REVIEWED:  < from: CT Chest No Cont (18 @ 16:09) >  PROCEDURE:   CT of the Chest was performed without intravenous contrast.  Sagittal and coronal reformats were performed.  Maximum Intensity Projection images were generated.    FINDINGS:    CHEST:     LUNGS AND LARGE AIRWAYS: Patent central airways.   Peripheral linear and   reticular opacities are unchanged from prior study. A 4 mm juxtapleural   nodule in the right middle lobe (2:63) is unchanged from prior study.   Multiple calcified granuloma are unchanged.    PLEURA: No pleural effusion.    VESSELS: Calcific atherosclerosis of aorta and coronary arteries.    HEART: Heart size is normal. No pericardial effusion. Annular   calcification of mitral valve. Aortic valve calcification.    MEDIASTINUM AND SHILPA: No lymphadenopathy.    CHEST WALL AND LOWER NECK: Within normal limits.    VISUALIZED UPPER ABDOMEN: Within normal limits.    BONES: Degenerative changes of spine.    IMPRESSION:     There is no pneumonia.    Peripheral linear and reticular opacities are unchanged.    < end of copied text >

## 2018-12-29 NOTE — PROGRESS NOTE ADULT - SUBJECTIVE AND OBJECTIVE BOX
Chief complaint  Patient is a 87y old  Female who presents with a chief complaint of BRBPR (29 Dec 2018 13:16)   Review of systems  Patient in bed, looks comfortable, no fever, no hypoglycemia.    Labs and Fingersticks  CAPILLARY BLOOD GLUCOSE      POCT Blood Glucose.: 282 mg/dL (29 Dec 2018 16:30)  POCT Blood Glucose.: 466 mg/dL (29 Dec 2018 12:15)  POCT Blood Glucose.: 421 mg/dL (29 Dec 2018 12:14)  POCT Blood Glucose.: 208 mg/dL (29 Dec 2018 07:34)  POCT Blood Glucose.: 296 mg/dL (28 Dec 2018 18:29)      Anion Gap, Serum: 13 (12-28 @ 14:50)  Anion Gap, Serum: 14 (12-28 @ 11:47)    Hemoglobin A1C, Whole Blood: 8.0 <H> (12-28 @ 21:27)  Hemoglobin A1C, Whole Blood: 8.2 <H> (12-28 @ 20:18)    Calcium, Total Serum: 8.4 (12-28 @ 14:50)  Calcium, Total Serum: 9.0 (12-28 @ 11:47)  Albumin, Serum: 3.1 <L> (12-28 @ 14:50)  Albumin, Serum: 3.5 (12-28 @ 11:47)    Alanine Aminotransferase (ALT/SGPT): 8 <L> (12-28 @ 14:50)  Alanine Aminotransferase (ALT/SGPT): 9 <L> (12-28 @ 11:47)  Alkaline Phosphatase, Serum: 54 (12-28 @ 14:50)  Alkaline Phosphatase, Serum: 62 (12-28 @ 11:47)  Aspartate Aminotransferase (AST/SGOT): 7 <L> (12-28 @ 14:50)  Aspartate Aminotransferase (AST/SGOT): 6 <L> (12-28 @ 11:47)        12-28    136  |  100  |  21  ----------------------------<  171<H>  3.9   |  23  |  0.81    Ca    8.4      28 Dec 2018 14:50    TPro  7.7  /  Alb  3.1<L>  /  TBili  0.6  /  DBili  x   /  AST  7<L>  /  ALT  8<L>  /  AlkPhos  54  12-28                        9.9    10.8  )-----------( 288      ( 28 Dec 2018 17:22 )             29.6     Medications  MEDICATIONS  (STANDING):  buDESOnide 160 MICROgram(s)/formoterol 4.5 MICROgram(s) Inhaler 2 Puff(s) Inhalation two times a day  cyanocobalamin 1000 MICROGram(s) Oral daily  dextrose 5%. 1000 milliLiter(s) (50 mL/Hr) IV Continuous <Continuous>  dextrose 50% Injectable 12.5 Gram(s) IV Push once  dextrose 50% Injectable 25 Gram(s) IV Push once  dextrose 50% Injectable 25 Gram(s) IV Push once  ergocalciferol 16997 Unit(s) Oral every week  folic acid 1 milliGRAM(s) Oral daily  heparin  Injectable 5000 Unit(s) SubCutaneous every 12 hours  hydrochlorothiazide 25 milliGRAM(s) Oral daily  insulin glargine Injectable (LANTUS) 12 Unit(s) SubCutaneous at bedtime  insulin lispro (HumaLOG) corrective regimen sliding scale   SubCutaneous three times a day before meals  insulin lispro (HumaLOG) corrective regimen sliding scale   SubCutaneous at bedtime  losartan 50 milliGRAM(s) Oral daily  methotrexate 17.5 milliGRAM(s) Oral every week  pantoprazole    Tablet 40 milliGRAM(s) Oral two times a day  tiotropium 18 MICROgram(s) Capsule 1 Capsule(s) Inhalation daily      Physical Exam  General: Patient comfortable in bed  Vital Signs Last 12 Hrs  T(F): 98 (12-29-18 @ 11:52), Max: 98 (12-29-18 @ 11:52)  HR: 91 (12-29-18 @ 11:52) (91 - 91)  BP: 126/66 (12-29-18 @ 11:52) (126/66 - 126/66)  BP(mean): --  RR: 18 (12-29-18 @ 11:52) (18 - 18)  SpO2: 93% (12-29-18 @ 11:52) (93% - 93%)  Neck: No palpable thyroid nodules.  CVS: S1S2, No murmurs  Respiratory: No wheezing, no crepitations  GI: Abdomen soft, bowel sounds positive  Musculoskeletal:  edema lower extremities.   Skin: No skin rashes, no ecchymosis    Diagnostics    Hemoglobin A1C, Whole Blood: Routine (12-28 @ 14:41)

## 2018-12-29 NOTE — PROGRESS NOTE ADULT - SUBJECTIVE AND OBJECTIVE BOX
Patient is a 87y old  Female who presents with a chief complaint of BRBPR (29 Dec 2018 17:52)                                                               INTERVAL HPI/OVERNIGHT EVENTS:    REVIEW OF SYSTEMS:     CONSTITUTIONAL: No weakness, fevers or chills  EYES/ENT: No visual changes , no ear ache   NECK: No pain or stiffness  RESPIRATORY: No cough, wheezing,  No shortness of breath  CARDIOVASCULAR: No chest pain or palpitations  GASTROINTESTINAL: No abdominal pain  . No nausea, vomiting, or hematemesis; No diarrhea or constipation. No melena or hematochezia.  GENITOURINARY: No dysuria, frequency or hematuria  NEUROLOGICAL: No numbness or weakness  SKIN: No itching, burning, rashes, or lesions                                                                                                                                                                                                                                                                                 Medications:  MEDICATIONS  (STANDING):  buDESOnide 160 MICROgram(s)/formoterol 4.5 MICROgram(s) Inhaler 2 Puff(s) Inhalation two times a day  cyanocobalamin 1000 MICROGram(s) Oral daily  dextrose 5%. 1000 milliLiter(s) (50 mL/Hr) IV Continuous <Continuous>  dextrose 50% Injectable 12.5 Gram(s) IV Push once  dextrose 50% Injectable 25 Gram(s) IV Push once  dextrose 50% Injectable 25 Gram(s) IV Push once  ergocalciferol 30410 Unit(s) Oral every week  folic acid 1 milliGRAM(s) Oral daily  heparin  Injectable 5000 Unit(s) SubCutaneous every 12 hours  hydrochlorothiazide 25 milliGRAM(s) Oral daily  insulin glargine Injectable (LANTUS) 12 Unit(s) SubCutaneous at bedtime  insulin lispro (HumaLOG) corrective regimen sliding scale   SubCutaneous three times a day before meals  insulin lispro (HumaLOG) corrective regimen sliding scale   SubCutaneous at bedtime  insulin lispro Injectable (HumaLOG) 6 Unit(s) SubCutaneous three times a day before meals  losartan 50 milliGRAM(s) Oral daily  methotrexate 17.5 milliGRAM(s) Oral every week  pantoprazole    Tablet 40 milliGRAM(s) Oral two times a day  tiotropium 18 MICROgram(s) Capsule 1 Capsule(s) Inhalation daily    MEDICATIONS  (PRN):  ALBUTerol    90 MICROgram(s) HFA Inhaler 2 Puff(s) Inhalation every 6 hours PRN Bronchospasm  benzonatate 200 milliGRAM(s) Oral three times a day PRN Cough  dextrose 40% Gel 15 Gram(s) Oral once PRN Blood Glucose LESS THAN 70 milliGRAM(s)/deciliter  glucagon  Injectable 1 milliGRAM(s) IntraMuscular once PRN Glucose LESS THAN 70 milligrams/deciliter  guaiFENesin/dextromethorphan  Syrup 10 milliLiter(s) Oral every 6 hours PRN Cough       Allergies    No Known Allergies    Intolerances      Vital Signs Last 24 Hrs  T(C): 36.6 (29 Dec 2018 20:39), Max: 36.7 (29 Dec 2018 11:52)  T(F): 97.8 (29 Dec 2018 20:39), Max: 98 (29 Dec 2018 11:52)  HR: 86 (29 Dec 2018 20:39) (76 - 91)  BP: 157/81 (29 Dec 2018 20:39) (126/66 - 157/81)  BP(mean): --  RR: 18 (29 Dec 2018 20:39) (18 - 18)  SpO2: 95% (29 Dec 2018 20:39) (93% - 95%)  CAPILLARY BLOOD GLUCOSE      POCT Blood Glucose.: 147 mg/dL (29 Dec 2018 21:20)  POCT Blood Glucose.: 282 mg/dL (29 Dec 2018 16:30)  POCT Blood Glucose.: 466 mg/dL (29 Dec 2018 12:15)  POCT Blood Glucose.: 421 mg/dL (29 Dec 2018 12:14)  POCT Blood Glucose.: 208 mg/dL (29 Dec 2018 07:34)      12-28 @ 07:01  -  12-29 @ 07:00  --------------------------------------------------------  IN: 0 mL / OUT: 100 mL / NET: -100 mL    12-29 @ 07:01  -  12-29 @ 22:55  --------------------------------------------------------  IN: 780 mL / OUT: 0 mL / NET: 780 mL      Physical Exam:    Daily     Daily   General:  Well appearing, NAD, not cachetic  HEENT:  Nonicteric, PERRLA  CV:  RRR, S1S2   Lungs:  CTA B/L, no wheezes, rales, rhonchi  Abdomen:  Soft, non-tender, no distended, positive BS  Extremities:  2+ pulses, no c/c, no edema  Skin:  Warm and dry, no rashes  :  No mason  Neuro:  AAOx3, non-focal, grossly intact                                                                                                                                                                                                                                                                                                LABS:                               9.9    10.8  )-----------( 288      ( 28 Dec 2018 17:22 )             29.6                      12-28    136  |  100  |  21  ----------------------------<  171<H>  3.9   |  23  |  0.81    Ca    8.4      28 Dec 2018 14:50    TPro  7.7  /  Alb  3.1<L>  /  TBili  0.6  /  DBili  x   /  AST  7<L>  /  ALT  8<L>  /  AlkPhos  54  12-28                       RADIOLOGY & ADDITIONAL TESTS         I personally reviewed: [  ]EKG   [  ]CXR    [  ] CT      A/P:         Discussed with :     [employer] consultants' Notes   Time spent : Patient is a 87y old  Female who presents with a chief complaint of BRBPR (29 Dec 2018 17:52)                                                               INTERVAL HPI/OVERNIGHT EVENTS:    REVIEW OF SYSTEMS:     CONSTITUTIONAL: feels overall improved   EYES/ENT: No visual changes , no ear ache   NECK: No pain or stiffness  RESPIRATORY:  cough, wheezing,  No shortness of breath  CARDIOVASCULAR: No chest pain or palpitations  GASTROINTESTINAL: No abdominal pain  . No nausea, vomiting, or hematemesis; No diarrhea or constipation. No melena or hematochezia.  GENITOURINARY: No dysuria, frequency or hematuria  NEUROLOGICAL: No numbness or weakness                                                                                                                                                                                                                                                                                  Medications:  MEDICATIONS  (STANDING):  buDESOnide 160 MICROgram(s)/formoterol 4.5 MICROgram(s) Inhaler 2 Puff(s) Inhalation two times a day  cyanocobalamin 1000 MICROGram(s) Oral daily  dextrose 5%. 1000 milliLiter(s) (50 mL/Hr) IV Continuous <Continuous>  dextrose 50% Injectable 12.5 Gram(s) IV Push once  dextrose 50% Injectable 25 Gram(s) IV Push once  dextrose 50% Injectable 25 Gram(s) IV Push once  ergocalciferol 47210 Unit(s) Oral every week  folic acid 1 milliGRAM(s) Oral daily  heparin  Injectable 5000 Unit(s) SubCutaneous every 12 hours  hydrochlorothiazide 25 milliGRAM(s) Oral daily  insulin glargine Injectable (LANTUS) 12 Unit(s) SubCutaneous at bedtime  insulin lispro (HumaLOG) corrective regimen sliding scale   SubCutaneous three times a day before meals  insulin lispro (HumaLOG) corrective regimen sliding scale   SubCutaneous at bedtime  insulin lispro Injectable (HumaLOG) 6 Unit(s) SubCutaneous three times a day before meals  losartan 50 milliGRAM(s) Oral daily  methotrexate 17.5 milliGRAM(s) Oral every week  pantoprazole    Tablet 40 milliGRAM(s) Oral two times a day  tiotropium 18 MICROgram(s) Capsule 1 Capsule(s) Inhalation daily    MEDICATIONS  (PRN):  ALBUTerol    90 MICROgram(s) HFA Inhaler 2 Puff(s) Inhalation every 6 hours PRN Bronchospasm  benzonatate 200 milliGRAM(s) Oral three times a day PRN Cough  dextrose 40% Gel 15 Gram(s) Oral once PRN Blood Glucose LESS THAN 70 milliGRAM(s)/deciliter  glucagon  Injectable 1 milliGRAM(s) IntraMuscular once PRN Glucose LESS THAN 70 milligrams/deciliter  guaiFENesin/dextromethorphan  Syrup 10 milliLiter(s) Oral every 6 hours PRN Cough       Allergies    No Known Allergies    Intolerances      Vital Signs Last 24 Hrs  T(C): 36.6 (29 Dec 2018 20:39), Max: 36.7 (29 Dec 2018 11:52)  T(F): 97.8 (29 Dec 2018 20:39), Max: 98 (29 Dec 2018 11:52)  HR: 86 (29 Dec 2018 20:39) (76 - 91)  BP: 157/81 (29 Dec 2018 20:39) (126/66 - 157/81)  BP(mean): --  RR: 18 (29 Dec 2018 20:39) (18 - 18)  SpO2: 95% (29 Dec 2018 20:39) (93% - 95%)  CAPILLARY BLOOD GLUCOSE      POCT Blood Glucose.: 147 mg/dL (29 Dec 2018 21:20)  POCT Blood Glucose.: 282 mg/dL (29 Dec 2018 16:30)  POCT Blood Glucose.: 466 mg/dL (29 Dec 2018 12:15)  POCT Blood Glucose.: 421 mg/dL (29 Dec 2018 12:14)  POCT Blood Glucose.: 208 mg/dL (29 Dec 2018 07:34)      12-28 @ 07:01 - 12-29 @ 07:00  --------------------------------------------------------  IN: 0 mL / OUT: 100 mL / NET: -100 mL    12-29 @ 07:01 - 12-29 @ 22:55  --------------------------------------------------------  IN: 780 mL / OUT: 0 mL / NET: 780 mL      Physical Exam:      General:  NAD   HEENT:  Nonicteric, PERRLA  CV:  RRR, S1S2   Lungs:  crackles at bases   Abdomen:  Soft, non-tender, no distended, positive BS  Extremities:  2+ pulses, no c/c, no edema  Skin:  Warm and dry, no rashes  :  No mason  Neuro:  AAOx3, non-focal, grossly intact           MSK : improved R wirst and L rohanle swelling and ROM                                                                                                                                                                                                                                                                                        LABS:                               9.9    10.8  )-----------( 288      ( 28 Dec 2018 17:22 )             29.6                      12-28    136  |  100  |  21  ----------------------------<  171<H>  3.9   |  23  |  0.81    Ca    8.4      28 Dec 2018 14:50    TPro  7.7  /  Alb  3.1<L>  /  TBili  0.6  /  DBili  x   /  AST  7<L>  /  ALT  8<L>  /  AlkPhos  54  12-28

## 2018-12-29 NOTE — PROGRESS NOTE ADULT - ASSESSMENT
84 y/o fmeale with hx of RA on mtx , and was on prednisone ( no longer on steroids since 09/2017  and has not recieved iv infusion  ( monthly simponi ) in three months . also history of HTN and DM .. has been admitted twice over the past 6-8 months with  weakness . initial admit pt was thought ot have an element of adrenal insufficeiny when she was on steroids .. and pt was sent out on steroids with some improvement,.. later admitted as lij for weakness thought to be multifactorial sec to viral illness, dehydration , and leemt of steroid -induced myopathy.more recently admitted with weakness sec to hMPV and most recently admitted with  acute GIB ( diverticular )..  Pt now admitted with generlzied weakness, polyarthiritc pains and cough..  Pt has been feeling weak and has been having difficulty ambulating for sometime now.. and over the past one week she developed a cough, fever and was treated with a course of abx which she completed yesterday .. since pt did not respond completely she was brought be her  ..  pt c/o of joint pains of neck, wirsts and ankles   most pronounced in R wrist and L ankle with swelling   no fever ( but noted to have fever in ED of 100.2 )   no chills   still persistent cough though somewhat improving   no CP/SOB   had an episode of cvomitting however post severe episode of cough   no urianry sx   in ED was given steroids for suspicion of RA flare     1- sepsis : pt with fever and elevated WBC .. mild elevateion of procal however was treated w abx as of yesterday   will pan culture   will consult ID   check CT  hold off on further abx     2- Arhtarlegia : generlized with swelling of R wirst and L ankle     given steroids in ED   consider further steroids   check ESR /CRP   rhuem inpu t  PT OOB    3- DM : cont insulin     4- HTN cont meds 84 y/o fmeale with hx of RA on mtx , and was on prednisone ( no longer on steroids since 09/2017  and has not recieved iv infusion  ( monthly simponi ) in three months . also history of HTN and DM .. has been admitted twice over the past 6-8 months with  weakness . initial admit pt was thought ot have an element of adrenal insufficeiny when she was on steroids .. and pt was sent out on steroids with some improvement,.. later admitted as lij for weakness thought to be multifactorial sec to viral illness, dehydration , and leemt of steroid -induced myopathy.more recently admitted with weakness sec to hMPV and most recently admitted with  acute GIB ( diverticular )..  Pt now admitted with generlzied weakness, polyarthiritc pains and cough..  Pt has been feeling weak and has been having difficulty ambulating for sometime now.. and over the past one week she developed a cough, fever and was treated with a course of abx which she completed yesterday .. since pt did not respond completely she was brought be her  ..  pt c/o of joint pains of neck, wirsts and ankles   most pronounced in R wrist and L ankle with swelling   no fever ( but noted to have fever in ED of 100.2 )   no chills   still persistent cough though somewhat improving   no CP/SOB   had an episode of cvomitting however post severe episode of cough   no urianry sx   in ED was given steroids for suspicion of RA flare     1- sepsis : pt with fever and elevated WBC .. mild elevateion of procal however was treated w abx as of yesterday   RVP positive   no evidence of bacterial infection   cont off abx     2- Arhtarlegia : generlized with swelling of R wirst and L ankle     significantly elevated ESR  ? PMR   cont steroids     3- DM : cont insulin     4- HTN cont meds

## 2018-12-29 NOTE — CONSULT NOTE ADULT - ASSESSMENT
HTN  stable    Aortic Stenosis  Moderate on last echo   clinically stable  repeat echo can be done as outpt    DM  Monitor finger stick. Insulin coverage. Diabetic education and Diabetic diet. Consider nutrition consultation.

## 2018-12-29 NOTE — PROGRESS NOTE ADULT - ASSESSMENT
RA, Aortic stenosis,ILD  Fever and cough, ? viral URI  Low PC level and negative chest CT, no evidence of a pneumonia.  Fever may be secondary to RA flair vs viral syndrome.  Steroids should help control fever  Suggest:  1.monitor off antibiotics  2.Steroids per primary service  3.Await blood cultures  4.Follow for resolution of fever  5.Additional w/u as indicated RA, Aortic stenosis,ILD, DM, and HTN  Off and on steroids, ? 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 viral URI with RA flare  Low PC level and negative chest CT, no evidence of a pneumonia.  Fever may be secondary to RA flair vs viral syndrome.  Steroids should help control fever and joint pains  Suggest:  1.monitor off antibiotics, PC of .12 and negative chest CT  2.Steroids per primary service  3.Await blood cultures  4.Follow for resolution of fever  5.Additional w/u as indicated

## 2018-12-30 LAB
GLUCOSE BLDC GLUCOMTR-MCNC: 117 MG/DL — HIGH (ref 70–99)
GLUCOSE BLDC GLUCOMTR-MCNC: 156 MG/DL — HIGH (ref 70–99)
GLUCOSE BLDC GLUCOMTR-MCNC: 195 MG/DL — HIGH (ref 70–99)
GLUCOSE BLDC GLUCOMTR-MCNC: 203 MG/DL — HIGH (ref 70–99)

## 2018-12-30 RX ADMIN — TIOTROPIUM BROMIDE 1 CAPSULE(S): 18 CAPSULE ORAL; RESPIRATORY (INHALATION) at 12:40

## 2018-12-30 RX ADMIN — BUDESONIDE AND FORMOTEROL FUMARATE DIHYDRATE 2 PUFF(S): 160; 4.5 AEROSOL RESPIRATORY (INHALATION) at 06:36

## 2018-12-30 RX ADMIN — HEPARIN SODIUM 5000 UNIT(S): 5000 INJECTION INTRAVENOUS; SUBCUTANEOUS at 16:49

## 2018-12-30 RX ADMIN — PREGABALIN 1000 MICROGRAM(S): 225 CAPSULE ORAL at 12:40

## 2018-12-30 RX ADMIN — PANTOPRAZOLE SODIUM 40 MILLIGRAM(S): 20 TABLET, DELAYED RELEASE ORAL at 06:37

## 2018-12-30 RX ADMIN — Medication 200 MILLIGRAM(S): at 06:37

## 2018-12-30 RX ADMIN — LOSARTAN POTASSIUM 50 MILLIGRAM(S): 100 TABLET, FILM COATED ORAL at 06:37

## 2018-12-30 RX ADMIN — Medication 6 UNIT(S): at 12:39

## 2018-12-30 RX ADMIN — Medication 25 MILLIGRAM(S): at 06:37

## 2018-12-30 RX ADMIN — Medication 1: at 12:39

## 2018-12-30 RX ADMIN — BUDESONIDE AND FORMOTEROL FUMARATE DIHYDRATE 2 PUFF(S): 160; 4.5 AEROSOL RESPIRATORY (INHALATION) at 16:49

## 2018-12-30 RX ADMIN — Medication 200 MILLIGRAM(S): at 21:25

## 2018-12-30 RX ADMIN — Medication 1 MILLIGRAM(S): at 12:40

## 2018-12-30 RX ADMIN — Medication 6 UNIT(S): at 08:29

## 2018-12-30 RX ADMIN — HEPARIN SODIUM 5000 UNIT(S): 5000 INJECTION INTRAVENOUS; SUBCUTANEOUS at 06:34

## 2018-12-30 RX ADMIN — INSULIN GLARGINE 12 UNIT(S): 100 INJECTION, SOLUTION SUBCUTANEOUS at 21:25

## 2018-12-30 RX ADMIN — Medication 6 UNIT(S): at 16:49

## 2018-12-30 RX ADMIN — Medication 200 MILLIGRAM(S): at 12:40

## 2018-12-30 RX ADMIN — PANTOPRAZOLE SODIUM 40 MILLIGRAM(S): 20 TABLET, DELAYED RELEASE ORAL at 16:52

## 2018-12-30 RX ADMIN — Medication 2: at 08:29

## 2018-12-30 RX ADMIN — METHOTREXATE 17.5 MILLIGRAM(S): 2.5 TABLET ORAL at 16:50

## 2018-12-30 NOTE — PHYSICAL THERAPY INITIAL EVALUATION ADULT - ADDITIONAL COMMENTS
Pt. lives in house with  and grand daughter. 4-5 steps to get in. No steps inside of the house. No HHA services. Ambulates with RW or standard cane  independent . usually stays indoor. Has shower chair, grab bars at home.

## 2018-12-30 NOTE — PROGRESS NOTE ADULT - SUBJECTIVE AND OBJECTIVE BOX
Subjective: Patient seen and examined. No new events except as noted.     SUBJECTIVE/ROS:  has mild cough       MEDICATIONS:  MEDICATIONS  (STANDING):  buDESOnide 160 MICROgram(s)/formoterol 4.5 MICROgram(s) Inhaler 2 Puff(s) Inhalation two times a day  cyanocobalamin 1000 MICROGram(s) Oral daily  dextrose 5%. 1000 milliLiter(s) (50 mL/Hr) IV Continuous <Continuous>  dextrose 50% Injectable 12.5 Gram(s) IV Push once  dextrose 50% Injectable 25 Gram(s) IV Push once  dextrose 50% Injectable 25 Gram(s) IV Push once  ergocalciferol 88850 Unit(s) Oral every week  folic acid 1 milliGRAM(s) Oral daily  heparin  Injectable 5000 Unit(s) SubCutaneous every 12 hours  hydrochlorothiazide 25 milliGRAM(s) Oral daily  insulin glargine Injectable (LANTUS) 12 Unit(s) SubCutaneous at bedtime  insulin lispro (HumaLOG) corrective regimen sliding scale   SubCutaneous three times a day before meals  insulin lispro (HumaLOG) corrective regimen sliding scale   SubCutaneous at bedtime  insulin lispro Injectable (HumaLOG) 6 Unit(s) SubCutaneous three times a day before meals  losartan 50 milliGRAM(s) Oral daily  methotrexate 17.5 milliGRAM(s) Oral every week  pantoprazole    Tablet 40 milliGRAM(s) Oral two times a day  tiotropium 18 MICROgram(s) Capsule 1 Capsule(s) Inhalation daily      PHYSICAL EXAM:  T(C): 36.8 (12-30-18 @ 11:30), Max: 36.8 (12-30-18 @ 11:30)  HR: 93 (12-30-18 @ 11:30) (86 - 93)  BP: 133/66 (12-30-18 @ 11:30) (124/69 - 157/81)  RR: 17 (12-30-18 @ 11:30) (17 - 18)  SpO2: 95% (12-30-18 @ 11:30) (94% - 95%)  Wt(kg): --  I&O's Summary    29 Dec 2018 07:01  -  30 Dec 2018 07:00  --------------------------------------------------------  IN: 780 mL / OUT: 0 mL / NET: 780 mL    30 Dec 2018 07:01  -  30 Dec 2018 13:55  --------------------------------------------------------  IN: 200 mL / OUT: 0 mL / NET: 200 mL          JVP: Normal  Neck: supple  Lung: clear   CV: S1 S2 , Murmur:  Abd: soft  Ext: No edema  neuro: Awake / alert  Psych: flat affect  Skin: normal        LABS/DATA:    CARDIAC MARKERS:                                9.9    10.8  )-----------( 288      ( 28 Dec 2018 17:22 )             29.6     12-28    136  |  100  |  21  ----------------------------<  171<H>  3.9   |  23  |  0.81    Ca    8.4      28 Dec 2018 14:50    TPro  7.7  /  Alb  3.1<L>  /  TBili  0.6  /  DBili  x   /  AST  7<L>  /  ALT  8<L>  /  AlkPhos  54  12-28    proBNP:   Lipid Profile:   HgA1c:   TSH:     TELE:  EKG:

## 2018-12-30 NOTE — PROGRESS NOTE ADULT - SUBJECTIVE AND OBJECTIVE BOX
Subjective: Patient seen and examined. No new events except as noted.     REVIEW OF SYSTEMS:    CONSTITUTIONAL: No weakness, fevers or chills  EYES/ENT: No visual changes;  No vertigo or throat pain   NECK: No pain or stiffness  RESPIRATORY: No cough, wheezing, hemoptysis; No shortness of breath  CARDIOVASCULAR: No chest pain or palpitations  GASTROINTESTINAL: No abdominal or epigastric pain. No nausea, vomiting, or hematemesis; No diarrhea or constipation. No melena or hematochezia.  GENITOURINARY: No dysuria, frequency or hematuria  NEUROLOGICAL: No numbness or weakness  SKIN: No itching, burning, rashes, or lesions   All other review of systems is negative unless indicated above.    MEDICATIONS:  MEDICATIONS  (STANDING):  buDESOnide 160 MICROgram(s)/formoterol 4.5 MICROgram(s) Inhaler 2 Puff(s) Inhalation two times a day  cyanocobalamin 1000 MICROGram(s) Oral daily  dextrose 5%. 1000 milliLiter(s) (50 mL/Hr) IV Continuous <Continuous>  dextrose 50% Injectable 12.5 Gram(s) IV Push once  dextrose 50% Injectable 25 Gram(s) IV Push once  dextrose 50% Injectable 25 Gram(s) IV Push once  ergocalciferol 62223 Unit(s) Oral every week  folic acid 1 milliGRAM(s) Oral daily  heparin  Injectable 5000 Unit(s) SubCutaneous every 12 hours  hydrochlorothiazide 25 milliGRAM(s) Oral daily  insulin glargine Injectable (LANTUS) 12 Unit(s) SubCutaneous at bedtime  insulin lispro (HumaLOG) corrective regimen sliding scale   SubCutaneous three times a day before meals  insulin lispro (HumaLOG) corrective regimen sliding scale   SubCutaneous at bedtime  insulin lispro Injectable (HumaLOG) 6 Unit(s) SubCutaneous three times a day before meals  losartan 50 milliGRAM(s) Oral daily  methotrexate 17.5 milliGRAM(s) Oral every week  pantoprazole    Tablet 40 milliGRAM(s) Oral two times a day  tiotropium 18 MICROgram(s) Capsule 1 Capsule(s) Inhalation daily      PHYSICAL EXAM:  T(C): 36.8 (12-30-18 @ 20:41), Max: 36.8 (12-30-18 @ 11:30)  HR: 104 (12-30-18 @ 20:41) (92 - 104)  BP: 148/75 (12-30-18 @ 20:41) (124/69 - 152/76)  RR: 18 (12-30-18 @ 20:41) (17 - 18)  SpO2: 94% (12-30-18 @ 20:41) (94% - 95%)  Wt(kg): --  I&O's Summary    29 Dec 2018 07:01  -  30 Dec 2018 07:00  --------------------------------------------------------  IN: 780 mL / OUT: 0 mL / NET: 780 mL    30 Dec 2018 07:01  -  30 Dec 2018 22:12  --------------------------------------------------------  IN: 580 mL / OUT: 0 mL / NET: 580 mL          Appearance: Normal	  HEENT:   Normal oral mucosa, PERRL, EOMI	  Lymphatic: No lymphadenopathy , no edema  Cardiovascular: Normal S1 S2, No JVD, No murmurs , Peripheral pulses palpable 2+ bilaterally  Respiratory: Lungs clear to auscultation, normal effort 	  Gastrointestinal:  Soft, Non-tender, + BS	  Skin: No rashes, No ecchymoses, No cyanosis, warm to touch  Musculoskeletal: Normal range of motion, normal strength  Psychiatry:  Mood & affect appropriate  Ext: No edema      All labs, Imaging and EKGs personally reviewed

## 2018-12-30 NOTE — PHYSICAL THERAPY INITIAL EVALUATION ADULT - PLANNED THERAPY INTERVENTIONS, PT EVAL
strengthening/transfer training/GOAL: Pt will negotiate 5 steps  up and down using HR support, independent  within 2-3 weeks./balance training/gait training

## 2018-12-30 NOTE — PROGRESS NOTE ADULT - ASSESSMENT
Coronvirus tracheobronchitis  Mild intermittent asthma: currently without sign bronchospasm  RA, acute flare  Pulmonary fibrosis: likely RA related  Crackles on exam likely ILD related    REC    Observe off abx  Continue symbicort; add spiriva  Hycodan prn at night  Poor candidate for methotrexate - should be addressed with [facility] rheumtologist next week

## 2018-12-30 NOTE — PHYSICAL THERAPY INITIAL EVALUATION ADULT - PERTINENT HX OF CURRENT PROBLEM, REHAB EVAL
84 y/o fmeale with hx of RA on mtx , and was on prednisone ( no longer on steroids since 09/2017  and has not recieved iv infusion  ( monthly simponi ) in three months . also history of HTN and DM .. has been admitted twice over the past 6-8 months with  weakness   Pt now admitted with generlzied weakness, polyarthiritc pains and cough. 84 y/o female with hx of RA on mtx , and was on prednisone ( no longer on steroids since 09/2017  and has not recieved iv infusion  ( monthly simponi ) in three months . also history of HTN and DM .. has been admitted twice over the past 6-8 months with  weakness   Pt now admitted with generlzied weakness, polyarthiritc pains and cough.

## 2018-12-30 NOTE — PROGRESS NOTE ADULT - ASSESSMENT
86 y/o fmeale with hx of RA on mtx , and was on prednisone ( no longer on steroids since 09/2017  and has not recieved iv infusion  ( monthly simponi ) in three months . also history of HTN and DM .. has been admitted twice over the past 6-8 months with  weakness . initial admit pt was thought ot have an element of adrenal insufficeiny when she was on steroids .. and pt was sent out on steroids with some improvement,.. later admitted as lij for weakness thought to be multifactorial sec to viral illness, dehydration , and leemt of steroid -induced myopathy.more recently admitted with weakness sec to hMPV and most recently admitted with  acute GIB ( diverticular )..  Pt now admitted with generlzied weakness, polyarthiritc pains and cough..  Pt has been feeling weak and has been having difficulty ambulating for sometime now.. and over the past one week she developed a cough, fever and was treated with a course of abx which she completed yesterday .. since pt did not respond completely she was brought be her  ..  pt c/o of joint pains of neck, wirsts and ankles   most pronounced in R wrist and L ankle with swelling   no fever ( but noted to have fever in ED of 100.2 )   no chills   still persistent cough though somewhat improving   no CP/SOB   had an episode of cvomitting however post severe episode of cough   no urianry sx   in ED was given steroids for suspicion of RA flare     1- sepsis : pt with fever and elevated WBC   ID and pulm eval appreciated   monitor off ABx   CT chest noted, no PNA      2- Arhtarlegia : generlized with swelling of R wirst and L ankle     given steroids in ED   consider further steroids   check ESR /CRP   rhuem inpu t  PT OOB    3- DM : cont insulin     4- HTN cont meds   Card eval appreciated

## 2018-12-30 NOTE — PROGRESS NOTE ADULT - SUBJECTIVE AND OBJECTIVE BOX
CC: f/u for fever    Patient reports; no fever but still c/o not feeling well.she c/o a dry cough    REVIEW OF SYSTEMS:  All other review of systems negative (Comprehensive ROS): joint pains improved    Antimicrobials Day #  :off    Other Medications Reviewed    T(F): 97.4 (18 @ 04:20), Max: 98 (18 @ 11:52)  HR: 93 (18 @ 04:20)  BP: 152/76 (18 @ 04:20)  RR: 18 (18 @ 04:20)  SpO2: 94% (18 @ 04:20)  Wt(kg): --    PHYSICAL EXAM:  General: alert, no acute distress  Eyes:  anicteric, no conjunctival injection, no discharge  Oropharynx: no lesions or injection 	  Neck: supple, without adenopathy  Lungs: few basilar crackles  Heart: regular rate and rhythm; no murmur, rubs or gallops  Abdomen: soft, nondistended, nontender, without mass or organomegaly  Skin: no lesions  Extremities: no clubbing, cyanosis, or edema  Neurologic: alert, oriented, moves all extremities  Wrist and ankle swelling improved  LAB RESULTS:                        9.9    10.8  )-----------( 288      ( 28 Dec 2018 17:22 )             29.6         136  |  100  |  21  ----------------------------<  171<H>  3.9   |  23  |  0.81    Ca    8.4      28 Dec 2018 14:50    TPro  7.7  /  Alb  3.1<L>  /  TBili  0.6  /  DBili  x   /  AST  7<L>  /  ALT  8<L>  /  AlkPhos  54      LIVER FUNCTIONS - ( 28 Dec 2018 14:50 )  Alb: 3.1 g/dL / Pro: 7.7 g/dL / ALK PHOS: 54 U/L / ALT: 8 U/L / AST: 7 U/L / GGT: x           Urinalysis Basic - ( 28 Dec 2018 19:42 )    Color: Light Yellow / Appearance: Clear / S.011 / pH: x  Gluc: x / Ketone: Trace  / Bili: Negative / Urobili: Negative   Blood: x / Protein: Trace / Nitrite: Negative   Leuk Esterase: Moderate / RBC: 1 /hpf / WBC 4 /hpf   Sq Epi: x / Non Sq Epi: 1 /hpf / Bacteria: Negative      MICROBIOLOGY:  RECENT CULTURES:   @ 16:12 .Blood Blood     No growth to date.              RADIOLOGY REVIEWED:    < from: CT Chest No Cont (18 @ 16:09) >  IMPRESSION:     There is no pneumonia.    Peripheral linear and reticular opacities are unchanged.    < end of copied text >

## 2018-12-30 NOTE — PROGRESS NOTE ADULT - ASSESSMENT
Assessment  DMT2: 87y Female with DM T2 with hyperglycemia was on insulin, blood sugars improving, no hypoglycemic episode,  eating meals,  non compliant with low carb diet.  Asthma/Cough: On medications,  no chest pain, stable, monitored.  HTN: Controlled, no headaches, on medications.        Karan Akbar MD  Cell: [phone]  Office: [phone]

## 2018-12-30 NOTE — PROGRESS NOTE ADULT - SUBJECTIVE AND OBJECTIVE BOX
Follow-up Pulm Progress Note  Abrahan Flannery MD  [phone]    Febrile  PCR coronvirus positive  OOB, c/o nocturnal cough limiting sleep  No dyspnea; O2 sat 98% on RA      TPro  7.7  /  Alb  3.1<L>  /  TBili  0.6  /  DBili  x   /  AST  7<L>  /  ALT  8<L>  /  AlkPhos  54  12-28    Procalcitonin, Serum: 0.12 ng/mL (12-28-18 @ 11:56)    Physical Examination:  PULM: crackles R 1/3 and L basilar; few scattered exp rhonchi  CVS: Regular rate and rhythm, no murmurs, rubs, or gallops  ABD: Soft, non-tender  EXT:  No clubbing, cyanosis, or edema    RADIOLOGY REVIEWED  CXR:    CT chest:    TTE:

## 2018-12-30 NOTE — PROGRESS NOTE ADULT - SUBJECTIVE AND OBJECTIVE BOX
Chief complaint  Patient is a 87y old  Female who presents with a chief complaint of BRBPR (30 Dec 2018 13:55)   Review of systems  Patient in bed, looks comfortable, no fever, no hypoglycemia.    Labs and Fingersticks  CAPILLARY BLOOD GLUCOSE      POCT Blood Glucose.: 117 mg/dL (30 Dec 2018 16:32)  POCT Blood Glucose.: 156 mg/dL (30 Dec 2018 12:02)  POCT Blood Glucose.: 203 mg/dL (30 Dec 2018 08:02)  POCT Blood Glucose.: 147 mg/dL (29 Dec 2018 21:20)        Hemoglobin A1C, Whole Blood: 8.0 <H> (12-28 @ 21:27)  Hemoglobin A1C, Whole Blood: 8.2 <H> (12-28 @ 20:18)                  Medications  MEDICATIONS  (STANDING):  buDESOnide 160 MICROgram(s)/formoterol 4.5 MICROgram(s) Inhaler 2 Puff(s) Inhalation two times a day  cyanocobalamin 1000 MICROGram(s) Oral daily  dextrose 5%. 1000 milliLiter(s) (50 mL/Hr) IV Continuous <Continuous>  dextrose 50% Injectable 12.5 Gram(s) IV Push once  dextrose 50% Injectable 25 Gram(s) IV Push once  dextrose 50% Injectable 25 Gram(s) IV Push once  ergocalciferol 26928 Unit(s) Oral every week  folic acid 1 milliGRAM(s) Oral daily  heparin  Injectable 5000 Unit(s) SubCutaneous every 12 hours  hydrochlorothiazide 25 milliGRAM(s) Oral daily  insulin glargine Injectable (LANTUS) 12 Unit(s) SubCutaneous at bedtime  insulin lispro (HumaLOG) corrective regimen sliding scale   SubCutaneous three times a day before meals  insulin lispro (HumaLOG) corrective regimen sliding scale   SubCutaneous at bedtime  insulin lispro Injectable (HumaLOG) 6 Unit(s) SubCutaneous three times a day before meals  losartan 50 milliGRAM(s) Oral daily  methotrexate 17.5 milliGRAM(s) Oral every week  pantoprazole    Tablet 40 milliGRAM(s) Oral two times a day  tiotropium 18 MICROgram(s) Capsule 1 Capsule(s) Inhalation daily      Physical Exam  General: Patient comfortable in bed  Vital Signs Last 12 Hrs  T(F): 98.3 (12-30-18 @ 11:30), Max: 98.3 (12-30-18 @ 11:30)  HR: 93 (12-30-18 @ 11:30) (92 - 93)  BP: 133/66 (12-30-18 @ 11:30) (124/69 - 133/66)  BP(mean): --  RR: 17 (12-30-18 @ 11:30) (17 - 17)  SpO2: 95% (12-30-18 @ 11:30) (95% - 95%)  Neck: No palpable thyroid nodules.  CVS: S1S2, No murmurs  Respiratory: No wheezing, no crepitations  GI: Abdomen soft, bowel sounds positive  Musculoskeletal:  edema lower extremities.   Skin: No skin rashes, no ecchymosis    Diagnostics    Hemoglobin A1C, Whole Blood: Routine (12-28 @ 14:41)

## 2018-12-30 NOTE — PROGRESS NOTE ADULT - ASSESSMENT
HTN  stable    Aortic Stenosis  Moderate on last echo   clinically stable  repeat echo can be done as outpt    DM  Monitor finger stick. Insulin coverage. Diabetic education and Diabetic diet. Consider nutrition consultation.     cough   likely due to her viral syndrome  fu with med

## 2018-12-31 ENCOUNTER — TRANSCRIPTION ENCOUNTER (OUTPATIENT)
Age: 83
End: 2018-12-31

## 2018-12-31 LAB
ALBUMIN SERPL ELPH-MCNC: 3.4 G/DL — SIGNIFICANT CHANGE UP (ref 3.3–5)
ALP SERPL-CCNC: 62 U/L — SIGNIFICANT CHANGE UP (ref 40–120)
ALT FLD-CCNC: 12 U/L — SIGNIFICANT CHANGE UP (ref 10–45)
ANION GAP SERPL CALC-SCNC: 13 MMOL/L — SIGNIFICANT CHANGE UP (ref 5–17)
AST SERPL-CCNC: 19 U/L — SIGNIFICANT CHANGE UP (ref 10–40)
BILIRUB SERPL-MCNC: 0.6 MG/DL — SIGNIFICANT CHANGE UP (ref 0.2–1.2)
BUN SERPL-MCNC: 19 MG/DL — SIGNIFICANT CHANGE UP (ref 7–23)
CALCIUM SERPL-MCNC: 8.8 MG/DL — SIGNIFICANT CHANGE UP (ref 8.4–10.5)
CHLORIDE SERPL-SCNC: 96 MMOL/L — SIGNIFICANT CHANGE UP (ref 96–108)
CO2 SERPL-SCNC: 25 MMOL/L — SIGNIFICANT CHANGE UP (ref 22–31)
CREAT SERPL-MCNC: 0.8 MG/DL — SIGNIFICANT CHANGE UP (ref 0.5–1.3)
GLUCOSE BLDC GLUCOMTR-MCNC: 131 MG/DL — HIGH (ref 70–99)
GLUCOSE BLDC GLUCOMTR-MCNC: 159 MG/DL — HIGH (ref 70–99)
GLUCOSE BLDC GLUCOMTR-MCNC: 183 MG/DL — HIGH (ref 70–99)
GLUCOSE BLDC GLUCOMTR-MCNC: 235 MG/DL — HIGH (ref 70–99)
GLUCOSE BLDC GLUCOMTR-MCNC: 393 MG/DL — HIGH (ref 70–99)
GLUCOSE SERPL-MCNC: 133 MG/DL — HIGH (ref 70–99)
HCT VFR BLD CALC: 33.3 % — LOW (ref 34.5–45)
HGB BLD-MCNC: 10.7 G/DL — LOW (ref 11.5–15.5)
MCHC RBC-ENTMCNC: 27.9 PG — SIGNIFICANT CHANGE UP (ref 27–34)
MCHC RBC-ENTMCNC: 32.1 GM/DL — SIGNIFICANT CHANGE UP (ref 32–36)
MCV RBC AUTO: 86.9 FL — SIGNIFICANT CHANGE UP (ref 80–100)
PLATELET # BLD AUTO: 322 K/UL — SIGNIFICANT CHANGE UP (ref 150–400)
POTASSIUM SERPL-MCNC: 3.4 MMOL/L — LOW (ref 3.5–5.3)
POTASSIUM SERPL-SCNC: 3.4 MMOL/L — LOW (ref 3.5–5.3)
PROT SERPL-MCNC: 8.5 G/DL — HIGH (ref 6–8.3)
RBC # BLD: 3.83 M/UL — SIGNIFICANT CHANGE UP (ref 3.8–5.2)
RBC # FLD: 14.2 % — SIGNIFICANT CHANGE UP (ref 10.3–14.5)
SODIUM SERPL-SCNC: 134 MMOL/L — LOW (ref 135–145)
WBC # BLD: 11 K/UL — HIGH (ref 3.8–10.5)
WBC # FLD AUTO: 11 K/UL — HIGH (ref 3.8–10.5)

## 2018-12-31 RX ORDER — POTASSIUM CHLORIDE 20 MEQ
20 PACKET (EA) ORAL
Qty: 0 | Refills: 0 | Status: COMPLETED | OUTPATIENT
Start: 2018-12-31 | End: 2018-12-31

## 2018-12-31 RX ADMIN — Medication 1: at 08:08

## 2018-12-31 RX ADMIN — Medication 20 MILLIEQUIVALENT(S): at 12:54

## 2018-12-31 RX ADMIN — BUDESONIDE AND FORMOTEROL FUMARATE DIHYDRATE 2 PUFF(S): 160; 4.5 AEROSOL RESPIRATORY (INHALATION) at 05:57

## 2018-12-31 RX ADMIN — Medication 25 MILLIGRAM(S): at 05:58

## 2018-12-31 RX ADMIN — Medication 6 UNIT(S): at 12:54

## 2018-12-31 RX ADMIN — HEPARIN SODIUM 5000 UNIT(S): 5000 INJECTION INTRAVENOUS; SUBCUTANEOUS at 17:32

## 2018-12-31 RX ADMIN — Medication 200 MILLIGRAM(S): at 12:57

## 2018-12-31 RX ADMIN — Medication 6 UNIT(S): at 17:31

## 2018-12-31 RX ADMIN — INSULIN GLARGINE 12 UNIT(S): 100 INJECTION, SOLUTION SUBCUTANEOUS at 22:32

## 2018-12-31 RX ADMIN — Medication 1: at 17:32

## 2018-12-31 RX ADMIN — Medication 6 UNIT(S): at 08:08

## 2018-12-31 RX ADMIN — TIOTROPIUM BROMIDE 1 CAPSULE(S): 18 CAPSULE ORAL; RESPIRATORY (INHALATION) at 12:55

## 2018-12-31 RX ADMIN — BUDESONIDE AND FORMOTEROL FUMARATE DIHYDRATE 2 PUFF(S): 160; 4.5 AEROSOL RESPIRATORY (INHALATION) at 17:32

## 2018-12-31 RX ADMIN — PREGABALIN 1000 MICROGRAM(S): 225 CAPSULE ORAL at 12:55

## 2018-12-31 RX ADMIN — PANTOPRAZOLE SODIUM 40 MILLIGRAM(S): 20 TABLET, DELAYED RELEASE ORAL at 05:59

## 2018-12-31 RX ADMIN — Medication 30 MILLIGRAM(S): at 12:53

## 2018-12-31 RX ADMIN — Medication 1 MILLIGRAM(S): at 12:54

## 2018-12-31 RX ADMIN — Medication 20 MILLIEQUIVALENT(S): at 13:55

## 2018-12-31 RX ADMIN — LOSARTAN POTASSIUM 50 MILLIGRAM(S): 100 TABLET, FILM COATED ORAL at 05:58

## 2018-12-31 RX ADMIN — HEPARIN SODIUM 5000 UNIT(S): 5000 INJECTION INTRAVENOUS; SUBCUTANEOUS at 05:57

## 2018-12-31 RX ADMIN — PANTOPRAZOLE SODIUM 40 MILLIGRAM(S): 20 TABLET, DELAYED RELEASE ORAL at 17:32

## 2018-12-31 NOTE — DIETITIAN INITIAL EVALUATION ADULT. - NS AS NUTRI INTERV ED CONTENT
1) Provided recommendations to increase PO and protein intake in case of decreased appetite, recommended small frequent meals to optimize intake and to start with protein, reviewed list of foods with protein, offered nutritional supplementation available in hospital - pt refused at this time. 2) Provided education on heart healthy and T2DM nutrition therapy. Provided education on foods containing carbohydrates, foods containing proteins, and portion sizes. Stressed the importance of a balanced meal to maintain blood glucose. Encouraged vegetables consumption. Described HbA1c and stressed importance of its normal levels. Encouraged Pt to continue monitoring blood glucose at home. Discussed how to manage hypoglycemia. Recommended water consumption with avoidance of soda and juice. Recommended limited salt intake. Reviewed foods high in salt and amount of salt recommended per day. Discussed nutrition label reading. Stressed the importance of limited fried foods and saturated fat consumption. Provided handout at bedside with education provided. Pt amenable for education. Dietitian remains available.

## 2018-12-31 NOTE — PROGRESS NOTE ADULT - SUBJECTIVE AND OBJECTIVE BOX
Subjective: Patient seen and examined. No new events except as noted.     SUBJECTIVE/ROS:      MEDICATIONS:  MEDICATIONS  (STANDING):  buDESOnide 160 MICROgram(s)/formoterol 4.5 MICROgram(s) Inhaler 2 Puff(s) Inhalation two times a day  cyanocobalamin 1000 MICROGram(s) Oral daily  dextrose 5%. 1000 milliLiter(s) (50 mL/Hr) IV Continuous <Continuous>  dextrose 50% Injectable 12.5 Gram(s) IV Push once  dextrose 50% Injectable 25 Gram(s) IV Push once  dextrose 50% Injectable 25 Gram(s) IV Push once  ergocalciferol 70003 Unit(s) Oral every week  folic acid 1 milliGRAM(s) Oral daily  heparin  Injectable 5000 Unit(s) SubCutaneous every 12 hours  hydrochlorothiazide 25 milliGRAM(s) Oral daily  insulin glargine Injectable (LANTUS) 12 Unit(s) SubCutaneous at bedtime  insulin lispro (HumaLOG) corrective regimen sliding scale   SubCutaneous three times a day before meals  insulin lispro (HumaLOG) corrective regimen sliding scale   SubCutaneous at bedtime  insulin lispro Injectable (HumaLOG) 6 Unit(s) SubCutaneous three times a day before meals  losartan 50 milliGRAM(s) Oral daily  methotrexate 17.5 milliGRAM(s) Oral every week  pantoprazole    Tablet 40 milliGRAM(s) Oral two times a day  potassium chloride    Tablet ER 20 milliEquivalent(s) Oral every 2 hours  tiotropium 18 MICROgram(s) Capsule 1 Capsule(s) Inhalation daily      PHYSICAL EXAM:  T(C): 36.9 (12-31-18 @ 05:11), Max: 36.9 (12-31-18 @ 05:11)  HR: 83 (12-31-18 @ 05:11) (83 - 104)  BP: 132/53 (12-31-18 @ 05:11) (132/53 - 148/75)  RR: 18 (12-31-18 @ 05:11) (18 - 18)  SpO2: 92% (12-31-18 @ 05:11) (92% - 94%)  Wt(kg): --  I&O's Summary    30 Dec 2018 07:01  -  31 Dec 2018 07:00  --------------------------------------------------------  IN: 580 mL / OUT: 0 mL / NET: 580 mL    31 Dec 2018 07:01  -  31 Dec 2018 12:11  --------------------------------------------------------  IN: 200 mL / OUT: 0 mL / NET: 200 mL        JVP: Normal  Neck: supple  Lung: clear   CV: S1 S2 , Murmur:  Abd: soft  Ext: No edema  neuro: Awake / alert  Psych: flat affect  Skin: normal        LABS/DATA:    CARDIAC MARKERS:                                10.7   11.00 )-----------( 322      ( 31 Dec 2018 08:04 )             33.3     12-31    134<L>  |  96  |  19  ----------------------------<  133<H>  3.4<L>   |  25  |  0.80    Ca    8.8      31 Dec 2018 06:49    TPro  8.5<H>  /  Alb  3.4  /  TBili  0.6  /  DBili  x   /  AST  19  /  ALT  12  /  AlkPhos  62  12-31    proBNP:   Lipid Profile:   HgA1c:   TSH:     TELE:  EKG:

## 2018-12-31 NOTE — DISCHARGE NOTE ADULT - SECONDARY DIAGNOSIS.
DM (diabetes mellitus), type 2 HTN (hypertension), benign RA (rheumatoid arthritis) Aortic valve stenosis, etiology of cardiac valve disease unspecified

## 2018-12-31 NOTE — DIETITIAN INITIAL EVALUATION ADULT. - ADHERENCE
Pt reports not following any type of diet or restriction at home. Reports taking Vitamin B12, Vitamin D2, and Folic Acid PTA. Reports monitoring BG 1-2 xday with ranges between 149-150 mg/dl and states taking Januvia and Lantus at home; reports previously taking steroids and BG was elevated; HbA1c (12/28) 8.0% - suspected good BG control due to older age and steroids. Pt able to recall foods with carbohydrates and their effect in BG.

## 2018-12-31 NOTE — DISCHARGE NOTE ADULT - MEDICATION SUMMARY - MEDICATIONS TO TAKE
I will START or STAY ON the medications listed below when I get home from the hospital:    predniSONE 10 mg oral tablet  -- 2 tab(s) po daily x 3 days, 1.5 tabs po daily x 3 days, 1 tab po daily x 3 days  -- Indication: For RA (rheumatoid arthritis)    losartan 50 mg oral tablet  -- 1 tab(s) by mouth once a day  -- Indication: For HTN (hypertension), benign    Glumetza 500 mg oral tablet, extended release  -- 2 tab(s) by mouth 2 times a day  -- Indication: For Diabetes    Januvia 100 mg oral tablet  -- 1 tab(s) by mouth once a day  -- Indication: For Diabetes    methotrexate 2.5 mg oral tablet  -- 7 tab(s) by mouth once a week  -- Indication: For RA (rheumatoid arthritis)    benzonatate 100 mg oral capsule  -- 2 cap(s) by mouth 3 times a day  -- Indication: For Cough    alendronate 70 mg oral tablet  -- 1 tab(s) by mouth once a week  -- Indication: For osteoporosis    Symbicort 160 mcg-4.5 mcg/inh inhalation aerosol  -- 2 puff(s) inhaled 2 times a day  -- Indication: For Asthma    albuterol 90 mcg/inh inhalation aerosol  -- 2 puff(s) inhaled every 6 hours, As needed, Bronchospasm  -- Indication: For Asthma    tiotropium 18 mcg inhalation capsule  -- 1 cap(s) inhaled once a day  -- Indication: For Asthma    hydroCHLOROthiazide 25 mg oral tablet  -- 1 tab(s) by mouth once a day  -- Indication: For HTN (hypertension), benign    pantoprazole 40 mg oral delayed release tablet  -- 1 tab(s) by mouth 2 times a day  -- Indication: For gerd    hydrocodone-homatropine 5 mg-1.5 mg/5 mL oral syrup  -- 5 milliliter(s) by mouth once a day (at bedtime), As needed, Cough MDD:5ml  -- Indication: For Cough    guaifenesin-dextromethorphan 100 mg-10 mg/5 mL oral liquid  -- 10 milliliter(s) by mouth every 6 hours  -- Indication: For Cough    Vitamin D2 50,000 intl units (1.25 mg) oral capsule  -- 1 cap(s) by mouth once a week  -- Indication: For vitamin    folic acid 1 mg oral tablet  -- 1 tab(s) by mouth once a day  -- Indication: For vitamin    cyanocobalamin 1000 mcg oral tablet  -- 1 tab(s) by mouth once a day  -- Indication: For vitamin

## 2018-12-31 NOTE — DISCHARGE NOTE ADULT - ADDITIONAL INSTRUCTIONS
follow up with rheumatology  follow up with pmd follow up with rheumatology in 1-2 weeks  follow up with pmd in 1 week Follow up with PMD in 1 week  Follow up with Rheumatology in 1 week  Follow up with Endocrinology in 1-2 weeks  Follow up with Pulmonology in 1-2 weeks

## 2018-12-31 NOTE — PROGRESS NOTE ADULT - SUBJECTIVE AND OBJECTIVE BOX
CC: f/u for cough  and fever    Patient reports; cough is improved, fever better, joint pain better    REVIEW OF SYSTEMS:  All other review of systems negative (Comprehensive ROS)    Antimicrobials Day #  :off    Other Medications Reviewed    T(F): 98.5 (12-31-18 @ 05:11), Max: 98.5 (12-31-18 @ 05:11)  HR: 83 (12-31-18 @ 05:11)  BP: 132/53 (12-31-18 @ 05:11)  RR: 18 (12-31-18 @ 05:11)  SpO2: 92% (12-31-18 @ 05:11)  Wt(kg): --    PHYSICAL EXAM:  General: alert, no acute distress  Eyes:  anicteric, no conjunctival injection, no discharge  Oropharynx: no lesions or injection 	  Neck: supple, without adenopathy  Lungs: few ronchi and wheezes  Heart: regular rate and rhythm; no murmur, rubs or gallops  Abdomen: soft, nondistended, nontender, without mass or organomegaly  Skin: no lesions  Extremities: no clubbing, cyanosis, or edema, joint swelling improved  Neurologic: alert, oriented, moves all extremities    LAB RESULTS:                        10.7   11.00 )-----------( 322      ( 31 Dec 2018 08:04 )             33.3     12-31    134<L>  |  96  |  19  ----------------------------<  133<H>  3.4<L>   |  25  |  0.80    Ca    8.8      31 Dec 2018 06:49    TPro  8.5<H>  /  Alb  3.4  /  TBili  0.6  /  DBili  x   /  AST  19  /  ALT  12  /  AlkPhos  62  12-31    LIVER FUNCTIONS - ( 31 Dec 2018 06:49 )  Alb: 3.4 g/dL / Pro: 8.5 g/dL / ALK PHOS: 62 U/L / ALT: 12 U/L / AST: 19 U/L / GGT: x             MICROBIOLOGY:  RECENT CULTURES:  12-28 @ 16:12 .Blood Blood     No growth to date.          RADIOLOGY REVIEWED:  < from: CT Chest No Cont (12.28.18 @ 16:09) >  IMPRESSION:     There is no pneumonia.    Peripheral linear and reticular opacities are unchanged.    < end of copied text >

## 2018-12-31 NOTE — PROGRESS NOTE ADULT - ASSESSMENT
Coronvirus tracheobronchitis  Mild intermittent asthma: currently without sign bronchospasm, prolonged exp on exam  RA, acute flare  Aortic stenosis  Pulmonary fibrosis: likely RA related  Crackles on exam likely ILD related    REC    Observe off abx  Continue symbicort; add spiriva  Prednisone 30 mg qd X 3 days  Hycodan prn at night  Poor candidate for methotrexate - should be addressed with [facility] rheumtologist next week  DC planning per primary team in 1-2 days

## 2018-12-31 NOTE — CONSULT NOTE ADULT - ASSESSMENT
Imp:  86 yo female with long h/o RA.  She was admitted with cough and worsening joint pains.  Feeling overall improved on prednisone.    Seems to be viral infection which may have stimulated her immune system and caused the RA to flare up.      Plan:  continue prednisone 30mg for 3 days as per pulm, then decrease prednisone to 20mg/day and taper slowly from there (can be done as outpt)  I would favor continuing MTX for the time being as it seems her lung dz is more likely disease related rather than MTX toxicity (though difficult to say for sure).  If her coughing does not subside over the next 1-2 weeks then consider stopping MTX; can d/w primary Rheum and pulm (though I sp/w  and she might f/u with me as outpatient since it is difficult for them to travel to [place]). Imp:  86 yo female with long h/o RA.  She was admitted with cough and worsening joint pains.  Feeling overall improved on prednisone.    Seems to be viral infection which may have stimulated her immune system and caused the RA to flare up.      Plan:  continue prednisone 30mg for 3 days as per pulm, then decrease prednisone to 20mg/day and taper slowly from there (can be done as outpt)  I would favor continuing MTX for the time being as it seems her lung dz is more likely disease related rather than MTX toxicity (though difficult to say for sure).  If her coughing does not subside over the next 1-2 weeks then consider stopping MTX; can d/w primary Rheum and pulm (though I sp/w  and she might f/u with me as outpatient since it is difficult for them to travel to [place]).  A lower MTX dose is also a possible consideration in the future

## 2018-12-31 NOTE — DIETITIAN INITIAL EVALUATION ADULT. - NS FNS WEIGHT CHANGE REASON
Pt reports weight has been stable at 130 pounds x 2 months PTA previously with weight loss due to "being sick", unable to recall pounds lost or time frame. Weight as per previous RD (07/31/2017) 140.6 pounds. Weight as per flow sheets (12/28) 130.1 pounds - unable to obtain accurate weight changes/history PTA, weight likely to be stable at this time.

## 2018-12-31 NOTE — DISCHARGE NOTE ADULT - PLAN OF CARE
resolution of symptoms supportive care HgA1C this admission.  Make sure you get your HgA1c checked every three months.  If you take oral diabetes medications, check your blood glucose two times a day.  If you take insulin, check your blood glucose before meals and at bedtime.  It's important not to skip any meals.  Keep a log of your blood glucose results and always take it with you to your doctor appointments.  Keep a list of your current medications including injectables and over the counter medications and bring this medication list with you to all your doctor appointments.  If you have not seen your ophthalmologist this year call for appointment.  Check your feet daily for redness, sores, or openings. Do not self treat. If no improvement in two days call your primary care physician for an appointment.  Low blood sugar (hypoglycemia) is a blood sugar below 70mg/dl. Check your blood sugar if you feel signs/symptoms of hypoglycemia. If your blood sugar is below 70 take 15 grams of carbohydrates (ex 4 oz of apple juice, 3-4 glucose tablets, or 4-6 oz of regular soda) wait 15 minutes and repeat blood sugar to make sure it comes up above 70.  If your blood sugar is above 70 and you are due for a meal, have a meal.  If you are not due for a meal have a snack.  This snack helps keeps your blood sugar at a safe range. Follow up with your medical doctor to establish long term blood pressure treatment goals. follow up with rheumatology  continue current treatment plan. Get plenty of rest.  Drink enough water and fluids to keep your urine clear or pale yellow.  Eat a well-balanced diet.  Call your caregiver for follow-up as recommended. HgA1C this admission 8.0  Make sure you get your HgA1c checked every three months.  If you take oral diabetes medications, check your blood glucose two times a day.  If you take insulin, check your blood glucose before meals and at bedtime.  It's important not to skip any meals.  Keep a log of your blood glucose results and always take it with you to your doctor appointments.  Keep a list of your current medications including injectables and over the counter medications and bring this medication list with you to all your doctor appointments.  If you have not seen your ophthalmologist this year call for appointment.  Check your feet daily for redness, sores, or openings. Do not self treat. If no improvement in two days call your primary care physician for an appointment.  Low blood sugar (hypoglycemia) is a blood sugar below 70mg/dl. Check your blood sugar if you feel signs/symptoms of hypoglycemia. If your blood sugar is below 70 take 15 grams of carbohydrates (ex 4 oz of apple juice, 3-4 glucose tablets, or 4-6 oz of regular soda) wait 15 minutes and repeat blood sugar to make sure it comes up above 70.  If your blood sugar is above 70 and you are due for a meal, have a meal.  If you are not due for a meal have a snack.  This snack helps keeps your blood sugar at a safe range. Take medications for your blood pressure as recommended.  Eat a heart healthy diet that is low in saturated fats and salt, and includes whole grains, fruits, vegetables and lean protein   Exercise regularly (consult with your physician or cardiologist first); maintain a heart healthy weight.   If you smoke - quit (A resource to help you stop smoking is the [facility] – phone number [phone].). Continue to follow with your primary physician or cardiologist.   Seek medical help for dizziness, Lightheadedness, Blurry vision, Headache, Chest pain, Shortness of breath  Follow up with your medical doctor to establish long term blood pressure treatment goals. Follow up with Cardiology for outpatient Echocardiogram

## 2018-12-31 NOTE — PROGRESS NOTE ADULT - SUBJECTIVE AND OBJECTIVE BOX
Follow-up Pulm Progress Note  Abrahan Flannery MD  [phone]    AFebrile  PCR coronvirus positive  Cough somewhat improved today: did not use Hycodan last night  No dyspnea; O2 sat 98% on RA      TPro  7.7  /  Alb  3.1<L>  /  TBili  0.6  /  DBili  x   /  AST  7<L>  /  ALT  8<L>  /  AlkPhos  54  12-28    Procalcitonin, Serum: 0.12 ng/mL (12-28-18 @ 11:56)    Physical Examination:  PULM: crackles R 1/3 and L basilar; few scattered exp rhonchi  CVS: Regular rate and rhythm, no murmurs, rubs, or gallops  ABD: Soft, non-tender  EXT:  No clubbing, cyanosis, or edema    RADIOLOGY REVIEWED  CXR:    CT chest:    TTE:

## 2018-12-31 NOTE — DISCHARGE NOTE ADULT - PATIENT PORTAL LINK FT
You can access the [facility] Patient Portal, offered by [facility], by registering with the following website: [email or web address]

## 2018-12-31 NOTE — PROGRESS NOTE ADULT - ASSESSMENT
RA, Aortic stenosis,ILD, DM, and HTN  Off and on steroids, ? 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 viral URI with RA flare.(Coronavirus)  Low PC level and negative chest CT, no evidence of a pneumonia.  ? Lung toxicity from MTX vs RA  Fever may be secondary to RA flair vs viral syndrome.  Steroids should help control fever and joint pains  Blood cultures negative so far and fever has moderated.  Suggest:  1.monitor off antibiotics, PC of .12 and negative chest CT  2.Steroids per primary service  3.Additional w/u as indicated,no additional ID w/u planned, we will stop actively following, please call if ID issues arise

## 2018-12-31 NOTE — DIETITIAN INITIAL EVALUATION ADULT. - OTHER INFO
Nutrition consult received for HbA1c 8.2%. Pt reports good appetite and PO intake. Noted fluctuating PO intake between % as per flow sheets - pt states not being hungry at dinner yesterday. Denies difficulty chewing/swallowing. Pt denies nausea, vomiting, diarrhea, or constipation, reports last BM yesterday (12/30).

## 2018-12-31 NOTE — PROGRESS NOTE ADULT - SUBJECTIVE AND OBJECTIVE BOX
Patient is a 87y old  Female who presents with a chief complaint of BRBPR (31 Dec 2018 16:30)                                                               INTERVAL HPI/OVERNIGHT EVENTS:    REVIEW OF SYSTEMS:     CONSTITUTIONAL: generalilzed weakness though feels somewhat improved   EYES/ENT: No visual changes , no ear ache   NECK: No pain or stiffness  RESPIRATORY: No cough, wheezing,  No shortness of breath  CARDIOVASCULAR: No chest pain or palpitations  GASTROINTESTINAL: No abdominal pain  . No nausea, vomiting, or hematemesis; No diarrhea or constipation. No melena or hematochezia.  GENITOURINARY: no compalints   NSK improved pain and sweling of wriset and ankel                                                                                                                                                                                                                                                                                   Medications:  MEDICATIONS  (STANDING):  buDESOnide 160 MICROgram(s)/formoterol 4.5 MICROgram(s) Inhaler 2 Puff(s) Inhalation two times a day  cyanocobalamin 1000 MICROGram(s) Oral daily  dextrose 5%. 1000 milliLiter(s) (50 mL/Hr) IV Continuous <Continuous>  dextrose 50% Injectable 12.5 Gram(s) IV Push once  dextrose 50% Injectable 25 Gram(s) IV Push once  dextrose 50% Injectable 25 Gram(s) IV Push once  ergocalciferol 86658 Unit(s) Oral every week  folic acid 1 milliGRAM(s) Oral daily  heparin  Injectable 5000 Unit(s) SubCutaneous every 12 hours  hydrochlorothiazide 25 milliGRAM(s) Oral daily  insulin glargine Injectable (LANTUS) 12 Unit(s) SubCutaneous at bedtime  insulin lispro (HumaLOG) corrective regimen sliding scale   SubCutaneous three times a day before meals  insulin lispro (HumaLOG) corrective regimen sliding scale   SubCutaneous at bedtime  insulin lispro Injectable (HumaLOG) 6 Unit(s) SubCutaneous three times a day before meals  losartan 50 milliGRAM(s) Oral daily  methotrexate 17.5 milliGRAM(s) Oral every week  pantoprazole    Tablet 40 milliGRAM(s) Oral two times a day  predniSONE   Tablet 30 milliGRAM(s) Oral daily  tiotropium 18 MICROgram(s) Capsule 1 Capsule(s) Inhalation daily    MEDICATIONS  (PRN):  ALBUTerol    90 MICROgram(s) HFA Inhaler 2 Puff(s) Inhalation every 6 hours PRN Bronchospasm  benzonatate 200 milliGRAM(s) Oral three times a day PRN Cough  dextrose 40% Gel 15 Gram(s) Oral once PRN Blood Glucose LESS THAN 70 milliGRAM(s)/deciliter  glucagon  Injectable 1 milliGRAM(s) IntraMuscular once PRN Glucose LESS THAN 70 milligrams/deciliter  guaiFENesin/dextromethorphan  Syrup 10 milliLiter(s) Oral every 6 hours PRN Cough  HYDROcodone/homatropine Syrup 5 milliLiter(s) Oral at bedtime PRN Cough       Allergies    No Known Allergies    Intolerances      Vital Signs Last 24 Hrs  T(C): 36.9 (31 Dec 2018 05:11), Max: 36.9 (31 Dec 2018 05:11)  T(F): 98.5 (31 Dec 2018 05:11), Max: 98.5 (31 Dec 2018 05:11)  HR: 83 (31 Dec 2018 05:11) (83 - 104)  BP: 132/53 (31 Dec 2018 05:11) (132/53 - 148/75)  BP(mean): --  RR: 18 (31 Dec 2018 05:11) (18 - 18)  SpO2: 92% (31 Dec 2018 05:11) (92% - 94%)  CAPILLARY BLOOD GLUCOSE      POCT Blood Glucose.: 183 mg/dL (31 Dec 2018 17:00)  POCT Blood Glucose.: 131 mg/dL (31 Dec 2018 12:11)  POCT Blood Glucose.: 159 mg/dL (31 Dec 2018 08:05)  POCT Blood Glucose.: 195 mg/dL (30 Dec 2018 21:19)       @ : @ 07:00  --------------------------------------------------------  IN: 580 mL / OUT: 0 mL / NET: 580 mL     @ : @ 18:38  --------------------------------------------------------  IN: 300 mL / OUT: 0 mL / NET: 300 mL      Physical Exam:    Daily Weight in k (31 Dec 2018 16:15)  General:  NAD  but looks chronically ill   HEENT:  Nonicteric, PERRLA  CV:  RRR, S1S2   Lungs:  CTA B/L, no wheezes, rales, rhonchi  Abdomen:  Soft, non-tender, no distended, positive BS  Extremities:  2+ pulses, no c/c, no edema  Skin:  Warm and dry, no rashes  :  No mason  Neuro:  AAOx3, non-focal, grossly intact                 MSK : much improved swelling /ROM of wrist and ankle                                                                                                                                                                                                                                                                                  LABS:                               10.7   11.00 )-----------( 322      ( 31 Dec 2018 08:04 )             33.3                          134<L>  |  96  |  19  ----------------------------<  133<H>  3.4<L>   |  25  |  0.80    Ca    8.8      31 Dec 2018 06:49    TPro  8.5<H>  /  Alb  3.4  /  TBili  0.6  /  DBili  x   /  AST  19  /  ALT  12  /  AlkPhos  62                      Time spent : Patient is a 87y old  Female who presents with a chief complaint of BRBPR (31 Dec 2018 16:30)                                                               INTERVAL HPI/OVERNIGHT EVENTS:    REVIEW OF SYSTEMS:     CONSTITUTIONAL: generalilzed weakness though feels somewhat improved   EYES/ENT: No visual changes , no ear ache   NECK: No pain or stiffness  RESPIRATORY:  cough, wheezing,  No shortness of breath  CARDIOVASCULAR: No chest pain or palpitations  GASTROINTESTINAL: No abdominal pain  . No nausea, vomiting, or hematemesis; No diarrhea or constipation. No melena or hematochezia.  GENITOURINARY: no compalints   NSK improved pain and sweling of wriset and ankel                                                                                                                                                                                                                                                                                   Medications:  MEDICATIONS  (STANDING):  buDESOnide 160 MICROgram(s)/formoterol 4.5 MICROgram(s) Inhaler 2 Puff(s) Inhalation two times a day  cyanocobalamin 1000 MICROGram(s) Oral daily  dextrose 5%. 1000 milliLiter(s) (50 mL/Hr) IV Continuous <Continuous>  dextrose 50% Injectable 12.5 Gram(s) IV Push once  dextrose 50% Injectable 25 Gram(s) IV Push once  dextrose 50% Injectable 25 Gram(s) IV Push once  ergocalciferol 89718 Unit(s) Oral every week  folic acid 1 milliGRAM(s) Oral daily  heparin  Injectable 5000 Unit(s) SubCutaneous every 12 hours  hydrochlorothiazide 25 milliGRAM(s) Oral daily  insulin glargine Injectable (LANTUS) 12 Unit(s) SubCutaneous at bedtime  insulin lispro (HumaLOG) corrective regimen sliding scale   SubCutaneous three times a day before meals  insulin lispro (HumaLOG) corrective regimen sliding scale   SubCutaneous at bedtime  insulin lispro Injectable (HumaLOG) 6 Unit(s) SubCutaneous three times a day before meals  losartan 50 milliGRAM(s) Oral daily  methotrexate 17.5 milliGRAM(s) Oral every week  pantoprazole    Tablet 40 milliGRAM(s) Oral two times a day  predniSONE   Tablet 30 milliGRAM(s) Oral daily  tiotropium 18 MICROgram(s) Capsule 1 Capsule(s) Inhalation daily    MEDICATIONS  (PRN):  ALBUTerol    90 MICROgram(s) HFA Inhaler 2 Puff(s) Inhalation every 6 hours PRN Bronchospasm  benzonatate 200 milliGRAM(s) Oral three times a day PRN Cough  dextrose 40% Gel 15 Gram(s) Oral once PRN Blood Glucose LESS THAN 70 milliGRAM(s)/deciliter  glucagon  Injectable 1 milliGRAM(s) IntraMuscular once PRN Glucose LESS THAN 70 milligrams/deciliter  guaiFENesin/dextromethorphan  Syrup 10 milliLiter(s) Oral every 6 hours PRN Cough  HYDROcodone/homatropine Syrup 5 milliLiter(s) Oral at bedtime PRN Cough       Allergies    No Known Allergies    Intolerances      Vital Signs Last 24 Hrs  T(C): 36.9 (31 Dec 2018 05:11), Max: 36.9 (31 Dec 2018 05:11)  T(F): 98.5 (31 Dec 2018 05:11), Max: 98.5 (31 Dec 2018 05:11)  HR: 83 (31 Dec 2018 05:11) (83 - 104)  BP: 132/53 (31 Dec 2018 05:11) (132/53 - 148/75)  BP(mean): --  RR: 18 (31 Dec 2018 05:11) (18 - 18)  SpO2: 92% (31 Dec 2018 05:11) (92% - 94%)  CAPILLARY BLOOD GLUCOSE      POCT Blood Glucose.: 183 mg/dL (31 Dec 2018 17:00)  POCT Blood Glucose.: 131 mg/dL (31 Dec 2018 12:11)  POCT Blood Glucose.: 159 mg/dL (31 Dec 2018 08:05)  POCT Blood Glucose.: 195 mg/dL (30 Dec 2018 21:19)       @ : @ 07:00  --------------------------------------------------------  IN: 580 mL / OUT: 0 mL / NET: 580 mL     @ : @ 18:38  --------------------------------------------------------  IN: 300 mL / OUT: 0 mL / NET: 300 mL      Physical Exam:    Daily Weight in k (31 Dec 2018 16:15)  General:  NAD  but looks chronically ill   HEENT:  Nonicteric, PERRLA  CV:  RRR, S1S2   Lungs:  CTA B/L, no wheezes, rales, rhonchi  Abdomen:  Soft, non-tender, no distended, positive BS  Extremities:  2+ pulses, no c/c, no edema  Skin:  Warm and dry, no rashes  :  No mason  Neuro:  AAOx3, non-focal, grossly intact                 MSK : much improved swelling /ROM of wrist and ankle                                                                                                                                                                                                                                                                                  LABS:                               10.7   11.00 )-----------( 322      ( 31 Dec 2018 08:04 )             33.3                          134<L>  |  96  |  19  ----------------------------<  133<H>  3.4<L>   |  25  |  0.80    Ca    8.8      31 Dec 2018 06:49    TPro  8.5<H>  /  Alb  3.4  /  TBili  0.6  /  DBili  x   /  AST  19  /  ALT  12  /  AlkPhos  62                      Time spent :

## 2018-12-31 NOTE — DISCHARGE NOTE ADULT - HOSPITAL COURSE
88 yo female with long h/o RA.  She was admitted with cough and worsening joint pains.  Feeling overall improved on prednisone.    Seems to be viral infection which may have stimulated her immune system and caused the RA to flare up.      Plan:  continue prednisone 30mg for 3 days as per pulm, then decrease prednisone to 20mg/day and taper slowly from there (can be done as outpt)  I would favor continuing MTX for the time being as it seems her lung dz is more likely disease related rather than MTX toxicity (though difficult to say for sure).  If her coughing does not subside over the next 1-2 weeks then consider stopping MTX; can d/w primary Rheum and pulm (though I sp/w  and she might f/u with me as outpatient since it is difficult for them to travel to [place]).  A lower MTX dose is also a possible consideration in the future

## 2018-12-31 NOTE — DIETITIAN INITIAL EVALUATION ADULT. - NS AS NUTRI INTERV MEALS SNACK
Recommend continue current diet as above. Encourage PO intake, obtain food preferences, provide feeding assistance as needed.

## 2018-12-31 NOTE — CHART NOTE - NSCHARTNOTEFT_GEN_A_CORE
Due to the patients deconditioning and generalized weakness secondary to rheumatoid arthritis, the patient will need a transport chair.  This patient is unable to self-propel in a standard wheel chair.  This is necessary to achieve daily tasks and therapies which cannot be achieved with the use of a cane or rolling walker.  The patient and family are in agreement with wheelchair use at home and assistance will be provided if needed.  ARASELI Osborne NP

## 2018-12-31 NOTE — DISCHARGE NOTE ADULT - PROVIDER TOKENS
TOKSHANE:'[id number]' TOKEN:'[id number]',TOKEN:'[id number]' TOKEN:'[id number]:'[id number]:'[id number]:'[id number]'

## 2018-12-31 NOTE — PROGRESS NOTE ADULT - ASSESSMENT
Assessment  DMT2: 87y Female with DM T2 with hyperglycemia was on insulin, blood sugars improving, no hypoglycemic episode,  eating meals,  non compliant with low carb diet.  Asthma/Cough: On medications,  no chest pain, stable, monitored.  HTN: Controlled, no headaches, on medications.        Karan Akbar MD  Cell: [phone]  Office: [phone]

## 2018-12-31 NOTE — DISCHARGE NOTE ADULT - CARE PROVIDER_API CALL
Yuriy Tobias (MD), Family Medicine  [facility] [address]  [address] [place] [address]  Phone: [phone]  Fax: [phone] Yuriy Tobias (MD), Family Medicine  [address]  [address] [place] [address]  Phone: [phone]  Fax: [phone]    Goldberg, Avram Z (MD), Internal Medicine; Rheumatology  [address] [address]  Phone: [phone]  Fax: [phone] Yuriy Tobias (MD), Family Medicine  [address] [facility]  1st [place] [address]  Phone: [phone]  Fax: [phone]    Goldberg, Avram Z (MD), Internal Medicine; Rheumatology  [address] [facility] [address]  [place] [address]  Phone: [phone]  Fax: [phone]    Levi Arana), Critical Care Medicine; Internal Medicine; Pulmonary Disease  [address] [place] [address]  Phone: [phone]  Fax: [phone]    Jared Decker), Cardiovascular Disease; Internal Medicine  [address] [address]  Phone: [phone]  Fax: [phone]

## 2018-12-31 NOTE — PROGRESS NOTE ADULT - ASSESSMENT
86 y/o fmeale with hx of RA on mtx , and was on prednisone ( no longer on steroids since 09/2017  and has not recieved iv infusion  ( monthly simponi ) in three months . also history of HTN and DM .. has been admitted twice over the past 6-8 months with  weakness . initial admit pt was thought ot have an element of adrenal insufficeiny when she was on steroids .. and pt was sent out on steroids with some improvement,.. later admitted as lij for weakness thought to be multifactorial sec to viral illness, dehydration , and leemt of steroid -induced myopathy.more recently admitted with weakness sec to hMPV and most recently admitted with  acute GIB ( diverticular )..  Pt now admitted with generlzied weakness, polyarthiritc pains and cough..  Pt has been feeling weak and has been having difficulty ambulating for sometime now.. and over the past one week she developed a cough, fever and was treated with a course of abx which she completed yesterday .. since pt did not respond completely she was brought be her  ..  pt c/o of joint pains of neck, wirsts and ankles   most pronounced in R wrist and L ankle with swelling   no fever ( but noted to have fever in ED of 100.2 )   no chills   still persistent cough though somewhat improving   no CP/SOB   had an episode of cvomitting however post severe episode of cough   no urianry sx   in ED was given steroids for suspicion of RA flare     1- sepsis : pt with fever and elevated WBC .. mild elevateion of procal however was treated w abx as of yesterday   RVP positive   no evidence of bacterial infection   cont off abx     2- Arhtarlegia /RA flare   : generlized with swelling of R wirst and L ankle     significantly elevated ESR  ? PMR   cont steroids   methotrexate : reconsider if contributing to lung findings     3- DM : cont insulin     4- HTN cont meds     5- Pulmonary fibrosis: likely RA related  Crackles on exam likely ILD related  on steroids

## 2018-12-31 NOTE — PROGRESS NOTE ADULT - SUBJECTIVE AND OBJECTIVE BOX
Chief complaint  Patient is a 87y old  Female who presents with a chief complaint of BRBPR (31 Dec 2018 12:37)   Review of systems  Patient in bed, looks comfortable, no fever, no hypoglycemia.    Labs and Fingersticks  CAPILLARY BLOOD GLUCOSE      POCT Blood Glucose.: 131 mg/dL (31 Dec 2018 12:11)  POCT Blood Glucose.: 159 mg/dL (31 Dec 2018 08:05)  POCT Blood Glucose.: 195 mg/dL (30 Dec 2018 21:19)  POCT Blood Glucose.: 117 mg/dL (30 Dec 2018 16:32)      Anion Gap, Serum: 13 (12-31 @ 06:49)      Calcium, Total Serum: 8.8 (12-31 @ 06:49)  Albumin, Serum: 3.4 (12-31 @ 06:49)    Alanine Aminotransferase (ALT/SGPT): 12 (12-31 @ 06:49)  Alkaline Phosphatase, Serum: 62 (12-31 @ 06:49)  Aspartate Aminotransferase (AST/SGOT): 19 (12-31 @ 06:49)        12-31    134<L>  |  96  |  19  ----------------------------<  133<H>  3.4<L>   |  25  |  0.80    Ca    8.8      31 Dec 2018 06:49    TPro  8.5<H>  /  Alb  3.4  /  TBili  0.6  /  DBili  x   /  AST  19  /  ALT  12  /  AlkPhos  62  12-31                        10.7   11.00 )-----------( 322      ( 31 Dec 2018 08:04 )             33.3     Medications  MEDICATIONS  (STANDING):  buDESOnide 160 MICROgram(s)/formoterol 4.5 MICROgram(s) Inhaler 2 Puff(s) Inhalation two times a day  cyanocobalamin 1000 MICROGram(s) Oral daily  dextrose 5%. 1000 milliLiter(s) (50 mL/Hr) IV Continuous <Continuous>  dextrose 50% Injectable 12.5 Gram(s) IV Push once  dextrose 50% Injectable 25 Gram(s) IV Push once  dextrose 50% Injectable 25 Gram(s) IV Push once  ergocalciferol 60328 Unit(s) Oral every week  folic acid 1 milliGRAM(s) Oral daily  heparin  Injectable 5000 Unit(s) SubCutaneous every 12 hours  hydrochlorothiazide 25 milliGRAM(s) Oral daily  insulin glargine Injectable (LANTUS) 12 Unit(s) SubCutaneous at bedtime  insulin lispro (HumaLOG) corrective regimen sliding scale   SubCutaneous three times a day before meals  insulin lispro (HumaLOG) corrective regimen sliding scale   SubCutaneous at bedtime  insulin lispro Injectable (HumaLOG) 6 Unit(s) SubCutaneous three times a day before meals  losartan 50 milliGRAM(s) Oral daily  methotrexate 17.5 milliGRAM(s) Oral every week  pantoprazole    Tablet 40 milliGRAM(s) Oral two times a day  predniSONE   Tablet 30 milliGRAM(s) Oral daily  tiotropium 18 MICROgram(s) Capsule 1 Capsule(s) Inhalation daily      Physical Exam  General: Patient comfortable in bed  Vital Signs Last 12 Hrs  T(F): 98.5 (12-31-18 @ 05:11), Max: 98.5 (12-31-18 @ 05:11)  HR: 83 (12-31-18 @ 05:11) (83 - 83)  BP: 132/53 (12-31-18 @ 05:11) (132/53 - 132/53)  BP(mean): --  RR: 18 (12-31-18 @ 05:11) (18 - 18)  SpO2: 92% (12-31-18 @ 05:11) (92% - 92%)  Neck: No palpable thyroid nodules.  CVS: S1S2, No murmurs  Respiratory: No wheezing, no crepitations  GI: Abdomen soft, bowel sounds positive  Musculoskeletal:  edema lower extremities.   Skin: No skin rashes, no ecchymosis    Diagnostics    Hemoglobin A1C, Whole Blood: Routine (12-28 @ 14:41)

## 2018-12-31 NOTE — DIETITIAN INITIAL EVALUATION ADULT. - ENERGY NEEDS
Ht: 60 inches Wt: 130.1 pounds BMI: 25.6 kg/m2 IBW: 100 (+/-10%) 130.1 %IBW  Pertinent information: Pt 86 y/o F with PMH: RA (no longer steroids since 09/2019), HTN, DM, asthma, admitted with BRBPR, sepsis, arthralgia, found with coronavirus tracheobronchitis, aortic stenosis.      No noted edema as per flow sheets. Skin: pressure ulcer stage 1 in sacrum as per documentation.

## 2018-12-31 NOTE — DISCHARGE NOTE ADULT - CARE PLAN
Principal Discharge DX:	Cough  Goal:	resolution of symptoms  Assessment and plan of treatment:	supportive care  Secondary Diagnosis:	DM (diabetes mellitus), type 2  Assessment and plan of treatment:	HgA1C this admission.  Make sure you get your HgA1c checked every three months.  If you take oral diabetes medications, check your blood glucose two times a day.  If you take insulin, check your blood glucose before meals and at bedtime.  It's important not to skip any meals.  Keep a log of your blood glucose results and always take it with you to your doctor appointments.  Keep a list of your current medications including injectables and over the counter medications and bring this medication list with you to all your doctor appointments.  If you have not seen your ophthalmologist this year call for appointment.  Check your feet daily for redness, sores, or openings. Do not self treat. If no improvement in two days call your primary care physician for an appointment.  Low blood sugar (hypoglycemia) is a blood sugar below 70mg/dl. Check your blood sugar if you feel signs/symptoms of hypoglycemia. If your blood sugar is below 70 take 15 grams of carbohydrates (ex 4 oz of apple juice, 3-4 glucose tablets, or 4-6 oz of regular soda) wait 15 minutes and repeat blood sugar to make sure it comes up above 70.  If your blood sugar is above 70 and you are due for a meal, have a meal.  If you are not due for a meal have a snack.  This snack helps keeps your blood sugar at a safe range.  Secondary Diagnosis:	HTN (hypertension), benign  Assessment and plan of treatment:	Follow up with your medical doctor to establish long term blood pressure treatment goals.  Secondary Diagnosis:	RA (rheumatoid arthritis)  Assessment and plan of treatment:	follow up with rheumatology  continue current treatment plan. Principal Discharge DX:	Coronavirus infection  Goal:	resolution of symptoms  Assessment and plan of treatment:	Get plenty of rest.  Drink enough water and fluids to keep your urine clear or pale yellow.  Eat a well-balanced diet.  Call your caregiver for follow-up as recommended.  Secondary Diagnosis:	DM (diabetes mellitus), type 2  Assessment and plan of treatment:	HgA1C this admission 8.0  Make sure you get your HgA1c checked every three months.  If you take oral diabetes medications, check your blood glucose two times a day.  If you take insulin, check your blood glucose before meals and at bedtime.  It's important not to skip any meals.  Keep a log of your blood glucose results and always take it with you to your doctor appointments.  Keep a list of your current medications including injectables and over the counter medications and bring this medication list with you to all your doctor appointments.  If you have not seen your ophthalmologist this year call for appointment.  Check your feet daily for redness, sores, or openings. Do not self treat. If no improvement in two days call your primary care physician for an appointment.  Low blood sugar (hypoglycemia) is a blood sugar below 70mg/dl. Check your blood sugar if you feel signs/symptoms of hypoglycemia. If your blood sugar is below 70 take 15 grams of carbohydrates (ex 4 oz of apple juice, 3-4 glucose tablets, or 4-6 oz of regular soda) wait 15 minutes and repeat blood sugar to make sure it comes up above 70.  If your blood sugar is above 70 and you are due for a meal, have a meal.  If you are not due for a meal have a snack.  This snack helps keeps your blood sugar at a safe range.  Secondary Diagnosis:	HTN (hypertension), benign  Assessment and plan of treatment:	Take medications for your blood pressure as recommended.  Eat a heart healthy diet that is low in saturated fats and salt, and includes whole grains, fruits, vegetables and lean protein   Exercise regularly (consult with your physician or cardiologist first); maintain a heart healthy weight.   If you smoke - quit (A resource to help you stop smoking is the [facility] – phone number [phone].). Continue to follow with your primary physician or cardiologist.   Seek medical help for dizziness, Lightheadedness, Blurry vision, Headache, Chest pain, Shortness of breath  Follow up with your medical doctor to establish long term blood pressure treatment goals.  Secondary Diagnosis:	RA (rheumatoid arthritis)  Assessment and plan of treatment:	follow up with rheumatology  continue current treatment plan. Principal Discharge DX:	Coronavirus infection  Goal:	resolution of symptoms  Assessment and plan of treatment:	Get plenty of rest.  Drink enough water and fluids to keep your urine clear or pale yellow.  Eat a well-balanced diet.  Call your caregiver for follow-up as recommended.  Secondary Diagnosis:	DM (diabetes mellitus), type 2  Assessment and plan of treatment:	HgA1C this admission 8.0  Make sure you get your HgA1c checked every three months.  If you take oral diabetes medications, check your blood glucose two times a day.  If you take insulin, check your blood glucose before meals and at bedtime.  It's important not to skip any meals.  Keep a log of your blood glucose results and always take it with you to your doctor appointments.  Keep a list of your current medications including injectables and over the counter medications and bring this medication list with you to all your doctor appointments.  If you have not seen your ophthalmologist this year call for appointment.  Check your feet daily for redness, sores, or openings. Do not self treat. If no improvement in two days call your primary care physician for an appointment.  Low blood sugar (hypoglycemia) is a blood sugar below 70mg/dl. Check your blood sugar if you feel signs/symptoms of hypoglycemia. If your blood sugar is below 70 take 15 grams of carbohydrates (ex 4 oz of apple juice, 3-4 glucose tablets, or 4-6 oz of regular soda) wait 15 minutes and repeat blood sugar to make sure it comes up above 70.  If your blood sugar is above 70 and you are due for a meal, have a meal.  If you are not due for a meal have a snack.  This snack helps keeps your blood sugar at a safe range.  Secondary Diagnosis:	HTN (hypertension), benign  Assessment and plan of treatment:	Take medications for your blood pressure as recommended.  Eat a heart healthy diet that is low in saturated fats and salt, and includes whole grains, fruits, vegetables and lean protein   Exercise regularly (consult with your physician or cardiologist first); maintain a heart healthy weight.   If you smoke - quit (A resource to help you stop smoking is the [facility] – phone number [phone].). Continue to follow with your primary physician or cardiologist.   Seek medical help for dizziness, Lightheadedness, Blurry vision, Headache, Chest pain, Shortness of breath  Follow up with your medical doctor to establish long term blood pressure treatment goals.  Secondary Diagnosis:	RA (rheumatoid arthritis)  Assessment and plan of treatment:	follow up with rheumatology  continue current treatment plan.  Secondary Diagnosis:	Aortic valve stenosis, etiology of cardiac valve disease unspecified  Assessment and plan of treatment:	Follow up with Cardiology for outpatient Echocardiogram

## 2018-12-31 NOTE — DIETITIAN INITIAL EVALUATION ADULT. - PHYSICAL APPEARANCE
well nourished/Performed Nutrition Focused Physical Exam with pt's consent and noted moderate muscle loss in temporales and mild muscle loss in scapula - suspected normal due to older age; no further muscle/fat loss noted in other areas.

## 2018-12-31 NOTE — CONSULT NOTE ADULT - SUBJECTIVE AND OBJECTIVE BOX
HISTORY OF PRESENT ILLNESS:  88 yo female with h/o RA, DM, HTN; admitted with cough and increased joint pains, and general weakness.  Started on prednisone; feeling much improved.  She has been on MTX and was on Simponi via primary rheum in [facility]; has not had Simponi in about 3 months    PAST MEDICAL & SURGICAL HISTORY:  RA (rheumatoid arthritis)  Asthma  DM (diabetes mellitus), type 2  HTN (hypertension), benign  After-cataract of left eye  Tubal occlusion        MEDICATIONS  (STANDING):  buDESOnide 160 MICROgram(s)/formoterol 4.5 MICROgram(s) Inhaler 2 Puff(s) Inhalation two times a day  cyanocobalamin 1000 MICROGram(s) Oral daily  dextrose 5%. 1000 milliLiter(s) (50 mL/Hr) IV Continuous <Continuous>  dextrose 50% Injectable 12.5 Gram(s) IV Push once  dextrose 50% Injectable 25 Gram(s) IV Push once  dextrose 50% Injectable 25 Gram(s) IV Push once  ergocalciferol 83164 Unit(s) Oral every week  folic acid 1 milliGRAM(s) Oral daily  heparin  Injectable 5000 Unit(s) SubCutaneous every 12 hours  hydrochlorothiazide 25 milliGRAM(s) Oral daily  insulin glargine Injectable (LANTUS) 12 Unit(s) SubCutaneous at bedtime  insulin lispro (HumaLOG) corrective regimen sliding scale   SubCutaneous three times a day before meals  insulin lispro (HumaLOG) corrective regimen sliding scale   SubCutaneous at bedtime  insulin lispro Injectable (HumaLOG) 6 Unit(s) SubCutaneous three times a day before meals  losartan 50 milliGRAM(s) Oral daily  methotrexate 17.5 milliGRAM(s) Oral every week  pantoprazole    Tablet 40 milliGRAM(s) Oral two times a day  predniSONE   Tablet 30 milliGRAM(s) Oral daily  tiotropium 18 MICROgram(s) Capsule 1 Capsule(s) Inhalation daily    MEDICATIONS  (PRN):  ALBUTerol    90 MICROgram(s) HFA Inhaler 2 Puff(s) Inhalation every 6 hours PRN Bronchospasm  benzonatate 200 milliGRAM(s) Oral three times a day PRN Cough  dextrose 40% Gel 15 Gram(s) Oral once PRN Blood Glucose LESS THAN 70 milliGRAM(s)/deciliter  glucagon  Injectable 1 milliGRAM(s) IntraMuscular once PRN Glucose LESS THAN 70 milligrams/deciliter  guaiFENesin/dextromethorphan  Syrup 10 milliLiter(s) Oral every 6 hours PRN Cough  HYDROcodone/homatropine Syrup 5 milliLiter(s) Oral at bedtime PRN Cough      Allergies    No Known Allergies    Intolerances        PERTINENT MEDICATION HISTORY:    SOCIAL HISTORY:    FAMILY HISTORY:  No pertinent family history in first degree relatives      Vital Signs Last 24 Hrs  T(C): 36.9 (31 Dec 2018 05:11), Max: 36.9 (31 Dec 2018 05:11)  T(F): 98.5 (31 Dec 2018 05:11), Max: 98.5 (31 Dec 2018 05:11)  HR: 83 (31 Dec 2018 05:11) (83 - 104)  BP: 132/53 (31 Dec 2018 05:11) (132/53 - 148/75)  BP(mean): --  RR: 18 (31 Dec 2018 05:11) (18 - 18)  SpO2: 92% (31 Dec 2018 05:11) (92% - 94%)    Daily     Daily Weight in k (31 Dec 2018 16:15)    PHYSICAL EXAM:      Constitutional:  fair appearing elderly female      Neck:  supple   Breasts:    Back:    Respiratory:    Cardiovascular:    Gastrointestinal:  abd soft nt, nd, no masses        Extremities:  no edema    Vascular:  good distal pulses in feet    Neurological:    Skin:  No active rashes  Lymph Nodes:    Musculoskeletal:  improved ROM; no current acctive synovitis      Psychiatric:  In good spirits, answers questions      LABS:                        10.7   11.00 )-----------( 322      ( 31 Dec 2018 08:04 )             33.3         134<L>  |  96  |  19  ----------------------------<  133<H>  3.4<L>   |  25  |  0.80    Ca    8.8      31 Dec 2018 06:49    TPro  8.5<H>  /  Alb  3.4  /  TBili  0.6  /  DBili  x   /  AST  19  /  ALT  12  /  AlkPhos  62            RADIOLOGY & ADDITIONAL STUDIES:

## 2018-12-31 NOTE — DIETITIAN INITIAL EVALUATION ADULT. - NS AS NUTRI INTERV VITAMIN
Recommend Multivitamin to optimize nutrient intake and help with healing of pressure ulcer. Recommend Multivitamin to optimize nutrient intake and help with healing of pressure ulcer and K+ supplementation to help attain normal serum levels as appropriate.

## 2019-01-01 LAB
ANION GAP SERPL CALC-SCNC: 15 MMOL/L — SIGNIFICANT CHANGE UP (ref 5–17)
BUN SERPL-MCNC: 24 MG/DL — HIGH (ref 7–23)
CALCIUM SERPL-MCNC: 8.6 MG/DL — SIGNIFICANT CHANGE UP (ref 8.4–10.5)
CHLORIDE SERPL-SCNC: 98 MMOL/L — SIGNIFICANT CHANGE UP (ref 96–108)
CO2 SERPL-SCNC: 23 MMOL/L — SIGNIFICANT CHANGE UP (ref 22–31)
CREAT SERPL-MCNC: 0.86 MG/DL — SIGNIFICANT CHANGE UP (ref 0.5–1.3)
GLUCOSE BLDC GLUCOMTR-MCNC: 159 MG/DL — HIGH (ref 70–99)
GLUCOSE BLDC GLUCOMTR-MCNC: 200 MG/DL — HIGH (ref 70–99)
GLUCOSE BLDC GLUCOMTR-MCNC: 235 MG/DL — HIGH (ref 70–99)
GLUCOSE BLDC GLUCOMTR-MCNC: 354 MG/DL — HIGH (ref 70–99)
GLUCOSE SERPL-MCNC: 240 MG/DL — HIGH (ref 70–99)
HCT VFR BLD CALC: 31.1 % — LOW (ref 34.5–45)
HGB BLD-MCNC: 9.9 G/DL — LOW (ref 11.5–15.5)
MCHC RBC-ENTMCNC: 27.6 PG — SIGNIFICANT CHANGE UP (ref 27–34)
MCHC RBC-ENTMCNC: 31.8 GM/DL — LOW (ref 32–36)
MCV RBC AUTO: 86.6 FL — SIGNIFICANT CHANGE UP (ref 80–100)
PLATELET # BLD AUTO: 285 K/UL — SIGNIFICANT CHANGE UP (ref 150–400)
POTASSIUM SERPL-MCNC: 3.5 MMOL/L — SIGNIFICANT CHANGE UP (ref 3.5–5.3)
POTASSIUM SERPL-SCNC: 3.5 MMOL/L — SIGNIFICANT CHANGE UP (ref 3.5–5.3)
RBC # BLD: 3.59 M/UL — LOW (ref 3.8–5.2)
RBC # FLD: 14.1 % — SIGNIFICANT CHANGE UP (ref 10.3–14.5)
SODIUM SERPL-SCNC: 136 MMOL/L — SIGNIFICANT CHANGE UP (ref 135–145)
WBC # BLD: 8.67 K/UL — SIGNIFICANT CHANGE UP (ref 3.8–10.5)
WBC # FLD AUTO: 8.67 K/UL — SIGNIFICANT CHANGE UP (ref 3.8–10.5)

## 2019-01-01 RX ADMIN — BUDESONIDE AND FORMOTEROL FUMARATE DIHYDRATE 2 PUFF(S): 160; 4.5 AEROSOL RESPIRATORY (INHALATION) at 17:26

## 2019-01-01 RX ADMIN — HEPARIN SODIUM 5000 UNIT(S): 5000 INJECTION INTRAVENOUS; SUBCUTANEOUS at 05:03

## 2019-01-01 RX ADMIN — Medication 6 UNIT(S): at 12:16

## 2019-01-01 RX ADMIN — LOSARTAN POTASSIUM 50 MILLIGRAM(S): 100 TABLET, FILM COATED ORAL at 05:03

## 2019-01-01 RX ADMIN — Medication 1 MILLIGRAM(S): at 12:17

## 2019-01-01 RX ADMIN — HEPARIN SODIUM 5000 UNIT(S): 5000 INJECTION INTRAVENOUS; SUBCUTANEOUS at 17:26

## 2019-01-01 RX ADMIN — PANTOPRAZOLE SODIUM 40 MILLIGRAM(S): 20 TABLET, DELAYED RELEASE ORAL at 17:26

## 2019-01-01 RX ADMIN — BUDESONIDE AND FORMOTEROL FUMARATE DIHYDRATE 2 PUFF(S): 160; 4.5 AEROSOL RESPIRATORY (INHALATION) at 05:03

## 2019-01-01 RX ADMIN — PREGABALIN 1000 MICROGRAM(S): 225 CAPSULE ORAL at 12:17

## 2019-01-01 RX ADMIN — Medication 25 MILLIGRAM(S): at 05:02

## 2019-01-01 RX ADMIN — INSULIN GLARGINE 12 UNIT(S): 100 INJECTION, SOLUTION SUBCUTANEOUS at 22:11

## 2019-01-01 RX ADMIN — Medication 1: at 17:25

## 2019-01-01 RX ADMIN — Medication 30 MILLIGRAM(S): at 05:03

## 2019-01-01 RX ADMIN — TIOTROPIUM BROMIDE 1 CAPSULE(S): 18 CAPSULE ORAL; RESPIRATORY (INHALATION) at 12:17

## 2019-01-01 RX ADMIN — PANTOPRAZOLE SODIUM 40 MILLIGRAM(S): 20 TABLET, DELAYED RELEASE ORAL at 05:02

## 2019-01-01 RX ADMIN — Medication 6 UNIT(S): at 08:40

## 2019-01-01 RX ADMIN — Medication 2: at 08:39

## 2019-01-01 RX ADMIN — Medication 6 UNIT(S): at 17:25

## 2019-01-01 RX ADMIN — Medication 5: at 12:16

## 2019-01-01 NOTE — PROGRESS NOTE ADULT - PROBLEM SELECTOR PLAN 2
Continue treatment, monitoring, primary team FU

## 2019-01-01 NOTE — PROGRESS NOTE ADULT - SUBJECTIVE AND OBJECTIVE BOX
Chief complaint  Patient is a 87y old  Female who presents with a chief complaint of BRBPR (01 Jan 2019 10:48)   Review of systems  Patient in bed, looks comfortable, no fever, no hypoglycemia.    Labs and Fingersticks  CAPILLARY BLOOD GLUCOSE  205 (01 Jan 2019 08:05)      POCT Blood Glucose.: 200 mg/dL (01 Jan 2019 16:40)  POCT Blood Glucose.: 354 mg/dL (01 Jan 2019 11:58)  POCT Blood Glucose.: 235 mg/dL (01 Jan 2019 08:05)  POCT Blood Glucose.: 235 mg/dL (31 Dec 2018 21:50)      Anion Gap, Serum: 15 (01-01 @ 06:15)  Anion Gap, Serum: 13 (12-31 @ 06:49)      Calcium, Total Serum: 8.6 (01-01 @ 06:15)  Calcium, Total Serum: 8.8 (12-31 @ 06:49)  Albumin, Serum: 3.4 (12-31 @ 06:49)    Alanine Aminotransferase (ALT/SGPT): 12 (12-31 @ 06:49)  Alkaline Phosphatase, Serum: 62 (12-31 @ 06:49)  Aspartate Aminotransferase (AST/SGOT): 19 (12-31 @ 06:49)        01-01    136  |  98  |  24<H>  ----------------------------<  240<H>  3.5   |  23  |  0.86    Ca    8.6      01 Jan 2019 06:15    TPro  8.5<H>  /  Alb  3.4  /  TBili  0.6  /  DBili  x   /  AST  19  /  ALT  12  /  AlkPhos  62  12-31                        9.9    8.67  )-----------( 285      ( 01 Jan 2019 09:24 )             31.1     Medications  MEDICATIONS  (STANDING):  buDESOnide 160 MICROgram(s)/formoterol 4.5 MICROgram(s) Inhaler 2 Puff(s) Inhalation two times a day  cyanocobalamin 1000 MICROGram(s) Oral daily  dextrose 5%. 1000 milliLiter(s) (50 mL/Hr) IV Continuous <Continuous>  dextrose 50% Injectable 12.5 Gram(s) IV Push once  dextrose 50% Injectable 25 Gram(s) IV Push once  dextrose 50% Injectable 25 Gram(s) IV Push once  ergocalciferol 13477 Unit(s) Oral every week  folic acid 1 milliGRAM(s) Oral daily  heparin  Injectable 5000 Unit(s) SubCutaneous every 12 hours  hydrochlorothiazide 25 milliGRAM(s) Oral daily  insulin glargine Injectable (LANTUS) 12 Unit(s) SubCutaneous at bedtime  insulin lispro (HumaLOG) corrective regimen sliding scale   SubCutaneous three times a day before meals  insulin lispro (HumaLOG) corrective regimen sliding scale   SubCutaneous at bedtime  insulin lispro Injectable (HumaLOG) 6 Unit(s) SubCutaneous three times a day before meals  losartan 50 milliGRAM(s) Oral daily  methotrexate 17.5 milliGRAM(s) Oral every week  pantoprazole    Tablet 40 milliGRAM(s) Oral two times a day  predniSONE   Tablet 30 milliGRAM(s) Oral daily  tiotropium 18 MICROgram(s) Capsule 1 Capsule(s) Inhalation daily      Physical Exam  General: Patient comfortable in bed  Vital Signs Last 12 Hrs  T(F): 97.4 (01-01-19 @ 14:45), Max: 97.4 (01-01-19 @ 14:45)  HR: 90 (01-01-19 @ 14:45) (90 - 90)  BP: 110/67 (01-01-19 @ 14:45) (110/67 - 110/67)  BP(mean): --  RR: 18 (01-01-19 @ 14:45) (18 - 18)  SpO2: 96% (01-01-19 @ 14:45) (96% - 96%)  Neck: No palpable thyroid nodules.  CVS: S1S2, No murmurs  Respiratory: No wheezing, no crepitations  GI: Abdomen soft, bowel sounds positive  Musculoskeletal:  edema lower extremities.   Skin: No skin rashes, no ecchymosis    Diagnostics    Hemoglobin A1C, Whole Blood: Routine (12-28 @ 14:41)

## 2019-01-01 NOTE — PROGRESS NOTE ADULT - SUBJECTIVE AND OBJECTIVE BOX
Subjective: Patient seen and examined. No new events except as noted.     SUBJECTIVE/ROS:  feels ok       MEDICATIONS:  MEDICATIONS  (STANDING):  buDESOnide 160 MICROgram(s)/formoterol 4.5 MICROgram(s) Inhaler 2 Puff(s) Inhalation two times a day  cyanocobalamin 1000 MICROGram(s) Oral daily  dextrose 5%. 1000 milliLiter(s) (50 mL/Hr) IV Continuous <Continuous>  dextrose 50% Injectable 12.5 Gram(s) IV Push once  dextrose 50% Injectable 25 Gram(s) IV Push once  dextrose 50% Injectable 25 Gram(s) IV Push once  ergocalciferol 13810 Unit(s) Oral every week  folic acid 1 milliGRAM(s) Oral daily  heparin  Injectable 5000 Unit(s) SubCutaneous every 12 hours  hydrochlorothiazide 25 milliGRAM(s) Oral daily  insulin glargine Injectable (LANTUS) 12 Unit(s) SubCutaneous at bedtime  insulin lispro (HumaLOG) corrective regimen sliding scale   SubCutaneous three times a day before meals  insulin lispro (HumaLOG) corrective regimen sliding scale   SubCutaneous at bedtime  insulin lispro Injectable (HumaLOG) 6 Unit(s) SubCutaneous three times a day before meals  losartan 50 milliGRAM(s) Oral daily  methotrexate 17.5 milliGRAM(s) Oral every week  pantoprazole    Tablet 40 milliGRAM(s) Oral two times a day  predniSONE   Tablet 30 milliGRAM(s) Oral daily  tiotropium 18 MICROgram(s) Capsule 1 Capsule(s) Inhalation daily      PHYSICAL EXAM:  T(C): 36.5 (01-01-19 @ 04:18), Max: 36.8 (12-31-18 @ 21:15)  HR: 82 (01-01-19 @ 04:18) (82 - 85)  BP: 126/68 (01-01-19 @ 04:18) (119/69 - 126/68)  RR: 18 (01-01-19 @ 04:18) (18 - 18)  SpO2: 98% (01-01-19 @ 04:18) (94% - 98%)  Wt(kg): --  I&O's Summary    31 Dec 2018 07:01  -  01 Jan 2019 07:00  --------------------------------------------------------  IN: 480 mL / OUT: 0 mL / NET: 480 mL        JVP: Normal  Neck: supple  Lung: clear   CV: S1 S2 , Murmur:  Abd: soft  Ext: No edema  neuro: Awake / alert  Psych: flat affect  Skin: normal        LABS/DATA:    CARDIAC MARKERS:                                10.7   11.00 )-----------( 322      ( 31 Dec 2018 08:04 )             33.3     01-01    136  |  98  |  24<H>  ----------------------------<  240<H>  3.5   |  23  |  0.86    Ca    8.6      01 Jan 2019 06:15    TPro  8.5<H>  /  Alb  3.4  /  TBili  0.6  /  DBili  x   /  AST  19  /  ALT  12  /  AlkPhos  62  12-31    proBNP:   Lipid Profile:   HgA1c:   TSH:     TELE:  EKG:

## 2019-01-01 NOTE — PROGRESS NOTE ADULT - SUBJECTIVE AND OBJECTIVE BOX
Patient is a 87y old  Female who presents with a chief complaint of BRBPR (2019 09:06)                                                               INTERVAL HPI/OVERNIGHT EVENTS:    REVIEW OF SYSTEMS:     CONSTITUTIONAL: overall improvign weakness   EYES/ENT: No visual changes , no ear ache   NECK: No pain or stiffness  RESPIRATORY:imrpoving  cough, wheezing,  No shortness of breath  CARDIOVASCULAR: No chest pain or palpitations  GASTROINTESTINAL: No abdominal pain  . No nausea, vomiting, or hematemesis; No diarrhea or constipation. No melena or hematochezia.  GENITOURINARY: No dysuria, frequency or hematuria  NEUROLOGICAL: No numbness or weakness  MSK :" improved joint pains                                                                                                                                                                                                                                                                                  Medications:  MEDICATIONS  (STANDING):  buDESOnide 160 MICROgram(s)/formoterol 4.5 MICROgram(s) Inhaler 2 Puff(s) Inhalation two times a day  cyanocobalamin 1000 MICROGram(s) Oral daily  dextrose 5%. 1000 milliLiter(s) (50 mL/Hr) IV Continuous <Continuous>  dextrose 50% Injectable 12.5 Gram(s) IV Push once  dextrose 50% Injectable 25 Gram(s) IV Push once  dextrose 50% Injectable 25 Gram(s) IV Push once  ergocalciferol 35507 Unit(s) Oral every week  folic acid 1 milliGRAM(s) Oral daily  heparin  Injectable 5000 Unit(s) SubCutaneous every 12 hours  hydrochlorothiazide 25 milliGRAM(s) Oral daily  insulin glargine Injectable (LANTUS) 12 Unit(s) SubCutaneous at bedtime  insulin lispro (HumaLOG) corrective regimen sliding scale   SubCutaneous three times a day before meals  insulin lispro (HumaLOG) corrective regimen sliding scale   SubCutaneous at bedtime  insulin lispro Injectable (HumaLOG) 6 Unit(s) SubCutaneous three times a day before meals  losartan 50 milliGRAM(s) Oral daily  methotrexate 17.5 milliGRAM(s) Oral every week  pantoprazole    Tablet 40 milliGRAM(s) Oral two times a day  predniSONE   Tablet 30 milliGRAM(s) Oral daily  tiotropium 18 MICROgram(s) Capsule 1 Capsule(s) Inhalation daily    MEDICATIONS  (PRN):  ALBUTerol    90 MICROgram(s) HFA Inhaler 2 Puff(s) Inhalation every 6 hours PRN Bronchospasm  benzonatate 200 milliGRAM(s) Oral three times a day PRN Cough  dextrose 40% Gel 15 Gram(s) Oral once PRN Blood Glucose LESS THAN 70 milliGRAM(s)/deciliter  glucagon  Injectable 1 milliGRAM(s) IntraMuscular once PRN Glucose LESS THAN 70 milligrams/deciliter  guaiFENesin/dextromethorphan  Syrup 10 milliLiter(s) Oral every 6 hours PRN Cough  HYDROcodone/homatropine Syrup 5 milliLiter(s) Oral at bedtime PRN Cough       Allergies    No Known Allergies    Intolerances      Vital Signs Last 24 Hrs  T(C): 36.5 (2019 04:18), Max: 36.8 (31 Dec 2018 21:15)  T(F): 97.7 (2019 04:18), Max: 98.3 (31 Dec 2018 21:15)  HR: 82 (2019 04:18) (82 - 85)  BP: 126/68 (2019 04:18) (119/69 - 126/68)  BP(mean): --  RR: 18 (2019 04:18) (18 - 18)  SpO2: 98% (2019 04:18) (94% - 98%)  CAPILLARY BLOOD GLUCOSE  205 (2019 08:05)      POCT Blood Glucose.: 235 mg/dL (2019 08:05)  POCT Blood Glucose.: 235 mg/dL (31 Dec 2018 21:50)  POCT Blood Glucose.: 183 mg/dL (31 Dec 2018 17:00)  POCT Blood Glucose.: 131 mg/dL (31 Dec 2018 12:11)       @ : @ 07:00  --------------------------------------------------------  IN: 480 mL / OUT: 0 mL / NET: 480 mL     @ : @ 10:49  --------------------------------------------------------  IN: 240 mL / OUT: 0 mL / NET: 240 mL      Physical Exam:    Daily     Daily Weight in k (31 Dec 2018 16:15)  General:  Well appearing, NAD, not cachetic  HEENT:  Nonicteric, PERRLA  CV:  RRR, S1S2   Lungs:  CTA B/L, no wheezes, rales, rhonchi  Abdomen:  Soft, non-tender, no distended, positive BS  Extremities:  2+ pulses, no c/c, no edema  Skin:  Warm and dry, no rashes  :  No mason  Neuro:  AAOx3, non-focal, grossly intact                                                                                                                                                                                                                                                                                                LABS:                               10.7   11.00 )-----------( 322      ( 31 Dec 2018 08:04 )             33.3                      01-    136  |  98  |  24<H>  ----------------------------<  240<H>  3.5   |  23  |  0.86    Ca    8.6      2019 06:15    TPro  8.5<H>  /  Alb  3.4  /  TBili  0.6  /  DBili  x   /  AST  19  /  ALT  12  /  AlkPhos  62  12-31                       RADIOLOGY & ADDITIONAL TESTS         I personally reviewed: [  ]EKG   [  ]CXR    [  ] CT      A/P:         Discussed with :     [employer] consultants' Notes   Time spent : Patient is a 87y old  Female who presents with a chief complaint of BRBPR (2019 09:06)                                                               INTERVAL HPI/OVERNIGHT EVENTS:    REVIEW OF SYSTEMS:     CONSTITUTIONAL: overall improvign weakness   EYES/ENT: No visual changes , no ear ache   NECK: No pain or stiffness  RESPIRATORY:imrpoving  cough, wheezing,  No shortness of breath  CARDIOVASCULAR: No chest pain or palpitations  GASTROINTESTINAL: No abdominal pain  . No nausea, vomiting, or hematemesis; No diarrhea or constipation. No melena or hematochezia.  GENITOURINARY: No dysuria, frequency or hematuria  NEUROLOGICAL: No numbness or weakness  MSK :" improved joint pains                                                                                                                                                                                                                                                                                  Medications:  MEDICATIONS  (STANDING):  buDESOnide 160 MICROgram(s)/formoterol 4.5 MICROgram(s) Inhaler 2 Puff(s) Inhalation two times a day  cyanocobalamin 1000 MICROGram(s) Oral daily  dextrose 5%. 1000 milliLiter(s) (50 mL/Hr) IV Continuous <Continuous>  dextrose 50% Injectable 12.5 Gram(s) IV Push once  dextrose 50% Injectable 25 Gram(s) IV Push once  dextrose 50% Injectable 25 Gram(s) IV Push once  ergocalciferol 83509 Unit(s) Oral every week  folic acid 1 milliGRAM(s) Oral daily  heparin  Injectable 5000 Unit(s) SubCutaneous every 12 hours  hydrochlorothiazide 25 milliGRAM(s) Oral daily  insulin glargine Injectable (LANTUS) 12 Unit(s) SubCutaneous at bedtime  insulin lispro (HumaLOG) corrective regimen sliding scale   SubCutaneous three times a day before meals  insulin lispro (HumaLOG) corrective regimen sliding scale   SubCutaneous at bedtime  insulin lispro Injectable (HumaLOG) 6 Unit(s) SubCutaneous three times a day before meals  losartan 50 milliGRAM(s) Oral daily  methotrexate 17.5 milliGRAM(s) Oral every week  pantoprazole    Tablet 40 milliGRAM(s) Oral two times a day  predniSONE   Tablet 30 milliGRAM(s) Oral daily  tiotropium 18 MICROgram(s) Capsule 1 Capsule(s) Inhalation daily    MEDICATIONS  (PRN):  ALBUTerol    90 MICROgram(s) HFA Inhaler 2 Puff(s) Inhalation every 6 hours PRN Bronchospasm  benzonatate 200 milliGRAM(s) Oral three times a day PRN Cough  dextrose 40% Gel 15 Gram(s) Oral once PRN Blood Glucose LESS THAN 70 milliGRAM(s)/deciliter  glucagon  Injectable 1 milliGRAM(s) IntraMuscular once PRN Glucose LESS THAN 70 milligrams/deciliter  guaiFENesin/dextromethorphan  Syrup 10 milliLiter(s) Oral every 6 hours PRN Cough  HYDROcodone/homatropine Syrup 5 milliLiter(s) Oral at bedtime PRN Cough       Allergies    No Known Allergies    Intolerances      Vital Signs Last 24 Hrs  T(C): 36.5 (2019 04:18), Max: 36.8 (31 Dec 2018 21:15)  T(F): 97.7 (2019 04:18), Max: 98.3 (31 Dec 2018 21:15)  HR: 82 (2019 04:18) (82 - 85)  BP: 126/68 (2019 04:18) (119/69 - 126/68)  BP(mean): --  RR: 18 (2019 04:18) (18 - 18)  SpO2: 98% (2019 04:18) (94% - 98%)  CAPILLARY BLOOD GLUCOSE  205 (2019 08:05)      POCT Blood Glucose.: 235 mg/dL (2019 08:05)  POCT Blood Glucose.: 235 mg/dL (31 Dec 2018 21:50)  POCT Blood Glucose.: 183 mg/dL (31 Dec 2018 17:00)  POCT Blood Glucose.: 131 mg/dL (31 Dec 2018 12:11)       @ : @ 07:00  --------------------------------------------------------  IN: 480 mL / OUT: 0 mL / NET: 480 mL     @ : @ 10:49  --------------------------------------------------------  IN: 240 mL / OUT: 0 mL / NET: 240 mL      Physical Exam:    Daily     Daily Weight in k (31 Dec 2018 16:15)  General:  NAD   HEENT:  Nonicteric, PERRLA  CV:  RRR, S1S2   Lungs:  crackles at bases   Abdomen:  Soft, non-tender, no distended, positive BS  Extremities:  2+ pulses, no c/c, no edema  Skin:  Warm and dry, no rashes  :  No mason  Neuro:  AAOx3, non-focal, grossly intact                                                                                                                                                                                                                                                                                                LABS:                               10.7   11.00 )-----------( 322      ( 31 Dec 2018 08:04 )             33.3                      -    136  |  98  |  24<H>  ----------------------------<  240<H>  3.5   |  23  |  0.86    Ca    8.6      2019 06:15    TPro  8.5<H>  /  Alb  3.4  /  TBili  0.6  /  DBili  x   /  AST  19  /  ALT  12  /  AlkPhos  62  -

## 2019-01-01 NOTE — PROGRESS NOTE ADULT - ASSESSMENT
HTN  stable  cont current meds    Aortic Stenosis  Moderate on last echo   clinically stable  repeat echo can be done as outpt    DM  Monitor finger stick. Insulin coverage.   cough   likely due to her viral syndrome  fu with med

## 2019-01-01 NOTE — PROGRESS NOTE ADULT - ASSESSMENT
Assessment  DMT2: 87y Female with DM T2 with hyperglycemia was on insulin, blood sugars improving, no hypoglycemic episode,  eating meals,  non compliant with low carb diet.  Asthma/Cough: On medications,  no chest pain, stable, monitored.  HTN: Controlled, no headaches, on medications.        Karan Akbar MD  Cell: [phone]  Office: [phone]

## 2019-01-01 NOTE — PROGRESS NOTE ADULT - PROBLEM SELECTOR PLAN 1
Will continue current insulin regimen for now. Will continue monitoring FS, log, will Follow up.  Patient counseled for compliance with consistent low carb diet.
Will increase Lantus to 12 units at bed time.  Will increase Humalog to 6 units before each meal in addition to Humalog correction scale coverage.  Patient counseled for compliance with consistent low carb diet.
Will start on Humalog coverage,  Will start Lantus 8u qhs, start monitoring FS, log, will Follow up.  Patient counseled for compliance with consistent low carb diet.

## 2019-01-01 NOTE — PROGRESS NOTE ADULT - PROBLEM SELECTOR PROBLEM 1
DM (diabetes mellitus), type 2

## 2019-01-01 NOTE — PROGRESS NOTE ADULT - PROBLEM SELECTOR PLAN 3
May continue current treatment, monitor BP, primary team FU

## 2019-01-01 NOTE — PROGRESS NOTE ADULT - ASSESSMENT
84 y/o fmeale with hx of RA on mtx , and was on prednisone ( no longer on steroids since 09/2017  and has not recieved iv infusion  ( monthly simponi ) in three months . also history of HTN and DM .. has been admitted twice over the past 6-8 months with  weakness . initial admit pt was thought ot have an element of adrenal insufficeiny when she was on steroids .. and pt was sent out on steroids with some improvement,.. later admitted as lij for weakness thought to be multifactorial sec to viral illness, dehydration , and leemt of steroid -induced myopathy.more recently admitted with weakness sec to hMPV and most recently admitted with  acute GIB ( diverticular )..  Pt now admitted with generlzied weakness, polyarthiritc pains and cough..  Pt has been feeling weak and has been having difficulty ambulating for sometime now.. and over the past one week she developed a cough, fever and was treated with a course of abx which she completed yesterday .. since pt did not respond completely she was brought be her  ..  pt c/o of joint pains of neck, wirsts and ankles   most pronounced in R wrist and L ankle with swelling   no fever ( but noted to have fever in ED of 100.2 )   no chills   still persistent cough though somewhat improving   no CP/SOB   had an episode of cvomitting however post severe episode of cough   no urianry sx   in ED was given steroids for suspicion of RA flare     1- sepsis : pt with fever and elevated WBC .. mild elevateion of procal however was treated w abx as of yesterday   RVP positive   no evidence of bacterial infection   cont off abx     2- Arhtarlegia /RA flare   : generlized with swelling of R wirst and L ankle     significantly elevated ESR  ? PMR   cont steroids   methotrexate : reconsider if contributing to lung findings     3- DM : cont insulin     4- HTN cont meds     5- Pulmonary fibrosis: likely RA related  Crackles on exam likely ILD related  on steroids 84 y/o fmeale with hx of RA on mtx , and was on prednisone ( no longer on steroids since 09/2017  and has not recieved iv infusion  ( monthly simponi ) in three months . also history of HTN and DM .. has been admitted twice over the past 6-8 months with  weakness . initial admit pt was thought ot have an element of adrenal insufficeiny when she was on steroids .. and pt was sent out on steroids with some improvement,.. later admitted as lij for weakness thought to be multifactorial sec to viral illness, dehydration , and leemt of steroid -induced myopathy.more recently admitted with weakness sec to hMPV and most recently admitted with  acute GIB ( diverticular )..  Pt now admitted with generlzied weakness, polyarthiritc pains and cough..  Pt has been feeling weak and has been having difficulty ambulating for sometime now.. and over the past one week she developed a cough, fever and was treated with a course of abx which she completed yesterday .. since pt did not respond completely she was brought be her  ..  pt c/o of joint pains of neck, wirsts and ankles   most pronounced in R wrist and L ankle with swelling   no fever ( but noted to have fever in ED of 100.2 )   no chills   still persistent cough though somewhat improving   no CP/SOB   had an episode of cvomitting however post severe episode of cough   no urianry sx   in ED was given steroids for suspicion of RA flare     1- sepsis : pt with fever and elevated WBC .. mild elevateion of procal however was treated w abx as of yesterday   RVP positive   no evidence of bacterial infection   cont off abx     2- Arhtarlegia /RA flare   : generlized with swelling of R wirst and L ankle     significantly elevated ESR  cont steroids   appreciate Dr. Weller input : continue prednisone 30mg for 3 days as per pulm, then decrease prednisone to 20mg/day and taper slowly from there (can be done as outpt)  I would favor continuing MTX for the time being as it seems her lung dz is more likely disease related rather than MTX toxicity (though difficult to say for sure).  If her coughing does not subside over the next 1-2 weeks then consider stopping MTX; can d/w primary Rheum and pulm (though I sp/w  and she might f/u with me as outpatient since it is difficult for them to travel to [place]).  A lower MTX dose is also a possible consideration in the future      3- DM : cont insulin     4- HTN cont meds     5- Pulmonary fibrosis: likely RA related  Crackles on exam likely ILD related  on steroids

## 2019-01-02 VITALS
RESPIRATION RATE: 18 BRPM | TEMPERATURE: 98 F | OXYGEN SATURATION: 93 % | DIASTOLIC BLOOD PRESSURE: 36 MMHG | HEART RATE: 89 BPM | SYSTOLIC BLOOD PRESSURE: 107 MMHG

## 2019-01-02 LAB
CULTURE RESULTS: SIGNIFICANT CHANGE UP
CULTURE RESULTS: SIGNIFICANT CHANGE UP
GLUCOSE BLDC GLUCOMTR-MCNC: 206 MG/DL — HIGH (ref 70–99)
GLUCOSE BLDC GLUCOMTR-MCNC: 234 MG/DL — HIGH (ref 70–99)
SPECIMEN SOURCE: SIGNIFICANT CHANGE UP
SPECIMEN SOURCE: SIGNIFICANT CHANGE UP

## 2019-01-02 PROCEDURE — 82962 GLUCOSE BLOOD TEST: CPT

## 2019-01-02 PROCEDURE — 87633 RESP VIRUS 12-25 TARGETS: CPT

## 2019-01-02 PROCEDURE — 97161 PT EVAL LOW COMPLEX 20 MIN: CPT

## 2019-01-02 PROCEDURE — 94640 AIRWAY INHALATION TREATMENT: CPT

## 2019-01-02 PROCEDURE — 80053 COMPREHEN METABOLIC PANEL: CPT

## 2019-01-02 PROCEDURE — 84145 PROCALCITONIN (PCT): CPT

## 2019-01-02 PROCEDURE — 87486 CHLMYD PNEUM DNA AMP PROBE: CPT

## 2019-01-02 PROCEDURE — 81001 URINALYSIS AUTO W/SCOPE: CPT

## 2019-01-02 PROCEDURE — 93005 ELECTROCARDIOGRAM TRACING: CPT

## 2019-01-02 PROCEDURE — 87040 BLOOD CULTURE FOR BACTERIA: CPT

## 2019-01-02 PROCEDURE — 99285 EMERGENCY DEPT VISIT HI MDM: CPT | Mod: 25

## 2019-01-02 PROCEDURE — 85027 COMPLETE CBC AUTOMATED: CPT

## 2019-01-02 PROCEDURE — 80048 BASIC METABOLIC PNL TOTAL CA: CPT

## 2019-01-02 PROCEDURE — 71045 X-RAY EXAM CHEST 1 VIEW: CPT

## 2019-01-02 PROCEDURE — 85652 RBC SED RATE AUTOMATED: CPT

## 2019-01-02 PROCEDURE — 96374 THER/PROPH/DIAG INJ IV PUSH: CPT

## 2019-01-02 PROCEDURE — 87798 DETECT AGENT NOS DNA AMP: CPT

## 2019-01-02 PROCEDURE — 83036 HEMOGLOBIN GLYCOSYLATED A1C: CPT

## 2019-01-02 PROCEDURE — 71250 CT THORAX DX C-: CPT

## 2019-01-02 PROCEDURE — 87581 M.PNEUMON DNA AMP PROBE: CPT

## 2019-01-02 RX ORDER — GUAIFENESIN/DEXTROMETHORPHAN 600MG-30MG
10 TABLET, EXTENDED RELEASE 12 HR ORAL
Qty: 0 | Refills: 0 | DISCHARGE
Start: 2019-01-02

## 2019-01-02 RX ORDER — PREGABALIN 225 MG/1
1 CAPSULE ORAL
Qty: 0 | Refills: 0 | DISCHARGE
Start: 2019-01-02

## 2019-01-02 RX ORDER — FOLIC ACID 0.8 MG
2 TABLET ORAL
Qty: 0 | Refills: 0 | COMMUNITY

## 2019-01-02 RX ORDER — ALBUTEROL 90 UG/1
2 AEROSOL, METERED ORAL
Qty: 0 | Refills: 0 | DISCHARGE
Start: 2019-01-02

## 2019-01-02 RX ORDER — PANTOPRAZOLE SODIUM 20 MG/1
1 TABLET, DELAYED RELEASE ORAL
Qty: 0 | Refills: 0 | COMMUNITY
Start: 2019-01-02

## 2019-01-02 RX ORDER — PANTOPRAZOLE SODIUM 20 MG/1
1 TABLET, DELAYED RELEASE ORAL
Qty: 60 | Refills: 0
Start: 2019-01-02 | End: 2019-01-31

## 2019-01-02 RX ORDER — ALBUTEROL 90 UG/1
2 AEROSOL, METERED ORAL
Qty: 0 | Refills: 0 | COMMUNITY

## 2019-01-02 RX ORDER — FOLIC ACID 0.8 MG
1 TABLET ORAL
Qty: 0 | Refills: 0 | DISCHARGE
Start: 2019-01-02

## 2019-01-02 RX ORDER — METHOTREXATE 2.5 MG/1
7 TABLET ORAL
Qty: 0 | Refills: 0 | DISCHARGE
Start: 2019-01-02

## 2019-01-02 RX ORDER — TIOTROPIUM BROMIDE 18 UG/1
1 CAPSULE ORAL; RESPIRATORY (INHALATION)
Qty: 0 | Refills: 0 | COMMUNITY
Start: 2019-01-02

## 2019-01-02 RX ORDER — TIOTROPIUM BROMIDE 18 UG/1
1 CAPSULE ORAL; RESPIRATORY (INHALATION)
Qty: 1 | Refills: 0
Start: 2019-01-02 | End: 2019-01-31

## 2019-01-02 RX ADMIN — Medication 2: at 12:00

## 2019-01-02 RX ADMIN — Medication 2: at 08:30

## 2019-01-02 RX ADMIN — Medication 30 MILLIGRAM(S): at 05:01

## 2019-01-02 RX ADMIN — HEPARIN SODIUM 5000 UNIT(S): 5000 INJECTION INTRAVENOUS; SUBCUTANEOUS at 05:01

## 2019-01-02 RX ADMIN — Medication 6 UNIT(S): at 12:00

## 2019-01-02 RX ADMIN — BUDESONIDE AND FORMOTEROL FUMARATE DIHYDRATE 2 PUFF(S): 160; 4.5 AEROSOL RESPIRATORY (INHALATION) at 05:01

## 2019-01-02 RX ADMIN — Medication 6 UNIT(S): at 08:30

## 2019-01-02 RX ADMIN — PANTOPRAZOLE SODIUM 40 MILLIGRAM(S): 20 TABLET, DELAYED RELEASE ORAL at 05:01

## 2019-01-02 RX ADMIN — TIOTROPIUM BROMIDE 1 CAPSULE(S): 18 CAPSULE ORAL; RESPIRATORY (INHALATION) at 12:01

## 2019-01-02 RX ADMIN — Medication 25 MILLIGRAM(S): at 05:01

## 2019-01-02 RX ADMIN — LOSARTAN POTASSIUM 50 MILLIGRAM(S): 100 TABLET, FILM COATED ORAL at 05:01

## 2019-01-02 NOTE — PROGRESS NOTE ADULT - SUBJECTIVE AND OBJECTIVE BOX
Patient is a 87y old  Female who presents with a chief complaint of BRBPR (02 Jan 2019 14:05)                                                               INTERVAL HPI/OVERNIGHT EVENTS:    REVIEW OF SYSTEMS:     CONSTITUTIONAL: No weakness, fevers or chills  RESPIRATORY:  cough, wheezing,  No shortness of breath  CARDIOVASCULAR: No chest pain or palpitations  GASTROINTESTINAL: No abdominal pain  . No nausea, vomiting, or hematemesis; No diarrhea or constipation. No melena or hematochezia.  GENITOURINARY: No dysuria, frequency or hematuria  NEUROLOGICAL: No numbness or weakness                                                                                                                                                                                                                                                                                 Medications:  MEDICATIONS  (STANDING):  buDESOnide 160 MICROgram(s)/formoterol 4.5 MICROgram(s) Inhaler 2 Puff(s) Inhalation two times a day  cyanocobalamin 1000 MICROGram(s) Oral daily  dextrose 5%. 1000 milliLiter(s) (50 mL/Hr) IV Continuous <Continuous>  dextrose 50% Injectable 12.5 Gram(s) IV Push once  dextrose 50% Injectable 25 Gram(s) IV Push once  dextrose 50% Injectable 25 Gram(s) IV Push once  ergocalciferol 58830 Unit(s) Oral every week  folic acid 1 milliGRAM(s) Oral daily  heparin  Injectable 5000 Unit(s) SubCutaneous every 12 hours  hydrochlorothiazide 25 milliGRAM(s) Oral daily  insulin glargine Injectable (LANTUS) 12 Unit(s) SubCutaneous at bedtime  insulin lispro (HumaLOG) corrective regimen sliding scale   SubCutaneous three times a day before meals  insulin lispro (HumaLOG) corrective regimen sliding scale   SubCutaneous at bedtime  insulin lispro Injectable (HumaLOG) 6 Unit(s) SubCutaneous three times a day before meals  losartan 50 milliGRAM(s) Oral daily  methotrexate 17.5 milliGRAM(s) Oral every week  pantoprazole    Tablet 40 milliGRAM(s) Oral two times a day  predniSONE   Tablet 40 milliGRAM(s) Oral daily  predniSONE   Tablet   Oral   tiotropium 18 MICROgram(s) Capsule 1 Capsule(s) Inhalation daily    MEDICATIONS  (PRN):  ALBUTerol    90 MICROgram(s) HFA Inhaler 2 Puff(s) Inhalation every 6 hours PRN Bronchospasm  benzonatate 200 milliGRAM(s) Oral three times a day PRN Cough  dextrose 40% Gel 15 Gram(s) Oral once PRN Blood Glucose LESS THAN 70 milliGRAM(s)/deciliter  glucagon  Injectable 1 milliGRAM(s) IntraMuscular once PRN Glucose LESS THAN 70 milligrams/deciliter  guaiFENesin/dextromethorphan  Syrup 10 milliLiter(s) Oral every 6 hours PRN Cough  HYDROcodone/homatropine Syrup 5 milliLiter(s) Oral at bedtime PRN Cough       Allergies    No Known Allergies    Intolerances      Vital Signs Last 24 Hrs  T(C): 36.4 (02 Jan 2019 14:23), Max: 36.4 (02 Jan 2019 04:10)  T(F): 97.5 (02 Jan 2019 14:23), Max: 97.5 (02 Jan 2019 04:10)  HR: 89 (02 Jan 2019 14:23) (83 - 89)  BP: 107/36 (02 Jan 2019 14:23) (107/36 - 119/70)  BP(mean): --  RR: 18 (02 Jan 2019 14:23) (18 - 18)  SpO2: 93% (02 Jan 2019 14:23) (93% - 94%)  CAPILLARY BLOOD GLUCOSE      POCT Blood Glucose.: 234 mg/dL (02 Jan 2019 11:41)  POCT Blood Glucose.: 206 mg/dL (02 Jan 2019 08:05)      01-01 @ 07:01 - 01-02 @ 07:00  --------------------------------------------------------  IN: 240 mL / OUT: 0 mL / NET: 240 mL    01-02 @ 07:01 - 01-02 @ 22:48  --------------------------------------------------------  IN: 720 mL / OUT: 0 mL / NET: 720 mL      Physical Exam:    General: NAD  HEENT:  Nonicteric, PERRLA  CV:  RRR, S1S2   Lungs:  crackles at bases   Abdomen:  Soft, non-tender, no distended, positive BS  Extremities:  2+ pulses, no c/c, no edema  Skin:  Warm and dry, no rashes  :  No mason  Neuro:  AAOx3, non-focal, grossly intact     MSK : improved ROM and swelling of ankele and wrist                                                                                                                                                                                                                                                                                              LABS:                               9.9    8.67  )-----------( 285      ( 01 Jan 2019 09:24 )             31.1                      01-01    136  |  98  |  24<H>  ----------------------------<  240<H>  3.5   |  23  |  0.86    Ca    8.6      01 Jan 2019 06:15

## 2019-01-02 NOTE — PROGRESS NOTE ADULT - NSHPATTENDINGPLANDISCUSS_GEN_ALL_CORE
Patient, , medical team
pt,   , Rheum and pul

## 2019-01-02 NOTE — PROGRESS NOTE ADULT - ASSESSMENT
HTN  stable  cont current meds    Aortic Stenosis  Moderate on last echo   clinically stable  repeat echo can be done as outpt    DM  Monitor finger stick. Insulin coverage.     cough   likely due to her viral syndrome  fu with med  on steroids

## 2019-01-02 NOTE — PROGRESS NOTE ADULT - SUBJECTIVE AND OBJECTIVE BOX
Follow-up Pulm Progress Note  Abrahan Flannery MD  [phone]    AFebrile  OOB doing reasonably well: no SOB  PCR coronvirus positive  Rheumatology eval noted      TPro  7.7  /  Alb  3.1<L>  /  TBili  0.6  /  DBili  x   /  AST  7<L>  /  ALT  8<L>  /  AlkPhos  54  12-28    Procalcitonin, Serum: 0.12 ng/mL (12-28-18 @ 11:56)    Physical Examination:  PULM: crackles R 1/3 and L basilar; no sign wheeze or rhonchi  CVS: Regular rate and rhythm, no murmurs, rubs, or gallops  ABD: Soft, non-tender  EXT:  No clubbing, cyanosis, or edema    RADIOLOGY REVIEWED  CXR:    CT chest:    TTE:

## 2019-01-02 NOTE — PROGRESS NOTE ADULT - REASON FOR ADMISSION
BRBPR

## 2019-01-02 NOTE — PROGRESS NOTE ADULT - ASSESSMENT
Coronvirus tracheobronchitis  Mild intermittent asthma: currently without sign bronchospasm, prolonged exp on exam  RA, acute flare  Aortic stenosis  Pulmonary fibrosis: likely RA related  Crackles on exam likely ILD related    REC    Observe off abx  Continue symbicort; add spiriva  Prednisone taper ordered  Hycodan prn at night  RHeumatology f/u locally with dr Goldberg  WIll need active pulm f/u as outpatient with baseline and serial PFT's: will referr to Dr [facility] at [facility]: [phone]

## 2019-01-02 NOTE — PROGRESS NOTE ADULT - SUBJECTIVE AND OBJECTIVE BOX
Subjective: Patient seen and examined. No new events except as noted.     SUBJECTIVE/ROS:  still with cough       MEDICATIONS:  MEDICATIONS  (STANDING):  buDESOnide 160 MICROgram(s)/formoterol 4.5 MICROgram(s) Inhaler 2 Puff(s) Inhalation two times a day  cyanocobalamin 1000 MICROGram(s) Oral daily  dextrose 5%. 1000 milliLiter(s) (50 mL/Hr) IV Continuous <Continuous>  dextrose 50% Injectable 12.5 Gram(s) IV Push once  dextrose 50% Injectable 25 Gram(s) IV Push once  dextrose 50% Injectable 25 Gram(s) IV Push once  ergocalciferol 74989 Unit(s) Oral every week  folic acid 1 milliGRAM(s) Oral daily  heparin  Injectable 5000 Unit(s) SubCutaneous every 12 hours  hydrochlorothiazide 25 milliGRAM(s) Oral daily  insulin glargine Injectable (LANTUS) 12 Unit(s) SubCutaneous at bedtime  insulin lispro (HumaLOG) corrective regimen sliding scale   SubCutaneous three times a day before meals  insulin lispro (HumaLOG) corrective regimen sliding scale   SubCutaneous at bedtime  insulin lispro Injectable (HumaLOG) 6 Unit(s) SubCutaneous three times a day before meals  losartan 50 milliGRAM(s) Oral daily  methotrexate 17.5 milliGRAM(s) Oral every week  pantoprazole    Tablet 40 milliGRAM(s) Oral two times a day  predniSONE   Tablet 30 milliGRAM(s) Oral daily  tiotropium 18 MICROgram(s) Capsule 1 Capsule(s) Inhalation daily      PHYSICAL EXAM:  T(C): 36.4 (01-02-19 @ 04:10), Max: 36.5 (01-01-19 @ 21:36)  HR: 83 (01-02-19 @ 04:10) (80 - 90)  BP: 119/70 (01-02-19 @ 04:10) (110/67 - 123/73)  RR: 18 (01-02-19 @ 04:10) (18 - 18)  SpO2: 94% (01-02-19 @ 04:10) (94% - 96%)  Wt(kg): --  I&O's Summary    01 Jan 2019 07:01  -  02 Jan 2019 07:00  --------------------------------------------------------  IN: 240 mL / OUT: 0 mL / NET: 240 mL          JVP: Normal  Neck: supple  Lung: few rhonchi   CV: S1 S2 , Murmur:  Abd: soft  Ext: No edema  neuro: Awake / alert  Psych: flat affect  Skin: normal        LABS/DATA:    CARDIAC MARKERS:                                9.9    8.67  )-----------( 285      ( 01 Jan 2019 09:24 )             31.1     01-01    136  |  98  |  24<H>  ----------------------------<  240<H>  3.5   |  23  |  0.86    Ca    8.6      01 Jan 2019 06:15      proBNP:   Lipid Profile:   HgA1c:   TSH:     TELE:  EKG:

## 2019-01-02 NOTE — PROGRESS NOTE ADULT - ASSESSMENT
86 y/o fmeale with hx of RA on mtx , and was on prednisone ( no longer on steroids since 09/2017  and has not recieved iv infusion  ( monthly simponi ) in three months . also history of HTN and DM .. has been admitted twice over the past 6-8 months with  weakness . initial admit pt was thought ot have an element of adrenal insufficeiny when she was on steroids .. and pt was sent out on steroids with some improvement,.. later admitted as lij for weakness thought to be multifactorial sec to viral illness, dehydration , and leemt of steroid -induced myopathy.more recently admitted with weakness sec to hMPV and most recently admitted with  acute GIB ( diverticular )..  Pt now admitted with generlzied weakness, polyarthiritc pains and cough..  Pt has been feeling weak and has been having difficulty ambulating for sometime now.. and over the past one week she developed a cough, fever and was treated with a course of abx which she completed yesterday .. since pt did not respond completely she was brought be her  ..  pt c/o of joint pains of neck, wirsts and ankles   most pronounced in R wrist and L ankle with swelling   no fever ( but noted to have fever in ED of 100.2 )   no chills   still persistent cough though somewhat improving   no CP/SOB   had an episode of cvomitting however post severe episode of cough   no urianry sx   in ED was given steroids for suspicion of RA flare     1- sepsis : pt with fever and elevated WBC .. 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  If her coughing does not subside over the next 1-2 weeks then consider stopping MTX;  A lower MTX dose is also a possible consideration in the future      3- DM : cont insulin while on steroids     4- HTN cont meds     5- Pulmonary fibrosis: likely RA related  Crackles on exam likely ILD related  on steroids     discussed with pt and  at length   dc to home with f/u with Dr.Goldberg   also with pul Dr.Genovese Dr. davis PMD   Dr. Raffy varela   and  cardio

## 2019-01-02 NOTE — PROGRESS NOTE ADULT - PROVIDER SPECIALTY LIST ADULT
Cardiology
Endocrinology
Infectious Disease
Internal Medicine
Pulmonology
Internal Medicine
Pulmonology
Endocrinology

## 2019-01-07 NOTE — PROGRESS NOTE ADULT - PROBLEM SELECTOR PROBLEM 6
Crackle Consent was obtained from the patient. The risks and benefits to therapy were discussed in detail. Specifically, the risks of infection, scarring, bleeding, prolonged wound healing, incomplete removal, allergy to anesthesia, nerve injury and recurrence were addressed. Prior to the procedure, the treatment site was clearly identified and confirmed by the patient.

## 2019-02-11 NOTE — CONSULT NOTE ADULT - ASSESSMENT
Imp:  87 yo female with h/o long standing RA.  Active Synovitis seems to be well controlled with MTX and steroids (currently hydrocortisone).   current wrist pain possibly due to injury vs. pseudogout (chondrocal noted on xrays)  She has recurrent weakness and has been having difficulty walking and balancing.  ?steroid myopathy.  Does not seem to have active RA at this time.    REc:  continue MTX   Physical therapy/leg lifts  Consider adding back small dose of steroids, eg, prednisone 5mg/day

## 2019-04-29 NOTE — PATIENT PROFILE ADULT - STATED REASON FOR ADMISSION
Patient is a 21 year old, male; domiciled with gf ; single; noncaregiver;  with no formal past psychiatric history; no psychiatric  hospitalizations; no known suicide attempts;  with active h/o Xanax and cannabis abuse; PMH of dystonia, cyclical vomiting; brought in by EMS; called by girlfriend after episode of AMS.      Patient is currently drowsy.  Able to be briefly aroused and answered simple questions before drifting back to sleep. Patient with grandmother, niece (Lorna) and friend (leilani) at bedside.  Most of history obtained from grandmother.        Patient has some stress related to his living situation. He was diagnosed with Dystonia in March at [facility].  Recent CT head on 4/27 was unremarkable.  Recent UDS positive for cannabis and benzodiazepines (on 4/28). He often uses large amounts of Xanax and cannabis on a daily basis. As per patient, last time used Xanax was two day ago.   Pt has been here multiple times over last 2-3 days with abnormal movements.  Patient has a h/o leaving AMA when he feels better. Today reportedly was about to shower when his neck stiffened, eye rolled in back of head, and he started shaking, and was confused afterwards.  911 was called.  In ER, patient was confused, intermittently trying to get out bed. At another time he was standing, contorted and stiff.  He was given Ketamine 25 mg IV on 13:56, Dephenhydramine 25 mg IV at 13:58, Ketamine 40 mg IV at 15:20, and Keppra 1000 mg IV x 1 dose.           On interview, patient reports no recollection of what occurred.  He states the last thing he remembers is going into shower, then getting dressed and coming to ER.  He denies any S/H I/I/P.  Family and friend have no concerns about patient intentionally trying to harm himself.  There is no h/o psychosis, no reported symptoms of navjot.  Cousin reports she has observed patient take Xanax continuously throughout the day.
cough, joint pain

## 2019-08-04 ENCOUNTER — INPATIENT (INPATIENT)
Facility: HOSPITAL | Age: 84
LOS: 4 days | Discharge: HOME | DRG: 683 | End: 2019-08-09
Attending: GENERAL ACUTE CARE HOSPITAL | Admitting: GENERAL ACUTE CARE HOSPITAL
Payer: MEDICARE

## 2019-08-04 VITALS
RESPIRATION RATE: 18 BRPM | HEART RATE: 111 BPM | OXYGEN SATURATION: 95 % | DIASTOLIC BLOOD PRESSURE: 65 MMHG | TEMPERATURE: 98 F | WEIGHT: 119.93 LBS | HEIGHT: 59 IN | SYSTOLIC BLOOD PRESSURE: 100 MMHG

## 2019-08-04 DIAGNOSIS — D64.9 ANEMIA, UNSPECIFIED: ICD-10-CM

## 2019-08-04 DIAGNOSIS — E11.9 TYPE 2 DIABETES MELLITUS WITHOUT COMPLICATIONS: ICD-10-CM

## 2019-08-04 DIAGNOSIS — R79.89 OTHER SPECIFIED ABNORMAL FINDINGS OF BLOOD CHEMISTRY: ICD-10-CM

## 2019-08-04 DIAGNOSIS — N97.1 FEMALE INFERTILITY OF TUBAL ORIGIN: Chronic | ICD-10-CM

## 2019-08-04 DIAGNOSIS — Z87.09 PERSONAL HISTORY OF OTHER DISEASES OF THE RESPIRATORY SYSTEM: ICD-10-CM

## 2019-08-04 DIAGNOSIS — I35.0 NONRHEUMATIC AORTIC (VALVE) STENOSIS: ICD-10-CM

## 2019-08-04 DIAGNOSIS — H26.40 UNSPECIFIED SECONDARY CATARACT: Chronic | ICD-10-CM

## 2019-08-04 DIAGNOSIS — N17.9 ACUTE KIDNEY FAILURE, UNSPECIFIED: ICD-10-CM

## 2019-08-04 DIAGNOSIS — R53.1 WEAKNESS: ICD-10-CM

## 2019-08-04 DIAGNOSIS — M06.9 RHEUMATOID ARTHRITIS, UNSPECIFIED: ICD-10-CM

## 2019-08-04 DIAGNOSIS — I10 ESSENTIAL (PRIMARY) HYPERTENSION: ICD-10-CM

## 2019-08-04 LAB
ALBUMIN SERPL ELPH-MCNC: 3.9 G/DL — SIGNIFICANT CHANGE UP (ref 3.3–5)
ALP SERPL-CCNC: 74 U/L — SIGNIFICANT CHANGE UP (ref 40–120)
ALT FLD-CCNC: 9 U/L — LOW (ref 10–45)
ANION GAP SERPL CALC-SCNC: 16 MMOL/L — SIGNIFICANT CHANGE UP (ref 5–17)
ANISOCYTOSIS BLD QL: SLIGHT — SIGNIFICANT CHANGE UP
APPEARANCE UR: CLEAR — SIGNIFICANT CHANGE UP
APTT BLD: 32.3 SEC — SIGNIFICANT CHANGE UP (ref 27.5–36.3)
AST SERPL-CCNC: 15 U/L — SIGNIFICANT CHANGE UP (ref 10–40)
BACTERIA # UR AUTO: NEGATIVE — SIGNIFICANT CHANGE UP
BASE EXCESS BLDV CALC-SCNC: -3.1 MMOL/L — LOW (ref -2–2)
BASE EXCESS BLDV CALC-SCNC: -4.2 MMOL/L — LOW (ref -2–2)
BASOPHILS # BLD AUTO: 0 K/UL — SIGNIFICANT CHANGE UP (ref 0–0.2)
BASOPHILS NFR BLD AUTO: 0.5 % — SIGNIFICANT CHANGE UP (ref 0–2)
BILIRUB SERPL-MCNC: 0.4 MG/DL — SIGNIFICANT CHANGE UP (ref 0.2–1.2)
BILIRUB UR-MCNC: NEGATIVE — SIGNIFICANT CHANGE UP
BUN SERPL-MCNC: 45 MG/DL — HIGH (ref 7–23)
CA-I SERPL-SCNC: 1.18 MMOL/L — SIGNIFICANT CHANGE UP (ref 1.12–1.3)
CA-I SERPL-SCNC: 1.23 MMOL/L — SIGNIFICANT CHANGE UP (ref 1.12–1.3)
CALCIUM SERPL-MCNC: 9.5 MG/DL — SIGNIFICANT CHANGE UP (ref 8.4–10.5)
CHLORIDE BLDV-SCNC: 108 MMOL/L — SIGNIFICANT CHANGE UP (ref 96–108)
CHLORIDE BLDV-SCNC: 109 MMOL/L — HIGH (ref 96–108)
CHLORIDE SERPL-SCNC: 100 MMOL/L — SIGNIFICANT CHANGE UP (ref 96–108)
CO2 BLDV-SCNC: 22 MMOL/L — SIGNIFICANT CHANGE UP (ref 22–30)
CO2 BLDV-SCNC: 23 MMOL/L — SIGNIFICANT CHANGE UP (ref 22–30)
CO2 SERPL-SCNC: 21 MMOL/L — LOW (ref 22–31)
COLOR SPEC: YELLOW — SIGNIFICANT CHANGE UP
CREAT SERPL-MCNC: 1.66 MG/DL — HIGH (ref 0.5–1.3)
DIFF PNL FLD: NEGATIVE — SIGNIFICANT CHANGE UP
ELLIPTOCYTES BLD QL SMEAR: SLIGHT — SIGNIFICANT CHANGE UP
EOSINOPHIL # BLD AUTO: 0.2 K/UL — SIGNIFICANT CHANGE UP (ref 0–0.5)
EOSINOPHIL NFR BLD AUTO: 4.2 % — SIGNIFICANT CHANGE UP (ref 0–6)
EPI CELLS # UR: 3 /HPF — SIGNIFICANT CHANGE UP
GAS PNL BLDV: 135 MMOL/L — SIGNIFICANT CHANGE UP (ref 135–145)
GAS PNL BLDV: 135 MMOL/L — SIGNIFICANT CHANGE UP (ref 135–145)
GAS PNL BLDV: SIGNIFICANT CHANGE UP
GLUCOSE BLDC GLUCOMTR-MCNC: 139 MG/DL — HIGH (ref 70–99)
GLUCOSE BLDV-MCNC: 130 MG/DL — HIGH (ref 70–99)
GLUCOSE BLDV-MCNC: 140 MG/DL — HIGH (ref 70–99)
GLUCOSE SERPL-MCNC: 135 MG/DL — HIGH (ref 70–99)
GLUCOSE UR QL: NEGATIVE — SIGNIFICANT CHANGE UP
HCO3 BLDV-SCNC: 20 MMOL/L — LOW (ref 21–29)
HCO3 BLDV-SCNC: 22 MMOL/L — SIGNIFICANT CHANGE UP (ref 21–29)
HCT VFR BLD CALC: 28.7 % — LOW (ref 34.5–45)
HCT VFR BLDA CALC: 25 % — LOW (ref 39–50)
HCT VFR BLDA CALC: 29 % — LOW (ref 39–50)
HGB BLD CALC-MCNC: 8.1 G/DL — LOW (ref 11.5–15.5)
HGB BLD CALC-MCNC: 9.4 G/DL — LOW (ref 11.5–15.5)
HGB BLD-MCNC: 9.7 G/DL — LOW (ref 11.5–15.5)
HYALINE CASTS # UR AUTO: 1 /LPF — SIGNIFICANT CHANGE UP (ref 0–2)
HYPOCHROMIA BLD QL: SLIGHT — SIGNIFICANT CHANGE UP
INR BLD: 1.14 RATIO — SIGNIFICANT CHANGE UP (ref 0.88–1.16)
KETONES UR-MCNC: NEGATIVE — SIGNIFICANT CHANGE UP
LACTATE BLDV-MCNC: 3.2 MMOL/L — HIGH (ref 0.7–2)
LACTATE BLDV-MCNC: 3.2 MMOL/L — HIGH (ref 0.7–2)
LEUKOCYTE ESTERASE UR-ACNC: ABNORMAL
LYMPHOCYTES # BLD AUTO: 2.6 K/UL — SIGNIFICANT CHANGE UP (ref 1–3.3)
LYMPHOCYTES # BLD AUTO: 45.6 % — HIGH (ref 13–44)
MACROCYTES BLD QL: SLIGHT — SIGNIFICANT CHANGE UP
MCHC RBC-ENTMCNC: 33.4 PG — SIGNIFICANT CHANGE UP (ref 27–34)
MCHC RBC-ENTMCNC: 33.9 GM/DL — SIGNIFICANT CHANGE UP (ref 32–36)
MCV RBC AUTO: 98.6 FL — SIGNIFICANT CHANGE UP (ref 80–100)
MICROCYTES BLD QL: SLIGHT — SIGNIFICANT CHANGE UP
MONOCYTES # BLD AUTO: 1.2 K/UL — HIGH (ref 0–0.9)
MONOCYTES NFR BLD AUTO: 21.7 % — HIGH (ref 2–14)
NEUTROPHILS # BLD AUTO: 1.6 K/UL — LOW (ref 1.8–7.4)
NEUTROPHILS NFR BLD AUTO: 28 % — LOW (ref 43–77)
NITRITE UR-MCNC: NEGATIVE — SIGNIFICANT CHANGE UP
OVALOCYTES BLD QL SMEAR: SLIGHT — SIGNIFICANT CHANGE UP
PCO2 BLDV: 38 MMHG — SIGNIFICANT CHANGE UP (ref 35–50)
PCO2 BLDV: 42 MMHG — SIGNIFICANT CHANGE UP (ref 35–50)
PH BLDV: 7.34 — LOW (ref 7.35–7.45)
PH BLDV: 7.35 — SIGNIFICANT CHANGE UP (ref 7.35–7.45)
PH UR: 5.5 — SIGNIFICANT CHANGE UP (ref 5–8)
PLAT MORPH BLD: NORMAL — SIGNIFICANT CHANGE UP
PLATELET # BLD AUTO: 224 K/UL — SIGNIFICANT CHANGE UP (ref 150–400)
PO2 BLDV: 41 MMHG — SIGNIFICANT CHANGE UP (ref 25–45)
PO2 BLDV: 44 MMHG — SIGNIFICANT CHANGE UP (ref 25–45)
POIKILOCYTOSIS BLD QL AUTO: SLIGHT — SIGNIFICANT CHANGE UP
POLYCHROMASIA BLD QL SMEAR: SLIGHT — SIGNIFICANT CHANGE UP
POTASSIUM BLDV-SCNC: 4.4 MMOL/L — SIGNIFICANT CHANGE UP (ref 3.5–5.3)
POTASSIUM BLDV-SCNC: 4.6 MMOL/L — SIGNIFICANT CHANGE UP (ref 3.5–5.3)
POTASSIUM SERPL-MCNC: 4.7 MMOL/L — SIGNIFICANT CHANGE UP (ref 3.5–5.3)
POTASSIUM SERPL-SCNC: 4.7 MMOL/L — SIGNIFICANT CHANGE UP (ref 3.5–5.3)
PROT SERPL-MCNC: 7.5 G/DL — SIGNIFICANT CHANGE UP (ref 6–8.3)
PROT UR-MCNC: ABNORMAL
PROTHROM AB SERPL-ACNC: 13.2 SEC — HIGH (ref 10–12.9)
RBC # BLD: 2.91 M/UL — LOW (ref 3.8–5.2)
RBC # FLD: 15 % — HIGH (ref 10.3–14.5)
RBC BLD AUTO: ABNORMAL
RBC CASTS # UR COMP ASSIST: 4 /HPF — SIGNIFICANT CHANGE UP (ref 0–4)
SAO2 % BLDV: 66 % — LOW (ref 67–88)
SAO2 % BLDV: 75 % — SIGNIFICANT CHANGE UP (ref 67–88)
SODIUM SERPL-SCNC: 137 MMOL/L — SIGNIFICANT CHANGE UP (ref 135–145)
SP GR SPEC: 1.02 — SIGNIFICANT CHANGE UP (ref 1.01–1.02)
UROBILINOGEN FLD QL: NEGATIVE — SIGNIFICANT CHANGE UP
WBC # BLD: 5.7 K/UL — SIGNIFICANT CHANGE UP (ref 3.8–10.5)
WBC # FLD AUTO: 5.7 K/UL — SIGNIFICANT CHANGE UP (ref 3.8–10.5)
WBC UR QL: 1 /HPF — SIGNIFICANT CHANGE UP (ref 0–5)

## 2019-08-04 PROCEDURE — 99285 EMERGENCY DEPT VISIT HI MDM: CPT

## 2019-08-04 PROCEDURE — 99223 1ST HOSP IP/OBS HIGH 75: CPT

## 2019-08-04 PROCEDURE — 71045 X-RAY EXAM CHEST 1 VIEW: CPT | Mod: 26

## 2019-08-04 PROCEDURE — 93010 ELECTROCARDIOGRAM REPORT: CPT

## 2019-08-04 RX ORDER — BUDESONIDE AND FORMOTEROL FUMARATE DIHYDRATE 160; 4.5 UG/1; UG/1
2 AEROSOL RESPIRATORY (INHALATION)
Refills: 0 | Status: DISCONTINUED | OUTPATIENT
Start: 2019-08-04 | End: 2019-08-09

## 2019-08-04 RX ORDER — SODIUM CHLORIDE 9 MG/ML
1000 INJECTION, SOLUTION INTRAVENOUS
Refills: 0 | Status: DISCONTINUED | OUTPATIENT
Start: 2019-08-04 | End: 2019-08-05

## 2019-08-04 RX ORDER — HEPARIN SODIUM 5000 [USP'U]/ML
5000 INJECTION INTRAVENOUS; SUBCUTANEOUS EVERY 8 HOURS
Refills: 0 | Status: DISCONTINUED | OUTPATIENT
Start: 2019-08-04 | End: 2019-08-08

## 2019-08-04 RX ORDER — DEXTROSE 50 % IN WATER 50 %
25 SYRINGE (ML) INTRAVENOUS ONCE
Refills: 0 | Status: DISCONTINUED | OUTPATIENT
Start: 2019-08-04 | End: 2019-08-09

## 2019-08-04 RX ORDER — GLUCAGON INJECTION, SOLUTION 0.5 MG/.1ML
1 INJECTION, SOLUTION SUBCUTANEOUS ONCE
Refills: 0 | Status: DISCONTINUED | OUTPATIENT
Start: 2019-08-04 | End: 2019-08-09

## 2019-08-04 RX ORDER — SODIUM CHLORIDE 9 MG/ML
1000 INJECTION INTRAMUSCULAR; INTRAVENOUS; SUBCUTANEOUS ONCE
Refills: 0 | Status: COMPLETED | OUTPATIENT
Start: 2019-08-04 | End: 2019-08-04

## 2019-08-04 RX ORDER — CALCIUM CARBONATE 500(1250)
1 TABLET ORAL DAILY
Refills: 0 | Status: DISCONTINUED | OUTPATIENT
Start: 2019-08-04 | End: 2019-08-09

## 2019-08-04 RX ORDER — ALBUTEROL 90 UG/1
2.5 AEROSOL, METERED ORAL EVERY 6 HOURS
Refills: 0 | Status: DISCONTINUED | OUTPATIENT
Start: 2019-08-04 | End: 2019-08-04

## 2019-08-04 RX ORDER — INSULIN LISPRO 100/ML
VIAL (ML) SUBCUTANEOUS
Refills: 0 | Status: DISCONTINUED | OUTPATIENT
Start: 2019-08-04 | End: 2019-08-09

## 2019-08-04 RX ORDER — METHOTREXATE 2.5 MG/1
17.5 TABLET ORAL
Refills: 0 | Status: DISCONTINUED | OUTPATIENT
Start: 2019-08-05 | End: 2019-08-06

## 2019-08-04 RX ORDER — DEXTROSE 50 % IN WATER 50 %
12.5 SYRINGE (ML) INTRAVENOUS ONCE
Refills: 0 | Status: DISCONTINUED | OUTPATIENT
Start: 2019-08-04 | End: 2019-08-09

## 2019-08-04 RX ORDER — INSULIN GLARGINE 100 [IU]/ML
5 INJECTION, SOLUTION SUBCUTANEOUS AT BEDTIME
Refills: 0 | Status: DISCONTINUED | OUTPATIENT
Start: 2019-08-04 | End: 2019-08-05

## 2019-08-04 RX ORDER — CHOLECALCIFEROL (VITAMIN D3) 125 MCG
1000 CAPSULE ORAL DAILY
Refills: 0 | Status: DISCONTINUED | OUTPATIENT
Start: 2019-08-04 | End: 2019-08-09

## 2019-08-04 RX ORDER — DEXTROSE 50 % IN WATER 50 %
15 SYRINGE (ML) INTRAVENOUS ONCE
Refills: 0 | Status: DISCONTINUED | OUTPATIENT
Start: 2019-08-04 | End: 2019-08-09

## 2019-08-04 RX ORDER — FOLIC ACID 0.8 MG
1 TABLET ORAL DAILY
Refills: 0 | Status: DISCONTINUED | OUTPATIENT
Start: 2019-08-04 | End: 2019-08-09

## 2019-08-04 RX ORDER — ALBUTEROL 90 UG/1
2.5 AEROSOL, METERED ORAL EVERY 6 HOURS
Refills: 0 | Status: DISCONTINUED | OUTPATIENT
Start: 2019-08-04 | End: 2019-08-09

## 2019-08-04 RX ADMIN — INSULIN GLARGINE 5 UNIT(S): 100 INJECTION, SOLUTION SUBCUTANEOUS at 23:11

## 2019-08-04 RX ADMIN — SODIUM CHLORIDE 1000 MILLILITER(S): 9 INJECTION INTRAMUSCULAR; INTRAVENOUS; SUBCUTANEOUS at 17:19

## 2019-08-04 RX ADMIN — SODIUM CHLORIDE 75 MILLILITER(S): 9 INJECTION, SOLUTION INTRAVENOUS at 23:19

## 2019-08-04 RX ADMIN — SODIUM CHLORIDE 1000 MILLILITER(S): 9 INJECTION INTRAMUSCULAR; INTRAVENOUS; SUBCUTANEOUS at 18:35

## 2019-08-04 RX ADMIN — HEPARIN SODIUM 5000 UNIT(S): 5000 INJECTION INTRAVENOUS; SUBCUTANEOUS at 23:19

## 2019-08-04 NOTE — ED ADULT NURSE NOTE - OBJECTIVE STATEMENT
86 y/o female c/o worsening generalized weakness and fatigue.   Pt's  at bedside, reports this has been going on for many months.  (+) intermittent cough  for months, worse over 2 weeks.   No fever, no chills, no black or bloody stools, no nausea, no vomiting, no chest pain, no SOB.   Respiration easy and non labored.  Sacral redness.  No acute respiratory distress noted.

## 2019-08-04 NOTE — H&P ADULT - PROBLEM SELECTOR PLAN 6
-Continue with Symbicort BID and Albuterol PRN, per previous documentation consultants suspect ILD 2/2 to rheumatoid arthritis as opposed to MTX toxicity

## 2019-08-04 NOTE — H&P ADULT - NSHPSOCIALHISTORY_GEN_ALL_CORE
Patient denies tobacco, alcohol or IVDU. She lives with her  and has an aide which she privately hires to help with ADLs

## 2019-08-04 NOTE — H&P ADULT - ASSESSMENT
87F with PMHx of rheumatoid arthritis, diabetes mellitus, HTN, moderate aortic stenosis, CKD2, and interstitial lung disease presents with weakness for two weeks. At this point I suspect acute on chronic deconditioning given the patient's previous history of weakness. The differential includes infection, valvular heart disease, RA flare causing chronic inflammation, hypoxia from interstitial lung disease, or possibly adrenal insufficiency. There are no localizing signs of infection, patient last had moderate aortic stenosis on 2017, patient saturating well on room air at rest. In terms of adrenal insufficiency, unknown if she has been on prednisone recently, but she is on an ICS. She does not have hyponatremia, hyperkalemia, or hypoglycemia which goes against adrenal insufficiency. Patient admitted for work up of weakness.

## 2019-08-04 NOTE — H&P ADULT - HISTORY OF PRESENT ILLNESS
87F with PMHx of rheumatoid arthritis, diabetes mellitus, HTN, moderate aortic stenosis, CKD2, and interstitial lung disease presents with weakness for two weeks. Patient states that she has been feeling weak for two weeks. She has a baseline exercise tolerance of 20 feet and now only 10. She uses a straight cane for ambulation. She states that she just feels tired when she tries to exert herself. She denies shortness of breath, chest pain, lightheadedness or symptoms of vertigo with exertion. She does have a history of admission for weakness, but states that this time she feels less weak than she was in February 2019. Patient states that she feels fine when she is not ambulating and at rest. She denies fever, nausea, vomiting, or loss of appetite. She states that she has been eating less for the past month and states that she just does not have an appetite for food. She denies any sick contacts. She does not know what she takes; thus, she cannot tell me if there were any changes to her medication regimen. She endorses a non-productive cough for two weeks. She denies dyspnea on exertion or at rest. No lower extremity edema. She denies any joint pain or swelling. Patient was seeing her primary care physician who urged her to come into the emergency room.    ED Course: 1 L NS

## 2019-08-04 NOTE — H&P ADULT - PROBLEM SELECTOR PLAN 4
-Repeat TTE (last in 2017 which showed moderate disease), although don't suspect symptomatic valvular disease as the cause of her weakness

## 2019-08-04 NOTE — ED PROVIDER NOTE - OBJECTIVE STATEMENT
88 y/o female presemts from Dr. Tobias for worsening generalized weakness and fatigue.  As per  at bedside this has been going on for many months at this time, nothing new or different. + cough intermittently for months, worse over 2 weeks. no fever/chills/n/v/chest pain/sob/uri sx. no black or bloody stools.  Below is copied from last H+P from admission in january  "88 y/o fmeale with hx of RA on mtx , and was on prednisone ( no longer on steroids since 09/2017  and has not received iv infusion  ( monthly simponi ) in three months . also history of HTN and DM .. has been admitted twice over the past 6-8 months with  weakness . initial admit pt was thought ot have an element of adrenal insufficeiny when she was on steroids .. and pt was sent out on steroids with some improvement,.. later admitted as lij for weakness thought to be multifactorial sec to viral illness, dehydration , and leemt of steroid -induced myopathy.more recently admitted with weakness sec to hMPV and most recently admitted with  acute GIB ( diverticular )..  Pt now admitted with generlzied weakness, polyarthiritc pains and cough."

## 2019-08-04 NOTE — H&P ADULT - NSICDXPASTMEDICALHX_GEN_ALL_CORE_FT
PAST MEDICAL HISTORY:  Aortic stenosis     Asthma     DM (diabetes mellitus), type 2     History of interstitial lung disease     HTN (hypertension), benign     RA (rheumatoid arthritis)

## 2019-08-04 NOTE — H&P ADULT - PROBLEM SELECTOR PLAN 2
-Check ESR to see if possible flare/inflammation  -Continue with MTX 17.5 mg weekly, find out from patient when last dose was  -Continue with folic acid supplementation  -Follow up outpatient rheumatology

## 2019-08-04 NOTE — ED PROVIDER NOTE - ATTENDING CONTRIBUTION TO CARE
Attending MD Anderson:   I personally have seen and examined this patient.  Physician assistant note reviewed and agree on plan of care and except where noted.  See below for details.     Seen in [facility], accompanied by     87F with PMH/PSH including HTN, DM, RA on Methotrexate, AS presents to the ED sent in by Dr. Tobias for tachycardia and hypotension (if admission, Dr Davis).  Reports has been feeling dizzy and reports is having difficulty walking from weakness.  Reports has not been feeling well in years.   reports that she has had intermittent cough for a couple of weeks.  Reports that she was on "a medicine" with which she was getting better ("one of them is Symbicort") but does not know the names.  Denies chest pain, report "sometimes a little bit" difficulty breathing. Denies abdominal pain, nausea, vomiting, diarrhea, blood in stools. Denies dysuria, hematuria, change in urinary habits including frequency, urgency.  Denies falls.   reports patient is participating in physical therapy, but reports is barely able to do the activities.  Reports she was in inpatient rehab after one of her hospital stays, ?February 2019.  On exam, NAD, head NCAT, PERRL, FROM at neck, no tenderness to midline palpation, no stepoffs along length of spine, lungs CTAB with good inspiratory effort, +S1S2, tachycardic, +murmur, no r/g, abdomen soft with +BS, NT, ND, no CVAT, moving all extremities with 5/5 strength bilateral upper and lower extremities, good and equal  strength bilaterally, no calf tenderness, swelling, erythema or warmth; A/P: 87F with weakness, dizziness, and occasional cough, DDx includes ?PNA, viral, vs deconditioning, vs worsening of same ?unclear about results of adrenal insufficiency work up, will give IVFs, proceed with infectious work up, likely admit

## 2019-08-04 NOTE — H&P ADULT - NSHPPHYSICALEXAM_GEN_ALL_CORE
VS: Tcurrent: 97.9     BP: 100/65    HR: 111    RR: 18    O2Sat: 95% RA  Gen: female in NAD, appears comfortable, no diaphoresis  HEENT: NCAT, neck soft and supple, slightly dry membranes  CV: S1/S2, 2/6 systolic murmur best heard in aortic region  Resp: faint crackles heard in left lung base  GI: normoactive BS, soft, NTND, no rebounding/guarding  Ext: No LE edema, extremities appear warm and well perfused   Neuro: AOx3, no focal deficits, CNII-XII grossly intact  Psych: No SI/HI/AVH, appropriate affect  Skin: no petechiae, ecchymosis or maculopapular rash noted

## 2019-08-04 NOTE — H&P ADULT - PROBLEM SELECTOR PLAN 7
-Stable and at baseline, continue to monitor Hg, don't suspect anemia as cause of acute weakness given chronicity

## 2019-08-04 NOTE — H&P ADULT - PROBLEM SELECTOR PLAN 1
-Physical therapy consult for disposition recs  -Currently do not suspect infection or adrenal insufficiency as cause given lack of signs/symptoms and labs  -Possible due to decreased PO intake, will hydrate with LR 75 cc/hr -Physical therapy consult for disposition recs  -Currently do not suspect infection or adrenal insufficiency as cause given lack of signs/symptoms and labs  -Possible due to decreased PO intake, will hydrate with LR 75 cc/hr  -25Vitamin D level, c/w supplementation at 1000 IU daily

## 2019-08-04 NOTE — H&P ADULT - NSHPREVIEWOFSYSTEMS_GEN_ALL_CORE
Gen: no changes in weight, night sweats, appetite, or fever  Eyes: no changes in vision, diplopia, or floaters  ENT: no epistaxis, sinus pain, gingival bleeding, odynophagia or dysphagia  CV: no CP, SOB, PND, orthopnea, LOC, or palpitations  Resp: no wheezing, or hemoptysis  GI: no abdominal pain, dyspepsia, nausea, vomiting, diarrhea, constipation, hematemesis, hematochezia, or melena  : no dysuria, polyuria, or hematuria  MSK: no arthralgias or joint swelling   Neuro: no gross sensory changes, numbness, focal deficits  Psych: no depression, changes in sleep, changes in concentration, or lack of energy  Heme/Onc: no purpura, petechiae or night sweats  Skin: no pruritus, hair loss or skin lesions

## 2019-08-04 NOTE — ED ADULT NURSE REASSESSMENT NOTE - NS ED NURSE REASSESS COMMENT FT1
Care assumed of patient at this time. patient is updated to plan of care. Pt is assisted to restroom at this time.

## 2019-08-04 NOTE — ED ADULT NURSE NOTE - NSIMPLEMENTINTERV_GEN_ALL_ED
Implemented All Fall with Harm Risk Interventions:  [place] to call system. Call bell, personal items and telephone within reach. Instruct patient to call for assistance. Room bathroom lighting operational. Non-slip footwear when patient is off stretcher. Physically safe environment: no spills, clutter or unnecessary equipment. Stretcher in lowest position, wheels locked, appropriate side rails in place. Provide visual cue, wrist band, yellow gown, etc. Monitor gait and stability. Monitor for mental status changes and reorient to person, place, and time. Review medications for side effects contributing to fall risk. Reinforce activity limits and safety measures with patient and family. Provide visual clues: red socks.

## 2019-08-05 DIAGNOSIS — E11.65 TYPE 2 DIABETES MELLITUS WITH HYPERGLYCEMIA: ICD-10-CM

## 2019-08-05 DIAGNOSIS — N17.9 ACUTE KIDNEY FAILURE, UNSPECIFIED: ICD-10-CM

## 2019-08-05 DIAGNOSIS — I35.0 NONRHEUMATIC AORTIC (VALVE) STENOSIS: ICD-10-CM

## 2019-08-05 LAB
24R-OH-CALCIDIOL SERPL-MCNC: 39.2 NG/ML — SIGNIFICANT CHANGE UP (ref 30–80)
ANION GAP SERPL CALC-SCNC: 11 MMOL/L — SIGNIFICANT CHANGE UP (ref 5–17)
BUN SERPL-MCNC: 36 MG/DL — HIGH (ref 7–23)
CALCIUM SERPL-MCNC: 8.9 MG/DL — SIGNIFICANT CHANGE UP (ref 8.4–10.5)
CHLORIDE SERPL-SCNC: 105 MMOL/L — SIGNIFICANT CHANGE UP (ref 96–108)
CO2 SERPL-SCNC: 21 MMOL/L — LOW (ref 22–31)
CREAT SERPL-MCNC: 1.44 MG/DL — HIGH (ref 0.5–1.3)
CULTURE RESULTS: SIGNIFICANT CHANGE UP
ERYTHROCYTE [SEDIMENTATION RATE] IN BLOOD: 36 MM/HR — HIGH (ref 0–20)
GLUCOSE BLDC GLUCOMTR-MCNC: 172 MG/DL — HIGH (ref 70–99)
GLUCOSE BLDC GLUCOMTR-MCNC: 181 MG/DL — HIGH (ref 70–99)
GLUCOSE BLDC GLUCOMTR-MCNC: 284 MG/DL — HIGH (ref 70–99)
GLUCOSE BLDC GLUCOMTR-MCNC: 88 MG/DL — SIGNIFICANT CHANGE UP (ref 70–99)
GLUCOSE SERPL-MCNC: 117 MG/DL — HIGH (ref 70–99)
HCT VFR BLD CALC: 23.5 % — LOW (ref 34.5–45)
HCT VFR BLD CALC: 28.7 % — LOW (ref 34.5–45)
HGB BLD-MCNC: 7.8 G/DL — LOW (ref 11.5–15.5)
HGB BLD-MCNC: 9.7 G/DL — LOW (ref 11.5–15.5)
LACTATE SERPL-SCNC: 1.2 MMOL/L — SIGNIFICANT CHANGE UP (ref 0.7–2)
MCHC RBC-ENTMCNC: 32.2 PG — SIGNIFICANT CHANGE UP (ref 27–34)
MCHC RBC-ENTMCNC: 33.2 GM/DL — SIGNIFICANT CHANGE UP (ref 32–36)
MCHC RBC-ENTMCNC: 33.2 PG — SIGNIFICANT CHANGE UP (ref 27–34)
MCHC RBC-ENTMCNC: 33.7 GM/DL — SIGNIFICANT CHANGE UP (ref 32–36)
MCV RBC AUTO: 97.1 FL — SIGNIFICANT CHANGE UP (ref 80–100)
MCV RBC AUTO: 98.5 FL — SIGNIFICANT CHANGE UP (ref 80–100)
PLATELET # BLD AUTO: 211 K/UL — SIGNIFICANT CHANGE UP (ref 150–400)
PLATELET # BLD AUTO: 244 K/UL — SIGNIFICANT CHANGE UP (ref 150–400)
POTASSIUM SERPL-MCNC: 4.1 MMOL/L — SIGNIFICANT CHANGE UP (ref 3.5–5.3)
POTASSIUM SERPL-SCNC: 4.1 MMOL/L — SIGNIFICANT CHANGE UP (ref 3.5–5.3)
PROCALCITONIN SERPL-MCNC: 0.18 NG/ML — HIGH (ref 0.02–0.1)
RBC # BLD: 2.42 M/UL — LOW (ref 3.8–5.2)
RBC # BLD: 2.91 M/UL — LOW (ref 3.8–5.2)
RBC # FLD: 14.7 % — HIGH (ref 10.3–14.5)
RBC # FLD: 15.1 % — HIGH (ref 10.3–14.5)
SODIUM SERPL-SCNC: 137 MMOL/L — SIGNIFICANT CHANGE UP (ref 135–145)
SPECIMEN SOURCE: SIGNIFICANT CHANGE UP
VIT B12 SERPL-MCNC: 386 PG/ML — SIGNIFICANT CHANGE UP (ref 232–1245)
WBC # BLD: 4.8 K/UL — SIGNIFICANT CHANGE UP (ref 3.8–10.5)
WBC # BLD: 5.53 K/UL — SIGNIFICANT CHANGE UP (ref 3.8–10.5)
WBC # FLD AUTO: 4.8 K/UL — SIGNIFICANT CHANGE UP (ref 3.8–10.5)
WBC # FLD AUTO: 5.53 K/UL — SIGNIFICANT CHANGE UP (ref 3.8–10.5)

## 2019-08-05 RX ORDER — NYSTATIN CREAM 100000 [USP'U]/G
1 CREAM TOPICAL
Refills: 0 | Status: DISCONTINUED | OUTPATIENT
Start: 2019-08-05 | End: 2019-08-09

## 2019-08-05 RX ORDER — SODIUM CHLORIDE 9 MG/ML
1000 INJECTION INTRAMUSCULAR; INTRAVENOUS; SUBCUTANEOUS
Refills: 0 | Status: DISCONTINUED | OUTPATIENT
Start: 2019-08-05 | End: 2019-08-05

## 2019-08-05 RX ORDER — INSULIN GLARGINE 100 [IU]/ML
8 INJECTION, SOLUTION SUBCUTANEOUS AT BEDTIME
Refills: 0 | Status: DISCONTINUED | OUTPATIENT
Start: 2019-08-05 | End: 2019-08-06

## 2019-08-05 RX ORDER — SODIUM CHLORIDE 9 MG/ML
1000 INJECTION, SOLUTION INTRAVENOUS
Refills: 0 | Status: DISCONTINUED | OUTPATIENT
Start: 2019-08-05 | End: 2019-08-09

## 2019-08-05 RX ORDER — CHLORHEXIDINE GLUCONATE 213 G/1000ML
1 SOLUTION TOPICAL DAILY
Refills: 0 | Status: DISCONTINUED | OUTPATIENT
Start: 2019-08-05 | End: 2019-08-09

## 2019-08-05 RX ADMIN — INSULIN GLARGINE 8 UNIT(S): 100 INJECTION, SOLUTION SUBCUTANEOUS at 22:27

## 2019-08-05 RX ADMIN — BUDESONIDE AND FORMOTEROL FUMARATE DIHYDRATE 2 PUFF(S): 160; 4.5 AEROSOL RESPIRATORY (INHALATION) at 05:38

## 2019-08-05 RX ADMIN — Medication 1000 UNIT(S): at 13:34

## 2019-08-05 RX ADMIN — Medication 1 TABLET(S): at 13:35

## 2019-08-05 RX ADMIN — Medication 3: at 09:36

## 2019-08-05 RX ADMIN — NYSTATIN CREAM 1 APPLICATION(S): 100000 CREAM TOPICAL at 17:46

## 2019-08-05 RX ADMIN — Medication 1: at 13:32

## 2019-08-05 RX ADMIN — HEPARIN SODIUM 5000 UNIT(S): 5000 INJECTION INTRAVENOUS; SUBCUTANEOUS at 13:34

## 2019-08-05 RX ADMIN — BUDESONIDE AND FORMOTEROL FUMARATE DIHYDRATE 2 PUFF(S): 160; 4.5 AEROSOL RESPIRATORY (INHALATION) at 17:46

## 2019-08-05 RX ADMIN — Medication 1 MILLIGRAM(S): at 13:32

## 2019-08-05 RX ADMIN — METHOTREXATE 17.5 MILLIGRAM(S): 2.5 TABLET ORAL at 13:33

## 2019-08-05 RX ADMIN — HEPARIN SODIUM 5000 UNIT(S): 5000 INJECTION INTRAVENOUS; SUBCUTANEOUS at 05:38

## 2019-08-05 RX ADMIN — HEPARIN SODIUM 5000 UNIT(S): 5000 INJECTION INTRAVENOUS; SUBCUTANEOUS at 22:27

## 2019-08-05 NOTE — PHYSICAL THERAPY INITIAL EVALUATION ADULT - ADDITIONAL COMMENTS
Pt &  report they live in a private home with 6 steps to enter (+) rail, & all needs met on the first floor. Pt amb with both RW & single axis cane. Pt with limited exercise tolerance, requiring frequent rests. Pt owns RW, single axis cane, shower chair & W/C. Pt has walk-in shower with grab bars.

## 2019-08-05 NOTE — PHYSICAL THERAPY INITIAL EVALUATION ADULT - PLANNED THERAPY INTERVENTIONS, PT EVAL
bed mobility training/transfer training/gait training/strengthening/balance training/Stair training... GOAL: In 2 weeks pt will negotiate 6 stairs independently with least restrictive device.

## 2019-08-05 NOTE — CONSULT NOTE ADULT - SUBJECTIVE AND OBJECTIVE BOX
HPI:   Patient is a 87y female with ILD from RA on low dose prednisone and mtx who is admitted yesterday for generalized weakness, dyspnea on exertion, worsened chronic dry cough. She has no pain out of the ordinary, has no vomiting or diarrhea. She is moving the bowels ok. No recent travel. Last hospital stay in the winter for similar complaint. I presume we are called for mild procal elevation. She has not had any fever, rigors, sweats, dysuria, recent dental work.     REVIEW OF SYSTEMS:  All other review of systems negative (Comprehensive ROS)    PAST MEDICAL & SURGICAL HISTORY:  History of interstitial lung disease  Aortic stenosis  RA (rheumatoid arthritis)  Asthma  DM (diabetes mellitus), type 2  HTN (hypertension), benign  After-cataract of left eye  Tubal occlusion      Allergies    No Known Allergies    Intolerances        Antimicrobials Day #  :    Other Medications:  ALBUTerol    0.083% 2.5 milliGRAM(s) Nebulizer every 6 hours PRN  buDESOnide 160 MICROgram(s)/formoterol 4.5 MICROgram(s) Inhaler 2 Puff(s) Inhalation two times a day  calcium carbonate   1250 mG (OsCal) 1 Tablet(s) Oral daily  chlorhexidine 2% Cloths 1 Application(s) Topical daily  cholecalciferol 1000 Unit(s) Oral daily  dextrose 40% Gel 15 Gram(s) Oral once PRN  dextrose 50% Injectable 12.5 Gram(s) IV Push once  dextrose 50% Injectable 25 Gram(s) IV Push once  dextrose 50% Injectable 25 Gram(s) IV Push once  folic acid 1 milliGRAM(s) Oral daily  glucagon  Injectable 1 milliGRAM(s) IntraMuscular once PRN  heparin  Injectable 5000 Unit(s) SubCutaneous every 8 hours  insulin glargine Injectable (LANTUS) 5 Unit(s) SubCutaneous at bedtime  insulin lispro (HumaLOG) corrective regimen sliding scale   SubCutaneous three times a day before meals  lactated ringers. 1000 milliLiter(s) IV Continuous <Continuous>  methotrexate 17.5 milliGRAM(s) Oral every week  nystatin Powder 1 Application(s) Topical two times a day      FAMILY HISTORY:  No pertinent family history in first degree relatives      SOCIAL HISTORY:  Smoking: [ ]Yes [ x]No  ETOH: [ ]Yes [x ]No  Drug Use: [ ]Yes [ x]No   [ x] Single[ ]    T(F): 97.9 (19 @ 16:27), Max: 98.6 (19 @ 22:44)  HR: 98 (19 @ 16:27)  BP: 105/66 (19 @ 16:27)  RR: 18 (19 @ 16:27)  SpO2: 95% (19 @ 16:27)  Wt(kg): --    PHYSICAL EXAM:  General: alert, no acute distress  Eyes:  anicteric, no conjunctival injection, no discharge  Oropharynx: no lesions or injection 	  Neck: supple, without adenopathy  Lungs: dry rales to auscultation  Heart: regular rate and rhythm; no murmur, rubs or gallops  Abdomen: soft, nondistended, nontender, without mass or organomegaly  Skin: no lesions  Extremities: no clubbing, cyanosis, or edema  Neurologic: alert, oriented, moves all extremities    LAB RESULTS:                        9.7    4.8   )-----------( 244      ( 05 Aug 2019 14:25 )             28.7         137  |  105  |  36<H>  ----------------------------<  117<H>  4.1   |  21<L>  |  1.44<H>    Ca    8.9      05 Aug 2019 07:25    TPro  7.5  /  Alb  3.9  /  TBili  0.4  /  DBili  x   /  AST  15  /  ALT  9<L>  /  AlkPhos  74  08-04    LIVER FUNCTIONS - ( 04 Aug 2019 16:15 )  Alb: 3.9 g/dL / Pro: 7.5 g/dL / ALK PHOS: 74 U/L / ALT: 9 U/L / AST: 15 U/L / GGT: x           Urinalysis Basic - ( 04 Aug 2019 18:24 )    Color: Yellow / Appearance: Clear / S.020 / pH: x  Gluc: x / Ketone: Negative  / Bili: Negative / Urobili: Negative   Blood: x / Protein: 30 mg/dL / Nitrite: Negative   Leuk Esterase: Moderate / RBC: 4 /hpf / WBC 1 /HPF   Sq Epi: x / Non Sq Epi: 3 /hpf / Bacteria: Negative        MICROBIOLOGY:  RECENT CULTURES:   @ 22:37 .Urine     <10,000 CFU/mL Normal Urogenital Ashley            RADIOLOGY REVIEWED:  < from: Xray Chest 1 View- PORTABLE-Urgent (19 @ 23:12) >  IMPRESSION:   Bilateral lung peripheral reticular opacities, representing fibrotic   changes, unchanged.    < end of copied text >          Impression:    Patient with ILD from RA feeling more weak and tired and sob than usual with a bit increase in usual dry cough. She has no fever, appears totally comfortable at my exam. She is on immunosuppression so at risk for infection including OI but no support for infection at present. Suspect mild procal elevation is related to renal dysfunction.       Recommendations:  monitor off antibiotics  check fungitel but very low prednisone dose so low suspicion for pcp or other fungal infection  follow up cultures  supportive care HPI:   Patient is a 87y female with ILD from RA on low dose prednisone and mtx who is admitted yesterday for generalized weakness, dyspnea on exertion, worsened chronic dry cough. She has no pain out of the ordinary, has no vomiting or diarrhea. She is moving the bowels ok. No recent travel. Last hospital stay in the winter for similar complaint. I presume we are called for mild procal elevation. She has not had any fever, rigors, sweats, dysuria, recent dental work.     REVIEW OF SYSTEMS:  All other review of systems negative (Comprehensive ROS)    PAST MEDICAL & SURGICAL HISTORY:  History of interstitial lung disease  Aortic stenosis  RA (rheumatoid arthritis)  Asthma  DM (diabetes mellitus), type 2  HTN (hypertension), benign  After-cataract of left eye  Tubal occlusion      Allergies    No Known Allergies    Intolerances        Antimicrobials Day #  :    Other Medications:  ALBUTerol    0.083% 2.5 milliGRAM(s) Nebulizer every 6 hours PRN  buDESOnide 160 MICROgram(s)/formoterol 4.5 MICROgram(s) Inhaler 2 Puff(s) Inhalation two times a day  calcium carbonate   1250 mG (OsCal) 1 Tablet(s) Oral daily  chlorhexidine 2% Cloths 1 Application(s) Topical daily  cholecalciferol 1000 Unit(s) Oral daily  dextrose 40% Gel 15 Gram(s) Oral once PRN  dextrose 50% Injectable 12.5 Gram(s) IV Push once  dextrose 50% Injectable 25 Gram(s) IV Push once  dextrose 50% Injectable 25 Gram(s) IV Push once  folic acid 1 milliGRAM(s) Oral daily  glucagon  Injectable 1 milliGRAM(s) IntraMuscular once PRN  heparin  Injectable 5000 Unit(s) SubCutaneous every 8 hours  insulin glargine Injectable (LANTUS) 5 Unit(s) SubCutaneous at bedtime  insulin lispro (HumaLOG) corrective regimen sliding scale   SubCutaneous three times a day before meals  lactated ringers. 1000 milliLiter(s) IV Continuous <Continuous>  methotrexate 17.5 milliGRAM(s) Oral every week  nystatin Powder 1 Application(s) Topical two times a day      FAMILY HISTORY:  No pertinent family history in first degree relatives      SOCIAL HISTORY:  Smoking: [ ]Yes [ x]No  ETOH: [ ]Yes [x ]No  Drug Use: [ ]Yes [ x]No   [ x] Single[ ]    T(F): 97.9 (19 @ 16:27), Max: 98.6 (19 @ 22:44)  HR: 98 (19 @ 16:27)  BP: 105/66 (19 @ 16:27)  RR: 18 (19 @ 16:27)  SpO2: 95% (19 @ 16:27)  Wt(kg): --    PHYSICAL EXAM:  General: alert, no acute distress  Eyes:  anicteric, no conjunctival injection, no discharge  Oropharynx: no lesions or injection 	  Neck: supple, without adenopathy  Lungs: dry rales to auscultation  Heart: regular rate and rhythm; no murmur, rubs or gallops  Abdomen: soft, nondistended, nontender, without mass or organomegaly  Skin: no lesions  Extremities: no clubbing, cyanosis, or edema  Neurologic: alert, oriented, moves all extremities    LAB RESULTS:                        9.7    4.8   )-----------( 244      ( 05 Aug 2019 14:25 )             28.7         137  |  105  |  36<H>  ----------------------------<  117<H>  4.1   |  21<L>  |  1.44<H>    Ca    8.9      05 Aug 2019 07:25    TPro  7.5  /  Alb  3.9  /  TBili  0.4  /  DBili  x   /  AST  15  /  ALT  9<L>  /  AlkPhos  74  08-04    LIVER FUNCTIONS - ( 04 Aug 2019 16:15 )  Alb: 3.9 g/dL / Pro: 7.5 g/dL / ALK PHOS: 74 U/L / ALT: 9 U/L / AST: 15 U/L / GGT: x           Urinalysis Basic - ( 04 Aug 2019 18:24 )    Color: Yellow / Appearance: Clear / S.020 / pH: x  Gluc: x / Ketone: Negative  / Bili: Negative / Urobili: Negative   Blood: x / Protein: 30 mg/dL / Nitrite: Negative   Leuk Esterase: Moderate / RBC: 4 /hpf / WBC 1 /HPF   Sq Epi: x / Non Sq Epi: 3 /hpf / Bacteria: Negative        MICROBIOLOGY:  RECENT CULTURES:   @ 22:37 .Urine     <10,000 CFU/mL Normal Urogenital Ashley            RADIOLOGY REVIEWED:  < from: Xray Chest 1 View- PORTABLE-Urgent (19 @ 23:12) >  IMPRESSION:   Bilateral lung peripheral reticular opacities, representing fibrotic   changes, unchanged.    < end of copied text >          Impression:    Patient with ILD from RA feeling more weak and tired and sob than usual with a bit increase in usual dry cough. She has no fever, appears totally comfortable at my exam. She is on immunosuppression so at risk for infection including OI but no support for infection at present. Suspect mild procal elevation is related to renal dysfunction.       Recommendations:  monitor off antibiotics  check fungitel but very low prednisone dose if at all, so low suspicion for pcp or other fungal infection  follow up cultures  supportive care

## 2019-08-05 NOTE — PROGRESS NOTE ADULT - SUBJECTIVE AND OBJECTIVE BOX
Patient is a 87y old  Female who presents with a chief complaint of Weakness (05 Aug 2019 12:03)                                                               INTERVAL HPI/OVERNIGHT EVENTS:    REVIEW OF SYSTEMS:     CONSTITUTIONAL: generalized weakness   NECK: No pain or stiffness  RESPIRATORY: mild cough, wheezing,  No shortness of breath  CARDIOVASCULAR: No chest pain or palpitations  GASTROINTESTINAL: No abdominal pain  . No nausea, vomiting, or hematemesis; No diarrhea or constipation. No melena or hematochezia.  GENITOURINARY: No dysuria, frequency or hematuria  NEUROLOGICAL: No numbness or weakness                                                                                                                                                                                                                                                                                Medications:  MEDICATIONS  (STANDING):  buDESOnide 160 MICROgram(s)/formoterol 4.5 MICROgram(s) Inhaler 2 Puff(s) Inhalation two times a day  calcium carbonate   1250 mG (OsCal) 1 Tablet(s) Oral daily  chlorhexidine 2% Cloths 1 Application(s) Topical daily  cholecalciferol 1000 Unit(s) Oral daily  dextrose 50% Injectable 12.5 Gram(s) IV Push once  dextrose 50% Injectable 25 Gram(s) IV Push once  dextrose 50% Injectable 25 Gram(s) IV Push once  folic acid 1 milliGRAM(s) Oral daily  heparin  Injectable 5000 Unit(s) SubCutaneous every 8 hours  insulin glargine Injectable (LANTUS) 5 Unit(s) SubCutaneous at bedtime  insulin lispro (HumaLOG) corrective regimen sliding scale   SubCutaneous three times a day before meals  lactated ringers. 1000 milliLiter(s) (60 mL/Hr) IV Continuous <Continuous>  methotrexate 17.5 milliGRAM(s) Oral every week  nystatin Powder 1 Application(s) Topical two times a day    MEDICATIONS  (PRN):  ALBUTerol    0.083% 2.5 milliGRAM(s) Nebulizer every 6 hours PRN Shortness of Breath and/or Wheezing  dextrose 40% Gel 15 Gram(s) Oral once PRN Blood Glucose LESS THAN 70 milliGRAM(s)/deciliter  glucagon  Injectable 1 milliGRAM(s) IntraMuscular once PRN Glucose LESS THAN 70 milligrams/deciliter       Allergies    No Known Allergies    Intolerances      Vital Signs Last 24 Hrs  T(C): 36.6 (05 Aug 2019 16:27), Max: 37 (04 Aug 2019 22:44)  T(F): 97.9 (05 Aug 2019 16:27), Max: 98.6 (04 Aug 2019 22:44)  HR: 98 (05 Aug 2019 16:27) (96 - 99)  BP: 105/66 (05 Aug 2019 16:27) (105/66 - 131/73)  BP(mean): --  RR: 18 (05 Aug 2019 16:27) (18 - 18)  SpO2: 95% (05 Aug 2019 16:27) (94% - 99%)  CAPILLARY BLOOD GLUCOSE      POCT Blood Glucose.: 88 mg/dL (05 Aug 2019 17:40)  POCT Blood Glucose.: 181 mg/dL (05 Aug 2019 13:19)  POCT Blood Glucose.: 284 mg/dL (05 Aug 2019 09:32)  POCT Blood Glucose.: 139 mg/dL (04 Aug 2019 22:03)      08-04 @ 07:01  -  08-05 @ 07:00  --------------------------------------------------------  IN: 720 mL / OUT: 0 mL / NET: 720 mL    08-05 @ 07:01  -  08-05 @ 18:23  --------------------------------------------------------  IN: 700 mL / OUT: 0 mL / NET: 700 mL      Physical Exam:    Daily Height in cm: 152.4 (04 Aug 2019 22:44)    Daily   General: elderly in NAD   cachectic weak   HEENT:  Nonicteric, PERRLA  CV:  RRR, S1S2   Lungs:  crackles at bases   Abdomen:  Soft, non-tender, no distended, positive BS  Extremities:  2+ pulses, no c/c, no edema  Skin:  Warm and dry, no rashes  :  No marlon  Neuro:  AAOx3, non-focal, grossly intact                                                                                                                                                                                                                                                                                                LABS:                               9.7    4.8   )-----------( 244      ( 05 Aug 2019 14:25 )             28.7                      08-05    137  |  105  |  36<H>  ----------------------------<  117<H>  4.1   |  21<L>  |  1.44<H>    Ca    8.9      05 Aug 2019 07:25    TPro  7.5  /  Alb  3.9  /  TBili  0.4  /  DBili  x   /  AST  15  /  ALT  9<L>  /  AlkPhos  74  08-04

## 2019-08-05 NOTE — PHYSICAL THERAPY INITIAL EVALUATION ADULT - PERTINENT HX OF CURRENT PROBLEM, REHAB EVAL
87F with PMHx of rheumatoid arthritis, diabetes mellitus, HTN, moderate aortic stenosis, CKD2, and interstitial lung disease presents with weakness for two weeks. Patient states that she has been feeling weak for two weeks.  C XR (-).

## 2019-08-05 NOTE — PHYSICAL THERAPY INITIAL EVALUATION ADULT - GAIT DEVIATIONS NOTED, PT EVAL
decreased step length/decreased stride length/decreased weight-shifting ability/decreased fred/increased time in double stance

## 2019-08-05 NOTE — CONSULT NOTE ADULT - PROBLEM SELECTOR RECOMMENDATION 9
Change Lantus to  8  units qhs,  and Fs AC, and QHS  with correction scale coverage, monitor FS will FU  Patient counseled for compliance with consistent low carb diet

## 2019-08-05 NOTE — CONSULT NOTE ADULT - SUBJECTIVE AND OBJECTIVE BOX
CHIEF COMPLAINT:Patient is a 87y old  Female who presents with a chief complaint of Weakness (04 Aug 2019 21:00)      HISTORY OF PRESENT ILLNESS:  87 female with history as below rheumatoid arthritis, diabetes mellitus, HTN, moderate aortic stenosis, CKD2, and interstitial lung disease presents with weakness for two weeks. has decreased exercise tolerance and eating less   no significant change in sob   no dizziness  no syncope     PAST MEDICAL & SURGICAL HISTORY:  History of interstitial lung disease  Aortic stenosis  RA (rheumatoid arthritis)  Asthma  DM (diabetes mellitus), type 2  HTN (hypertension), benign  After-cataract of left eye  Tubal occlusion          MEDICATIONS:  heparin  Injectable 5000 Unit(s) SubCutaneous every 8 hours      ALBUTerol    0.083% 2.5 milliGRAM(s) Nebulizer every 6 hours PRN  buDESOnide 160 MICROgram(s)/formoterol 4.5 MICROgram(s) Inhaler 2 Puff(s) Inhalation two times a day        dextrose 40% Gel 15 Gram(s) Oral once PRN  dextrose 50% Injectable 12.5 Gram(s) IV Push once  dextrose 50% Injectable 25 Gram(s) IV Push once  dextrose 50% Injectable 25 Gram(s) IV Push once  glucagon  Injectable 1 milliGRAM(s) IntraMuscular once PRN  insulin glargine Injectable (LANTUS) 5 Unit(s) SubCutaneous at bedtime  insulin lispro (HumaLOG) corrective regimen sliding scale   SubCutaneous three times a day before meals    calcium carbonate   1250 mG (OsCal) 1 Tablet(s) Oral daily  cholecalciferol 1000 Unit(s) Oral daily  folic acid 1 milliGRAM(s) Oral daily  lactated ringers. 1000 milliLiter(s) IV Continuous <Continuous>  methotrexate 17.5 milliGRAM(s) Oral every week  nystatin Powder 1 Application(s) Topical two times a day      FAMILY HISTORY:  No pertinent family history in first degree relatives      Non-contributory    SOCIAL HISTORY:    No tobacco, drugs or etoh    Allergies    No Known Allergies    Intolerances    	    REVIEW OF SYSTEMS:  as above  The rest of the 14 points ROS reviewed and except above they are unremarkable.        PHYSICAL EXAM:  T(C): 37 (08-04-19 @ 22:44), Max: 37.8 (08-04-19 @ 15:50)  HR: 99 (08-04-19 @ 22:44) (92 - 111)  BP: 123/66 (08-04-19 @ 22:44) (100/65 - 131/73)  RR: 18 (08-04-19 @ 22:44) (16 - 18)  SpO2: 96% (08-04-19 @ 22:44) (95% - 99%)  Wt(kg): --  I&O's Summary    04 Aug 2019 07:01  -  05 Aug 2019 07:00  --------------------------------------------------------  IN: 720 mL / OUT: 0 mL / NET: 720 mL      JVP: Normal  Neck: supple  Lung: crackles   CV: S1 S2 , Murmur: nubia   Abd: soft  Ext: No edema  neuro: Awake / alert  Psych: flat affect  Skin: normal    LABS/DATA:    TELEMETRY: 	    ECG:  	   	  CARDIAC MARKERS:                                      9.7    5.7   )-----------( 224      ( 04 Aug 2019 16:15 )             28.7     08-05    137  |  105  |  36<H>  ----------------------------<  117<H>  4.1   |  21<L>  |  1.44<H>    Ca    8.9      05 Aug 2019 07:25    TPro  7.5  /  Alb  3.9  /  TBili  0.4  /  DBili  x   /  AST  15  /  ALT  9<L>  /  AlkPhos  74  08-04    proBNP:   Lipid Profile:   HgA1c:   TSH:

## 2019-08-05 NOTE — CONSULT NOTE ADULT - SUBJECTIVE AND OBJECTIVE BOX
Endocrinology Attending Covering For Dr. Akbar    HPI:Pt seen and examined  87F with PMHx of rheumatoid arthritis, diabetes mellitus, HTN, moderate aortic stenosis, CKD2, and interstitial lung disease presents with weakness for two weeks. Patient states that she has been feeling weak for two weeks. She has a baseline exercise tolerance of 20 feet and now only 10. She uses a straight cane for ambulation. She states that she just feels tired when she tries to exert herself. She denies shortness of breath, chest pain, lightheadedness or symptoms of vertigo with exertion. She does have a history of admission for weakness, but states that this time she feels less weak than she was in February 2019. Patient states that she feels fine when she is not ambulating and at rest. She denies fever, nausea, vomiting, or loss of appetite. She states that she has been eating less for the past month and states that she just does not have an appetite for food. She denies any sick contacts. She does not know what she takes; thus, she cannot tell me if there were any changes to her medication regimen. She endorses a non-productive cough for two weeks. She denies dyspnea on exertion or at rest. No lower extremity edema. She denies any joint pain or swelling. Patient was seeing her primary care physician who urged her to come into the emergency room.  ED Course: 1 L NS (04 Aug 2019 21:00)  Patient has history of diabetes, on oral meds at home,   Glumetza 500 mg 2 tab BID, and Januvia 100, was told recently to stop glumetza  no polyuria polydipsia. Patient follows up with PCP for diabetes management., Has H/o bilateral cataract surgery, and chronic kidney disease  Occ Muscle cramps, Called to see patient fo in hospital management of diabetes Blood sugar  IN   PAST MEDICAL & SURGICAL HISTORY:  History of interstitial lung disease  Aortic stenosis  RA (rheumatoid arthritis)  Asthma  DM (diabetes mellitus), type 2  HTN (hypertension), benign  After-cataract of left eye  Tubal occlusion      FAMILY HISTORY:  No pertinent family history in first degree relatives      Social History:    Outpatient Medications:    MEDICATIONS  (STANDING):  buDESOnide 160 MICROgram(s)/formoterol 4.5 MICROgram(s) Inhaler 2 Puff(s) Inhalation two times a day  calcium carbonate   1250 mG (OsCal) 1 Tablet(s) Oral daily  chlorhexidine 2% Cloths 1 Application(s) Topical daily  cholecalciferol 1000 Unit(s) Oral daily  dextrose 50% Injectable 12.5 Gram(s) IV Push once  dextrose 50% Injectable 25 Gram(s) IV Push once  dextrose 50% Injectable 25 Gram(s) IV Push once  folic acid 1 milliGRAM(s) Oral daily  heparin  Injectable 5000 Unit(s) SubCutaneous every 8 hours  insulin lispro (HumaLOG) corrective regimen sliding scale   SubCutaneous three times a day before meals  lactated ringers. 1000 milliLiter(s) (60 mL/Hr) IV Continuous <Continuous>  methotrexate 17.5 milliGRAM(s) Oral every week  nystatin Powder 1 Application(s) Topical two times a day    MEDICATIONS  (PRN):  ALBUTerol    0.083% 2.5 milliGRAM(s) Nebulizer every 6 hours PRN Shortness of Breath and/or Wheezing  dextrose 40% Gel 15 Gram(s) Oral once PRN Blood Glucose LESS THAN 70 milliGRAM(s)/deciliter  glucagon  Injectable 1 milliGRAM(s) IntraMuscular once PRN Glucose LESS THAN 70 milligrams/deciliter      Allergies    No Known Allergies    Intolerances      Review of Systems:  Constitutional: No fever, no chills  Eyes: No blurry vision  Neuro: No tremors  HEENT: No pain, no neck swelling  Cardiovascular: No chest pain, no palpitations  Respiratory: Has SOB, no cough  GI: No nausea, vomiting, abdominal pain  : No dysuria  Skin: no rash  MSK: Has leg swelling, no foot ulcers.  Psych: no depression  Endocrine: no polyuria, polydipsia    ALL OTHER SYSTEMS REVIEWED AND NEGATIVE    UNABLE TO OBTAIN    PHYSICAL EXAM:  VITALS: T(C): 36.6 (08-05-19 @ 16:27)  T(F): 97.9 (08-05-19 @ 16:27), Max: 98.6 (08-04-19 @ 22:44)  HR: 98 (08-05-19 @ 16:27) (96 - 99)  BP: 105/66 (08-05-19 @ 16:27) (105/66 - 123/66)  RR:  (18 - 18)  SpO2:  (94% - 96%)  Wt(kg): --  GENERAL: NAD, well-groomed, well-developed  EYES: No proptosis, no lid lag  HEENT:  Atraumatic, Normocephalic  THYROID: Normal size, no palpable nodules  RESPIRATORY: Clear to auscultation bilaterally; No rales, rhonchi, wheezing  CARDIOVASCULAR: Si S2, No murmurs;  GI: Soft, non distended, normal bowel sounds  SKIN: Dry, intact, No rashes or lesions  MUSCULOSKELETAL: Has BL lower extremity edema.  NEURO:  no tremor, sensation decreased in feet BL,    POCT Blood Glucose.: 172 mg/dL (08-05-19 @ 21:47)  POCT Blood Glucose.: 88 mg/dL (08-05-19 @ 17:40)  POCT Blood Glucose.: 181 mg/dL (08-05-19 @ 13:19)  POCT Blood Glucose.: 284 mg/dL (08-05-19 @ 09:32)  POCT Blood Glucose.: 139 mg/dL (08-04-19 @ 22:03)                            9.7    4.8   )-----------( 244      ( 05 Aug 2019 14:25 )             28.7       08-05    137  |  105  |  36<H>  ----------------------------<  117<H>  4.1   |  21<L>  |  1.44<H>    EGFR if : 38<L>  EGFR if non : 33<L>    Ca    8.9      08-05    TPro  7.5  /  Alb  3.9  /  TBili  0.4  /  DBili  x   /  AST  15  /  ALT  9<L>  /  AlkPhos  74  08-04      Thyroid Function Tests:              Radiology:

## 2019-08-05 NOTE — CONSULT NOTE ADULT - SUBJECTIVE AND OBJECTIVE BOX
PULMONARY CONSULT  Abrahan Flannery MD  [phone]    Initial HPI on admission:  HPI:  87F with PMHx of rheumatoid arthritis, diabetes mellitus, HTN, moderate aortic stenosis, CKD2, and interstitial lung disease presents with weakness for two weeks. Patient states that she has been feeling weak for two weeks. She has a baseline exercise tolerance of 20 feet and now only 10. She uses a straight cane for ambulation. She states that she just feels tired when she tries to exert herself. She denies shortness of breath, chest pain, lightheadedness or symptoms of vertigo with exertion. She does have a history of admission for weakness, but states that this time she feels less weak than she was in 2019. Patient states that she feels fine when she is not ambulating and at rest. She denies fever, nausea, vomiting, or loss of appetite. She states that she has been eating less for the past month and states that she just does not have an appetite for food. She denies any sick contacts. She does not know what she takes; thus, she cannot tell me if there were any changes to her medication regimen. She endorses a non-productive cough for two weeks. She denies dyspnea on exertion or at rest. No lower extremity edema. She denies any joint pain or swelling. Patient was seeing her primary care physician who urged her to come into the emergency room.          BRIEF HOSPITAL COURSE: ***    PAST MEDICAL & SURGICAL HISTORY:  History of interstitial lung disease  Aortic stenosis  RA (rheumatoid arthritis)  Asthma  DM (diabetes mellitus), type 2  HTN (hypertension), benign  After-cataract of left eye  Tubal occlusion      FAMILY HISTORY:  No pertinent family history in first degree relatives    Social History (marital status, living situation, occupation, tobacco use, alcohol and drug use, and sexual history): Patient denies tobacco, alcohol or IVDU. She lives with her  and has an aide which she privately hires to help with ADLs	     Tobacco Screening:  · Core Measure Site	No	    Review of Systems: Gen: no changes in weight, night sweats, appetite, or fever  Eyes: no changes in vision, diplopia, or floaters  ENT: no epistaxis, sinus pain, gingival bleeding, odynophagia or dysphagia  CV: no CP, SOB, PND, orthopnea, LOC, or palpitations  Resp: no wheezing, or hemoptysis  GI: no abdominal pain, dyspepsia, nausea, vomiting, diarrhea, constipation, hematemesis, hematochezia, or melena  : no dysuria, polyuria, or hematuria  MSK: no arthralgias or joint swelling   Neuro: no gross sensory changes, numbness, focal deficits  Psych: no depression, changes in sleep, changes in concentration, or lack of energy  Heme/Onc: no purpura, petechiae or night sweats Skin: no pruritus, hair loss or skin lesions	      Allergies and Intolerances:        Allergies:  	No Known Allergies:       Medications:  MEDICATIONS  (STANDING):  buDESOnide 160 MICROgram(s)/formoterol 4.5 MICROgram(s) Inhaler 2 Puff(s) Inhalation two times a day  calcium carbonate   1250 mG (OsCal) 1 Tablet(s) Oral daily  cholecalciferol 1000 Unit(s) Oral daily  dextrose 50% Injectable 12.5 Gram(s) IV Push once  dextrose 50% Injectable 25 Gram(s) IV Push once  dextrose 50% Injectable 25 Gram(s) IV Push once  folic acid 1 milliGRAM(s) Oral daily  heparin  Injectable 5000 Unit(s) SubCutaneous every 8 hours  insulin glargine Injectable (LANTUS) 5 Unit(s) SubCutaneous at bedtime  insulin lispro (HumaLOG) corrective regimen sliding scale   SubCutaneous three times a day before meals  lactated ringers. 1000 milliLiter(s) (60 mL/Hr) IV Continuous <Continuous>  methotrexate 17.5 milliGRAM(s) Oral every week  nystatin Powder 1 Application(s) Topical two times a day    MEDICATIONS  (PRN):  ALBUTerol    0.083% 2.5 milliGRAM(s) Nebulizer every 6 hours PRN Shortness of Breath and/or Wheezing  dextrose 40% Gel 15 Gram(s) Oral once PRN Blood Glucose LESS THAN 70 milliGRAM(s)/deciliter  glucagon  Injectable 1 milliGRAM(s) IntraMuscular once PRN Glucose LESS THAN 70 milligrams/deciliter    Vital Signs Last 24 Hrs  T(C): 36.9 (05 Aug 2019 08:14), Max: 37.8 (04 Aug 2019 15:50)  T(F): 98.5 (05 Aug 2019 08:14), Max: 100 (04 Aug 2019 15:50)  HR: 96 (05 Aug 2019 08:14) (92 - 111)  BP: 122/75 (05 Aug 2019 08:14) (100/65 - 131/73)  BP(mean): --  RR: 18 (05 Aug 2019 08:14) (16 - 18)  SpO2: 94% (05 Aug 2019 08:14) (94% - 99%)       @ 07:01  -   @ 07:00  --------------------------------------------------------  IN: 720 mL / OUT: 0 mL / NET: 720 mL      LABS:                        7.8    5.53  )-----------( 211      ( 05 Aug 2019 09:48 )             23.5         137  |  105  |  36<H>  ----------------------------<  117<H>  4.1   |  21<L>  |  1.44<H>    Ca    8.9      05 Aug 2019 07:25    TPro  7.5  /  Alb  3.9  /  TBili  0.4  /  DBili  x   /  AST  15  /  ALT  9<L>  /  AlkPhos  74  08-04      PT/INR - ( 04 Aug 2019 16:15 )   PT: 13.2 sec;   INR: 1.14 ratio         PTT - ( 04 Aug 2019 16:15 )  PTT:32.3 sec  Urinalysis Basic - ( 04 Aug 2019 18:24 )    Color: Yellow / Appearance: Clear / S.020 / pH: x  Gluc: x / Ketone: Negative  / Bili: Negative / Urobili: Negative   Blood: x / Protein: 30 mg/dL / Nitrite: Negative   Leuk Esterase: Moderate / RBC: 4 /hpf / WBC 1 /HPF   Sq Epi: x / Non Sq Epi: 3 /hpf / Bacteria: Negative      Procalcitonin, Serum: 0.18 ng/mL (19 @ 11:25)      Physical Examination:    General: No acute distress.      HEENT: Pupils equal, reactive to light.  Symmetric.    PULM: Clear to auscultation bilaterally, no significant sputum production    CVS: Regular rate and rhythm, no murmurs, rubs, or gallops    ABD: Soft, nondistended, nontender, normoactive bowel sounds, no masses    EXT: No edema, nontender    SKIN: Warm and well perfused, no rashes noted.    NEURO: Alert, oriented, interactive, nonfocal    RADIOLOGY REVIEWED PERSONALLY  CXR:    CT chest:    PROCEDURE DATE:  2019        INTERPRETATION:  CLINICAL INFORMATION: Cough    EXAM: AP view of chest.    COMPARISON: Chest radiograph from 2018.    FINDINGS:    No pleuraleffusion or pneumothorax.  Heart size is normal.  Bilateral lung peripheral reticular opacities, unchanged.  No acute osseous effect.    IMPRESSION:   Bilateral lung peripheral reticular opacities, representing fibrotic   changes, unchanged.      TTE:      Assessment:    Plan: PULMONARY CONSULT  Abrahan Flannery MD  [phone]    Initial HPI on admission:  HPI:  87F with PMHx of rheumatoid arthritis, diabetes mellitus, HTN, moderate aortic stenosis, CKD2, and interstitial lung disease presents with weakness for two weeks. Patient states that she has been feeling weak for two weeks. She has a baseline exercise tolerance of 20 feet and now only 10. She uses a straight cane for ambulation. She states that she just feels tired when she tries to exert herself. She denies shortness of breath, chest pain, lightheadedness or symptoms of vertigo with exertion. She does have a history of admission for weakness, but states that this time she feels less weak than she was in 2019. Patient states that she feels fine when she is not ambulating and at rest. She denies fever, nausea, vomiting, or loss of appetite. She states that she has been eating less for the past month and states that she just does not have an appetite for food. She denies any sick contacts. She does not know what she takes; thus, she cannot tell me if there were any changes to her medication regimen. She endorses a non-productive cough for two weeks. She denies dyspnea on exertion or at rest. No lower extremity edema. She denies any joint pain or swelling. Patient was seeing her primary care physician who urged her to come into the emergency room.    Patient denies recent fever, chills, productive cough, change in chronic dyspnea.  She is still taking Methotrexate for RA    PAST MEDICAL & SURGICAL HISTORY:  History of interstitial lung disease  Aortic stenosis  RA (rheumatoid arthritis)  Asthma  DM (diabetes mellitus), type 2  HTN (hypertension), benign  After-cataract of left eye  Tubal occlusion      FAMILY HISTORY:  No pertinent family history in first degree relatives    Social History (marital status, living situation, occupation, tobacco use, alcohol and drug use, and sexual history): Patient denies tobacco, alcohol or IVDU. She lives with her  and has an aide which she privately hires to help with ADLs	     Tobacco Screening:  · Core Measure Site	No	    Review of Systems: Gen: no changes in weight, night sweats, appetite, or fever  Eyes: no changes in vision, diplopia, or floaters  ENT: no epistaxis, sinus pain, gingival bleeding, odynophagia or dysphagia  CV: no CP, SOB, PND, orthopnea, LOC, or palpitations  Resp: no wheezing, or hemoptysis  GI: no abdominal pain, dyspepsia, nausea, vomiting, diarrhea, constipation, hematemesis, hematochezia, or melena  : no dysuria, polyuria, or hematuria  MSK: no arthralgias or joint swelling   Neuro: no gross sensory changes, numbness, focal deficits  Psych: no depression, changes in sleep, changes in concentration, or lack of energy  Heme/Onc: no purpura, petechiae or night sweats Skin: no pruritus, hair loss or skin lesions	      Allergies and Intolerances:        Allergies:  	No Known Allergies:       Medications:  MEDICATIONS  (STANDING):  buDESOnide 160 MICROgram(s)/formoterol 4.5 MICROgram(s) Inhaler 2 Puff(s) Inhalation two times a day  calcium carbonate   1250 mG (OsCal) 1 Tablet(s) Oral daily  cholecalciferol 1000 Unit(s) Oral daily  dextrose 50% Injectable 12.5 Gram(s) IV Push once  dextrose 50% Injectable 25 Gram(s) IV Push once  dextrose 50% Injectable 25 Gram(s) IV Push once  folic acid 1 milliGRAM(s) Oral daily  heparin  Injectable 5000 Unit(s) SubCutaneous every 8 hours  insulin glargine Injectable (LANTUS) 5 Unit(s) SubCutaneous at bedtime  insulin lispro (HumaLOG) corrective regimen sliding scale   SubCutaneous three times a day before meals  lactated ringers. 1000 milliLiter(s) (60 mL/Hr) IV Continuous <Continuous>  methotrexate 17.5 milliGRAM(s) Oral every week  nystatin Powder 1 Application(s) Topical two times a day    MEDICATIONS  (PRN):  ALBUTerol    0.083% 2.5 milliGRAM(s) Nebulizer every 6 hours PRN Shortness of Breath and/or Wheezing  dextrose 40% Gel 15 Gram(s) Oral once PRN Blood Glucose LESS THAN 70 milliGRAM(s)/deciliter  glucagon  Injectable 1 milliGRAM(s) IntraMuscular once PRN Glucose LESS THAN 70 milligrams/deciliter    Vital Signs Last 24 Hrs  T(C): 36.9 (05 Aug 2019 08:14), Max: 37.8 (04 Aug 2019 15:50)  T(F): 98.5 (05 Aug 2019 08:14), Max: 100 (04 Aug 2019 15:50)  HR: 96 (05 Aug 2019 08:14) (92 - 111)  BP: 122/75 (05 Aug 2019 08:14) (100/65 - 131/73)  BP(mean): --  RR: 18 (05 Aug 2019 08:14) (16 - 18)  SpO2: 94% (05 Aug 2019 08:14) (94% - 99%)       @ 07:01  -   @ 07:00  --------------------------------------------------------  IN: 720 mL / OUT: 0 mL / NET: 720 mL      LABS:                        7.8    5.53  )-----------( 211      ( 05 Aug 2019 09:48 )             23.5         137  |  105  |  36<H>  ----------------------------<  117<H>  4.1   |  21<L>  |  1.44<H>    Ca    8.9      05 Aug 2019 07:25    TPro  7.5  /  Alb  3.9  /  TBili  0.4  /  DBili  x   /  AST  15  /  ALT  9<L>  /  AlkPhos  74  08-04      PT/INR - ( 04 Aug 2019 16:15 )   PT: 13.2 sec;   INR: 1.14 ratio         PTT - ( 04 Aug 2019 16:15 )  PTT:32.3 sec  Urinalysis Basic - ( 04 Aug 2019 18:24 )    Color: Yellow / Appearance: Clear / S.020 / pH: x  Gluc: x / Ketone: Negative  / Bili: Negative / Urobili: Negative   Blood: x / Protein: 30 mg/dL / Nitrite: Negative   Leuk Esterase: Moderate / RBC: 4 /hpf / WBC 1 /HPF   Sq Epi: x / Non Sq Epi: 3 /hpf / Bacteria: Negative      Procalcitonin, Serum: 0.18 ng/mL (19 @ 11:25)      Physical Examination:    General: Non toxic, No acute distress.  O2 sat 97% on RA at rest in chair    HEENT: Pupils equal, reactive to light.  Symmetric.    PULM: Scattered posterior crackles    CVS: Regular rate and rhythm, no murmurs, rubs, or gallops    ABD: Soft, nondistended, nontender, normoactive bowel sounds, no masses    EXT: No edema, nontender    SKIN: Warm and well perfused, no rashes noted.    NEURO: Alert, oriented, interactive, nonfocal    RADIOLOGY REVIEWED PERSONALLY  CXR:    CT chest:    PROCEDURE DATE:  2019        INTERPRETATION:  CLINICAL INFORMATION: Cough    EXAM: AP view of chest.    COMPARISON: Chest radiograph from 2018.    FINDINGS:    No pleuraleffusion or pneumothorax.  Heart size is normal.  Bilateral lung peripheral reticular opacities, unchanged.  No acute osseous effect.    IMPRESSION:   Bilateral lung peripheral reticular opacities, representing fibrotic   changes, unchanged.      TTE:    PROCEDURE: Transthoracic echocardiogram with 2-D, M-Mode  and complete spectral and color flow Doppler.  INDICATION: Syncope and collapse (R55)  ------------------------------------------------------------------------  Dimensions:    Normal Values:  LA:     3.4    2.0 - 4.0 cm  Ao:     2.7    2.0 - 3.8 cm  SEPTUM: 0.7    0.6 - 1.2 cm  PWT:    0.7    0.6 - 1.1 cm  LVIDd:  4.3    3.0 - 5.6 cm  LVIDs:  2.5    1.8 - 4.0 cm  Derived variables:  LVMI: 54 g/m2  RWT: 0.32  Fractional short: 42 %  EF (Teicholtz): 73 %  Doppler Peak Velocity (m/sec): AoV=2.0  ------------------------------------------------------------------------  Observations:  Mitral Valve: Mitral annular calcification, otherwise  normal mitral valve. Minimal mitral regurgitation.  Aortic Valve/Aorta: Calcified trileaflet aortic valve with  decreased opening. Peak transaortic valve gradient equals  16 mm Hg, mean transaortic valve gradient equals 10 mm Hg,  estimated aortic valve area equals 1.5 sqcm (by continuity  equation), aortic valve velocity time integral equals 42  cm, consistent with moderate aortic stenosis. Mild aortic  regurgitation.  Peak left ventricular outflow tract  gradient equals 3 mm Hg, mean gradient is equal to 2 mm Hg,  LVOT velocity time integral equals 20 cm.  Aortic Root: 2.7 cm.  LVOT diameter: 2 cm.  Left Atrium: Normal leftatrium.  LA volume index = 26  cc/m2.  Left Ventricle: Normal left ventricular systolic function.  No segmental wall motion abnormalities. Normal left  ventricular internal dimensions and wall thicknesses.  Reversal of the E-A waves of the mitral inflow pattern  consistent with reduced compliance of the left ventricle.  Right Heart: Normal right atrium. The right ventricle is  not well visualized; grossly normal right ventricular  systolic function. Normal tricuspid valve. Minimal  tricuspid regurgitation. Normal pulmonic valve. Minimal  pulmonic regurgitation.  Pericardium/Pleura: Normal pericardium with no pericardial  effusion.  Hemodynamic: Estimated right atrial pressure is 8 mm Hg.  Inadequate tricuspid regurgitation Doppler envelope  precludes estimation of RVSP.  ------------------------------------------------------------------------  Conclusions:  1. Mitral annular calcification, otherwise normal mitral  valve. Minimal mitral regurgitation.  2. Calcified trileaflet aortic valve with decreased  opening. Peak transaortic valve gradient equals 16 mm Hg,  mean transaortic valve gradient equals 10 mm Hg, estimated  aortic valve area equals 1.5 sqcm (by continuity equation),  aortic valve velocity time integral equals 42 cm,  consistent with moderate aortic stenosis. Mild aortic  regurgitation.  3. Normal left ventricular internal dimensions and wall  thicknesses.  4. Normal left ventricular systolic function. No segmental  wall motion abnormalities.  5. Reversal of the E-A waves of the mitral inflow pattern  consistent with reduced compliance of the left ventricle.  6. The right ventricle is not well visualized; grossly  normal right ventricular systolic function.          Assessment:    Plan:

## 2019-08-05 NOTE — PHYSICAL THERAPY INITIAL EVALUATION ADULT - GENERAL OBSERVATIONS, REHAB EVAL
Pt tolerated 45min PT initial evaluation well. Pt rec'd in chair in NAD,  bedside, +IVL. . Pt educated by MD mid session (additional 15 min).

## 2019-08-06 LAB
ANION GAP SERPL CALC-SCNC: 11 MMOL/L — SIGNIFICANT CHANGE UP (ref 5–17)
BUN SERPL-MCNC: 29 MG/DL — HIGH (ref 7–23)
C3 SERPL-MCNC: 88 MG/DL — SIGNIFICANT CHANGE UP (ref 81–157)
C4 SERPL-MCNC: 15 MG/DL — SIGNIFICANT CHANGE UP (ref 13–39)
CALCIUM SERPL-MCNC: 8.7 MG/DL — SIGNIFICANT CHANGE UP (ref 8.4–10.5)
CHLORIDE SERPL-SCNC: 101 MMOL/L — SIGNIFICANT CHANGE UP (ref 96–108)
CO2 SERPL-SCNC: 23 MMOL/L — SIGNIFICANT CHANGE UP (ref 22–31)
CREAT ?TM UR-MCNC: 73 MG/DL — SIGNIFICANT CHANGE UP
CREAT SERPL-MCNC: 1.39 MG/DL — HIGH (ref 0.5–1.3)
CRP SERPL-MCNC: 1.03 MG/DL — HIGH (ref 0–0.4)
ERYTHROCYTE [SEDIMENTATION RATE] IN BLOOD: 36 MM/HR — HIGH (ref 0–20)
GLUCOSE BLDC GLUCOMTR-MCNC: 169 MG/DL — HIGH (ref 70–99)
GLUCOSE BLDC GLUCOMTR-MCNC: 170 MG/DL — HIGH (ref 70–99)
GLUCOSE BLDC GLUCOMTR-MCNC: 175 MG/DL — HIGH (ref 70–99)
GLUCOSE BLDC GLUCOMTR-MCNC: 211 MG/DL — HIGH (ref 70–99)
GLUCOSE SERPL-MCNC: 139 MG/DL — HIGH (ref 70–99)
HCT VFR BLD CALC: 24 % — LOW (ref 34.5–45)
HGB BLD-MCNC: 7.7 G/DL — LOW (ref 11.5–15.5)
MCHC RBC-ENTMCNC: 31.8 PG — SIGNIFICANT CHANGE UP (ref 27–34)
MCHC RBC-ENTMCNC: 32.1 GM/DL — SIGNIFICANT CHANGE UP (ref 32–36)
MCV RBC AUTO: 99.2 FL — SIGNIFICANT CHANGE UP (ref 80–100)
PLATELET # BLD AUTO: 238 K/UL — SIGNIFICANT CHANGE UP (ref 150–400)
POTASSIUM SERPL-MCNC: 3.7 MMOL/L — SIGNIFICANT CHANGE UP (ref 3.5–5.3)
POTASSIUM SERPL-SCNC: 3.7 MMOL/L — SIGNIFICANT CHANGE UP (ref 3.5–5.3)
PROT ?TM UR-MCNC: 26 MG/DL — HIGH (ref 0–12)
PROT/CREAT UR-RTO: 0.4 RATIO — HIGH (ref 0–0.2)
RBC # BLD: 2.42 M/UL — LOW (ref 3.8–5.2)
RBC # FLD: 15.2 % — HIGH (ref 10.3–14.5)
RHEUMATOID FACT SERPL-ACNC: 411 IU/ML — HIGH (ref 0–13)
SODIUM SERPL-SCNC: 135 MMOL/L — SIGNIFICANT CHANGE UP (ref 135–145)
SODIUM UR-SCNC: 84 MMOL/L — SIGNIFICANT CHANGE UP
T4 FREE SERPL-MCNC: 1.3 NG/DL — SIGNIFICANT CHANGE UP (ref 0.9–1.8)
TSH SERPL-MCNC: 3.99 UIU/ML — SIGNIFICANT CHANGE UP (ref 0.27–4.2)
WBC # BLD: 4.42 K/UL — SIGNIFICANT CHANGE UP (ref 3.8–10.5)
WBC # FLD AUTO: 4.42 K/UL — SIGNIFICANT CHANGE UP (ref 3.8–10.5)

## 2019-08-06 RX ORDER — INSULIN GLARGINE 100 [IU]/ML
12 INJECTION, SOLUTION SUBCUTANEOUS AT BEDTIME
Refills: 0 | Status: DISCONTINUED | OUTPATIENT
Start: 2019-08-06 | End: 2019-08-09

## 2019-08-06 RX ADMIN — NYSTATIN CREAM 1 APPLICATION(S): 100000 CREAM TOPICAL at 05:16

## 2019-08-06 RX ADMIN — ALBUTEROL 2.5 MILLIGRAM(S): 90 AEROSOL, METERED ORAL at 05:17

## 2019-08-06 RX ADMIN — ALBUTEROL 2.5 MILLIGRAM(S): 90 AEROSOL, METERED ORAL at 15:21

## 2019-08-06 RX ADMIN — BUDESONIDE AND FORMOTEROL FUMARATE DIHYDRATE 2 PUFF(S): 160; 4.5 AEROSOL RESPIRATORY (INHALATION) at 18:32

## 2019-08-06 RX ADMIN — Medication 1: at 13:37

## 2019-08-06 RX ADMIN — Medication 1 TABLET(S): at 13:38

## 2019-08-06 RX ADMIN — NYSTATIN CREAM 1 APPLICATION(S): 100000 CREAM TOPICAL at 18:32

## 2019-08-06 RX ADMIN — Medication 1: at 08:03

## 2019-08-06 RX ADMIN — HEPARIN SODIUM 5000 UNIT(S): 5000 INJECTION INTRAVENOUS; SUBCUTANEOUS at 21:09

## 2019-08-06 RX ADMIN — HEPARIN SODIUM 5000 UNIT(S): 5000 INJECTION INTRAVENOUS; SUBCUTANEOUS at 05:17

## 2019-08-06 RX ADMIN — Medication 1 MILLIGRAM(S): at 13:38

## 2019-08-06 RX ADMIN — INSULIN GLARGINE 12 UNIT(S): 100 INJECTION, SOLUTION SUBCUTANEOUS at 21:47

## 2019-08-06 RX ADMIN — Medication 1: at 18:32

## 2019-08-06 RX ADMIN — HEPARIN SODIUM 5000 UNIT(S): 5000 INJECTION INTRAVENOUS; SUBCUTANEOUS at 13:38

## 2019-08-06 RX ADMIN — Medication 1000 UNIT(S): at 13:38

## 2019-08-06 RX ADMIN — ALBUTEROL 2.5 MILLIGRAM(S): 90 AEROSOL, METERED ORAL at 21:09

## 2019-08-06 RX ADMIN — BUDESONIDE AND FORMOTEROL FUMARATE DIHYDRATE 2 PUFF(S): 160; 4.5 AEROSOL RESPIRATORY (INHALATION) at 05:16

## 2019-08-06 NOTE — CONSULT NOTE ADULT - SUBJECTIVE AND OBJECTIVE BOX
Admitting Diagnosis:  Weakness (R53.1): WEAKNESS      HPI:  This is a 87y year old Female with the below past medical history who presents with the chief complaint of tremulousness and weak allover x about 2 weeks. states was not eating well at home and does not like food here. uses cane for fall prevention. states better since admission a little.     Past Medical History:  History of interstitial lung disease (Z87.09)  Aortic stenosis (I35.0)  RA (rheumatoid arthritis) (714.0)  Asthma (493.90)  DM (diabetes mellitus), type 2 (250.00)  HTN (hypertension), benign (401.1)      Past Surgical History:  After-cataract of left eye (H26.40)  Tubal occlusion (N97.1)  No significant past surgical history ([id number])      Social History:  No toxic habits    Family History:  FAMILY HISTORY:  No pertinent family history in first degree relatives      Allergies:  No Known Allergies      ROS:  Constitutional: Patient offers no complaints of fevers or significant weight loss  Ears, Nose, Mouth and Throat: The patient presents with no abnormalities of the head, ears, eyes, nose or throat  Skin: Patient offers no concerns of new rashes or lesions  Respiratory: The patient presents with no abnormalities of the respiratory tract  Cardiovascular: The patient presents with no cardiac abnormalities  Gastrointestinal: The patient presents with no abnormalities of the GI system  Genitourinary: The patient presents with no dysuria, hematuria or frequent urination  Neurological: See HPI  Endocrine: Patient offers no complaints of excessive thirst, urination, or heat/cold intolerance    Advanced care planning reviewed and noted in the chart.    Medications:  ALBUTerol    0.083% 2.5 milliGRAM(s) Nebulizer every 6 hours PRN  buDESOnide 160 MICROgram(s)/formoterol 4.5 MICROgram(s) Inhaler 2 Puff(s) Inhalation two times a day  calcium carbonate   1250 mG (OsCal) 1 Tablet(s) Oral daily  chlorhexidine 2% Cloths 1 Application(s) Topical daily  cholecalciferol 1000 Unit(s) Oral daily  dextrose 40% Gel 15 Gram(s) Oral once PRN  dextrose 50% Injectable 12.5 Gram(s) IV Push once  dextrose 50% Injectable 25 Gram(s) IV Push once  dextrose 50% Injectable 25 Gram(s) IV Push once  folic acid 1 milliGRAM(s) Oral daily  glucagon  Injectable 1 milliGRAM(s) IntraMuscular once PRN  heparin  Injectable 5000 Unit(s) SubCutaneous every 8 hours  insulin glargine Injectable (LANTUS) 8 Unit(s) SubCutaneous at bedtime  insulin lispro (HumaLOG) corrective regimen sliding scale   SubCutaneous three times a day before meals  lactated ringers. 1000 milliLiter(s) IV Continuous <Continuous>  methotrexate 17.5 milliGRAM(s) Oral every week  nystatin Powder 1 Application(s) Topical two times a day      Labs:  CBC Full  -  ( 05 Aug 2019 14:25 )  WBC Count : 4.8 K/uL  RBC Count : 2.91 M/uL  Hemoglobin : 9.7 g/dL  Hematocrit : 28.7 %  Platelet Count - Automated : 244 K/uL  Mean Cell Volume : 98.5 fl  Mean Cell Hemoglobin : 33.2 pg  Mean Cell Hemoglobin Concentration : 33.7 gm/dL  Auto Neutrophil # : x  Auto Lymphocyte # : x  Auto Monocyte # : x  Auto Eosinophil # : x  Auto Basophil # : x  Auto Neutrophil % : x  Auto Lymphocyte % : x  Auto Monocyte % : x  Auto Eosinophil % : x  Auto Basophil % : x    08-06    135  |  101  |  29<H>  ----------------------------<  139<H>  3.7   |  23  |  1.39<H>    Ca    8.7      06 Aug 2019 07:19    TPro  7.5  /  Alb  3.9  /  TBili  0.4  /  DBili  x   /  AST  15  /  ALT  9<L>  /  AlkPhos  74  08-04    CAPILLARY BLOOD GLUCOSE      POCT Blood Glucose.: 175 mg/dL (06 Aug 2019 07:47)  POCT Blood Glucose.: 172 mg/dL (05 Aug 2019 21:47)  POCT Blood Glucose.: 88 mg/dL (05 Aug 2019 17:40)  POCT Blood Glucose.: 181 mg/dL (05 Aug 2019 13:19)    LIVER FUNCTIONS - ( 04 Aug 2019 16:15 )  Alb: 3.9 g/dL / Pro: 7.5 g/dL / ALK PHOS: 74 U/L / ALT: 9 U/L / AST: 15 U/L / GGT: x           PT/INR - ( 04 Aug 2019 16:15 )   PT: 13.2 sec;   INR: 1.14 ratio         PTT - ( 04 Aug 2019 16:15 )  PTT:32.3 sec  Urinalysis Basic - ( 04 Aug 2019 18:24 )    Color: Yellow / Appearance: Clear / S.020 / pH: x  Gluc: x / Ketone: Negative  / Bili: Negative / Urobili: Negative   Blood: x / Protein: 30 mg/dL / Nitrite: Negative   Leuk Esterase: Moderate / RBC: 4 /hpf / WBC 1 /HPF   Sq Epi: x / Non Sq Epi: 3 /hpf / Bacteria: Negative      Female    Vitals:  Vital Signs Last 24 Hrs  T(C): 37 (06 Aug 2019 07:24), Max: 37 (06 Aug 2019 07:24)  T(F): 98.6 (06 Aug 2019 07:24), Max: 98.6 (06 Aug 2019 07:24)  HR: 108 (06 Aug 2019 07:24) (98 - 108)  BP: 122/72 (06 Aug 2019 07:24) (105/66 - 130/73)  BP(mean): --  RR: 18 (06 Aug 2019 07:24) (18 - 18)  SpO2: 96% (06 Aug 2019 07:24) (95% - 96%)    NEUROLOGICAL EXAM:    Mental status: Awake, alert, and in no apparent distress. Oriented to person, place and time. Language function is normal. Recent memory, digit span and concentration were normal.     Cranial Nerves: Pupils were equal, round, reactive to light. Extraocular movements were intact. Visual field were full. Fundoscopic exam was deferred. Facial sensation was intact to light touch. There was no facial asymmetry. The palate was upgoing symmetrically and tongue was midline. Hearing acuity was intact to finger rub AU. Shoulder shrug was full bilaterally    Motor exam: Bulk and tone were normal. Strength was 5/5 in all four extremities. Fine finger movements were symmetric and normal. There was no pronator drift    Reflexes: 2+ in the bilateral upper extremities. 2+ in the bilateral lower extremities. Toes were downgoing bilaterally.     Sensation: Intact to light touch, temperature, vibration and proprioception.     Coordination: Finger-nose-finger and heel-to-shin was without dysmetria. fine tremulousness in UE b/L ,very low amplitude non positional.     Gait: Narrow base and steady. Romberg was negative.

## 2019-08-06 NOTE — CONSULT NOTE ADULT - SUBJECTIVE AND OBJECTIVE BOX
[facility] NEPHROLOGY- CONSULTATION NOTE    87y Female with history of below presents with weakness. Nephrology consulted for elevated Scr.    Patient with no prior history of kidney disease but admits to poor PO intake at home without any GI losses. Patient also on HCTZ and losartan as an outpatient which were discontinued on admission. Scr has subsequently been decreasing.    REVIEW OF SYSTEMS:  Gen: no changes in weight, + weakness  HEENT: no rhinorrhea  Neck: no sore throat  Cards: no chest pain  Resp: no dyspnea  GI: no nausea or vomiting or diarrhea  : no dysuria or hematuria  Vascular: no LE edema  Derm: no rashes  Neuro: no numbness/tingling    No Known Allergies      Home Medications Reviewed  Hospital Medications:   MEDICATIONS  (STANDING):  buDESOnide 160 MICROgram(s)/formoterol 4.5 MICROgram(s) Inhaler 2 Puff(s) Inhalation two times a day  calcium carbonate   1250 mG (OsCal) 1 Tablet(s) Oral daily  chlorhexidine 2% Cloths 1 Application(s) Topical daily  cholecalciferol 1000 Unit(s) Oral daily  dextrose 50% Injectable 12.5 Gram(s) IV Push once  dextrose 50% Injectable 25 Gram(s) IV Push once  dextrose 50% Injectable 25 Gram(s) IV Push once  folic acid 1 milliGRAM(s) Oral daily  heparin  Injectable 5000 Unit(s) SubCutaneous every 8 hours  insulin glargine Injectable (LANTUS) 8 Unit(s) SubCutaneous at bedtime  insulin lispro (HumaLOG) corrective regimen sliding scale   SubCutaneous three times a day before meals  lactated ringers. 1000 milliLiter(s) (60 mL/Hr) IV Continuous <Continuous>  methotrexate 17.5 milliGRAM(s) Oral every week  nystatin Powder 1 Application(s) Topical two times a day      PAST MEDICAL & SURGICAL HISTORY:  History of interstitial lung disease  Aortic stenosis  RA (rheumatoid arthritis)  Asthma  DM (diabetes mellitus), type 2  HTN (hypertension), benign  After-cataract of left eye  Tubal occlusion      FAMILY HISTORY:  No pertinent family history in first degree relatives      SOCIAL HISTORY:  Denies toxic substance use     VITALS:  T(F): 98.6 (19 @ 07:24), Max: 98.6 (19 @ 07:24)  HR: 108 (19 @ 07:24)  BP: 122/72 (19 @ 07:24)  RR: 18 (19 @ 07:24)  SpO2: 96% (19 @ 07:24)  Wt(kg): --     @ 07:01  -   @ 07:00  --------------------------------------------------------  IN: 1620 mL / OUT: 0 mL / NET: 1620 mL     @ 07:01  -   @ 12:05  --------------------------------------------------------  IN: 480 mL / OUT: 0 mL / NET: 480 mL        PHYSICAL EXAM:  Gen: NAD, calm  HEENT: MMM  Neck: no JVD  Cards: RRR, +S1/S2, + BRITTNY  Resp: diffuse rales  GI: soft, NT/ND, NABS  : no CVA tenderness  Vascular: trace LLE edema  Derm: no rashes  Neuro: non-focal    LABS:      135  |  101  |  29<H>  ----------------------------<  139<H>  3.7   |  23  |  1.39<H>    Ca    8.7      06 Aug 2019 07:19    TPro  7.5  /  Alb  3.9  /  TBili  0.4  /  DBili      /  AST  15  /  ALT  9<L>  /  AlkPhos  74      Creatinine Trend: 1.39 <--, 1.44 <--, 1.66 <--                        7.7    4.42  )-----------( 238      ( 06 Aug 2019 11:01 )             24.0     Urine Studies:  Urinalysis Basic - ( 04 Aug 2019 18:24 )    Color: Yellow / Appearance: Clear / S.020 / pH:   Gluc:  / Ketone: Negative  / Bili: Negative / Urobili: Negative   Blood:  / Protein: 30 mg/dL / Nitrite: Negative   Leuk Esterase: Moderate / RBC: 4 /hpf / WBC 1 /HPF   Sq Epi:  / Non Sq Epi: 3 /hpf / Bacteria: Negative          RADIOLOGY & ADDITIONAL STUDIES:  < from: Xray Chest 1 View- PORTABLE-Urgent (19 @ 23:12) >  IMPRESSION:   Bilateral lung peripheral reticular opacities, representing fibrotic   changes, unchanged.    < end of copied text >

## 2019-08-06 NOTE — CONSULT NOTE ADULT - SUBJECTIVE AND OBJECTIVE BOX
HISTORY OF PRESENT ILLNESS:  86 yo female with h/o RA, has been on MTX in the past, though recently arthritis has been stable.  About a month ago found to have elevated Cr as outpatient and I advised pt to stop all NSAIDs and MTX was reduced.    She is admitted with general weakness, fatigue, cough, nausea    PAST MEDICAL & SURGICAL HISTORY:  History of interstitial lung disease  Aortic stenosis  RA (rheumatoid arthritis)  Asthma  DM (diabetes mellitus), type 2  HTN (hypertension), benign  After-cataract of left eye  Tubal occlusion        MEDICATIONS  (STANDING):  buDESOnide 160 MICROgram(s)/formoterol 4.5 MICROgram(s) Inhaler 2 Puff(s) Inhalation two times a day  calcium carbonate   1250 mG (OsCal) 1 Tablet(s) Oral daily  chlorhexidine 2% Cloths 1 Application(s) Topical daily  cholecalciferol 1000 Unit(s) Oral daily  dextrose 50% Injectable 12.5 Gram(s) IV Push once  dextrose 50% Injectable 25 Gram(s) IV Push once  dextrose 50% Injectable 25 Gram(s) IV Push once  folic acid 1 milliGRAM(s) Oral daily  heparin  Injectable 5000 Unit(s) SubCutaneous every 8 hours  insulin glargine Injectable (LANTUS) 12 Unit(s) SubCutaneous at bedtime  insulin lispro (HumaLOG) corrective regimen sliding scale   SubCutaneous three times a day before meals  lactated ringers. 1000 milliLiter(s) (60 mL/Hr) IV Continuous <Continuous>  nystatin Powder 1 Application(s) Topical two times a day    MEDICATIONS  (PRN):  ALBUTerol    0.083% 2.5 milliGRAM(s) Nebulizer every 6 hours PRN Shortness of Breath and/or Wheezing  dextrose 40% Gel 15 Gram(s) Oral once PRN Blood Glucose LESS THAN 70 milliGRAM(s)/deciliter  glucagon  Injectable 1 milliGRAM(s) IntraMuscular once PRN Glucose LESS THAN 70 milligrams/deciliter      Allergies    No Known Allergies    Intolerances        PERTINENT MEDICATION HISTORY:    SOCIAL HISTORY:    FAMILY HISTORY:  No pertinent family history in first degree relatives      Vital Signs Last 24 Hrs  T(C): 36.9 (06 Aug 2019 15:22), Max: 37 (06 Aug 2019 07:24)  T(F): 98.5 (06 Aug 2019 15:22), Max: 98.6 (06 Aug 2019 07:24)  HR: 101 (06 Aug 2019 15:22) (101 - 108)  BP: 123/66 (06 Aug 2019 15:22) (122/72 - 130/73)  BP(mean): --  RR: 18 (06 Aug 2019 15:22) (18 - 18)  SpO2: 100% (06 Aug 2019 15:22) (95% - 100%)    Daily     Daily     PHYSICAL EXAM:      Constitutional:  Fair appearing, elderly    Eyes:  EOMI    ENMT:    Neck:  supple, no masses      Back:  no pain    Gastrointestinal:  abd soft nt,      Extremities:  mild LE edema      Neurological:  tremulousness in hands/fingers    Skin:  No active rash    Lymph Nodes:    Musculoskeletal:  No active synovitis  +Crepitus of b knees, mild pain with ROM    Psychiatric:  alert, in fair spirits, answers appropriately        LABS:             ESR 36  CRP 1.03mg/dL                 7.7    4.42  )-----------( 238      ( 06 Aug 2019 11:01 )             24.0     08-06    135  |  101  |  29<H>  ----------------------------<  139<H>  3.7   |  23  |  1.39<H>    Ca    8.7      06 Aug 2019 07:19            RADIOLOGY & ADDITIONAL STUDIES:

## 2019-08-06 NOTE — PROGRESS NOTE ADULT - SUBJECTIVE AND OBJECTIVE BOX
Follow-up Pulm Progress Note  Abrahan Flannery MD  [phone]    Afebrile OOB in chair  HB decrease noted: confirmatory repeat pending  Says she feels a bit better today: denies CP or dyspnea    Medications:  Vital Signs Last 24 Hrs  T(C): 36.9 (06 Aug 2019 15:22), Max: 37 (06 Aug 2019 07:24)  T(F): 98.5 (06 Aug 2019 15:22), Max: 98.6 (06 Aug 2019 07:24)  HR: 101 (06 Aug 2019 15:22) (98 - 108)  BP: 123/66 (06 Aug 2019 15:22) (105/66 - 130/73)  BP(mean): --  RR: 18 (06 Aug 2019 15:22) (18 - 18)  SpO2: 100% (06 Aug 2019 15:22) (95% - 100%)      08-05 @ 07:01  -  08-06 @ 07:00  --------------------------------------------------------  IN: 1620 mL / OUT: 0 mL / NET: 1620 mL    LABS:                        7.7    4.42  )-----------( 238      ( 06 Aug 2019 11:01 )             24.0     08-06    135  |  101  |  29<H>  ----------------------------<  139<H>  3.7   |  23  |  1.39<H>    Ca    8.7      06 Aug 2019 07:19  Procalcitonin, Serum: 0.18 ng/mL (08-05-19 @ 11:25)    CULTURES:  Culture Results:   No growth to date. (08-05 @ 13:48)  Culture Results:   No growth to date. (08-05 @ 13:48)  Culture Results:   <10,000 CFU/mL Normal Urogenital Ashley (08-04 @ 22:37)    Most recent blood culture -- 08-05 @ 13:48   -- -- .Blood 08-05 @ 13:48  Most recent blood culture -- 08-04 @ 22:37   -- -- .Urine 08-04 @ 22:37      Physical Examination:  PULM:   CVS: Regular rate and rhythm, no murmurs, rubs, or gallops  ABD: Soft, non-tender  EXT:  No clubbing, cyanosis, or edema    RADIOLOGY REVIEWED  CXR:    CT chest:    TTE:

## 2019-08-06 NOTE — PROGRESS NOTE ADULT - SUBJECTIVE AND OBJECTIVE BOX
Subjective: Patient seen and examined. No new events except as noted.     SUBJECTIVE/ROS:  fatigue       MEDICATIONS:  MEDICATIONS  (STANDING):  buDESOnide 160 MICROgram(s)/formoterol 4.5 MICROgram(s) Inhaler 2 Puff(s) Inhalation two times a day  calcium carbonate   1250 mG (OsCal) 1 Tablet(s) Oral daily  chlorhexidine 2% Cloths 1 Application(s) Topical daily  cholecalciferol 1000 Unit(s) Oral daily  dextrose 50% Injectable 12.5 Gram(s) IV Push once  dextrose 50% Injectable 25 Gram(s) IV Push once  dextrose 50% Injectable 25 Gram(s) IV Push once  folic acid 1 milliGRAM(s) Oral daily  heparin  Injectable 5000 Unit(s) SubCutaneous every 8 hours  insulin glargine Injectable (LANTUS) 8 Unit(s) SubCutaneous at bedtime  insulin lispro (HumaLOG) corrective regimen sliding scale   SubCutaneous three times a day before meals  lactated ringers. 1000 milliLiter(s) (60 mL/Hr) IV Continuous <Continuous>  methotrexate 17.5 milliGRAM(s) Oral every week  nystatin Powder 1 Application(s) Topical two times a day      PHYSICAL EXAM:  T(C): 37 (08-06-19 @ 07:24), Max: 37 (08-06-19 @ 07:24)  HR: 108 (08-06-19 @ 07:24) (96 - 108)  BP: 122/72 (08-06-19 @ 07:24) (105/66 - 130/73)  RR: 18 (08-06-19 @ 07:24) (18 - 18)  SpO2: 96% (08-06-19 @ 07:24) (94% - 96%)  Wt(kg): --  I&O's Summary    05 Aug 2019 07:01  -  06 Aug 2019 07:00  --------------------------------------------------------  IN: 1620 mL / OUT: 0 mL / NET: 1620 mL            JVP: Normal  Neck: supple  Lung: clear   CV: S1 S2 , Murmur: pos nubia   Abd: soft  Ext: No edema  neuro: Awake / alert  Psych: flat affect  Skin: normal``    LABS/DATA:    CARDIAC MARKERS:                                9.7    4.8   )-----------( 244      ( 05 Aug 2019 14:25 )             28.7     08-05    137  |  105  |  36<H>  ----------------------------<  117<H>  4.1   |  21<L>  |  1.44<H>    Ca    8.9      05 Aug 2019 07:25    TPro  7.5  /  Alb  3.9  /  TBili  0.4  /  DBili  x   /  AST  15  /  ALT  9<L>  /  AlkPhos  74  08-04    proBNP:   Lipid Profile:   HgA1c:   TSH:     TELE:  EKG:

## 2019-08-06 NOTE — PROGRESS NOTE ADULT - SUBJECTIVE AND OBJECTIVE BOX
CC: f/u for elevated procal    Patient reports  feels a little stronger today  REVIEW OF SYSTEMS:  All other review of systems negative (Comprehensive ROS)    Antimicrobials Day #  :    Other Medications Reviewed    T(F): 98.6 (19 @ 07:24), Max: 98.6 (19 @ 07:24)  HR: 108 (19 @ 07:24)  BP: 122/72 (19 @ 07:24)  RR: 18 (19 @ 07:24)  SpO2: 96% (19 @ 07:24)  Wt(kg): --    PHYSICAL EXAM:  General: alert, no acute distress  Eyes:  anicteric, no conjunctival injection, no discharge  Oropharynx: no lesions or injection 	  Neck: supple, without adenopathy  Lungs: clear to auscultation  Heart: regular rate and rhythm; no murmur, rubs or gallops  Abdomen: soft, nondistended, nontender, without mass or organomegaly  Skin: no lesions  Extremities: no clubbing, cyanosis, or edema  Neurologic: alert, oriented, moves all extremities    LAB RESULTS:                        7.7    4.42  )-----------( 238      ( 06 Aug 2019 11:01 )             24.0     08-06    135  |  101  |  29<H>  ----------------------------<  139<H>  3.7   |  23  |  1.39<H>    Ca    8.7      06 Aug 2019 07:19    TPro  7.5  /  Alb  3.9  /  TBili  0.4  /  DBili  x   /  AST  15  /  ALT  9<L>  /  AlkPhos  74  08-04    LIVER FUNCTIONS - ( 04 Aug 2019 16:15 )  Alb: 3.9 g/dL / Pro: 7.5 g/dL / ALK PHOS: 74 U/L / ALT: 9 U/L / AST: 15 U/L / GGT: x           Urinalysis Basic - ( 04 Aug 2019 18:24 )    Color: Yellow / Appearance: Clear / S.020 / pH: x  Gluc: x / Ketone: Negative  / Bili: Negative / Urobili: Negative   Blood: x / Protein: 30 mg/dL / Nitrite: Negative   Leuk Esterase: Moderate / RBC: 4 /hpf / WBC 1 /HPF   Sq Epi: x / Non Sq Epi: 3 /hpf / Bacteria: Negative      MICROBIOLOGY:  RECENT CULTURES:   @ 22:37 .Urine     <10,000 CFU/mL Normal Urogenital Ashley          RADIOLOGY REVIEWED:      < from: Xray Chest 1 View- PORTABLE-Urgent (19 @ 23:12) >    IMPRESSION:   Bilateral lung peripheral reticular opacities, representing fibrotic   changes, unchanged.    < end of copied text >          Assessment:  Elderly woman with RA, ILD on mtx admitted for diffuse weakness, malaise, mild procal, crp, esr elevation, high RF, no support for infection at present.   Plan:  monitor off abx

## 2019-08-06 NOTE — PROGRESS NOTE ADULT - SUBJECTIVE AND OBJECTIVE BOX
Endocrinology Attending Covering for Dr. Akbar      Chief complaint  Patient is a 87y old  Female who presents with a chief complaint of Weakness (06 Aug 2019 07:48)   Review of systems  Patient in bed, looks comfortable, no fever,  had no hypoglycemia.    Labs and Fingersticks  CAPILLARY BLOOD GLUCOSE      POCT Blood Glucose.: 175 mg/dL (06 Aug 2019 07:47)  POCT Blood Glucose.: 172 mg/dL (05 Aug 2019 21:47)  POCT Blood Glucose.: 88 mg/dL (05 Aug 2019 17:40)  POCT Blood Glucose.: 181 mg/dL (05 Aug 2019 13:19)      Anion Gap, Serum: 11 (08-06 @ 07:19)  Anion Gap, Serum: 11 (08-05 @ 07:25)  Anion Gap, Serum: 16 (08-04 @ 16:15)      Calcium, Total Serum: 8.7 (08-06 @ 07:19)  Calcium, Total Serum: 8.9 (08-05 @ 07:25)  Calcium, Total Serum: 9.5 (08-04 @ 16:15)  Vitamin D, 25-Hydroxy: 39.2 (08-05 @ 10:04)  Albumin, Serum: 3.9 (08-04 @ 16:15)    Alanine Aminotransferase (ALT/SGPT): 9 <L> (08-04 @ 16:15)  Alkaline Phosphatase, Serum: 74 (08-04 @ 16:15)  Aspartate Aminotransferase (AST/SGOT): 15 (08-04 @ 16:15)        08-06    135  |  101  |  29<H>  ----------------------------<  139<H>  3.7   |  23  |  1.39<H>    Ca    8.7      06 Aug 2019 07:19    TPro  7.5  /  Alb  3.9  /  TBili  0.4  /  DBili  x   /  AST  15  /  ALT  9<L>  /  AlkPhos  74  08-04                        9.7    4.8   )-----------( 244      ( 05 Aug 2019 14:25 )             28.7     Medications  MEDICATIONS  (STANDING):  buDESOnide 160 MICROgram(s)/formoterol 4.5 MICROgram(s) Inhaler 2 Puff(s) Inhalation two times a day  calcium carbonate   1250 mG (OsCal) 1 Tablet(s) Oral daily  chlorhexidine 2% Cloths 1 Application(s) Topical daily  cholecalciferol 1000 Unit(s) Oral daily  dextrose 50% Injectable 12.5 Gram(s) IV Push once  dextrose 50% Injectable 25 Gram(s) IV Push once  dextrose 50% Injectable 25 Gram(s) IV Push once  folic acid 1 milliGRAM(s) Oral daily  heparin  Injectable 5000 Unit(s) SubCutaneous every 8 hours  insulin glargine Injectable (LANTUS) 8 Unit(s) SubCutaneous at bedtime  insulin lispro (HumaLOG) corrective regimen sliding scale   SubCutaneous three times a day before meals  lactated ringers. 1000 milliLiter(s) (60 mL/Hr) IV Continuous <Continuous>  methotrexate 17.5 milliGRAM(s) Oral every week  nystatin Powder 1 Application(s) Topical two times a day      Physical Exam  General: Patient comfortable in bed  Vital Signs Last 12 Hrs  T(F): 98.6 (08-06-19 @ 07:24), Max: 98.6 (08-06-19 @ 07:24)  HR: 108 (08-06-19 @ 07:24) (102 - 108)  BP: 122/72 (08-06-19 @ 07:24) (122/72 - 130/73)  BP(mean): --  RR: 18 (08-06-19 @ 07:24) (18 - 18)  SpO2: 96% (08-06-19 @ 07:24) (95% - 96%)  Neck: No palpable thyroid nodules.  CVS: S1S2, No murmurs  Respiratory: No wheezing, no crepitations  GI: Abdomen soft, bowel sounds positive  Musculoskeletal:  edema lower extremities.   Skin: No skin rashes, no ecchymosis    Diagnostics

## 2019-08-06 NOTE — PROGRESS NOTE ADULT - SUBJECTIVE AND OBJECTIVE BOX
Patient is a 87y old  Female who presents with a chief complaint of Weakness (06 Aug 2019 16:20)                                                               INTERVAL HPI/OVERNIGHT EVENTS:    REVIEW OF SYSTEMS:     CONSTITUTIONAL: No weakness, fevers or chills  EYES/ENT: No visual changes , no ear ache   NECK: No pain or stiffness  RESPIRATORY: No cough, wheezing,  No shortness of breath  CARDIOVASCULAR: No chest pain or palpitations  GASTROINTESTINAL: No abdominal pain  . No nausea, vomiting, or hematemesis; No diarrhea or constipation. No melena or hematochezia.  GENITOURINARY: No dysuria, frequency or hematuria  NEUROLOGICAL: No numbness or weakness  SKIN: No itching, burning, rashes, or lesions                                                                                                                                                                                                                                                                                 Medications:  MEDICATIONS  (STANDING):  buDESOnide 160 MICROgram(s)/formoterol 4.5 MICROgram(s) Inhaler 2 Puff(s) Inhalation two times a day  calcium carbonate   1250 mG (OsCal) 1 Tablet(s) Oral daily  chlorhexidine 2% Cloths 1 Application(s) Topical daily  cholecalciferol 1000 Unit(s) Oral daily  dextrose 50% Injectable 12.5 Gram(s) IV Push once  dextrose 50% Injectable 25 Gram(s) IV Push once  dextrose 50% Injectable 25 Gram(s) IV Push once  folic acid 1 milliGRAM(s) Oral daily  heparin  Injectable 5000 Unit(s) SubCutaneous every 8 hours  insulin glargine Injectable (LANTUS) 12 Unit(s) SubCutaneous at bedtime  insulin lispro (HumaLOG) corrective regimen sliding scale   SubCutaneous three times a day before meals  lactated ringers. 1000 milliLiter(s) (60 mL/Hr) IV Continuous <Continuous>  methotrexate 17.5 milliGRAM(s) Oral every week  nystatin Powder 1 Application(s) Topical two times a day    MEDICATIONS  (PRN):  ALBUTerol    0.083% 2.5 milliGRAM(s) Nebulizer every 6 hours PRN Shortness of Breath and/or Wheezing  dextrose 40% Gel 15 Gram(s) Oral once PRN Blood Glucose LESS THAN 70 milliGRAM(s)/deciliter  glucagon  Injectable 1 milliGRAM(s) IntraMuscular once PRN Glucose LESS THAN 70 milligrams/deciliter       Allergies    No Known Allergies    Intolerances      Vital Signs Last 24 Hrs  T(C): 36.9 (06 Aug 2019 15:22), Max: 37 (06 Aug 2019 07:24)  T(F): 98.5 (06 Aug 2019 15:22), Max: 98.6 (06 Aug 2019 07:24)  HR: 101 (06 Aug 2019 15:22) (101 - 108)  BP: 123/66 (06 Aug 2019 15:22) (122/72 - 130/73)  BP(mean): --  RR: 18 (06 Aug 2019 15:22) (18 - 18)  SpO2: 100% (06 Aug 2019 15:22) (95% - 100%)  CAPILLARY BLOOD GLUCOSE      POCT Blood Glucose.: 170 mg/dL (06 Aug 2019 17:37)  POCT Blood Glucose.: 169 mg/dL (06 Aug 2019 13:33)  POCT Blood Glucose.: 175 mg/dL (06 Aug 2019 07:47)  POCT Blood Glucose.: 172 mg/dL (05 Aug 2019 21:47)      08-05 @ 07:01  -  08-06 @ 07:00  --------------------------------------------------------  IN: 1620 mL / OUT: 0 mL / NET: 1620 mL    08-06 @ 07:01  -  08-06 @ 19:52  --------------------------------------------------------  IN: 480 mL / OUT: 0 mL / NET: 480 mL      Physical Exam:    Daily     Daily   General:  Well appearing, NAD, not cachetic  HEENT:  Nonicteric, PERRLA  CV:  RRR, S1S2   Lungs:  CTA B/L, no wheezes, rales, rhonchi  Abdomen:  Soft, non-tender, no distended, positive BS  Extremities:  2+ pulses, no c/c, no edema  Skin:  Warm and dry, no rashes  :  No mason  Neuro:  AAOx3, non-focal, grossly intact                                                                                                                                                                                                                                                                                                LABS:                               7.7    4.42  )-----------( 238      ( 06 Aug 2019 11:01 )             24.0                      08-06    135  |  101  |  29<H>  ----------------------------<  139<H>  3.7   |  23  |  1.39<H>    Ca    8.7      06 Aug 2019 07:19                         RADIOLOGY & ADDITIONAL TESTS         I personally reviewed: [  ]EKG   [  ]CXR    [  ] CT      A/P:         Discussed with :     Stefani consultants' Notes   Time spent : Patient is a 87y old  Female who presents with a chief complaint of Weakness (06 Aug 2019 16:20)                                                               INTERVAL HPI/OVERNIGHT EVENTS:    REVIEW OF SYSTEMS:     CONSTITUTIONAL: No weakness, fevers or chills  EYES/ENT: No visual changes , no ear ache   NECK: No pain or stiffness  RESPIRATORY: No cough, wheezing,  No shortness of breath  CARDIOVASCULAR: No chest pain or palpitations  GASTROINTESTINAL: No abdominal pain  . No nausea, vomiting, or hematemesis; No diarrhea or constipation. No melena or hematochezia.  GENITOURINARY: No dysuria, frequency or hematuria  NEUROLOGICAL: No numbness or weakness                                                                                                                                                                                                                                                                         Medications:  MEDICATIONS  (STANDING):  buDESOnide 160 MICROgram(s)/formoterol 4.5 MICROgram(s) Inhaler 2 Puff(s) Inhalation two times a day  calcium carbonate   1250 mG (OsCal) 1 Tablet(s) Oral daily  chlorhexidine 2% Cloths 1 Application(s) Topical daily  cholecalciferol 1000 Unit(s) Oral daily  dextrose 50% Injectable 12.5 Gram(s) IV Push once  dextrose 50% Injectable 25 Gram(s) IV Push once  dextrose 50% Injectable 25 Gram(s) IV Push once  folic acid 1 milliGRAM(s) Oral daily  heparin  Injectable 5000 Unit(s) SubCutaneous every 8 hours  insulin glargine Injectable (LANTUS) 12 Unit(s) SubCutaneous at bedtime  insulin lispro (HumaLOG) corrective regimen sliding scale   SubCutaneous three times a day before meals  lactated ringers. 1000 milliLiter(s) (60 mL/Hr) IV Continuous <Continuous>  methotrexate 17.5 milliGRAM(s) Oral every week  nystatin Powder 1 Application(s) Topical two times a day    MEDICATIONS  (PRN):  ALBUTerol    0.083% 2.5 milliGRAM(s) Nebulizer every 6 hours PRN Shortness of Breath and/or Wheezing  dextrose 40% Gel 15 Gram(s) Oral once PRN Blood Glucose LESS THAN 70 milliGRAM(s)/deciliter  glucagon  Injectable 1 milliGRAM(s) IntraMuscular once PRN Glucose LESS THAN 70 milligrams/deciliter       Allergies    No Known Allergies    Intolerances      Vital Signs Last 24 Hrs  T(C): 36.9 (06 Aug 2019 15:22), Max: 37 (06 Aug 2019 07:24)  T(F): 98.5 (06 Aug 2019 15:22), Max: 98.6 (06 Aug 2019 07:24)  HR: 101 (06 Aug 2019 15:22) (101 - 108)  BP: 123/66 (06 Aug 2019 15:22) (122/72 - 130/73)  BP(mean): --  RR: 18 (06 Aug 2019 15:22) (18 - 18)  SpO2: 100% (06 Aug 2019 15:22) (95% - 100%)  CAPILLARY BLOOD GLUCOSE      POCT Blood Glucose.: 170 mg/dL (06 Aug 2019 17:37)  POCT Blood Glucose.: 169 mg/dL (06 Aug 2019 13:33)  POCT Blood Glucose.: 175 mg/dL (06 Aug 2019 07:47)  POCT Blood Glucose.: 172 mg/dL (05 Aug 2019 21:47)      08-05 @ 07:01  -  08-06 @ 07:00  --------------------------------------------------------  IN: 1620 mL / OUT: 0 mL / NET: 1620 mL    08-06 @ 07:01  -  08-06 @ 19:52  --------------------------------------------------------  IN: 480 mL / OUT: 0 mL / NET: 480 mL      Physical Exam:    Daily     Daily   General: elderly in NAD   HEENT:  Nonicteric, PERRLA  CV:  RRR, S1S2   Lungs:  CTA B/L, no wheezes, rales, rhonchi  Abdomen:  Soft, non-tender, no distended, positive BS  Extremities:  2+ pulses, no c/c, no edema  Skin:  Warm and dry, no rashes  :  No mason  Neuro:  AAOx3, non-focal, grossly intact                                                                                                                                                                                                                                                                                                LABS:                               7.7    4.42  )-----------( 238      ( 06 Aug 2019 11:01 )             24.0                      08-06    135  |  101  |  29<H>  ----------------------------<  139<H>  3.7   |  23  |  1.39<H>    Ca    8.7      06 Aug 2019 07:19                        Rviewed consultants' Notes

## 2019-08-06 NOTE — CONSULT NOTE ADULT - ATTENDING COMMENTS
[facility] NEPHROLOGY  Jarrell Payne M.D.  Terry Collins D.O.  Kenia Blackburn M.D.  Ayah Longoria, MSN, ANP-C    Telephone: [phone]  Facsimile: [phone]    [address] [place] [address]

## 2019-08-07 LAB
ANION GAP SERPL CALC-SCNC: 13 MMOL/L — SIGNIFICANT CHANGE UP (ref 5–17)
BLD GP AB SCN SERPL QL: NEGATIVE — SIGNIFICANT CHANGE UP
BUN SERPL-MCNC: 24 MG/DL — HIGH (ref 7–23)
CALCIUM SERPL-MCNC: 9.2 MG/DL — SIGNIFICANT CHANGE UP (ref 8.4–10.5)
CHLORIDE SERPL-SCNC: 105 MMOL/L — SIGNIFICANT CHANGE UP (ref 96–108)
CO2 SERPL-SCNC: 23 MMOL/L — SIGNIFICANT CHANGE UP (ref 22–31)
CREAT SERPL-MCNC: 1.19 MG/DL — SIGNIFICANT CHANGE UP (ref 0.5–1.3)
CRYPTOC AG FLD QL: NEGATIVE — SIGNIFICANT CHANGE UP
FUNGITELL: <31 PG/ML — SIGNIFICANT CHANGE UP
GLUCOSE BLDC GLUCOMTR-MCNC: 117 MG/DL — HIGH (ref 70–99)
GLUCOSE BLDC GLUCOMTR-MCNC: 152 MG/DL — HIGH (ref 70–99)
GLUCOSE BLDC GLUCOMTR-MCNC: 216 MG/DL — HIGH (ref 70–99)
GLUCOSE SERPL-MCNC: 143 MG/DL — HIGH (ref 70–99)
HCT VFR BLD CALC: 23.5 % — LOW (ref 34.5–45)
HCT VFR BLD CALC: 24.4 % — LOW (ref 34.5–45)
HGB BLD-MCNC: 7.7 G/DL — LOW (ref 11.5–15.5)
HGB BLD-MCNC: 8.6 G/DL — LOW (ref 11.5–15.5)
MCHC RBC-ENTMCNC: 31.8 PG — SIGNIFICANT CHANGE UP (ref 27–34)
MCHC RBC-ENTMCNC: 32.8 GM/DL — SIGNIFICANT CHANGE UP (ref 32–36)
MCHC RBC-ENTMCNC: 34.3 PG — HIGH (ref 27–34)
MCHC RBC-ENTMCNC: 35.3 GM/DL — SIGNIFICANT CHANGE UP (ref 32–36)
MCV RBC AUTO: 97.1 FL — SIGNIFICANT CHANGE UP (ref 80–100)
MCV RBC AUTO: 97.2 FL — SIGNIFICANT CHANGE UP (ref 80–100)
NT-PROBNP SERPL-SCNC: 3254 PG/ML — HIGH (ref 0–300)
PLATELET # BLD AUTO: 256 K/UL — SIGNIFICANT CHANGE UP (ref 150–400)
PLATELET # BLD AUTO: 275 K/UL — SIGNIFICANT CHANGE UP (ref 150–400)
POTASSIUM SERPL-MCNC: 4 MMOL/L — SIGNIFICANT CHANGE UP (ref 3.5–5.3)
POTASSIUM SERPL-SCNC: 4 MMOL/L — SIGNIFICANT CHANGE UP (ref 3.5–5.3)
RAPID RVP RESULT: SIGNIFICANT CHANGE UP
RBC # BLD: 2.42 M/UL — LOW (ref 3.8–5.2)
RBC # BLD: 2.51 M/UL — LOW (ref 3.8–5.2)
RBC # FLD: 14.3 % — SIGNIFICANT CHANGE UP (ref 10.3–14.5)
RBC # FLD: 14.6 % — HIGH (ref 10.3–14.5)
RH IG SCN BLD-IMP: NEGATIVE — SIGNIFICANT CHANGE UP
SODIUM SERPL-SCNC: 141 MMOL/L — SIGNIFICANT CHANGE UP (ref 135–145)
WBC # BLD: 2.9 K/UL — LOW (ref 3.8–10.5)
WBC # BLD: 3.52 K/UL — LOW (ref 3.8–10.5)
WBC # FLD AUTO: 2.9 K/UL — LOW (ref 3.8–10.5)
WBC # FLD AUTO: 3.52 K/UL — LOW (ref 3.8–10.5)

## 2019-08-07 PROCEDURE — 71045 X-RAY EXAM CHEST 1 VIEW: CPT | Mod: 26

## 2019-08-07 RX ORDER — INSULIN LISPRO 100/ML
2 VIAL (ML) SUBCUTANEOUS
Refills: 0 | Status: DISCONTINUED | OUTPATIENT
Start: 2019-08-07 | End: 2019-08-08

## 2019-08-07 RX ADMIN — NYSTATIN CREAM 1 APPLICATION(S): 100000 CREAM TOPICAL at 17:33

## 2019-08-07 RX ADMIN — CHLORHEXIDINE GLUCONATE 1 APPLICATION(S): 213 SOLUTION TOPICAL at 12:02

## 2019-08-07 RX ADMIN — BUDESONIDE AND FORMOTEROL FUMARATE DIHYDRATE 2 PUFF(S): 160; 4.5 AEROSOL RESPIRATORY (INHALATION) at 18:46

## 2019-08-07 RX ADMIN — Medication 1 TABLET(S): at 12:02

## 2019-08-07 RX ADMIN — HEPARIN SODIUM 5000 UNIT(S): 5000 INJECTION INTRAVENOUS; SUBCUTANEOUS at 21:19

## 2019-08-07 RX ADMIN — Medication 2 UNIT(S): at 19:16

## 2019-08-07 RX ADMIN — NYSTATIN CREAM 1 APPLICATION(S): 100000 CREAM TOPICAL at 05:35

## 2019-08-07 RX ADMIN — Medication 1 MILLIGRAM(S): at 12:02

## 2019-08-07 RX ADMIN — HEPARIN SODIUM 5000 UNIT(S): 5000 INJECTION INTRAVENOUS; SUBCUTANEOUS at 05:35

## 2019-08-07 RX ADMIN — Medication 1: at 19:17

## 2019-08-07 RX ADMIN — Medication 1000 UNIT(S): at 12:02

## 2019-08-07 RX ADMIN — Medication 100 MILLIGRAM(S): at 21:18

## 2019-08-07 RX ADMIN — Medication 200 MILLIGRAM(S): at 21:18

## 2019-08-07 RX ADMIN — HEPARIN SODIUM 5000 UNIT(S): 5000 INJECTION INTRAVENOUS; SUBCUTANEOUS at 14:45

## 2019-08-07 RX ADMIN — BUDESONIDE AND FORMOTEROL FUMARATE DIHYDRATE 2 PUFF(S): 160; 4.5 AEROSOL RESPIRATORY (INHALATION) at 05:35

## 2019-08-07 RX ADMIN — INSULIN GLARGINE 12 UNIT(S): 100 INJECTION, SOLUTION SUBCUTANEOUS at 21:18

## 2019-08-07 RX ADMIN — ALBUTEROL 2.5 MILLIGRAM(S): 90 AEROSOL, METERED ORAL at 18:46

## 2019-08-07 NOTE — PROGRESS NOTE ADULT - SUBJECTIVE AND OBJECTIVE BOX
[facility] NEPHROLOGY- PROGRESS NOTE    87y Female with history of ILD, aortic stenosis presents with weakness. Nephrology consulted for elevated Scr.    REVIEW OF SYSTEMS:  Gen: no changes in weight, + weakness  Cards: no chest pain  Resp: no dyspnea, + cough  GI: no nausea or vomiting or diarrhea  Vascular: no LE edema    No Known Allergies      Hospital Medications: Medications reviewed    VITALS:  T(F): 98.1 (19 @ 00:39), Max: 98.5 (19 @ 15:22)  HR: 111 (19 @ 00:39)  BP: 134/71 (19 @ 00:39)  RR: 18 (19 @ 00:39)  SpO2: 95% (19 00:39)  Wt(kg): --  Height (cm): 152.4 ( 22:44)  Weight (kg): 56.2 ( 22:44)  BMI (kg/m2): 24.2 ( 22:44)  BSA (m2): 1.52 ( 22:44)     @ 07:01  -   @ 07:00  --------------------------------------------------------  IN: 1780 mL / OUT: 0 mL / NET: 1780 mL        PHYSICAL EXAM:    Gen: NAD, calm  Cards: + tachycardia, +S1/S2, + BRITTNY  Resp: + rales anteriorly  GI: soft, NT/ND, NABS  Vascular: no LE edema B/L    LABS:      141  |  105  |  24<H>  ----------------------------<  143<H>  4.0   |  23  |  1.19    Ca    9.2      07 Aug 2019 07:12      Creatinine Trend: 1.19 <--, 1.39 <--, 1.44 <--, 1.66 <--                        7.7    4.42  )-----------( 238      ( 06 Aug 2019 11:01 )             24.0     Urine Studies:  Urinalysis Basic - ( 04 Aug 2019 18:24 )    Color: Yellow / Appearance: Clear / S.020 / pH:   Gluc:  / Ketone: Negative  / Bili: Negative / Urobili: Negative   Blood:  / Protein: 30 mg/dL / Nitrite: Negative   Leuk Esterase: Moderate / RBC: 4 /hpf / WBC 1 /HPF   Sq Epi:  / Non Sq Epi: 3 /hpf / Bacteria: Negative      Sodium, Random Urine: 84 mmol/L ( @ 18:31)  Creatinine, Random Urine: 73 mg/dL ( @ 18:31)  Protein/Creatinine Ratio Calculation: 0.4 Ratio ( @ 18:31)

## 2019-08-07 NOTE — PROGRESS NOTE ADULT - SUBJECTIVE AND OBJECTIVE BOX
Endocrinology Attending Covering for Dr. Akbar      Chief complaint  Patient is a 87y old  Female who presents with a chief complaint of Weakness (07 Aug 2019 10:37)   Review of systems  Patient in bed, looks comfortable, no fever,  had no hypoglycemic  complaining of dry cough  Labs and Fingersticks  CAPILLARY BLOOD GLUCOSE      POCT Blood Glucose.: 117 mg/dL (07 Aug 2019 09:36)  POCT Blood Glucose.: 211 mg/dL (06 Aug 2019 21:47)  POCT Blood Glucose.: 170 mg/dL (06 Aug 2019 17:37)  POCT Blood Glucose.: 169 mg/dL (06 Aug 2019 13:33)      Anion Gap, Serum: 13 (08-07 @ 07:12)  Anion Gap, Serum: 11 (08-06 @ 07:19)      Calcium, Total Serum: 9.2 (08-07 @ 07:12)  Calcium, Total Serum: 8.7 (08-06 @ 07:19)          08-07    141  |  105  |  24<H>  ----------------------------<  143<H>  4.0   |  23  |  1.19    Ca    9.2      07 Aug 2019 07:12                          7.7    3.52  )-----------( 256      ( 07 Aug 2019 09:11 )             23.5     Medications  MEDICATIONS  (STANDING):  buDESOnide 160 MICROgram(s)/formoterol 4.5 MICROgram(s) Inhaler 2 Puff(s) Inhalation two times a day  calcium carbonate   1250 mG (OsCal) 1 Tablet(s) Oral daily  chlorhexidine 2% Cloths 1 Application(s) Topical daily  cholecalciferol 1000 Unit(s) Oral daily  dextrose 50% Injectable 12.5 Gram(s) IV Push once  dextrose 50% Injectable 25 Gram(s) IV Push once  dextrose 50% Injectable 25 Gram(s) IV Push once  folic acid 1 milliGRAM(s) Oral daily  heparin  Injectable 5000 Unit(s) SubCutaneous every 8 hours  insulin glargine Injectable (LANTUS) 12 Unit(s) SubCutaneous at bedtime  insulin lispro (HumaLOG) corrective regimen sliding scale   SubCutaneous three times a day before meals  lactated ringers. 1000 milliLiter(s) (60 mL/Hr) IV Continuous <Continuous>  nystatin Powder 1 Application(s) Topical two times a day      Physical Exam  General: Patient comfortable in bed  Vital Signs Last 12 Hrs  T(F): 97.8 (08-07-19 @ 09:21), Max: 98.1 (08-07-19 @ 00:39)  HR: 99 (08-07-19 @ 09:21) (99 - 111)  BP: 127/71 (08-07-19 @ 09:21) (127/71 - 134/71)  BP(mean): --  RR: 18 (08-07-19 @ 09:21) (18 - 18)  SpO2: 93% (08-07-19 @ 09:21) (93% - 95%)  Neck: No palpable thyroid nodules.  CVS: S1S2, No murmurs  Respiratory: No wheezing, no crepitations  GI: Abdomen soft, bowel sounds positive  Musculoskeletal:  edema lower extremities.   Skin: No skin rashes, no ecchymosis    Diagnostics

## 2019-08-07 NOTE — PROGRESS NOTE ADULT - SUBJECTIVE AND OBJECTIVE BOX
Patient is a 87y old  Female who presents with a chief complaint of Weakness (07 Aug 2019 11:47)                                                               INTERVAL HPI/OVERNIGHT EVENTS:    REVIEW OF SYSTEMS:     CONSTITUTIONAL: No weakness, fevers or chills  EYES/ENT: No visual changes , no ear ache   NECK: No pain or stiffness  RESPIRATORY: No cough, wheezing,  No shortness of breath  CARDIOVASCULAR: No chest pain or palpitations  GASTROINTESTINAL: No abdominal pain  . No nausea, vomiting, or hematemesis; No diarrhea or constipation. No melena or hematochezia.  GENITOURINARY: No dysuria, frequency or hematuria  NEUROLOGICAL: No numbness or weakness  SKIN: No itching, burning, rashes, or lesions                                                                                                                                                                                                                                                                                 Medications:  MEDICATIONS  (STANDING):  benzonatate 100 milliGRAM(s) Oral three times a day  buDESOnide 160 MICROgram(s)/formoterol 4.5 MICROgram(s) Inhaler 2 Puff(s) Inhalation two times a day  calcium carbonate   1250 mG (OsCal) 1 Tablet(s) Oral daily  chlorhexidine 2% Cloths 1 Application(s) Topical daily  cholecalciferol 1000 Unit(s) Oral daily  dextrose 50% Injectable 12.5 Gram(s) IV Push once  dextrose 50% Injectable 25 Gram(s) IV Push once  dextrose 50% Injectable 25 Gram(s) IV Push once  folic acid 1 milliGRAM(s) Oral daily  heparin  Injectable 5000 Unit(s) SubCutaneous every 8 hours  insulin glargine Injectable (LANTUS) 12 Unit(s) SubCutaneous at bedtime  insulin lispro (HumaLOG) corrective regimen sliding scale   SubCutaneous three times a day before meals  insulin lispro Injectable (HumaLOG) 2 Unit(s) SubCutaneous three times a day with meals  lactated ringers. 1000 milliLiter(s) (60 mL/Hr) IV Continuous <Continuous>  nystatin Powder 1 Application(s) Topical two times a day    MEDICATIONS  (PRN):  ALBUTerol    0.083% 2.5 milliGRAM(s) Nebulizer every 6 hours PRN Shortness of Breath and/or Wheezing  dextrose 40% Gel 15 Gram(s) Oral once PRN Blood Glucose LESS THAN 70 milliGRAM(s)/deciliter  glucagon  Injectable 1 milliGRAM(s) IntraMuscular once PRN Glucose LESS THAN 70 milligrams/deciliter  guaiFENesin   Syrup  (Sugar-Free) 200 milliGRAM(s) Oral every 6 hours PRN Cough       Allergies    No Known Allergies    Intolerances      Vital Signs Last 24 Hrs  T(C): 37.4 (07 Aug 2019 16:29), Max: 37.4 (07 Aug 2019 16:29)  T(F): 99.3 (07 Aug 2019 16:29), Max: 99.3 (07 Aug 2019 16:29)  HR: 95 (07 Aug 2019 16:29) (95 - 111)  BP: 134/75 (07 Aug 2019 16:29) (127/71 - 134/75)  BP(mean): --  RR: 18 (07 Aug 2019 16:29) (18 - 18)  SpO2: 95% (07 Aug 2019 16:29) (93% - 95%)  CAPILLARY BLOOD GLUCOSE      POCT Blood Glucose.: 216 mg/dL (07 Aug 2019 21:14)  POCT Blood Glucose.: 152 mg/dL (07 Aug 2019 19:05)  POCT Blood Glucose.: 117 mg/dL (07 Aug 2019 09:36)       @ 07:01  -   @ 07:00  --------------------------------------------------------  IN: 1780 mL / OUT: 0 mL / NET: 1780 mL      Physical Exam:    Daily     Daily Weight in k.6 (07 Aug 2019 09:21)  General:  Well appearing, NAD, not cachetic  HEENT:  Nonicteric, PERRLA  CV:  RRR, S1S2   Lungs:  CTA B/L, no wheezes, rales, rhonchi  Abdomen:  Soft, non-tender, no distended, positive BS  Extremities:  2+ pulses, no c/c, no edema  Skin:  Warm and dry, no rashes  :  No mason  Neuro:  AAOx3, non-focal, grossly intact                                                                                                                                                                                                                                                                                                LABS:                               8.6    2.9   )-----------( 275      ( 07 Aug 2019 21:09 )             24.4                          141  |  105  |  24<H>  ----------------------------<  143<H>  4.0   |  23  |  1.19    Ca    9.2      07 Aug 2019 07:12                         RADIOLOGY & ADDITIONAL TESTS         I personally reviewed: [  ]EKG   [  ]CXR    [  ] CT      A/P:         Discussed with :     Stefani consultants' Notes   Time spent : Patient is a 87y old  Female who presents with a chief complaint of Weakness (07 Aug 2019 11:47)                                                               INTERVAL HPI/OVERNIGHT EVENTS:    REVIEW OF SYSTEMS:     CONSTITUTIONAL: No weakness, fevers or chills  EYES/ENT: No visual changes , no ear ache   NECK: No pain or stiffness  RESPIRATORY: cough and worse laying down   no CP but with mild sob   CARDIOVASCULAR: No chest pain or palpitations  GASTROINTESTINAL: No abdominal pain  . No nausea, vomiting, or hematemesis; No diarrhea or constipation. No melena or hematochezia.  GENITOURINARY: No dysuria, frequency or hematuria  NEUROLOGICAL: No numbness or weakness                                                                                                                                                                                                                                                                            Medications:  MEDICATIONS  (STANDING):  benzonatate 100 milliGRAM(s) Oral three times a day  buDESOnide 160 MICROgram(s)/formoterol 4.5 MICROgram(s) Inhaler 2 Puff(s) Inhalation two times a day  calcium carbonate   1250 mG (OsCal) 1 Tablet(s) Oral daily  chlorhexidine 2% Cloths 1 Application(s) Topical daily  cholecalciferol 1000 Unit(s) Oral daily  dextrose 50% Injectable 12.5 Gram(s) IV Push once  dextrose 50% Injectable 25 Gram(s) IV Push once  dextrose 50% Injectable 25 Gram(s) IV Push once  folic acid 1 milliGRAM(s) Oral daily  heparin  Injectable 5000 Unit(s) SubCutaneous every 8 hours  insulin glargine Injectable (LANTUS) 12 Unit(s) SubCutaneous at bedtime  insulin lispro (HumaLOG) corrective regimen sliding scale   SubCutaneous three times a day before meals  insulin lispro Injectable (HumaLOG) 2 Unit(s) SubCutaneous three times a day with meals  lactated ringers. 1000 milliLiter(s) (60 mL/Hr) IV Continuous <Continuous>  nystatin Powder 1 Application(s) Topical two times a day    MEDICATIONS  (PRN):  ALBUTerol    0.083% 2.5 milliGRAM(s) Nebulizer every 6 hours PRN Shortness of Breath and/or Wheezing  dextrose 40% Gel 15 Gram(s) Oral once PRN Blood Glucose LESS THAN 70 milliGRAM(s)/deciliter  glucagon  Injectable 1 milliGRAM(s) IntraMuscular once PRN Glucose LESS THAN 70 milligrams/deciliter  guaiFENesin   Syrup  (Sugar-Free) 200 milliGRAM(s) Oral every 6 hours PRN Cough       Allergies    No Known Allergies    Intolerances      Vital Signs Last 24 Hrs  T(C): 37.4 (07 Aug 2019 16:29), Max: 37.4 (07 Aug 2019 16:29)  T(F): 99.3 (07 Aug 2019 16:29), Max: 99.3 (07 Aug 2019 16:29)  HR: 95 (07 Aug 2019 16:29) (95 - 111)  BP: 134/75 (07 Aug 2019 16:29) (127/71 - 134/75)  BP(mean): --  RR: 18 (07 Aug 2019 16:29) (18 - 18)  SpO2: 95% (07 Aug 2019 16:29) (93% - 95%)  CAPILLARY BLOOD GLUCOSE      POCT Blood Glucose.: 216 mg/dL (07 Aug 2019 21:14)  POCT Blood Glucose.: 152 mg/dL (07 Aug 2019 19:05)  POCT Blood Glucose.: 117 mg/dL (07 Aug 2019 09:36)      - @ 07:01  -  08-07 @ 07:00  --------------------------------------------------------  IN: 1780 mL / OUT: 0 mL / NET: 1780 mL      Physical Exam:      Daily Weight in k.6 (07 Aug 2019 09:21)  General: NAD   HEENT:  Nonicteric, PERRLA  CV:  RRR, S1S2   Lungs:  crackles B   Abdomen:  Soft, non-tender, no distended, positive BS  Extremities:  no edema  Skin:  Warm and dry, no rashes  :  No mason  Neuro:  AAOx3, non-focal, grossly intact                                                                                                                                                                                                                                                                                                LABS:                               8.6    2.9   )-----------( 275      ( 07 Aug 2019 21:09 )             24.4                          141  |  105  |  24<H>  ----------------------------<  143<H>  4.0   |  23  |  1.19    Ca    9.2      07 Aug 2019 07:12

## 2019-08-07 NOTE — PROGRESS NOTE ADULT - SUBJECTIVE AND OBJECTIVE BOX
Subjective: Patient seen and examined. No new events except as noted.     SUBJECTIVE/ROS:  has cough       MEDICATIONS:  MEDICATIONS  (STANDING):  buDESOnide 160 MICROgram(s)/formoterol 4.5 MICROgram(s) Inhaler 2 Puff(s) Inhalation two times a day  calcium carbonate   1250 mG (OsCal) 1 Tablet(s) Oral daily  chlorhexidine 2% Cloths 1 Application(s) Topical daily  cholecalciferol 1000 Unit(s) Oral daily  dextrose 50% Injectable 12.5 Gram(s) IV Push once  dextrose 50% Injectable 25 Gram(s) IV Push once  dextrose 50% Injectable 25 Gram(s) IV Push once  folic acid 1 milliGRAM(s) Oral daily  heparin  Injectable 5000 Unit(s) SubCutaneous every 8 hours  insulin glargine Injectable (LANTUS) 12 Unit(s) SubCutaneous at bedtime  insulin lispro (HumaLOG) corrective regimen sliding scale   SubCutaneous three times a day before meals  lactated ringers. 1000 milliLiter(s) (60 mL/Hr) IV Continuous <Continuous>  nystatin Powder 1 Application(s) Topical two times a day      PHYSICAL EXAM:  T(C): 36.6 (08-07-19 @ 09:21), Max: 36.9 (08-06-19 @ 15:22)  HR: 99 (08-07-19 @ 09:21) (99 - 111)  BP: 127/71 (08-07-19 @ 09:21) (123/66 - 134/71)  RR: 18 (08-07-19 @ 09:21) (18 - 18)  SpO2: 93% (08-07-19 @ 09:21) (93% - 100%)  Wt(kg): --  I&O's Summary    06 Aug 2019 07:01  -  07 Aug 2019 07:00  --------------------------------------------------------  IN: 1780 mL / OUT: 0 mL / NET: 1780 mL            JVP: Normal  Neck: supple  Lung: dry crackles   CV: S1 S2 , Murmur: nubia   Abd: soft  Ext: No edema  neuro: Awake / alert  Psych: flat affect  Skin: normal``    LABS/DATA:    CARDIAC MARKERS:                                7.7    3.52  )-----------( 256      ( 07 Aug 2019 09:11 )             23.5     08-07    141  |  105  |  24<H>  ----------------------------<  143<H>  4.0   |  23  |  1.19    Ca    9.2      07 Aug 2019 07:12      proBNP:   Lipid Profile:   HgA1c:   TSH:     TELE:  EKG:

## 2019-08-07 NOTE — PROGRESS NOTE ADULT - SUBJECTIVE AND OBJECTIVE BOX
Follow-up Pulm Progress Note  Abrahan Flannery MD  [phone]    Afebrile OOB in chair  C/O dry cough overnight; no dyspnea or CP now  TTE pending  Anemia noted    Vital Signs Last 24 Hrs  T(C): 36.6 (07 Aug 2019 09:21), Max: 36.9 (06 Aug 2019 15:22)  T(F): 97.8 (07 Aug 2019 09:21), Max: 98.5 (06 Aug 2019 15:22)  HR: 99 (07 Aug 2019 09:21) (99 - 111)  BP: 127/71 (07 Aug 2019 09:21) (123/66 - 134/71)  BP(mean): --  RR: 18 (07 Aug 2019 09:21) (18 - 18)  SpO2: 93% (07 Aug 2019 09:21) (93% - 100%)                          7.7    3.52  )-----------( 256      ( 07 Aug 2019 09:11 )             23.5     08-07    141  |  105  |  24<H>  ----------------------------<  143<H>  4.0   |  23  |  1.19    Ca    9.2      07 Aug 2019 07:12      Procalcitonin, Serum: 0.18 ng/mL (08-05-19 @ 11:25)    CULTURES:  Culture Results:   No growth to date. (08-05 @ 13:48)  Culture Results:   No growth to date. (08-05 @ 13:48)  Culture Results:   <10,000 CFU/mL Normal Urogenital Ashley (08-04 @ 22:37)    Most recent blood culture -- 08-05 @ 13:48   -- -- .Blood 08-05 @ 13:48  Most recent blood culture -- 08-04 @ 22:37   -- -- .Urine 08-04 @ 22:37      Physical Examination:  PULM:   CVS: Regular rate and rhythm, no murmurs, rubs, or gallops  ABD: Soft, non-tender  EXT:  No clubbing, cyanosis, or edema    RADIOLOGY REVIEWED  CXR:    CT chest:    TTE:

## 2019-08-08 LAB
ANA TITR SER: NEGATIVE — SIGNIFICANT CHANGE UP
ANION GAP SERPL CALC-SCNC: 12 MMOL/L — SIGNIFICANT CHANGE UP (ref 5–17)
BUN SERPL-MCNC: 18 MG/DL — SIGNIFICANT CHANGE UP (ref 7–23)
CALCIUM SERPL-MCNC: 9.2 MG/DL — SIGNIFICANT CHANGE UP (ref 8.4–10.5)
CHLORIDE SERPL-SCNC: 103 MMOL/L — SIGNIFICANT CHANGE UP (ref 96–108)
CO2 SERPL-SCNC: 24 MMOL/L — SIGNIFICANT CHANGE UP (ref 22–31)
CREAT SERPL-MCNC: 1.1 MG/DL — SIGNIFICANT CHANGE UP (ref 0.5–1.3)
FERRITIN SERPL-MCNC: 186 NG/ML — HIGH (ref 15–150)
GLUCOSE BLDC GLUCOMTR-MCNC: 127 MG/DL — HIGH (ref 70–99)
GLUCOSE BLDC GLUCOMTR-MCNC: 128 MG/DL — HIGH (ref 70–99)
GLUCOSE BLDC GLUCOMTR-MCNC: 136 MG/DL — HIGH (ref 70–99)
GLUCOSE BLDC GLUCOMTR-MCNC: 148 MG/DL — HIGH (ref 70–99)
GLUCOSE BLDC GLUCOMTR-MCNC: 151 MG/DL — HIGH (ref 70–99)
GLUCOSE SERPL-MCNC: 116 MG/DL — HIGH (ref 70–99)
HAPTOGLOB SERPL-MCNC: 97 MG/DL — SIGNIFICANT CHANGE UP (ref 34–200)
HCT VFR BLD CALC: 23.5 % — LOW (ref 34.5–45)
HGB BLD-MCNC: 7.6 G/DL — LOW (ref 11.5–15.5)
IRON SATN MFR SERPL: 24 % — SIGNIFICANT CHANGE UP (ref 14–50)
IRON SATN MFR SERPL: 39 UG/DL — SIGNIFICANT CHANGE UP (ref 30–160)
LDH SERPL L TO P-CCNC: 276 U/L — HIGH (ref 50–242)
MCHC RBC-ENTMCNC: 31.3 PG — SIGNIFICANT CHANGE UP (ref 27–34)
MCHC RBC-ENTMCNC: 32.3 GM/DL — SIGNIFICANT CHANGE UP (ref 32–36)
MCV RBC AUTO: 96.7 FL — SIGNIFICANT CHANGE UP (ref 80–100)
PLATELET # BLD AUTO: 266 K/UL — SIGNIFICANT CHANGE UP (ref 150–400)
POTASSIUM SERPL-MCNC: 3.7 MMOL/L — SIGNIFICANT CHANGE UP (ref 3.5–5.3)
POTASSIUM SERPL-SCNC: 3.7 MMOL/L — SIGNIFICANT CHANGE UP (ref 3.5–5.3)
RBC # BLD: 2.43 M/UL — LOW (ref 3.8–5.2)
RBC # FLD: 14.5 % — SIGNIFICANT CHANGE UP (ref 10.3–14.5)
SODIUM SERPL-SCNC: 139 MMOL/L — SIGNIFICANT CHANGE UP (ref 135–145)
TIBC SERPL-MCNC: 164 UG/DL — LOW (ref 220–430)
TSH SERPL-MCNC: 2.95 UIU/ML — SIGNIFICANT CHANGE UP (ref 0.27–4.2)
UIBC SERPL-MCNC: 125 UG/DL — SIGNIFICANT CHANGE UP (ref 110–370)
WBC # BLD: 2.74 K/UL — LOW (ref 3.8–10.5)
WBC # FLD AUTO: 2.74 K/UL — LOW (ref 3.8–10.5)

## 2019-08-08 PROCEDURE — 93970 EXTREMITY STUDY: CPT | Mod: 26

## 2019-08-08 RX ORDER — IBUPROFEN 200 MG
200 TABLET ORAL
Refills: 0 | Status: DISCONTINUED | OUTPATIENT
Start: 2019-08-08 | End: 2019-08-09

## 2019-08-08 RX ORDER — INSULIN LISPRO 100/ML
2 VIAL (ML) SUBCUTANEOUS
Refills: 0 | Status: DISCONTINUED | OUTPATIENT
Start: 2019-08-08 | End: 2019-08-09

## 2019-08-08 RX ORDER — INSULIN LISPRO 100/ML
4 VIAL (ML) SUBCUTANEOUS
Refills: 0 | Status: DISCONTINUED | OUTPATIENT
Start: 2019-08-08 | End: 2019-08-09

## 2019-08-08 RX ORDER — ACETAMINOPHEN 500 MG
650 TABLET ORAL ONCE
Refills: 0 | Status: COMPLETED | OUTPATIENT
Start: 2019-08-08 | End: 2019-08-08

## 2019-08-08 RX ORDER — ENOXAPARIN SODIUM 100 MG/ML
60 INJECTION SUBCUTANEOUS
Refills: 0 | Status: DISCONTINUED | OUTPATIENT
Start: 2019-08-08 | End: 2019-08-09

## 2019-08-08 RX ADMIN — NYSTATIN CREAM 1 APPLICATION(S): 100000 CREAM TOPICAL at 05:38

## 2019-08-08 RX ADMIN — Medication 650 MILLIGRAM(S): at 06:56

## 2019-08-08 RX ADMIN — BUDESONIDE AND FORMOTEROL FUMARATE DIHYDRATE 2 PUFF(S): 160; 4.5 AEROSOL RESPIRATORY (INHALATION) at 18:05

## 2019-08-08 RX ADMIN — INSULIN GLARGINE 12 UNIT(S): 100 INJECTION, SOLUTION SUBCUTANEOUS at 21:53

## 2019-08-08 RX ADMIN — NYSTATIN CREAM 1 APPLICATION(S): 100000 CREAM TOPICAL at 18:05

## 2019-08-08 RX ADMIN — ENOXAPARIN SODIUM 60 MILLIGRAM(S): 100 INJECTION SUBCUTANEOUS at 18:04

## 2019-08-08 RX ADMIN — Medication 2 UNIT(S): at 10:09

## 2019-08-08 RX ADMIN — Medication 200 MILLIGRAM(S): at 18:04

## 2019-08-08 RX ADMIN — HEPARIN SODIUM 5000 UNIT(S): 5000 INJECTION INTRAVENOUS; SUBCUTANEOUS at 05:38

## 2019-08-08 RX ADMIN — Medication 1000 UNIT(S): at 14:13

## 2019-08-08 RX ADMIN — Medication 1 TABLET(S): at 14:13

## 2019-08-08 RX ADMIN — Medication 1: at 17:58

## 2019-08-08 RX ADMIN — CHLORHEXIDINE GLUCONATE 1 APPLICATION(S): 213 SOLUTION TOPICAL at 14:13

## 2019-08-08 RX ADMIN — Medication 4 UNIT(S): at 17:58

## 2019-08-08 RX ADMIN — HEPARIN SODIUM 5000 UNIT(S): 5000 INJECTION INTRAVENOUS; SUBCUTANEOUS at 14:13

## 2019-08-08 RX ADMIN — Medication 2 UNIT(S): at 14:14

## 2019-08-08 RX ADMIN — BUDESONIDE AND FORMOTEROL FUMARATE DIHYDRATE 2 PUFF(S): 160; 4.5 AEROSOL RESPIRATORY (INHALATION) at 05:37

## 2019-08-08 RX ADMIN — Medication 650 MILLIGRAM(S): at 06:14

## 2019-08-08 RX ADMIN — Medication 1 MILLIGRAM(S): at 14:12

## 2019-08-08 RX ADMIN — ALBUTEROL 2.5 MILLIGRAM(S): 90 AEROSOL, METERED ORAL at 18:04

## 2019-08-08 NOTE — CHART NOTE - NSCHARTNOTEFT_GEN_A_CORE
< from: [facility] Duplex Lower Ext Vein Scan, Bilat (08.08.19 @ 13:12) >    IMPRESSION: Partially occlusive positive deep venous thrombosis below the   right knee involving the right posterior tibial vein.    No evidence of deep venous thrombosis in the left lower extremity.    Small left Baker's cyst.    Discussed above finding with Dr Davis and Dr Lundberg   Plans for Lovenox 1.5mg/kg  Department of Medicine   YORDY Thurman [facility] [id number]

## 2019-08-08 NOTE — PROGRESS NOTE ADULT - SUBJECTIVE AND OBJECTIVE BOX
Subjective: Patient seen and examined. No new events except as noted.     SUBJECTIVE/ROS:      MEDICATIONS:  MEDICATIONS  (STANDING):  buDESOnide 160 MICROgram(s)/formoterol 4.5 MICROgram(s) Inhaler 2 Puff(s) Inhalation two times a day  calcium carbonate   1250 mG (OsCal) 1 Tablet(s) Oral daily  chlorhexidine 2% Cloths 1 Application(s) Topical daily  cholecalciferol 1000 Unit(s) Oral daily  dextrose 50% Injectable 12.5 Gram(s) IV Push once  dextrose 50% Injectable 25 Gram(s) IV Push once  dextrose 50% Injectable 25 Gram(s) IV Push once  folic acid 1 milliGRAM(s) Oral daily  heparin  Injectable 5000 Unit(s) SubCutaneous every 8 hours  insulin glargine Injectable (LANTUS) 12 Unit(s) SubCutaneous at bedtime  insulin lispro (HumaLOG) corrective regimen sliding scale   SubCutaneous three times a day before meals  insulin lispro Injectable (HumaLOG) 2 Unit(s) SubCutaneous three times a day with meals  lactated ringers. 1000 milliLiter(s) (60 mL/Hr) IV Continuous <Continuous>  nystatin Powder 1 Application(s) Topical two times a day      PHYSICAL EXAM:  T(C): 36.6 (08-08-19 @ 07:59), Max: 37.4 (08-07-19 @ 16:29)  HR: 88 (08-08-19 @ 07:59) (88 - 102)  BP: 130/74 (08-08-19 @ 07:59) (127/71 - 165/82)  RR: 18 (08-08-19 @ 07:59) (18 - 18)  SpO2: 94% (08-08-19 @ 07:59) (93% - 96%)  Wt(kg): --  I&O's Summary    07 Aug 2019 07:01  -  08 Aug 2019 07:00  --------------------------------------------------------  IN: 280 mL / OUT: 0 mL / NET: 280 mL            JVP: Normal  Neck: supple  Lung: crackles   CV: S1 S2 , Murmur:  Abd: soft  Ext: No edema  neuro: Awake / alert  Psych: flat affect  Skin: normal``    LABS/DATA:    CARDIAC MARKERS:                                8.6    2.9   )-----------( 275      ( 07 Aug 2019 21:09 )             24.4     08-08    139  |  103  |  18  ----------------------------<  116<H>  3.7   |  24  |  1.10    Ca    9.2      08 Aug 2019 08:25      proBNP: Serum Pro-Brain Natriuretic Peptide: 3254 pg/mL (08-07 @ 21:09)    Lipid Profile:   HgA1c:   TSH:     TELE:  EKG:

## 2019-08-08 NOTE — PROGRESS NOTE ADULT - SUBJECTIVE AND OBJECTIVE BOX
[facility] NEPHROLOGY- PROGRESS NOTE    87y Female with history of ILD, aortic stenosis presents with weakness. Nephrology consulted for elevated Scr.    REVIEW OF SYSTEMS:  Gen: no changes in weight, + weakness  Cards: no chest pain  Resp: no dyspnea, + cough  GI: no nausea or vomiting or diarrhea  Vascular: no LE edema    No Known Allergies      Hospital Medications: Medications reviewed      VITALS:  T(F): 97.9 (19 @ 07:59), Max: 98.2 (19 @ 00:30)  HR: 88 (19 @ 07:59)  BP: 130/74 (19 @ 07:59)  RR: 18 (19 @ 07:59)  SpO2: 94% (19 @ 07:59)  Wt(kg): --     @ 07:01  -   @ 07:00  --------------------------------------------------------  IN: 280 mL / OUT: 0 mL / NET: 280 mL        PHYSICAL EXAM:    Gen: NAD, calm  Cards: + tachycardia, +S1/S2, + BRITTNY  Resp: + rales anteriorly  GI: soft, NT/ND, NABS  Vascular: no LE edema B/L      LABS:      139  |  103  |  18  ----------------------------<  116<H>  3.7   |  24  |  1.10    Ca    9.2      08 Aug 2019 08:25      Creatinine Trend: 1.10 <--, 1.19 <--, 1.39 <--, 1.44 <--, 1.66 <--                        7.6    2.74  )-----------( 266      ( 08 Aug 2019 12:54 )             23.5     Urine Studies:  Urinalysis Basic - ( 04 Aug 2019 18:24 )    Color: Yellow / Appearance: Clear / S.020 / pH:   Gluc:  / Ketone: Negative  / Bili: Negative / Urobili: Negative   Blood:  / Protein: 30 mg/dL / Nitrite: Negative   Leuk Esterase: Moderate / RBC: 4 /hpf / WBC 1 /HPF   Sq Epi:  / Non Sq Epi: 3 /hpf / Bacteria: Negative      Sodium, Random Urine: 84 mmol/L ( @ 18:31)  Creatinine, Random Urine: 73 mg/dL ( @ 18:31)  Protein/Creatinine Ratio Calculation: 0.4 Ratio ( @ 18:31)

## 2019-08-08 NOTE — CONSULT NOTE ADULT - ASSESSMENT
Assessment  DMT2: 87y Female with DM T2 with hyperglycemia  was put on 5 units Lantus and SSI coverage  BS today [phone]  .HTN: Controlled, On med.  RH A; on Methotrexate  ILD; ON symbicort and Albuterol  CKD: Monitor labs/BMP,   Plan:   DMT2:   Change Lantus to  8  units qhs,  and Fs AC, and QHS  with correction scale coverage, monitor FS will FU  Patient counseled for compliance with consistent low carb diet  HTN: Continue treatment, monitoring, Primary team FU  RH A; on Methotrexate  ILD;  on Methotrexatel  ADINA/CKD: Continue monitoring labs, renal FU  Case discussed with the NP  Thank you for consult  efrain avina MD  [phone]
This is a 87y year old Female with the below past medical history who presents with the chief complaint of tremulousness and weak allover x about 2 weeks. states was not eating well at home and does not like food here. uses cane for fall prevention. states better since admission a little.     ? due to albulterol inhaler, can give tremulousness side effect and tachy, patient  today    improved and neuro exam non focal now    likely deconditioned and decrease in PO intake over past month.    no role for neuroimaging as non focal exam.     OOB to chair as tolerated with fall precautions. PT
87y Female with history of ILD, aortic stenosis presents with weakness. Nephrology consulted for elevated Scr.    1) ADINA: likely hemodynamically mediated in setting of poor PO intake on losartan and HCTZ as an outpatient. UA relatively bland. Check urine sodium and urine creatinine. Check bladder scan r/o urinary retention. Defer IVF given improving Scr. Avoid nephrotoxins. Monitor electrolytes.    2) Proteinuria: Mild and likely due to DM. Check spot urine TP/CR to quantify proteinuria.     3) HTN: BP controlled. Holding losartan and HCTZ for now given resolving ADINA and well controlled pressures. Monitor BP.    4) Weakness: As per primary team.
Imp:  88 yo female with h/o RA, DM, recently found to have elevated Creatinine, a/w generalized weakness, cough, nausea.    RA appears inactive; MTX was recently stopped due to elevated Cr    Rec:  Stop MTX for now  Undergoing renal w/u as per nephro  Exercise as tolerated; start PTx
Patient with anemia and leucopenia.  The prior has been chronic the latter is new.  She does have RA and is taking MTX and that could cause cytopenias, but she has been on it for a while so unclear why it happened now.  Infectious work-up was negative.  Could be an auto-immune component, but again would expect it to have been there prior on and off as well.  Monitor the ANC for now.  She has no fevers.  The MCV is normocytic.    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  The B12 was not decreased.  An anemia work-up has already been ordered for this morning and await those results.  For now monitor the Hgb and supportive transfusions as needed.  The last Hgb was adequate.
weakness  ? decondition state / adina   check TSH  PT eval   further work up as per primary team    AS  moderate  agree with repeat echo     DAINA   off diuretics/arb for now   improving    HTN  stable    ILD  good o2 sat on room air
Generalized weakness in the absence of change in pulmonary status: no clinical suspicion of acture infection or decompensated RA associated ILD. Patient may be deconditioned with anemia and possible low CO if AS has progressed    REC    Monitor resp status  TTE pending  Observe off antibiotics

## 2019-08-08 NOTE — CHART NOTE - NSCHARTNOTEFT_GEN_A_CORE
Medicine NP    Notified by RN patient woke up and felt dizzy. Seen patient, is sleeping at this time. BP at time of event was 165/82 electronically, will reassess. Differentials include dizziness as side effect of Tessalon perles which was given last night or in setting of hypertension since pt is off antihypertensives 2/2 ADINA now resolved. If pt remains hypertensive will restart Losartan per card recs from 8/7. Continue to monitor overnight.    Anabel Prather, [facility]  [id number] Medicine NP    Notified by RN patient woke up and felt dizzy. Seen patient, is sleeping at this time. BP at time of event was 165/82 electronically, other vitals stable, will reassess. Differentials include dizziness as side effect of Tessalon perles which was given last night or in setting of hypertension since pt is off antihypertensives 2/2 ADINA now resolved. If pt remains hypertensive will restart Losartan per card recs from 8/7. Continue to monitor overnight.    Anabel Prather, [facility]  [id number] Medicine NP    Notified by RN patient woke up and felt dizzy and headache. Seen patient, is sleeping at this time. BP at time of event was 165/82 electronically, other vitals stable, will reassess. Differentials include dizziness as side effect of Tessalon perles which was given last night or in setting of hypertension since pt is off antihypertensives 2/2 ADINA now resolved. If pt remains hypertensive will restart Losartan per card recs from 8/7. Continue to monitor overnight.    Anabel Prather, [facility]  [id number] Medicine NP    Notified by RN patient woke up and felt dizzy and headache. Seen patient, is sleeping at this time. BP at time of event was 165/82 electronically, other vitals stable, will reassess. Differentials include dizziness/HA as side effect of Tessalon perles which was given last night or in setting of hypertension since pt is off antihypertensives 2/2 ADINA, now resolved. If pt remains hypertensive will restart Losartan per card recs from 8/7. Continue to monitor overnight.    Anabel Prather, [facility]  [id number]

## 2019-08-08 NOTE — PROGRESS NOTE ADULT - ATTENDING COMMENTS
[facility] NEPHROLOGY  Jarrell Payne M.D.  Terry Collins D.O.  Kenia Blackburn M.D.  Ayah Longoria, MSN, ANP-C    Telephone: [phone]  Facsimile: [phone]    [address] [place] [address]

## 2019-08-08 NOTE — PROGRESS NOTE ADULT - SUBJECTIVE AND OBJECTIVE BOX
Admitting Diagnosis:  Weakness (R53.1): WEAKNESS      HPI:  This is a 87y year old Female with the below past medical history who presents with the chief complaint of tremulousness and weak allover x about 2 weeks. states was not eating well at home and does not like food here. uses cane for fall prevention. states better since admission a little.   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)  No significant past surgical history ([id number])      Social History:  No toxic habits    Family History:  FAMILY HISTORY:  No pertinent family history in first degree relatives      Allergies:  No Known Allergies      ROS:  Constitutional: Patient offers no complaints of fevers or significant weight loss  Ears, Nose, Mouth and Throat: The patient presents with no abnormalities of the head, ears, eyes, nose or throat  Skin: Patient offers no concerns of new rashes or lesions  Respiratory: The patient presents with no abnormalities of the respiratory tract  Cardiovascular: The patient presents with no cardiac abnormalities  Gastrointestinal: The patient presents with no abnormalities of the GI system  Genitourinary: The patient presents with no dysuria, hematuria or frequent urination  Neurological: See HPI  Endocrine: Patient offers no complaints of excessive thirst, urination, or heat/cold intolerance    Advanced care planning reviewed and noted in the chart.    Medications:  ALBUTerol    0.083% 2.5 milliGRAM(s) Nebulizer every 6 hours PRN  buDESOnide 160 MICROgram(s)/formoterol 4.5 MICROgram(s) Inhaler 2 Puff(s) Inhalation two times a day  calcium carbonate   1250 mG (OsCal) 1 Tablet(s) Oral daily  chlorhexidine 2% Cloths 1 Application(s) Topical daily  cholecalciferol 1000 Unit(s) Oral daily  dextrose 40% Gel 15 Gram(s) Oral once PRN  dextrose 50% Injectable 12.5 Gram(s) IV Push once  dextrose 50% Injectable 25 Gram(s) IV Push once  dextrose 50% Injectable 25 Gram(s) IV Push once  folic acid 1 milliGRAM(s) Oral daily  glucagon  Injectable 1 milliGRAM(s) IntraMuscular once PRN  guaiFENesin   Syrup  (Sugar-Free) 200 milliGRAM(s) Oral every 6 hours PRN  heparin  Injectable 5000 Unit(s) SubCutaneous every 8 hours  insulin glargine Injectable (LANTUS) 12 Unit(s) SubCutaneous at bedtime  insulin lispro (HumaLOG) corrective regimen sliding scale   SubCutaneous three times a day before meals  insulin lispro Injectable (HumaLOG) 2 Unit(s) SubCutaneous three times a day with meals  lactated ringers. 1000 milliLiter(s) IV Continuous <Continuous>  nystatin Powder 1 Application(s) Topical two times a day      Labs:  CBC Full  -  ( 07 Aug 2019 21:09 )  WBC Count : 2.9 K/uL  RBC Count : 2.51 M/uL  Hemoglobin : 8.6 g/dL  Hematocrit : 24.4 %  Platelet Count - Automated : 275 K/uL  Mean Cell Volume : 97.2 fl  Mean Cell Hemoglobin : 34.3 pg  Mean Cell Hemoglobin Concentration : 35.3 gm/dL  Auto Neutrophil # : x  Auto Lymphocyte # : x  Auto Monocyte # : x  Auto Eosinophil # : x  Auto Basophil # : x  Auto Neutrophil % : x  Auto Lymphocyte % : x  Auto Monocyte % : x  Auto Eosinophil % : x  Auto Basophil % : x    08-07    141  |  105  |  24<H>  ----------------------------<  143<H>  4.0   |  23  |  1.19    Ca    9.2      07 Aug 2019 07:12      CAPILLARY BLOOD GLUCOSE      POCT Blood Glucose.: 127 mg/dL (08 Aug 2019 08:30)  POCT Blood Glucose.: 136 mg/dL (08 Aug 2019 04:12)  POCT Blood Glucose.: 216 mg/dL (07 Aug 2019 21:14)  POCT Blood Glucose.: 152 mg/dL (07 Aug 2019 19:05)  POCT Blood Glucose.: 117 mg/dL (07 Aug 2019 09:36)              Vitals:  Vital Signs Last 24 Hrs  T(C): 36.6 (08 Aug 2019 07:59), Max: 37.4 (07 Aug 2019 16:29)  T(F): 97.9 (08 Aug 2019 07:59), Max: 99.3 (07 Aug 2019 16:29)  HR: 88 (08 Aug 2019 07:59) (88 - 102)  BP: 130/74 (08 Aug 2019 07:59) (127/71 - 165/82)  BP(mean): --  RR: 18 (08 Aug 2019 07:59) (18 - 18)  SpO2: 94% (08 Aug 2019 07:59) (93% - 96%)    NEUROLOGICAL EXAM:    Mental status: Awake, alert, and in no apparent distress. Oriented to person, place knew hospital but not name, did not know year, only knew president when given a list     Cranial Nerves: Pupils were equal, round, reactive to light. Extraocular movements were intact. Visual field were full. Fundoscopic exam was deferred. Facial sensation was intact to light touch. There was no facial asymmetry. The palate was upgoing symmetrically and tongue was midline. Hearing acuity was intact to finger rub AU. Shoulder shrug was full bilaterally    Motor exam: Bulk and tone were normal. Strength was 5/5 in all four extremities. Fine finger movements were symmetric and normal. There was no pronator drift    Reflexes: 2+ in the bilateral upper extremities. 2+ in the bilateral lower extremities. Toes were downgoing bilaterally.     Sensation: Intact to light touch, temperature, vibration and proprioception.     Coordination: Finger-nose-finger and heel-to-shin was without dysmetria. fine tremulousness in UE b/L ,very low amplitude non positional.     Gait: defer    mild pain on palpation of the neck

## 2019-08-08 NOTE — PROGRESS NOTE ADULT - SUBJECTIVE AND OBJECTIVE BOX
CC: f/u for  FTT, leukopenia  Patient reports  she is still tired, wants to go home  REVIEW OF SYSTEMS:  All other review of systems negative (Comprehensive ROS)    Antimicrobials Day #  :    Other Medications Reviewed    T(F): 97.9 (08-08-19 @ 07:59), Max: 99.3 (08-07-19 @ 16:29)  HR: 88 (08-08-19 @ 07:59)  BP: 130/74 (08-08-19 @ 07:59)  RR: 18 (08-08-19 @ 07:59)  SpO2: 94% (08-08-19 @ 07:59)  Wt(kg): --    PHYSICAL EXAM:  General: alert, no acute distress  Eyes:  anicteric, no conjunctival injection, no discharge  Oropharynx: no lesions or injection 	  Neck: supple, without adenopathy  Lungs: basilar rales to auscultation  Heart: regular rate and rhythm; no murmur, rubs or gallops  Abdomen: soft, nondistended, nontender, without mass or organomegaly  Skin: no lesions  Extremities: no clubbing, cyanosis, or edema  Neurologic: alert, oriented, moves all extremities    LAB RESULTS:                        7.6    2.74  )-----------( 266      ( 08 Aug 2019 12:54 )             23.5     08-08    139  |  103  |  18  ----------------------------<  116<H>  3.7   |  24  |  1.10    Ca    9.2      08 Aug 2019 08:25          MICROBIOLOGY:  RECENT CULTURES:  08-05 @ 13:48 .Blood     No growth to date.      08-04 @ 22:37 .Urine     <10,000 CFU/mL Normal Urogenital Ashley          RADIOLOGY REVIEWED:    < from: [facility] Duplex Lower Ext Vein Scan, Bilat (08.08.19 @ 13:12) >    IMPRESSION: Partially occlusive positive deep venous thrombosis below the   right knee involving the right posterior tibial vein.    No evidence of deep venous thrombosis in the left lower extremity.    Small left Baker's cyst.    < end of copied text >      Assessment:  patient with RA on mtx admitted for FTT, no infection found but developing cytopenia so will check tick borne illness panel and cmv pcr  Plan:  monitor off antibiotics  check cmv pcr, ehrlichia pcr and babesia pcr

## 2019-08-08 NOTE — PROGRESS NOTE ADULT - SUBJECTIVE AND OBJECTIVE BOX
Follow-up Pulm Progress Note  Abrahan Flannery MD  [phone]    Breathing comfortably supine in bed  TTE pending       Vital Signs Last 24 Hrs  T(C): 36.6 (08 Aug 2019 07:59), Max: 37.4 (07 Aug 2019 16:29)  T(F): 97.9 (08 Aug 2019 07:59), Max: 99.3 (07 Aug 2019 16:29)  HR: 88 (08 Aug 2019 07:59) (88 - 102)  BP: 130/74 (08 Aug 2019 07:59) (130/74 - 165/82)  BP(mean): --  RR: 18 (08 Aug 2019 07:59) (18 - 18)  SpO2: 94% (08 Aug 2019 07:59) (94% - 96%)                              8.6    2.9   )-----------( 275      ( 07 Aug 2019 21:09 )             24.4       08-08    139  |  103  |  18  ----------------------------<  116<H>  3.7   |  24  |  1.10    Ca    9.2      08 Aug 2019 08:25       Procalcitonin, Serum: 0.18 ng/mL (08-05-19 @ 11:25)    CULTURES:  Culture Results:   No growth to date. (08-05 @ 13:48)  Culture Results:   No growth to date. (08-05 @ 13:48)  Culture Results:   <10,000 CFU/mL Normal Urogenital Ashley (08-04 @ 22:37)    Most recent blood culture -- 08-05 @ 13:48   -- -- .Blood 08-05 @ 13:48  Most recent blood culture -- 08-04 @ 22:37   -- -- .Urine 08-04 @ 22:37      Physical Examination:  PULM:   CVS: Regular rate and rhythm, no murmurs, rubs, or gallops  ABD: Soft, non-tender  EXT:  No clubbing, cyanosis, or edema    RADIOLOGY REVIEWED  CXR:    CT chest:    TTE:

## 2019-08-08 NOTE — PROGRESS NOTE ADULT - SUBJECTIVE AND OBJECTIVE BOX
Endocrinology Attending Covering for Dr. Akbar      Chief complaint  Patient is a 87y old  Female who presents with a chief complaint of Weakness (08 Aug 2019 10:51)   Review of systems  Patient in bed, looks comfortable, no fever,  had no hypoglycemia.  felt dizzy this AM    Labs and Fingersticks  CAPILLARY BLOOD GLUCOSE      POCT Blood Glucose.: 127 mg/dL (08 Aug 2019 08:30)  POCT Blood Glucose.: 136 mg/dL (08 Aug 2019 04:12)  POCT Blood Glucose.: 216 mg/dL (07 Aug 2019 21:14)  POCT Blood Glucose.: 152 mg/dL (07 Aug 2019 19:05)      Anion Gap, Serum: 12 (08-08 @ 08:25)  Anion Gap, Serum: 13 (08-07 @ 07:12)      Calcium, Total Serum: 9.2 (08-08 @ 08:25)  Calcium, Total Serum: 9.2 (08-07 @ 07:12)          08-08    139  |  103  |  18  ----------------------------<  116<H>  3.7   |  24  |  1.10    Ca    9.2      08 Aug 2019 08:25                          8.6    2.9   )-----------( 275      ( 07 Aug 2019 21:09 )             24.4     Medications  MEDICATIONS  (STANDING):  buDESOnide 160 MICROgram(s)/formoterol 4.5 MICROgram(s) Inhaler 2 Puff(s) Inhalation two times a day  calcium carbonate   1250 mG (OsCal) 1 Tablet(s) Oral daily  chlorhexidine 2% Cloths 1 Application(s) Topical daily  cholecalciferol 1000 Unit(s) Oral daily  dextrose 50% Injectable 12.5 Gram(s) IV Push once  dextrose 50% Injectable 25 Gram(s) IV Push once  dextrose 50% Injectable 25 Gram(s) IV Push once  folic acid 1 milliGRAM(s) Oral daily  heparin  Injectable 5000 Unit(s) SubCutaneous every 8 hours  insulin glargine Injectable (LANTUS) 12 Unit(s) SubCutaneous at bedtime  insulin lispro (HumaLOG) corrective regimen sliding scale   SubCutaneous three times a day before meals  insulin lispro Injectable (HumaLOG) 2 Unit(s) SubCutaneous three times a day with meals  lactated ringers. 1000 milliLiter(s) (60 mL/Hr) IV Continuous <Continuous>  nystatin Powder 1 Application(s) Topical two times a day      Physical Exam  General: Patient comfortable in bed  Vital Signs Last 12 Hrs  T(F): 97.9 (08-08-19 @ 07:59), Max: 98.2 (08-08-19 @ 00:30)  HR: 88 (08-08-19 @ 07:59) (88 - 102)  BP: 130/74 (08-08-19 @ 07:59) (130/74 - 165/82)  BP(mean): --  RR: 18 (08-08-19 @ 07:59) (18 - 18)  SpO2: 94% (08-08-19 @ 07:59) (94% - 96%)  Neck: No palpable thyroid nodules.  CVS: S1S2, No murmurs  Respiratory: No wheezing, no crepitations  GI: Abdomen soft, bowel sounds positive  Musculoskeletal:  edema lower extremities.   Skin: No skin rashes, no ecchymosis    Diagnostics

## 2019-08-09 ENCOUNTER — TRANSCRIPTION ENCOUNTER (OUTPATIENT)
Age: 84
End: 2019-08-09

## 2019-08-09 VITALS
OXYGEN SATURATION: 97 % | HEART RATE: 92 BPM | TEMPERATURE: 98 F | SYSTOLIC BLOOD PRESSURE: 169 MMHG | DIASTOLIC BLOOD PRESSURE: 80 MMHG | RESPIRATION RATE: 20 BRPM

## 2019-08-09 LAB
ANION GAP SERPL CALC-SCNC: 13 MMOL/L — SIGNIFICANT CHANGE UP (ref 5–17)
BUN SERPL-MCNC: 18 MG/DL — SIGNIFICANT CHANGE UP (ref 7–23)
CALCIUM SERPL-MCNC: 9.2 MG/DL — SIGNIFICANT CHANGE UP (ref 8.4–10.5)
CHLORIDE SERPL-SCNC: 100 MMOL/L — SIGNIFICANT CHANGE UP (ref 96–108)
CMV DNA CSF QL NAA+PROBE: SIGNIFICANT CHANGE UP
CMV DNA SPEC NAA+PROBE-LOG#: SIGNIFICANT CHANGE UP LOGIU/ML
CO2 SERPL-SCNC: 25 MMOL/L — SIGNIFICANT CHANGE UP (ref 22–31)
CREAT SERPL-MCNC: 1.21 MG/DL — SIGNIFICANT CHANGE UP (ref 0.5–1.3)
CULTURE RESULTS: SIGNIFICANT CHANGE UP
GLUCOSE BLDC GLUCOMTR-MCNC: 151 MG/DL — HIGH (ref 70–99)
GLUCOSE BLDC GLUCOMTR-MCNC: 96 MG/DL — SIGNIFICANT CHANGE UP (ref 70–99)
GLUCOSE SERPL-MCNC: 102 MG/DL — HIGH (ref 70–99)
HCT VFR BLD CALC: 27.6 % — LOW (ref 34.5–45)
HGB BLD-MCNC: 8.8 G/DL — LOW (ref 11.5–15.5)
MCHC RBC-ENTMCNC: 30.9 PG — SIGNIFICANT CHANGE UP (ref 27–34)
MCHC RBC-ENTMCNC: 31.9 GM/DL — LOW (ref 32–36)
MCV RBC AUTO: 96.8 FL — SIGNIFICANT CHANGE UP (ref 80–100)
PLATELET # BLD AUTO: 346 K/UL — SIGNIFICANT CHANGE UP (ref 150–400)
POTASSIUM SERPL-MCNC: 3.4 MMOL/L — LOW (ref 3.5–5.3)
POTASSIUM SERPL-SCNC: 3.4 MMOL/L — LOW (ref 3.5–5.3)
RBC # BLD: 2.85 M/UL — LOW (ref 3.8–5.2)
RBC # FLD: 14.2 % — SIGNIFICANT CHANGE UP (ref 10.3–14.5)
SODIUM SERPL-SCNC: 138 MMOL/L — SIGNIFICANT CHANGE UP (ref 135–145)
SPECIMEN SOURCE: SIGNIFICANT CHANGE UP
WBC # BLD: 3.18 K/UL — LOW (ref 3.8–10.5)
WBC # FLD AUTO: 3.18 K/UL — LOW (ref 3.8–10.5)

## 2019-08-09 PROCEDURE — 86431 RHEUMATOID FACTOR QUANT: CPT

## 2019-08-09 PROCEDURE — 87449 NOS EACH ORGANISM AG IA: CPT

## 2019-08-09 PROCEDURE — 83550 IRON BINDING TEST: CPT

## 2019-08-09 PROCEDURE — 82728 ASSAY OF FERRITIN: CPT

## 2019-08-09 PROCEDURE — 86038 ANTINUCLEAR ANTIBODIES: CPT

## 2019-08-09 PROCEDURE — 83880 ASSAY OF NATRIURETIC PEPTIDE: CPT

## 2019-08-09 PROCEDURE — 87086 URINE CULTURE/COLONY COUNT: CPT

## 2019-08-09 PROCEDURE — 85027 COMPLETE CBC AUTOMATED: CPT

## 2019-08-09 PROCEDURE — 82565 ASSAY OF CREATININE: CPT

## 2019-08-09 PROCEDURE — 82330 ASSAY OF CALCIUM: CPT

## 2019-08-09 PROCEDURE — 86900 BLOOD TYPING SEROLOGIC ABO: CPT

## 2019-08-09 PROCEDURE — 86850 RBC ANTIBODY SCREEN: CPT

## 2019-08-09 PROCEDURE — 84295 ASSAY OF SERUM SODIUM: CPT

## 2019-08-09 PROCEDURE — 82435 ASSAY OF BLOOD CHLORIDE: CPT

## 2019-08-09 PROCEDURE — 84132 ASSAY OF SERUM POTASSIUM: CPT

## 2019-08-09 PROCEDURE — 84443 ASSAY THYROID STIM HORMONE: CPT

## 2019-08-09 PROCEDURE — 82607 VITAMIN B-12: CPT

## 2019-08-09 PROCEDURE — 99285 EMERGENCY DEPT VISIT HI MDM: CPT

## 2019-08-09 PROCEDURE — 87581 M.PNEUMON DNA AMP PROBE: CPT

## 2019-08-09 PROCEDURE — 84439 ASSAY OF FREE THYROXINE: CPT

## 2019-08-09 PROCEDURE — 84156 ASSAY OF PROTEIN URINE: CPT

## 2019-08-09 PROCEDURE — 82570 ASSAY OF URINE CREATININE: CPT

## 2019-08-09 PROCEDURE — 82803 BLOOD GASES ANY COMBINATION: CPT

## 2019-08-09 PROCEDURE — 85610 PROTHROMBIN TIME: CPT

## 2019-08-09 PROCEDURE — 93970 EXTREMITY STUDY: CPT

## 2019-08-09 PROCEDURE — 85730 THROMBOPLASTIN TIME PARTIAL: CPT

## 2019-08-09 PROCEDURE — 97161 PT EVAL LOW COMPLEX 20 MIN: CPT

## 2019-08-09 PROCEDURE — 86403 PARTICLE AGGLUT ANTBDY SCRN: CPT

## 2019-08-09 PROCEDURE — 86160 COMPLEMENT ANTIGEN: CPT

## 2019-08-09 PROCEDURE — 93306 TTE W/DOPPLER COMPLETE: CPT

## 2019-08-09 PROCEDURE — 86901 BLOOD TYPING SEROLOGIC RH(D): CPT

## 2019-08-09 PROCEDURE — 83010 ASSAY OF HAPTOGLOBIN QUANT: CPT

## 2019-08-09 PROCEDURE — 93005 ELECTROCARDIOGRAM TRACING: CPT

## 2019-08-09 PROCEDURE — 85652 RBC SED RATE AUTOMATED: CPT

## 2019-08-09 PROCEDURE — 80053 COMPREHEN METABOLIC PANEL: CPT

## 2019-08-09 PROCEDURE — 83540 ASSAY OF IRON: CPT

## 2019-08-09 PROCEDURE — 81001 URINALYSIS AUTO W/SCOPE: CPT

## 2019-08-09 PROCEDURE — 87040 BLOOD CULTURE FOR BACTERIA: CPT

## 2019-08-09 PROCEDURE — 80048 BASIC METABOLIC PNL TOTAL CA: CPT

## 2019-08-09 PROCEDURE — 87486 CHLMYD PNEUM DNA AMP PROBE: CPT

## 2019-08-09 PROCEDURE — 84145 PROCALCITONIN (PCT): CPT

## 2019-08-09 PROCEDURE — 93306 TTE W/DOPPLER COMPLETE: CPT | Mod: 26

## 2019-08-09 PROCEDURE — 87633 RESP VIRUS 12-25 TARGETS: CPT

## 2019-08-09 PROCEDURE — 82947 ASSAY GLUCOSE BLOOD QUANT: CPT

## 2019-08-09 PROCEDURE — 94640 AIRWAY INHALATION TREATMENT: CPT

## 2019-08-09 PROCEDURE — 85014 HEMATOCRIT: CPT

## 2019-08-09 PROCEDURE — 83605 ASSAY OF LACTIC ACID: CPT

## 2019-08-09 PROCEDURE — 86140 C-REACTIVE PROTEIN: CPT

## 2019-08-09 PROCEDURE — 84300 ASSAY OF URINE SODIUM: CPT

## 2019-08-09 PROCEDURE — 97116 GAIT TRAINING THERAPY: CPT

## 2019-08-09 PROCEDURE — 82962 GLUCOSE BLOOD TEST: CPT

## 2019-08-09 PROCEDURE — 82306 VITAMIN D 25 HYDROXY: CPT

## 2019-08-09 PROCEDURE — 87798 DETECT AGENT NOS DNA AMP: CPT

## 2019-08-09 PROCEDURE — 83615 LACTATE (LD) (LDH) ENZYME: CPT

## 2019-08-09 PROCEDURE — 97530 THERAPEUTIC ACTIVITIES: CPT

## 2019-08-09 PROCEDURE — 71045 X-RAY EXAM CHEST 1 VIEW: CPT

## 2019-08-09 RX ORDER — SITAGLIPTIN 50 MG/1
1 TABLET, FILM COATED ORAL
Qty: 0 | Refills: 0 | DISCHARGE

## 2019-08-09 RX ORDER — SITAGLIPTIN 50 MG/1
1 TABLET, FILM COATED ORAL
Qty: 30 | Refills: 0
Start: 2019-08-09 | End: 2019-09-07

## 2019-08-09 RX ORDER — METFORMIN HYDROCHLORIDE 850 MG/1
2 TABLET ORAL
Qty: 0 | Refills: 0 | DISCHARGE

## 2019-08-09 RX ORDER — REPAGLINIDE 1 MG/1
1 TABLET ORAL
Qty: 90 | Refills: 0
Start: 2019-08-09 | End: 2019-09-07

## 2019-08-09 RX ORDER — POTASSIUM CHLORIDE 20 MEQ
20 PACKET (EA) ORAL ONCE
Refills: 0 | Status: COMPLETED | OUTPATIENT
Start: 2019-08-09 | End: 2019-08-09

## 2019-08-09 RX ADMIN — Medication 1 TABLET(S): at 17:30

## 2019-08-09 RX ADMIN — Medication 2 UNIT(S): at 09:34

## 2019-08-09 RX ADMIN — Medication 20 MILLIEQUIVALENT(S): at 11:28

## 2019-08-09 RX ADMIN — ENOXAPARIN SODIUM 60 MILLIGRAM(S): 100 INJECTION SUBCUTANEOUS at 17:32

## 2019-08-09 RX ADMIN — Medication 2 UNIT(S): at 12:35

## 2019-08-09 RX ADMIN — NYSTATIN CREAM 1 APPLICATION(S): 100000 CREAM TOPICAL at 05:17

## 2019-08-09 RX ADMIN — Medication 1: at 12:35

## 2019-08-09 RX ADMIN — Medication 200 MILLIGRAM(S): at 03:33

## 2019-08-09 RX ADMIN — Medication 1 MILLIGRAM(S): at 11:26

## 2019-08-09 RX ADMIN — BUDESONIDE AND FORMOTEROL FUMARATE DIHYDRATE 2 PUFF(S): 160; 4.5 AEROSOL RESPIRATORY (INHALATION) at 05:17

## 2019-08-09 RX ADMIN — Medication 1000 UNIT(S): at 11:26

## 2019-08-09 RX ADMIN — ENOXAPARIN SODIUM 60 MILLIGRAM(S): 100 INJECTION SUBCUTANEOUS at 05:18

## 2019-08-09 RX ADMIN — NYSTATIN CREAM 1 APPLICATION(S): 100000 CREAM TOPICAL at 17:31

## 2019-08-09 RX ADMIN — BUDESONIDE AND FORMOTEROL FUMARATE DIHYDRATE 2 PUFF(S): 160; 4.5 AEROSOL RESPIRATORY (INHALATION) at 17:31

## 2019-08-09 RX ADMIN — CHLORHEXIDINE GLUCONATE 1 APPLICATION(S): 213 SOLUTION TOPICAL at 11:26

## 2019-08-09 NOTE — PROGRESS NOTE ADULT - SUBJECTIVE AND OBJECTIVE BOX
Admitting Diagnosis:  Weakness (R53.1): WEAKNESS      HPI:  This is a 87y year old Female with the below past medical history who presents with the chief complaint of tremulousness and weak allover x about 2 weeks. states was not eating well at home and does not like food here. uses cane for fall prevention. states better since admission a little.   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)  No significant past surgical history ([id number])      Social History:  No toxic habits    Family History:  FAMILY HISTORY:  No pertinent family history in first degree relatives      Allergies:  No Known Allergies      ROS:  Constitutional: Patient offers no complaints of fevers or significant weight loss  Ears, Nose, Mouth and Throat: The patient presents with no abnormalities of the head, ears, eyes, nose or throat  Skin: Patient offers no concerns of new rashes or lesions  Respiratory: The patient presents with no abnormalities of the respiratory tract  Cardiovascular: The patient presents with no cardiac abnormalities  Gastrointestinal: The patient presents with no abnormalities of the GI system  Genitourinary: The patient presents with no dysuria, hematuria or frequent urination  Neurological: See HPI  Endocrine: Patient offers no complaints of excessive thirst, urination, or heat/cold intolerance    Advanced care planning reviewed and noted in the chart.    Medications:  ALBUTerol    0.083% 2.5 milliGRAM(s) Nebulizer every 6 hours PRN  buDESOnide 160 MICROgram(s)/formoterol 4.5 MICROgram(s) Inhaler 2 Puff(s) Inhalation two times a day  calcium carbonate   1250 mG (OsCal) 1 Tablet(s) Oral daily  chlorhexidine 2% Cloths 1 Application(s) Topical daily  cholecalciferol 1000 Unit(s) Oral daily  dextrose 40% Gel 15 Gram(s) Oral once PRN  dextrose 50% Injectable 12.5 Gram(s) IV Push once  dextrose 50% Injectable 25 Gram(s) IV Push once  dextrose 50% Injectable 25 Gram(s) IV Push once  enoxaparin Injectable 60 milliGRAM(s) SubCutaneous two times a day  folic acid 1 milliGRAM(s) Oral daily  glucagon  Injectable 1 milliGRAM(s) IntraMuscular once PRN  guaiFENesin   Syrup  (Sugar-Free) 200 milliGRAM(s) Oral every 6 hours PRN  ibuprofen  Tablet. 200 milliGRAM(s) Oral two times a day PRN  insulin glargine Injectable (LANTUS) 12 Unit(s) SubCutaneous at bedtime  insulin lispro (HumaLOG) corrective regimen sliding scale   SubCutaneous three times a day before meals  insulin lispro Injectable (HumaLOG) 2 Unit(s) SubCutaneous before breakfast  insulin lispro Injectable (HumaLOG) 2 Unit(s) SubCutaneous before lunch  insulin lispro Injectable (HumaLOG) 4 Unit(s) SubCutaneous before dinner  lactated ringers. 1000 milliLiter(s) IV Continuous <Continuous>  nystatin Powder 1 Application(s) Topical two times a day      Labs:  CBC Full  -  ( 08 Aug 2019 12:54 )  WBC Count : 2.74 K/uL  RBC Count : 2.43 M/uL  Hemoglobin : 7.6 g/dL  Hematocrit : 23.5 %  Platelet Count - Automated : 266 K/uL  Mean Cell Volume : 96.7 fl  Mean Cell Hemoglobin : 31.3 pg  Mean Cell Hemoglobin Concentration : 32.3 gm/dL  Auto Neutrophil # : x  Auto Lymphocyte # : x  Auto Monocyte # : x  Auto Eosinophil # : x  Auto Basophil # : x  Auto Neutrophil % : x  Auto Lymphocyte % : x  Auto Monocyte % : x  Auto Eosinophil % : x  Auto Basophil % : x    08-08    139  |  103  |  18  ----------------------------<  116<H>  3.7   |  24  |  1.10    Ca    9.2      08 Aug 2019 08:25      CAPILLARY BLOOD GLUCOSE      POCT Blood Glucose.: 128 mg/dL (08 Aug 2019 21:43)  POCT Blood Glucose.: 151 mg/dL (08 Aug 2019 17:30)  POCT Blood Glucose.: 148 mg/dL (08 Aug 2019 14:09)  POCT Blood Glucose.: 127 mg/dL (08 Aug 2019 08:30)              Vitals:  Vital Signs Last 24 Hrs  T(C): 37.1 (09 Aug 2019 00:06), Max: 37.1 (08 Aug 2019 21:47)  T(F): 98.7 (09 Aug 2019 00:06), Max: 98.8 (08 Aug 2019 21:47)  HR: 96 (09 Aug 2019 00:06) (96 - 112)  BP: 127/63 (09 Aug 2019 00:06) (127/63 - 166/84)  BP(mean): --  RR: 18 (09 Aug 2019 00:06) (18 - 20)  SpO2: 93% (09 Aug 2019 00:06) (93% - 95%)    NEUROLOGICAL EXAM:    Mental status: Awake, alert, and in no apparent distress.  Oriented to person, place knew hospital but not name, did not know year, only knew president when given a list     Cranial Nerves: Pupils were equal, round, reactive to light. Extraocular movements were intact. Visual field were full.  Facial sensation was intact to light touch. There was no facial asymmetry. The palate was upgoing symmetrically and tongue was midline. Hearing acuity was intact to finger rub AU. Shoulder shrug was full bilaterally    Motor exam: Bulk and tone were normal. Strength was 5/5 in all four extremities. Fine finger movements were symmetric and normal.     Reflexes: 2+ in the bilateral upper extremities. 2+ in the bilateral lower extremities. Toes were downgoing bilaterally.     Sensation: Intact to light touch, temperature, vibration     Coordination: Finger-nose-finger and heel-to-shin was without dysmetria.   fine tremulousness in UE b/L ,very low amplitude non positional.     Gait: defer    no pain on palpation of the neck

## 2019-08-09 NOTE — PROGRESS NOTE ADULT - PROVIDER SPECIALTY LIST ADULT
Cardiology
Endocrinology
Heme/Onc
Infectious Disease
Infectious Disease
Internal Medicine
Nephrology
Neurology
Neurology
Pulmonology
Neurology
Internal Medicine
38

## 2019-08-09 NOTE — PROGRESS NOTE ADULT - REASON FOR ADMISSION
Weakness

## 2019-08-09 NOTE — DISCHARGE NOTE PROVIDER - NSDCCPCAREPLAN_GEN_ALL_CORE_FT
PRINCIPAL DISCHARGE DIAGNOSIS  Diagnosis: Weakness  Assessment and Plan of Treatment:       SECONDARY DISCHARGE DIAGNOSES  Diagnosis: ADINA (acute kidney injury)  Assessment and Plan of Treatment: Likely pre-renal   Renal function wdl

## 2019-08-09 NOTE — PROGRESS NOTE ADULT - SUBJECTIVE AND OBJECTIVE BOX
Follow-up Pulm Progress Note  Abrahan Flannery MD  [phone]    Breathing comfortably OOB in chair  Chronic cough  TTE pending    Vital Signs Last 24 Hrs  T(C): 36.7 (09 Aug 2019 08:32), Max: 37.1 (08 Aug 2019 21:47)  T(F): 98.1 (09 Aug 2019 08:32), Max: 98.8 (08 Aug 2019 21:47)  HR: 113 (09 Aug 2019 08:32) (96 - 113)  BP: 161/78 (09 Aug 2019 08:32) (127/63 - 166/84)  BP(mean): --  RR: 18 (09 Aug 2019 08:32) (18 - 20)  SpO2: 95% (09 Aug 2019 08:32) (93% - 95%)                              7.6    2.74  )-----------( 266      ( 08 Aug 2019 12:54 )             23.5       08-09    138  |  100  |  18  ----------------------------<  102<H>  3.4<L>   |  25  |  1.21    Ca    9.2      09 Aug 2019 09:40         Procalcitonin, Serum: 0.18 ng/mL (08-05-19 @ 11:25)    CULTURES:  Culture Results:   No growth to date. (08-05 @ 13:48)  Culture Results:   No growth to date. (08-05 @ 13:48)  Culture Results:   <10,000 CFU/mL Normal Urogenital Ashley (08-04 @ 22:37)    Most recent blood culture -- 08-05 @ 13:48   -- -- .Blood 08-05 @ 13:48  Most recent blood culture -- 08-04 @ 22:37   -- -- .Urine 08-04 @ 22:37      Physical Examination:  PULM: bibasilar crackles  CVS: Regular rate and rhythm, no murmurs, rubs, or gallops  ABD: Soft, non-tender  EXT:  No clubbing, cyanosis, or edema    RADIOLOGY REVIEWED  CXR:    CT chest:    TTE:

## 2019-08-09 NOTE — CONSULT NOTE ADULT - PROVIDER SPECIALTY LIST ADULT
Cardiology
Heme/Onc
Infectious Disease
Nephrology
Pulmonology
Rheumatology
Vascular Cardiology
Neurology
Endocrinology

## 2019-08-09 NOTE — CONSULT NOTE ADULT - CONSULT REQUESTED DATE/TIME
05-Aug-2019 08:06
05-Aug-2019 12:03
05-Aug-2019 19:59
06-Aug-2019 12:04
06-Aug-2019 22:35
07-Aug-2019
09-Aug-2019 12:50
06-Aug-2019 11:26
05-Aug-2019 21:49

## 2019-08-09 NOTE — DISCHARGE NOTE PROVIDER - HOSPITAL COURSE
DCP with med rec discussed with Dr Davis PT is cleared for dc home with Physical Therapy     Follow up with Dr Tobias DCP with med rec discussed with Dr Davis PT is cleared for dc home with Physical Therapy     Follow up with Dr Tobias     Follow up with Dr Colon in 1 week with surveillance u/s 87F with PMHx of rheumatoid arthritis, diabetes mellitus, HTN, moderate aortic stenosis, CKD2, acute on chronic deconditioning given the patient's previous history of weakness likely secondary to dehydration.colonic diverticulosis (s/p diverticular bleed 7/2018 s/p transfusion), pulmonary fibrosis (due to RA) and interstitial lung disease presents with weakness for two weeks; with below the knee DVT of the R posterior tibial vein      Below the knee DVT of the R posterior tibial vein, unprovoked     on Lovenox     due to #1 will recommend to HOLD AC upon discharge and follow up in the office with Dr. Colon in 1 week     plans for surveillance of the DVT with repeat US duplex of the lower extremities in 1 week (8/15/2019)    DCP with med rec discussed with Dr Davis PT is cleared for dc home with Physical Therapy     Follow up with Dr Tobias

## 2019-08-09 NOTE — DISCHARGE NOTE PROVIDER - PROVIDER TOKENS
PROVIDER:[TOKEN:[[id number]]] PROVIDER:[TOKEN:[[id number]]],PROVIDER:[TOKEN:[[id number]],FOLLOWUP:[1 week]]

## 2019-08-09 NOTE — PROGRESS NOTE ADULT - SUBJECTIVE AND OBJECTIVE BOX
RODERICK CORTEZ  MRN-[id number]    Patient is a 87y old  Female who presents with a chief complaint of Weakness (09 Aug 2019 14:22)      Review of System    Comfortable    Current Meds  MEDICATIONS  (STANDING):  buDESOnide 160 MICROgram(s)/formoterol 4.5 MICROgram(s) Inhaler 2 Puff(s) Inhalation two times a day  calcium carbonate   1250 mG (OsCal) 1 Tablet(s) Oral daily  chlorhexidine 2% Cloths 1 Application(s) Topical daily  cholecalciferol 1000 Unit(s) Oral daily  dextrose 50% Injectable 12.5 Gram(s) IV Push once  dextrose 50% Injectable 25 Gram(s) IV Push once  dextrose 50% Injectable 25 Gram(s) IV Push once  enoxaparin Injectable 60 milliGRAM(s) SubCutaneous two times a day  folic acid 1 milliGRAM(s) Oral daily  insulin glargine Injectable (LANTUS) 12 Unit(s) SubCutaneous at bedtime  insulin lispro (HumaLOG) corrective regimen sliding scale   SubCutaneous three times a day before meals  insulin lispro Injectable (HumaLOG) 2 Unit(s) SubCutaneous before breakfast  insulin lispro Injectable (HumaLOG) 2 Unit(s) SubCutaneous before lunch  insulin lispro Injectable (HumaLOG) 4 Unit(s) SubCutaneous before dinner  nystatin Powder 1 Application(s) Topical two times a day    MEDICATIONS  (PRN):  ALBUTerol    0.083% 2.5 milliGRAM(s) Nebulizer every 6 hours PRN Shortness of Breath and/or Wheezing  dextrose 40% Gel 15 Gram(s) Oral once PRN Blood Glucose LESS THAN 70 milliGRAM(s)/deciliter  glucagon  Injectable 1 milliGRAM(s) IntraMuscular once PRN Glucose LESS THAN 70 milligrams/deciliter  guaiFENesin   Syrup  (Sugar-Free) 200 milliGRAM(s) Oral every 6 hours PRN Cough  ibuprofen  Tablet. 200 milliGRAM(s) Oral two times a day PRN Mild Pain (1 - 3), Moderate Pain (4 - 6), Severe Pain (7 - 10)    Vital Signs Last 24 Hrs  T(C): 36.9 (09 Aug 2019 16:23), Max: 37.1 (08 Aug 2019 21:47)  T(F): 98.4 (09 Aug 2019 16:23), Max: 98.8 (08 Aug 2019 21:47)  HR: 92 (09 Aug 2019 16:23) (92 - 113)  BP: 169/80 (09 Aug 2019 16:23) (127/63 - 169/80)  BP(mean): --  RR: 20 (09 Aug 2019 16:23) (18 - 20)  SpO2: 97% (09 Aug 2019 16:23) (93% - 97%)    Physical Exam    Constitutional: NAD    Lab  CBC Full  -  ( 09 Aug 2019 11:22 )  WBC Count : 3.18 K/uL  RBC Count : 2.85 M/uL  Hemoglobin : 8.8 g/dL  Hematocrit : 27.6 %  Platelet Count - Automated : 346 K/uL  Mean Cell Volume : 96.8 fl  Mean Cell Hemoglobin : 30.9 pg  Mean Cell Hemoglobin Concentration : 31.9 gm/dL  Auto Neutrophil # : x  Auto Lymphocyte # : x  Auto Monocyte # : x  Auto Eosinophil # : x  Auto Basophil # : x  Auto Neutrophil % : x  Auto Lymphocyte % : x  Auto Monocyte % : x  Auto Eosinophil % : x  Auto Basophil % : x    08-09    138  |  100  |  18  ----------------------------<  102<H>  3.4<L>   |  25  |  1.21    Ca    9.2      09 Aug 2019 09:40          Rad:    Assessment/Plan

## 2019-08-09 NOTE — DISCHARGE NOTE NURSING/CASE MANAGEMENT/SOCIAL WORK - NSDCDPATPORTLINK_GEN_ALL_CORE
You can access the [facility], offered by [facility], by registering with the following website: [email or web address]

## 2019-08-09 NOTE — PROGRESS NOTE ADULT - ATTENDING COMMENTS
[facility] NE[facility]ROLOGY  Jarrell Payne M.D.  Terry Collins D.O.  Kenia Blackburn M.D.  Ayah Longoria, MSN, ANP-C    Telephone: [phone]  Facsimile: [phone]    [address] [place] [address]

## 2019-08-09 NOTE — PHARMACOTHERAPY INTERVENTION NOTE - COMMENTS
Pharmacy intern discussed discharge medication name, dose, frequency, and side effects. Medication information handout provided from Med Essential Fact Sheet ([employer]).

## 2019-08-09 NOTE — PROGRESS NOTE ADULT - SUBJECTIVE AND OBJECTIVE BOX
Subjective: Patient seen and examined. No new events except as noted.     SUBJECTIVE/ROS:  feels ok       MEDICATIONS:  MEDICATIONS  (STANDING):  buDESOnide 160 MICROgram(s)/formoterol 4.5 MICROgram(s) Inhaler 2 Puff(s) Inhalation two times a day  calcium carbonate   1250 mG (OsCal) 1 Tablet(s) Oral daily  chlorhexidine 2% Cloths 1 Application(s) Topical daily  cholecalciferol 1000 Unit(s) Oral daily  dextrose 50% Injectable 12.5 Gram(s) IV Push once  dextrose 50% Injectable 25 Gram(s) IV Push once  dextrose 50% Injectable 25 Gram(s) IV Push once  enoxaparin Injectable 60 milliGRAM(s) SubCutaneous two times a day  folic acid 1 milliGRAM(s) Oral daily  insulin glargine Injectable (LANTUS) 12 Unit(s) SubCutaneous at bedtime  insulin lispro (HumaLOG) corrective regimen sliding scale   SubCutaneous three times a day before meals  insulin lispro Injectable (HumaLOG) 2 Unit(s) SubCutaneous before breakfast  insulin lispro Injectable (HumaLOG) 2 Unit(s) SubCutaneous before lunch  insulin lispro Injectable (HumaLOG) 4 Unit(s) SubCutaneous before dinner  lactated ringers. 1000 milliLiter(s) (60 mL/Hr) IV Continuous <Continuous>  nystatin Powder 1 Application(s) Topical two times a day      PHYSICAL EXAM:  T(C): 37.1 (08-09-19 @ 00:06), Max: 37.1 (08-08-19 @ 21:47)  HR: 96 (08-09-19 @ 00:06) (96 - 112)  BP: 127/63 (08-09-19 @ 00:06) (127/63 - 166/84)  RR: 18 (08-09-19 @ 00:06) (18 - 20)  SpO2: 93% (08-09-19 @ 00:06) (93% - 95%)  Wt(kg): --  I&O's Summary          JVP: Normal  Neck: supple  Lung: clear   CV: S1 S2 , Murmur:  Abd: soft  Ext: No edema  neuro: Awake / alert  Psych: flat affect  Skin: normal``    LABS/DATA:    CARDIAC MARKERS:                                7.6    2.74  )-----------( 266      ( 08 Aug 2019 12:54 )             23.5     08-08    139  |  103  |  18  ----------------------------<  116<H>  3.7   |  24  |  1.10    Ca    9.2      08 Aug 2019 08:25      proBNP:   Lipid Profile:   HgA1c:   TSH: Thyroid Stimulating Hormone, Serum: 2.95 uIU/mL (08-08 @ 10:52)      TELE:  EKG:

## 2019-08-09 NOTE — PROGRESS NOTE ADULT - ASSESSMENT
87F with PMHx of rheumatoid arthritis, diabetes mellitus, HTN, moderate aortic stenosis, CKD2, and interstitial lung disease presents with weakness for two weeks. At this point I suspect acute on chronic deconditioning given the patient's previous history of weakness. The differential includes infection, valvular heart disease, RA flare causing chronic inflammation, hypoxia from interstitial lung disease, or possibly adrenal insufficiency. There are no localizing signs of infection, patient last had moderate aortic stenosis on 2017, patient saturating well on room air at rest. In terms of adrenal insufficiency, unknown if she has been on prednisone recently, but she is on an ICS. She does not have hyponatremia, hyperkalemia, or hypoglycemia which goes against adrenal insufficiency. Patient admitted for work up of weakness.    Problem/Plan - 1:  ·  Problem: Weakness.  Plan: --Currently do not suspect infection ( mild elevationof procal however in setting of ADINA )  or adrenal insufficiency as cause given lack of signs/symptoms and labs  -Possible due to decreased PO and deconditioning   - neuro consult  and rheum appreciated :  no further w/u   .. off mmtx for now       Problem/Plan - 2:  ·  Problem: RA (rheumatoid arthritis).  Plan: -  hold MTX per rheum   -Continue with folic acid supplementation      Problem/Plan - 3:  ·  Problem: DM (diabetes mellitus), type 2.  Plan: -Hold oral agents  -Decrease basal insulin to 5 units qhs  -ISS, FS TID AC.     Problem/Plan - 4:  ·  Problem: Aortic stenosis.  Plan: -Repeat TTE (last in 2017 which showed moderate disease),: called to expedite   Problem/Plan - 5:  ·  Problem: HTN (hypertension), benign.  Plan: -can restart losratan upon dc   hold HCTZ and monitr as o/p.    Problem/Plan - 6:  Problem: History of interstitial lung disease. Plan: -Continue with Symbicort BID and Albuterol PRN, per previous documentation consultants suspect ILD 2/2 to rheumatoid arthritis as opposed to MTX toxicity.    Problem/Plan - 7:  ·  Problem: Normocytic anemia.  Plan: -Stable and at baseline    Problem/Plan - 8:  ·  Problem: Lactate blood increase.  Plan: -improved     Problem/Plan - 9:  ·  Problem: Acute kidney injury superimposed on CKD.  Plan: -likely pre renal   hold IVF for now   renal input apprecaited     Problem/Plan - 10  cough : RVPneg   , CXR  Clear lungs   '    Problem/Plan - 11 :  acnemia : servando sec to AOCD     heme input appreciated    hold transfusion for now   Hb reasob  ankle and close to baseline     Problem/Plan - 112: acute DVT   : below the knee : d/w vasc and Heme   below knee : will hols on AC and will monitor with serial US as o./p    l
RA on methotrexate  RA associated pulmona;ry fibrosis - stable  Anemia  Non specfic weakness  MOderate AS on prior TTE  Resolving Chuyita    REC    Awaiting TTE  Observe off abx
This is a 87y year old Female with the below past medical history who presents with the chief complaint of tremulousness and weak allover x about 2 weeks. states was not eating well at home and does not like food here. uses cane for fall prevention. states better since admission a little.     ? due to albulterol inhaler, can give tremulousness side effect and tachy, patient  today    improved and neuro exam non focal now    likely deconditioned and decrease in PO intake over past month.    do not believe neuroimaging will change managemetn, so will defer    OOB to chair as tolerated with fall precautions.   PT  no neuro c/i to d/c
87F with PMHx of rheumatoid arthritis, diabetes mellitus, HTN, moderate aortic stenosis, CKD2, and interstitial lung disease presents with weakness for two weeks. At this point I suspect acute on chronic deconditioning given the patient's previous history of weakness. The differential includes infection, valvular heart disease, RA flare causing chronic inflammation, hypoxia from interstitial lung disease, or possibly adrenal insufficiency. There are no localizing signs of infection, patient last had moderate aortic stenosis on 2017, patient saturating well on room air at rest. In terms of adrenal insufficiency, unknown if she has been on prednisone recently, but she is on an ICS. She does not have hyponatremia, hyperkalemia, or hypoglycemia which goes against adrenal insufficiency. Patient admitted for work up of weakness.    Problem/Plan - 1:  ·  Problem: Weakness.  Plan: --Currently do not suspect infection ( mild elevationof procal however in setting of ADINA )  or adrenal insufficiency as cause given lack of signs/symptoms and labs  -Possible due to decreased PO and deconditioning   - neuro consult  and rheum   check ESR , CRP , complements and jorge  , RF      Problem/Plan - 2:  ·  Problem: RA (rheumatoid arthritis).  Plan: -  -Continue with MTX 17.5 mg weekly,??  reports that she no longer takes Mtx   ?   -Continue with folic acid supplementation      Problem/Plan - 3:  ·  Problem: DM (diabetes mellitus), type 2.  Plan: -Hold oral agents  -Decrease basal insulin to 5 units qhs  -ISS, FS TID AC.     Problem/Plan - 4:  ·  Problem: Aortic stenosis.  Plan: -Repeat TTE (last in 2017 which showed moderate disease),    Problem/Plan - 5:  ·  Problem: HTN (hypertension), benign.  Plan: -Hold oral agents and monitor BP.     Problem/Plan - 6:  Problem: History of interstitial lung disease. Plan: -Continue with Symbicort BID and Albuterol PRN, per previous documentation consultants suspect ILD 2/2 to rheumatoid arthritis as opposed to MTX toxicity.    Problem/Plan - 7:  ·  Problem: Normocytic anemia.  Plan: -Stable and at baseline    Problem/Plan - 8:  ·  Problem: Lactate blood increase.  Plan: -improved     Problem/Plan - 9:  ·  Problem: Acute kidney injury superimposed on CKD.  Plan: -likely pre renal   cont ivf   renal input
87y Female with history of ILD, aortic stenosis presents with weakness. Nephrology consulted for elevated Scr.    1) ADINA: Resolving and likely hemodynamically mediated in setting of poor PO intake on losartan and HCTZ as an outpatient. Baseline Scr 0.8. UA relatively bland with negative bladder scan on 8/6. Monitor Scr carefully as patient started on NSAIDS as per neurology. Avoid nephrotoxins. Monitor electrolytes.    2) Proteinuria: Mild and likely due to DM (spot urine TP/CR 0.4). Can start losartan 25 mg QHS on discharge.    3) HTN: BP increasing off of HCTZ and losartan. Can start losartan 25 mg QHS on discharge. Monitor BP.    4) Weakness: As per primary team.    Will sign off. Please call with questions.
87y Female with history of ILD, aortic stenosis presents with weakness. Nephrology consulted for elevated Scr.    1) ADINA: Resolving and likely hemodynamically mediated in setting of poor PO intake on losartan and HCTZ as an outpatient. UA relatively bland with negative bladder scan on 8/6. Avoid nephrotoxins. Monitor electrolytes.    2) Proteinuria: Mild and likely due to DM (spot urine TP/CR 0.4). Eventual ARB.    3) HTN: BP controlled. Holding losartan and HCTZ for now given resolving ADINA and well controlled pressures. Monitor BP.    4) Weakness: As per primary team.
87y Female with history of ILD, aortic stenosis presents with weakness. Nephrology consulted for elevated Scr.    1) ADINA: Resolving and likely hemodynamically mediated in setting of poor PO intake on losartan and HCTZ as an outpatient. UA relatively bland with negative bladder scan on 8/6. Monitor Scr carefully as patient started on NSAIDS today as per neurology. Avoid nephrotoxins. Monitor electrolytes.    2) Proteinuria: Mild and likely due to DM (spot urine TP/CR 0.4). Eventual ARB.    3) HTN: BP controlled. Off losartan and HCTZ given well controlled pressures. Monitor BP.    4) Weakness: As per primary team.
AS  moderate  awaiting repeat echo ( it can be done as outpt )     ADINA   resolved     HTN  stable    ILD  good o2 sat on room air   fu with pulm
Assessment  DMT2: 87y Female with DM T2 with hyperglycemia  was put on 5 units Lantus and SSI coverage  BS This  got Lantus  12 units Last  night  .HTN: Controlled, On med.  RH A; on Methotrexate  ILD; ON Symbicort and Albuterol  CKD: Monitor labs/BMP,   Plan:   DMT2:   Continue  Lantus to  12  units qhs, start Humalog 2 units pre-each meal with correction scale coverage, monitor FS will FU  Patient counseled for compliance with consistent low carb diet  HTN: Continue treatment, monitoring, Primary team FU  RH A; on Methotrexate  ILD;  on Methotrexatel  ADINA/CKD: Continue monitoring labs, renal FU  Case discussed with the NP  Thank you for consult  efrain avina MD  [phone]
Assessment  DMT2: 87y Female with DM T2 with hyperglycemia  was put on 5 units Lantus and SSI coverage  BS This  got Lantus  8 units Last  night  .HTN: Controlled, On med.  RH A; on Methotrexate  ILD; ON Symbicort and Albuterol  CKD: Monitor labs/BMP,   Plan:   DMT2:   Change Lantus to  12  units qhs,  and Fs AC, and QHS  with correction scale coverage, monitor FS will FU  Patient counseled for compliance with consistent low carb diet  HTN: Continue treatment, monitoring, Primary team FU  RH A; on Methotrexate  ILD;  on Methotrexatel  ADINA/CKD: Continue monitoring labs, renal FU  Case discussed with the NP  Thank you for consult  efrain avina MD  [phone]
Assessment  DMT2: 87y Female with DM T2 with hyperglycemia BS This    got Lantus  12 units Last  night  .HTN: Controlled, On med.  RH A; on Methotrexate  ILD; ON Symbicort and Albuterol   ADINA/CKD: Monitor labs/BMP,   As; F/u by cardiology  Plan:   DMT2:   Continue  Lantus to  12  units qhs, start Humalog 2 units pre- Breakfast and Lunch, and 4 units pre-dinner with correction scale coverage, monitor FS will FU  Patient counseled for compliance with consistent low carb diet  HTN: Continue treatment, monitoring, Primary team FU  RH A; on Methotrexate  ILD;  on Methotrexatel  ADINA/CKD: Continue monitoring labs, renal FU  Case discussed with the NP  Thank you for consult  efrain avina MD  [phone]
Assessment  DMT2: 87y Female with DM T2 with hyperglycemia bs  yesterday   BS This    got Lantus  12 units Last  night  .HTN: Controlled, On med.  RH A; on Methotrexate  ILD; ON Symbicort and Albuterol   ADINA/CKD: Monitor labs/BMP,   As; F/u by cardiology  Plan:   DMT2:   Continue  Lantus to  12  units qhs, start Humalog 2 units pre- Breakfast and Lunch, and 4 units pre-dinner with correction scale coverage, monitor FS will FU  Patient counseled for compliance with consistent low carb diet  HTN: Continue treatment, monitoring, Primary team FU  RH A; on Methotrexate  ILD;  on Methotrexatel  ADINA/CKD: Continue monitoring labs, renal FU  Case discussed with the NP  Thank you for consult  efrain avina MD  [phone]
Patient with anemia and leucopenia.  The prior has been chronic the latter is new.  She does have RA and is taking MTX and that could cause cytopenias, but she has been on it for a while so unclear why it happened now.  Infectious work-up was negative.  Could be an auto-immune component, but again would expect it to have been there prior on and off as well.    Monitor the ANC for now.  She has no fevers.  The MCV is normocytic.    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  The B12 was not decreased.     monitor the Hgb and supportive transfusions as needed.      below knee dvt. for serial doppler.  vascular input noted
RA on methotrexate  RA associated pulmona;ry fibrosis - stable  Anemia  Non specfic weakness  MOderate AS on prior TTE  Resolving Chuyita    REC    Awaiting TTE  Observe off abx
This is a 87y year old Female with the below past medical history who presents with the chief complaint of tremulousness and weak allover x about 2 weeks. states was not eating well at home and does not like food here. uses cane for fall prevention. states better since admission a little.     ? due to albulterol inhaler, can give tremulousness side effect and tachy, patient  today    improved and neuro exam non focal now    likely deconditioned and decrease in PO intake over past month.    do not believe neuroimaging will change managemetn, so will defer    OOB to chair as tolerated with fall precautions.     RN note says headache, pt complains of neck discomfort, can use hot packs, NSAIDs    no neuro c/i to d/c
This is a 87y year old Female with the below past medical history who presents with the chief complaint of tremulousness and weak allover x about 2 weeks. states was not eating well at home and does not like food here. uses cane for fall prevention. states better since admission a little.     ? due to albulterol inhaler, can give tremulousness side effect and tachy, patient  today    improved and neuro exam non focal now    likely deconditioned and decrease in PO intake over past month.    do not believe neuroimaging will change managemetn, so will defer    OOB to chair as tolerated with fall precautions.     headaches and neck pain better, if recurr, nsaids and hot packs    no neuro c/i to d/c    reconsult prn
weakness  ? decondition state / adina   also can be from anemia   normal TSH  endo on board   PT eval   further work up as per primary team    AS  moderate  agree with repeat echo     ADINA   off diuretics/arb for now   improving    HTN  stable    ILD  good o2 sat on room air   fu with pulm given cough
weakness  ? decondition state / adina   also can be from anemia   normal TSH  endo on board   PT eval   further work up as per primary team    AS  moderate  awaiting repeat echo     ADINA   off diuretics/arb for now   improving    HTN  stable    ILD  good o2 sat on room air   fu with pulm
weakness  ? decondition state / adina   check TSH  endo on board   PT eval   further work up as per primary team    AS  moderate  agree with repeat echo     ADINA   off diuretics/arb for now   improving    HTN  stable    ILD  good o2 sat on room air   fu with pulm
87F with PMHx of rheumatoid arthritis, diabetes mellitus, HTN, moderate aortic stenosis, CKD2, and interstitial lung disease presents with weakness for two weeks. At this point I suspect acute on chronic deconditioning given the patient's previous history of weakness. The differential includes infection, valvular heart disease, RA flare causing chronic inflammation, hypoxia from interstitial lung disease, or possibly adrenal insufficiency. There are no localizing signs of infection, patient last had moderate aortic stenosis on 2017, patient saturating well on room air at rest. In terms of adrenal insufficiency, unknown if she has been on prednisone recently, but she is on an ICS. She does not have hyponatremia, hyperkalemia, or hypoglycemia which goes against adrenal insufficiency. Patient admitted for work up of weakness.    Problem/Plan - 1:  ·  Problem: Weakness.  Plan: --Currently do not suspect infection ( mild elevationof procal however in setting of ADINA )  or adrenal insufficiency as cause given lack of signs/symptoms and labs  -Possible due to decreased PO and deconditioning   - neuro consult  and rheum appreciated :  no further w/u   .. off mmtx for now       Problem/Plan - 2:  ·  Problem: RA (rheumatoid arthritis).  Plan: -  hold MTX per rheum   -Continue with folic acid supplementation      Problem/Plan - 3:  ·  Problem: DM (diabetes mellitus), type 2.  Plan: -Hold oral agents  -Decrease basal insulin to 5 units qhs  -ISS, FS TID AC.     Problem/Plan - 4:  ·  Problem: Aortic stenosis.  Plan: -Repeat TTE (last in 2017 which showed moderate disease),    Problem/Plan - 5:  ·  Problem: HTN (hypertension), benign.  Plan: -Hold oral agents and monitor BP.     Problem/Plan - 6:  Problem: History of interstitial lung disease. Plan: -Continue with Symbicort BID and Albuterol PRN, per previous documentation consultants suspect ILD 2/2 to rheumatoid arthritis as opposed to MTX toxicity.    Problem/Plan - 7:  ·  Problem: Normocytic anemia.  Plan: -Stable and at baseline    Problem/Plan - 8:  ·  Problem: Lactate blood increase.  Plan: -improved     Problem/Plan - 9:  ·  Problem: Acute kidney injury superimposed on CKD.  Plan: -likely pre renal   cont ivf   renal input
87F with PMHx of rheumatoid arthritis, diabetes mellitus, HTN, moderate aortic stenosis, CKD2, and interstitial lung disease presents with weakness for two weeks. At this point I suspect acute on chronic deconditioning given the patient's previous history of weakness. The differential includes infection, valvular heart disease, RA flare causing chronic inflammation, hypoxia from interstitial lung disease, or possibly adrenal insufficiency. There are no localizing signs of infection, patient last had moderate aortic stenosis on 2017, patient saturating well on room air at rest. In terms of adrenal insufficiency, unknown if she has been on prednisone recently, but she is on an ICS. She does not have hyponatremia, hyperkalemia, or hypoglycemia which goes against adrenal insufficiency. Patient admitted for work up of weakness.    Problem/Plan - 1:  ·  Problem: Weakness.  Plan: --Currently do not suspect infection ( mild elevationof procal however in setting of ADINA )  or adrenal insufficiency as cause given lack of signs/symptoms and labs  -Possible due to decreased PO and deconditioning   - neuro consult  and rheum appreciated :  no further w/u   .. off mmtx for now       Problem/Plan - 2:  ·  Problem: RA (rheumatoid arthritis).  Plan: -  hold MTX per rheum   -Continue with folic acid supplementation      Problem/Plan - 3:  ·  Problem: DM (diabetes mellitus), type 2.  Plan: -Hold oral agents  -Decrease basal insulin to 5 units qhs  -ISS, FS TID AC.     Problem/Plan - 4:  ·  Problem: Aortic stenosis.  Plan: -Repeat TTE (last in 2017 which showed moderate disease),    Problem/Plan - 5:  ·  Problem: HTN (hypertension), benign.  Plan: -Hold oral agents and monitor BP.     Problem/Plan - 6:  Problem: History of interstitial lung disease. Plan: -Continue with Symbicort BID and Albuterol PRN, per previous documentation consultants suspect ILD 2/2 to rheumatoid arthritis as opposed to MTX toxicity.    Problem/Plan - 7:  ·  Problem: Normocytic anemia.  Plan: -Stable and at baseline    Problem/Plan - 8:  ·  Problem: Lactate blood increase.  Plan: -improved     Problem/Plan - 9:  ·  Problem: Acute kidney injury superimposed on CKD.  Plan: -likely pre renal   hold IVF for now   renal input apprecaited     Problem/Plan - 10  cough : RVPneg   , CXR  Clear lungs   '    Problem/Plan - 11 :  acnemia : likley sec to AOCD     heme input appreciated    hold transfusion for now   Hb reasob  ankle and close to baseline
87F with PMHx of rheumatoid arthritis, diabetes mellitus, HTN, moderate aortic stenosis, CKD2, and interstitial lung disease presents with weakness for two weeks. At this point I suspect acute on chronic deconditioning given the patient's previous history of weakness. The differential includes infection, valvular heart disease, RA flare causing chronic inflammation, hypoxia from interstitial lung disease, or possibly adrenal insufficiency. There are no localizing signs of infection, patient last had moderate aortic stenosis on 2017, patient saturating well on room air at rest. In terms of adrenal insufficiency, unknown if she has been on prednisone recently, but she is on an ICS. She does not have hyponatremia, hyperkalemia, or hypoglycemia which goes against adrenal insufficiency. Patient admitted for work up of weakness.    Problem/Plan - 1:  ·  Problem: Weakness.  Plan: --Currently do not suspect infection ( mild elevationof procal however in setting of ADINA )  or adrenal insufficiency as cause given lack of signs/symptoms and labs  -Possible due to decreased PO and deconditioning   - neuro consult  and rheum appreciated :  no further w/u   .. off mmtx for now       Problem/Plan - 2:  ·  Problem: RA (rheumatoid arthritis).  Plan: -  hold MTX per rheum   -Continue with folic acid supplementation      Problem/Plan - 3:  ·  Problem: DM (diabetes mellitus), type 2.  Plan: -Hold oral agents  -Decrease basal insulin to 5 units qhs  -ISS, FS TID AC.     Problem/Plan - 4:  ·  Problem: Aortic stenosis.  Plan: -Repeat TTE (last in 2017 which showed moderate disease),    Problem/Plan - 5:  ·  Problem: HTN (hypertension), benign.  Plan: -Hold oral agents and monitor BP.     Problem/Plan - 6:  Problem: History of interstitial lung disease. Plan: -Continue with Symbicort BID and Albuterol PRN, per previous documentation consultants suspect ILD 2/2 to rheumatoid arthritis as opposed to MTX toxicity.    Problem/Plan - 7:  ·  Problem: Normocytic anemia.  Plan: -Stable and at baseline    Problem/Plan - 8:  ·  Problem: Lactate blood increase.  Plan: -improved     Problem/Plan - 9:  ·  Problem: Acute kidney injury superimposed on CKD.  Plan: -likely pre renal   hold IVF for now   will check CXR and BNP .. given worsening crackles   renal input apprecaited     Problem/Plan - 10  cough : check RVP , CXR , BNP '    Problem/Plan - 11 :  acnemia : likley sec to AOCD     heme input   hold transfusion for now   Hb reasobankle and close to baseline

## 2019-08-09 NOTE — DISCHARGE NOTE PROVIDER - CARE PROVIDER_API CALL
Yuriy Tobias (MD)  Family Medicine  [address], [address] [place] [address]  Phone: [phone]  Fax: [phone]  Follow Up Time: Yuriy Tobias (MD)  Family Medicine  [address], [address] [place] [address]  Phone: [phone]  Fax: [phone]  Follow Up Time:     Tor Colon (DO)  Internal Medicine; Nuclear Cardiology  [address] [place] [address]  Phone: [phone]  Fax: [phone]  Follow Up Time: 1 week

## 2019-08-09 NOTE — PROGRESS NOTE ADULT - SUBJECTIVE AND OBJECTIVE BOX
Endocrinology Attending Covering for Dr. Akbar      Chief complaint  Patient is a 87y old  Female who presents with a chief complaint of Weakness (09 Aug 2019 12:49)   Review of systems  Patient in bed, looks comfortable, no fever,  had no hypoglycemia.    Labs and Fingersticks  CAPILLARY BLOOD GLUCOSE      POCT Blood Glucose.: 151 mg/dL (09 Aug 2019 12:22)  POCT Blood Glucose.: 96 mg/dL (09 Aug 2019 09:23)  POCT Blood Glucose.: 128 mg/dL (08 Aug 2019 21:43)  POCT Blood Glucose.: 151 mg/dL (08 Aug 2019 17:30)  POCT Blood Glucose.: 148 mg/dL (08 Aug 2019 14:09)      Anion Gap, Serum: 13 (08-09 @ 09:40)  Anion Gap, Serum: 12 (08-08 @ 08:25)      Calcium, Total Serum: 9.2 (08-09 @ 09:40)  Calcium, Total Serum: 9.2 (08-08 @ 08:25)          08-09    138  |  100  |  18  ----------------------------<  102<H>  3.4<L>   |  25  |  1.21    Ca    9.2      09 Aug 2019 09:40                          8.8    3.18  )-----------( 346      ( 09 Aug 2019 11:22 )             27.6     Medications  MEDICATIONS  (STANDING):  buDESOnide 160 MICROgram(s)/formoterol 4.5 MICROgram(s) Inhaler 2 Puff(s) Inhalation two times a day  calcium carbonate   1250 mG (OsCal) 1 Tablet(s) Oral daily  chlorhexidine 2% Cloths 1 Application(s) Topical daily  cholecalciferol 1000 Unit(s) Oral daily  dextrose 50% Injectable 12.5 Gram(s) IV Push once  dextrose 50% Injectable 25 Gram(s) IV Push once  dextrose 50% Injectable 25 Gram(s) IV Push once  enoxaparin Injectable 60 milliGRAM(s) SubCutaneous two times a day  folic acid 1 milliGRAM(s) Oral daily  insulin glargine Injectable (LANTUS) 12 Unit(s) SubCutaneous at bedtime  insulin lispro (HumaLOG) corrective regimen sliding scale   SubCutaneous three times a day before meals  insulin lispro Injectable (HumaLOG) 2 Unit(s) SubCutaneous before breakfast  insulin lispro Injectable (HumaLOG) 2 Unit(s) SubCutaneous before lunch  insulin lispro Injectable (HumaLOG) 4 Unit(s) SubCutaneous before dinner  nystatin Powder 1 Application(s) Topical two times a day      Physical Exam  General: Patient comfortable in bed  Vital Signs Last 12 Hrs  T(F): 98.1 (08-09-19 @ 08:32), Max: 98.1 (08-09-19 @ 08:32)  HR: 113 (08-09-19 @ 08:32) (113 - 113)  BP: 161/78 (08-09-19 @ 08:32) (161/78 - 161/78)  BP(mean): --  RR: 18 (08-09-19 @ 08:32) (18 - 18)  SpO2: 95% (08-09-19 @ 08:32) (95% - 95%)  Neck: No palpable thyroid nodules.  CVS: S1S2, No murmurs  Respiratory: No wheezing, no crepitations  GI: Abdomen soft, bowel sounds positive  Musculoskeletal:  edema lower extremities.   Skin: No skin rashes, no ecchymosis    Diagnostics

## 2019-08-09 NOTE — PROGRESS NOTE ADULT - SUBJECTIVE AND OBJECTIVE BOX
[facility] NEPHROLOGY- PROGRESS NOTE    87y Female with history of ILD, aortic stenosis presents with weakness. Nephrology consulted for elevated Scr.    REVIEW OF SYSTEMS:  Gen: no changes in weight, + weakness improving  Cards: no chest pain  Resp: no dyspnea  GI: no nausea or vomiting or diarrhea  Vascular: no LE edema    No Known Allergies      Hospital Medications: Medications reviewed      VITALS:  T(F): 98.1 (19 @ 08:32), Max: 98.8 (19 @ 21:47)  HR: 113 (19 @ 08:32)  BP: 161/78 (19 @ 08:32)  RR: 18 (19 @ 08:32)  SpO2: 95% (19 @ 08:32)  Wt(kg): --      PHYSICAL EXAM:    Gen: NAD, calm  Cards: + tachycardia, +S1/S2, + BRITTNY  Resp: + rales anteriorly  GI: soft, NT/ND, NABS  Vascular: no LE edema B/L      LABS:      138  |  100  |  18  ----------------------------<  102<H>  3.4<L>   |  25  |  1.21    Ca    9.2      09 Aug 2019 09:40      Creatinine Trend: 1.21 <--, 1.10 <--, 1.19 <--, 1.39 <--, 1.44 <--, 1.66 <--                        7.6    2.74  )-----------( 266      ( 08 Aug 2019 12:54 )             23.5     Urine Studies:  Urinalysis Basic - ( 04 Aug 2019 18:24 )    Color: Yellow / Appearance: Clear / S.020 / pH:   Gluc:  / Ketone: Negative  / Bili: Negative / Urobili: Negative   Blood:  / Protein: 30 mg/dL / Nitrite: Negative   Leuk Esterase: Moderate / RBC: 4 /hpf / WBC 1 /HPF   Sq Epi:  / Non Sq Epi: 3 /hpf / Bacteria: Negative      Sodium, Random Urine: 84 mmol/L ( @ 18:31)  Creatinine, Random Urine: 73 mg/dL ( @ 18:31)  Protein/Creatinine Ratio Calculation: 0.4 Ratio ( @ 18:31)

## 2019-08-09 NOTE — CONSULT NOTE ADULT - CONSULT REASON
Below the knee DVT
Elevated Scr
RA
Weaknes/ILD
anemia, leucopenia
cardiac eval
high procalcitonin
weakness
Hyperglycemia

## 2019-08-09 NOTE — CONSULT NOTE ADULT - SUBJECTIVE AND OBJECTIVE BOX
Vascular Cardiology Consult Note     [facility] [id number]              EMAIL [email or web address]   OFFICE [phone]    CC:  Below the knee DVT     HPI:   87F with PMHx of rheumatoid arthritis, diabetes mellitus, HTN, moderate aortic stenosis, CKD2, and interstitial lung disease presents with weakness for two weeks. At this point I suspect acute on chronic deconditioning given the patient's previous history of weakness.     For PE:  Provoked/Unprovoked? (long haul air travel, immobility, surgery)  Duration of symptoms?  Leg pain or swelling?  Cancer Screening: (mammo, pap, colonoscopy, prostate?)  Syncope?  Prior VTE?  Family history of VTE?  OCP use?  Prior bleeding?    For PAD:  Claudication?  Ulceration on feet?  Prior vascular testing?  Podiatrist?  Smoker?  On antiplatelet and statin therapy?  Concern for infection/cellulitis?  Renal function?  Prior stents/intervention?  Allergy to contrast?           Allergies    No Known Allergies    Intolerances    	    MEDICATIONS:  enoxaparin Injectable 60 milliGRAM(s) SubCutaneous two times a day      ALBUTerol    0.083% 2.5 milliGRAM(s) Nebulizer every 6 hours PRN  buDESOnide 160 MICROgram(s)/formoterol 4.5 MICROgram(s) Inhaler 2 Puff(s) Inhalation two times a day  guaiFENesin   Syrup  (Sugar-Free) 200 milliGRAM(s) Oral every 6 hours PRN    ibuprofen  Tablet. 200 milliGRAM(s) Oral two times a day PRN      dextrose 40% Gel 15 Gram(s) Oral once PRN  dextrose 50% Injectable 12.5 Gram(s) IV Push once  dextrose 50% Injectable 25 Gram(s) IV Push once  dextrose 50% Injectable 25 Gram(s) IV Push once  glucagon  Injectable 1 milliGRAM(s) IntraMuscular once PRN  insulin glargine Injectable (LANTUS) 12 Unit(s) SubCutaneous at bedtime  insulin lispro (HumaLOG) corrective regimen sliding scale   SubCutaneous three times a day before meals  insulin lispro Injectable (HumaLOG) 2 Unit(s) SubCutaneous before breakfast  insulin lispro Injectable (HumaLOG) 2 Unit(s) SubCutaneous before lunch  insulin lispro Injectable (HumaLOG) 4 Unit(s) SubCutaneous before dinner    calcium carbonate   1250 mG (OsCal) 1 Tablet(s) Oral daily  chlorhexidine 2% Cloths 1 Application(s) Topical daily  cholecalciferol 1000 Unit(s) Oral daily  folic acid 1 milliGRAM(s) Oral daily  nystatin Powder 1 Application(s) Topical two times a day      PAST MEDICAL & SURGICAL HISTORY:  History of interstitial lung disease  Aortic stenosis  RA (rheumatoid arthritis)  Asthma  DM (diabetes mellitus), type 2  HTN (hypertension), benign  After-cataract of left eye  Tubal occlusion      FAMILY HISTORY:  No pertinent family history in first degree relatives      SOCIAL HISTORY:  unchanged    REVIEW OF SYSTEMS:  CONSTITUTIONAL: No fever, weight loss, or fatigue  EYES: No eye pain, visual disturbances, or discharge  ENMT:  No difficulty hearing, tinnitus, vertigo; No sinus or throat pain  NECK: No pain or stiffness  RESPIRATORY: No cough, wheezing, chills or hemoptysis; No Shortness of Breath    CARDIOVASCULAR: No chest pain, palpitations, passing out, dizziness, or leg swelling    GASTROINTESTINAL: No abdominal or epigastric pain. No nausea, vomiting, or hematemesis; No diarrhea or constipation. No melena or hematochezia.  GENITOURINARY: No dysuria, frequency, hematuria, or incontinence  NEUROLOGICAL: No headaches, memory loss, loss of strength, numbness, or tremors  SKIN: No itching, burning, rashes, or lesions   LYMPH Nodes: No enlarged glands  ENDOCRINE: No heat or cold intolerance; No hair loss  MUSCULOSKELETAL: No joint pain or swelling; No muscle, back, or extremity pain  PSYCHIATRIC: No depression, anxiety, mood swings, or difficulty sleeping  HEME/LYMPH: No easy bruising, or bleeding gums  ALLERY AND IMMUNOLOGIC: No hives or eczema	    [ x] All others negative	  [ ] Unable to obtain    PHYSICAL EXAM:  T(C): 36.7 (08-09-19 @ 08:32), Max: 37.1 (08-08-19 @ 21:47)  HR: 113 (08-09-19 @ 08:32) (96 - 113)  BP: 161/78 (08-09-19 @ 08:32) (127/63 - 166/84)  RR: 18 (08-09-19 @ 08:32) (18 - 20)  SpO2: 95% (08-09-19 @ 08:32) (93% - 95%)  Wt(kg): --  I&O's Summary      Appearance: Normal	  HEENT:   Normal oral mucosa, PERRL, EOMI	  Carotid:   Right:  No Bruit      Left:  No bruit  Lymphatic: No lymphadenopathy  Cardiovascular: Normal S1 S2, No JVD, No murmurs  Respiratory: Lungs clear to auscultation	  Psychiatry: A & O x 3, Mood & affect appropriate  Gastrointestinal:  Soft, Non-tender, + BS	  Skin: No rashes, No ecchymoses, No cyanosis	  Neurologic: Non-focal  Extremities: Normal range of motion.    Vascular Pulse Exam:  Right Femoral:  Palpable       Left Femoral:  Palpable  Right Popliteal:  Palpable      Left Popliteal:  Palpable  Right DP:  Palpable                 Left DP:  Palpable  Right PT:  Palpable                 Left DP:  Palpable    Foot Exam:  Warm, no ulceration.        LABS:	 	    CBC Full  -  ( 09 Aug 2019 11:22 )  WBC Count : 3.18 K/uL  Hemoglobin : 8.8 g/dL  Hematocrit : 27.6 %  Platelet Count - Automated : 346 K/uL  Mean Cell Volume : 96.8 fl  Mean Cell Hemoglobin : 30.9 pg  Mean Cell Hemoglobin Concentration : 31.9 gm/dL  Auto Neutrophil # : x  Auto Lymphocyte # : x  Auto Monocyte # : x  Auto Eosinophil # : x  Auto Basophil # : x  Auto Neutrophil % : x  Auto Lymphocyte % : x  Auto Monocyte % : x  Auto Eosinophil % : x  Auto Basophil % : x    08-09    138  |  100  |  18  ----------------------------<  102<H>  3.4<L>   |  25  |  1.21  08-08    139  |  103  |  18  ----------------------------<  116<H>  3.7   |  24  |  1.10    Ca    9.2      09 Aug 2019 09:40  Ca    9.2      08 Aug 2019 08:25            Assessment:  1.            Plan:  1.          Thank you      Vascular Cardiology Service    Please call with any questions:   SPECT[facility] - [id number]  Office [phone]  email:  [email or web address] Vascular Cardiology Consult Note     [facility] [id number]              EMAIL [email or web address]   OFFICE [phone]    CC:  Below the knee DVT     HPI:   87F with PMHx of rheumatoid arthritis, diabetes mellitus, HTN, moderate aortic stenosis, CKD2,  colonic diverticulosis (s/p diverticular bleed 7/2018 s/p transfusion), pulmonary fibrosis (due to RA) and interstitial lung disease presents with weakness for two weeks. At this point I suspect acute on chronic deconditioning given the patient's previous history of weakness likely secondary to dehydration. Patient states that she is currently doing well with no complaints of any chest pain at rest or upon exertion but notes + shortness of breath, which is chronic due to pulmonary fibrosis. Patient states at baseline shes is ambulatory with no limitations; no recent travel (via car or air travel), immobility, surgery.   No prior history of VTE  No family h/o of VTE   No prior diagnosis of cancer        Allergies  No Known Allergies  Intolerances    	  MEDICATIONS:  enoxaparin Injectable 60 milliGRAM(s) SubCutaneous two times a day  ALBUTerol    0.083% 2.5 milliGRAM(s) Nebulizer every 6 hours PRN  buDESOnide 160 MICROgram(s)/formoterol 4.5 MICROgram(s) Inhaler 2 Puff(s) Inhalation two times a day  guaiFENesin   Syrup  (Sugar-Free) 200 milliGRAM(s) Oral every 6 hours PRN  ibuprofen  Tablet. 200 milliGRAM(s) Oral two times a day PRN  dextrose 40% Gel 15 Gram(s) Oral once PRN  dextrose 50% Injectable 12.5 Gram(s) IV Push once  dextrose 50% Injectable 25 Gram(s) IV Push once  dextrose 50% Injectable 25 Gram(s) IV Push once  glucagon  Injectable 1 milliGRAM(s) IntraMuscular once PRN  insulin glargine Injectable (LANTUS) 12 Unit(s) SubCutaneous at bedtime  insulin lispro (HumaLOG) corrective regimen sliding scale   SubCutaneous three times a day before meals  insulin lispro Injectable (HumaLOG) 2 Unit(s) SubCutaneous before breakfast  insulin lispro Injectable (HumaLOG) 2 Unit(s) SubCutaneous before lunch  insulin lispro Injectable (HumaLOG) 4 Unit(s) SubCutaneous before dinner    calcium carbonate   1250 mG (OsCal) 1 Tablet(s) Oral daily  chlorhexidine 2% Cloths 1 Application(s) Topical daily  cholecalciferol 1000 Unit(s) Oral daily  folic acid 1 milliGRAM(s) Oral daily  nystatin Powder 1 Application(s) Topical two times a day      PAST MEDICAL & SURGICAL HISTORY:  History of interstitial lung disease  Aortic stenosis  RA (rheumatoid arthritis)  Asthma  DM (diabetes mellitus), type 2  HTN (hypertension), benign  After-cataract of left eye  Tubal occlusion      FAMILY HISTORY:  No pertinent family history in first degree relatives      SOCIAL HISTORY:  unchanged    REVIEW OF SYSTEMS:  CONSTITUTIONAL: No fever, weight loss, or +fatigue  EYES: No eye pain, visual disturbances, or discharge  ENMT:  No difficulty hearing, tinnitus, vertigo; No sinus or throat pain  NECK: No pain or stiffness  RESPIRATORY: No cough, wheezing, chills or hemoptysis; No Shortness of Breath  CARDIOVASCULAR: No chest pain, palpitations, passing out, dizziness, or leg swelling  GASTROINTESTINAL: No abdominal or epigastric pain. No nausea, vomiting, or hematemesis; No diarrhea or constipation. No melena or hematochezia.  GENITOURINARY: No dysuria, frequency, hematuria, or incontinence  NEUROLOGICAL: No headaches, memory loss, loss of strength, numbness, or tremors  SKIN: No itching, burning, rashes, or lesions   LYMPH Nodes: No enlarged glands  ENDOCRINE: No heat or cold intolerance; No hair loss  MUSCULOSKELETAL: No joint pain or swelling; No muscle, back, or extremity pain  PSYCHIATRIC: No depression, anxiety, mood swings, or difficulty sleeping  HEME/LYMPH: No easy bruising, or bleeding gums  ALLERY AND IMMUNOLOGIC: No hives or eczema	    [ x] All others negative	  [ ] Unable to obtain    PHYSICAL EXAM:  T(C): 36.7 (08-09-19 @ 08:32), Max: 37.1 (08-08-19 @ 21:47)  HR: 113 (08-09-19 @ 08:32) (96 - 113)  BP: 161/78 (08-09-19 @ 08:32) (127/63 - 166/84)  RR: 18 (08-09-19 @ 08:32) (18 - 20)  SpO2: 95% (08-09-19 @ 08:32) (93% - 95%)  Wt(kg): --  I&O's Summary      Appearance: Normal	  HEENT:   Normal oral mucosa, PERRL, EOMI	  Carotid: Right:  No Bruit      Left:  No bruit  Lymphatic: No lymphadenopathy  Cardiovascular: Normal S1 S2, No JVD, No murmurs  Respiratory: bibasilar crackles 	  Psychiatry: A & O x 3, Mood & affect appropriate  Gastrointestinal:  Soft, Non-tender, + BS	  Skin: No rashes, No ecchymoses, No cyanosis	  Neurologic: Non-focal  Extremities: Normal range of motion; no signs of phlegmasia     Vascular Pulse Exam:  Right Femoral:  Palpable       Left Femoral:  Palpable  Right Popliteal:  Palpable      Left Popliteal:  Palpable  Right DP:  Palpable                 Left DP:  Palpable  Right PT:  Palpable                 Left DP:  Palpable    Foot Exam:  Warm, no ulceration.        LABS:	 	    CBC Full  -  ( 09 Aug 2019 11:22 )  WBC Count : 3.18 K/uL  Hemoglobin : 8.8 g/dL  Hematocrit : 27.6 %  Platelet Count - Automated : 346 K/uL  Mean Cell Volume : 96.8 fl  Mean Cell Hemoglobin : 30.9 pg  Mean Cell Hemoglobin Concentration : 31.9 gm/dL  Auto Neutrophil # : x  Auto Lymphocyte # : x  Auto Monocyte # : x  Auto Eosinophil # : x  Auto Basophil # : x  Auto Neutrophil % : x  Auto Lymphocyte % : x  Auto Monocyte % : x  Auto Eosinophil % : x  Auto Basophil % : x    08-09    138  |  100  |  18  ----------------------------<  102<H>  3.4<L>   |  25  |  1.21  08-08    139  |  103  |  18  ----------------------------<  116<H>  3.7   |  24  |  1.10    Ca    9.2      09 Aug 2019 09:40  Ca    9.2      08 Aug 2019 08:25            Assessment:  87F with PMHx of rheumatoid arthritis, diabetes mellitus, HTN, moderate aortic stenosis, CKD2, colonic diverticulosis (s/p diverticular bleed 7/2018 s/p transfusion), pulmonary fibrosis (due to RA) and interstitial lung disease presents with weakness for two weeks; with below the knee DVT of the R posterior tibial vein     1. Below the knee DVT of the R posterior tibial vein, unprovoked   - on Lovenox        Plan:  - due to #1 will recommend to HOLD AC upon discharge and follow up in the office with Dr. Galmer in 1 week   - plans for surveillance of the DVT with repeat US duplex of the lower extremities in 1 week (8/15/2019)  -          Thank you      Vascular Cardiology Service    Please call with any questions:   SPECT[facility] - [id number]  Office [phone]  email:  [email or web address] Vascular Cardiology Consult Note     [facility] [id number]              EMAIL [email or web address]   OFFICE [phone]    CC:  Below the knee DVT     HPI:   87F with PMHx of rheumatoid arthritis, diabetes mellitus, HTN, moderate aortic stenosis, CKD2,  colonic diverticulosis (s/p diverticular bleed 7/2018 s/p transfusion), pulmonary fibrosis (due to RA) and interstitial lung disease presents with weakness for two weeks. At this point I suspect acute on chronic deconditioning given the patient's previous history of weakness likely secondary to dehydration. Patient states that she is currently doing well with no complaints of any chest pain at rest or upon exertion but notes + shortness of breath, which is chronic due to pulmonary fibrosis. Patient states at baseline shes is ambulatory with no limitations; no recent travel (via car or air travel), immobility, surgery.   No prior history of VTE  No family h/o of VTE   No prior diagnosis of cancer        Allergies  No Known Allergies  Intolerances    	  MEDICATIONS:  enoxaparin Injectable 60 milliGRAM(s) SubCutaneous two times a day  ALBUTerol    0.083% 2.5 milliGRAM(s) Nebulizer every 6 hours PRN  buDESOnide 160 MICROgram(s)/formoterol 4.5 MICROgram(s) Inhaler 2 Puff(s) Inhalation two times a day  guaiFENesin   Syrup  (Sugar-Free) 200 milliGRAM(s) Oral every 6 hours PRN  ibuprofen  Tablet. 200 milliGRAM(s) Oral two times a day PRN  dextrose 40% Gel 15 Gram(s) Oral once PRN  dextrose 50% Injectable 12.5 Gram(s) IV Push once  dextrose 50% Injectable 25 Gram(s) IV Push once  dextrose 50% Injectable 25 Gram(s) IV Push once  glucagon  Injectable 1 milliGRAM(s) IntraMuscular once PRN  insulin glargine Injectable (LANTUS) 12 Unit(s) SubCutaneous at bedtime  insulin lispro (HumaLOG) corrective regimen sliding scale   SubCutaneous three times a day before meals  insulin lispro Injectable (HumaLOG) 2 Unit(s) SubCutaneous before breakfast  insulin lispro Injectable (HumaLOG) 2 Unit(s) SubCutaneous before lunch  insulin lispro Injectable (HumaLOG) 4 Unit(s) SubCutaneous before dinner    calcium carbonate   1250 mG (OsCal) 1 Tablet(s) Oral daily  chlorhexidine 2% Cloths 1 Application(s) Topical daily  cholecalciferol 1000 Unit(s) Oral daily  folic acid 1 milliGRAM(s) Oral daily  nystatin Powder 1 Application(s) Topical two times a day      PAST MEDICAL & SURGICAL HISTORY:  History of interstitial lung disease  Aortic stenosis  RA (rheumatoid arthritis)  Asthma  DM (diabetes mellitus), type 2  HTN (hypertension), benign  After-cataract of left eye  Tubal occlusion      FAMILY HISTORY:  No pertinent family history in first degree relatives      SOCIAL HISTORY:  unchanged    REVIEW OF SYSTEMS:  CONSTITUTIONAL: No fever, weight loss, or +fatigue  EYES: No eye pain, visual disturbances, or discharge  ENMT:  No difficulty hearing, tinnitus, vertigo; No sinus or throat pain  NECK: No pain or stiffness  RESPIRATORY: No cough, wheezing, chills or hemoptysis; No Shortness of Breath  CARDIOVASCULAR: No chest pain, palpitations, passing out, dizziness, or leg swelling  GASTROINTESTINAL: No abdominal or epigastric pain. No nausea, vomiting, or hematemesis; No diarrhea or constipation. No melena or hematochezia.  GENITOURINARY: No dysuria, frequency, hematuria, or incontinence  NEUROLOGICAL: No headaches, memory loss, loss of strength, numbness, or tremors  SKIN: No itching, burning, rashes, or lesions   LYMPH Nodes: No enlarged glands  ENDOCRINE: No heat or cold intolerance; No hair loss  MUSCULOSKELETAL: No joint pain or swelling; No muscle, back, or extremity pain  PSYCHIATRIC: No depression, anxiety, mood swings, or difficulty sleeping  HEME/LYMPH: No easy bruising, or bleeding gums  ALLERY AND IMMUNOLOGIC: No hives or eczema	    [ x] All others negative	  [ ] Unable to obtain    PHYSICAL EXAM:  T(C): 36.7 (08-09-19 @ 08:32), Max: 37.1 (08-08-19 @ 21:47)  HR: 113 (08-09-19 @ 08:32) (96 - 113)  BP: 161/78 (08-09-19 @ 08:32) (127/63 - 166/84)  RR: 18 (08-09-19 @ 08:32) (18 - 20)  SpO2: 95% (08-09-19 @ 08:32) (93% - 95%)  Wt(kg): --  I&O's Summary      Appearance: Normal	  HEENT:   Normal oral mucosa, PERRL, EOMI	  Carotid: Right:  No Bruit      Left:  No bruit  Lymphatic: No lymphadenopathy  Cardiovascular: Normal S1 S2, No JVD, No murmurs  Respiratory: bibasilar crackles 	  Psychiatry: A & O x 3, Mood & affect appropriate  Gastrointestinal:  Soft, Non-tender, + BS	  Skin: No rashes, No ecchymoses, No cyanosis	  Neurologic: Non-focal  Extremities: Normal range of motion; no signs of phlegmasia     Vascular Pulse Exam:  Right Femoral:  Palpable       Left Femoral:  Palpable  Right Popliteal:  Palpable      Left Popliteal:  Palpable  Right DP:  Palpable                 Left DP:  Palpable  Right PT:  Palpable                 Left DP:  Palpable    Foot Exam:  Warm, no ulceration.        LABS:	 	    CBC Full  -  ( 09 Aug 2019 11:22 )  WBC Count : 3.18 K/uL  Hemoglobin : 8.8 g/dL  Hematocrit : 27.6 %  Platelet Count - Automated : 346 K/uL  Mean Cell Volume : 96.8 fl  Mean Cell Hemoglobin : 30.9 pg  Mean Cell Hemoglobin Concentration : 31.9 gm/dL  Auto Neutrophil # : x  Auto Lymphocyte # : x  Auto Monocyte # : x  Auto Eosinophil # : x  Auto Basophil # : x  Auto Neutrophil % : x  Auto Lymphocyte % : x  Auto Monocyte % : x  Auto Eosinophil % : x  Auto Basophil % : x    08-09    138  |  100  |  18  ----------------------------<  102<H>  3.4<L>   |  25  |  1.21  08-08    139  |  103  |  18  ----------------------------<  116<H>  3.7   |  24  |  1.10    Ca    9.2      09 Aug 2019 09:40  Ca    9.2      08 Aug 2019 08:25            Assessment:  87F with PMHx of rheumatoid arthritis, diabetes mellitus, HTN, moderate aortic stenosis, CKD2, colonic diverticulosis (s/p diverticular bleed 7/2018 s/p transfusion), pulmonary fibrosis (due to RA) and interstitial lung disease presents with weakness for two weeks; with below the knee DVT of the R posterior tibial vein     1. Below the knee DVT of the R posterior tibial vein, unprovoked   - on Lovenox        Plan:  - due to #1 will recommend to HOLD AC upon discharge and follow up in the office with Dr. Galmer in 1 week   - plans for surveillance of the DVT with repeat US duplex of the lower extremities in 1 week (8/15/2019)  - discharge planning as per primary service     Latesha Cedillo D.O.  Thank you      Vascular Cardiology Service    Please call with any questions:   SPECT[facility] - [phone]  Office [phone]  email:  [email or web address]

## 2019-08-09 NOTE — PROGRESS NOTE ADULT - SUBJECTIVE AND OBJECTIVE BOX
Patient is a 87y old  Female who presents with a chief complaint of Weakness (09 Aug 2019 13:44)                                                               INTERVAL HPI/OVERNIGHT EVENTS:    REVIEW OF SYSTEMS:     CONSTITUTIONAL: No weakness, fevers or chills  RESPIRATORY:  cough, wheezing,  No shortness of breath  CARDIOVASCULAR: No chest pain or palpitations  GASTROINTESTINAL: No abdominal pain  . No nausea, vomiting, or hematemesis; No diarrhea or constipation. No melena or hematochezia.  GENITOURINARY: No dysuria, frequency or hematuria  NEUROLOGICAL: No numbness or weakness  SKIN: No itching, burning, rashes, or lesions                                                                                                                                                                                                                                                                                 Medications:  MEDICATIONS  (STANDING):  buDESOnide 160 MICROgram(s)/formoterol 4.5 MICROgram(s) Inhaler 2 Puff(s) Inhalation two times a day  calcium carbonate   1250 mG (OsCal) 1 Tablet(s) Oral daily  chlorhexidine 2% Cloths 1 Application(s) Topical daily  cholecalciferol 1000 Unit(s) Oral daily  dextrose 50% Injectable 12.5 Gram(s) IV Push once  dextrose 50% Injectable 25 Gram(s) IV Push once  dextrose 50% Injectable 25 Gram(s) IV Push once  enoxaparin Injectable 60 milliGRAM(s) SubCutaneous two times a day  folic acid 1 milliGRAM(s) Oral daily  insulin glargine Injectable (LANTUS) 12 Unit(s) SubCutaneous at bedtime  insulin lispro (HumaLOG) corrective regimen sliding scale   SubCutaneous three times a day before meals  insulin lispro Injectable (HumaLOG) 2 Unit(s) SubCutaneous before breakfast  insulin lispro Injectable (HumaLOG) 2 Unit(s) SubCutaneous before lunch  insulin lispro Injectable (HumaLOG) 4 Unit(s) SubCutaneous before dinner  nystatin Powder 1 Application(s) Topical two times a day    MEDICATIONS  (PRN):  ALBUTerol    0.083% 2.5 milliGRAM(s) Nebulizer every 6 hours PRN Shortness of Breath and/or Wheezing  dextrose 40% Gel 15 Gram(s) Oral once PRN Blood Glucose LESS THAN 70 milliGRAM(s)/deciliter  glucagon  Injectable 1 milliGRAM(s) IntraMuscular once PRN Glucose LESS THAN 70 milligrams/deciliter  guaiFENesin   Syrup  (Sugar-Free) 200 milliGRAM(s) Oral every 6 hours PRN Cough  ibuprofen  Tablet. 200 milliGRAM(s) Oral two times a day PRN Mild Pain (1 - 3), Moderate Pain (4 - 6), Severe Pain (7 - 10)       Allergies    No Known Allergies    Intolerances      Vital Signs Last 24 Hrs  T(C): 36.7 (09 Aug 2019 08:32), Max: 37.1 (08 Aug 2019 21:47)  T(F): 98.1 (09 Aug 2019 08:32), Max: 98.8 (08 Aug 2019 21:47)  HR: 113 (09 Aug 2019 08:32) (96 - 113)  BP: 161/78 (09 Aug 2019 08:32) (127/63 - 166/84)  BP(mean): --  RR: 18 (09 Aug 2019 08:32) (18 - 20)  SpO2: 95% (09 Aug 2019 08:32) (93% - 95%)  CAPILLARY BLOOD GLUCOSE      POCT Blood Glucose.: 151 mg/dL (09 Aug 2019 12:22)  POCT Blood Glucose.: 96 mg/dL (09 Aug 2019 09:23)  POCT Blood Glucose.: 128 mg/dL (08 Aug 2019 21:43)  POCT Blood Glucose.: 151 mg/dL (08 Aug 2019 17:30)      Physical Exam:    Daily     Daily   General:  NAD   HEENT:  Nonicteric, PERRLA  CV:  RRR, S1S2   Lungs:  crackles   Abdomen:  Soft, non-tender, no distended, positive BS  Extremities:  2+ pulses, no c/c, no edema  Skin:  Warm and dry, no rashes  :  No mason  Neuro:  AAOx3, non-focal, grossly intact                                                                                                                                                                                                                                                                                                LABS:                               8.8    3.18  )-----------( 346      ( 09 Aug 2019 11:22 )             27.6                      08-09    138  |  100  |  18  ----------------------------<  102<H>  3.4<L>   |  25  |  1.21    Ca    9.2      09 Aug 2019 09:40

## 2019-08-10 LAB
A PHAGOCYTOPH DNA BLD QL NAA+PROBE: NEGATIVE — SIGNIFICANT CHANGE UP
CULTURE RESULTS: SIGNIFICANT CHANGE UP
CULTURE RESULTS: SIGNIFICANT CHANGE UP
E CHAFFEENSIS DNA BLD QL NAA+PROBE: NEGATIVE — SIGNIFICANT CHANGE UP
E EWINGII DNA SPEC QL NAA+PROBE: NEGATIVE — SIGNIFICANT CHANGE UP
EHRLICHIA DNA SPEC QL NAA+PROBE: NEGATIVE — SIGNIFICANT CHANGE UP
SPECIMEN SOURCE: SIGNIFICANT CHANGE UP
SPECIMEN SOURCE: SIGNIFICANT CHANGE UP

## 2019-08-10 NOTE — PROGRESS NOTE ADULT - SUBJECTIVE AND OBJECTIVE BOX
Chief complaint  Patient is a 86y old  Female who presents with a chief complaint of weakness   cough   diarreah (13 Feb 2018 12:58)   Review of systems  Patient in bed, looks comfortable, no fever,  eating meals, SOB improving,  no hypoglycemia.    Labs and Fingersticks  CAPILLARY BLOOD GLUCOSE      POCT Blood Glucose.: 250 mg/dL (22 Feb 2018 08:14)  POCT Blood Glucose.: 256 mg/dL (21 Feb 2018 21:41)  POCT Blood Glucose.: 306 mg/dL (21 Feb 2018 17:02)  POCT Blood Glucose.: 425 mg/dL (21 Feb 2018 12:17)  POCT Blood Glucose.: 451 mg/dL (21 Feb 2018 12:14)      Anion Gap, Serum: 13 (02-22 @ 05:41)      Calcium, Total Serum: 9.1 (02-22 @ 05:41)          02-22    136  |  97  |  51<H>  ----------------------------<  278<H>  3.6   |  26  |  1.10    Ca    9.1      22 Feb 2018 05:41                          12.3   16.0  )-----------( 295      ( 22 Feb 2018 05:41 )             35.9     Medications  MEDICATIONS  (STANDING):  amLODIPine   Tablet 5 milliGRAM(s) Oral daily  benzonatate 100 milliGRAM(s) Oral three times a day  buDESOnide  80 MICROgram(s)/formoterol 4.5 MICROgram(s) Inhaler 2 Puff(s) Inhalation two times a day  cyanocobalamin 1000 MICROGram(s) Oral daily  dextrose 5%. 1000 milliLiter(s) (50 mL/Hr) IV Continuous <Continuous>  dextrose 50% Injectable 12.5 Gram(s) IV Push once  dextrose 50% Injectable 25 Gram(s) IV Push once  dextrose 50% Injectable 25 Gram(s) IV Push once  docusate sodium 100 milliGRAM(s) Oral three times a day  famotidine    Tablet 20 milliGRAM(s) Oral at bedtime  folic acid 1 milliGRAM(s) Oral daily  guaiFENesin   Syrup  (Sugar-Free) 100 milliGRAM(s) Oral every 6 hours  heparin  Injectable 5000 Unit(s) SubCutaneous every 12 hours  hydrochlorothiazide 25 milliGRAM(s) Oral daily  insulin glargine Injectable (LANTUS) 30 Unit(s) SubCutaneous at bedtime  insulin lispro (HumaLOG) corrective regimen sliding scale   SubCutaneous three times a day before meals  insulin lispro (HumaLOG) corrective regimen sliding scale   SubCutaneous at bedtime  insulin lispro Injectable (HumaLOG) 10 Unit(s) SubCutaneous three times a day before meals  mirtazapine 15 milliGRAM(s) Oral at bedtime  pantoprazole    Tablet 40 milliGRAM(s) Oral two times a day before meals  polyethylene glycol 3350 17 Gram(s) Oral daily  predniSONE   Tablet 20 milliGRAM(s) Oral two times a day  predniSONE   Tablet   Oral   senna 2 Tablet(s) Oral at bedtime  tiotropium 18 MICROgram(s) Capsule 1 Capsule(s) Inhalation daily      Physical Exam  General: Patient comfortable in bed  Vital Signs Last 12 Hrs  T(F): 97.9 (02-22-18 @ 04:16), Max: 97.9 (02-22-18 @ 04:16)  HR: 78 (02-22-18 @ 04:16) (78 - 78)  BP: 115/72 (02-22-18 @ 04:16) (115/72 - 115/72)  BP(mean): --  RR: 18 (02-22-18 @ 04:16) (18 - 18)  SpO2: 95% (02-22-18 @ 04:16) (95% - 95%)  Neck: No palpable thyroid nodules.  CVS: S1S2, No murmurs  Respiratory: No wheezing, no crepitations  GI: Abdomen soft, bowel sounds positive  Musculoskeletal:  edema lower extremities.   Skin: No skin rashes, no ecchymosis    Diagnostics Statement Selected Statement Selected Statement Selected Statement Selected Statement Selected Statement Selected Statement Selected Statement Selected Statement Selected Statement Selected Statement Selected

## 2019-08-13 PROBLEM — Z87.09 PERSONAL HISTORY OF OTHER DISEASES OF THE RESPIRATORY SYSTEM: Chronic | Status: ACTIVE | Noted: 2019-08-04

## 2019-08-13 PROBLEM — I35.0 NONRHEUMATIC AORTIC (VALVE) STENOSIS: Chronic | Status: ACTIVE | Noted: 2019-08-04

## 2019-08-14 ENCOUNTER — NON-APPOINTMENT (OUTPATIENT)
Age: 84
End: 2019-08-14

## 2019-08-14 ENCOUNTER — OUTPATIENT (OUTPATIENT)
Dept: OUTPATIENT SERVICES | Facility: HOSPITAL | Age: 84
LOS: 1 days | End: 2019-08-14
Payer: MEDICARE

## 2019-08-14 ENCOUNTER — APPOINTMENT (OUTPATIENT)
Dept: CARDIOLOGY | Facility: CLINIC | Age: 84
End: 2019-08-14
Payer: MEDICARE

## 2019-08-14 VITALS
HEART RATE: 103 BPM | BODY MASS INDEX: 23 KG/M2 | SYSTOLIC BLOOD PRESSURE: 140 MMHG | WEIGHT: 125 LBS | OXYGEN SATURATION: 98 % | DIASTOLIC BLOOD PRESSURE: 75 MMHG | HEIGHT: 62 IN

## 2019-08-14 DIAGNOSIS — H26.40 UNSPECIFIED SECONDARY CATARACT: Chronic | ICD-10-CM

## 2019-08-14 DIAGNOSIS — N97.1 FEMALE INFERTILITY OF TUBAL ORIGIN: Chronic | ICD-10-CM

## 2019-08-14 DIAGNOSIS — I82.409 ACUTE EMBOLISM AND THROMBOSIS OF UNSPECIFIED DEEP VEINS OF UNSPECIFIED LOWER EXTREMITY: ICD-10-CM

## 2019-08-14 PROCEDURE — 99204 OFFICE O/P NEW MOD 45 MIN: CPT

## 2019-08-14 PROCEDURE — 93970 EXTREMITY STUDY: CPT | Mod: 26

## 2019-08-14 PROCEDURE — 93970 EXTREMITY STUDY: CPT

## 2019-08-14 PROCEDURE — 93000 ELECTROCARDIOGRAM COMPLETE: CPT

## 2019-08-14 NOTE — PHYSICAL EXAM
[General Appearance - Well Developed] : well developed [Normal Appearance] : normal appearance [Well Groomed] : well groomed [General Appearance - Well Nourished] : well nourished [No Deformities] : no deformities [General Appearance - In No Acute Distress] : no acute distress [Eyelids - No Xanthelasma] : the eyelids demonstrated no xanthelasmas [Normal Conjunctiva] : the conjunctiva exhibited no abnormalities [No Oral Pallor] : no oral pallor [Normal Oral Mucosa] : normal oral mucosa [No Oral Cyanosis] : no oral cyanosis [Heart Sounds] : normal S1 and S2 [Heart Rate And Rhythm] : heart rate and rhythm were normal [Murmurs] : no murmurs present [Respiration, Rhythm And Depth] : normal respiratory rhythm and effort [Auscultation Breath Sounds / Voice Sounds] : lungs were clear to auscultation bilaterally [Exaggerated Use Of Accessory Muscles For Inspiration] : no accessory muscle use [Abdomen Soft] : soft [Abdomen Tenderness] : non-tender [Abdomen Mass (___ Cm)] : no abdominal mass palpated [Skin Color & Pigmentation] : normal skin color and pigmentation [No Venous Stasis] : no venous stasis [No Skin Ulcers] : no skin ulcer [Skin Lesions] : no skin lesions [No Xanthoma] : no  xanthoma was observed [FreeTextEntry1] : in wheelchair [Nail Clubbing] : no clubbing of the fingernails [Cyanosis, Localized] : no localized cyanosis [Petechial Hemorrhages (___cm)] : no petechial hemorrhages [] : no ischemic changes

## 2019-08-14 NOTE — REASON FOR VISIT
[Initial Evaluation] : an initial evaluation of [FreeTextEntry1] : 87F with PMHx of rheumatoid arthritis, diabetes mellitus, HTN, moderate aortic stenosis, CKD2,  colonic diverticulosis (s/p diverticular bleed 7/2018 s/p transfusion), pulmonary fibrosis (due to RA) and interstitial lung disease presents with weakness for two weeks. Suspect acute on chronic deconditioning given the patient's previous history of weakness likely secondary to dehydration. Patient states that she is currently doing well with no complaints of any chest pain at rest or upon exertion but notes + shortness of breath, which is chronic due to pulmonary fibrosis. Patient states at baseline shes is ambulatory with no limitations; no recent travel (via car or air travel), immobility, surgery. \par No prior history of VTE\par No family h/o of VTE \par No prior diagnosis of cancer \par \par \par Allergies\par No Known Allergies\par Intolerances\par \par 	\par MEDICATIONS:\par  Alendronate\par Janumet\par Losartan 50\par Replaglinide\par Symbicort\par Vitamin D\par \par \par PAST MEDICAL & SURGICAL HISTORY:\par History of interstitial lung disease\par Aortic stenosis\par RA (rheumatoid arthritis)\par Asthma\par DM (diabetes mellitus), type 2\par HTN (hypertension), benign\par After-cataract of left eye\par Tubal occlusion\par \par \par FAMILY HISTORY:\par No pertinent family history in first degree relatives\par \par \par \par IMPRESSION: Partially occlusive positive deep venous thrombosis below the \par right knee involving the right posterior tibial vein.\par \par No evidence of deep venous thrombosis in the left lower extremity.\par \par Small left Baker's cyst.\par \par Nurse practitioner Cuca has been informed. \par \par \par

## 2019-08-14 NOTE — ASSESSMENT
[FreeTextEntry1] : 87F with PMHx of rheumatoid arthritis, diabetes mellitus, HTN, moderate aortic stenosis, CKD2, colonic diverticulosis (s/p diverticular bleed 7/2018 s/p transfusion), pulmonary fibrosis (due to RA) and interstitial lung disease presents with weakness for two weeks; with below the knee DVT of the R posterior tibial vein \par \par 1. Below the knee DVT of the R posterior tibial vein, unprovoked \par - on Lovenox \par  2.  HTN\par 3.  Mild AS\par 4. DM\par 5.  CKD\par 6.  Anemia\par 7. Prior GI bleed\par \par Plan:\par 1.  Below the knee DVT, asymptomatic with anemia, prior GI bleed and overall frailty favor surveillance with duplex over anticoagulation\par 2.  Repeat venous duplex today.  Labwork today \par 3.  Return in 2 weeks for venous duplex surveillance.\par 4.  Await #2. \par

## 2019-08-15 LAB
25(OH)D3 SERPL-MCNC: 37.6 NG/ML
ALBUMIN SERPL ELPH-MCNC: 3.5 G/DL
ALP BLD-CCNC: 80 U/L
ALT SERPL-CCNC: 26 U/L
ANION GAP SERPL CALC-SCNC: 9 MMOL/L
AST SERPL-CCNC: 17 U/L
BASOPHILS # BLD AUTO: 0.07 K/UL
BASOPHILS NFR BLD AUTO: 0.7 %
BILIRUB SERPL-MCNC: 0.5 MG/DL
BUN SERPL-MCNC: 28 MG/DL
CALCIUM SERPL-MCNC: 8.7 MG/DL
CHLORIDE SERPL-SCNC: 107 MMOL/L
CHOLEST SERPL-MCNC: 160 MG/DL
CHOLEST/HDLC SERPL: 3.8 RATIO
CO2 SERPL-SCNC: 23 MMOL/L
CREAT SERPL-MCNC: 1.42 MG/DL
DEPRECATED D DIMER PPP IA-ACNC: 799 NG/ML DDU
EOSINOPHIL # BLD AUTO: 0.33 K/UL
EOSINOPHIL NFR BLD AUTO: 3.2 %
ESTIMATED AVERAGE GLUCOSE: 154 MG/DL
GLUCOSE SERPL-MCNC: 238 MG/DL
HBA1C MFR BLD HPLC: 7 %
HCT VFR BLD CALC: 28 %
HDLC SERPL-MCNC: 42 MG/DL
HGB BLD-MCNC: 8.8 G/DL
IMM GRANULOCYTES NFR BLD AUTO: 1.1 %
LDLC SERPL CALC-MCNC: 92 MG/DL
LYMPHOCYTES # BLD AUTO: 2.18 K/UL
LYMPHOCYTES NFR BLD AUTO: 20.9 %
MAN DIFF?: NORMAL
MCHC RBC-ENTMCNC: 31.4 GM/DL
MCHC RBC-ENTMCNC: 31.7 PG
MCV RBC AUTO: 100.7 FL
MONOCYTES # BLD AUTO: 0.86 K/UL
MONOCYTES NFR BLD AUTO: 8.2 %
NEUTROPHILS # BLD AUTO: 6.89 K/UL
NEUTROPHILS NFR BLD AUTO: 65.9 %
PLATELET # BLD AUTO: 342 K/UL
POTASSIUM SERPL-SCNC: 4.1 MMOL/L
PROT SERPL-MCNC: 7.1 G/DL
RBC # BLD: 2.78 M/UL
RBC # FLD: 14.9 %
SODIUM SERPL-SCNC: 139 MMOL/L
TRIGL SERPL-MCNC: 129 MG/DL
WBC # FLD AUTO: 10.45 K/UL

## 2019-09-10 ENCOUNTER — OUTPATIENT (OUTPATIENT)
Dept: OUTPATIENT SERVICES | Facility: HOSPITAL | Age: 84
LOS: 1 days | End: 2019-09-10
Payer: MEDICARE

## 2019-09-10 ENCOUNTER — APPOINTMENT (OUTPATIENT)
Dept: ULTRASOUND IMAGING | Facility: HOSPITAL | Age: 84
End: 2019-09-10
Payer: MEDICARE

## 2019-09-10 ENCOUNTER — APPOINTMENT (OUTPATIENT)
Dept: CARDIOLOGY | Facility: CLINIC | Age: 84
End: 2019-09-10

## 2019-09-10 DIAGNOSIS — I82.409 ACUTE EMBOLISM AND THROMBOSIS OF UNSPECIFIED DEEP VEINS OF UNSPECIFIED LOWER EXTREMITY: ICD-10-CM

## 2019-09-10 DIAGNOSIS — N97.1 FEMALE INFERTILITY OF TUBAL ORIGIN: Chronic | ICD-10-CM

## 2019-09-10 DIAGNOSIS — Z00.00 ENCOUNTER FOR GENERAL ADULT MEDICAL EXAMINATION WITHOUT ABNORMAL FINDINGS: ICD-10-CM

## 2019-09-10 DIAGNOSIS — H26.40 UNSPECIFIED SECONDARY CATARACT: Chronic | ICD-10-CM

## 2019-09-10 PROCEDURE — 93970 EXTREMITY STUDY: CPT | Mod: 26

## 2019-09-10 PROCEDURE — 93970 EXTREMITY STUDY: CPT

## 2019-09-17 ENCOUNTER — APPOINTMENT (OUTPATIENT)
Dept: INTERNAL MEDICINE | Facility: CLINIC | Age: 84
End: 2019-09-17

## 2019-11-12 ENCOUNTER — NON-APPOINTMENT (OUTPATIENT)
Age: 84
End: 2019-11-12

## 2019-11-12 ENCOUNTER — OUTPATIENT (OUTPATIENT)
Dept: OUTPATIENT SERVICES | Facility: HOSPITAL | Age: 84
LOS: 1 days | End: 2019-11-12
Payer: MEDICARE

## 2019-11-12 ENCOUNTER — APPOINTMENT (OUTPATIENT)
Dept: CARDIOLOGY | Facility: CLINIC | Age: 84
End: 2019-11-12
Payer: MEDICARE

## 2019-11-12 VITALS
SYSTOLIC BLOOD PRESSURE: 124 MMHG | WEIGHT: 122 LBS | OXYGEN SATURATION: 98 % | DIASTOLIC BLOOD PRESSURE: 74 MMHG | HEART RATE: 91 BPM | BODY MASS INDEX: 22.45 KG/M2 | HEIGHT: 62 IN

## 2019-11-12 DIAGNOSIS — H26.40 UNSPECIFIED SECONDARY CATARACT: Chronic | ICD-10-CM

## 2019-11-12 DIAGNOSIS — I82.409 ACUTE EMBOLISM AND THROMBOSIS OF UNSPECIFIED DEEP VEINS OF UNSPECIFIED LOWER EXTREMITY: ICD-10-CM

## 2019-11-12 DIAGNOSIS — N97.1 FEMALE INFERTILITY OF TUBAL ORIGIN: Chronic | ICD-10-CM

## 2019-11-12 PROCEDURE — 99214 OFFICE O/P EST MOD 30 MIN: CPT

## 2019-11-12 PROCEDURE — 93970 EXTREMITY STUDY: CPT

## 2019-11-12 PROCEDURE — 93000 ELECTROCARDIOGRAM COMPLETE: CPT

## 2019-11-12 PROCEDURE — 93970 EXTREMITY STUDY: CPT | Mod: 26

## 2019-11-12 NOTE — PHYSICAL EXAM
[General Appearance - Well Developed] : well developed [Normal Appearance] : normal appearance [No Deformities] : no deformities [General Appearance - Well Nourished] : well nourished [Well Groomed] : well groomed [Normal Conjunctiva] : the conjunctiva exhibited no abnormalities [General Appearance - In No Acute Distress] : no acute distress [Normal Oral Mucosa] : normal oral mucosa [Eyelids - No Xanthelasma] : the eyelids demonstrated no xanthelasmas [No Oral Pallor] : no oral pallor [No Oral Cyanosis] : no oral cyanosis [Exaggerated Use Of Accessory Muscles For Inspiration] : no accessory muscle use [Respiration, Rhythm And Depth] : normal respiratory rhythm and effort [Auscultation Breath Sounds / Voice Sounds] : lungs were clear to auscultation bilaterally [Murmurs] : no murmurs present [Heart Rate And Rhythm] : heart rate and rhythm were normal [Heart Sounds] : normal S1 and S2 [Abdomen Soft] : soft [Abdomen Tenderness] : non-tender [Abdomen Mass (___ Cm)] : no abdominal mass palpated [Nail Clubbing] : no clubbing of the fingernails [Cyanosis, Localized] : no localized cyanosis [Petechial Hemorrhages (___cm)] : no petechial hemorrhages [Skin Color & Pigmentation] : normal skin color and pigmentation [] : no rash [No Venous Stasis] : no venous stasis [No Skin Ulcers] : no skin ulcer [Skin Lesions] : no skin lesions [No Xanthoma] : no  xanthoma was observed [FreeTextEntry1] : mild RLE edema.

## 2019-11-12 NOTE — REASON FOR VISIT
[Follow-Up - Clinic] : a clinic follow-up of [FreeTextEntry1] :  Here for followup visit 11/12/2019\par Doing ok.  was started on steroid by her rheumatologist.\par She is walking with a cane.  She lives at home\par No falls\par The legs feel ok.  only mild edema of the right leg "alvarado always had this"\par No blood in the stool\par Drinking water, breathing is ok.  No chest pain.  \par She states that she takes aspirin 81mg daily \par No bloodwork recently .

## 2019-11-12 NOTE — ASSESSMENT
[FreeTextEntry1] : 87F with PMHx of rheumatoid arthritis, diabetes mellitus, HTN, moderate aortic stenosis, CKD2, colonic diverticulosis (s/p diverticular bleed 7/2018 s/p transfusion), pulmonary fibrosis (due to RA) and interstitial lung disease presents with weakness for two weeks; with below the knee DVT of the R posterior tibial vein \par \par 1. Below the knee DVT of the R posterior tibial vein, unprovoked \par   2.  HTN\par 3.  Mild AS\par 4. DM\par 5.  CKD\par 6.  Anemia\par 7. Prior GI bleed\par \par Plan:\par 1.  Below the knee DVT, asymptomatic with anemia, prior GI bleed and overall frailty favor surveillance with duplex over anticoagulation\par 2.  Repeat venous duplex today.  Labwork today \par 3.   Await ultrasound results.\par 4.  Fax note to Dr. Yuriy Tobias\par 5.  If duplex stable, then continue aspirin alone and followup in 3 months.  If duplex shows propagation, then will need to discuss role for cautious anticoagulation\par

## 2019-11-12 NOTE — ED PROVIDER NOTE - PSYCHIATRIC, MLM
straight cane
Alert and oriented to person, place, time/situation. normal mood and affect. no apparent risk to self or others.

## 2019-12-17 ENCOUNTER — EMERGENCY (EMERGENCY)
Facility: HOSPITAL | Age: 84
LOS: 1 days | Discharge: HOME | End: 2019-12-17
Attending: STUDENT IN AN ORGANIZED HEALTH CARE EDUCATION/TRAINING PROGRAM
Payer: MEDICARE

## 2019-12-17 VITALS
RESPIRATION RATE: 18 BRPM | HEART RATE: 68 BPM | TEMPERATURE: 98 F | OXYGEN SATURATION: 96 % | SYSTOLIC BLOOD PRESSURE: 126 MMHG | DIASTOLIC BLOOD PRESSURE: 91 MMHG

## 2019-12-17 VITALS
RESPIRATION RATE: 19 BRPM | HEART RATE: 76 BPM | OXYGEN SATURATION: 95 % | DIASTOLIC BLOOD PRESSURE: 80 MMHG | TEMPERATURE: 98 F | HEIGHT: 60 IN | WEIGHT: 132.94 LBS | SYSTOLIC BLOOD PRESSURE: 147 MMHG

## 2019-12-17 DIAGNOSIS — H26.40 UNSPECIFIED SECONDARY CATARACT: Chronic | ICD-10-CM

## 2019-12-17 DIAGNOSIS — N97.1 FEMALE INFERTILITY OF TUBAL ORIGIN: Chronic | ICD-10-CM

## 2019-12-17 LAB
ALBUMIN SERPL ELPH-MCNC: 3.7 G/DL — SIGNIFICANT CHANGE UP (ref 3.3–5)
ALP SERPL-CCNC: 68 U/L — SIGNIFICANT CHANGE UP (ref 40–120)
ALT FLD-CCNC: 17 U/L — SIGNIFICANT CHANGE UP (ref 10–45)
ANION GAP SERPL CALC-SCNC: 14 MMOL/L — SIGNIFICANT CHANGE UP (ref 5–17)
AST SERPL-CCNC: 7 U/L — LOW (ref 10–40)
B-OH-BUTYR SERPL-SCNC: 0.1 MMOL/L — SIGNIFICANT CHANGE UP
BASOPHILS # BLD AUTO: 0.05 K/UL — SIGNIFICANT CHANGE UP (ref 0–0.2)
BASOPHILS NFR BLD AUTO: 0.4 % — SIGNIFICANT CHANGE UP (ref 0–2)
BILIRUB SERPL-MCNC: 0.4 MG/DL — SIGNIFICANT CHANGE UP (ref 0.2–1.2)
BUN SERPL-MCNC: 47 MG/DL — HIGH (ref 7–23)
CALCIUM SERPL-MCNC: 10.1 MG/DL — SIGNIFICANT CHANGE UP (ref 8.4–10.5)
CHLORIDE SERPL-SCNC: 101 MMOL/L — SIGNIFICANT CHANGE UP (ref 96–108)
CO2 SERPL-SCNC: 19 MMOL/L — LOW (ref 22–31)
CREAT SERPL-MCNC: 1.15 MG/DL — SIGNIFICANT CHANGE UP (ref 0.5–1.3)
EOSINOPHIL # BLD AUTO: 0.05 K/UL — SIGNIFICANT CHANGE UP (ref 0–0.5)
EOSINOPHIL NFR BLD AUTO: 0.4 % — SIGNIFICANT CHANGE UP (ref 0–6)
GAS PNL BLDV: SIGNIFICANT CHANGE UP
GLUCOSE SERPL-MCNC: 319 MG/DL — HIGH (ref 70–99)
HCT VFR BLD CALC: 35.3 % — SIGNIFICANT CHANGE UP (ref 34.5–45)
HGB BLD-MCNC: 11.7 G/DL — SIGNIFICANT CHANGE UP (ref 11.5–15.5)
IMM GRANULOCYTES NFR BLD AUTO: 1.4 % — SIGNIFICANT CHANGE UP (ref 0–1.5)
LYMPHOCYTES # BLD AUTO: 2.71 K/UL — SIGNIFICANT CHANGE UP (ref 1–3.3)
LYMPHOCYTES # BLD AUTO: 21.2 % — SIGNIFICANT CHANGE UP (ref 13–44)
MCHC RBC-ENTMCNC: 27.9 PG — SIGNIFICANT CHANGE UP (ref 27–34)
MCHC RBC-ENTMCNC: 33.1 GM/DL — SIGNIFICANT CHANGE UP (ref 32–36)
MCV RBC AUTO: 84.2 FL — SIGNIFICANT CHANGE UP (ref 80–100)
MONOCYTES # BLD AUTO: 0.8 K/UL — SIGNIFICANT CHANGE UP (ref 0–0.9)
MONOCYTES NFR BLD AUTO: 6.3 % — SIGNIFICANT CHANGE UP (ref 2–14)
NEUTROPHILS # BLD AUTO: 8.99 K/UL — HIGH (ref 1.8–7.4)
NEUTROPHILS NFR BLD AUTO: 70.3 % — SIGNIFICANT CHANGE UP (ref 43–77)
NRBC # BLD: 0 /100 WBCS — SIGNIFICANT CHANGE UP (ref 0–0)
PLATELET # BLD AUTO: 279 K/UL — SIGNIFICANT CHANGE UP (ref 150–400)
POTASSIUM SERPL-MCNC: 4.4 MMOL/L — SIGNIFICANT CHANGE UP (ref 3.5–5.3)
POTASSIUM SERPL-SCNC: 4.4 MMOL/L — SIGNIFICANT CHANGE UP (ref 3.5–5.3)
PROT SERPL-MCNC: 7.9 G/DL — SIGNIFICANT CHANGE UP (ref 6–8.3)
RBC # BLD: 4.19 M/UL — SIGNIFICANT CHANGE UP (ref 3.8–5.2)
RBC # FLD: 16.5 % — HIGH (ref 10.3–14.5)
SODIUM SERPL-SCNC: 134 MMOL/L — LOW (ref 135–145)
WBC # BLD: 12.78 K/UL — HIGH (ref 3.8–10.5)
WBC # FLD AUTO: 12.78 K/UL — HIGH (ref 3.8–10.5)

## 2019-12-17 PROCEDURE — 82435 ASSAY OF BLOOD CHLORIDE: CPT

## 2019-12-17 PROCEDURE — 84295 ASSAY OF SERUM SODIUM: CPT

## 2019-12-17 PROCEDURE — 82803 BLOOD GASES ANY COMBINATION: CPT

## 2019-12-17 PROCEDURE — 99283 EMERGENCY DEPT VISIT LOW MDM: CPT

## 2019-12-17 PROCEDURE — 85027 COMPLETE CBC AUTOMATED: CPT

## 2019-12-17 PROCEDURE — 80053 COMPREHEN METABOLIC PANEL: CPT

## 2019-12-17 PROCEDURE — 83605 ASSAY OF LACTIC ACID: CPT

## 2019-12-17 PROCEDURE — 82947 ASSAY GLUCOSE BLOOD QUANT: CPT

## 2019-12-17 PROCEDURE — 82330 ASSAY OF CALCIUM: CPT

## 2019-12-17 PROCEDURE — 85014 HEMATOCRIT: CPT

## 2019-12-17 PROCEDURE — 99284 EMERGENCY DEPT VISIT MOD MDM: CPT

## 2019-12-17 PROCEDURE — 93005 ELECTROCARDIOGRAM TRACING: CPT

## 2019-12-17 PROCEDURE — 84132 ASSAY OF SERUM POTASSIUM: CPT

## 2019-12-17 PROCEDURE — 82010 KETONE BODYS QUAN: CPT

## 2019-12-17 PROCEDURE — 82962 GLUCOSE BLOOD TEST: CPT

## 2019-12-17 RX ORDER — SODIUM CHLORIDE 9 MG/ML
1000 INJECTION INTRAMUSCULAR; INTRAVENOUS; SUBCUTANEOUS ONCE
Refills: 0 | Status: DISCONTINUED | OUTPATIENT
Start: 2019-12-17 | End: 2019-12-28

## 2019-12-17 NOTE — ED PROVIDER NOTE - PHYSICAL EXAMINATION
I have reviewed the triage vital signs.   Const: Well-nourished, Well-developed, Eyes: no conjunctival injection and no scleral icterus ENMT: Moist mucus membranes, CVS: +S1/S2, radial/DP pulse 2+ bilaterally   RESP: Unlabored respiratory effort, Clear to auscultation bilaterally GI: Nontender/Nondistended soft abdomen, no CVA tenderness MSK: Extremities w/o deformity or ttp, Psych: Awake, Alert, & Orientedx3;  Appropriate mood and affect, cooperative

## 2019-12-17 NOTE — ED PROVIDER NOTE - CLINICAL SUMMARY MEDICAL DECISION MAKING FREE TEXT BOX
elevated lactate discussed w/ pt   doesn't want to stay for repeat unlikely septic, elevated lactate discussed w/ pt that it could indicate dehydration, no features to suspect sepsis. pt was offerred admission for diabetic management, and improved glucose control however pt declined,   no features to suggest DKA beta hydrox WNL.   Will d/c home w/ outpt follow up as pt is not amenable to additional diabetic teaching at this time,  and patient counselled regarding warning signs of hyperglycemia and reasons to return.

## 2019-12-17 NOTE — ED ADULT NURSE NOTE - OBJECTIVE STATEMENT
88YOF pmh asthma, diabetes, HTN presents to ED c.o hyperglycemia. Pt was at doctor's office for regular checkup and found to have glucose in 500s. Pt was given insulin at doctor's office. Pt finger stick in ED noted to be 236 at this time. Denies SOB, CP, fever, chills, abd pain, N/V/d, burning upon urination. Safety and comfort measures provided. Will continue to monitor.  at bedside.

## 2019-12-17 NOTE — ED PROVIDER NOTE - NS ED ROS FT
all other ROS is negative except as documented as HPI. Constitutional: no fevers no chills.   CV: no chest pain, no palpitations   Respiratory: no shortness of breath, no cough   GI: no abdominal pain, no nausea no vomiting   Neuro: no headache, no weakness, no numbness  all other ROS is negative except as documented as HPI.

## 2019-12-17 NOTE — ED PROVIDER NOTE - PATIENT PORTAL LINK FT
You can access the FollowMyHealth Patient Portal offered by [facility] by registering at the following website: [email or web address]. By joining [employer]’s FollowMyHealth portal, you will also be able to view your health information using other applications (apps) compatible with our system.

## 2019-12-17 NOTE — ED PROVIDER NOTE - SEVERE SEPSIS ALERT DETAILS
Attending MD Mari:  I have seen and evaluated this patient and do not believe the patient is septic at this time.  I will continue to evaluate.

## 2019-12-17 NOTE — ED PROVIDER NOTE - OBJECTIVE STATEMENT
88Fw/ diabetes on insulin 30 U daily, RA, anemia, HTN, moderate AS, CKD2, diverticulosis, pulmonary fibrosis and ILD, prior DVT, DMII on insulin, sent in for elevated sugar at home, 500s. Pt reports that she is always cold, and is intermittently lightheaded but states that this is her normal.  No fever, vomiting, diarrhea, chest pain, confusion.  She denies any dysuria, no urinary urgency/frequency, no dysuria. Given insulin at doctor's office prior to arriving to ED.   Endocrinologist Aniya Kinney [phone]  PMD Yuriy Tobias 88Fw/ diabetes on insulin 30 U daily, RA, anemia, HTN, moderate AS, CKD2, diverticulosis, pulmonary fibrosis and ILD, prior DVT, DMII on insulin, sent in for elevated sugar at home, 500s. Pt reports that she is always cold, and is intermittently lightheaded but states that this is her normal.  No fever, vomiting, diarrhea, chest pain, confusion.  She denies any dysuria, no urinary urgency/frequency, Pt was seen by PCP today and was sent to the ER for evaluation PCP Yuriy Tobias

## 2019-12-17 NOTE — ED PROVIDER NOTE - RAPID ASSESSMENT
attending Alize: pt seen by me as quick assessment MD in triage prior to full evaluation in main ER   88yF h/o DMII on insulin, sent in for elevated sugar at home, 500s. Pt with associated lightheadedness and chills. No fever, vomiting, diarrhea, chest pain, confusion. +urinary frequency, no dysuria. Given insulin at doctor's office prior to arriving to ED.   Endocrinologist Aniya Kinney [phone]  PMD Yuriy Tobias attending Alize: pt seen by me as quick assessment MD in triage prior to full evaluation in main ER   88yF h/o RA, anemia, HTN, moderate AS, CKD2, diverticulosis, pulmonary fibrosis and ILD, prior DVT, DMII on insulin, sent in for elevated sugar at home, 500s. Pt with associated lightheadedness and chills. No fever, vomiting, diarrhea, chest pain, confusion. +urinary frequency, no dysuria. Given insulin at doctor's office prior to arriving to ED.   Endocrinologist Aniya Kinney [phone]  PMD Yuriy Tobias

## 2019-12-17 NOTE — ED PROVIDER NOTE - NSFOLLOWUPINSTRUCTIONS_ED_ALL_ED_FT
You were seen in the emergency department today and we recommend that you return if you develop chest pain, abdominal pain, shortness of breath, lightheadedness, vomiting, leg swelling, fever, headache, slurred speech, weakness or blurry vision.     Please follow up with your primary care doctor over the next 24 hours for further management of your sugar and to review your bloodwork to ensure no additional tests, imaging or bloodwork, need to be performed.

## 2020-01-10 NOTE — ED PROVIDER NOTE - MUSCULOSKELETAL [-], MLM
Per Dr. Zhao, care of patient moved from CHF to preht as of this date.   
no back pain/no joint pain/no neck pain

## 2020-03-13 NOTE — ED ADULT NURSE NOTE - CARDIO ASSESSMENT
This is a recent snapshot of the patient's [facility] medical record.  For current drug dose and complete information and questions, call [phone] or In Basket pool, [facility] home infusion ([id number])  CSN Number:  [id number]       WDL

## 2020-07-04 ENCOUNTER — INPATIENT (INPATIENT)
Facility: HOSPITAL | Age: 85
LOS: 5 days | Discharge: HOME | DRG: 312 | End: 2020-07-10
Attending: GENERAL ACUTE CARE HOSPITAL | Admitting: GENERAL ACUTE CARE HOSPITAL
Payer: MEDICARE

## 2020-07-04 VITALS
HEART RATE: 83 BPM | RESPIRATION RATE: 16 BRPM | TEMPERATURE: 98 F | OXYGEN SATURATION: 100 % | DIASTOLIC BLOOD PRESSURE: 96 MMHG | WEIGHT: 139.99 LBS | SYSTOLIC BLOOD PRESSURE: 176 MMHG

## 2020-07-04 DIAGNOSIS — H26.40 UNSPECIFIED SECONDARY CATARACT: Chronic | ICD-10-CM

## 2020-07-04 DIAGNOSIS — R55 SYNCOPE AND COLLAPSE: ICD-10-CM

## 2020-07-04 DIAGNOSIS — N97.1 FEMALE INFERTILITY OF TUBAL ORIGIN: Chronic | ICD-10-CM

## 2020-07-04 LAB
ALBUMIN SERPL ELPH-MCNC: 3.6 G/DL — SIGNIFICANT CHANGE UP (ref 3.3–5)
ALP SERPL-CCNC: 50 U/L — SIGNIFICANT CHANGE UP (ref 40–120)
ALT FLD-CCNC: 9 U/L — LOW (ref 10–45)
ANION GAP SERPL CALC-SCNC: 12 MMOL/L — SIGNIFICANT CHANGE UP (ref 5–17)
APPEARANCE UR: CLEAR — SIGNIFICANT CHANGE UP
AST SERPL-CCNC: 15 U/L — SIGNIFICANT CHANGE UP (ref 10–40)
BASOPHILS # BLD AUTO: 0.05 K/UL — SIGNIFICANT CHANGE UP (ref 0–0.2)
BASOPHILS NFR BLD AUTO: 0.4 % — SIGNIFICANT CHANGE UP (ref 0–2)
BILIRUB SERPL-MCNC: 0.4 MG/DL — SIGNIFICANT CHANGE UP (ref 0.2–1.2)
BILIRUB UR-MCNC: NEGATIVE — SIGNIFICANT CHANGE UP
BUN SERPL-MCNC: 43 MG/DL — HIGH (ref 7–23)
CALCIUM SERPL-MCNC: 9.6 MG/DL — SIGNIFICANT CHANGE UP (ref 8.4–10.5)
CHLORIDE SERPL-SCNC: 104 MMOL/L — SIGNIFICANT CHANGE UP (ref 96–108)
CK SERPL-CCNC: 30 U/L — SIGNIFICANT CHANGE UP (ref 25–170)
CO2 SERPL-SCNC: 20 MMOL/L — LOW (ref 22–31)
COLOR SPEC: YELLOW — SIGNIFICANT CHANGE UP
CREAT SERPL-MCNC: 1.33 MG/DL — HIGH (ref 0.5–1.3)
DIFF PNL FLD: NEGATIVE — SIGNIFICANT CHANGE UP
EOSINOPHIL # BLD AUTO: 0.08 K/UL — SIGNIFICANT CHANGE UP (ref 0–0.5)
EOSINOPHIL NFR BLD AUTO: 0.6 % — SIGNIFICANT CHANGE UP (ref 0–6)
GAS PNL BLDV: SIGNIFICANT CHANGE UP
GLUCOSE BLDC GLUCOMTR-MCNC: 113 MG/DL — HIGH (ref 70–99)
GLUCOSE BLDC GLUCOMTR-MCNC: 159 MG/DL — HIGH (ref 70–99)
GLUCOSE SERPL-MCNC: 132 MG/DL — HIGH (ref 70–99)
GLUCOSE UR QL: NEGATIVE — SIGNIFICANT CHANGE UP
HCT VFR BLD CALC: 34.6 % — SIGNIFICANT CHANGE UP (ref 34.5–45)
HGB BLD-MCNC: 11 G/DL — LOW (ref 11.5–15.5)
IMM GRANULOCYTES NFR BLD AUTO: 0.6 % — SIGNIFICANT CHANGE UP (ref 0–1.5)
KETONES UR-MCNC: NEGATIVE — SIGNIFICANT CHANGE UP
LEUKOCYTE ESTERASE UR-ACNC: ABNORMAL
LYMPHOCYTES # BLD AUTO: 19.1 % — SIGNIFICANT CHANGE UP (ref 13–44)
LYMPHOCYTES # BLD AUTO: 2.51 K/UL — SIGNIFICANT CHANGE UP (ref 1–3.3)
MCHC RBC-ENTMCNC: 28.6 PG — SIGNIFICANT CHANGE UP (ref 27–34)
MCHC RBC-ENTMCNC: 31.8 GM/DL — LOW (ref 32–36)
MCV RBC AUTO: 89.9 FL — SIGNIFICANT CHANGE UP (ref 80–100)
MONOCYTES # BLD AUTO: 0.87 K/UL — SIGNIFICANT CHANGE UP (ref 0–0.9)
MONOCYTES NFR BLD AUTO: 6.6 % — SIGNIFICANT CHANGE UP (ref 2–14)
NEUTROPHILS # BLD AUTO: 9.57 K/UL — HIGH (ref 1.8–7.4)
NEUTROPHILS NFR BLD AUTO: 72.7 % — SIGNIFICANT CHANGE UP (ref 43–77)
NITRITE UR-MCNC: NEGATIVE — SIGNIFICANT CHANGE UP
NRBC # BLD: 0 /100 WBCS — SIGNIFICANT CHANGE UP (ref 0–0)
NT-PROBNP SERPL-SCNC: 464 PG/ML — HIGH (ref 0–300)
PH UR: 6 — SIGNIFICANT CHANGE UP (ref 5–8)
PLATELET # BLD AUTO: 290 K/UL — SIGNIFICANT CHANGE UP (ref 150–400)
POTASSIUM SERPL-MCNC: 4.6 MMOL/L — SIGNIFICANT CHANGE UP (ref 3.5–5.3)
POTASSIUM SERPL-SCNC: 4.6 MMOL/L — SIGNIFICANT CHANGE UP (ref 3.5–5.3)
PROT SERPL-MCNC: 7.3 G/DL — SIGNIFICANT CHANGE UP (ref 6–8.3)
PROT UR-MCNC: ABNORMAL
RBC # BLD: 3.85 M/UL — SIGNIFICANT CHANGE UP (ref 3.8–5.2)
RBC # FLD: 14.2 % — SIGNIFICANT CHANGE UP (ref 10.3–14.5)
SARS-COV-2 RNA SPEC QL NAA+PROBE: SIGNIFICANT CHANGE UP
SODIUM SERPL-SCNC: 136 MMOL/L — SIGNIFICANT CHANGE UP (ref 135–145)
SP GR SPEC: 1.02 — SIGNIFICANT CHANGE UP (ref 1.01–1.02)
TROPONIN T, HIGH SENSITIVITY RESULT: 15 NG/L — SIGNIFICANT CHANGE UP (ref 0–51)
TROPONIN T, HIGH SENSITIVITY RESULT: 15 NG/L — SIGNIFICANT CHANGE UP (ref 0–51)
UROBILINOGEN FLD QL: NEGATIVE — SIGNIFICANT CHANGE UP
WBC # BLD: 13.16 K/UL — HIGH (ref 3.8–10.5)
WBC # FLD AUTO: 13.16 K/UL — HIGH (ref 3.8–10.5)

## 2020-07-04 PROCEDURE — 99284 EMERGENCY DEPT VISIT MOD MDM: CPT | Mod: CS,GC

## 2020-07-04 PROCEDURE — 70450 CT HEAD/BRAIN W/O DYE: CPT | Mod: 26

## 2020-07-04 PROCEDURE — 93010 ELECTROCARDIOGRAM REPORT: CPT

## 2020-07-04 PROCEDURE — 71045 X-RAY EXAM CHEST 1 VIEW: CPT | Mod: 26

## 2020-07-04 RX ORDER — DEXTROSE 50 % IN WATER 50 %
15 SYRINGE (ML) INTRAVENOUS ONCE
Refills: 0 | Status: DISCONTINUED | OUTPATIENT
Start: 2020-07-04 | End: 2020-07-10

## 2020-07-04 RX ORDER — LOSARTAN POTASSIUM 100 MG/1
1 TABLET, FILM COATED ORAL
Qty: 0 | Refills: 0 | DISCHARGE

## 2020-07-04 RX ORDER — GLUCAGON INJECTION, SOLUTION 0.5 MG/.1ML
1 INJECTION, SOLUTION SUBCUTANEOUS ONCE
Refills: 0 | Status: DISCONTINUED | OUTPATIENT
Start: 2020-07-04 | End: 2020-07-10

## 2020-07-04 RX ORDER — HEPARIN SODIUM 5000 [USP'U]/ML
5000 INJECTION INTRAVENOUS; SUBCUTANEOUS EVERY 12 HOURS
Refills: 0 | Status: DISCONTINUED | OUTPATIENT
Start: 2020-07-04 | End: 2020-07-10

## 2020-07-04 RX ORDER — DEXTROSE 50 % IN WATER 50 %
25 SYRINGE (ML) INTRAVENOUS ONCE
Refills: 0 | Status: DISCONTINUED | OUTPATIENT
Start: 2020-07-04 | End: 2020-07-10

## 2020-07-04 RX ORDER — SODIUM CHLORIDE 9 MG/ML
1000 INJECTION, SOLUTION INTRAVENOUS ONCE
Refills: 0 | Status: COMPLETED | OUTPATIENT
Start: 2020-07-04 | End: 2020-07-04

## 2020-07-04 RX ORDER — DEXTROSE 50 % IN WATER 50 %
12.5 SYRINGE (ML) INTRAVENOUS ONCE
Refills: 0 | Status: DISCONTINUED | OUTPATIENT
Start: 2020-07-04 | End: 2020-07-10

## 2020-07-04 RX ORDER — METOPROLOL TARTRATE 50 MG
50 TABLET ORAL DAILY
Refills: 0 | Status: DISCONTINUED | OUTPATIENT
Start: 2020-07-04 | End: 2020-07-08

## 2020-07-04 RX ORDER — FOLIC ACID 0.8 MG
2 TABLET ORAL DAILY
Refills: 0 | Status: DISCONTINUED | OUTPATIENT
Start: 2020-07-04 | End: 2020-07-10

## 2020-07-04 RX ORDER — LOSARTAN POTASSIUM 100 MG/1
25 TABLET, FILM COATED ORAL DAILY
Refills: 0 | Status: DISCONTINUED | OUTPATIENT
Start: 2020-07-04 | End: 2020-07-09

## 2020-07-04 RX ORDER — ONDANSETRON 8 MG/1
4 TABLET, FILM COATED ORAL EVERY 6 HOURS
Refills: 0 | Status: DISCONTINUED | OUTPATIENT
Start: 2020-07-04 | End: 2020-07-10

## 2020-07-04 RX ORDER — INSULIN LISPRO 100/ML
VIAL (ML) SUBCUTANEOUS AT BEDTIME
Refills: 0 | Status: DISCONTINUED | OUTPATIENT
Start: 2020-07-04 | End: 2020-07-10

## 2020-07-04 RX ORDER — INSULIN LISPRO 100/ML
VIAL (ML) SUBCUTANEOUS
Refills: 0 | Status: DISCONTINUED | OUTPATIENT
Start: 2020-07-04 | End: 2020-07-10

## 2020-07-04 RX ORDER — BUDESONIDE AND FORMOTEROL FUMARATE DIHYDRATE 160; 4.5 UG/1; UG/1
2 AEROSOL RESPIRATORY (INHALATION)
Qty: 0 | Refills: 0 | DISCHARGE

## 2020-07-04 RX ORDER — PANTOPRAZOLE SODIUM 20 MG/1
40 TABLET, DELAYED RELEASE ORAL
Refills: 0 | Status: DISCONTINUED | OUTPATIENT
Start: 2020-07-04 | End: 2020-07-10

## 2020-07-04 RX ORDER — SODIUM CHLORIDE 9 MG/ML
1000 INJECTION, SOLUTION INTRAVENOUS
Refills: 0 | Status: DISCONTINUED | OUTPATIENT
Start: 2020-07-04 | End: 2020-07-10

## 2020-07-04 RX ORDER — SODIUM CHLORIDE 9 MG/ML
1000 INJECTION, SOLUTION INTRAVENOUS
Refills: 0 | Status: DISCONTINUED | OUTPATIENT
Start: 2020-07-04 | End: 2020-07-06

## 2020-07-04 RX ADMIN — SODIUM CHLORIDE 1000 MILLILITER(S): 9 INJECTION, SOLUTION INTRAVENOUS at 18:36

## 2020-07-04 NOTE — ED ADULT NURSE REASSESSMENT NOTE - NS ED NURSE REASSESS COMMENT FT1
Report received from RN Rozina. Pt is resting comfortably in bed asking for bed pan. Pt placed on bedpan and cleaned. Pt A&Ox3. Pt informed of POC and awaiting a bed upstairs. Pt has no complaints at this time. Pt safety maintained.

## 2020-07-04 NOTE — ED ADULT NURSE NOTE - PMH
Aortic stenosis    Asthma    DM (diabetes mellitus), type 2    History of interstitial lung disease    HTN (hypertension), benign    RA (rheumatoid arthritis)

## 2020-07-04 NOTE — H&P ADULT - ASSESSMENT
87F with PMHx of rheumatoid arthritis, diabetes mellitus, HTN, moderate aortic stenosis, CKD2, and interstitial lung disease admitted wiht syncope/.. pt is nt able to porivde evenets excepts saying taht room was too " warm ": and she was trying to get up and next thing she knew ambulance was there., As francisco javier chart : as per granddaughter when daughter of pt went outside found pt "slumped over in chair" and minimally responsive for a few minutes, called EMS and pt slowly came to. Before this was in usual state of health. Pt denies CP , palpitations .. ,mild SOB yesterday and naseous today but no vomitting or diarreah , no abdominal pain and no urinary sx .   no fever or chills   no cough     - syncope   amdite to tele   cardio inpu t  ? etiology sx AS ?  check orthostatics     RA: cont meds     - dysphagia : had an episode of " chocking " when placed on diet .. S/S eval   NPO for now 87F with PMHx of rheumatoid arthritis, diabetes mellitus, HTN, moderate aortic stenosis, CKD2, and interstitial lung disease admitted wiht syncope/.. pt is nt able to porivde evenets excepts saying taht room was too " warm ": and she was trying to get up and next thing she knew ambulance was there., As francisco javier chart : as per granddaughter when daughter of pt went outside found pt "slumped over in chair" and minimally responsive for a few minutes, called EMS and pt slowly came to. Before this was in usual state of health. Pt denies CP , palpitations .. ,mild SOB yesterday and naseous today but no vomitting or diarreah , no abdominal pain and no urinary sx . ( freuency ?? new ?  )   no fever or chills   no cough     - syncope   amdite to tele   cardio inpu t  ? etiology sx AS ?  check orthostatics     RA: cont meds     - dysphagia : had an episode of " chocking " when placed on diet .. S/S eval   NPO for now     - presumed UTI : will check culture ,.... unlcear if new frequency however will likely treat     - TA : cont steroids     - DM: monitor FS and cont RISS   endo input     - ADINA : likley an element of dehydration   monitor   gentle hydration

## 2020-07-04 NOTE — H&P ADULT - HISTORY OF PRESENT ILLNESS
87F with PMHx of rheumatoid arthritis, diabetes mellitus, HTN, moderate aortic stenosis, CKD2, and interstitial lung disease admitted wiht syncope/.. pt is nt able to porivde evenets excepts saying taht room was too " warm ": and she was trying to get up and next thing she knew ambulance was there., As francisco javier chart : as per granddaughter when daughter of pt went outside found pt "slumped over in chair" and minimally responsive for a few minutes, called EMS and pt slowly came to. Before this was in usual state of health. Pt denies CP , palpitations .. ,mild SOB yesterday and naseous today but no vomitting or diarreah , no abdominal pain and no urinary sx .   no fever or chills   no cough 87F with PMHx of rheumatoid arthritis, diabetes mellitus, HTN, moderate aortic stenosis, CKD2, and interstitial lung disease admitted wiht syncope/.. pt is nt able to porivde evenets excepts saying taht room was too " warm ": and she was trying to get up and next thing she knew ambulance was there., As francisco javier chart : as per granddaughter when daughter of pt went outside found pt "slumped over in chair" and minimally responsive for a few minutes, called EMS and pt slowly came to. Before this was in usual state of health. Pt denies CP , palpitations .. ,mild SOB yesterday and naseous today but no vomitting or diarreah , no abdominal pain and no urinary sx ( freuency ?? new ?    no fever or chills   no cough

## 2020-07-04 NOTE — ED PROVIDER NOTE - ATTENDING CONTRIBUTION TO CARE
88F presenting with presyncope, on exam well appearing now aside from mild crackles on lungs though lower fields only suspect atelectasis but consider pna, does have cardiac hx though no chest pain and EKG with RBBB similar to dec 2019, will check labs, cxr, tele admit for syncope workup given unresponsive for 2-3 minutes at home per family account there is concern for arrhythmogenic syncope.

## 2020-07-04 NOTE — ED PROVIDER NOTE - SHIFT CHANGE DETAILS
Pt signed out to me pending labs/cxr, pt had reported syncopal event. Pt reports she felt hot and then dizzy and that is the last thing she remembers. Per pt, she does not want to stay in the hospital. will need to speak to family and pt with  after results. Pt stable, no acute complaints. Pt signed out to me pending labs/cxr, pt had reported syncopal event. Pt reports she felt hot and then dizzy and that is the last thing she remembers.  Pt stable, no acute complaints. plan for admission for syncope work up.

## 2020-07-04 NOTE — ED PROVIDER NOTE - CLINICAL SUMMARY MEDICAL DECISION MAKING FREE TEXT BOX
Oren PGY-3:  89yo F here s/p syncopal episode, TTE last year with minimal abnormalities (mild AS) no murmer here, however unclear etiology of lung crackles found on exam. Will obtain bnp/cxr to further assess, ekg/fingerstick to assess for etiology of syncope, will reassess after bloodwork . Likely admission in this 89yo F s/p syncopal episode for Tele monitoring and cardiac workup

## 2020-07-04 NOTE — ED ADULT NURSE NOTE - OBJECTIVE STATEMENT
89 y/o female BIB EMS for syncopal episode.  As per granddaughter, reports pt's daughter went outside, she found the pt "slumped over in chair" and was minimally responsive for a few minutes,  so she called EMS and pt slowly returned back to herself.  States prior to this was in usual state of health.  Pt denies  dizziness, SOB, chest pain, n/v/d, fever, chills, hitting head.  Respiration easy and unlabored. No acute respiratory distress noted, pt able to move all extremities. 89 y/o female BIB EMS for syncopal episode.  As per granddaughter, reports pt's daughter went outside, she found the pt "slumped over in chair" and was minimally responsive for a few minutes,  so she called EMS and pt slowly returned back to herself.  States prior to this pt was in usual state of health.  Pt denies  dizziness, SOB, chest pain, n/v/d, fever, chills, hitting head.  Respiration easy and unlabored. No acute respiratory distress noted, pt able to move all extremities.

## 2020-07-04 NOTE — ED PROVIDER NOTE - PHYSICAL EXAMINATION
General: NAD  HEENT: pupils equal and reactive, normal external ears bilaterally   Cardiac: RRR, no MRG appreciated  Resp: Crackles to lung bases BL  Abd: soft, nontender, nondistended,   : no CVA tenderness  Neuro: Moving all extremities  Skin:  normal color for race

## 2020-07-04 NOTE — ED PROVIDER NOTE - OBJECTIVE STATEMENT
87yo F h/o mild aortic stenosis, MD , HTN , DM here after syncopal episode    As per grandaughter when daughter of pt went outside found pt "slumped over in chair" and minimally responsive for a few minutes, called EMS and pt slowly came to. Before this was in usual state of health. Pt denies CP/SOB. Denies cardiac hx, no hx of smoking. Denies N//VD, Denies F/C.

## 2020-07-05 DIAGNOSIS — Z87.09 PERSONAL HISTORY OF OTHER DISEASES OF THE RESPIRATORY SYSTEM: ICD-10-CM

## 2020-07-05 DIAGNOSIS — R55 SYNCOPE AND COLLAPSE: ICD-10-CM

## 2020-07-05 DIAGNOSIS — E11.9 TYPE 2 DIABETES MELLITUS WITHOUT COMPLICATIONS: ICD-10-CM

## 2020-07-05 DIAGNOSIS — I10 ESSENTIAL (PRIMARY) HYPERTENSION: ICD-10-CM

## 2020-07-05 DIAGNOSIS — E03.9 HYPOTHYROIDISM, UNSPECIFIED: ICD-10-CM

## 2020-07-05 LAB
A1C WITH ESTIMATED AVERAGE GLUCOSE RESULT: 7 % — HIGH (ref 4–5.6)
ALBUMIN SERPL ELPH-MCNC: 3.2 G/DL — LOW (ref 3.3–5)
ALP SERPL-CCNC: 46 U/L — SIGNIFICANT CHANGE UP (ref 40–120)
ALT FLD-CCNC: 8 U/L — LOW (ref 10–45)
ANION GAP SERPL CALC-SCNC: 10 MMOL/L — SIGNIFICANT CHANGE UP (ref 5–17)
AST SERPL-CCNC: 12 U/L — SIGNIFICANT CHANGE UP (ref 10–40)
BASOPHILS # BLD AUTO: 0.04 K/UL — SIGNIFICANT CHANGE UP (ref 0–0.2)
BASOPHILS NFR BLD AUTO: 0.4 % — SIGNIFICANT CHANGE UP (ref 0–2)
BILIRUB SERPL-MCNC: 0.4 MG/DL — SIGNIFICANT CHANGE UP (ref 0.2–1.2)
BUN SERPL-MCNC: 36 MG/DL — HIGH (ref 7–23)
CALCIUM SERPL-MCNC: 9.1 MG/DL — SIGNIFICANT CHANGE UP (ref 8.4–10.5)
CHLORIDE SERPL-SCNC: 105 MMOL/L — SIGNIFICANT CHANGE UP (ref 96–108)
CO2 SERPL-SCNC: 23 MMOL/L — SIGNIFICANT CHANGE UP (ref 22–31)
CREAT SERPL-MCNC: 1.32 MG/DL — HIGH (ref 0.5–1.3)
EOSINOPHIL # BLD AUTO: 0.12 K/UL — SIGNIFICANT CHANGE UP (ref 0–0.5)
EOSINOPHIL NFR BLD AUTO: 1.2 % — SIGNIFICANT CHANGE UP (ref 0–6)
ESTIMATED AVERAGE GLUCOSE: 154 MG/DL — HIGH (ref 68–114)
GLUCOSE BLDC GLUCOMTR-MCNC: 130 MG/DL — HIGH (ref 70–99)
GLUCOSE BLDC GLUCOMTR-MCNC: 152 MG/DL — HIGH (ref 70–99)
GLUCOSE BLDC GLUCOMTR-MCNC: 204 MG/DL — HIGH (ref 70–99)
GLUCOSE BLDC GLUCOMTR-MCNC: 213 MG/DL — HIGH (ref 70–99)
GLUCOSE SERPL-MCNC: 123 MG/DL — HIGH (ref 70–99)
HCT VFR BLD CALC: 32.1 % — LOW (ref 34.5–45)
HGB BLD-MCNC: 10.1 G/DL — LOW (ref 11.5–15.5)
IMM GRANULOCYTES NFR BLD AUTO: 0.5 % — SIGNIFICANT CHANGE UP (ref 0–1.5)
LYMPHOCYTES # BLD AUTO: 3.48 K/UL — HIGH (ref 1–3.3)
LYMPHOCYTES # BLD AUTO: 33.8 % — SIGNIFICANT CHANGE UP (ref 13–44)
MCHC RBC-ENTMCNC: 27.8 PG — SIGNIFICANT CHANGE UP (ref 27–34)
MCHC RBC-ENTMCNC: 31.5 GM/DL — LOW (ref 32–36)
MCV RBC AUTO: 88.4 FL — SIGNIFICANT CHANGE UP (ref 80–100)
MONOCYTES # BLD AUTO: 0.76 K/UL — SIGNIFICANT CHANGE UP (ref 0–0.9)
MONOCYTES NFR BLD AUTO: 7.4 % — SIGNIFICANT CHANGE UP (ref 2–14)
NEUTROPHILS # BLD AUTO: 5.86 K/UL — SIGNIFICANT CHANGE UP (ref 1.8–7.4)
NEUTROPHILS NFR BLD AUTO: 56.7 % — SIGNIFICANT CHANGE UP (ref 43–77)
NRBC # BLD: 0 /100 WBCS — SIGNIFICANT CHANGE UP (ref 0–0)
PLATELET # BLD AUTO: 257 K/UL — SIGNIFICANT CHANGE UP (ref 150–400)
POTASSIUM SERPL-MCNC: 4 MMOL/L — SIGNIFICANT CHANGE UP (ref 3.5–5.3)
POTASSIUM SERPL-SCNC: 4 MMOL/L — SIGNIFICANT CHANGE UP (ref 3.5–5.3)
PROT SERPL-MCNC: 6.3 G/DL — SIGNIFICANT CHANGE UP (ref 6–8.3)
RBC # BLD: 3.63 M/UL — LOW (ref 3.8–5.2)
RBC # FLD: 14.1 % — SIGNIFICANT CHANGE UP (ref 10.3–14.5)
SARS-COV-2 IGG SERPL QL IA: NEGATIVE — SIGNIFICANT CHANGE UP
SARS-COV-2 IGM SERPL IA-ACNC: 0.08 INDEX — SIGNIFICANT CHANGE UP
SODIUM SERPL-SCNC: 138 MMOL/L — SIGNIFICANT CHANGE UP (ref 135–145)
T4 FREE SERPL-MCNC: 1.4 NG/DL — SIGNIFICANT CHANGE UP (ref 0.9–1.8)
TSH SERPL-MCNC: 5.54 UIU/ML — HIGH (ref 0.27–4.2)
WBC # BLD: 10.31 K/UL — SIGNIFICANT CHANGE UP (ref 3.8–10.5)
WBC # FLD AUTO: 10.31 K/UL — SIGNIFICANT CHANGE UP (ref 3.8–10.5)

## 2020-07-05 RX ADMIN — HEPARIN SODIUM 5000 UNIT(S): 5000 INJECTION INTRAVENOUS; SUBCUTANEOUS at 17:20

## 2020-07-05 RX ADMIN — Medication 2: at 17:20

## 2020-07-05 RX ADMIN — SODIUM CHLORIDE 60 MILLILITER(S): 9 INJECTION, SOLUTION INTRAVENOUS at 17:23

## 2020-07-05 RX ADMIN — Medication 5 MILLIGRAM(S): at 05:09

## 2020-07-05 RX ADMIN — HEPARIN SODIUM 5000 UNIT(S): 5000 INJECTION INTRAVENOUS; SUBCUTANEOUS at 05:09

## 2020-07-05 RX ADMIN — PANTOPRAZOLE SODIUM 40 MILLIGRAM(S): 20 TABLET, DELAYED RELEASE ORAL at 05:09

## 2020-07-05 RX ADMIN — Medication 2.5 MILLIGRAM(S): at 22:08

## 2020-07-05 RX ADMIN — SODIUM CHLORIDE 60 MILLILITER(S): 9 INJECTION, SOLUTION INTRAVENOUS at 00:23

## 2020-07-05 RX ADMIN — Medication 2 MILLIGRAM(S): at 13:02

## 2020-07-05 RX ADMIN — Medication 2: at 12:54

## 2020-07-05 RX ADMIN — Medication 50 MILLIGRAM(S): at 05:09

## 2020-07-05 RX ADMIN — LOSARTAN POTASSIUM 25 MILLIGRAM(S): 100 TABLET, FILM COATED ORAL at 05:09

## 2020-07-05 NOTE — PROGRESS NOTE ADULT - ASSESSMENT
87F with PMHx of rheumatoid arthritis, diabetes mellitus, HTN, moderate aortic stenosis, CKD2, and interstitial lung disease admitted wiht syncope/.. pt is nt able to porivde evenets excepts saying taht room was too " warm ": and she was trying to get up and next thing she knew ambulance was there., As francisco javier chart : as per granddaughter when daughter of pt went outside found pt "slumped over in chair" and minimally responsive for a few minutes, called EMS and pt slowly came to. Before this was in usual state of health. Pt denies CP , palpitations .. ,mild SOB yesterday and naseous today but no vomitting or diarreah , no abdominal pain and no urinary sx .   no fever or chills   no cough     - syncope   amdite to tele   cardio inpu t appreciated   ? etiology sx AS ?  check orthostatics     RA: cont meds     - dysphagia : had an episode of " chocking " when placed on diet .. S/S eval 87F with PMHx of rheumatoid arthritis, diabetes mellitus, HTN, moderate aortic stenosis, CKD2, and interstitial lung disease admitted wiht syncope/.. pt is nt able to porivde evenets excepts saying taht room was too " warm ": and she was trying to get up and next thing she knew ambulance was there., As francisco javier chart : as per granddaughter when daughter of pt went outside found pt "slumped over in chair" and minimally responsive for a few minutes, called EMS and pt slowly came to. Before this was in usual state of health. Pt denies CP , palpitations .. ,mild SOB yesterday and naseous today but no vomitting or diarreah , no abdominal pain and no urinary sx .   no fever or chills   no cough     - syncope   amdite to tele   cardio inpu t appreciated   ? etiology sx AS ?  check orthostatics     RA: cont meds     - dysphagia : had an episode of " chocking " when placed on diet .. S/S eval     - presumed UTI : will check culture ,.... unlcear if new frequency however will likely treat     - TA : cont steroids     - DM: monitor FS and cont RISS   endo input     - ADINA : likley an element of dehydration   monitor   gentle hydration

## 2020-07-05 NOTE — SWALLOW BEDSIDE ASSESSMENT ADULT - ASR SWALLOW ASPIRATION MONITOR
gurgly voice/cough/fever/change of breathing pattern/throat clearing/upper respiratory infection/Monitor for s/s aspiration/laryngeal penetration. If noted:  D/C p.o. intake, provide non-oral nutrition/hydration/meds, and contact this service @ [id number]/pneumonia

## 2020-07-05 NOTE — SWALLOW BEDSIDE ASSESSMENT ADULT - COMMENTS
CXR: IMPRESSION: The study is markedly limited secondary to patient rotation. There are linear peripheral reticular opacities within both lungs. There is no focal pneumonia identified. The heart is normal in size.

## 2020-07-05 NOTE — PROGRESS NOTE ADULT - SUBJECTIVE AND OBJECTIVE BOX
Patient is a 88y old  Female who presents with a chief complaint of                                                              INTERVAL HPI/OVERNIGHT EVENTS:    REVIEW OF SYSTEMS:     CONSTITUTIONAL: No weakness, fevers or chills  EYES/ENT: No visual changes , no ear ache   NECK: No pain or stiffness  RESPIRATORY: No cough, wheezing,  No shortness of breath  CARDIOVASCULAR: No chest pain or palpitations  GASTROINTESTINAL: No abdominal pain  . No nausea, vomiting, or hematemesis; No diarrhea or constipation. No melena or hematochezia.  GENITOURINARY: No dysuria, frequency or hematuria  NEUROLOGICAL: No numbness or weakness  SKIN: No itching, burning, rashes, or lesions                                                                                                                                                                                                                                                                                 Medications:  MEDICATIONS  (STANDING):  dextrose 5%. 1000 milliLiter(s) (50 mL/Hr) IV Continuous <Continuous>  dextrose 50% Injectable 12.5 Gram(s) IV Push once  dextrose 50% Injectable 25 Gram(s) IV Push once  dextrose 50% Injectable 25 Gram(s) IV Push once  folic acid 2 milliGRAM(s) Oral daily  heparin   Injectable 5000 Unit(s) SubCutaneous every 12 hours  insulin lispro (HumaLOG) corrective regimen sliding scale   SubCutaneous three times a day before meals  insulin lispro (HumaLOG) corrective regimen sliding scale   SubCutaneous at bedtime  losartan 25 milliGRAM(s) Oral daily  metoprolol succinate ER 50 milliGRAM(s) Oral daily  pantoprazole    Tablet 40 milliGRAM(s) Oral before breakfast  predniSONE   Tablet 2.5 milliGRAM(s) Oral at bedtime  predniSONE   Tablet 5 milliGRAM(s) Oral daily  sodium chloride 0.45%. 1000 milliLiter(s) (60 mL/Hr) IV Continuous <Continuous>    MEDICATIONS  (PRN):  dextrose 40% Gel 15 Gram(s) Oral once PRN Blood Glucose LESS THAN 70 milliGRAM(s)/deciliter  glucagon  Injectable 1 milliGRAM(s) IntraMuscular once PRN Glucose LESS THAN 70 milligrams/deciliter  ondansetron Injectable 4 milliGRAM(s) IV Push every 6 hours PRN Nausea and/or Vomiting       Allergies    No Known Allergies    Intolerances      Vital Signs Last 24 Hrs  T(C): 36.7 (05 Jul 2020 20:13), Max: 36.9 (05 Jul 2020 05:55)  T(F): 98 (05 Jul 2020 20:13), Max: 98.4 (05 Jul 2020 05:55)  HR: 76 (05 Jul 2020 20:13) (72 - 81)  BP: 164/77 (05 Jul 2020 20:13) (156/76 - 179/83)  BP(mean): --  RR: 17 (05 Jul 2020 20:13) (16 - 17)  SpO2: 95% (05 Jul 2020 20:13) (95% - 97%)  CAPILLARY BLOOD GLUCOSE      POCT Blood Glucose.: 204 mg/dL (05 Jul 2020 17:12)  POCT Blood Glucose.: 213 mg/dL (05 Jul 2020 12:28)  POCT Blood Glucose.: 130 mg/dL (05 Jul 2020 07:51)  POCT Blood Glucose.: 159 mg/dL (04 Jul 2020 23:53)  POCT Blood Glucose.: 113 mg/dL (04 Jul 2020 22:00)      07-05 @ 07:01  -  07-05 @ 21:10  --------------------------------------------------------  IN: 240 mL / OUT: 0 mL / NET: 240 mL      Physical Exam:    Daily     Daily   General:  Well appearing, NAD, not cachetic  HEENT:  Nonicteric, PERRLA  CV:  RRR, S1S2   Lungs:  CTA B/L, no wheezes, rales, rhonchi  Abdomen:  Soft, non-tender, no distended, positive BS  Extremities:  2+ pulses, no c/c, no edema  Skin:  Warm and dry, no rashes  :  No mason  Neuro:  AAOx3, non-focal, grossly intact                                                                                                                                                                                                                                                                                                LABS:                               10.1   10.31 )-----------( 257      ( 05 Jul 2020 06:09 )             32.1                      07-05    138  |  105  |  36<H>  ----------------------------<  123<H>  4.0   |  23  |  1.32<H>    Ca    9.1      05 Jul 2020 06:09    TPro  6.3  /  Alb  3.2<L>  /  TBili  0.4  /  DBili  x   /  AST  12  /  ALT  8<L>  /  AlkPhos  46  07-05                       RADIOLOGY & ADDITIONAL TESTS         I personally reviewed: [  ]EKG   [  ]CXR    [  ] CT      A/P:         Discussed with :     Stefani consultants' Notes   Time spent : Patient is a 88y old  Female who presents with a chief complaint of                                                              INTERVAL HPI/OVERNIGHT EVENTS:    REVIEW OF SYSTEMS:     CONSTITUTIONAL: No weakness, fevers or chills  RESPIRATORY: No cough, wheezing,  No shortness of breath  CARDIOVASCULAR: No chest pain or palpitations  GASTROINTESTINAL: No abdominal pain  . No nausea, vomiting,   GENITOURINARY: No dysuria, ?? new f  NEUROLOGICAL: No numbness or weakness                                                                                                                                                                                                                                                                                 Medications:  MEDICATIONS  (STANDING):  dextrose 5%. 1000 milliLiter(s) (50 mL/Hr) IV Continuous <Continuous>  dextrose 50% Injectable 12.5 Gram(s) IV Push once  dextrose 50% Injectable 25 Gram(s) IV Push once  dextrose 50% Injectable 25 Gram(s) IV Push once  folic acid 2 milliGRAM(s) Oral daily  heparin   Injectable 5000 Unit(s) SubCutaneous every 12 hours  insulin lispro (HumaLOG) corrective regimen sliding scale   SubCutaneous three times a day before meals  insulin lispro (HumaLOG) corrective regimen sliding scale   SubCutaneous at bedtime  losartan 25 milliGRAM(s) Oral daily  metoprolol succinate ER 50 milliGRAM(s) Oral daily  pantoprazole    Tablet 40 milliGRAM(s) Oral before breakfast  predniSONE   Tablet 2.5 milliGRAM(s) Oral at bedtime  predniSONE   Tablet 5 milliGRAM(s) Oral daily  sodium chloride 0.45%. 1000 milliLiter(s) (60 mL/Hr) IV Continuous <Continuous>    MEDICATIONS  (PRN):  dextrose 40% Gel 15 Gram(s) Oral once PRN Blood Glucose LESS THAN 70 milliGRAM(s)/deciliter  glucagon  Injectable 1 milliGRAM(s) IntraMuscular once PRN Glucose LESS THAN 70 milligrams/deciliter  ondansetron Injectable 4 milliGRAM(s) IV Push every 6 hours PRN Nausea and/or Vomiting       Allergies    No Known Allergies    Intolerances      Vital Signs Last 24 Hrs  T(C): 36.7 (05 Jul 2020 20:13), Max: 36.9 (05 Jul 2020 05:55)  T(F): 98 (05 Jul 2020 20:13), Max: 98.4 (05 Jul 2020 05:55)  HR: 76 (05 Jul 2020 20:13) (72 - 81)  BP: 164/77 (05 Jul 2020 20:13) (156/76 - 179/83)  BP(mean): --  RR: 17 (05 Jul 2020 20:13) (16 - 17)  SpO2: 95% (05 Jul 2020 20:13) (95% - 97%)  CAPILLARY BLOOD GLUCOSE      POCT Blood Glucose.: 204 mg/dL (05 Jul 2020 17:12)  POCT Blood Glucose.: 213 mg/dL (05 Jul 2020 12:28)  POCT Blood Glucose.: 130 mg/dL (05 Jul 2020 07:51)  POCT Blood Glucose.: 159 mg/dL (04 Jul 2020 23:53)  POCT Blood Glucose.: 113 mg/dL (04 Jul 2020 22:00)      07-05 @ 07:01  -  07-05 @ 21:10  --------------------------------------------------------  IN: 240 mL / OUT: 0 mL / NET: 240 mL      Physical Exam:    Daily     Daily   General:  NAD   HEENT:  Nonicteric, PERRLA  CV:  RRR, S1S2   Lungs:  CTA B/L, no wheezes, rales, rhonchi  Abdomen:  Soft, non-tender, no distended, positive BS  Extremities:  no edema   Neuro:  AAOx3, non-focal, grossly intact                                                                                                                                                                                                                                                                                                LABS:                               10.1   10.31 )-----------( 257      ( 05 Jul 2020 06:09 )             32.1                      07-05    138  |  105  |  36<H>  ----------------------------<  123<H>  4.0   |  23  |  1.32<H>    Ca    9.1      05 Jul 2020 06:09    TPro  6.3  /  Alb  3.2<L>  /  TBili  0.4  /  DBili  x   /  AST  12  /  ALT  8<L>  /  AlkPhos  46  07-05                       RADIOLOGY & ADDITIONAL TESTS         I personally reviewed: [  ]EKG   [  ]CXR    [  ] CT      A/P:         Discussed with :     Stefani consultants' Notes   Time spent :

## 2020-07-05 NOTE — SWALLOW BEDSIDE ASSESSMENT ADULT - SWALLOW EVAL: DIAGNOSIS
Patient admitted with syncope. Noted to have a "choking" event during PO intake per MD/RN report. Patient presents with mildly prolonged mastication of solids, delayed trigger of the swallow, suspected decreased laryngeal elevation upon palpation, and coughing post intake of thin liquids. +Mild hypophonia with decreased vocal intensity.

## 2020-07-05 NOTE — CONSULT NOTE ADULT - ASSESSMENT
Assessment  DMT2: 88y Female with DM T2 with hyperglycemia, A1C pending, was on oral meds at home, now on insulin coverage, blood sugars in acceptable range, no hypoglycemic episodes, she did not eat breakfast, appears comfortable.  Hypothyroid: Patient has no history thyroid disease, was not on any thyroid supplements, subclinical hypothyroid, FT4 in acceptable range.  Syncope: workup in progress, FU Cardiology.  ILD: on prednisone  HTN: Controlled,  on antihypertensive medications.          Karan Akbar MD  Cell: [phone]  Office: [phone] Assessment  DMT2: 88y Female with DM T2 with hyperglycemia, A1C pending, was on oral meds at home, now on insulin coverage, blood sugars in acceptable range,  no hypoglycemic episodes, she did not eat breakfast, appears comfortable.  Hypothyroid: Patient has no history thyroid disease, was not on any thyroid supplements, subclinical hypothyroid, FT4 in acceptable range.  Syncope: workup in progress, FU Cardiology.  ILD: on prednisone  HTN: Controlled,  on antihypertensive medications.          Karan Akbar MD  Cell: [phone]  Office: [phone]

## 2020-07-05 NOTE — PROVIDER CONTACT NOTE (OTHER) - RECOMMENDATIONS
NPO except for medications until speech language evaluation consult for dysphagia. RN will continue to monitor.

## 2020-07-05 NOTE — CONSULT NOTE ADULT - ASSESSMENT
Syncope  unclear etiology   monitor on tele   obtain echo   check carotid doppler   will consider ILR     HTN  cont current meds for now     AS  recheck echo , doubt progression     ILD  on steroids      Advanced care planning was discussed with patient and family.  Risks, benefits and alternatives of the cardiac treatments and medical therapy including procedures were discussed in detail and all questions were answered. Importance of compliance with medical therapy and lifestyle modification to improve cardiovascular health were addressed. Appropriate forms and patient educational materials were reviewed. 30 minutes face to face spent.

## 2020-07-05 NOTE — SWALLOW BEDSIDE ASSESSMENT ADULT - SLP GENERAL OBSERVATIONS
Patient encountered awake and alert, positioned upright in bed with assist from RN as pt stated she was unable to push herself up with her legs. +RA; A&Ox2. +Hypophonia with decreased vocal volume. Able to follow simple commands and make wants/needs known. Fluent in English and German. +Significant tremor of the hands.

## 2020-07-05 NOTE — SWALLOW BEDSIDE ASSESSMENT ADULT - SWALLOW EVAL: PATIENT/FAMILY GOALS STATEMENT
Pt denied hx of dysphagia, but when questioned re: coughing during PO intake pt stated she "doesn't cough more than the normal person." Stated her  does the cooking at home as she is unable to prep meals 2/2 UE tremors.  Also stated she has an aide that assists her as she "cannot do anything anymore."

## 2020-07-05 NOTE — CONSULT NOTE ADULT - PROBLEM SELECTOR RECOMMENDATION 2
Subclinical; Free T4 in acceptable range. No need for management at this point.  Suggest to FU endo 4-6 weeks to repeat TFTs.  Discussed plan with patient.

## 2020-07-05 NOTE — CONSULT NOTE ADULT - PROBLEM SELECTOR RECOMMENDATION 3
Could not order AM cortisol given patient is on steroids. However, labs and BP not indicative of AI.  Suggest to continue workup and management per primary team/Cardiology recommendations.

## 2020-07-05 NOTE — SWALLOW BEDSIDE ASSESSMENT ADULT - SLP PERTINENT HISTORY OF CURRENT PROBLEM
88F with PMHx of rheumatoid arthritis, diabetes mellitus, HTN, moderate aortic stenosis, CKD2, and interstitial lung disease admitted with syncope. pt is not able to provide events excepts saying that room was too "warm" and she was trying to get up and next thing she knew ambulance was there, As per chart: as per granddaughter when daughter of pt went outside found pt "slumped over in chair" and minimally responsive for a few minutes, called EMS and pt slowly came to. Before this was in usual state of health. Pt denies CP, palpitations; mild SOB yesterday and nauseous today but no vomiting or diarrhea; no abdominal pain and no urinary sx; no fever or chills; no cough. Dysphagia: had an episode of "choking" when placed on diet.. S/S eval; NPO for now. Cardiology following. Of note, pt had MBS in February 2018 with recommendations for regular texture diet with small single sips via cup only 2/2 deep laryngeal penetration of thin liquids via straw.

## 2020-07-05 NOTE — SWALLOW BEDSIDE ASSESSMENT ADULT - SWALLOW EVAL: RECOMMENDED FEEDING/EATING TECHNIQUES
small sips/bites/allow for swallow between intakes/maintain upright posture during/after eating for 30 mins

## 2020-07-05 NOTE — SWALLOW BEDSIDE ASSESSMENT ADULT - PHARYNGEAL PHASE
Delayed pharyngeal swallow/Decreased laryngeal elevation Cough post oral intake/Delayed pharyngeal swallow/Decreased laryngeal elevation Decreased laryngeal elevation/Delayed pharyngeal swallow

## 2020-07-06 LAB
-  AMIKACIN: SIGNIFICANT CHANGE UP
-  AMPICILLIN/SULBACTAM: SIGNIFICANT CHANGE UP
-  AMPICILLIN: SIGNIFICANT CHANGE UP
-  AZTREONAM: SIGNIFICANT CHANGE UP
-  CEFAZOLIN: SIGNIFICANT CHANGE UP
-  CEFEPIME: SIGNIFICANT CHANGE UP
-  CEFOXITIN: SIGNIFICANT CHANGE UP
-  CEFTRIAXONE: SIGNIFICANT CHANGE UP
-  CIPROFLOXACIN: SIGNIFICANT CHANGE UP
-  GENTAMICIN: SIGNIFICANT CHANGE UP
-  IMIPENEM: SIGNIFICANT CHANGE UP
-  LEVOFLOXACIN: SIGNIFICANT CHANGE UP
-  MEROPENEM: SIGNIFICANT CHANGE UP
-  NITROFURANTOIN: SIGNIFICANT CHANGE UP
-  PIPERACILLIN/TAZOBACTAM: SIGNIFICANT CHANGE UP
-  TIGECYCLINE: SIGNIFICANT CHANGE UP
-  TOBRAMYCIN: SIGNIFICANT CHANGE UP
-  TRIMETHOPRIM/SULFAMETHOXAZOLE: SIGNIFICANT CHANGE UP
ANION GAP SERPL CALC-SCNC: 10 MMOL/L — SIGNIFICANT CHANGE UP (ref 5–17)
BUN SERPL-MCNC: 30 MG/DL — HIGH (ref 7–23)
CALCIUM SERPL-MCNC: 9.1 MG/DL — SIGNIFICANT CHANGE UP (ref 8.4–10.5)
CHLORIDE SERPL-SCNC: 102 MMOL/L — SIGNIFICANT CHANGE UP (ref 96–108)
CO2 SERPL-SCNC: 24 MMOL/L — SIGNIFICANT CHANGE UP (ref 22–31)
CREAT SERPL-MCNC: 1.2 MG/DL — SIGNIFICANT CHANGE UP (ref 0.5–1.3)
CULTURE RESULTS: SIGNIFICANT CHANGE UP
GLUCOSE BLDC GLUCOMTR-MCNC: 196 MG/DL — HIGH (ref 70–99)
GLUCOSE BLDC GLUCOMTR-MCNC: 229 MG/DL — HIGH (ref 70–99)
GLUCOSE BLDC GLUCOMTR-MCNC: 235 MG/DL — HIGH (ref 70–99)
GLUCOSE BLDC GLUCOMTR-MCNC: 300 MG/DL — HIGH (ref 70–99)
GLUCOSE BLDC GLUCOMTR-MCNC: 445 MG/DL — HIGH (ref 70–99)
GLUCOSE BLDC GLUCOMTR-MCNC: 504 MG/DL — CRITICAL HIGH (ref 70–99)
GLUCOSE SERPL-MCNC: 184 MG/DL — HIGH (ref 70–99)
HCT VFR BLD CALC: 33.7 % — LOW (ref 34.5–45)
HGB BLD-MCNC: 10.8 G/DL — LOW (ref 11.5–15.5)
MAGNESIUM SERPL-MCNC: 1.6 MG/DL — SIGNIFICANT CHANGE UP (ref 1.6–2.6)
MCHC RBC-ENTMCNC: 28.3 PG — SIGNIFICANT CHANGE UP (ref 27–34)
MCHC RBC-ENTMCNC: 32 GM/DL — SIGNIFICANT CHANGE UP (ref 32–36)
MCV RBC AUTO: 88.5 FL — SIGNIFICANT CHANGE UP (ref 80–100)
METHOD TYPE: SIGNIFICANT CHANGE UP
NRBC # BLD: 0 /100 WBCS — SIGNIFICANT CHANGE UP (ref 0–0)
ORGANISM # SPEC MICROSCOPIC CNT: SIGNIFICANT CHANGE UP
ORGANISM # SPEC MICROSCOPIC CNT: SIGNIFICANT CHANGE UP
PLATELET # BLD AUTO: 260 K/UL — SIGNIFICANT CHANGE UP (ref 150–400)
POTASSIUM SERPL-MCNC: 4.2 MMOL/L — SIGNIFICANT CHANGE UP (ref 3.5–5.3)
POTASSIUM SERPL-SCNC: 4.2 MMOL/L — SIGNIFICANT CHANGE UP (ref 3.5–5.3)
RBC # BLD: 3.81 M/UL — SIGNIFICANT CHANGE UP (ref 3.8–5.2)
RBC # FLD: 13.9 % — SIGNIFICANT CHANGE UP (ref 10.3–14.5)
SODIUM SERPL-SCNC: 136 MMOL/L — SIGNIFICANT CHANGE UP (ref 135–145)
SPECIMEN SOURCE: SIGNIFICANT CHANGE UP
WBC # BLD: 10.82 K/UL — HIGH (ref 3.8–10.5)
WBC # FLD AUTO: 10.82 K/UL — HIGH (ref 3.8–10.5)

## 2020-07-06 PROCEDURE — 93880 EXTRACRANIAL BILAT STUDY: CPT | Mod: 26

## 2020-07-06 PROCEDURE — 74230 X-RAY XM SWLNG FUNCJ C+: CPT | Mod: 26

## 2020-07-06 RX ORDER — SODIUM CHLORIDE 9 MG/ML
1000 INJECTION, SOLUTION INTRAVENOUS
Refills: 0 | Status: DISCONTINUED | OUTPATIENT
Start: 2020-07-06 | End: 2020-07-10

## 2020-07-06 RX ORDER — INSULIN GLARGINE 100 [IU]/ML
6 INJECTION, SOLUTION SUBCUTANEOUS AT BEDTIME
Refills: 0 | Status: DISCONTINUED | OUTPATIENT
Start: 2020-07-06 | End: 2020-07-07

## 2020-07-06 RX ORDER — MAGNESIUM SULFATE 500 MG/ML
1 VIAL (ML) INJECTION ONCE
Refills: 0 | Status: COMPLETED | OUTPATIENT
Start: 2020-07-06 | End: 2020-07-06

## 2020-07-06 RX ORDER — INSULIN LISPRO 100/ML
4 VIAL (ML) SUBCUTANEOUS
Refills: 0 | Status: DISCONTINUED | OUTPATIENT
Start: 2020-07-06 | End: 2020-07-07

## 2020-07-06 RX ORDER — CEFTRIAXONE 500 MG/1
1000 INJECTION, POWDER, FOR SOLUTION INTRAMUSCULAR; INTRAVENOUS EVERY 24 HOURS
Refills: 0 | Status: DISCONTINUED | OUTPATIENT
Start: 2020-07-06 | End: 2020-07-08

## 2020-07-06 RX ADMIN — SODIUM CHLORIDE 60 MILLILITER(S): 9 INJECTION, SOLUTION INTRAVENOUS at 15:29

## 2020-07-06 RX ADMIN — PANTOPRAZOLE SODIUM 40 MILLIGRAM(S): 20 TABLET, DELAYED RELEASE ORAL at 05:10

## 2020-07-06 RX ADMIN — Medication 6: at 12:21

## 2020-07-06 RX ADMIN — Medication 2 MILLIGRAM(S): at 15:28

## 2020-07-06 RX ADMIN — SODIUM CHLORIDE 60 MILLILITER(S): 9 INJECTION, SOLUTION INTRAVENOUS at 21:52

## 2020-07-06 RX ADMIN — INSULIN GLARGINE 6 UNIT(S): 100 INJECTION, SOLUTION SUBCUTANEOUS at 21:51

## 2020-07-06 RX ADMIN — Medication 4 UNIT(S): at 18:08

## 2020-07-06 RX ADMIN — LOSARTAN POTASSIUM 25 MILLIGRAM(S): 100 TABLET, FILM COATED ORAL at 05:10

## 2020-07-06 RX ADMIN — CEFTRIAXONE 100 MILLIGRAM(S): 500 INJECTION, POWDER, FOR SOLUTION INTRAMUSCULAR; INTRAVENOUS at 15:25

## 2020-07-06 RX ADMIN — Medication 50 MILLIGRAM(S): at 05:10

## 2020-07-06 RX ADMIN — Medication 2: at 18:08

## 2020-07-06 RX ADMIN — HEPARIN SODIUM 5000 UNIT(S): 5000 INJECTION INTRAVENOUS; SUBCUTANEOUS at 05:10

## 2020-07-06 RX ADMIN — Medication 5 MILLIGRAM(S): at 05:10

## 2020-07-06 RX ADMIN — Medication 100 GRAM(S): at 09:40

## 2020-07-06 RX ADMIN — Medication 2.5 MILLIGRAM(S): at 21:51

## 2020-07-06 RX ADMIN — Medication 1: at 09:00

## 2020-07-06 RX ADMIN — HEPARIN SODIUM 5000 UNIT(S): 5000 INJECTION INTRAVENOUS; SUBCUTANEOUS at 18:09

## 2020-07-06 NOTE — SWALLOW VFSS/MBS ASSESSMENT ADULT - NS SWALLOW VFSS REC ASPIR MON
change of breathing pattern/upper respiratory infection/cough/fever/pneumonia/throat clearing/gurgly voice

## 2020-07-06 NOTE — PROGRESS NOTE ADULT - SUBJECTIVE AND OBJECTIVE BOX
Patient is a 88y old  Female who presents with a chief complaint of                                                              INTERVAL HPI/OVERNIGHT EVENTS:    REVIEW OF SYSTEMS:     CONSTITUTIONAL: No weakness, fevers or chills  RESPIRATORY: No cough, wheezing,  No shortness of breath  CARDIOVASCULAR: No chest pain or palpitations  GASTROINTESTINAL: No abdominal pain  . No nausea, vomiting, or hematemesis; No diarrhea or constipation. No melena or hematochezia.  GENITOURINARY: No dysuria  NEUROLOGICAL: No numbness or weakness  SKIN: No itching, burning, rashes, or lesions                                                                                                                                                                                                                                                                                 Medications:  MEDICATIONS  (STANDING):  cefTRIAXone   IVPB 1000 milliGRAM(s) IV Intermittent every 24 hours  dextrose 5%. 1000 milliLiter(s) (50 mL/Hr) IV Continuous <Continuous>  dextrose 50% Injectable 12.5 Gram(s) IV Push once  dextrose 50% Injectable 25 Gram(s) IV Push once  dextrose 50% Injectable 25 Gram(s) IV Push once  folic acid 2 milliGRAM(s) Oral daily  heparin   Injectable 5000 Unit(s) SubCutaneous every 12 hours  insulin glargine Injectable (LANTUS) 6 Unit(s) SubCutaneous at bedtime  insulin lispro (HumaLOG) corrective regimen sliding scale   SubCutaneous three times a day before meals  insulin lispro (HumaLOG) corrective regimen sliding scale   SubCutaneous at bedtime  losartan 25 milliGRAM(s) Oral daily  metoprolol succinate ER 50 milliGRAM(s) Oral daily  pantoprazole    Tablet 40 milliGRAM(s) Oral before breakfast  predniSONE   Tablet 2.5 milliGRAM(s) Oral at bedtime  predniSONE   Tablet 5 milliGRAM(s) Oral daily  sodium chloride 0.45%. 1000 milliLiter(s) (60 mL/Hr) IV Continuous <Continuous>    MEDICATIONS  (PRN):  dextrose 40% Gel 15 Gram(s) Oral once PRN Blood Glucose LESS THAN 70 milliGRAM(s)/deciliter  glucagon  Injectable 1 milliGRAM(s) IntraMuscular once PRN Glucose LESS THAN 70 milligrams/deciliter  ondansetron Injectable 4 milliGRAM(s) IV Push every 6 hours PRN Nausea and/or Vomiting       Allergies    No Known Allergies    Intolerances      Vital Signs Last 24 Hrs  T(C): 36.9 (2020 04:59), Max: 36.9 (2020 04:59)  T(F): 98.5 (2020 04:59), Max: 98.5 (2020 04:59)  HR: 80 (2020 04:59) (72 - 80)  BP: 158/77 (2020 04:59) (158/77 - 172/94)  BP(mean): --  RR: 16 (2020 04:59) (16 - 17)  SpO2: 94% (2020 04:59) (94% - 96%)  CAPILLARY BLOOD GLUCOSE      POCT Blood Glucose.: 196 mg/dL (2020 08:20)  POCT Blood Glucose.: 152 mg/dL (2020 21:40)  POCT Blood Glucose.: 204 mg/dL (2020 17:12)  POCT Blood Glucose.: 213 mg/dL (2020 12:28)      07- @ 07:01  -   @ 07:00  --------------------------------------------------------  IN: 240 mL / OUT: 900 mL / NET: -660 mL      Physical Exam:    Daily     Daily Weight in k.3 (2020 09:46)  General: elderly NAD   HEENT:  Nonicteric, PERRLA  CV:  RRR, S1S2   Lungs:  CTA B/L, no wheezes, rales, rhonchi  Abdomen:  Soft, non-tender, no distended, positive BS  Extremities: no edema   Neuro:  AAOx3, non-focal, grossly intact                                                                                                                                                                                                                                                                                                LABS:                               10.8   10.82 )-----------( 260      ( 2020 05:06 )             33.7                      07-06    136  |  102  |  30<H>  ----------------------------<  184<H>  4.2   |  24  |  1.20    Ca    9.1      2020 05:06  Mg     1.6     07-06    TPro  6.3  /  Alb  3.2<L>  /  TBili  0.4  /  DBili  x   /  AST  12  /  ALT  8<L>  /  AlkPhos  46  07-05                       RADIOLOGY & ADDITIONAL TESTS         I personally reviewed: [  ]EKG   [  ]CXR    [  ] CT      A/P:         Discussed with :     Stefani consultants' Notes   Time spent :

## 2020-07-06 NOTE — SWALLOW VFSS/MBS ASSESSMENT ADULT - SPECIFY REASON(S)
to assess the swallow mechanism; r/o dysphagia to objectively assess the swallow mechanism; r/o dysphagia

## 2020-07-06 NOTE — PROGRESS NOTE ADULT - ASSESSMENT
Syncope  unclear etiology   monitor on tele - so far no significant events   obtain echo   check carotid doppler   will consider ILR     HTN  cont current meds for now     AS  recheck echo , doubt progression     ILD  on steroids      Advanced care planning was discussed with patient and family.  Risks, benefits and alternatives of the cardiac treatments and medical therapy including procedures were discussed in detail and all questions were answered. Importance of compliance with medical therapy and lifestyle modification to improve cardiovascular health were addressed. Appropriate forms and patient educational materials were reviewed. 30 minutes face to face spent.

## 2020-07-06 NOTE — PROGRESS NOTE ADULT - SUBJECTIVE AND OBJECTIVE BOX
Subjective: Patient seen and examined. No new events except as noted.     SUBJECTIVE/ROS:  feels ok       MEDICATIONS:  MEDICATIONS  (STANDING):  dextrose 5%. 1000 milliLiter(s) (50 mL/Hr) IV Continuous <Continuous>  dextrose 50% Injectable 12.5 Gram(s) IV Push once  dextrose 50% Injectable 25 Gram(s) IV Push once  dextrose 50% Injectable 25 Gram(s) IV Push once  folic acid 2 milliGRAM(s) Oral daily  heparin   Injectable 5000 Unit(s) SubCutaneous every 12 hours  insulin lispro (HumaLOG) corrective regimen sliding scale   SubCutaneous three times a day before meals  insulin lispro (HumaLOG) corrective regimen sliding scale   SubCutaneous at bedtime  losartan 25 milliGRAM(s) Oral daily  metoprolol succinate ER 50 milliGRAM(s) Oral daily  pantoprazole    Tablet 40 milliGRAM(s) Oral before breakfast  predniSONE   Tablet 2.5 milliGRAM(s) Oral at bedtime  predniSONE   Tablet 5 milliGRAM(s) Oral daily  sodium chloride 0.45%. 1000 milliLiter(s) (60 mL/Hr) IV Continuous <Continuous>      PHYSICAL EXAM:  T(C): 36.9 (07-06-20 @ 04:59), Max: 36.9 (07-06-20 @ 04:59)  HR: 80 (07-06-20 @ 04:59) (72 - 80)  BP: 158/77 (07-06-20 @ 04:59) (158/77 - 172/94)  RR: 16 (07-06-20 @ 04:59) (16 - 17)  SpO2: 94% (07-06-20 @ 04:59) (94% - 96%)  Wt(kg): --  I&O's Summary    05 Jul 2020 07:01  -  06 Jul 2020 07:00  --------------------------------------------------------  IN: 240 mL / OUT: 900 mL / NET: -660 mL            JVP: Normal  Neck: supple  Lung: clear   CV: S1 S2 , Murmur:  Abd: soft  Ext: No edema  neuro: Awake / alert  Psych: flat affect  Skin: normal``    LABS/DATA:    CARDIAC MARKERS:  CARDIAC MARKERS ( 04 Jul 2020 19:01 )  x     / x     / 30 U/L / x     / x                                    10.8   10.82 )-----------( 260      ( 06 Jul 2020 05:06 )             33.7     07-06    136  |  102  |  30<H>  ----------------------------<  184<H>  4.2   |  24  |  1.20    Ca    9.1      06 Jul 2020 05:06  Mg     1.6     07-06    TPro  6.3  /  Alb  3.2<L>  /  TBili  0.4  /  DBili  x   /  AST  12  /  ALT  8<L>  /  AlkPhos  46  07-05    proBNP:   Lipid Profile:   HgA1c:   TSH: Thyroid Stimulating Hormone, Serum: 5.54 uIU/mL (07-05 @ 08:39)      TELE:  EKG:

## 2020-07-06 NOTE — PROGRESS NOTE ADULT - ASSESSMENT
87F with PMHx of rheumatoid arthritis, diabetes mellitus, HTN, moderate aortic stenosis, CKD2, and interstitial lung disease admitted wiht syncope/.. pt is nt able to porivde evenets excepts saying taht room was too " warm ": and she was trying to get up and next thing she knew ambulance was there., As francisco javier chart : as per granddaughter when daughter of pt went outside found pt "slumped over in chair" and minimally responsive for a few minutes, called EMS and pt slowly came to. Before this was in usual state of health. Pt denies CP , palpitations .. ,mild SOB yesterday and naseous today but no vomitting or diarreah , no abdominal pain and no urinary sx .   no fever or chills   no cough     - syncope :   cont tele   cardio inpu t appreciated   ? etiology sx AS ?  orthostatics : positive : will give  gentle hydration     RA: cont meds     - dysphagia : had an episode of " chocking " when placed on diet .. S/S eval     - presumed UTI : GNR in urine.. start ceftriaxione   fu culture      - RA : cont steroids     - DM: monitor FS and cont RISS   endo input     - ADINA : likley an element of dehydration   monitor   gentle hydration     Discussed ACP : pt verbally had expressed she does not want aggressive measures however never signed MOLST ..  will discuss w her re: MOLST form   discussed wpt pt and

## 2020-07-06 NOTE — PROGRESS NOTE ADULT - ASSESSMENT
Assessment  DMT2: 88y Female with DM T2 with hyperglycemia, A1C 7%, was on oral meds at home, now on insulin coverage, blood sugars fluctuating, running slightly high, no hypoglycemic episodes. Patient is eating meals on dysphagia diet, she appears comfortable, denies current complaints.  Hypothyroid: Patient has no history thyroid disease, was not on any thyroid supplements, subclinical hypothyroid, FT4 in acceptable range.  Syncope: +orthostatics, workup in progress, FU Cardiology.  ILD: on prednisone  HTN: Controlled,  on antihypertensive medications.          Karan Akbar MD  Cell: [phone]  Office: [phone] Assessment  DMT2: 88y Female with DM T2 with hyperglycemia, A1C 7%, was on oral meds at home, now on insulin coverage, blood sugars trending up, hyperglycemic this afternoon (, 445), no hypoglycemic episodes. Patient is eating meals on dysphagia diet, she appears comfortable, denies current complaints. Patient on prednisone and now receiving ABx in dextrose.  Hypothyroid: Patient has no history thyroid disease, was not on any thyroid supplements, subclinical hypothyroid, FT4 in acceptable range.  Syncope: +orthostatics, workup in progress, FU Cardiology.  ILD: on prednisone  HTN: Controlled,  on antihypertensive medications.          Karan Akbar MD  Cell: [phone]  Office: [phone] Assessment  DMT2: 88y Female with DM T2 with hyperglycemia, A1C 7%, was on oral meds at home, now on insulin coverage,  blood sugars trending up, hyperglycemic this afternoon (, 445), no hypoglycemic episodes. Patient is eating meals on dysphagia diet, she appears comfortable, denies current complaints. Patient on prednisone and now receiving ABx in dextrose.  Hypothyroid: Patient has no history thyroid disease, was not on any thyroid supplements, subclinical hypothyroid, FT4 in acceptable range.  Syncope: +orthostatics, workup in progress, FU Cardiology.  ILD: on prednisone  HTN: Controlled,  on antihypertensive medications.          Karan Akbar MD  Cell: [phone]  Office: [phone]

## 2020-07-06 NOTE — PROGRESS NOTE ADULT - PROBLEM SELECTOR PLAN 1
Will start Lantus 6u at bedtime and continue Humalog correction scale coverage ac/hs. Will continue monitoring FS, log, and FU.  Patient counseled for compliance with consistent low carb diet and exercise as tolerated outpatient. Will start Lantus 6u at bedtime.  Will start Humalog 4u before each meal and continue Humalog correction scale coverage ac/hs. Will continue monitoring FS, log, and FU.  Patient counseled for compliance with consistent low carb diet and exercise as tolerated outpatient.

## 2020-07-06 NOTE — SWALLOW VFSS/MBS ASSESSMENT ADULT - ASPIRATION PRECAUTIONS
yes/Monitor for s/s aspiration/laryngeal penetration. If noted:  D/C p.o. intake, provide non-oral nutrition/hydration/meds, and contact this service @ [email or web address]

## 2020-07-06 NOTE — SWALLOW VFSS/MBS ASSESSMENT ADULT - INTACT
Swallow is triggered when bolus is at the valleculae Swallow is triggered when bolus is at the aryepiglottic folds.

## 2020-07-06 NOTE — SWALLOW VFSS/MBS ASSESSMENT ADULT - SLP GENERAL OBSERVATIONS
Pt encountered in radiology, secure in AURELIO chair. Awake, alert, and follows simple directives. Pt left in NAD.

## 2020-07-06 NOTE — SWALLOW VFSS/MBS ASSESSMENT ADULT - LARYNGEAL PENETRATION DURING THE SWALLOW - SILENT
Trace, during the swallow with retrieval. With straw, amount increases to mild, with complete retrieval.

## 2020-07-06 NOTE — PROGRESS NOTE ADULT - SUBJECTIVE AND OBJECTIVE BOX
Chief complaint  Patient is a 88y old  Female who presents with a chief complaint of  Review of systems  Patient in bed, looks comfortable, no hypoglycemic episodes.    Labs and Fingersticks  CAPILLARY BLOOD GLUCOSE      POCT Blood Glucose.: 196 mg/dL (06 Jul 2020 08:20)  POCT Blood Glucose.: 152 mg/dL (05 Jul 2020 21:40)  POCT Blood Glucose.: 204 mg/dL (05 Jul 2020 17:12)  POCT Blood Glucose.: 213 mg/dL (05 Jul 2020 12:28)      Anion Gap, Serum: 10 (07-06 @ 05:06)  Anion Gap, Serum: 10 (07-05 @ 06:09)  Anion Gap, Serum: 12 (07-04 @ 17:38)      Calcium, Total Serum: 9.1 (07-06 @ 05:06)  Calcium, Total Serum: 9.1 (07-05 @ 06:09)  Calcium, Total Serum: 9.6 (07-04 @ 17:38)  Albumin, Serum: 3.2 <L> (07-05 @ 06:09)  Albumin, Serum: 3.6 (07-04 @ 17:38)    Alanine Aminotransferase (ALT/SGPT): 8 <L> (07-05 @ 06:09)  Alanine Aminotransferase (ALT/SGPT): 9 <L> (07-04 @ 17:38)  Alkaline Phosphatase, Serum: 46 (07-05 @ 06:09)  Alkaline Phosphatase, Serum: 50 (07-04 @ 17:38)  Aspartate Aminotransferase (AST/SGOT): 12 (07-05 @ 06:09)  Aspartate Aminotransferase (AST/SGOT): 15 (07-04 @ 17:38)        07-06    136  |  102  |  30<H>  ----------------------------<  184<H>  4.2   |  24  |  1.20    Ca    9.1      06 Jul 2020 05:06  Mg     1.6     07-06    TPro  6.3  /  Alb  3.2<L>  /  TBili  0.4  /  DBili  x   /  AST  12  /  ALT  8<L>  /  AlkPhos  46  07-05                        10.8   10.82 )-----------( 260      ( 06 Jul 2020 05:06 )             33.7     Medications  MEDICATIONS  (STANDING):  cefTRIAXone   IVPB 1000 milliGRAM(s) IV Intermittent every 24 hours  dextrose 5%. 1000 milliLiter(s) (50 mL/Hr) IV Continuous <Continuous>  dextrose 50% Injectable 12.5 Gram(s) IV Push once  dextrose 50% Injectable 25 Gram(s) IV Push once  dextrose 50% Injectable 25 Gram(s) IV Push once  folic acid 2 milliGRAM(s) Oral daily  heparin   Injectable 5000 Unit(s) SubCutaneous every 12 hours  insulin glargine Injectable (LANTUS) 6 Unit(s) SubCutaneous at bedtime  insulin lispro (HumaLOG) corrective regimen sliding scale   SubCutaneous three times a day before meals  insulin lispro (HumaLOG) corrective regimen sliding scale   SubCutaneous at bedtime  losartan 25 milliGRAM(s) Oral daily  metoprolol succinate ER 50 milliGRAM(s) Oral daily  pantoprazole    Tablet 40 milliGRAM(s) Oral before breakfast  predniSONE   Tablet 2.5 milliGRAM(s) Oral at bedtime  predniSONE   Tablet 5 milliGRAM(s) Oral daily  sodium chloride 0.45%. 1000 milliLiter(s) (60 mL/Hr) IV Continuous <Continuous>      Physical Exam  General: Patient comfortable in bed  Vital Signs Last 12 Hrs  T(F): 98.5 (07-06-20 @ 04:59), Max: 98.5 (07-06-20 @ 04:59)  HR: 80 (07-06-20 @ 04:59) (80 - 80)  BP: 158/77 (07-06-20 @ 04:59) (158/77 - 158/77)  BP(mean): --  RR: 16 (07-06-20 @ 04:59) (16 - 16)  SpO2: 94% (07-06-20 @ 04:59) (94% - 94%)  Neck: No palpable thyroid nodules.  CVS: S1S2, No murmurs  Respiratory: No wheezing, no crepitations  GI: Abdomen soft, bowel sounds positive  Musculoskeletal:  edema lower extremities.   Skin: No skin rashes, no ecchymosis    Diagnostics

## 2020-07-07 ENCOUNTER — TRANSCRIPTION ENCOUNTER (OUTPATIENT)
Age: 85
End: 2020-07-07

## 2020-07-07 LAB
ANION GAP SERPL CALC-SCNC: 14 MMOL/L — SIGNIFICANT CHANGE UP (ref 5–17)
BUN SERPL-MCNC: 27 MG/DL — HIGH (ref 7–23)
CALCIUM SERPL-MCNC: 8.4 MG/DL — SIGNIFICANT CHANGE UP (ref 8.4–10.5)
CHLORIDE SERPL-SCNC: 103 MMOL/L — SIGNIFICANT CHANGE UP (ref 96–108)
CO2 SERPL-SCNC: 20 MMOL/L — LOW (ref 22–31)
CREAT SERPL-MCNC: 1.11 MG/DL — SIGNIFICANT CHANGE UP (ref 0.5–1.3)
GLUCOSE BLDC GLUCOMTR-MCNC: 102 MG/DL — HIGH (ref 70–99)
GLUCOSE BLDC GLUCOMTR-MCNC: 177 MG/DL — HIGH (ref 70–99)
GLUCOSE BLDC GLUCOMTR-MCNC: 200 MG/DL — HIGH (ref 70–99)
GLUCOSE BLDC GLUCOMTR-MCNC: 275 MG/DL — HIGH (ref 70–99)
GLUCOSE BLDC GLUCOMTR-MCNC: 394 MG/DL — HIGH (ref 70–99)
GLUCOSE BLDC GLUCOMTR-MCNC: 409 MG/DL — HIGH (ref 70–99)
GLUCOSE SERPL-MCNC: 200 MG/DL — HIGH (ref 70–99)
HCT VFR BLD CALC: 31 % — LOW (ref 34.5–45)
HGB BLD-MCNC: 10 G/DL — LOW (ref 11.5–15.5)
MCHC RBC-ENTMCNC: 28.4 PG — SIGNIFICANT CHANGE UP (ref 27–34)
MCHC RBC-ENTMCNC: 32.3 GM/DL — SIGNIFICANT CHANGE UP (ref 32–36)
MCV RBC AUTO: 88.1 FL — SIGNIFICANT CHANGE UP (ref 80–100)
NRBC # BLD: 0 /100 WBCS — SIGNIFICANT CHANGE UP (ref 0–0)
PLATELET # BLD AUTO: 231 K/UL — SIGNIFICANT CHANGE UP (ref 150–400)
POTASSIUM SERPL-MCNC: 4.1 MMOL/L — SIGNIFICANT CHANGE UP (ref 3.5–5.3)
POTASSIUM SERPL-SCNC: 4.1 MMOL/L — SIGNIFICANT CHANGE UP (ref 3.5–5.3)
RBC # BLD: 3.52 M/UL — LOW (ref 3.8–5.2)
RBC # FLD: 14.2 % — SIGNIFICANT CHANGE UP (ref 10.3–14.5)
SODIUM SERPL-SCNC: 137 MMOL/L — SIGNIFICANT CHANGE UP (ref 135–145)
WBC # BLD: 9.87 K/UL — SIGNIFICANT CHANGE UP (ref 3.8–10.5)
WBC # FLD AUTO: 9.87 K/UL — SIGNIFICANT CHANGE UP (ref 3.8–10.5)

## 2020-07-07 PROCEDURE — 93306 TTE W/DOPPLER COMPLETE: CPT | Mod: 26

## 2020-07-07 RX ORDER — INSULIN GLARGINE 100 [IU]/ML
10 INJECTION, SOLUTION SUBCUTANEOUS AT BEDTIME
Refills: 0 | Status: DISCONTINUED | OUTPATIENT
Start: 2020-07-07 | End: 2020-07-08

## 2020-07-07 RX ORDER — INSULIN LISPRO 100/ML
7 VIAL (ML) SUBCUTANEOUS
Refills: 0 | Status: DISCONTINUED | OUTPATIENT
Start: 2020-07-07 | End: 2020-07-08

## 2020-07-07 RX ADMIN — Medication 5: at 12:44

## 2020-07-07 RX ADMIN — Medication 2 MILLIGRAM(S): at 11:29

## 2020-07-07 RX ADMIN — Medication 50 MILLIGRAM(S): at 05:35

## 2020-07-07 RX ADMIN — Medication 2.5 MILLIGRAM(S): at 21:06

## 2020-07-07 RX ADMIN — PANTOPRAZOLE SODIUM 40 MILLIGRAM(S): 20 TABLET, DELAYED RELEASE ORAL at 05:35

## 2020-07-07 RX ADMIN — Medication 4 UNIT(S): at 09:18

## 2020-07-07 RX ADMIN — Medication 7 UNIT(S): at 17:36

## 2020-07-07 RX ADMIN — Medication 7 UNIT(S): at 12:44

## 2020-07-07 RX ADMIN — CEFTRIAXONE 100 MILLIGRAM(S): 500 INJECTION, POWDER, FOR SOLUTION INTRAMUSCULAR; INTRAVENOUS at 16:26

## 2020-07-07 RX ADMIN — LOSARTAN POTASSIUM 25 MILLIGRAM(S): 100 TABLET, FILM COATED ORAL at 05:35

## 2020-07-07 RX ADMIN — HEPARIN SODIUM 5000 UNIT(S): 5000 INJECTION INTRAVENOUS; SUBCUTANEOUS at 17:36

## 2020-07-07 RX ADMIN — Medication 5 MILLIGRAM(S): at 05:35

## 2020-07-07 RX ADMIN — HEPARIN SODIUM 5000 UNIT(S): 5000 INJECTION INTRAVENOUS; SUBCUTANEOUS at 05:35

## 2020-07-07 RX ADMIN — Medication 3: at 09:17

## 2020-07-07 NOTE — PROGRESS NOTE ADULT - I WAS PHYSICALLY PRESENT FOR THE KEY PORTIONS OF THE EVALUATION AND MANAGEMENT (E/M) SERVICE PROVIDED.  I AGREE WITH THE ABOVE HISTORY, PHYSICAL, AND PLAN WHICH I HAVE REVIEWED AND EDITED WHERE APPROPRIATE
[FreeTextEntry1] : Fell on the left leg \par Had pain and discomfort. \par Was seen in ED and splinted\par Pain has been getting better\par \par denies any history of  fever, any history of numbness and history of tingling and history of change in bladder or bowel function and history of weakness and history of bug or tick bites or rashes\par \par Parents Alive and Well\par Goes to School\par Has not had any surgery nor has any other medical issues\par  Statement Selected

## 2020-07-07 NOTE — PROGRESS NOTE ADULT - ASSESSMENT
87F with PMHx of rheumatoid arthritis, diabetes mellitus, HTN, moderate aortic stenosis, CKD2, and interstitial lung disease admitted wiht syncope/.. pt is nt able to porivde evenets excepts saying taht room was too " warm ": and she was trying to get up and next thing she knew ambulance was there., As francisco javier chart : as per granddaughter when daughter of pt went outside found pt "slumped over in chair" and minimally responsive for a few minutes, called EMS and pt slowly came to. Before this was in usual state of health. Pt denies CP , palpitations .. ,mild SOB yesterday and naseous today but no vomitting or diarreah , no abdominal pain and no urinary sx .   no fever or chills   no cough     - syncope :   cont tele   cardio inpu t appreciated   ? etiology sx AS ?  doubt progression : will expedite   orthostatics : positive : s/p hydration : compression stockings ..    RA: cont meds     - dysphagia :  evaluated with MBS now on regular diet     - presumed UTI : GNR in urine.. start ceftriaxione   Ecoli sensitive : complete three days       - RA : cont steroids     - DM: uncotrolled :  fu with endo     - ADINA : likley an element of dehydration   monitor     - tremor : pt on BB   seems to be essential tremor: neuro inpt     Discussed ACP : pt verbally had expressed she does not want aggressive measures however never signed MOLST ..  will discuss w her re: MOLST form   discussed wpt pt and

## 2020-07-07 NOTE — CONSULT NOTE ADULT - SUBJECTIVE AND OBJECTIVE BOX
[facility]        Patient is a 88y old  Female who presents with a chief complaint of   Excerpt from H&P,"  HPI:  87F with PMHx of rheumatoid arthritis, diabetes mellitus, HTN, moderate aortic stenosis, CKD2, and interstitial lung disease admitted wiht syncope/.. pt is nt able to porivde evenets excepts saying taht room was too " warm ": and she was trying to get up and next thing she knew ambulance was there., As francisco javier chart : as per granddaughter when daughter of pt went outside found pt "slumped over in chair" and minimally responsive for a few minutes, called EMS and pt slowly came to. Before this was in usual state of health. Pt denies CP , palpitations .. ,mild SOB yesterday and naseous today but no vomitting or diarreah , no abdominal pain and no urinary sx ( freuency ?? new ?    no fever or chills   no cough             *****PAST MEDICAL / Surgical  HISTORY:  PAST MEDICAL & SURGICAL HISTORY:  History of interstitial lung disease  Aortic stenosis  RA (rheumatoid arthritis)  Asthma  DM (diabetes mellitus), type 2  HTN (hypertension), benign  After-cataract of left eye  Tubal occlusion           *****FAMILY HISTORY:  FAMILY HISTORY:  No pertinent family history in first degree relatives           *****SOCIAL HISTORY:  Alcohol: None  Smoking: None         *****ALLERGIES:   Allergies    No Known Allergies    Intolerances             *****MEDICATIONS: current medication reviewed and documented.   MEDICATIONS  (STANDING):  cefTRIAXone   IVPB 1000 milliGRAM(s) IV Intermittent every 24 hours  dextrose 5%. 1000 milliLiter(s) (50 mL/Hr) IV Continuous <Continuous>  dextrose 50% Injectable 12.5 Gram(s) IV Push once  dextrose 50% Injectable 25 Gram(s) IV Push once  dextrose 50% Injectable 25 Gram(s) IV Push once  folic acid 2 milliGRAM(s) Oral daily  heparin   Injectable 5000 Unit(s) SubCutaneous every 12 hours  insulin glargine Injectable (LANTUS) 10 Unit(s) SubCutaneous at bedtime  insulin lispro (HumaLOG) corrective regimen sliding scale   SubCutaneous three times a day before meals  insulin lispro (HumaLOG) corrective regimen sliding scale   SubCutaneous at bedtime  insulin lispro Injectable (HumaLOG) 7 Unit(s) SubCutaneous three times a day before meals  losartan 25 milliGRAM(s) Oral daily  metoprolol succinate ER 50 milliGRAM(s) Oral daily  pantoprazole    Tablet 40 milliGRAM(s) Oral before breakfast  predniSONE   Tablet 2.5 milliGRAM(s) Oral at bedtime  predniSONE   Tablet 5 milliGRAM(s) Oral daily  sodium chloride 0.45%. 1000 milliLiter(s) (60 mL/Hr) IV Continuous <Continuous>    MEDICATIONS  (PRN):  dextrose 40% Gel 15 Gram(s) Oral once PRN Blood Glucose LESS THAN 70 milliGRAM(s)/deciliter  glucagon  Injectable 1 milliGRAM(s) IntraMuscular once PRN Glucose LESS THAN 70 milligrams/deciliter  ondansetron Injectable 4 milliGRAM(s) IV Push every 6 hours PRN Nausea and/or Vomiting           *****REVIEW OF SYSTEM:  GEN: no fever, no chills, no pain  RESP: no SOB, no cough, no sputum  CVS: no chest pain, no palpitations, no edema  GI: no abdominal pain, no nausea, no vomiting, no constipation, no diarrhea  : no dysurea, no frequency, no hematurea  Neuro: no headache, no dizziness  PSYCH: no anxiety, no depression  Derm : no itching, no rash         *****VITAL SIGNS:  T(C): 37.3 (07-07-20 @ 20:30), Max: 37.3 (07-07-20 @ 20:30)  HR: 82 (07-07-20 @ 20:30) (77 - 82)  BP: 152/73 (07-07-20 @ 20:30) (152/73 - 174/90)  RR: 18 (07-07-20 @ 20:30) (16 - 18)  SpO2: 95% (07-07-20 @ 20:30) (93% - 97%)  Wt(kg): --    07-06 @ 07:01  -  07-07 @ 07:00  --------------------------------------------------------  IN: 1320 mL / OUT: 851 mL / NET: 469 mL    07-07 @ 07:01  -  07-07 @ 22:41  --------------------------------------------------------  IN: 720 mL / OUT: 400 mL / NET: 320 mL             *****PHYSICAL EXAM:   Alert oriented x 3   Attention comprehension are fair. Able to name, repeat, read without any difficulty.   Able to follow 3 step commands.   flat affect   EOMI fundi not visualized,  VFF to confrontration  No facial asymmetry   Tongue is midline dysarthria   Palate elevates symmetrically   Moving all 4 ext symmetrically no pronator drift   r cogwheeling   intention tremor noted r > l   Reflexes are diminished hroughout   sensation is grossly symmetric  Gait : not assessed.  B/L down going toes               *****LAB AND IMAGING:                          10.0   9.87  )-----------( 231      ( 07 Jul 2020 05:19 )             31.0               07-07    137  |  103  |  27<H>  ----------------------------<  200<H>  4.1   |  20<L>  |  1.11    Ca    8.4      07 Jul 2020 05:19  Mg     1.6     07-06                                  [All pertinent recent Imaging reports reviewed]         *****A S S E S S M E N T   A N D   P L A N :      87F with PMHx of rheumatoid arthritis, diabetes mellitus, HTN, moderate aortic stenosis, CKD2, and interstitial lung disease admitted wiht syncope/.. pt is nt able to porivde evenets excepts saying [facility] room was too " warm ": and she was trying to get up and next thing she knew ambulance was there., As francisco javier chart : as per granddaughter when daughter of pt went outside found pt "slumped over in chair" and minimally responsive for a few minutes, called EMS and pt slowly came to. Before this was in usual state of health. Pt denies CP , palpitations .. ,mild SOB yesterday and naseous today but no vomitting or diarreah , no abdominal pain and no urinary sx ( freuency ?? new ?    no fever or chills   no cough  Problem/Recommendations 1:  sycope of unclear etiology   given hx of moderate aortic stenosis, likely related to low bp   syncope w/u underway   pt eval   orthostatics     Problem/Recommendations 2: tremor noted ( ongoing for yr)   action tremor   + cogwheeling   likely essential tremor        ___________________________  Will follow with you.  Thank you,  Liv Delgado MD  Diplomate of the American Board of Neurology and Psychiatry.  Diplomate of the American Board of Vascular Neurology.   [facility] ([facility]), [facility]   Ph: [phone]    Differential diagnosis and plan of care discussed with patient after the evaluation.   Advanced care planning options discussed.   Pain assessed and judicious use of narcotics when appropriate was discussed.  Importance of Fall prevention discussed.  Counseling on Smoking and Alcohol cessation was offered when appropriate.  Counseling on Diet, exercise, and medication compliance was done.   83 minutes spent on the total encounter;  more than 50 % of the visit was spent on counseling  and or coordinating care by the attending physician.    Thank you for allowing me to participate in the care of this jose patient. Please do not hesitate to call me if you have any questions.     This and subsequent notes were partially created using voice recognition software and will  inherently be subject to errors including those of syntax and sound alike substitutions which may escape proofreading. In such instances original meaning may be extrapolated by contextual derivation.
CHIEF COMPLAINT:Patient is a 88y old  Female who presents with a chief complaint of     HISTORY OF PRESENT ILLNESS:    88 female with history as below known to me from office, hospital admitted with syncope , does not remember the event  states it was a hot day and was outside found slumped over in a chair as per notes   currently denies any chest pain, sob, palpitation, dizziness or syncope.     PAST MEDICAL & SURGICAL HISTORY:  History of interstitial lung disease  Aortic stenosis  RA (rheumatoid arthritis)  Asthma  DM (diabetes mellitus), type 2  HTN (hypertension), benign  After-cataract of left eye  Tubal occlusion          MEDICATIONS:  heparin   Injectable 5000 Unit(s) SubCutaneous every 12 hours  losartan 25 milliGRAM(s) Oral daily  metoprolol succinate ER 50 milliGRAM(s) Oral daily        ondansetron Injectable 4 milliGRAM(s) IV Push every 6 hours PRN    pantoprazole    Tablet 40 milliGRAM(s) Oral before breakfast    dextrose 40% Gel 15 Gram(s) Oral once PRN  dextrose 50% Injectable 12.5 Gram(s) IV Push once  dextrose 50% Injectable 25 Gram(s) IV Push once  dextrose 50% Injectable 25 Gram(s) IV Push once  glucagon  Injectable 1 milliGRAM(s) IntraMuscular once PRN  insulin lispro (HumaLOG) corrective regimen sliding scale   SubCutaneous three times a day before meals  insulin lispro (HumaLOG) corrective regimen sliding scale   SubCutaneous at bedtime  predniSONE   Tablet 2.5 milliGRAM(s) Oral at bedtime  predniSONE   Tablet 5 milliGRAM(s) Oral daily    dextrose 5%. 1000 milliLiter(s) IV Continuous <Continuous>  folic acid 2 milliGRAM(s) Oral daily  sodium chloride 0.45%. 1000 milliLiter(s) IV Continuous <Continuous>      FAMILY HISTORY:  No pertinent family history in first degree relatives      Non-contributory    SOCIAL HISTORY:    No tobacco, drugs or etoh    Allergies    No Known Allergies    Intolerances    	    REVIEW OF SYSTEMS:  as above  The rest of the 14 points ROS reviewed and except above they are unremarkable.        PHYSICAL EXAM:  T(C): 36.9 (07-05-20 @ 05:55), Max: 36.9 (07-04-20 @ 20:21)  HR: 80 (07-05-20 @ 05:02) (67 - 83)  BP: 174/82 (07-05-20 @ 05:02) (130/82 - 189/81)  RR: 16 (07-05-20 @ 05:02) (16 - 16)  SpO2: 97% (07-05-20 @ 05:02) (95% - 100%)  Wt(kg): --  I&O's Summary    JVP: Normal  Neck: supple  Lung: clear   CV: S1 S2 , Murmur:  Abd: soft  Ext: No edema  neuro: Awake / alert  Psych: flat affect  Skin: normal      LABS/DATA:    TELEMETRY: 	    ECG:  	sinus, RBBB,   	  CARDIAC MARKERS:                        15 <<== 07-04-20 @ 19:01                15 <<== 07-04-20 @ 17:38                              10.1   10.31 )-----------( 257      ( 05 Jul 2020 06:09 )             32.1     07-05    138  |  105  |  36<H>  ----------------------------<  123<H>  4.0   |  23  |  1.32<H>    Ca    9.1      05 Jul 2020 06:09    TPro  6.3  /  Alb  3.2<L>  /  TBili  0.4  /  DBili  x   /  AST  12  /  ALT  8<L>  /  AlkPhos  46  07-05    proBNP: Serum Pro-Brain Natriuretic Peptide: 464 pg/mL (07-04 @ 17:38)    Lipid Profile:   HgA1c:   TSH:
HPI:  87F with PMHx of rheumatoid arthritis, diabetes mellitus, HTN, moderate aortic stenosis, CKD2, and interstitial lung disease admitted wiht syncope/.. pt is nt able to porivde evenets excepts saying taht room was too " warm ": and she was trying to get up and next thing she knew ambulance was there., As francisco javier chart : as per granddaughter when daughter of pt went outside found pt "slumped over in chair" and minimally responsive for a few minutes, called EMS and pt slowly came to. Before this was in usual state of health. Pt denies CP , palpitations .. ,mild SOB yesterday and naseous today but no vomitting or diarreah , no abdominal pain and no urinary sx .   no fever or chills   no cough (04 Jul 2020 23:18)  Patient has history of diabetes, A1C pending. Patient is a poor historian, unsure of her outpatient DM regimen, reports being on pills (per chart review: insulin and po meds). Patient reports fluctuating blood sugars, no recent hypoglycemic episodes, no polyuria polydipsia. Patient follows up with PCP for diabetes management.  Endo was consulted for glycemic control as well as thyroid management.    PAST MEDICAL & SURGICAL HISTORY:  History of interstitial lung disease  Aortic stenosis  RA (rheumatoid arthritis)  Asthma  DM (diabetes mellitus), type 2  HTN (hypertension), benign  After-cataract of left eye  Tubal occlusion      FAMILY HISTORY:  No pertinent family history in first degree relatives      Social History:    Outpatient Medications:    MEDICATIONS  (STANDING):  dextrose 5%. 1000 milliLiter(s) (50 mL/Hr) IV Continuous <Continuous>  dextrose 50% Injectable 12.5 Gram(s) IV Push once  dextrose 50% Injectable 25 Gram(s) IV Push once  dextrose 50% Injectable 25 Gram(s) IV Push once  folic acid 2 milliGRAM(s) Oral daily  heparin   Injectable 5000 Unit(s) SubCutaneous every 12 hours  insulin lispro (HumaLOG) corrective regimen sliding scale   SubCutaneous three times a day before meals  insulin lispro (HumaLOG) corrective regimen sliding scale   SubCutaneous at bedtime  losartan 25 milliGRAM(s) Oral daily  metoprolol succinate ER 50 milliGRAM(s) Oral daily  pantoprazole    Tablet 40 milliGRAM(s) Oral before breakfast  predniSONE   Tablet 2.5 milliGRAM(s) Oral at bedtime  predniSONE   Tablet 5 milliGRAM(s) Oral daily  sodium chloride 0.45%. 1000 milliLiter(s) (60 mL/Hr) IV Continuous <Continuous>    MEDICATIONS  (PRN):  dextrose 40% Gel 15 Gram(s) Oral once PRN Blood Glucose LESS THAN 70 milliGRAM(s)/deciliter  glucagon  Injectable 1 milliGRAM(s) IntraMuscular once PRN Glucose LESS THAN 70 milligrams/deciliter  ondansetron Injectable 4 milliGRAM(s) IV Push every 6 hours PRN Nausea and/or Vomiting      Allergies    No Known Allergies    Intolerances      Review of Systems:  Constitutional: No fever, no chills  Eyes: No blurry vision  Neuro: No tremors  HEENT: No pain, no neck swelling  Cardiovascular: No chest pain, no palpitations  Respiratory: Has SOB, no cough  GI: No nausea, vomiting, abdominal pain  : No dysuria  Skin: no rash  MSK: Has leg swelling.  Psych: no depression  Endocrine: no polyuria, polydipsia    ALL OTHER SYSTEMS REVIEWED AND NEGATIVE    UNABLE TO OBTAIN    PHYSICAL EXAM:  VITALS: T(C): 36.9 (07-05-20 @ 05:55)  T(F): 98.4 (07-05-20 @ 05:55), Max: 98.5 (07-04-20 @ 20:21)  HR: 80 (07-05-20 @ 05:02) (67 - 83)  BP: 174/82 (07-05-20 @ 05:02) (130/82 - 189/81)  RR:  (16 - 16)  SpO2:  (95% - 100%)  Wt(kg): --  GENERAL: NAD, well-groomed, well-developed  EYES: No proptosis, no lid lag  HEENT:  Atraumatic, Normocephalic  THYROID: Normal size, no palpable nodules  RESPIRATORY: Clear to auscultation bilaterally; No rales, rhonchi, wheezing  CARDIOVASCULAR: Si S2, No murmurs;  GI: Soft, non distended, normal bowel sounds  SKIN: Dry, intact, No rashes or lesions  MUSCULOSKELETAL: Has BL lower extremity edema.  NEURO:  no tremor, sensation decreased in feet BL,    POCT Blood Glucose.: 130 mg/dL (07-05-20 @ 07:51)  POCT Blood Glucose.: 159 mg/dL (07-04-20 @ 23:53)  POCT Blood Glucose.: 113 mg/dL (07-04-20 @ 22:00)  POCT Blood Glucose.: 125 mg/dL (07-04-20 @ 16:13)                            10.1   10.31 )-----------( 257      ( 05 Jul 2020 06:09 )             32.1       07-05    138  |  105  |  36<H>  ----------------------------<  123<H>  4.0   |  23  |  1.32<H>    EGFR if : 42<L>  EGFR if non : 36<L>    Ca    9.1      07-05    TPro  6.3  /  Alb  3.2<L>  /  TBili  0.4  /  DBili  x   /  AST  12  /  ALT  8<L>  /  AlkPhos  46  07-05      Thyroid Function Tests:  07-05 @ 08:39 TSH 5.54 FreeT4 1.4 T3 -- Anti TPO -- Anti Thyroglobulin Ab -- TSI --              Radiology:

## 2020-07-07 NOTE — PHYSICAL THERAPY INITIAL EVALUATION ADULT - ADDITIONAL COMMENTS
Pt resides in private home with . Pt receives assistance with dressing, bathing and household mobility from home health aide (5 hours daily). Pt reports 4 steps to enter home, however states her son installed an elevator chair that she utilizes for entering/exiting the home. Pt reports using RW for the past several years. Pt states she has grab bars in the bathroom.

## 2020-07-07 NOTE — DISCHARGE NOTE PROVIDER - PROVIDER TOKENS
PROVIDER:[TOKEN:[[id number]],FOLLOWUP:[1 week]] PROVIDER:[TOKEN:[[id number]],FOLLOWUP:[1 week]],PROVIDER:[TOKEN:[[id number]],FOLLOWUP:[2 weeks]],PROVIDER:[TOKEN:[[id number]],FOLLOWUP:[1 month]]

## 2020-07-07 NOTE — DISCHARGE NOTE PROVIDER - CARE PROVIDER_API CALL
Yuriy Tobias  [facility] MEDICINE  [address] [place] [address]  Phone: [phone]  Fax: [phone]  Follow Up Time: 1 week Yuriy Tobias  [facility] MEDICINE  [address] [place] [address]  Phone: [phone]  Fax: [phone]  Follow Up Time: 1 week    Jared Decker  CARDIOVASCULAR DISEASE  [address]  [place] [address]  Phone: [phone]  Fax: [phone]  Follow Up Time: 2 weeks    Karan Akabr  Endocrinology, Diabetes and Metabolism  [address] [place] [address]  Phone: [phone]  Fax: [phone]  Follow Up Time: 1 month

## 2020-07-07 NOTE — DISCHARGE NOTE PROVIDER - NSDCCPCAREPLAN_GEN_ALL_CORE_FT
PRINCIPAL DISCHARGE DIAGNOSIS  Diagnosis: Syncope, unspecified syncope type  Assessment and Plan of Treatment: Fainting usually is caused by fear, stress, pain, standing too long, over tired, overheated, going to bathroom, or coughing  Blood pressure can drop if you do not drink enough, blood pressure medication, alcohol, bleeding,  Consult with your doctor about driving  Avoid activity or condition that causes your syncope  Lay down with your feet up when you feel like you might faint      SECONDARY DISCHARGE DIAGNOSES  Diagnosis: Acute UTI  Assessment and Plan of Treatment: HOME CARE INSTRUCTIONS  You completed treatment for UTI  Drink enough water and fluids to keep your urine clear or pale yellow.  Avoid caffeine, tea, and carbonated beverages. They tend to irritate your bladder.  Empty your bladder often. Avoid holding urine for long periods of time.  Empty your bladder before and after sexual intercourse.  After a bowel movement, women should cleanse from front to back. Use each tissue only once.  SEEK MEDICAL CARE IF:  You have back pain.  You develop a fever.  Your symptoms do not begin to resolve within 3 days.  SEEK IMMEDIATE MEDICAL CARE IF:  You have severe back pain or lower abdominal pain.  You develop chills.  You have nausea or vomiting.  You have continued burning or discomfort with urination.      Diagnosis: Hypertension  Assessment and Plan of Treatment: Hypertension, also known simply as "high blood pressure" is very common, however can lead to many significant complications if left uncontrolled. When the blood pressure is elevated, the force the blood puts on the walls of the arteries is high and can lead to artery damage. Also, when the heart muscle has to pump blood against a high blood pressure, it thickens and enlarges, just like any muscle does when it has to do more work (think of a weight ). When the blood pressure is very high, people may feel a headache or tired. Some people can feel pounding in their head or have blurry vision. Hearing the heart beating in the ear especially at night can be a sign of high blood pressure. Eventually, symptoms of stroke, heart attack, heart failure or irregular heartbeats can occur  - Exercise: Doing cardiovascular exercise such as running, biking or swimming at least 30 minutes per day most days of the week is recommended to help keep blood pressure healthy  - Lose weight: Maintaining a normal BMI (body mass index) is very important in keeping blood pressure readings normal   - Avoid salt: Sodium in the diet increases the blood pressure in many ways. Salt comes in many foods, so just because you don't add salt to your food it does not mean that you are eating a low salt diet. Read labels and keep sodium intake to less than 2000 mg per day   - Avoid alcohol: Even 1 or 2 alcoholic drinks can significantly increase blood pressure   - DASH Diet: The DASH diet has been shown to reduce blood pressure   - Take all medication as prescribed.   - Follow up with your medical doctor for routine blood pressure monitoring at your next visit.   - Notify your doctor if you have any of the following symptoms:    - Dizziness, Lightheadedness, Blurry vision, Headache, Chest pain, Shortness of breath PRINCIPAL DISCHARGE DIAGNOSIS  Diagnosis: Syncope, unspecified syncope type  Assessment and Plan of Treatment: Fainting usually is caused by fear, stress, pain, standing too long, over tired, overheated, going to bathroom, or coughing  Blood pressure can drop if you do not drink enough, blood pressure medication, alcohol, bleeding,  Consult with your doctor about driving  Avoid activity or condition that causes your syncope  Lay down with your feet up when you feel like you might faint      SECONDARY DISCHARGE DIAGNOSES  Diagnosis: Acute UTI  Assessment and Plan of Treatment: HOME CARE INSTRUCTIONS  You completed treatment for UTI  Drink enough water and fluids to keep your urine clear or pale yellow.  Avoid caffeine, tea, and carbonated beverages. They tend to irritate your bladder.  Empty your bladder often. Avoid holding urine for long periods of time.  Empty your bladder before and after sexual intercourse.  After a bowel movement, women should cleanse from front to back. Use each tissue only once.  SEEK MEDICAL CARE IF:  You have back pain.  You develop a fever.  Your symptoms do not begin to resolve within 3 days.  SEEK IMMEDIATE MEDICAL CARE IF:  You have severe back pain or lower abdominal pain.  You develop chills.  You have nausea or vomiting.  You have continued burning or discomfort with urination.      Diagnosis: Subclinical hypothyroidism  Assessment and Plan of Treatment: Subclinical hypothyroidism, Follow up with Endocrine in 2 weeks    Diagnosis: Hypertension  Assessment and Plan of Treatment: Hypertension, also known simply as "high blood pressure" is very common, however can lead to many significant complications if left uncontrolled. When the blood pressure is elevated, the force the blood puts on the walls of the arteries is high and can lead to artery damage. Also, when the heart muscle has to pump blood against a high blood pressure, it thickens and enlarges, just like any muscle does when it has to do more work (think of a weight ). When the blood pressure is very high, people may feel a headache or tired. Some people can feel pounding in their head or have blurry vision. Hearing the heart beating in the ear especially at night can be a sign of high blood pressure. Eventually, symptoms of stroke, heart attack, heart failure or irregular heartbeats can occur  - Exercise: Doing cardiovascular exercise such as running, biking or swimming at least 30 minutes per day most days of the week is recommended to help keep blood pressure healthy  - Lose weight: Maintaining a normal BMI (body mass index) is very important in keeping blood pressure readings normal   - Avoid salt: Sodium in the diet increases the blood pressure in many ways. Salt comes in many foods, so just because you don't add salt to your food it does not mean that you are eating a low salt diet. Read labels and keep sodium intake to less than 2000 mg per day   - Avoid alcohol: Even 1 or 2 alcoholic drinks can significantly increase blood pressure   - DASH Diet: The DASH diet has been shown to reduce blood pressure   - Take all medication as prescribed.   - Follow up with your medical doctor for routine blood pressure monitoring at your next visit.   - Notify your doctor if you have any of the following symptoms:    - Dizziness, Lightheadedness, Blurry vision, Headache, Chest pain, Shortness of breath

## 2020-07-07 NOTE — PROGRESS NOTE ADULT - SUBJECTIVE AND OBJECTIVE BOX
Chief complaint  Patient is a 88y old  Female who presents with a chief complaint of  Review of systems  Patient in bed, looks comfortable, no hypoglycemic episodes.    Labs and Fingersticks  CAPILLARY BLOOD GLUCOSE      POCT Blood Glucose.: 235 mg/dL (06 Jul 2020 21:43)  POCT Blood Glucose.: 229 mg/dL (06 Jul 2020 18:02)  POCT Blood Glucose.: 300 mg/dL (06 Jul 2020 16:23)      Anion Gap, Serum: 14 (07-07 @ 05:19)  Anion Gap, Serum: 10 (07-06 @ 05:06)      Calcium, Total Serum: 8.4 (07-07 @ 05:19)  Calcium, Total Serum: 9.1 (07-06 @ 05:06)          07-07    137  |  103  |  27<H>  ----------------------------<  200<H>  4.1   |  20<L>  |  1.11    Ca    8.4      07 Jul 2020 05:19  Mg     1.6     07-06                          10.0   9.87  )-----------( 231      ( 07 Jul 2020 05:19 )             31.0     Medications  MEDICATIONS  (STANDING):  cefTRIAXone   IVPB 1000 milliGRAM(s) IV Intermittent every 24 hours  dextrose 5%. 1000 milliLiter(s) (50 mL/Hr) IV Continuous <Continuous>  dextrose 50% Injectable 12.5 Gram(s) IV Push once  dextrose 50% Injectable 25 Gram(s) IV Push once  dextrose 50% Injectable 25 Gram(s) IV Push once  folic acid 2 milliGRAM(s) Oral daily  heparin   Injectable 5000 Unit(s) SubCutaneous every 12 hours  insulin glargine Injectable (LANTUS) 10 Unit(s) SubCutaneous at bedtime  insulin lispro (HumaLOG) corrective regimen sliding scale   SubCutaneous three times a day before meals  insulin lispro (HumaLOG) corrective regimen sliding scale   SubCutaneous at bedtime  insulin lispro Injectable (HumaLOG) 7 Unit(s) SubCutaneous three times a day before meals  losartan 25 milliGRAM(s) Oral daily  metoprolol succinate ER 50 milliGRAM(s) Oral daily  pantoprazole    Tablet 40 milliGRAM(s) Oral before breakfast  predniSONE   Tablet 2.5 milliGRAM(s) Oral at bedtime  predniSONE   Tablet 5 milliGRAM(s) Oral daily  sodium chloride 0.45%. 1000 milliLiter(s) (60 mL/Hr) IV Continuous <Continuous>      Physical Exam  General: Patient comfortable in bed  Vital Signs Last 12 Hrs  T(F): 98.5 (07-07-20 @ 12:50), Max: 98.5 (07-07-20 @ 12:50)  HR: 82 (07-07-20 @ 12:50) (77 - 82)  BP: 164/79 (07-07-20 @ 12:50) (158/86 - 174/90)  BP(mean): --  RR: 17 (07-07-20 @ 12:50) (16 - 17)  SpO2: 94% (07-07-20 @ 12:50) (93% - 97%)  Neck: No palpable thyroid nodules.  CVS: S1S2, No murmurs  Respiratory: No wheezing, no crepitations  GI: Abdomen soft, bowel sounds positive  Musculoskeletal:  edema lower extremities.   Skin: No skin rashes, no ecchymosis    Diagnostics

## 2020-07-07 NOTE — PROGRESS NOTE ADULT - PROBLEM SELECTOR PLAN 1
Post-Anesthesia Evaluation and Assessment    Patient: Sammie Joy MRN: [id number]  SSN: [id number]    YOB: 1946  Age: 70 y.o. Sex: male       Cardiovascular Function/Vital Signs  Visit Vitals    /75    Pulse (!) 51    Temp 36.2 °C (97.2 °F)    Resp 20    Ht 5' 11\" (1.803 m)    Wt 87.1 kg (192 lb)    SpO2 100%    BMI 26.78 kg/m2       Patient is status post general anesthesia for Procedure(s):  RIGHT SHOULDER ARTHROPLASTY TOTAL REVERSE WITH BICEPS TENODESIS IN COMBINATION WITH LATISSIMUS KIRBY AND TERES MAJOR TENDON TRANSFER / INTERSCALENE  DELTA XTEND. Nausea/Vomiting: None    Postoperative hydration reviewed and adequate. Pain:  Pain Scale 1: Numeric (0 - 10) (04/19/18 1653)  Pain Intensity 1: 0 (04/19/18 1653)   Managed    Neurological Status:   Neuro (WDL): Exceptions to WDL (04/19/18 1553)  Neuro  Neurologic State: Drowsy (04/19/18 1653)  Orientation Level: Oriented X4 (04/19/18 1638)  Cognition: Follows commands (04/19/18 1638)  Speech: Clear (04/19/18 1638)  LUE Motor Response: Purposeful (04/19/18 1638)  LLE Motor Response: Purposeful (04/19/18 1638)  RUE Motor Response: Pharmocologically paralyzed (04/19/18 1638)  RLE Motor Response: Purposeful (04/19/18 1638)   At baseline    Mental Status and Level of Consciousness: Arousable    Pulmonary Status:   O2 Device: Nasal cannula (04/19/18 1638)   Adequate oxygenation and airway patent    Complications related to anesthesia: None    Post-anesthesia assessment completed.  No concerns    Signed By: Kristel Roldan MD     April 19, 2018 Patient with hyperglycemia receiving IV ABx in dextrose; Recommend to adjust order to NS if possible.  Will increase Lantus to 10u at bedtime.  Will increase Humalog to 7u before each meal and continue Humalog correction scale coverage ac/hs. Will continue monitoring FS, log, and FU.  Patient counseled for compliance with consistent low carb diet and exercise as tolerated outpatient.

## 2020-07-07 NOTE — PROGRESS NOTE ADULT - SUBJECTIVE AND OBJECTIVE BOX
Patient is a 88y old  Female who presents with a chief complaint of                                                              INTERVAL HPI/OVERNIGHT EVENTS:    REVIEW OF SYSTEMS:     CONSTITUTIONAL: No weakness, fevers or chills  RESPIRATORY: No cough, wheezing,  No shortness of breath  CARDIOVASCULAR: No chest pain or palpitations  GASTROINTESTINAL: No abdominal pain  . No nausea, vomiting, or hematemesis; No diarrhea or constipation. No melena or hematochezia.  GENITOURINARY: No dysuria, frequency or hematuria  NEUROLOGICAL: No numbness or weakness                                                                                                                                                                                                                                                                               Medications:  MEDICATIONS  (STANDING):  cefTRIAXone   IVPB 1000 milliGRAM(s) IV Intermittent every 24 hours  dextrose 5%. 1000 milliLiter(s) (50 mL/Hr) IV Continuous <Continuous>  dextrose 50% Injectable 12.5 Gram(s) IV Push once  dextrose 50% Injectable 25 Gram(s) IV Push once  dextrose 50% Injectable 25 Gram(s) IV Push once  folic acid 2 milliGRAM(s) Oral daily  heparin   Injectable 5000 Unit(s) SubCutaneous every 12 hours  insulin glargine Injectable (LANTUS) 10 Unit(s) SubCutaneous at bedtime  insulin lispro (HumaLOG) corrective regimen sliding scale   SubCutaneous three times a day before meals  insulin lispro (HumaLOG) corrective regimen sliding scale   SubCutaneous at bedtime  insulin lispro Injectable (HumaLOG) 7 Unit(s) SubCutaneous three times a day before meals  losartan 25 milliGRAM(s) Oral daily  metoprolol succinate ER 50 milliGRAM(s) Oral daily  pantoprazole    Tablet 40 milliGRAM(s) Oral before breakfast  predniSONE   Tablet 2.5 milliGRAM(s) Oral at bedtime  predniSONE   Tablet 5 milliGRAM(s) Oral daily  sodium chloride 0.45%. 1000 milliLiter(s) (60 mL/Hr) IV Continuous <Continuous>    MEDICATIONS  (PRN):  dextrose 40% Gel 15 Gram(s) Oral once PRN Blood Glucose LESS THAN 70 milliGRAM(s)/deciliter  glucagon  Injectable 1 milliGRAM(s) IntraMuscular once PRN Glucose LESS THAN 70 milligrams/deciliter  ondansetron Injectable 4 milliGRAM(s) IV Push every 6 hours PRN Nausea and/or Vomiting       Allergies    No Known Allergies    Intolerances      Vital Signs Last 24 Hrs  T(C): 36.6 (2020 05:18), Max: 36.8 (2020 13:56)  T(F): 97.9 (2020 05:18), Max: 98.3 (2020 20:20)  HR: 80 (2020 05:18) (80 - 81)  BP: 160/74 (2020 05:32) (129/81 - 174/90)  BP(mean): --  RR: 16 (2020 05:18) (16 - 18)  SpO2: 93% (2020 05:18) (93% - 96%)  CAPILLARY BLOOD GLUCOSE      POCT Blood Glucose.: 235 mg/dL (2020 21:43)  POCT Blood Glucose.: 229 mg/dL (2020 18:02)  POCT Blood Glucose.: 300 mg/dL (2020 16:23)  POCT Blood Glucose.: 445 mg/dL (2020 11:56)  POCT Blood Glucose.: 504 mg/dL (2020 11:54)      06 @ 07:01  -  07-07 @ 07:00  --------------------------------------------------------  IN: 1320 mL / OUT: 851 mL / NET: 469 mL      Physical Exam:    Daily     Daily Weight in k.1 (2020 10:00)  General: NAD   HEENT:  Nonicteric, PERRLA  CV:  RRR, S1S2   Lungs:  CTA B/L, no wheezes, rales, rhonchi  Abdomen:  Soft, non-tender, no distended, positive BS  Extremities:no edema   Neuro:  AAOx3, tremor                                                                                                                                                                                                                                                                                            LABS:                               10.0   9.87  )-----------( 231      ( 2020 05:19 )             31.0                      07    137  |  103  |  27<H>  ----------------------------<  200<H>  4.1   |  20<L>  |  1.11    Ca    8.4      2020 05:19  Mg     1.6     -                         RADIOLOGY & ADDITIONAL TESTS         I personally reviewed: [  ]EKG   [  ]CXR    [  ] CT      A/P:         Discussed with :     [employer] consultants' Notes   Time spent :

## 2020-07-07 NOTE — PHYSICAL THERAPY INITIAL EVALUATION ADULT - PERTINENT HX OF CURRENT PROBLEM, REHAB EVAL
87F with PMHx of rheumatoid arthritis, diabetes mellitus, HTN, moderate aortic stenosis, CKD2, and interstitial lung disease admitted with syncope. Per chart: as per granddaughter when daughter of pt went outside found pt "slumped over in chair" and minimally responsive for a few minutes, called EMS and pt slowly came to. +OHTN.

## 2020-07-07 NOTE — DISCHARGE NOTE PROVIDER - NSDCMRMEDTOKEN_GEN_ALL_CORE_FT
alendronate 70 mg oral tablet: 1 tab(s) orally once a week  Basaglar KwikPen 100 units/mL subcutaneous solution: 12 unit(s) subcutaneous once a day  Caltrate: 1 tab(s) orally 2 times a day  folic acid 1 mg oral tablet: 2 tab(s) orally once a day  Glumetza 500 mg oral tablet, extended release: 2 tab(s) orally once a day  Januvia 50 mg oral tablet: 1 tab(s) orally once a day   losartan 25 mg oral tablet: 1 tab(s) orally once a day  metoprolol succinate 50 mg oral tablet, extended release: 1 tab(s) orally once a day  NovoLOG 100 units/mL subcutaneous solution: 6 unit(s) subcutaneously in the morning, 6 units at noon &amp; 8 units in the evening (before a meal)  pantoprazole 40 mg oral delayed release tablet: 1 tab(s) orally 2 times a day  predniSONE 2.5 mg oral tablet: 1 tab(s) orally once a day (in the evening)  note: pt takes 1/2 tab of 5mg in the evening  predniSONE 5 mg oral tablet: 1 tab(s) orally once a day (in the morning)  Vitamin D2 50,000 intl units (1.25 mg) oral capsule: 1 cap(s) orally once a week alendronate 70 mg oral tablet: 1 tab(s) orally once a week  Basaglar KwikPen 100 units/mL subcutaneous solution: 18 unit(s) subcutaneous once a day  Caltrate: 1 tab(s) orally 2 times a day  cloNIDine 0.1 mg oral tablet: 1 tab(s) orally once a day (at bedtime)  folic acid 1 mg oral tablet: 2 tab(s) orally once a day  Glumetza 500 mg oral tablet, extended release: 2 tab(s) orally once a day  Januvia 50 mg oral tablet: 1 tab(s) orally once a day   labetalol 100 mg oral tablet: 1 tab(s) orally 2 times a day  NovoLOG 100 units/mL subcutaneous solution: 10 unit(s) subcutaneous 3 times a day, please check fingersticks prior to meals and follow scale   pantoprazole 40 mg oral delayed release tablet: 1 tab(s) orally 2 times a day  predniSONE 2.5 mg oral tablet: 1 tab(s) orally once a day (in the evening)  note: pt takes 1/2 tab of 5mg in the evening  predniSONE 5 mg oral tablet: 1 tab(s) orally once a day (in the morning)  Vitamin D2 50,000 intl units (1.25 mg) oral capsule: 1 cap(s) orally once a week

## 2020-07-07 NOTE — DISCHARGE NOTE PROVIDER - NSDCFUADDAPPT_GEN_ALL_CORE_FT
Please follow up with your PCP, Dr. Tobias in a week for further management Please follow up with your PCP, Dr. Tobias in a week for further management  Follow up with Endocrine to check Glucose and TFT'S in 4 weeks Please follow up with your PCP, Dr. Tobias in a week for further management  Follow up with Endocrine to check Glucose and TFT'S in 4 weeks  Please  have Creatinine checked with  next week , now 1.33

## 2020-07-07 NOTE — PROGRESS NOTE ADULT - ASSESSMENT
Syncope  unclear etiology   monitor on tele - so far no significant events   obtain echo   unremarkable carotid doppler   no events on tele     HTN  cont current meds for now     AS  recheck echo , doubt progression     ILD  on steroids      Advanced care planning was discussed with patient and family.  Risks, benefits and alternatives of the cardiac treatments and medical therapy including procedures were discussed in detail and all questions were answered. Importance of compliance with medical therapy and lifestyle modification to improve cardiovascular health were addressed. Appropriate forms and patient educational materials were reviewed. 30 minutes face to face spent.

## 2020-07-07 NOTE — PROGRESS NOTE ADULT - ASSESSMENT
Assessment  DMT2: 88y Female with DM T2 with hyperglycemia, A1C 7%, was on oral meds at home, started on basal bolus insulin yesterday, blood sugars improving though still not at target, no hypoglycemic episodes. Patient is eating meals, on prednisone and receiving ABx in dextrose, appears comfortable,  at bedside today.  Hypothyroid: Patient has no history thyroid disease, was not on any thyroid supplements, subclinical hypothyroid, FT4 in acceptable range.  Syncope: +orthostatics, workup in progress, FU Cardiology.  ILD: on prednisone  HTN: Controlled,  on antihypertensive medications.          Karan Akbar MD  Cell: [phone]  Office: [phone] Assessment  DMT2: 88y Female with DM T2 with hyperglycemia, A1C 7%, was on oral meds at home,  started on basal bolus insulin yesterday, blood sugars improving though still not at target, no hypoglycemic episodes. Patient is eating meals, on prednisone and receiving ABx in dextrose, appears comfortable,  at bedside today.  Hypothyroid: Patient has no history thyroid disease, was not on any thyroid supplements, subclinical hypothyroid, FT4 in acceptable range.  Syncope: +orthostatics, workup in progress, FU Cardiology.  ILD: on prednisone  HTN: Controlled,  on antihypertensive medications.          Karan Akbar MD  Cell: [phone]  Office: [phone]

## 2020-07-07 NOTE — PROGRESS NOTE ADULT - SUBJECTIVE AND OBJECTIVE BOX
Subjective: Patient seen and examined. No new events except as noted.     SUBJECTIVE/ROS:  resting   NAD      MEDICATIONS:  MEDICATIONS  (STANDING):  cefTRIAXone   IVPB 1000 milliGRAM(s) IV Intermittent every 24 hours  dextrose 5%. 1000 milliLiter(s) (50 mL/Hr) IV Continuous <Continuous>  dextrose 50% Injectable 12.5 Gram(s) IV Push once  dextrose 50% Injectable 25 Gram(s) IV Push once  dextrose 50% Injectable 25 Gram(s) IV Push once  folic acid 2 milliGRAM(s) Oral daily  heparin   Injectable 5000 Unit(s) SubCutaneous every 12 hours  insulin glargine Injectable (LANTUS) 6 Unit(s) SubCutaneous at bedtime  insulin lispro (HumaLOG) corrective regimen sliding scale   SubCutaneous three times a day before meals  insulin lispro (HumaLOG) corrective regimen sliding scale   SubCutaneous at bedtime  insulin lispro Injectable (HumaLOG) 4 Unit(s) SubCutaneous three times a day before meals  losartan 25 milliGRAM(s) Oral daily  metoprolol succinate ER 50 milliGRAM(s) Oral daily  pantoprazole    Tablet 40 milliGRAM(s) Oral before breakfast  predniSONE   Tablet 2.5 milliGRAM(s) Oral at bedtime  predniSONE   Tablet 5 milliGRAM(s) Oral daily  sodium chloride 0.45%. 1000 milliLiter(s) (60 mL/Hr) IV Continuous <Continuous>      PHYSICAL EXAM:  T(C): 36.6 (07-07-20 @ 05:18), Max: 36.8 (07-06-20 @ 13:56)  HR: 80 (07-07-20 @ 05:18) (80 - 81)  BP: 160/74 (07-07-20 @ 05:32) (129/81 - 174/90)  RR: 16 (07-07-20 @ 05:18) (16 - 18)  SpO2: 93% (07-07-20 @ 05:18) (93% - 96%)  Wt(kg): --  I&O's Summary    06 Jul 2020 07:01  -  07 Jul 2020 07:00  --------------------------------------------------------  IN: 1320 mL / OUT: 851 mL / NET: 469 mL            JVP: Normal  Neck: supple  Lung: clear   CV: S1 S2 , Murmur:  Abd: soft  Ext: No edema  neuro: Awake / alert  Psych: flat affect  Skin: normal``    LABS/DATA:    CARDIAC MARKERS:  CARDIAC MARKERS ( 04 Jul 2020 19:01 )  x     / x     / 30 U/L / x     / x                                    10.0   9.87  )-----------( 231      ( 07 Jul 2020 05:19 )             31.0     07-07    137  |  103  |  27<H>  ----------------------------<  200<H>  4.1   |  20<L>  |  1.11    Ca    8.4      07 Jul 2020 05:19  Mg     1.6     07-06      proBNP:   Lipid Profile:   HgA1c:   TSH:     TELE:  EKG:  < from: [facility] Duplex Carotid, Bilat (07.06.20 @ 12:59) >  EXAM:  CAROTID DUPLEX BILATERAL                            PROCEDURE DATE:  07/06/2020            INTERPRETATION:  Technique: Grayscale, color and spectral Doppler examination of both carotid arteries was performed.     HISTORY:  Syncope    There is increased tortuosity to the right and left carotid arteries in the neck.    There is relatively little atheromatous disease for age.    Blood flow velocities are as follows:    RIGHT:    PROX CCA = 59 ;  DIST CCA = 64 ;  PROX ICA = 49 ;  DIST ICA = 61 ;  ECA = 70    LEFT   :    PROX CCA = 88 ;  DIST CCA = 53 ;  PROX ICA = 48 ;  DIST ICA = 116 ;  ECA = 65    There is antegrade flow through both vertebral arteries.    IMPRESSION: No hemodynamically significant carotid artery stenoses.    Measurement of carotid stenosis is based on velocity parameters that correlate the residual internal carotid diameter with that of the more distal vessel in accordance with a method such as the [employer] Symptomatic Carotid Endarterectomy Trial (NASCET).      < end of copied text >

## 2020-07-07 NOTE — PHYSICAL THERAPY INITIAL EVALUATION ADULT - GENERAL OBSERVATIONS, REHAB EVAL
Pt received seated in chair, +ext cath, +tele. Pt agreeable to PT eval. Pt denied dizziness with standing and amb.

## 2020-07-08 LAB
GLUCOSE BLDC GLUCOMTR-MCNC: 132 MG/DL — HIGH (ref 70–99)
GLUCOSE BLDC GLUCOMTR-MCNC: 172 MG/DL — HIGH (ref 70–99)
GLUCOSE BLDC GLUCOMTR-MCNC: 193 MG/DL — HIGH (ref 70–99)
GLUCOSE BLDC GLUCOMTR-MCNC: 235 MG/DL — HIGH (ref 70–99)
GLUCOSE BLDC GLUCOMTR-MCNC: 248 MG/DL — HIGH (ref 70–99)
GLUCOSE BLDC GLUCOMTR-MCNC: 300 MG/DL — HIGH (ref 70–99)

## 2020-07-08 RX ORDER — INSULIN LISPRO 100/ML
8 VIAL (ML) SUBCUTANEOUS
Refills: 0 | Status: DISCONTINUED | OUTPATIENT
Start: 2020-07-08 | End: 2020-07-09

## 2020-07-08 RX ORDER — INSULIN GLARGINE 100 [IU]/ML
12 INJECTION, SOLUTION SUBCUTANEOUS AT BEDTIME
Refills: 0 | Status: DISCONTINUED | OUTPATIENT
Start: 2020-07-08 | End: 2020-07-09

## 2020-07-08 RX ORDER — LABETALOL HCL 100 MG
200 TABLET ORAL
Refills: 0 | Status: DISCONTINUED | OUTPATIENT
Start: 2020-07-08 | End: 2020-07-09

## 2020-07-08 RX ORDER — INSULIN GLARGINE 100 [IU]/ML
5 INJECTION, SOLUTION SUBCUTANEOUS ONCE
Refills: 0 | Status: COMPLETED | OUTPATIENT
Start: 2020-07-08 | End: 2020-07-08

## 2020-07-08 RX ADMIN — Medication 2 MILLIGRAM(S): at 12:11

## 2020-07-08 RX ADMIN — CEFTRIAXONE 100 MILLIGRAM(S): 500 INJECTION, POWDER, FOR SOLUTION INTRAMUSCULAR; INTRAVENOUS at 14:01

## 2020-07-08 RX ADMIN — HEPARIN SODIUM 5000 UNIT(S): 5000 INJECTION INTRAVENOUS; SUBCUTANEOUS at 05:36

## 2020-07-08 RX ADMIN — Medication 2: at 12:13

## 2020-07-08 RX ADMIN — INSULIN GLARGINE 12 UNIT(S): 100 INJECTION, SOLUTION SUBCUTANEOUS at 22:17

## 2020-07-08 RX ADMIN — Medication 8 UNIT(S): at 17:09

## 2020-07-08 RX ADMIN — INSULIN GLARGINE 5 UNIT(S): 100 INJECTION, SOLUTION SUBCUTANEOUS at 00:47

## 2020-07-08 RX ADMIN — PANTOPRAZOLE SODIUM 40 MILLIGRAM(S): 20 TABLET, DELAYED RELEASE ORAL at 05:36

## 2020-07-08 RX ADMIN — Medication 50 MILLIGRAM(S): at 05:36

## 2020-07-08 RX ADMIN — Medication 2.5 MILLIGRAM(S): at 22:12

## 2020-07-08 RX ADMIN — Medication 5 MILLIGRAM(S): at 05:36

## 2020-07-08 RX ADMIN — HEPARIN SODIUM 5000 UNIT(S): 5000 INJECTION INTRAVENOUS; SUBCUTANEOUS at 17:09

## 2020-07-08 RX ADMIN — Medication 2: at 10:08

## 2020-07-08 RX ADMIN — Medication 200 MILLIGRAM(S): at 17:09

## 2020-07-08 RX ADMIN — Medication 0.1 MILLIGRAM(S): at 22:12

## 2020-07-08 RX ADMIN — LOSARTAN POTASSIUM 25 MILLIGRAM(S): 100 TABLET, FILM COATED ORAL at 05:36

## 2020-07-08 NOTE — PROGRESS NOTE ADULT - PROBLEM SELECTOR PLAN 1
Patient with hyperglycemia receiving IV ABx in dextrose; Recommend to adjust order to NS if possible.  Will increase Lantus to 12u at bedtime.  Will increase Humalog to 8u before each meal and continue Humalog correction scale coverage ac/hs. Will continue monitoring FS, log, and FU.  Patient was on insulin at home, suggest DC on current insulin regimen and FU endo 4 weeks.  Discussed plan with patient and . Counseled for compliance with consistent low carb diet and exercise as tolerated outpatient.

## 2020-07-08 NOTE — PROGRESS NOTE ADULT - SUBJECTIVE AND OBJECTIVE BOX
Chief complaint  Patient is a 88y old  Female who presents with a chief complaint of  Review of systems  Patient in bed, looks comfortable, no hypoglycemic episodes.    Labs and Fingersticks  CAPILLARY BLOOD GLUCOSE      POCT Blood Glucose.: 248 mg/dL (08 Jul 2020 12:06)  POCT Blood Glucose.: 235 mg/dL (08 Jul 2020 09:57)  POCT Blood Glucose.: 172 mg/dL (08 Jul 2020 00:22)  POCT Blood Glucose.: 102 mg/dL (07 Jul 2020 22:23)  POCT Blood Glucose.: 177 mg/dL (07 Jul 2020 17:06)      Anion Gap, Serum: 14 (07-07 @ 05:19)      Calcium, Total Serum: 8.4 (07-07 @ 05:19)          07-07    137  |  103  |  27<H>  ----------------------------<  200<H>  4.1   |  20<L>  |  1.11    Ca    8.4      07 Jul 2020 05:19                          10.0   9.87  )-----------( 231      ( 07 Jul 2020 05:19 )             31.0     Medications  MEDICATIONS  (STANDING):  cefTRIAXone   IVPB 1000 milliGRAM(s) IV Intermittent every 24 hours  cloNIDine 0.1 milliGRAM(s) Oral at bedtime  dextrose 5%. 1000 milliLiter(s) (50 mL/Hr) IV Continuous <Continuous>  dextrose 50% Injectable 12.5 Gram(s) IV Push once  dextrose 50% Injectable 25 Gram(s) IV Push once  dextrose 50% Injectable 25 Gram(s) IV Push once  folic acid 2 milliGRAM(s) Oral daily  heparin   Injectable 5000 Unit(s) SubCutaneous every 12 hours  insulin glargine Injectable (LANTUS) 10 Unit(s) SubCutaneous at bedtime  insulin lispro (HumaLOG) corrective regimen sliding scale   SubCutaneous three times a day before meals  insulin lispro (HumaLOG) corrective regimen sliding scale   SubCutaneous at bedtime  insulin lispro Injectable (HumaLOG) 7 Unit(s) SubCutaneous three times a day before meals  labetalol 200 milliGRAM(s) Oral two times a day  losartan 25 milliGRAM(s) Oral daily  pantoprazole    Tablet 40 milliGRAM(s) Oral before breakfast  predniSONE   Tablet 2.5 milliGRAM(s) Oral at bedtime  predniSONE   Tablet 5 milliGRAM(s) Oral daily  sodium chloride 0.45%. 1000 milliLiter(s) (60 mL/Hr) IV Continuous <Continuous>      Physical Exam  General: Patient comfortable in bed  Vital Signs Last 12 Hrs  T(F): 97.7 (07-08-20 @ 13:14), Max: 98.3 (07-08-20 @ 04:09)  HR: 79 (07-08-20 @ 13:14) (79 - 89)  BP: 147/75 (07-08-20 @ 13:14) (147/75 - 182/81)  BP(mean): --  RR: 17 (07-08-20 @ 13:14) (17 - 18)  SpO2: 96% (07-08-20 @ 13:14) (95% - 97%)  Neck: No palpable thyroid nodules.  CVS: S1S2, No murmurs  Respiratory: No wheezing, no crepitations  GI: Abdomen soft, bowel sounds positive  Musculoskeletal:  edema lower extremities.   Skin: No skin rashes, no ecchymosis    Diagnostics

## 2020-07-08 NOTE — PROGRESS NOTE ADULT - SUBJECTIVE AND OBJECTIVE BOX
Subjective: Patient seen and examined. No new events except as noted.     SUBJECTIVE/ROS:  feels ok     MEDICATIONS:  MEDICATIONS  (STANDING):  cefTRIAXone   IVPB 1000 milliGRAM(s) IV Intermittent every 24 hours  dextrose 5%. 1000 milliLiter(s) (50 mL/Hr) IV Continuous <Continuous>  dextrose 50% Injectable 12.5 Gram(s) IV Push once  dextrose 50% Injectable 25 Gram(s) IV Push once  dextrose 50% Injectable 25 Gram(s) IV Push once  folic acid 2 milliGRAM(s) Oral daily  heparin   Injectable 5000 Unit(s) SubCutaneous every 12 hours  insulin glargine Injectable (LANTUS) 10 Unit(s) SubCutaneous at bedtime  insulin lispro (HumaLOG) corrective regimen sliding scale   SubCutaneous three times a day before meals  insulin lispro (HumaLOG) corrective regimen sliding scale   SubCutaneous at bedtime  insulin lispro Injectable (HumaLOG) 7 Unit(s) SubCutaneous three times a day before meals  losartan 25 milliGRAM(s) Oral daily  metoprolol succinate ER 50 milliGRAM(s) Oral daily  pantoprazole    Tablet 40 milliGRAM(s) Oral before breakfast  predniSONE   Tablet 2.5 milliGRAM(s) Oral at bedtime  predniSONE   Tablet 5 milliGRAM(s) Oral daily  sodium chloride 0.45%. 1000 milliLiter(s) (60 mL/Hr) IV Continuous <Continuous>      PHYSICAL EXAM:  T(C): 36.8 (07-08-20 @ 04:09), Max: 37.3 (07-07-20 @ 20:30)  HR: 88 (07-08-20 @ 05:33) (77 - 89)  BP: 163/88 (07-08-20 @ 05:33) (152/73 - 182/81)  RR: 18 (07-08-20 @ 04:09) (17 - 18)  SpO2: 97% (07-08-20 @ 04:09) (94% - 97%)  Wt(kg): --  I&O's Summary    06 Jul 2020 07:01  -  07 Jul 2020 07:00  --------------------------------------------------------  IN: 1320 mL / OUT: 851 mL / NET: 469 mL    07 Jul 2020 07:01  -  08 Jul 2020 06:36  --------------------------------------------------------  IN: 720 mL / OUT: 1050 mL / NET: -330 mL            JVP: Normal  Neck: supple  Lung: clear   CV: S1 S2 , Murmur:  Abd: soft  Ext: No edema  neuro: Awake / alert  Psych: flat affect  Skin: normal``    LABS/DATA:    CARDIAC MARKERS:                                10.0   9.87  )-----------( 231      ( 07 Jul 2020 05:19 )             31.0     07-07    137  |  103  |  27<H>  ----------------------------<  200<H>  4.1   |  20<L>  |  1.11    Ca    8.4      07 Jul 2020 05:19      proBNP:   Lipid Profile:   HgA1c:   TSH:     TELE:  EKG:

## 2020-07-08 NOTE — PROGRESS NOTE ADULT - SUBJECTIVE AND OBJECTIVE BOX
Patient is a 88y old  Female who presents with a chief complaint of                                                              INTERVAL HPI/OVERNIGHT EVENTS:    REVIEW OF SYSTEMS:     CONSTITUTIONAL: No weakness, fevers or chills  RESPIRATORY: No cough, wheezing,  No shortness of breath  CARDIOVASCULAR: No chest pain or palpitations  GASTROINTESTINAL: No abdominal pain  . No nausea, vomiting, or hematemesis; No diarrhea or constipation. No melena or hematochezia.  GENITOURINARY: No dysuria, frequency or hematuria  NEUROLOGICAL: No numbness or weakness                                                                                                                                                                                                                                                                                Medications:  MEDICATIONS  (STANDING):  cefTRIAXone   IVPB 1000 milliGRAM(s) IV Intermittent every 24 hours  cloNIDine 0.1 milliGRAM(s) Oral at bedtime  dextrose 5%. 1000 milliLiter(s) (50 mL/Hr) IV Continuous <Continuous>  dextrose 50% Injectable 12.5 Gram(s) IV Push once  dextrose 50% Injectable 25 Gram(s) IV Push once  dextrose 50% Injectable 25 Gram(s) IV Push once  folic acid 2 milliGRAM(s) Oral daily  heparin   Injectable 5000 Unit(s) SubCutaneous every 12 hours  insulin glargine Injectable (LANTUS) 12 Unit(s) SubCutaneous at bedtime  insulin lispro (HumaLOG) corrective regimen sliding scale   SubCutaneous three times a day before meals  insulin lispro (HumaLOG) corrective regimen sliding scale   SubCutaneous at bedtime  insulin lispro Injectable (HumaLOG) 8 Unit(s) SubCutaneous three times a day before meals  labetalol 200 milliGRAM(s) Oral two times a day  losartan 25 milliGRAM(s) Oral daily  pantoprazole    Tablet 40 milliGRAM(s) Oral before breakfast  predniSONE   Tablet 2.5 milliGRAM(s) Oral at bedtime  predniSONE   Tablet 5 milliGRAM(s) Oral daily  sodium chloride 0.45%. 1000 milliLiter(s) (60 mL/Hr) IV Continuous <Continuous>    MEDICATIONS  (PRN):  dextrose 40% Gel 15 Gram(s) Oral once PRN Blood Glucose LESS THAN 70 milliGRAM(s)/deciliter  glucagon  Injectable 1 milliGRAM(s) IntraMuscular once PRN Glucose LESS THAN 70 milligrams/deciliter  ondansetron Injectable 4 milliGRAM(s) IV Push every 6 hours PRN Nausea and/or Vomiting       Allergies    No Known Allergies    Intolerances      Vital Signs Last 24 Hrs  T(C): 36.5 (2020 13:14), Max: 37.3 (2020 20:30)  T(F): 97.7 (2020 13:14), Max: 99.1 (2020 20:30)  HR: 79 (2020 13:14) (79 - 89)  BP: 147/75 (2020 13:14) (147/75 - 182/81)  BP(mean): --  RR: 17 (2020 13:14) (17 - 18)  SpO2: 96% (2020 13:14) (95% - 97%)  CAPILLARY BLOOD GLUCOSE      POCT Blood Glucose.: 248 mg/dL (2020 12:06)  POCT Blood Glucose.: 235 mg/dL (2020 09:57)  POCT Blood Glucose.: 172 mg/dL (2020 00:22)  POCT Blood Glucose.: 102 mg/dL (2020 22:23)  POCT Blood Glucose.: 177 mg/dL (2020 17:06)      0707 @ 07:  -  08 @ 07:00  --------------------------------------------------------  IN: 720 mL / OUT: 1050 mL / NET: -330 mL    0708 @ 07:01  -  08 @ 16:49  --------------------------------------------------------  IN: 600 mL / OUT: 450 mL / NET: 150 mL      Physical Exam:    Daily     Daily Weight in k.9 (2020 11:53)  General:  NAD   HEENT:  Nonicteric, PERRLA  CV:  RRR, S1S2   Lungs:  CTA B/L, no wheezes, rales, rhonchi  Abdomen:  Soft, non-tender, no distended, positive BS  Extremities: jeremy subha   Neuro:  AAOx3, non-focal, grossly intact                                                                                                                                                                                                                                                                                                LABS:                               10.0   9.87  )-----------( 231      ( 2020 05:19 )             31.0                      07-07    137  |  103  |  27<H>  ----------------------------<  200<H>  4.1   |  20<L>  |  1.11    Ca    8.4      2020 05:19                         RADIOLOGY & ADDITIONAL TESTS         I personally reviewed: [  ]EKG   [  ]CXR    [  ] CT      A/P:         Discussed with :     Stefani consultants' Notes   Time spent :

## 2020-07-08 NOTE — PROGRESS NOTE ADULT - ASSESSMENT
Assessment  DMT2: 88y Female with DM T2 with hyperglycemia, A1C 7%, was on oral meds and insulin at home, now on basal bolus insulin, patient received partial-dose Lantus last night, blood sugars running high and not at target, no hypoglycemic episodes. Patient is eating meals, on prednisone and receiving ABx in dextrose, appears comfortable, no complaints.  Hypothyroid: Patient has no history thyroid disease, was not on any thyroid supplements, subclinical hypothyroid, FT4 in acceptable range.  Syncope: +orthostatics, workup in progress, FU Cardiology.  ILD: on prednisone  HTN: Controlled,  on antihypertensive medications.          Karan Akbar MD  Cell: [phone]  Office: [phone] Assessment  DMT2: 88y Female with DM T2 with hyperglycemia, A1C 7%, was on oral meds  and insulin at home, now on basal bolus insulin, patient received partial-dose Lantus last night, blood sugars running high and not at target, no hypoglycemic episodes. Patient is eating meals, on prednisone and receiving ABx in dextrose, appears comfortable, no complaints.  Hypothyroid: Patient has no history thyroid disease, was not on any thyroid supplements, subclinical hypothyroid, FT4 in acceptable range.  Syncope: +orthostatics, workup in progress, FU Cardiology.  ILD: on prednisone  HTN: Controlled,  on antihypertensive medications.          Karan Akbar MD  Cell: [phone]  Office: [phone]

## 2020-07-08 NOTE — PROGRESS NOTE ADULT - ASSESSMENT
87F with PMHx of rheumatoid arthritis, diabetes mellitus, HTN, moderate aortic stenosis, CKD2, and interstitial lung disease admitted wiht syncope/.. pt is nt able to porivde evenets excepts saying taht room was too " warm ": and she was trying to get up and next thing she knew ambulance was there., As francisco javier chart : as per granddaughter when daughter of pt went outside found pt "slumped over in chair" and minimally responsive for a few minutes, called EMS and pt slowly came to. Before this was in usual state of health. Pt denies CP , palpitations .. ,mild SOB yesterday and naseous today but no vomitting or diarreah , no abdominal pain and no urinary sx .   no fever or chills   no cough     - syncope :   cont tele   cardio inpu t appreciated   ? etiology sx AS ?  .. n stenosis reported on this echo   orthostatics : positive : s/p hydration : compression stockings ..    RA: cont meds     - dysphagia :  evaluated with MBS now on regular diet     - presumed UTI : GNR in urine.. completed  ceftriaxione   Ecoli sensitive : complete three days       - RA : cont steroids     - DM: uncontrolled :  fu with endo     - ADINA : likely an element of dehydration   monitor     - tremor : pt on BB       - uncontrolled HTN  : discussed with  : add clonidine for supine hypertension   monitor orthostatics     Discussed ACP : pt verbally had expressed she does not want aggressive measures however never signed MOLST ..  will discuss w her re: MOLST form   discussed wpt pt and

## 2020-07-08 NOTE — PROGRESS NOTE ADULT - ASSESSMENT
Syncope  unclear etiology   monitor on tele - so far no significant events   unremarkable echo   unremarkable carotid doppler   no events on tele     HTN  change toprol to labetalol as ordered     AS  stable     ILD  on steroids      Advanced care planning was discussed with patient and family.  Risks, benefits and alternatives of the cardiac treatments and medical therapy including procedures were discussed in detail and all questions were answered. Importance of compliance with medical therapy and lifestyle modification to improve cardiovascular health were addressed. Appropriate forms and patient educational materials were reviewed. 30 minutes face to face spent.

## 2020-07-08 NOTE — PROGRESS NOTE ADULT - SUBJECTIVE AND OBJECTIVE BOX
[facility] Neurological Care [facility]      Seen earlier today, and examined.  - Today, patient is without complaints.           *****MEDICATIONS: Current medication reviewed and documented.    MEDICATIONS  (STANDING):  cloNIDine 0.1 milliGRAM(s) Oral at bedtime  dextrose 5%. 1000 milliLiter(s) (50 mL/Hr) IV Continuous <Continuous>  dextrose 50% Injectable 12.5 Gram(s) IV Push once  dextrose 50% Injectable 25 Gram(s) IV Push once  dextrose 50% Injectable 25 Gram(s) IV Push once  folic acid 2 milliGRAM(s) Oral daily  heparin   Injectable 5000 Unit(s) SubCutaneous every 12 hours  insulin glargine Injectable (LANTUS) 12 Unit(s) SubCutaneous at bedtime  insulin lispro (HumaLOG) corrective regimen sliding scale   SubCutaneous three times a day before meals  insulin lispro (HumaLOG) corrective regimen sliding scale   SubCutaneous at bedtime  insulin lispro Injectable (HumaLOG) 8 Unit(s) SubCutaneous three times a day before meals  labetalol 200 milliGRAM(s) Oral two times a day  losartan 25 milliGRAM(s) Oral daily  pantoprazole    Tablet 40 milliGRAM(s) Oral before breakfast  predniSONE   Tablet 2.5 milliGRAM(s) Oral at bedtime  predniSONE   Tablet 5 milliGRAM(s) Oral daily  sodium chloride 0.45%. 1000 milliLiter(s) (60 mL/Hr) IV Continuous <Continuous>    MEDICATIONS  (PRN):  dextrose 40% Gel 15 Gram(s) Oral once PRN Blood Glucose LESS THAN 70 milliGRAM(s)/deciliter  glucagon  Injectable 1 milliGRAM(s) IntraMuscular once PRN Glucose LESS THAN 70 milligrams/deciliter  ondansetron Injectable 4 milliGRAM(s) IV Push every 6 hours PRN Nausea and/or Vomiting          ***** VITAL SIGNS:  T(F): 98.3 (07-08-20 @ 21:03), Max: 98.3 (07-08-20 @ 04:09)  HR: 92 (07-08-20 @ 21:03) (79 - 92)  BP: 126/62 (07-08-20 @ 21:03) (126/62 - 182/81)  RR: 16 (07-08-20 @ 21:03) (16 - 18)  SpO2: 94% (07-08-20 @ 21:03) (94% - 97%)  Wt(kg): --  ,   I&O's Summary    07 Jul 2020 07:01  -  08 Jul 2020 07:00  --------------------------------------------------------  IN: 720 mL / OUT: 1050 mL / NET: -330 mL    08 Jul 2020 07:01  -  08 Jul 2020 22:25  --------------------------------------------------------  IN: 1140 mL / OUT: 600 mL / NET: 540 mL             *****PHYSICAL EXAM: Alert oriented x 3   Attention comprehension are fair. Able to name, repeat, read without any difficulty.   Able to follow 3 step commands.   flat affect   EOMI fundi not visualized,  VFF to confrontration  No facial asymmetry   Tongue is midline dysarthria   Palate elevates symmetrically   Moving all 4 ext symmetrically no pronator drift   r cogwheeling   intention tremor noted r > l   Reflexes are diminished hroughout   sensation is grossly symmetric  Gait : not assessed.  B/L down going toes          *****LAB AND IMAGING:                        10.0   9.87  )-----------( 231      ( 07 Jul 2020 05:19 )             31.0               07-07    137  |  103  |  27<H>  ----------------------------<  200<H>  4.1   |  20<L>  |  1.11    Ca    8.4      07 Jul 2020 05:19                           [All pertinent recent Imaging/Reports reviewed]           *****A S S E S S M E N T   A N D   P L A N :            87F with PMHx of rheumatoid arthritis, diabetes mellitus, HTN, moderate aortic stenosis, CKD2, and interstitial lung disease admitted wiht syncope/.. pt is nt able to porivde evenets excepts saying taht room was too " warm ": and she was trying to get up and next thing she knew ambulance was there., As francisco javier chart : as per granddaughter when daughter of pt went outside found pt "slumped over in chair" and minimally responsive for a few minutes, called EMS and pt slowly came to. Before this was in usual state of health. Pt denies CP , palpitations .. ,mild SOB yesterday and naseous today but no vomitting or diarreah , no abdominal pain and no urinary sx ( freuency ?? new ?    no fever or chills   no cough  Problem/Recommendations 1:  sycope of unclear etiology   given hx of moderate aortic stenosis, likely related to low bp   syncope w/u underway   pt eval   orthostatics     Problem/Recommendations 2: tremor noted ( ongoing for yr)   action tremor   + cogwheeling   likely essential tremor, cannot r/o parkinsons variant   will continue to monitor   recommend follow up with Dr. Andrey Mitchell   Thank you for allowing me to participate in the care of this patient. Please do not hesitate to call me if you have any  questions.        ________________  Liv Delgado MD  [facility] Neurological [facility] [place] ([facility]  [phone]      33 minutes spent on total encounter; more than 50 % of the visit was  spent counseling about plan of care, compliance to diet/exercise and medication regimen and or  coordinating care by the attending physician.      It is advised that stroke patients follow up with TIFFANIE Dominguez @ [phone] in 1- 2 weeks.   Others please follow up with Dr. Michael Nissenbaum [phone]

## 2020-07-09 ENCOUNTER — TRANSCRIPTION ENCOUNTER (OUTPATIENT)
Age: 85
End: 2020-07-09

## 2020-07-09 LAB
GLUCOSE BLDC GLUCOMTR-MCNC: 186 MG/DL — HIGH (ref 70–99)
GLUCOSE BLDC GLUCOMTR-MCNC: 199 MG/DL — HIGH (ref 70–99)
GLUCOSE BLDC GLUCOMTR-MCNC: 228 MG/DL — HIGH (ref 70–99)
GLUCOSE BLDC GLUCOMTR-MCNC: 286 MG/DL — HIGH (ref 70–99)

## 2020-07-09 RX ORDER — LABETALOL HCL 100 MG
100 TABLET ORAL
Refills: 0 | Status: DISCONTINUED | OUTPATIENT
Start: 2020-07-09 | End: 2020-07-10

## 2020-07-09 RX ORDER — INSULIN LISPRO 100/ML
9 VIAL (ML) SUBCUTANEOUS
Refills: 0 | Status: DISCONTINUED | OUTPATIENT
Start: 2020-07-09 | End: 2020-07-10

## 2020-07-09 RX ORDER — INSULIN GLARGINE 100 [IU]/ML
15 INJECTION, SOLUTION SUBCUTANEOUS AT BEDTIME
Refills: 0 | Status: DISCONTINUED | OUTPATIENT
Start: 2020-07-09 | End: 2020-07-10

## 2020-07-09 RX ADMIN — Medication 8 UNIT(S): at 07:52

## 2020-07-09 RX ADMIN — HEPARIN SODIUM 5000 UNIT(S): 5000 INJECTION INTRAVENOUS; SUBCUTANEOUS at 19:01

## 2020-07-09 RX ADMIN — Medication 2: at 07:52

## 2020-07-09 RX ADMIN — Medication 2 MILLIGRAM(S): at 14:39

## 2020-07-09 RX ADMIN — LOSARTAN POTASSIUM 25 MILLIGRAM(S): 100 TABLET, FILM COATED ORAL at 05:41

## 2020-07-09 RX ADMIN — INSULIN GLARGINE 15 UNIT(S): 100 INJECTION, SOLUTION SUBCUTANEOUS at 22:29

## 2020-07-09 RX ADMIN — Medication 2.5 MILLIGRAM(S): at 22:29

## 2020-07-09 RX ADMIN — Medication 9 UNIT(S): at 18:04

## 2020-07-09 RX ADMIN — Medication 9 UNIT(S): at 12:48

## 2020-07-09 RX ADMIN — Medication 200 MILLIGRAM(S): at 05:41

## 2020-07-09 RX ADMIN — Medication 0.1 MILLIGRAM(S): at 22:29

## 2020-07-09 RX ADMIN — Medication 100 MILLIGRAM(S): at 19:03

## 2020-07-09 RX ADMIN — Medication 3: at 12:48

## 2020-07-09 RX ADMIN — HEPARIN SODIUM 5000 UNIT(S): 5000 INJECTION INTRAVENOUS; SUBCUTANEOUS at 05:42

## 2020-07-09 RX ADMIN — PANTOPRAZOLE SODIUM 40 MILLIGRAM(S): 20 TABLET, DELAYED RELEASE ORAL at 05:41

## 2020-07-09 RX ADMIN — Medication 5 MILLIGRAM(S): at 05:41

## 2020-07-09 RX ADMIN — Medication 1: at 18:04

## 2020-07-09 NOTE — PROVIDER CONTACT NOTE (OTHER) - ASSESSMENT
pt is AO4, verbal and responsive; non-bilious vomit. pt O2 sat 95% room air.
AAOx3. Asymptomatic during orthostatics.
PT /100, all other vitals stable, pt is anxious, otherwise no other complaints
Pt , vitals stable, no other complaints noted, asymptomatic

## 2020-07-09 NOTE — PROGRESS NOTE ADULT - SUBJECTIVE AND OBJECTIVE BOX
Subjective: Patient seen and examined. No new events except as noted.     SUBJECTIVE/ROS:  feels well       MEDICATIONS:  MEDICATIONS  (STANDING):  cloNIDine 0.1 milliGRAM(s) Oral at bedtime  dextrose 5%. 1000 milliLiter(s) (50 mL/Hr) IV Continuous <Continuous>  dextrose 50% Injectable 12.5 Gram(s) IV Push once  dextrose 50% Injectable 25 Gram(s) IV Push once  dextrose 50% Injectable 25 Gram(s) IV Push once  folic acid 2 milliGRAM(s) Oral daily  heparin   Injectable 5000 Unit(s) SubCutaneous every 12 hours  insulin glargine Injectable (LANTUS) 12 Unit(s) SubCutaneous at bedtime  insulin lispro (HumaLOG) corrective regimen sliding scale   SubCutaneous three times a day before meals  insulin lispro (HumaLOG) corrective regimen sliding scale   SubCutaneous at bedtime  insulin lispro Injectable (HumaLOG) 8 Unit(s) SubCutaneous three times a day before meals  labetalol 200 milliGRAM(s) Oral two times a day  pantoprazole    Tablet 40 milliGRAM(s) Oral before breakfast  predniSONE   Tablet 2.5 milliGRAM(s) Oral at bedtime  predniSONE   Tablet 5 milliGRAM(s) Oral daily  sodium chloride 0.45%. 1000 milliLiter(s) (60 mL/Hr) IV Continuous <Continuous>      PHYSICAL EXAM:  T(C): 36.4 (07-09-20 @ 04:21), Max: 36.8 (07-08-20 @ 09:06)  HR: 80 (07-09-20 @ 04:21) (79 - 92)  BP: 115/72 (07-09-20 @ 04:21) (115/72 - 161/77)  RR: 16 (07-09-20 @ 04:21) (16 - 18)  SpO2: 96% (07-09-20 @ 04:21) (94% - 96%)  Wt(kg): --  I&O's Summary    07 Jul 2020 07:01  -  08 Jul 2020 07:00  --------------------------------------------------------  IN: 720 mL / OUT: 1050 mL / NET: -330 mL    08 Jul 2020 07:01  -  09 Jul 2020 06:48  --------------------------------------------------------  IN: 1140 mL / OUT: 600 mL / NET: 540 mL            JVP: Normal  Neck: supple  Lung: clear   CV: S1 S2 , Murmur:  Abd: soft  Ext: No edema  neuro: Awake / alert  Psych: flat affect  Skin: normal``    LABS/DATA:    CARDIAC MARKERS:                  proBNP:   Lipid Profile:   HgA1c:   TSH:     TELE:  EKG:

## 2020-07-09 NOTE — PROGRESS NOTE ADULT - ASSESSMENT
Assessment  DMT2: 88y Female with DM T2 with hyperglycemia, A1C 7%, was on oral meds and insulin at home, now on basal bolus insulin, increased dose yesterday, blood sugars still not at target, no hypoglycemic episodes. Patient is eating meals, she is s/p ABx course, appears comfortable, DC planning in progress.  Hypothyroid: Patient has no history thyroid disease, was not on any thyroid supplements, subclinical hypothyroid, FT4 in acceptable range.  Syncope: +orthostatics, workup in progress, FU Cardiology.  ILD: on prednisone  HTN: Controlled,  on antihypertensive medications.          Karan Akbar MD  Cell: [phone]  Office: [phone] Assessment  DMT2: 88y Female with DM T2 with hyperglycemia, A1C 7%, was on oral meds and insulin at home, now on basal bolus insulin, increased dose yesterday,  blood sugars still not at target, no hypoglycemic episodes. Patient is eating meals, she is s/p ABx course, appears comfortable, DC planning in progress.  Hypothyroid: Patient has no history thyroid disease, was not on any thyroid supplements, subclinical hypothyroid, FT4 in acceptable range.  Syncope: +orthostatics, workup in progress, FU Cardiology.  ILD: on prednisone  HTN: Controlled,  on antihypertensive medications.          Karan Akbar MD  Cell: [phone]  Office: [phone]

## 2020-07-09 NOTE — PROGRESS NOTE ADULT - ASSESSMENT
Syncope  unclear etiology   monitor on tele - so far no significant events   unremarkable echo   unremarkable carotid doppler   no events on tele   ? O.H    HTN  has element of Orthostatic hypotension   BP is low this am  cont clonidine   dec labetalol to 100 bid   dc losartan     AS  stable     ILD  on steroids      Advanced care planning was discussed with patient and family.  Risks, benefits and alternatives of the cardiac treatments and medical therapy including procedures were discussed in detail and all questions were answered. Importance of compliance with medical therapy and lifestyle modification to improve cardiovascular health were addressed. Appropriate forms and patient educational materials were reviewed. 30 minutes face to face spent.

## 2020-07-09 NOTE — PROVIDER CONTACT NOTE (OTHER) - BACKGROUND
pt admitted for syncope and collapse.
Patient admitted with syncopal episode.
Pt admitted w/ dx of NSTEMI
Pt admitted with Syncope and Acute UTI diagnosis

## 2020-07-09 NOTE — PROGRESS NOTE ADULT - PROBLEM SELECTOR PLAN 1
Patient not a candidate for tight glycemic control given her age. Will increase basal bolus insulin regimen conservatively.  Will increase Lantus to 15u at bedtime.  Will increase Humalog to 9u before each meal and continue Humalog correction scale coverage ac/hs. Will continue monitoring FS, log, and FU.  Patient was on insulin at home, suggest DC on current insulin regimen and FU endo 4 weeks.  Discussed plan with patient and . Counseled for compliance with consistent low carb diet.

## 2020-07-09 NOTE — PROVIDER CONTACT NOTE (OTHER) - ACTION/TREATMENT ORDERED:
NPO except for medications until speech language evaluation consult for dysphagia. RN will continue to monitor.
Provider notified, Advised to make pt eat, pt ate sandwich, BG retaken an hour later, BG was 172. Provider reordered Lantus at 5U and Lantus was given, continue to monitor.
Provider notified, continue to monitor
As per NP, will continue to monitor. NP at bedside to assess patient. BP medications discontinued.

## 2020-07-09 NOTE — PROGRESS NOTE ADULT - SUBJECTIVE AND OBJECTIVE BOX
Patient is a 88y old  Female who presents with a chief complaint of                                                              INTERVAL HPI/OVERNIGHT EVENTS:    REVIEW OF SYSTEMS:     CONSTITUTIONAL: No weakness, fevers or chills  EYES/ENT: No visual changes , no ear ache   NECK: No pain or stiffness  RESPIRATORY: No cough, wheezing,  No shortness of breath  CARDIOVASCULAR: No chest pain or palpitations  GASTROINTESTINAL: No abdominal pain  . No nausea, vomiting, or hematemesis; No diarrhea or constipation. No melena or hematochezia.  GENITOURINARY: No dysuria, frequency or hematuria  NEUROLOGICAL: No numbness or weakness  SKIN: No itching, burning, rashes, or lesions                                                                                                                                                                                                                                                                                 Medications:  MEDICATIONS  (STANDING):  cloNIDine 0.1 milliGRAM(s) Oral at bedtime  dextrose 5%. 1000 milliLiter(s) (50 mL/Hr) IV Continuous <Continuous>  dextrose 50% Injectable 12.5 Gram(s) IV Push once  dextrose 50% Injectable 25 Gram(s) IV Push once  dextrose 50% Injectable 25 Gram(s) IV Push once  folic acid 2 milliGRAM(s) Oral daily  heparin   Injectable 5000 Unit(s) SubCutaneous every 12 hours  insulin glargine Injectable (LANTUS) 15 Unit(s) SubCutaneous at bedtime  insulin lispro (HumaLOG) corrective regimen sliding scale   SubCutaneous three times a day before meals  insulin lispro (HumaLOG) corrective regimen sliding scale   SubCutaneous at bedtime  insulin lispro Injectable (HumaLOG) 9 Unit(s) SubCutaneous three times a day before meals  labetalol 100 milliGRAM(s) Oral two times a day  pantoprazole    Tablet 40 milliGRAM(s) Oral before breakfast  predniSONE   Tablet 2.5 milliGRAM(s) Oral at bedtime  predniSONE   Tablet 5 milliGRAM(s) Oral daily  sodium chloride 0.45%. 1000 milliLiter(s) (60 mL/Hr) IV Continuous <Continuous>    MEDICATIONS  (PRN):  dextrose 40% Gel 15 Gram(s) Oral once PRN Blood Glucose LESS THAN 70 milliGRAM(s)/deciliter  glucagon  Injectable 1 milliGRAM(s) IntraMuscular once PRN Glucose LESS THAN 70 milligrams/deciliter  ondansetron Injectable 4 milliGRAM(s) IV Push every 6 hours PRN Nausea and/or Vomiting       Allergies    No Known Allergies    Intolerances      Vital Signs Last 24 Hrs  T(C): 36.4 (2020 04:21), Max: 36.8 (2020 21:03)  T(F): 97.6 (2020 04:21), Max: 98.3 (2020 21:03)  HR: 80 (2020 04:21) (79 - 92)  BP: 115/72 (2020 04:21) (115/72 - 161/77)  BP(mean): --  RR: 16 (2020 04:21) (16 - 17)  SpO2: 96% (2020 04:21) (94% - 96%)  CAPILLARY BLOOD GLUCOSE      POCT Blood Glucose.: 286 mg/dL (2020 12:08)  POCT Blood Glucose.: 228 mg/dL (2020 07:42)  POCT Blood Glucose.: 300 mg/dL (2020 22:12)  POCT Blood Glucose.: 193 mg/dL (2020 19:55)  POCT Blood Glucose.: 132 mg/dL (2020 16:42)      08 @ 07:  -   @ 07:00  --------------------------------------------------------  IN: 1140 mL / OUT: 600 mL / NET: 540 mL     @ 07:  -  09 @ 13:13  --------------------------------------------------------  IN: 120 mL / OUT: 0 mL / NET: 120 mL      Physical Exam:    Daily     Daily Weight in k.5 (2020 10:42)  General:  Well appearing, NAD, not cachetic  HEENT:  Nonicteric, PERRLA  CV:  RRR, S1S2   Lungs:  CTA B/L, no wheezes, rales, rhonchi  Abdomen:  Soft, non-tender, no distended, positive BS  Extremities:  2+ pulses, no c/c, no edema  Skin:  Warm and dry, no rashes  :  No mason  Neuro:  AAOx3, non-focal, grossly intact                                                                                                                                                                                                                                                                                                LABS:                                                     RADIOLOGY & ADDITIONAL TESTS         I personally reviewed: [  ]EKG   [  ]CXR    [  ] CT      A/P:         Discussed with :     Stefani consultants' Notes   Time spent : Patient is a 88y old  Female who presents with a chief complaint of                                                              INTERVAL HPI/OVERNIGHT EVENTS:    REVIEW OF SYSTEMS:     CONSTITUTIONAL: No weakness, fevers or chills  RESPIRATORY: No cough, wheezing,  No shortness of breath  CARDIOVASCULAR: No chest pain or palpitations  GASTROINTESTINAL: No abdominal pain  . No nausea, vomiting, or hematemesis; No diarrhea or constipation. No melena or hematochezia.  GENITOURINARY: No dysuria, frequency or hematuria  NEUROLOGICAL: No numbness or weakness                                                                                                                                                                                                                                                                                 Medications:  MEDICATIONS  (STANDING):  cloNIDine 0.1 milliGRAM(s) Oral at bedtime  dextrose 5%. 1000 milliLiter(s) (50 mL/Hr) IV Continuous <Continuous>  dextrose 50% Injectable 12.5 Gram(s) IV Push once  dextrose 50% Injectable 25 Gram(s) IV Push once  dextrose 50% Injectable 25 Gram(s) IV Push once  folic acid 2 milliGRAM(s) Oral daily  heparin   Injectable 5000 Unit(s) SubCutaneous every 12 hours  insulin glargine Injectable (LANTUS) 15 Unit(s) SubCutaneous at bedtime  insulin lispro (HumaLOG) corrective regimen sliding scale   SubCutaneous three times a day before meals  insulin lispro (HumaLOG) corrective regimen sliding scale   SubCutaneous at bedtime  insulin lispro Injectable (HumaLOG) 9 Unit(s) SubCutaneous three times a day before meals  labetalol 100 milliGRAM(s) Oral two times a day  pantoprazole    Tablet 40 milliGRAM(s) Oral before breakfast  predniSONE   Tablet 2.5 milliGRAM(s) Oral at bedtime  predniSONE   Tablet 5 milliGRAM(s) Oral daily  sodium chloride 0.45%. 1000 milliLiter(s) (60 mL/Hr) IV Continuous <Continuous>    MEDICATIONS  (PRN):  dextrose 40% Gel 15 Gram(s) Oral once PRN Blood Glucose LESS THAN 70 milliGRAM(s)/deciliter  glucagon  Injectable 1 milliGRAM(s) IntraMuscular once PRN Glucose LESS THAN 70 milligrams/deciliter  ondansetron Injectable 4 milliGRAM(s) IV Push every 6 hours PRN Nausea and/or Vomiting       Allergies    No Known Allergies    Intolerances      Vital Signs Last 24 Hrs  T(C): 36.4 (2020 04:21), Max: 36.8 (2020 21:03)  T(F): 97.6 (2020 04:21), Max: 98.3 (2020 21:03)  HR: 80 (2020 04:21) (79 - 92)  BP: 115/72 (2020 04:21) (115/72 - 161/77)  BP(mean): --  RR: 16 (2020 04:21) (16 - 17)  SpO2: 96% (2020 04:21) (94% - 96%)  CAPILLARY BLOOD GLUCOSE      POCT Blood Glucose.: 286 mg/dL (2020 12:08)  POCT Blood Glucose.: 228 mg/dL (2020 07:42)  POCT Blood Glucose.: 300 mg/dL (2020 22:12)  POCT Blood Glucose.: 193 mg/dL (2020 19:55)  POCT Blood Glucose.: 132 mg/dL (2020 16:42)       @ 07:01  -   @ 07:00  --------------------------------------------------------  IN: 1140 mL / OUT: 600 mL / NET: 540 mL     @ 07:01  -   @ 13:13  --------------------------------------------------------  IN: 120 mL / OUT: 0 mL / NET: 120 mL      Physical Exam:    Daily     Daily Weight in k.5 (2020 10:42)  General:  NAD   HEENT:  Nonicteric, PERRLA  CV:  RRR, S1S2   Lungs:  CTA B/L, no wheezes, rales, rhonchi  Abdomen:  Soft, non-tender, no distended, positive BS  Extremities: no edema  Neuro:  AAOx3, non-focal, grossly intact                                                                                                                                                                                                                                                                                                LABS:

## 2020-07-09 NOTE — PROGRESS NOTE ADULT - SUBJECTIVE AND OBJECTIVE BOX
Chief complaint  Patient is a 88y old  Female who presents with a chief complaint of  Review of systems  Patient sitting up in chair, looks comfortable, no hypoglycemic episodes.    Labs and Fingersticks  CAPILLARY BLOOD GLUCOSE      POCT Blood Glucose.: 228 mg/dL (09 Jul 2020 07:42)  POCT Blood Glucose.: 300 mg/dL (08 Jul 2020 22:12)  POCT Blood Glucose.: 193 mg/dL (08 Jul 2020 19:55)  POCT Blood Glucose.: 132 mg/dL (08 Jul 2020 16:42)  POCT Blood Glucose.: 248 mg/dL (08 Jul 2020 12:06)                        Medications  MEDICATIONS  (STANDING):  cloNIDine 0.1 milliGRAM(s) Oral at bedtime  dextrose 5%. 1000 milliLiter(s) (50 mL/Hr) IV Continuous <Continuous>  dextrose 50% Injectable 12.5 Gram(s) IV Push once  dextrose 50% Injectable 25 Gram(s) IV Push once  dextrose 50% Injectable 25 Gram(s) IV Push once  folic acid 2 milliGRAM(s) Oral daily  heparin   Injectable 5000 Unit(s) SubCutaneous every 12 hours  insulin glargine Injectable (LANTUS) 15 Unit(s) SubCutaneous at bedtime  insulin lispro (HumaLOG) corrective regimen sliding scale   SubCutaneous three times a day before meals  insulin lispro (HumaLOG) corrective regimen sliding scale   SubCutaneous at bedtime  insulin lispro Injectable (HumaLOG) 9 Unit(s) SubCutaneous three times a day before meals  labetalol 100 milliGRAM(s) Oral two times a day  pantoprazole    Tablet 40 milliGRAM(s) Oral before breakfast  predniSONE   Tablet 2.5 milliGRAM(s) Oral at bedtime  predniSONE   Tablet 5 milliGRAM(s) Oral daily  sodium chloride 0.45%. 1000 milliLiter(s) (60 mL/Hr) IV Continuous <Continuous>      Physical Exam  General: Patient comfortable in bed  Vital Signs Last 12 Hrs  T(F): 97.6 (07-09-20 @ 04:21), Max: 97.6 (07-09-20 @ 04:21)  HR: 80 (07-09-20 @ 04:21) (80 - 80)  BP: 115/72 (07-09-20 @ 04:21) (115/72 - 115/72)  BP(mean): --  RR: 16 (07-09-20 @ 04:21) (16 - 16)  SpO2: 96% (07-09-20 @ 04:21) (96% - 96%)  Neck: No palpable thyroid nodules.  CVS: S1S2, No murmurs  Respiratory: No wheezing, no crepitations  GI: Abdomen soft, bowel sounds positive  Musculoskeletal:  edema lower extremities.   Skin: No skin rashes, no ecchymosis    Diagnostics Chief complaint  Patient is a 88y old  Female who presents with a chief complaint of  Review of systems  Patient sitting up in chair, looks comfortable, no hypoglycemic episodes.    Labs and Fingersticks  CAPILLARY BLOOD GLUCOSE      POCT Blood Glucose.: 228 mg/dL (09 Jul 2020 07:42)  POCT Blood Glucose.: 300 mg/dL (08 Jul 2020 22:12)  POCT Blood Glucose.: 193 mg/dL (08 Jul 2020 19:55)  POCT Blood Glucose.: 132 mg/dL (08 Jul 2020 16:42)  POCT Blood Glucose.: 248 mg/dL (08 Jul 2020 12:06)    Medications  MEDICATIONS  (STANDING):  cloNIDine 0.1 milliGRAM(s) Oral at bedtime  dextrose 5%. 1000 milliLiter(s) (50 mL/Hr) IV Continuous <Continuous>  dextrose 50% Injectable 12.5 Gram(s) IV Push once  dextrose 50% Injectable 25 Gram(s) IV Push once  dextrose 50% Injectable 25 Gram(s) IV Push once  folic acid 2 milliGRAM(s) Oral daily  heparin   Injectable 5000 Unit(s) SubCutaneous every 12 hours  insulin glargine Injectable (LANTUS) 15 Unit(s) SubCutaneous at bedtime  insulin lispro (HumaLOG) corrective regimen sliding scale   SubCutaneous three times a day before meals  insulin lispro (HumaLOG) corrective regimen sliding scale   SubCutaneous at bedtime  insulin lispro Injectable (HumaLOG) 9 Unit(s) SubCutaneous three times a day before meals  labetalol 100 milliGRAM(s) Oral two times a day  pantoprazole    Tablet 40 milliGRAM(s) Oral before breakfast  predniSONE   Tablet 2.5 milliGRAM(s) Oral at bedtime  predniSONE   Tablet 5 milliGRAM(s) Oral daily  sodium chloride 0.45%. 1000 milliLiter(s) (60 mL/Hr) IV Continuous <Continuous>      Physical Exam  General: Patient comfortable in bed  Vital Signs Last 12 Hrs  T(F): 97.6 (07-09-20 @ 04:21), Max: 97.6 (07-09-20 @ 04:21)  HR: 80 (07-09-20 @ 04:21) (80 - 80)  BP: 115/72 (07-09-20 @ 04:21) (115/72 - 115/72)  BP(mean): --  RR: 16 (07-09-20 @ 04:21) (16 - 16)  SpO2: 96% (07-09-20 @ 04:21) (96% - 96%)  Neck: No palpable thyroid nodules.  CVS: S1S2, No murmurs  Respiratory: No wheezing, no crepitations  GI: Abdomen soft, bowel sounds positive  Musculoskeletal:  edema lower extremities.   Skin: No skin rashes, no ecchymosis    Diagnostics

## 2020-07-09 NOTE — PROVIDER CONTACT NOTE (OTHER) - SITUATION
PT , pt scheduled for Lantus 10 U
Patient with positive orthostatics. 77/49 standing, 99/59 laying. NP made aware.
Pt /100
pt aspirated after eating

## 2020-07-09 NOTE — DISCHARGE NOTE NURSING/CASE MANAGEMENT/SOCIAL WORK - PATIENT PORTAL LINK FT
You can access the FollowMyHealth Patient Portal offered by [facility] by registering at the following website: [email or web address]. By joining [facility]’s FollowMyHealth portal, you will also be able to view your health information using other applications (apps) compatible with our system.

## 2020-07-09 NOTE — PROGRESS NOTE ADULT - ASSESSMENT
87F with PMHx of rheumatoid arthritis, diabetes mellitus, HTN, moderate aortic stenosis, CKD2, and interstitial lung disease admitted wiht syncope/.. pt is nt able to porivde evenets excepts saying taht room was too " warm ": and she was trying to get up and next thing she knew ambulance was there., As francisco javier chart : as per granddaughter when daughter of pt went outside found pt "slumped over in chair" and minimally responsive for a few minutes, called EMS and pt slowly came to. Before this was in usual state of health. Pt denies CP , palpitations .. ,mild SOB yesterday and naseous today but no vomitting or diarreah , no abdominal pain and no urinary sx .   no fever or chills   no cough     - syncope :   cont tele   cardio inpu t appreciated   ? etiology sx AS ?  .. n stenosis reported on this echo   orthostatics : positive : s/p hydration : compression stockings ..    RA: cont meds     - dysphagia :  evaluated with MBS now on regular diet     - presumed UTI : GNR in urine.. completed  ceftriaxione   Ecoli sensitive : complete three days       - RA : cont steroids     - DM: uncontrolled :  fu with endo     - ADINA : likely an element of dehydration   monitor     - tremor : pt on BB       - uncontrolled HTN  : discussed with  : add clonidine for supine hypertension   monitor orthostatics     Discussed ACP : pt verbally had expressed she does not want aggressive measures however never signed MOLST ..  will discuss w her re: MOLST form   discussed wpt pt and  87F with PMHx of rheumatoid arthritis, diabetes mellitus, HTN, moderate aortic stenosis, CKD2, and interstitial lung disease admitted wiht syncope/.. pt is nt able to porivde evenets excepts saying taht room was too " warm ": and she was trying to get up and next thing she knew ambulance was there., As francisco javier chart : as per granddaughter when daughter of pt went outside found pt "slumped over in chair" and minimally responsive for a few minutes, called EMS and pt slowly came to. Before this was in usual state of health. Pt denies CP , palpitations .. ,mild SOB yesterday and naseous today but no vomitting or diarreah , no abdominal pain and no urinary sx .   no fever or chills   no cough     - syncope :   cont tele   cardio inpu t appreciated   ? etiology sx AS ?  .. no  stenosis reported on this echo : more likely etiology is dehydration /infection   orthostatics : positive : s/p hydration : compression stockings ..    RA: cont meds     - dysphagia :  evaluated with MBS now on regular diet     - presumed UTI : GNR in urine.. completed  ceftriaxione   Ecoli sensitive : complete three days       - RA : cont steroids     - DM: uncontrolled :  fu with endo     - ADINA : likely an element of dehydration   monitor     - tremor : pt on BB       - uncontrolled HTN  : discussed with  : added clonidine for supine hypertension   monitor orthostatics     Discussed ACP : pt verbally had expressed she does not want aggressive measures however never signed MOLST ..  will discuss w her re: MOLST form     discussed wpt pt and  at bedside at length   d/w NP and CM

## 2020-07-10 VITALS
DIASTOLIC BLOOD PRESSURE: 70 MMHG | TEMPERATURE: 98 F | HEART RATE: 80 BPM | SYSTOLIC BLOOD PRESSURE: 121 MMHG | OXYGEN SATURATION: 98 % | RESPIRATION RATE: 16 BRPM

## 2020-07-10 LAB
ANION GAP SERPL CALC-SCNC: 11 MMOL/L — SIGNIFICANT CHANGE UP (ref 5–17)
BUN SERPL-MCNC: 36 MG/DL — HIGH (ref 7–23)
CALCIUM SERPL-MCNC: 8.5 MG/DL — SIGNIFICANT CHANGE UP (ref 8.4–10.5)
CHLORIDE SERPL-SCNC: 103 MMOL/L — SIGNIFICANT CHANGE UP (ref 96–108)
CO2 SERPL-SCNC: 23 MMOL/L — SIGNIFICANT CHANGE UP (ref 22–31)
CREAT SERPL-MCNC: 1.33 MG/DL — HIGH (ref 0.5–1.3)
GLUCOSE BLDC GLUCOMTR-MCNC: 189 MG/DL — HIGH (ref 70–99)
GLUCOSE BLDC GLUCOMTR-MCNC: 239 MG/DL — HIGH (ref 70–99)
GLUCOSE SERPL-MCNC: 294 MG/DL — HIGH (ref 70–99)
HCT VFR BLD CALC: 30 % — LOW (ref 34.5–45)
HGB BLD-MCNC: 9.3 G/DL — LOW (ref 11.5–15.5)
MCHC RBC-ENTMCNC: 28.4 PG — SIGNIFICANT CHANGE UP (ref 27–34)
MCHC RBC-ENTMCNC: 31 GM/DL — LOW (ref 32–36)
MCV RBC AUTO: 91.5 FL — SIGNIFICANT CHANGE UP (ref 80–100)
NRBC # BLD: 0 /100 WBCS — SIGNIFICANT CHANGE UP (ref 0–0)
PLATELET # BLD AUTO: 221 K/UL — SIGNIFICANT CHANGE UP (ref 150–400)
POTASSIUM SERPL-MCNC: 3.8 MMOL/L — SIGNIFICANT CHANGE UP (ref 3.5–5.3)
POTASSIUM SERPL-SCNC: 3.8 MMOL/L — SIGNIFICANT CHANGE UP (ref 3.5–5.3)
RBC # BLD: 3.28 M/UL — LOW (ref 3.8–5.2)
RBC # FLD: 14.4 % — SIGNIFICANT CHANGE UP (ref 10.3–14.5)
SODIUM SERPL-SCNC: 137 MMOL/L — SIGNIFICANT CHANGE UP (ref 135–145)
WBC # BLD: 8.65 K/UL — SIGNIFICANT CHANGE UP (ref 3.8–10.5)
WBC # FLD AUTO: 8.65 K/UL — SIGNIFICANT CHANGE UP (ref 3.8–10.5)

## 2020-07-10 PROCEDURE — 82330 ASSAY OF CALCIUM: CPT

## 2020-07-10 PROCEDURE — 93306 TTE W/DOPPLER COMPLETE: CPT

## 2020-07-10 PROCEDURE — 83735 ASSAY OF MAGNESIUM: CPT

## 2020-07-10 PROCEDURE — 97530 THERAPEUTIC ACTIVITIES: CPT

## 2020-07-10 PROCEDURE — 70450 CT HEAD/BRAIN W/O DYE: CPT

## 2020-07-10 PROCEDURE — 71045 X-RAY EXAM CHEST 1 VIEW: CPT

## 2020-07-10 PROCEDURE — 82962 GLUCOSE BLOOD TEST: CPT

## 2020-07-10 PROCEDURE — 92611 MOTION FLUOROSCOPY/SWALLOW: CPT

## 2020-07-10 PROCEDURE — 80053 COMPREHEN METABOLIC PANEL: CPT

## 2020-07-10 PROCEDURE — 87186 SC STD MICRODIL/AGAR DIL: CPT

## 2020-07-10 PROCEDURE — 82435 ASSAY OF BLOOD CHLORIDE: CPT

## 2020-07-10 PROCEDURE — 93880 EXTRACRANIAL BILAT STUDY: CPT

## 2020-07-10 PROCEDURE — 84132 ASSAY OF SERUM POTASSIUM: CPT

## 2020-07-10 PROCEDURE — 81001 URINALYSIS AUTO W/SCOPE: CPT

## 2020-07-10 PROCEDURE — 82803 BLOOD GASES ANY COMBINATION: CPT

## 2020-07-10 PROCEDURE — 83880 ASSAY OF NATRIURETIC PEPTIDE: CPT

## 2020-07-10 PROCEDURE — 83036 HEMOGLOBIN GLYCOSYLATED A1C: CPT

## 2020-07-10 PROCEDURE — 86769 SARS-COV-2 COVID-19 ANTIBODY: CPT

## 2020-07-10 PROCEDURE — 74230 X-RAY XM SWLNG FUNCJ C+: CPT

## 2020-07-10 PROCEDURE — 84439 ASSAY OF FREE THYROXINE: CPT

## 2020-07-10 PROCEDURE — 97161 PT EVAL LOW COMPLEX 20 MIN: CPT

## 2020-07-10 PROCEDURE — 80048 BASIC METABOLIC PNL TOTAL CA: CPT

## 2020-07-10 PROCEDURE — 84295 ASSAY OF SERUM SODIUM: CPT

## 2020-07-10 PROCEDURE — 84443 ASSAY THYROID STIM HORMONE: CPT

## 2020-07-10 PROCEDURE — 82947 ASSAY GLUCOSE BLOOD QUANT: CPT

## 2020-07-10 PROCEDURE — 87086 URINE CULTURE/COLONY COUNT: CPT

## 2020-07-10 PROCEDURE — 85014 HEMATOCRIT: CPT

## 2020-07-10 PROCEDURE — 84484 ASSAY OF TROPONIN QUANT: CPT

## 2020-07-10 PROCEDURE — 99285 EMERGENCY DEPT VISIT HI MDM: CPT | Mod: 25

## 2020-07-10 PROCEDURE — 97116 GAIT TRAINING THERAPY: CPT

## 2020-07-10 PROCEDURE — 93005 ELECTROCARDIOGRAM TRACING: CPT

## 2020-07-10 PROCEDURE — 85027 COMPLETE CBC AUTOMATED: CPT

## 2020-07-10 PROCEDURE — 83605 ASSAY OF LACTIC ACID: CPT

## 2020-07-10 PROCEDURE — 82550 ASSAY OF CK (CPK): CPT

## 2020-07-10 PROCEDURE — 92610 EVALUATE SWALLOWING FUNCTION: CPT

## 2020-07-10 RX ORDER — INSULIN LISPRO 100/ML
10 VIAL (ML) SUBCUTANEOUS
Refills: 0 | Status: DISCONTINUED | OUTPATIENT
Start: 2020-07-10 | End: 2020-07-10

## 2020-07-10 RX ORDER — INSULIN GLARGINE 100 [IU]/ML
18 INJECTION, SOLUTION SUBCUTANEOUS AT BEDTIME
Refills: 0 | Status: DISCONTINUED | OUTPATIENT
Start: 2020-07-10 | End: 2020-07-10

## 2020-07-10 RX ORDER — LOSARTAN POTASSIUM 100 MG/1
1 TABLET, FILM COATED ORAL
Qty: 0 | Refills: 0 | DISCHARGE

## 2020-07-10 RX ORDER — INSULIN ASPART 100 [IU]/ML
6 INJECTION, SOLUTION SUBCUTANEOUS
Qty: 0 | Refills: 0 | DISCHARGE

## 2020-07-10 RX ORDER — LABETALOL HCL 100 MG
1 TABLET ORAL
Qty: 60 | Refills: 0
Start: 2020-07-10 | End: 2020-08-08

## 2020-07-10 RX ORDER — METOPROLOL TARTRATE 50 MG
1 TABLET ORAL
Qty: 0 | Refills: 0 | DISCHARGE

## 2020-07-10 RX ORDER — INSULIN GLARGINE 100 [IU]/ML
12 INJECTION, SOLUTION SUBCUTANEOUS
Qty: 0 | Refills: 0 | DISCHARGE

## 2020-07-10 RX ADMIN — PANTOPRAZOLE SODIUM 40 MILLIGRAM(S): 20 TABLET, DELAYED RELEASE ORAL at 06:10

## 2020-07-10 RX ADMIN — Medication 5 MILLIGRAM(S): at 06:10

## 2020-07-10 RX ADMIN — HEPARIN SODIUM 5000 UNIT(S): 5000 INJECTION INTRAVENOUS; SUBCUTANEOUS at 06:09

## 2020-07-10 RX ADMIN — Medication 2: at 08:35

## 2020-07-10 RX ADMIN — Medication 10 UNIT(S): at 12:23

## 2020-07-10 RX ADMIN — Medication 1: at 12:23

## 2020-07-10 RX ADMIN — Medication 9 UNIT(S): at 08:35

## 2020-07-10 RX ADMIN — Medication 100 MILLIGRAM(S): at 06:10

## 2020-07-10 RX ADMIN — Medication 2 MILLIGRAM(S): at 12:23

## 2020-07-10 NOTE — PROGRESS NOTE ADULT - SUBJECTIVE AND OBJECTIVE BOX
Subjective: Patient seen and examined. No new events except as noted.     SUBJECTIVE/ROS:  feels ok   No chest pain, dyspnea, palpitation, or dizziness.       MEDICATIONS:  MEDICATIONS  (STANDING):  cloNIDine 0.1 milliGRAM(s) Oral at bedtime  dextrose 5%. 1000 milliLiter(s) (50 mL/Hr) IV Continuous <Continuous>  dextrose 50% Injectable 12.5 Gram(s) IV Push once  dextrose 50% Injectable 25 Gram(s) IV Push once  dextrose 50% Injectable 25 Gram(s) IV Push once  folic acid 2 milliGRAM(s) Oral daily  heparin   Injectable 5000 Unit(s) SubCutaneous every 12 hours  insulin glargine Injectable (LANTUS) 15 Unit(s) SubCutaneous at bedtime  insulin lispro (HumaLOG) corrective regimen sliding scale   SubCutaneous three times a day before meals  insulin lispro (HumaLOG) corrective regimen sliding scale   SubCutaneous at bedtime  insulin lispro Injectable (HumaLOG) 9 Unit(s) SubCutaneous three times a day before meals  labetalol 100 milliGRAM(s) Oral two times a day  pantoprazole    Tablet 40 milliGRAM(s) Oral before breakfast  predniSONE   Tablet 2.5 milliGRAM(s) Oral at bedtime  predniSONE   Tablet 5 milliGRAM(s) Oral daily  sodium chloride 0.45%. 1000 milliLiter(s) (60 mL/Hr) IV Continuous <Continuous>      PHYSICAL EXAM:  T(C): 36.7 (07-10-20 @ 05:21), Max: 36.9 (07-09-20 @ 12:16)  HR: 88 (07-10-20 @ 05:21) (76 - 90)  BP: 122/66 (07-10-20 @ 05:21) (114/52 - 135/84)  RR: 16 (07-10-20 @ 05:21) (16 - 16)  SpO2: 96% (07-10-20 @ 05:21) (94% - 98%)  Wt(kg): --  I&O's Summary    08 Jul 2020 07:01  -  09 Jul 2020 07:00  --------------------------------------------------------  IN: 1140 mL / OUT: 600 mL / NET: 540 mL    09 Jul 2020 07:01  -  10 Jul 2020 06:54  --------------------------------------------------------  IN: 540 mL / OUT: 450 mL / NET: 90 mL            JVP: Normal  Neck: supple  Lung: clear   CV: S1 S2 , Murmur:  Abd: soft  Ext: No edema  neuro: Awake / alert  Psych: flat affect  Skin: normal``    LABS/DATA:    CARDIAC MARKERS:                  proBNP:   Lipid Profile:   HgA1c:   TSH:     TELE:  EKG:

## 2020-07-10 NOTE — PROGRESS NOTE ADULT - SUBJECTIVE AND OBJECTIVE BOX
[facility]      Seen earlier today, and examined.  - Today, patient is without complaints.           *****MEDICATIONS: Current medication reviewed and documented.    MEDICATIONS  (STANDING):  cloNIDine 0.1 milliGRAM(s) Oral at bedtime  dextrose 5%. 1000 milliLiter(s) (50 mL/Hr) IV Continuous <Continuous>  dextrose 50% Injectable 12.5 Gram(s) IV Push once  dextrose 50% Injectable 25 Gram(s) IV Push once  dextrose 50% Injectable 25 Gram(s) IV Push once  folic acid 2 milliGRAM(s) Oral daily  heparin   Injectable 5000 Unit(s) SubCutaneous every 12 hours  insulin glargine Injectable (LANTUS) 18 Unit(s) SubCutaneous at bedtime  insulin lispro (HumaLOG) corrective regimen sliding scale   SubCutaneous three times a day before meals  insulin lispro (HumaLOG) corrective regimen sliding scale   SubCutaneous at bedtime  insulin lispro Injectable (HumaLOG) 10 Unit(s) SubCutaneous three times a day before meals  labetalol 100 milliGRAM(s) Oral two times a day  pantoprazole    Tablet 40 milliGRAM(s) Oral before breakfast  predniSONE   Tablet 2.5 milliGRAM(s) Oral at bedtime  predniSONE   Tablet 5 milliGRAM(s) Oral daily  sodium chloride 0.45%. 1000 milliLiter(s) (60 mL/Hr) IV Continuous <Continuous>    MEDICATIONS  (PRN):  dextrose 40% Gel 15 Gram(s) Oral once PRN Blood Glucose LESS THAN 70 milliGRAM(s)/deciliter  glucagon  Injectable 1 milliGRAM(s) IntraMuscular once PRN Glucose LESS THAN 70 milligrams/deciliter  ondansetron Injectable 4 milliGRAM(s) IV Push every 6 hours PRN Nausea and/or Vomiting          ***** VITAL SIGNS:  T(F): 97.8 (07-10-20 @ 12:43), Max: 98.2 (20 @ 22:06)  HR: 80 (07-10-20 @ 12:43) (80 - 90)  BP: 121/70 (07-10-20 @ 12:43) (114/52 - 127/70)  RR: 16 (07-10-20 @ 12:43) (16 - 16)  SpO2: 98% (07-10-20 @ 12:43) (94% - 98%)  Wt(kg): --  ,   I&O's Summary    2020 07:01  -  10 Jul 2020 07:00  --------------------------------------------------------  IN: 540 mL / OUT: 450 mL / NET: 90 mL    10 Jul 2020 07:01  -  10 Jul 2020 18:37  --------------------------------------------------------  IN: 240 mL / OUT: 0 mL / NET: 240 mL             *****PHYSICAL EXAM:   Alert oriented x 3   Attention comprehension are fair. Able to name, repeat, read without any difficulty.   Able to follow 3 step commands.   flat affect   EOMI fundi not visualized,  VFF to confrontration  No facial asymmetry   Tongue is midline dysarthria   Palate elevates symmetrically   Moving all 4 ext symmetrically no pronator drift   r cogwheeling   intention tremor noted r > l   Reflexes are diminished hroughout   sensation is grossly symmetric  Gait : not assessed.  B/L down going toes          *****LAB AND IMAGIN.3    8.65  )-----------( 221      ( 10 Jul 2020 11:06 )             30.0               07-10    137  |  103  |  36<H>  ----------------------------<  294<H>  3.8   |  23  |  1.33<H>    Ca    8.5      10 Jul 2020 11:06                           [All pertinent recent Imaging/Reports reviewed]           87F with PMHx of rheumatoid arthritis, diabetes mellitus, HTN, moderate aortic stenosis, CKD2, and interstitial lung disease admitted wiht syncope/.. pt is nt able to porivde evenets excepts saying taht room was too " warm ": and she was trying to get up and next thing she knew ambulance was there., As francisco javier chart : as per granddaughter when daughter of pt went outside found pt "slumped over in chair" and minimally responsive for a few minutes, called EMS and pt slowly came to. Before this was in usual state of health. Pt denies CP , palpitations .. ,mild SOB yesterday and naseous today but no vomitting or diarreah , no abdominal pain and no urinary sx ( freuency ?? new ?    no fever or chills   no cough  Problem/Recommendations 1:  sycope of unclear etiology   given hx of moderate aortic stenosis, likely related to low bp   syncope w/u underway   pt eval   orthostatics     Problem/Recommendations 2: tremor noted ( ongoing for yr)   action tremor   + cogwheeling   likely essential tremor, cannot r/o parkinsons variant   will continue to monitor   recommend follow up with Dr. Andrey Mitchell      pts  at beside discussed getting weighted instruments.         Thank you for allowing me to participate in the care of this patient. Please do not hesitate to call me if you have any  questions.        ________________  Liv Delgado MD  [facility] ([facility]  [phone]      33 minutes spent on total encounter; more than 50 % of the visit was  spent counseling about plan of care, compliance to diet/exercise and medication regimen and or  coordinating care by the attending physician.      It is advised that stroke patients follow up with TIFFANIE Dominguez @ [phone] in 1- 2 weeks.   Others please follow up with Dr. Michael Nissenbaum [phone]

## 2020-07-10 NOTE — PROGRESS NOTE ADULT - ASSESSMENT
HTN with O.H  cont current meds  monitor orthostatic vitals today   OOB to chair   compression stockings     AS  stable     ILD  on steroids      Advanced care planning was discussed with patient and family.  Risks, benefits and alternatives of the cardiac treatments and medical therapy including procedures were discussed in detail and all questions were answered. Importance of compliance with medical therapy and lifestyle modification to improve cardiovascular health were addressed. Appropriate forms and patient educational materials were reviewed. 30 minutes face to face spent.

## 2020-07-10 NOTE — PROGRESS NOTE ADULT - PROBLEM SELECTOR PLAN 4
Continue meds, monitoring, FU primary team.

## 2020-07-10 NOTE — PROGRESS NOTE ADULT - PROBLEM SELECTOR PLAN 5
Suggest to continue medications, monitoring, FU primary team recommendations.

## 2020-07-10 NOTE — PROGRESS NOTE ADULT - PROBLEM SELECTOR PROBLEM 2
Subclinical hypothyroidism

## 2020-07-10 NOTE — PROGRESS NOTE ADULT - ASSESSMENT
Assessment  DMT2: 88y Female with DM T2 with hyperglycemia, A1C 7%, was on oral meds and insulin at home, now on basal bolus insulin, increased dose yesterday, blood sugars fluctuating, running high, no hypoglycemic episodes. Patient is eating meals, appears alert and comfortable, planning DC home today.  Hypothyroid: Patient has no history thyroid disease, was not on any thyroid supplements, subclinical hypothyroid, FT4 in acceptable range.  Syncope: +orthostatics, workup in progress, FU Cardiology.  ILD: on prednisone  HTN: Controlled,  on antihypertensive medications.          Karan Akbar MD  Cell: [phone]  Office: [phone] Assessment  DMT2: 88y Female with DM T2 with hyperglycemia, A1C 7%, was on oral meds and insulin at home, now on basal bolus insulin, increased dose yesterday,  blood sugars fluctuating, running high, no hypoglycemic episodes. Patient is eating meals, appears alert and comfortable, planning DC home today.  Hypothyroid: Patient has no history thyroid disease, was not on any thyroid supplements, subclinical hypothyroid, FT4 in acceptable range.  Syncope: +orthostatics, workup in progress, FU Cardiology.  ILD: on prednisone  HTN: Controlled,  on antihypertensive medications.          Karan Akbar MD  Cell: [phone]  Office: [phone]

## 2020-07-10 NOTE — PROGRESS NOTE ADULT - ATTENDING COMMENTS
Patient seen and exam today at bedside. Chart, labs, vitals, radiology reviewed. Above H&P reviewed and edited where appropriate.  Agree with history and physical exam. Agree with assessment and plan.

## 2020-07-10 NOTE — PROGRESS NOTE ADULT - PROBLEM SELECTOR PLAN 2
Subclinical; Free T4 in acceptable range. No need for management at this point.  Suggest to FU endo 4-6 weeks to repeat TFTs.

## 2020-07-10 NOTE — PROGRESS NOTE ADULT - PROBLEM SELECTOR PLAN 1
Patient not a candidate for tight glycemic control given her age. Will increase basal bolus insulin regimen conservatively.  Will increase Lantus to 18u at bedtime.  Will increase Humalog to 10u before each meal and continue Humalog correction scale coverage ac/hs. Will continue monitoring FS, log, and FU.  Patient was on insulin at home, suggest DC on current insulin regimen and FU endo 4 weeks.  Discussed plan with patient and . Counseled for compliance with consistent low carb diet.

## 2020-07-10 NOTE — PROGRESS NOTE ADULT - PROBLEM SELECTOR PROBLEM 4
History of interstitial lung disease

## 2020-07-10 NOTE — PROGRESS NOTE ADULT - SUBJECTIVE AND OBJECTIVE BOX
Chief complaint  Patient is a 88y old  Female who presents with a chief complaint of  Review of systems  Patient in bed, looks comfortable, no hypoglycemic episodes.    Labs and Fingersticks  CAPILLARY BLOOD GLUCOSE      POCT Blood Glucose.: 239 mg/dL (10 Jul 2020 07:50)  POCT Blood Glucose.: 199 mg/dL (09 Jul 2020 22:15)  POCT Blood Glucose.: 186 mg/dL (09 Jul 2020 17:21)  POCT Blood Glucose.: 286 mg/dL (09 Jul 2020 12:08)                        Medications  MEDICATIONS  (STANDING):  cloNIDine 0.1 milliGRAM(s) Oral at bedtime  dextrose 5%. 1000 milliLiter(s) (50 mL/Hr) IV Continuous <Continuous>  dextrose 50% Injectable 12.5 Gram(s) IV Push once  dextrose 50% Injectable 25 Gram(s) IV Push once  dextrose 50% Injectable 25 Gram(s) IV Push once  folic acid 2 milliGRAM(s) Oral daily  heparin   Injectable 5000 Unit(s) SubCutaneous every 12 hours  insulin glargine Injectable (LANTUS) 18 Unit(s) SubCutaneous at bedtime  insulin lispro (HumaLOG) corrective regimen sliding scale   SubCutaneous three times a day before meals  insulin lispro (HumaLOG) corrective regimen sliding scale   SubCutaneous at bedtime  insulin lispro Injectable (HumaLOG) 10 Unit(s) SubCutaneous three times a day before meals  labetalol 100 milliGRAM(s) Oral two times a day  pantoprazole    Tablet 40 milliGRAM(s) Oral before breakfast  predniSONE   Tablet 2.5 milliGRAM(s) Oral at bedtime  predniSONE   Tablet 5 milliGRAM(s) Oral daily  sodium chloride 0.45%. 1000 milliLiter(s) (60 mL/Hr) IV Continuous <Continuous>      Physical Exam  General: Patient comfortable in bed  Vital Signs Last 12 Hrs  T(F): 98 (07-10-20 @ 05:21), Max: 98 (07-10-20 @ 05:21)  HR: 88 (07-10-20 @ 05:21) (88 - 88)  BP: 122/66 (07-10-20 @ 05:21) (122/66 - 122/66)  BP(mean): --  RR: 16 (07-10-20 @ 05:21) (16 - 16)  SpO2: 96% (07-10-20 @ 05:21) (96% - 96%)  Neck: No palpable thyroid nodules.  CVS: S1S2, No murmurs  Respiratory: No wheezing, no crepitations  GI: Abdomen soft, bowel sounds positive  Musculoskeletal:  edema lower extremities.   Skin: No skin rashes, no ecchymosis    Diagnostics Chief complaint  Patient is a 88y old  Female who presents with a chief complaint of  Review of systems  Patient in bed, looks comfortable, no hypoglycemic episodes.    Labs and Fingersticks  CAPILLARY BLOOD GLUCOSE      POCT Blood Glucose.: 239 mg/dL (10 Jul 2020 07:50)  POCT Blood Glucose.: 199 mg/dL (09 Jul 2020 22:15)  POCT Blood Glucose.: 186 mg/dL (09 Jul 2020 17:21)  POCT Blood Glucose.: 286 mg/dL (09 Jul 2020 12:08)    Medications  MEDICATIONS  (STANDING):  cloNIDine 0.1 milliGRAM(s) Oral at bedtime  dextrose 5%. 1000 milliLiter(s) (50 mL/Hr) IV Continuous <Continuous>  dextrose 50% Injectable 12.5 Gram(s) IV Push once  dextrose 50% Injectable 25 Gram(s) IV Push once  dextrose 50% Injectable 25 Gram(s) IV Push once  folic acid 2 milliGRAM(s) Oral daily  heparin   Injectable 5000 Unit(s) SubCutaneous every 12 hours  insulin glargine Injectable (LANTUS) 18 Unit(s) SubCutaneous at bedtime  insulin lispro (HumaLOG) corrective regimen sliding scale   SubCutaneous three times a day before meals  insulin lispro (HumaLOG) corrective regimen sliding scale   SubCutaneous at bedtime  insulin lispro Injectable (HumaLOG) 10 Unit(s) SubCutaneous three times a day before meals  labetalol 100 milliGRAM(s) Oral two times a day  pantoprazole    Tablet 40 milliGRAM(s) Oral before breakfast  predniSONE   Tablet 2.5 milliGRAM(s) Oral at bedtime  predniSONE   Tablet 5 milliGRAM(s) Oral daily  sodium chloride 0.45%. 1000 milliLiter(s) (60 mL/Hr) IV Continuous <Continuous>      Physical Exam  General: Patient comfortable in bed  Vital Signs Last 12 Hrs  T(F): 98 (07-10-20 @ 05:21), Max: 98 (07-10-20 @ 05:21)  HR: 88 (07-10-20 @ 05:21) (88 - 88)  BP: 122/66 (07-10-20 @ 05:21) (122/66 - 122/66)  BP(mean): --  RR: 16 (07-10-20 @ 05:21) (16 - 16)  SpO2: 96% (07-10-20 @ 05:21) (96% - 96%)  Neck: No palpable thyroid nodules.  CVS: S1S2, No murmurs  Respiratory: No wheezing, no crepitations  GI: Abdomen soft, bowel sounds positive  Musculoskeletal:  edema lower extremities.   Skin: No skin rashes, no ecchymosis    Diagnostics

## 2020-07-10 NOTE — PROGRESS NOTE ADULT - PROVIDER SPECIALTY LIST ADULT
Cardiology
Endocrinology
Internal Medicine
Internal Medicine
Neurology
Neurology
Internal Medicine
Cardiology
Internal Medicine
Internal Medicine
Endocrinology

## 2020-08-31 NOTE — ED ADULT NURSE NOTE - PRO INTERPRETER NEED 2
[facility]                Phone: [phone]   Fax: [phone]    Physical Therapy Daily Treatment Note  Date:  2020    Patient Name:  Sydnee Lara    :  1987  MRN: [id number]    Evaluating therapist:  Consuello Harada, MPT               (20)  Restrictions/Precautions:    Diagnosis:  s/p cervical laminectomy/fusion C3-T1                (19)  Treatment Diagnosis:    Insurance/Certification information:  [id number] [place] [address],[address]  Referring Practitioner:  Marjorie Orona of care signed (Y/N):    Visit# / total visits:  -10  Pain level: 10/10   Time In:  924  Time Out:  1041    Subjective:      Exercises:  Exercise/Equipment Resistance/Repetitions Other comments   StepOne with arms  10 min            corner st 3x20s    upper trap st 3x20s           shrugs 15x3s    scap ret 15x3s             cervical:  flex/ext    15x3s                    rot  15x3s    pulleys for flex 5 min            airdyne  (arms only) 5 min            seated hip abd  [id number]                      flex [id number]                knee ext [id number]         toe raises  2x15    step ups 2x10x6\"           Other Therapeutic Activities:      Home Exercise Program:  provided 20    Manual Treatments:      Modalities:  IFC/MH to neck/upper back x 15 min     Timed Code Treatment Minutes: Total Treatment Minutes:      Treatment/Activity Tolerance:  [] Patient tolerated treatment well [] Patient limited by fatique  [] Patient limited by pain  [] Patient limited by other medical complications  [] Other:     Prognosis: [] Good [] Fair  [] Poor    Patient Requires Follow-up: [] Yes  [] No    Plan:   [] Continue per plan of care [] Alter current plan (see comments)  [] Plan of care initiated [] Hold pending MD visit [] Discharge  Plan for Next Session:      See Weekly Progress Note: []  Yes  []  No  Next due:        Electronically signed by:   Arnav Camacho
English

## 2020-10-15 NOTE — ED PROVIDER NOTE - CPE EDP MUSC NORM
normal...
No. KRISSY screening performed.  STOP BANG Legend: 0-2 = LOW Risk; 3-4 = INTERMEDIATE Risk; 5-8 = HIGH Risk

## 2020-11-07 NOTE — PATIENT PROFILE ADULT. - TEACHING/LEARNING DEVELOPMENTAL CONSIDERATIONS
Group Therapy Note    Date: 11/7/2020    Group Start Time: 1400  Group End Time: 1500  Group Topic: Recovery    [facility]    Darryle Merritts             Patient's Goal: Identifying Triggers    Notes: Pt shared some of their triggers and discussed how important it is to identify the triggers and come up with some ways to avoid them if possible. Pt acknowledged that they must try and figure out what is causing these triggers and work through it to try and stay on track with their mental health. Status After Intervention:  Unchanged    Participation Level:  Active Listener and Interactive    Participation Quality: Appropriate and Attentive      Speech:  normal      Thought Process/Content: Logical      Affective Functioning: Congruent      Mood: euthymic      Level of consciousness:  Alert, Oriented x4 and Attentive      Response to Learning: Able to verbalize/acknowledge new learning      Endings: None Reported    Modes of Intervention: Support and Exploration      Discipline Responsible: Psychoeducational Specialist      Signature:  Darryle Merritts
none

## 2021-02-02 NOTE — PROGRESS NOTE ADULT - SUBJECTIVE AND OBJECTIVE BOX
Patient is a 87y old  Female who presents with a chief complaint of Weakness (08 Aug 2019 16:48)                                                               INTERVAL HPI/OVERNIGHT EVENTS:    REVIEW OF SYSTEMS:     CONSTITUTIONAL: No weakness, fevers or chills  EYES/ENT: No visual changes , no ear ache   NECK: No pain or stiffness  RESPIRATORY: No cough, wheezing,  No shortness of breath  CARDIOVASCULAR: No chest pain or palpitations  GASTROINTESTINAL: No abdominal pain  . No nausea, vomiting, or hematemesis; No diarrhea or constipation. No melena or hematochezia.  GENITOURINARY: No dysuria, frequency or hematuria  NEUROLOGICAL: No numbness or weakness  SKIN: No itching, burning, rashes, or lesions                                                                                                                                                                                                                                                                                 Medications:  MEDICATIONS  (STANDING):  buDESOnide 160 MICROgram(s)/formoterol 4.5 MICROgram(s) Inhaler 2 Puff(s) Inhalation two times a day  calcium carbonate   1250 mG (OsCal) 1 Tablet(s) Oral daily  chlorhexidine 2% Cloths 1 Application(s) Topical daily  cholecalciferol 1000 Unit(s) Oral daily  dextrose 50% Injectable 12.5 Gram(s) IV Push once  dextrose 50% Injectable 25 Gram(s) IV Push once  dextrose 50% Injectable 25 Gram(s) IV Push once  enoxaparin Injectable 60 milliGRAM(s) SubCutaneous two times a day  folic acid 1 milliGRAM(s) Oral daily  insulin glargine Injectable (LANTUS) 12 Unit(s) SubCutaneous at bedtime  insulin lispro (HumaLOG) corrective regimen sliding scale   SubCutaneous three times a day before meals  insulin lispro Injectable (HumaLOG) 2 Unit(s) SubCutaneous before breakfast  insulin lispro Injectable (HumaLOG) 2 Unit(s) SubCutaneous before lunch  insulin lispro Injectable (HumaLOG) 4 Unit(s) SubCutaneous before dinner  lactated ringers. 1000 milliLiter(s) (60 mL/Hr) IV Continuous <Continuous>  nystatin Powder 1 Application(s) Topical two times a day    MEDICATIONS  (PRN):  ALBUTerol    0.083% 2.5 milliGRAM(s) Nebulizer every 6 hours PRN Shortness of Breath and/or Wheezing  dextrose 40% Gel 15 Gram(s) Oral once PRN Blood Glucose LESS THAN 70 milliGRAM(s)/deciliter  glucagon  Injectable 1 milliGRAM(s) IntraMuscular once PRN Glucose LESS THAN 70 milligrams/deciliter  guaiFENesin   Syrup  (Sugar-Free) 200 milliGRAM(s) Oral every 6 hours PRN Cough  ibuprofen  Tablet. 200 milliGRAM(s) Oral two times a day PRN Mild Pain (1 - 3), Moderate Pain (4 - 6), Severe Pain (7 - 10)       Allergies    No Known Allergies    Intolerances      Vital Signs Last 24 Hrs  T(C): 36.6 (08 Aug 2019 07:59), Max: 36.8 (08 Aug 2019 00:30)  T(F): 97.9 (08 Aug 2019 07:59), Max: 98.2 (08 Aug 2019 00:30)  HR: 88 (08 Aug 2019 07:59) (88 - 102)  BP: 130/74 (08 Aug 2019 07:59) (130/74 - 165/82)  BP(mean): --  RR: 18 (08 Aug 2019 07:59) (18 - 18)  SpO2: 94% (08 Aug 2019 07:59) (94% - 96%)  CAPILLARY BLOOD GLUCOSE      POCT Blood Glucose.: 151 mg/dL (08 Aug 2019 17:30)  POCT Blood Glucose.: 148 mg/dL (08 Aug 2019 14:09)  POCT Blood Glucose.: 127 mg/dL (08 Aug 2019 08:30)  POCT Blood Glucose.: 136 mg/dL (08 Aug 2019 04:12)  POCT Blood Glucose.: 216 mg/dL (07 Aug 2019 21:14)      08-07 @ 07:01  -  08-08 @ 07:00  --------------------------------------------------------  IN: 280 mL / OUT: 0 mL / NET: 280 mL      Physical Exam:    Daily     Daily   General:  Well appearing, NAD, not cachetic  HEENT:  Nonicteric, PERRLA  CV:  RRR, S1S2   Lungs:  CTA B/L, no wheezes, rales, rhonchi  Abdomen:  Soft, non-tender, no distended, positive BS  Extremities:  2+ pulses, no c/c, no edema  Skin:  Warm and dry, no rashes  :  No mason  Neuro:  AAOx3, non-focal, grossly intact                                                                                                                                                                                                                                                                                                LABS:                               7.6    2.74  )-----------( 266      ( 08 Aug 2019 12:54 )             23.5                      08-08    139  |  103  |  18  ----------------------------<  116<H>  3.7   |  24  |  1.10    Ca    9.2      08 Aug 2019 08:25                         RADIOLOGY & ADDITIONAL TESTS         I personally reviewed: [  ]EKG   [  ]CXR    [  ] CT      A/P:         Discussed with :     Stefani consultants' Notes   Time spent : Patient is a 87y old  Female who presents with a chief complaint of Weakness (08 Aug 2019 16:48)                                                               INTERVAL HPI/OVERNIGHT EVENTS:    REVIEW OF SYSTEMS:     CONSTITUTIONAL: No weakness, fevers or chills  RESPIRATORY:imrpvoed cough, wheezing,  No shortness of breath  CARDIOVASCULAR: No chest pain or palpitations  GASTROINTESTINAL: No abdominal pain  . No nausea, vomiting, or hematemesis; No diarrhea or constipation. No melena or hematochezia.  GENITOURINARY: No dysuria, frequency or hematuria  NEUROLOGICAL: No numbness or weakness                                                                                                                                                                                                                                                                               Medications:  MEDICATIONS  (STANDING):  buDESOnide 160 MICROgram(s)/formoterol 4.5 MICROgram(s) Inhaler 2 Puff(s) Inhalation two times a day  calcium carbonate   1250 mG (OsCal) 1 Tablet(s) Oral daily  chlorhexidine 2% Cloths 1 Application(s) Topical daily  cholecalciferol 1000 Unit(s) Oral daily  dextrose 50% Injectable 12.5 Gram(s) IV Push once  dextrose 50% Injectable 25 Gram(s) IV Push once  dextrose 50% Injectable 25 Gram(s) IV Push once  enoxaparin Injectable 60 milliGRAM(s) SubCutaneous two times a day  folic acid 1 milliGRAM(s) Oral daily  insulin glargine Injectable (LANTUS) 12 Unit(s) SubCutaneous at bedtime  insulin lispro (HumaLOG) corrective regimen sliding scale   SubCutaneous three times a day before meals  insulin lispro Injectable (HumaLOG) 2 Unit(s) SubCutaneous before breakfast  insulin lispro Injectable (HumaLOG) 2 Unit(s) SubCutaneous before lunch  insulin lispro Injectable (HumaLOG) 4 Unit(s) SubCutaneous before dinner  lactated ringers. 1000 milliLiter(s) (60 mL/Hr) IV Continuous <Continuous>  nystatin Powder 1 Application(s) Topical two times a day    MEDICATIONS  (PRN):  ALBUTerol    0.083% 2.5 milliGRAM(s) Nebulizer every 6 hours PRN Shortness of Breath and/or Wheezing  dextrose 40% Gel 15 Gram(s) Oral once PRN Blood Glucose LESS THAN 70 milliGRAM(s)/deciliter  glucagon  Injectable 1 milliGRAM(s) IntraMuscular once PRN Glucose LESS THAN 70 milligrams/deciliter  guaiFENesin   Syrup  (Sugar-Free) 200 milliGRAM(s) Oral every 6 hours PRN Cough  ibuprofen  Tablet. 200 milliGRAM(s) Oral two times a day PRN Mild Pain (1 - 3), Moderate Pain (4 - 6), Severe Pain (7 - 10)       Allergies    No Known Allergies    Intolerances      Vital Signs Last 24 Hrs  T(C): 36.6 (08 Aug 2019 07:59), Max: 36.8 (08 Aug 2019 00:30)  T(F): 97.9 (08 Aug 2019 07:59), Max: 98.2 (08 Aug 2019 00:30)  HR: 88 (08 Aug 2019 07:59) (88 - 102)  BP: 130/74 (08 Aug 2019 07:59) (130/74 - 165/82)  BP(mean): --  RR: 18 (08 Aug 2019 07:59) (18 - 18)  SpO2: 94% (08 Aug 2019 07:59) (94% - 96%)  CAPILLARY BLOOD GLUCOSE      POCT Blood Glucose.: 151 mg/dL (08 Aug 2019 17:30)  POCT Blood Glucose.: 148 mg/dL (08 Aug 2019 14:09)  POCT Blood Glucose.: 127 mg/dL (08 Aug 2019 08:30)  POCT Blood Glucose.: 136 mg/dL (08 Aug 2019 04:12)  POCT Blood Glucose.: 216 mg/dL (07 Aug 2019 21:14)      08-07 @ 07:01  -  08-08 @ 07:00  --------------------------------------------------------  IN: 280 mL / OUT: 0 mL / NET: 280 mL      Physical Exam:    General: NAD   HEENT:  Nonicteric, PERRLA  CV:  RRR, S1S2   Lungs:  crackles at bases   Abdomen:  Soft, non-tender, no distended, positive BS  Extremities:  2+ pulses, no c/c, no edema  Skin:  Warm and dry, no rashes  :  No mason  Neuro:  AAOx3, non-focal, grossly intact                                                                                                                                                                                                                                                                                                LABS:                               7.6    2.74  )-----------( 266      ( 08 Aug 2019 12:54 )             23.5                      08-08    139  |  103  |  18  ----------------------------<  116<H>  3.7   |  24  |  1.10    Ca    9.2      08 Aug 2019 08:25 No

## 2021-05-21 ENCOUNTER — INPATIENT (INPATIENT)
Facility: HOSPITAL | Age: 86
LOS: 4 days | Discharge: HOME | DRG: 638 | End: 2021-05-26
Attending: GENERAL ACUTE CARE HOSPITAL | Admitting: GENERAL ACUTE CARE HOSPITAL
Payer: MEDICARE

## 2021-05-21 VITALS
HEART RATE: 86 BPM | DIASTOLIC BLOOD PRESSURE: 80 MMHG | TEMPERATURE: 98 F | HEIGHT: 60 IN | RESPIRATION RATE: 20 BRPM | SYSTOLIC BLOOD PRESSURE: 131 MMHG | WEIGHT: 134.92 LBS

## 2021-05-21 DIAGNOSIS — H26.40 UNSPECIFIED SECONDARY CATARACT: Chronic | ICD-10-CM

## 2021-05-21 DIAGNOSIS — I10 ESSENTIAL (PRIMARY) HYPERTENSION: ICD-10-CM

## 2021-05-21 DIAGNOSIS — N30.00 ACUTE CYSTITIS WITHOUT HEMATURIA: ICD-10-CM

## 2021-05-21 DIAGNOSIS — R55 SYNCOPE AND COLLAPSE: ICD-10-CM

## 2021-05-21 DIAGNOSIS — M06.9 RHEUMATOID ARTHRITIS, UNSPECIFIED: ICD-10-CM

## 2021-05-21 DIAGNOSIS — N97.1 FEMALE INFERTILITY OF TUBAL ORIGIN: Chronic | ICD-10-CM

## 2021-05-21 DIAGNOSIS — E16.2 HYPOGLYCEMIA, UNSPECIFIED: ICD-10-CM

## 2021-05-21 DIAGNOSIS — N17.9 ACUTE KIDNEY FAILURE, UNSPECIFIED: ICD-10-CM

## 2021-05-21 DIAGNOSIS — E11.69 TYPE 2 DIABETES MELLITUS WITH OTHER SPECIFIED COMPLICATION: ICD-10-CM

## 2021-05-21 LAB
ALBUMIN SERPL ELPH-MCNC: 3.6 G/DL — SIGNIFICANT CHANGE UP (ref 3.3–5)
ALP SERPL-CCNC: 67 U/L — SIGNIFICANT CHANGE UP (ref 40–120)
ALT FLD-CCNC: 5 U/L — LOW (ref 10–45)
ANION GAP SERPL CALC-SCNC: 13 MMOL/L — SIGNIFICANT CHANGE UP (ref 5–17)
APPEARANCE UR: CLEAR — SIGNIFICANT CHANGE UP
AST SERPL-CCNC: 11 U/L — SIGNIFICANT CHANGE UP (ref 10–40)
BACTERIA # UR AUTO: ABNORMAL
BASE EXCESS BLDV CALC-SCNC: -0.3 MMOL/L — SIGNIFICANT CHANGE UP (ref -2–2)
BASOPHILS # BLD AUTO: 0.05 K/UL — SIGNIFICANT CHANGE UP (ref 0–0.2)
BASOPHILS NFR BLD AUTO: 0.4 % — SIGNIFICANT CHANGE UP (ref 0–2)
BILIRUB SERPL-MCNC: 0.4 MG/DL — SIGNIFICANT CHANGE UP (ref 0.2–1.2)
BILIRUB UR-MCNC: NEGATIVE — SIGNIFICANT CHANGE UP
BUN SERPL-MCNC: 39 MG/DL — HIGH (ref 7–23)
CA-I SERPL-SCNC: 1.24 MMOL/L — SIGNIFICANT CHANGE UP (ref 1.12–1.3)
CALCIUM SERPL-MCNC: 9.8 MG/DL — SIGNIFICANT CHANGE UP (ref 8.4–10.5)
CHLORIDE BLDV-SCNC: 110 MMOL/L — HIGH (ref 96–108)
CHLORIDE SERPL-SCNC: 107 MMOL/L — SIGNIFICANT CHANGE UP (ref 96–108)
CO2 BLDV-SCNC: 25 MMOL/L — SIGNIFICANT CHANGE UP (ref 22–30)
CO2 SERPL-SCNC: 20 MMOL/L — LOW (ref 22–31)
COLOR SPEC: YELLOW — SIGNIFICANT CHANGE UP
CREAT SERPL-MCNC: 1.47 MG/DL — HIGH (ref 0.5–1.3)
DIFF PNL FLD: NEGATIVE — SIGNIFICANT CHANGE UP
EOSINOPHIL # BLD AUTO: 0.23 K/UL — SIGNIFICANT CHANGE UP (ref 0–0.5)
EOSINOPHIL NFR BLD AUTO: 1.6 % — SIGNIFICANT CHANGE UP (ref 0–6)
EPI CELLS # UR: 3 /HPF — SIGNIFICANT CHANGE UP
GAS PNL BLDV: 138 MMOL/L — SIGNIFICANT CHANGE UP (ref 135–145)
GAS PNL BLDV: SIGNIFICANT CHANGE UP
GLUCOSE BLDC GLUCOMTR-MCNC: 126 MG/DL — HIGH (ref 70–99)
GLUCOSE BLDC GLUCOMTR-MCNC: 130 MG/DL — HIGH (ref 70–99)
GLUCOSE BLDV-MCNC: 43 MG/DL — CRITICAL LOW (ref 70–99)
GLUCOSE SERPL-MCNC: 41 MG/DL — CRITICAL LOW (ref 70–99)
GLUCOSE UR QL: ABNORMAL
HCO3 BLDV-SCNC: 24 MMOL/L — SIGNIFICANT CHANGE UP (ref 21–29)
HCT VFR BLD CALC: 34.2 % — LOW (ref 34.5–45)
HCT VFR BLDA CALC: 35 % — LOW (ref 39–50)
HGB BLD CALC-MCNC: 11.3 G/DL — LOW (ref 11.5–15.5)
HGB BLD-MCNC: 10.7 G/DL — LOW (ref 11.5–15.5)
HYALINE CASTS # UR AUTO: 4 /LPF — HIGH (ref 0–2)
IMM GRANULOCYTES NFR BLD AUTO: 0.7 % — SIGNIFICANT CHANGE UP (ref 0–1.5)
KETONES UR-MCNC: NEGATIVE — SIGNIFICANT CHANGE UP
LACTATE BLDV-MCNC: 1.9 MMOL/L — SIGNIFICANT CHANGE UP (ref 0.7–2)
LEUKOCYTE ESTERASE UR-ACNC: ABNORMAL
LYMPHOCYTES # BLD AUTO: 17.2 % — SIGNIFICANT CHANGE UP (ref 13–44)
LYMPHOCYTES # BLD AUTO: 2.41 K/UL — SIGNIFICANT CHANGE UP (ref 1–3.3)
MCHC RBC-ENTMCNC: 27.6 PG — SIGNIFICANT CHANGE UP (ref 27–34)
MCHC RBC-ENTMCNC: 31.3 GM/DL — LOW (ref 32–36)
MCV RBC AUTO: 88.1 FL — SIGNIFICANT CHANGE UP (ref 80–100)
MONOCYTES # BLD AUTO: 1.3 K/UL — HIGH (ref 0–0.9)
MONOCYTES NFR BLD AUTO: 9.3 % — SIGNIFICANT CHANGE UP (ref 2–14)
NEUTROPHILS # BLD AUTO: 9.91 K/UL — HIGH (ref 1.8–7.4)
NEUTROPHILS NFR BLD AUTO: 70.8 % — SIGNIFICANT CHANGE UP (ref 43–77)
NITRITE UR-MCNC: NEGATIVE — SIGNIFICANT CHANGE UP
NRBC # BLD: 0 /100 WBCS — SIGNIFICANT CHANGE UP (ref 0–0)
NT-PROBNP SERPL-SCNC: 436 PG/ML — HIGH (ref 0–300)
PCO2 BLDV: 41 MMHG — SIGNIFICANT CHANGE UP (ref 35–50)
PH BLDV: 7.39 — SIGNIFICANT CHANGE UP (ref 7.35–7.45)
PH UR: 6 — SIGNIFICANT CHANGE UP (ref 5–8)
PLATELET # BLD AUTO: 332 K/UL — SIGNIFICANT CHANGE UP (ref 150–400)
PO2 BLDV: 34 MMHG — SIGNIFICANT CHANGE UP (ref 25–45)
POTASSIUM BLDV-SCNC: 4.2 MMOL/L — SIGNIFICANT CHANGE UP (ref 3.5–5.3)
POTASSIUM SERPL-MCNC: 4.4 MMOL/L — SIGNIFICANT CHANGE UP (ref 3.5–5.3)
POTASSIUM SERPL-SCNC: 4.4 MMOL/L — SIGNIFICANT CHANGE UP (ref 3.5–5.3)
PROT SERPL-MCNC: 7.6 G/DL — SIGNIFICANT CHANGE UP (ref 6–8.3)
PROT UR-MCNC: ABNORMAL
RBC # BLD: 3.88 M/UL — SIGNIFICANT CHANGE UP (ref 3.8–5.2)
RBC # FLD: 14.4 % — SIGNIFICANT CHANGE UP (ref 10.3–14.5)
RBC CASTS # UR COMP ASSIST: 1 /HPF — SIGNIFICANT CHANGE UP (ref 0–4)
SAO2 % BLDV: 61 % — LOW (ref 67–88)
SARS-COV-2 RNA SPEC QL NAA+PROBE: SIGNIFICANT CHANGE UP
SODIUM SERPL-SCNC: 140 MMOL/L — SIGNIFICANT CHANGE UP (ref 135–145)
SP GR SPEC: 1.02 — SIGNIFICANT CHANGE UP (ref 1.01–1.02)
TROPONIN T, HIGH SENSITIVITY RESULT: 18 NG/L — SIGNIFICANT CHANGE UP (ref 0–51)
UROBILINOGEN FLD QL: NEGATIVE — SIGNIFICANT CHANGE UP
WBC # BLD: 14 K/UL — HIGH (ref 3.8–10.5)
WBC # FLD AUTO: 14 K/UL — HIGH (ref 3.8–10.5)
WBC UR QL: 14 /HPF — HIGH (ref 0–5)

## 2021-05-21 PROCEDURE — 99284 EMERGENCY DEPT VISIT MOD MDM: CPT | Mod: CS

## 2021-05-21 PROCEDURE — 99223 1ST HOSP IP/OBS HIGH 75: CPT

## 2021-05-21 PROCEDURE — 71045 X-RAY EXAM CHEST 1 VIEW: CPT | Mod: 26

## 2021-05-21 PROCEDURE — 93010 ELECTROCARDIOGRAM REPORT: CPT

## 2021-05-21 RX ORDER — PANTOPRAZOLE SODIUM 20 MG/1
40 TABLET, DELAYED RELEASE ORAL
Refills: 0 | Status: DISCONTINUED | OUTPATIENT
Start: 2021-05-21 | End: 2021-05-26

## 2021-05-21 RX ORDER — SODIUM CHLORIDE 9 MG/ML
500 INJECTION INTRAMUSCULAR; INTRAVENOUS; SUBCUTANEOUS ONCE
Refills: 0 | Status: COMPLETED | OUTPATIENT
Start: 2021-05-21 | End: 2021-05-21

## 2021-05-21 RX ORDER — DEXTROSE 50 % IN WATER 50 %
50 SYRINGE (ML) INTRAVENOUS ONCE
Refills: 0 | Status: COMPLETED | OUTPATIENT
Start: 2021-05-21 | End: 2021-05-21

## 2021-05-21 RX ORDER — CEFTRIAXONE 500 MG/1
1000 INJECTION, POWDER, FOR SOLUTION INTRAMUSCULAR; INTRAVENOUS ONCE
Refills: 0 | Status: COMPLETED | OUTPATIENT
Start: 2021-05-21 | End: 2021-05-21

## 2021-05-21 RX ORDER — INSULIN LISPRO 100/ML
VIAL (ML) SUBCUTANEOUS AT BEDTIME
Refills: 0 | Status: DISCONTINUED | OUTPATIENT
Start: 2021-05-21 | End: 2021-05-26

## 2021-05-21 RX ORDER — SITAGLIPTIN 50 MG/1
1 TABLET, FILM COATED ORAL
Qty: 30 | Refills: 0

## 2021-05-21 RX ORDER — ERGOCALCIFEROL 1.25 MG/1
1 CAPSULE ORAL
Qty: 0 | Refills: 0 | DISCHARGE

## 2021-05-21 RX ORDER — SODIUM CHLORIDE 9 MG/ML
1000 INJECTION INTRAMUSCULAR; INTRAVENOUS; SUBCUTANEOUS
Refills: 0 | Status: DISCONTINUED | OUTPATIENT
Start: 2021-05-21 | End: 2021-05-26

## 2021-05-21 RX ORDER — DEXTROSE 50 % IN WATER 50 %
25 SYRINGE (ML) INTRAVENOUS ONCE
Refills: 0 | Status: DISCONTINUED | OUTPATIENT
Start: 2021-05-21 | End: 2021-05-26

## 2021-05-21 RX ORDER — ALENDRONATE SODIUM 70 MG/1
1 TABLET ORAL
Qty: 0 | Refills: 0 | DISCHARGE

## 2021-05-21 RX ORDER — PANTOPRAZOLE SODIUM 20 MG/1
1 TABLET, DELAYED RELEASE ORAL
Qty: 60 | Refills: 0

## 2021-05-21 RX ORDER — METOPROLOL TARTRATE 50 MG
25 TABLET ORAL DAILY
Refills: 0 | Status: DISCONTINUED | OUTPATIENT
Start: 2021-05-21 | End: 2021-05-26

## 2021-05-21 RX ORDER — INSULIN GLARGINE 100 [IU]/ML
18 INJECTION, SOLUTION SUBCUTANEOUS
Qty: 0 | Refills: 0 | DISCHARGE

## 2021-05-21 RX ORDER — FOLIC ACID 0.8 MG
2 TABLET ORAL
Qty: 0 | Refills: 0 | DISCHARGE

## 2021-05-21 RX ORDER — METFORMIN HYDROCHLORIDE 850 MG/1
2 TABLET ORAL
Qty: 0 | Refills: 0 | DISCHARGE

## 2021-05-21 RX ORDER — SODIUM CHLORIDE 9 MG/ML
1000 INJECTION, SOLUTION INTRAVENOUS
Refills: 0 | Status: DISCONTINUED | OUTPATIENT
Start: 2021-05-21 | End: 2021-05-26

## 2021-05-21 RX ORDER — GLIMEPIRIDE 1 MG
1 TABLET ORAL
Qty: 0 | Refills: 0 | DISCHARGE

## 2021-05-21 RX ORDER — CEFTRIAXONE 500 MG/1
1000 INJECTION, POWDER, FOR SOLUTION INTRAMUSCULAR; INTRAVENOUS EVERY 24 HOURS
Refills: 0 | Status: COMPLETED | OUTPATIENT
Start: 2021-05-21 | End: 2021-05-25

## 2021-05-21 RX ORDER — DEXTROSE 50 % IN WATER 50 %
15 SYRINGE (ML) INTRAVENOUS ONCE
Refills: 0 | Status: DISCONTINUED | OUTPATIENT
Start: 2021-05-21 | End: 2021-05-26

## 2021-05-21 RX ORDER — CALCIUM CARBONATE 500(1250)
1 TABLET ORAL
Qty: 0 | Refills: 0 | DISCHARGE

## 2021-05-21 RX ORDER — INSULIN LISPRO 100/ML
VIAL (ML) SUBCUTANEOUS
Refills: 0 | Status: DISCONTINUED | OUTPATIENT
Start: 2021-05-21 | End: 2021-05-26

## 2021-05-21 RX ORDER — GLUCAGON INJECTION, SOLUTION 0.5 MG/.1ML
1 INJECTION, SOLUTION SUBCUTANEOUS ONCE
Refills: 0 | Status: DISCONTINUED | OUTPATIENT
Start: 2021-05-21 | End: 2021-05-26

## 2021-05-21 RX ORDER — INSULIN ASPART 100 [IU]/ML
10 INJECTION, SOLUTION SUBCUTANEOUS
Qty: 0 | Refills: 0 | DISCHARGE

## 2021-05-21 RX ORDER — DEXTROSE 50 % IN WATER 50 %
12.5 SYRINGE (ML) INTRAVENOUS ONCE
Refills: 0 | Status: DISCONTINUED | OUTPATIENT
Start: 2021-05-21 | End: 2021-05-26

## 2021-05-21 RX ADMIN — CEFTRIAXONE 100 MILLIGRAM(S): 500 INJECTION, POWDER, FOR SOLUTION INTRAMUSCULAR; INTRAVENOUS at 18:08

## 2021-05-21 RX ADMIN — Medication 50 MILLILITER(S): at 16:18

## 2021-05-21 RX ADMIN — SODIUM CHLORIDE 500 MILLILITER(S): 9 INJECTION INTRAMUSCULAR; INTRAVENOUS; SUBCUTANEOUS at 18:45

## 2021-05-21 RX ADMIN — SODIUM CHLORIDE 50 MILLILITER(S): 9 INJECTION INTRAMUSCULAR; INTRAVENOUS; SUBCUTANEOUS at 23:53

## 2021-05-21 RX ADMIN — CEFTRIAXONE 100 MILLIGRAM(S): 500 INJECTION, POWDER, FOR SOLUTION INTRAMUSCULAR; INTRAVENOUS at 23:53

## 2021-05-21 RX ADMIN — SODIUM CHLORIDE 500 MILLILITER(S): 9 INJECTION INTRAMUSCULAR; INTRAVENOUS; SUBCUTANEOUS at 18:08

## 2021-05-21 RX ADMIN — SODIUM CHLORIDE 50 MILLILITER(S): 9 INJECTION INTRAMUSCULAR; INTRAVENOUS; SUBCUTANEOUS at 23:42

## 2021-05-21 RX ADMIN — CEFTRIAXONE 1000 MILLIGRAM(S): 500 INJECTION, POWDER, FOR SOLUTION INTRAMUSCULAR; INTRAVENOUS at 18:45

## 2021-05-21 NOTE — ED PROVIDER NOTE - CARE PLAN
Principal Discharge DX:	Syncope  Secondary Diagnosis:	UTI (urinary tract infection)  Secondary Diagnosis:	Hypoglycemia

## 2021-05-21 NOTE — ED PROVIDER NOTE - OBJECTIVE STATEMENT
89yo female pt with PMHx of RA, DM, HTN, Moderate Aortic Stenosis, CKD2, and Interstitial Lung Disease was brought to ED by EMS for syncopal episode at home today. 88yo female pt with PMHx of RA, DM, HTN, Moderate Aortic Stenosis, CKD2, and Interstitial Lung Disease was brought to ED by EMS for syncopal episode at home today. Pt's A&Ox4 but she doesn't remember. As per pt's , Tramaine, pt normally uses wheelchair and she passes out while being transferred to the wheelchair. Pt's arms were holding by the  and aid when stood up and passed out for few seconds. Denies injury. Pt's not eaten stating 'no appetite' with generalized fatigue/dizziness since 3days ago and had cough last night. COVID vaccinations completed in 3/2021. Denies fever, chills, or sore throat. Denies headache. Denies CP/SOB/ABD pain or N/V/D. Denies sensory changes or weakness to extremities. Denies burning or frequent urination.

## 2021-05-21 NOTE — H&P ADULT - ASSESSMENT
89F c hx RA on chronic prednisone, HTN, CKD (baseline Cr ~1.1), ILD, DM2, wheelchairbound, pw hypoglycemia as likely etiology of confusion episode, in setting of UTI and ADINA.

## 2021-05-21 NOTE — H&P ADULT - NSHPPHYSICALEXAM_GEN_ALL_CORE
PHYSICAL EXAM:   GENERAL: Alert. Not confused. No acute distress. Not thin. Not cachectic. Not obese.  HEAD:  Atraumatic. Normocephalic.  EYES: EOMI. PERRLA. Normal conjunctiva/sclera.  ENT: Neck supple. No JVD. Moist oral mucosa. Not edentulous. No thrush.  LYMPH: Normal supraclavicular/cervical lymph nodes.   CARDIAC: Not tachy, Not adam. Regular rhythm. Not irregularly irregular. S1. S2.  LUNG/CHEST: BS equal bilaterally. No wheezes. +rales. No rhonchi.  ABDOMEN: Soft. No tenderness. No distension. No fluid wave. Normal bowel sounds.  BACK: No midline/vertebral tenderness. No flank tenderness.  VASCULAR: +2 b/l radial or ulnar pulses. Palpable DP pulses.  EXTREMITIES:  No clubbing. No cyanosis. No edema. Moving all 4.  NEUROLOGY: A&Ox3. Non-focal exam. Cranial nerves intact. mild resting tremor of b/l hands  PSYCH: Normal behavior. Normal affect.  SKIN: No jaundice. No erythema. No rash/lesion.  Vascular Access:     ICU Vital Signs Last 24 Hrs  T(C): 36.7 (21 May 2021 20:26), Max: 37.2 (21 May 2021 15:14)  T(F): 98.1 (21 May 2021 20:26), Max: 98.9 (21 May 2021 15:14)  HR: 80 (21 May 2021 20:26) (80 - 86)  BP: 146/78 (21 May 2021 20:26) (131/80 - 146/78)  BP(mean): --  ABP: --  ABP(mean): --  RR: 18 (21 May 2021 20:26) (18 - 20)  SpO2: 95% (21 May 2021 20:57) (87% - 98%)      I&O's Summary

## 2021-05-21 NOTE — ED PROVIDER NOTE - CPE EDP GASTRO NORM
----- Message from Corinne A Smogoleski, RN sent at 11/6/2020  1:43 PM CST -----    ----- Message -----  From: Crys Walker MD  Sent: 11/6/2020   1:43 PM CST  To: SYLVIA Redman Nurse Msg Pool    
I called and left a message letting patient know that orders have been placed and that she will not \" have to pee on command\". Patient was encouraged to call back with questions or concerns.  
Patient is aware of results and questions which labs she will be getting done at her next lab appointment on 11/28/2020. She would like to know if she will be needed to do a urine lab as she needs to plan ahead because she \"cannot pee on command\". Writer informed patient she would ask her PCP and someone will call her back.     Routed to PCP for review.   Please advise on which labs you would like drawn on 11/28/20 lab appointment.   Thank you!   
This labs ordered.  
normal...

## 2021-05-21 NOTE — H&P ADULT - HISTORY OF PRESENT ILLNESS
89F c hx RA on chronic prednisone, HTN, CKD (baseline Cr ~1.1), ILD, DM2, wheelchairbound, pw episode of confusion, poss loss of consciousness.    History from pt and pt's  over phone. Pt reports 1 week of coughs. Otherwise in usual state of health, until this morning, when pt stated she was not feeling well. Pt had half a bagel, then, while transferring from bed to wheelchair, had an episode of poss loss of consciousness, shaking of her head, eyes rolling back in head, fall. Pt was reportedly unconscious for only a few seconds, then came back. Pt denies losing consciousness at this point, but unable to give a good history as to what happened. Pt has not taking any basaglar at least for the past couple days, as her evening sugars have been in the 90s. Pt has only taken novolog, at least 9U before every meal. Family does not think pt has been eating well for the past few days. Family states pt's sugars are very labile, and can go up into the 300s. Pt denies any chest pain, sob, fevers, chills.    VS: Tm 98.9, P 86, /80, R 20, 99% RA  In the Ed, received ceftriaxone, , d50 1amp

## 2021-05-21 NOTE — ED ADULT NURSE NOTE - OBJECTIVE STATEMENT
Patient  is  alert  and  oriented  x3.  Color is  good  and  skin warm to touch. She  had  a  syncopal episode  earlier.  No  c/o  chest pain or  dizziness  offered.

## 2021-05-21 NOTE — ED PROVIDER NOTE - CLINICAL SUMMARY MEDICAL DECISION MAKING FREE TEXT BOX
Attending MD Castaneda:  89F with ho AS, HTN presenting with poor PO intake and syncope today. Poor PO intake spans several days prior. Pt herself is not sure why she is here but history from family members reveals patient syncopized today. Pt exam notable for frail elderly woman, awake and alert, no gross motor or sensory deficits, systolic murmur best heart at RUSB, somewhat pale appearing. Ddx includes dysrhythmia, worsening valvulopathy, anemia, metabolic derangements. Plan for labs, telemetry monitoring, admission       *The above represents an initial assessment/impression. Please refer to progress notes for potential changes in patient clinical course*

## 2021-05-21 NOTE — H&P ADULT - NSHPLABSRESULTS_GEN_ALL_CORE
Personally reviewed old records.  Personally reviewed labs.  Personally reviewed imaging.  Personally reviewed EKG.                          10.7   14.00 )-----------( 332      ( 21 May 2021 15:48 )             34.2       -    140  |  107  |  39<H>  ----------------------------<  41<LL>  4.4   |  20<L>  |  1.47<H>    Ca    9.8      21 May 2021 15:48    TPro  7.6  /  Alb  3.6  /  TBili  0.4  /  DBili  x   /  AST  11  /  ALT  5<L>  /  AlkPhos  67  -            LIVER FUNCTIONS - ( 21 May 2021 15:48 )  Alb: 3.6 g/dL / Pro: 7.6 g/dL / ALK PHOS: 67 U/L / ALT: 5 U/L / AST: 11 U/L / GGT: x                 Urinalysis Basic - ( 21 May 2021 16:57 )    Color: Yellow / Appearance: Clear / S.022 / pH: x  Gluc: x / Ketone: Negative  / Bili: Negative / Urobili: Negative   Blood: x / Protein: 30 mg/dL / Nitrite: Negative   Leuk Esterase: Large / RBC: 1 /hpf / WBC 14 /HPF   Sq Epi: x / Non Sq Epi: 3 /hpf / Bacteria: Many

## 2021-05-21 NOTE — H&P ADULT - NSHPSOCIALHISTORY_GEN_ALL_CORE
Social History:    Marital Status: ( x ) , (  ) Single, (  ) , (  ) , (  )   # of Children: 1  Lives with: (  ) alone, (  ) children, ( x ) spouse, (  ) parents, (  ) siblings, (  ) friends, (  ) other:   Occupation:     Substance Use/Illicit Drugs: (  ) never used vs other:   Tobacco Usage: ( x ) never smoked, (  ) former smoker, (  ) current smoker and Total Pack-Years:   Last Alcohol Usage/Frequency/Amount/Withdrawal/Hx of Abuse:  none  Foreign travel:   Animal exposure:

## 2021-05-21 NOTE — H&P ADULT - NSHPREVIEWOFSYSTEMS_GEN_ALL_CORE
REVIEW OF SYSTEMS:  CONSTITUTIONAL: No weakness. No fevers. No chills. No rigors. +poor appetite.  EYES: No blurry or double vision. No eye pain.  ENT: No hearing difficulty. No vertigo. No dysphagia. No sore throat. No Sinusitis/rhinorrhea.   NECK: No pain. No stiffness/rigidity.  CARDIAC: No chest pain. No palpitations. No lightheadedness. +syncope.  RESPIRATORY: +cough. No SOB. No hemoptysis.  GASTROINTESTINAL: No abdominal pain. No nausea. No vomiting. No hematemesis. No diarrhea.   GENITOURINARY: No dysuria. No frequency. No hesitancy. +odor  NEUROLOGICAL: No numbness/tingling. No focal weakness. No urinary or fecal incontinence. +unsteady gait.  BACK: No back pain. No flank pain.  EXTREMITIES: No lower extremity edema. Full ROM. No joint pain.  SKIN: No rashes. No itching. No other lesions.  PSYCHIATRIC: No depression. No anxiety. No SI/HI.  ALLERGIC: No lip swelling. No hives.  All other review of systems is negative unless indicated above.  Unless indicated above, unable to assess ROS 2/2

## 2021-05-21 NOTE — H&P ADULT - PROBLEM SELECTOR PLAN 2
- likely 2/2 poor po + 9U novolog + oral hypoglycemics  - hold long acting and premeals  - will start ISS only for now  - can see endo for appropriate insulin regimen  - a1c

## 2021-05-21 NOTE — ED PROVIDER NOTE - SHIFT CHANGE DETAILS
Attending MD Anderson: 89F with DM, HTN, CKD, ILD, poor po intake for days, syncope today, FS 59 now, received D50, pending admission

## 2021-05-21 NOTE — H&P ADULT - PROBLEM SELECTOR PLAN 1
- suspect 2/2 hypoglycemia  - tele  - last TTE July 2020 grossly benign  - can get TTE as outpt vs inpt  - trend CE  - orthostats

## 2021-05-21 NOTE — ED ADULT NURSE NOTE - CHIEF COMPLAINT QUOTE
As per EMS, pt had been feeling dizzy today. EMS states that patient had been tired and patient's family found patient "passed out." Unwitnessed, unknown LOC. EMS reports finger stick of 132.

## 2021-05-21 NOTE — ED PROVIDER NOTE - PHYSICAL EXAMINATION
NAD, VSS, Afebrile, A&Ox4, Neck supple. Lungs clear. ABD soft, non tender. No CVA tender. No focal neuro deficit.

## 2021-05-21 NOTE — ED PROVIDER NOTE - ATTENDING CONTRIBUTION TO CARE
Attending MD Castaneda:  I personally have seen and examined this patient.  NP note reviewed and agree on plan of care and except where noted.  See HPI, PE, and MDM for details.

## 2021-05-21 NOTE — ED PROVIDER NOTE - PROGRESS NOTE DETAILS
Resting on the bed without distress. Attending MD Anderson: Patient admitted, stable for transport to floors Attending MD Anderson: Patient admitted, stable for transport to floors.  Laura Griffiths Pt ate sandwich.

## 2021-05-21 NOTE — ED ADULT TRIAGE NOTE - CHIEF COMPLAINT QUOTE
As per EMS, pt had been feeling dizzy today. EMS states that patient had been tired and patient found patient passed out. Unwitnessed, unknown LOC. As per EMS, pt had been feeling dizzy today. EMS states that patient had been tired and patient's family found patient "passed out." Unwitnessed, unknown LOC. EMS reports finger stick of 132.

## 2021-05-22 LAB
A1C WITH ESTIMATED AVERAGE GLUCOSE RESULT: 6.8 % — HIGH (ref 4–5.6)
ALBUMIN SERPL ELPH-MCNC: 3.1 G/DL — LOW (ref 3.3–5)
ALP SERPL-CCNC: 64 U/L — SIGNIFICANT CHANGE UP (ref 40–120)
ALT FLD-CCNC: 7 U/L — LOW (ref 10–45)
ANION GAP SERPL CALC-SCNC: 14 MMOL/L — SIGNIFICANT CHANGE UP (ref 5–17)
AST SERPL-CCNC: 11 U/L — SIGNIFICANT CHANGE UP (ref 10–40)
BILIRUB SERPL-MCNC: 0.4 MG/DL — SIGNIFICANT CHANGE UP (ref 0.2–1.2)
BUN SERPL-MCNC: 35 MG/DL — HIGH (ref 7–23)
CALCIUM SERPL-MCNC: 9.3 MG/DL — SIGNIFICANT CHANGE UP (ref 8.4–10.5)
CHLORIDE SERPL-SCNC: 106 MMOL/L — SIGNIFICANT CHANGE UP (ref 96–108)
CO2 SERPL-SCNC: 19 MMOL/L — LOW (ref 22–31)
COVID-19 SPIKE DOMAIN AB INTERP: POSITIVE
COVID-19 SPIKE DOMAIN ANTIBODY RESULT: >250 U/ML — HIGH
CREAT SERPL-MCNC: 1.34 MG/DL — HIGH (ref 0.5–1.3)
ESTIMATED AVERAGE GLUCOSE: 148 MG/DL — HIGH (ref 68–114)
GLUCOSE BLDC GLUCOMTR-MCNC: 202 MG/DL — HIGH (ref 70–99)
GLUCOSE BLDC GLUCOMTR-MCNC: 203 MG/DL — HIGH (ref 70–99)
GLUCOSE BLDC GLUCOMTR-MCNC: 219 MG/DL — HIGH (ref 70–99)
GLUCOSE BLDC GLUCOMTR-MCNC: 338 MG/DL — HIGH (ref 70–99)
GLUCOSE SERPL-MCNC: 145 MG/DL — HIGH (ref 70–99)
HCT VFR BLD CALC: 32.2 % — LOW (ref 34.5–45)
HGB BLD-MCNC: 10 G/DL — LOW (ref 11.5–15.5)
MAGNESIUM SERPL-MCNC: 1.6 MG/DL — SIGNIFICANT CHANGE UP (ref 1.6–2.6)
MCHC RBC-ENTMCNC: 27.6 PG — SIGNIFICANT CHANGE UP (ref 27–34)
MCHC RBC-ENTMCNC: 31.1 GM/DL — LOW (ref 32–36)
MCV RBC AUTO: 89 FL — SIGNIFICANT CHANGE UP (ref 80–100)
NRBC # BLD: 0 /100 WBCS — SIGNIFICANT CHANGE UP (ref 0–0)
PHOSPHATE SERPL-MCNC: 3.4 MG/DL — SIGNIFICANT CHANGE UP (ref 2.5–4.5)
PLATELET # BLD AUTO: 290 K/UL — SIGNIFICANT CHANGE UP (ref 150–400)
POTASSIUM SERPL-MCNC: 4.1 MMOL/L — SIGNIFICANT CHANGE UP (ref 3.5–5.3)
POTASSIUM SERPL-SCNC: 4.1 MMOL/L — SIGNIFICANT CHANGE UP (ref 3.5–5.3)
PROT SERPL-MCNC: 6.9 G/DL — SIGNIFICANT CHANGE UP (ref 6–8.3)
RBC # BLD: 3.62 M/UL — LOW (ref 3.8–5.2)
RBC # FLD: 14.1 % — SIGNIFICANT CHANGE UP (ref 10.3–14.5)
SARS-COV-2 IGG+IGM SERPL QL IA: >250 U/ML — HIGH
SARS-COV-2 IGG+IGM SERPL QL IA: POSITIVE
SODIUM SERPL-SCNC: 139 MMOL/L — SIGNIFICANT CHANGE UP (ref 135–145)
TROPONIN T, HIGH SENSITIVITY RESULT: 17 NG/L — SIGNIFICANT CHANGE UP (ref 0–51)
TSH SERPL-MCNC: 2.55 UIU/ML — SIGNIFICANT CHANGE UP (ref 0.27–4.2)
WBC # BLD: 11.31 K/UL — HIGH (ref 3.8–10.5)
WBC # FLD AUTO: 11.31 K/UL — HIGH (ref 3.8–10.5)

## 2021-05-22 RX ORDER — INSULIN GLARGINE 100 [IU]/ML
6 INJECTION, SOLUTION SUBCUTANEOUS AT BEDTIME
Refills: 0 | Status: DISCONTINUED | OUTPATIENT
Start: 2021-05-22 | End: 2021-05-26

## 2021-05-22 RX ADMIN — Medication 5 MILLIGRAM(S): at 05:58

## 2021-05-22 RX ADMIN — Medication 4: at 12:04

## 2021-05-22 RX ADMIN — Medication 5 MILLIGRAM(S): at 17:42

## 2021-05-22 RX ADMIN — Medication 2: at 17:42

## 2021-05-22 RX ADMIN — SODIUM CHLORIDE 50 MILLILITER(S): 9 INJECTION INTRAMUSCULAR; INTRAVENOUS; SUBCUTANEOUS at 05:58

## 2021-05-22 RX ADMIN — Medication 25 MILLIGRAM(S): at 05:58

## 2021-05-22 RX ADMIN — Medication 2: at 08:23

## 2021-05-22 RX ADMIN — INSULIN GLARGINE 6 UNIT(S): 100 INJECTION, SOLUTION SUBCUTANEOUS at 22:37

## 2021-05-22 RX ADMIN — PANTOPRAZOLE SODIUM 40 MILLIGRAM(S): 20 TABLET, DELAYED RELEASE ORAL at 05:58

## 2021-05-22 RX ADMIN — CEFTRIAXONE 100 MILLIGRAM(S): 500 INJECTION, POWDER, FOR SOLUTION INTRAMUSCULAR; INTRAVENOUS at 22:38

## 2021-05-22 NOTE — CHART NOTE - NSCHARTNOTEFT_GEN_A_CORE
Notified by RN that patient desaturated to 87% while asleep. O2sat improved to 98% on 3LNC.  Evaluated the patient at bedside. Patient asymptomatic, denies SOB, CP    Vital Signs Last 24 Hrs  T(C): 36.7 (21 May 2021 20:26), Max: 37.2 (21 May 2021 15:14)  T(F): 98.1 (21 May 2021 20:26), Max: 98.9 (21 May 2021 15:14)  HR: 80 (21 May 2021 20:26) (80 - 86)  BP: 146/78 (21 May 2021 20:26) (131/80 - 146/78)  BP(mean): --  RR: 18 (21 May 2021 20:26) (18 - 20)  SpO2: 95% (21 May 2021 20:57) (87% - 98%)    < from: Xray Chest 1 View- PORTABLE-Urgent (05.21.21 @ 17:22) >    Redemonstration of linear reticular opacities bilaterally, chronic. No focal consolidations.      89F c hx RA on chronic prednisone, HTN, CKD (baseline Cr ~1.1), ILD, DM2, wheelchairbound, pw hypoglycemia as likely etiology of confusion episode, in setting of UTI and ADINA.    Hypoxia  now with an episode of desaturation while asleep  Patient asymptomatic, afebrile, no respiratory symptoms  Monitor o2sat  Monitor on tele  Monitor for apneic episodes, ?KAY  Will continue to monitor    Segun Reilly [facility]  [id number].

## 2021-05-22 NOTE — CONSULT NOTE ADULT - PROBLEM SELECTOR RECOMMENDATION 9
Will start Lantus 6 units at bed time.  Will continue Admelog correction scale coverage.  Patient counseled for compliance with consistent low carb diet.

## 2021-05-22 NOTE — PROGRESS NOTE ADULT - SUBJECTIVE AND OBJECTIVE BOX
Date of service: 21 @ 22:05      Patient is a 89y old  Female who presents with a chief complaint of loss of consciousness (22 May 2021 15:15)                                                               INTERVAL HPI/OVERNIGHT EVENTS:    REVIEW OF SYSTEMS:     CONSTITUTIONAL: No weakness, fevers or chills  EYES/ENT: No visual changes , no ear ache   NECK: No pain or stiffness  RESPIRATORY: No cough, wheezing,  No shortness of breath  CARDIOVASCULAR: No chest pain or palpitations  GASTROINTESTINAL: No abdominal pain  . No nausea, vomiting, or hematemesis; No diarrhea or constipation. No melena or hematochezia.  GENITOURINARY: No dysuria, frequency or hematuria  NEUROLOGICAL: No numbness or weakness  SKIN: No itching, burning, rashes, or lesions                                                                                                                                                                                                                                                                                 Medications:  MEDICATIONS  (STANDING):  cefTRIAXone   IVPB 1000 milliGRAM(s) IV Intermittent every 24 hours  dextrose 40% Gel 15 Gram(s) Oral once  dextrose 5%. 1000 milliLiter(s) (50 mL/Hr) IV Continuous <Continuous>  dextrose 5%. 1000 milliLiter(s) (100 mL/Hr) IV Continuous <Continuous>  dextrose 50% Injectable 25 Gram(s) IV Push once  dextrose 50% Injectable 12.5 Gram(s) IV Push once  dextrose 50% Injectable 25 Gram(s) IV Push once  glucagon  Injectable 1 milliGRAM(s) IntraMuscular once  insulin glargine Injectable (LANTUS) 6 Unit(s) SubCutaneous at bedtime  insulin lispro (ADMELOG) corrective regimen sliding scale   SubCutaneous three times a day before meals  insulin lispro (ADMELOG) corrective regimen sliding scale   SubCutaneous at bedtime  metoprolol succinate ER 25 milliGRAM(s) Oral daily  pantoprazole    Tablet 40 milliGRAM(s) Oral before breakfast  predniSONE   Tablet 5 milliGRAM(s) Oral two times a day  sodium chloride 0.9%. 1000 milliLiter(s) (50 mL/Hr) IV Continuous <Continuous>    MEDICATIONS  (PRN):       Allergies    No Known Allergies    Intolerances      Vital Signs Last 24 Hrs  T(C): 36.7 (22 May 2021 21:48), Max: 36.8 (22 May 2021 11:55)  T(F): 98.1 (22 May 2021 21:48), Max: 98.2 (22 May 2021 11:55)  HR: 79 (22 May 2021 21:48) (72 - 79)  BP: 151/75 (22 May 2021 21:48) (138/76 - 155/74)  BP(mean): --  RR: 18 (22 May 2021 21:48) (18 - 18)  SpO2: 94% (22 May 2021 21:48) (93% - 97%)  CAPILLARY BLOOD GLUCOSE      POCT Blood Glucose.: 202 mg/dL (22 May 2021 17:31)  POCT Blood Glucose.: 338 mg/dL (22 May 2021 11:57)  POCT Blood Glucose.: 203 mg/dL (22 May 2021 08:07)  POCT Blood Glucose.: 130 mg/dL (21 May 2021 23:43)       @ 07:01  -   @ 07:00  --------------------------------------------------------  IN: 0 mL / OUT: 100 mL / NET: -100 mL     @ 07:01  -   @ 22:05  --------------------------------------------------------  IN: 440 mL / OUT: 350 mL / NET: 90 mL      Physical Exam:    Daily     Daily Weight in k.4 (22 May 2021 07:15)  General:  Well appearing, NAD, not cachetic  HEENT:  Nonicteric, PERRLA  CV:  RRR, S1S2   Lungs:  CTA B/L, no wheezes, rales, rhonchi  Abdomen:  Soft, non-tender, no distended, positive BS  Extremities:  2+ pulses, no c/c, no edema  Skin:  Warm and dry, no rashes  :  No mason  Neuro:  AAOx3, non-focal, grossly intact                                                                                                                                                                                                                                                                                                LABS:                               10.0   11.31 )-----------( 290      ( 22 May 2021 07:22 )             32.2                          139  |  106  |  35<H>  ----------------------------<  145<H>  4.1   |  19<L>  |  1.34<H>    Ca    9.3      22 May 2021 07:26  Phos  3.4       Mg     1.6         TPro  6.9  /  Alb  3.1<L>  /  TBili  0.4  /  DBili  x   /  AST  11  /  ALT  7<L>  /  AlkPhos  64                         RADIOLOGY & ADDITIONAL TESTS         I personally reviewed: [  ]EKG   [  ]CXR    [  ] CT      A/P:         Discussed with :     Stefani consultants' Notes   Time spent :   Date of service: 21 @ 22:05      Patient is a 89y old  Female who presents with a chief complaint of loss of consciousness (22 May 2021 15:15)                                                               INTERVAL HPI/OVERNIGHT EVENTS:    REVIEW OF SYSTEMS:     CONSTITUTIONAL: No weakness, fevers or chills  RESPIRATORY: No cough, wheezing,  No shortness of breath  CARDIOVASCULAR: No chest pain or palpitations  GASTROINTESTINAL: No abdominal pain  . No nausea, vomiting, or hematemesis; No diarrhea or constipation. No melena or hematochezia.  GENITOURINARY: No dysuria, frequency or hematuria  NEUROLOGICAL: No numbness or weakness                                                                                                                                                                                                                                                                                  Medications:  MEDICATIONS  (STANDING):  cefTRIAXone   IVPB 1000 milliGRAM(s) IV Intermittent every 24 hours  dextrose 40% Gel 15 Gram(s) Oral once  dextrose 5%. 1000 milliLiter(s) (50 mL/Hr) IV Continuous <Continuous>  dextrose 5%. 1000 milliLiter(s) (100 mL/Hr) IV Continuous <Continuous>  dextrose 50% Injectable 25 Gram(s) IV Push once  dextrose 50% Injectable 12.5 Gram(s) IV Push once  dextrose 50% Injectable 25 Gram(s) IV Push once  glucagon  Injectable 1 milliGRAM(s) IntraMuscular once  insulin glargine Injectable (LANTUS) 6 Unit(s) SubCutaneous at bedtime  insulin lispro (ADMELOG) corrective regimen sliding scale   SubCutaneous three times a day before meals  insulin lispro (ADMELOG) corrective regimen sliding scale   SubCutaneous at bedtime  metoprolol succinate ER 25 milliGRAM(s) Oral daily  pantoprazole    Tablet 40 milliGRAM(s) Oral before breakfast  predniSONE   Tablet 5 milliGRAM(s) Oral two times a day  sodium chloride 0.9%. 1000 milliLiter(s) (50 mL/Hr) IV Continuous <Continuous>    MEDICATIONS  (PRN):       Allergies    No Known Allergies    Intolerances      Vital Signs Last 24 Hrs  T(C): 36.7 (22 May 2021 21:48), Max: 36.8 (22 May 2021 11:55)  T(F): 98.1 (22 May 2021 21:48), Max: 98.2 (22 May 2021 11:55)  HR: 79 (22 May 2021 21:48) (72 - 79)  BP: 151/75 (22 May 2021 21:48) (138/76 - 155/74)  BP(mean): --  RR: 18 (22 May 2021 21:48) (18 - 18)  SpO2: 94% (22 May 2021 21:48) (93% - 97%)  CAPILLARY BLOOD GLUCOSE      POCT Blood Glucose.: 202 mg/dL (22 May 2021 17:31)  POCT Blood Glucose.: 338 mg/dL (22 May 2021 11:57)  POCT Blood Glucose.: 203 mg/dL (22 May 2021 08:07)  POCT Blood Glucose.: 130 mg/dL (21 May 2021 23:43)       @ 07:01  -   @ 07:00  --------------------------------------------------------  IN: 0 mL / OUT: 100 mL / NET: -100 mL     @ 07:01  -   @ 22:05  --------------------------------------------------------  IN: 440 mL / OUT: 350 mL / NET: 90 mL      Physical Exam:    Daily     Daily Weight in k.4 (22 May 2021 07:15)  General: NAD   HEENT:  Nonicteric, PERRLA  CV:  RRR, S1S2   Lungs:  CTA B/L, no wheezes, rales, rhonchi  Abdomen:  Soft, non-tender, no distended, positive BS  Extremities: no edema   Neuro:  AAOx3, non-focal, grossly intact                                                                                                                                                                                                                                                                                                LABS:                               10.0   11.31 )-----------( 290      ( 22 May 2021 07:22 )             32.2                          139  |  106  |  35<H>  ----------------------------<  145<H>  4.1   |  19<L>  |  1.34<H>    Ca    9.3      22 May 2021 07:26  Phos  3.4       Mg     1.6         TPro  6.9  /  Alb  3.1<L>  /  TBili  0.4  /  DBili  x   /  AST  11  /  ALT  7<L>  /  AlkPhos  64  05-22                       RADIOLOGY & ADDITIONAL TESTS         I personally reviewed: [  ]EKG   [  ]CXR    [  ] CT      A/P:         Discussed with :     Stefani consultants' Notes   Time spent :

## 2021-05-22 NOTE — PROGRESS NOTE ADULT - ASSESSMENT
87F with PMHx of rheumatoid arthritis, diabetes mellitus, HTN, moderate aortic stenosis, CKD2, and interstitial lung disease admitted wiht syncope/.. pt is nt able to porivde evenets excepts saying taht room was too " warm ": and she was trying to get up and next thing she knew ambulance was there., As francisco javier chart : as per granddaughter when daughter of pt went outside found pt "slumped over in chair" and minimally responsive for a few minutes, called EMS and pt slowly came to. Before this was in usual state of health. Pt denies CP , palpitations .. ,mild SOB yesterday and naseous today but no vomitting or diarreah , no abdominal pain and no urinary sx .   no fever or chills   no cough     - syncope :   cont tele   cardio inpu t appreciated   ? etiology sx AS ?  .. no  stenosis reported on this echo : more likely etiology is dehydration /infection   orthostatics : positive : s/p hydration : compression stockings ..    RA: cont meds     - dysphagia :  evaluated with MBS now on regular diet     - presumed UTI : GNR in urine.. completed  ceftriaxione   Ecoli sensitive : complete three days       - RA : cont steroids     - DM: uncontrolled :  fu with endo     - ADINA : likely an element of dehydration   monitor     - tremor : pt on BB       - uncontrolled HTN  : discussed with  : added clonidine for supine hypertension   monitor orthostatics     Discussed ACP : pt verbally had expressed she does not want aggressive measures however never signed MOLST ..  will discuss w her re: MOLST form     discussed wpt pt and  at bedside at length   d/w NP and CM 87F with PMHx of rheumatoid arthritis, diabetes mellitus, HTN, moderate aortic stenosis, CKD2, and interstitial lung disease admitted wiht syncope/.. pt is nt able to porivde evenets excepts saying taht room was too " warm ": and she was trying to get up and next thing she knew ambulance was there., As francisco javier chart : as per granddaughter when daughter of pt went outside found pt "slumped over in chair" and minimally responsive for a few minutes, called EMS and pt slowly came to. Before this was in usual state of health. Pt denies CP , palpitations .. ,mild SOB yesterday and naseous today but no vomitting or diarreah , no abdominal pain and no urinary sx .   no fever or chills   no cough     - syncope :   cont tele   cardio inpu t appreciated   ? etiology sx AS ?  .. no  stenosis reported on this echo : more likely etiology is dehydration /infection   orthostatics : positive : s/p hydration : compression stockings ..    RA: cont meds     - dysphagia :  evaluated with MBS now on regular diet     - presumed UTI : GNR in urine.. completed  ceftriaxione   Ecoli sensitive : complete three days       - RA : cont steroids     - DM: uncontrolled :  fu with endo     - ADINA : likely an element of dehydration   monitor     - tremor : pt on BB       - uncontrolled HTN  : discussed with  : added clonidine for supine hypertension   monitor orthostatics     Discussed ACP : pt verbally had expressed she does not want aggressive measures however never signed MOLST ..  will discuss w her re: MOLST form   \

## 2021-05-22 NOTE — CONSULT NOTE ADULT - ASSESSMENT
syncope  likely in setting of hypoglycemia  pt has history of O.H    Orthostatic hypotension   on BB  off afterload reducers     DM II  Hypoglycemia  plan as per endo     AS  mild
  Assessment  DMT2: 89y Female with DM T2 with hyperglycemia admitted with hypoglycemia, patient was insulin at home, blood sugars trending down, had hypoglycemic episode,  eating partial meals, compliant with low carb diet.  Syncope: Being worked up, monitored, stable.  CKD: labs, chart reviewed.  HTN: On medications, controlled.                Karan Akbar MD  Cell: [phone]  Office: [phone]

## 2021-05-23 LAB
ANION GAP SERPL CALC-SCNC: 13 MMOL/L — SIGNIFICANT CHANGE UP (ref 5–17)
BUN SERPL-MCNC: 32 MG/DL — HIGH (ref 7–23)
C PEPTIDE SERPL-MCNC: 5 NG/ML — HIGH (ref 1.1–4.4)
CALCIUM SERPL-MCNC: 8.5 MG/DL — SIGNIFICANT CHANGE UP (ref 8.4–10.5)
CHLORIDE SERPL-SCNC: 108 MMOL/L — SIGNIFICANT CHANGE UP (ref 96–108)
CO2 SERPL-SCNC: 19 MMOL/L — LOW (ref 22–31)
CREAT SERPL-MCNC: 1.26 MG/DL — SIGNIFICANT CHANGE UP (ref 0.5–1.3)
GLUCOSE BLDC GLUCOMTR-MCNC: 186 MG/DL — HIGH (ref 70–99)
GLUCOSE BLDC GLUCOMTR-MCNC: 187 MG/DL — HIGH (ref 70–99)
GLUCOSE BLDC GLUCOMTR-MCNC: 191 MG/DL — HIGH (ref 70–99)
GLUCOSE BLDC GLUCOMTR-MCNC: 193 MG/DL — HIGH (ref 70–99)
GLUCOSE BLDC GLUCOMTR-MCNC: 215 MG/DL — HIGH (ref 70–99)
GLUCOSE SERPL-MCNC: 191 MG/DL — HIGH (ref 70–99)
HCT VFR BLD CALC: 29.4 % — LOW (ref 34.5–45)
HGB BLD-MCNC: 9.1 G/DL — LOW (ref 11.5–15.5)
MAGNESIUM SERPL-MCNC: 1.7 MG/DL — SIGNIFICANT CHANGE UP (ref 1.6–2.6)
MCHC RBC-ENTMCNC: 27 PG — SIGNIFICANT CHANGE UP (ref 27–34)
MCHC RBC-ENTMCNC: 31 GM/DL — LOW (ref 32–36)
MCV RBC AUTO: 87.2 FL — SIGNIFICANT CHANGE UP (ref 80–100)
NRBC # BLD: 0 /100 WBCS — SIGNIFICANT CHANGE UP (ref 0–0)
PHOSPHATE SERPL-MCNC: 3 MG/DL — SIGNIFICANT CHANGE UP (ref 2.5–4.5)
PLATELET # BLD AUTO: 279 K/UL — SIGNIFICANT CHANGE UP (ref 150–400)
POTASSIUM SERPL-MCNC: 4.3 MMOL/L — SIGNIFICANT CHANGE UP (ref 3.5–5.3)
POTASSIUM SERPL-SCNC: 4.3 MMOL/L — SIGNIFICANT CHANGE UP (ref 3.5–5.3)
RBC # BLD: 3.37 M/UL — LOW (ref 3.8–5.2)
RBC # FLD: 14 % — SIGNIFICANT CHANGE UP (ref 10.3–14.5)
SODIUM SERPL-SCNC: 140 MMOL/L — SIGNIFICANT CHANGE UP (ref 135–145)
T4 FREE SERPL-MCNC: 1.1 NG/DL — SIGNIFICANT CHANGE UP (ref 0.9–1.8)
TSH SERPL-MCNC: 1.89 UIU/ML — SIGNIFICANT CHANGE UP (ref 0.27–4.2)
WBC # BLD: 9.39 K/UL — SIGNIFICANT CHANGE UP (ref 3.8–10.5)
WBC # FLD AUTO: 9.39 K/UL — SIGNIFICANT CHANGE UP (ref 3.8–10.5)

## 2021-05-23 RX ORDER — MAGNESIUM SULFATE 500 MG/ML
1 VIAL (ML) INJECTION ONCE
Refills: 0 | Status: COMPLETED | OUTPATIENT
Start: 2021-05-23 | End: 2021-05-23

## 2021-05-23 RX ADMIN — INSULIN GLARGINE 6 UNIT(S): 100 INJECTION, SOLUTION SUBCUTANEOUS at 23:41

## 2021-05-23 RX ADMIN — Medication 1: at 08:10

## 2021-05-23 RX ADMIN — Medication 25 MILLIGRAM(S): at 04:43

## 2021-05-23 RX ADMIN — Medication 2: at 12:14

## 2021-05-23 RX ADMIN — CEFTRIAXONE 100 MILLIGRAM(S): 500 INJECTION, POWDER, FOR SOLUTION INTRAMUSCULAR; INTRAVENOUS at 23:34

## 2021-05-23 RX ADMIN — Medication 100 GRAM(S): at 17:13

## 2021-05-23 RX ADMIN — PANTOPRAZOLE SODIUM 40 MILLIGRAM(S): 20 TABLET, DELAYED RELEASE ORAL at 04:43

## 2021-05-23 RX ADMIN — Medication 1: at 17:13

## 2021-05-23 RX ADMIN — Medication 5 MILLIGRAM(S): at 17:14

## 2021-05-23 RX ADMIN — Medication 5 MILLIGRAM(S): at 04:43

## 2021-05-23 NOTE — SWALLOW BEDSIDE ASSESSMENT ADULT - SWALLOW EVAL: DIAGNOSIS
Pt p/w hypoglycemia as likely etiology of confusion episode, in setting of UTI and ADINA. Oropharyngeal swallow skills p/w notable history of silent penetration with retrieval from most recent MBSS in 2020.  1. Adequate orientation/reception 2. Adequate mastication and manipulation 3. Suspected reduced control of bolus leading to premature spillage behind the BoT prior to the swallow response 4. Suspected latency in the swallow trigger 5. Can not r/o silent penetration or aspiration given history 6. No cough with solids or liquids this date.

## 2021-05-23 NOTE — SWALLOW BEDSIDE ASSESSMENT ADULT - DIET PRIOR TO ADMI
regular solids/ thin liquids as per family member and pt; granddaughter indicated coughing during meals pta

## 2021-05-23 NOTE — SWALLOW BEDSIDE ASSESSMENT ADULT - NS ASR SWALLOW FINDINGS DISCUS
call placed to team, phone busy, discussed with pt and granddaughter/Physician/Nursing/Patient/Family

## 2021-05-23 NOTE — PROGRESS NOTE ADULT - SUBJECTIVE AND OBJECTIVE BOX
Date of service: 05-23-21 @ 22:13      Patient is a 89y old  Female who presents with a chief complaint of loss of consciousness (23 May 2021 17:14)                                                               INTERVAL HPI/OVERNIGHT EVENTS:    REVIEW OF SYSTEMS:     CONSTITUTIONAL: No weakness, fevers or chills  EYES/ENT: No visual changes , no ear ache   NECK: No pain or stiffness  RESPIRATORY: No cough, wheezing,  No shortness of breath  CARDIOVASCULAR: No chest pain or palpitations  GASTROINTESTINAL: No abdominal pain  . No nausea, vomiting, or hematemesis; No diarrhea or constipation. No melena or hematochezia.  GENITOURINARY: No dysuria, frequency or hematuria  NEUROLOGICAL: No numbness or weakness  SKIN: No itching, burning, rashes, or lesions                                                                                                                                                                                                                                                                                 Medications:  MEDICATIONS  (STANDING):  cefTRIAXone   IVPB 1000 milliGRAM(s) IV Intermittent every 24 hours  dextrose 40% Gel 15 Gram(s) Oral once  dextrose 5%. 1000 milliLiter(s) (50 mL/Hr) IV Continuous <Continuous>  dextrose 5%. 1000 milliLiter(s) (100 mL/Hr) IV Continuous <Continuous>  dextrose 50% Injectable 25 Gram(s) IV Push once  dextrose 50% Injectable 12.5 Gram(s) IV Push once  dextrose 50% Injectable 25 Gram(s) IV Push once  glucagon  Injectable 1 milliGRAM(s) IntraMuscular once  insulin glargine Injectable (LANTUS) 6 Unit(s) SubCutaneous at bedtime  insulin lispro (ADMELOG) corrective regimen sliding scale   SubCutaneous three times a day before meals  insulin lispro (ADMELOG) corrective regimen sliding scale   SubCutaneous at bedtime  metoprolol succinate ER 25 milliGRAM(s) Oral daily  pantoprazole    Tablet 40 milliGRAM(s) Oral before breakfast  predniSONE   Tablet 5 milliGRAM(s) Oral two times a day  sodium chloride 0.9%. 1000 milliLiter(s) (50 mL/Hr) IV Continuous <Continuous>    MEDICATIONS  (PRN):       Allergies    No Known Allergies    Intolerances      Vital Signs Last 24 Hrs  T(C): 36.5 (23 May 2021 12:11), Max: 36.6 (23 May 2021 04:12)  T(F): 97.7 (23 May 2021 12:11), Max: 97.8 (23 May 2021 04:12)  HR: 73 (23 May 2021 12:11) (73 - 80)  BP: 159/76 (23 May 2021 12:11) (159/76 - 183/91)  BP(mean): --  RR: 18 (23 May 2021 12:11) (18 - 18)  SpO2: 96% (23 May 2021 12:11) (96% - 97%)  CAPILLARY BLOOD GLUCOSE      POCT Blood Glucose.: 191 mg/dL (23 May 2021 17:06)  POCT Blood Glucose.: 215 mg/dL (23 May 2021 12:10)  POCT Blood Glucose.: 187 mg/dL (23 May 2021 07:56)  POCT Blood Glucose.: 219 mg/dL (22 May 2021 22:19)      05-22 @ 07:01  -  05-23 @ 07:00  --------------------------------------------------------  IN: 680 mL / OUT: 1000 mL / NET: -320 mL    05-23 @ 07:01  -  05-23 @ 22:13  --------------------------------------------------------  IN: 722 mL / OUT: 300 mL / NET: 422 mL      Physical Exam:    Daily     Daily   General:  Well appearing, NAD, not cachetic  HEENT:  Nonicteric, PERRLA  CV:  RRR, S1S2   Lungs:  CTA B/L, no wheezes, rales, rhonchi  Abdomen:  Soft, non-tender, no distended, positive BS  Extremities:  2+ pulses, no c/c, no edema  Skin:  Warm and dry, no rashes  :  No mason  Neuro:  AAOx3, non-focal, grossly intact                                                                                                                                                                                                                                                                                                LABS:                               9.1    9.39  )-----------( 279      ( 23 May 2021 06:37 )             29.4                      05-23    140  |  108  |  32<H>  ----------------------------<  191<H>  4.3   |  19<L>  |  1.26    Ca    8.5      23 May 2021 06:37  Phos  3.0     05-23  Mg     1.7     05-23    TPro  6.9  /  Alb  3.1<L>  /  TBili  0.4  /  DBili  x   /  AST  11  /  ALT  7<L>  /  AlkPhos  64  05-22                       RADIOLOGY & ADDITIONAL TESTS         I personally reviewed: [  ]EKG   [  ]CXR    [  ] CT      A/P:         Discussed with :     [facility] consultants' Notes   Time spent :

## 2021-05-23 NOTE — PROGRESS NOTE ADULT - ASSESSMENT
Assessment  DMT2: 89y Female with DM T2 with hyperglycemia admitted with hypoglycemia, patient was insulin at home, blood sugars improving, no new hypoglycemic episode,  eating partial meals, feeling tired, compliant with low carb diet.  Syncope: Being worked up, monitored, stable.  CKD: labs, chart reviewed.  HTN: On medications, controlled.                Karan Akbar MD  Cell: [phone]  Office: [phone]

## 2021-05-23 NOTE — PROGRESS NOTE ADULT - SUBJECTIVE AND OBJECTIVE BOX
Chief complaint  Patient is a 89y old  Female who presents with a chief complaint of loss of consciousness (23 May 2021 11:31)   Review of systems  Patient in bed, appears comfortable.    Labs and Fingersticks  CAPILLARY BLOOD GLUCOSE      POCT Blood Glucose.: 191 mg/dL (23 May 2021 17:06)  POCT Blood Glucose.: 215 mg/dL (23 May 2021 12:10)  POCT Blood Glucose.: 187 mg/dL (23 May 2021 07:56)  POCT Blood Glucose.: 219 mg/dL (22 May 2021 22:19)  POCT Blood Glucose.: 202 mg/dL (22 May 2021 17:31)      Anion Gap, Serum: 13 (05-23 @ 06:37)  Anion Gap, Serum: 14 (05-22 @ 07:26)      Calcium, Total Serum: 8.5 (05-23 @ 06:37)  Calcium, Total Serum: 9.3 (05-22 @ 07:26)  Albumin, Serum: 3.1 *L* (05-22 @ 07:26)    Alanine Aminotransferase (ALT/SGPT): 7 *L* (05-22 @ 07:26)  Alkaline Phosphatase, Serum: 64 (05-22 @ 07:26)  Aspartate Aminotransferase (AST/SGOT): 11 (05-22 @ 07:26)        05-23    140  |  108  |  32<H>  ----------------------------<  191<H>  4.3   |  19<L>  |  1.26    Ca    8.5      23 May 2021 06:37  Phos  3.0     05-23  Mg     1.7     05-23    TPro  6.9  /  Alb  3.1<L>  /  TBili  0.4  /  DBili  x   /  AST  11  /  ALT  7<L>  /  AlkPhos  64  05-22                        9.1    9.39  )-----------( 279      ( 23 May 2021 06:37 )             29.4     Medications  MEDICATIONS  (STANDING):  cefTRIAXone   IVPB 1000 milliGRAM(s) IV Intermittent every 24 hours  dextrose 40% Gel 15 Gram(s) Oral once  dextrose 5%. 1000 milliLiter(s) (50 mL/Hr) IV Continuous <Continuous>  dextrose 5%. 1000 milliLiter(s) (100 mL/Hr) IV Continuous <Continuous>  dextrose 50% Injectable 25 Gram(s) IV Push once  dextrose 50% Injectable 12.5 Gram(s) IV Push once  dextrose 50% Injectable 25 Gram(s) IV Push once  glucagon  Injectable 1 milliGRAM(s) IntraMuscular once  insulin glargine Injectable (LANTUS) 6 Unit(s) SubCutaneous at bedtime  insulin lispro (ADMELOG) corrective regimen sliding scale   SubCutaneous three times a day before meals  insulin lispro (ADMELOG) corrective regimen sliding scale   SubCutaneous at bedtime  magnesium sulfate  IVPB 1 Gram(s) IV Intermittent once  metoprolol succinate ER 25 milliGRAM(s) Oral daily  pantoprazole    Tablet 40 milliGRAM(s) Oral before breakfast  predniSONE   Tablet 5 milliGRAM(s) Oral two times a day  sodium chloride 0.9%. 1000 milliLiter(s) (50 mL/Hr) IV Continuous <Continuous>      Physical Exam  General: Patient comfortable in bed  Vital Signs Last 12 Hrs  T(F): 97.7 (05-23-21 @ 12:11), Max: 97.7 (05-23-21 @ 12:11)  HR: 73 (05-23-21 @ 12:11) (73 - 73)  BP: 159/76 (05-23-21 @ 12:11) (159/76 - 159/76)  BP(mean): --  RR: 18 (05-23-21 @ 12:11) (18 - 18)  SpO2: 96% (05-23-21 @ 12:11) (96% - 96%)  Neck: No palpable thyroid nodules.  CVS: S1S2, No murmurs  Respiratory: No wheezing, no crepitations  GI: Abdomen soft, bowel sounds positive  Musculoskeletal:  edema lower extremities.     Diagnostics    C-Peptide, Serum: AM Sched. Collection: 23-May-2021 06:00 (05-22 @ 08:56)  Free Thyroxine, Serum: AM Sched. Collection: 23-May-2021 06:00 (05-22 @ 08:56)  Thyroid Stimulating Hormone, Serum: AM Sched. Collection: 23-May-2021 06:00 (05-22 @ 08:56)  A1C with Estimated Average Glucose: Routine (05-22 @ 08:51)

## 2021-05-23 NOTE — SWALLOW BEDSIDE ASSESSMENT ADULT - SLP GENERAL OBSERVATIONS
Encountered OOB in rudy-chair, Aox3, cooperative, able to follow all commands, NAD, RA. Pt indicated that she had a coughing episode today and it 'was terrible' but that 'I am better'.

## 2021-05-23 NOTE — PROGRESS NOTE ADULT - ASSESSMENT
87F with PMHx of rheumatoid arthritis, diabetes mellitus, HTN, moderate aortic stenosis, CKD2, and interstitial lung disease admitted wiht syncope/.. pt is nt able to porivde evenets excepts saying taht room was too " warm ": and she was trying to get up and next thing she knew ambulance was there., As francisco javier chart : as per granddaughter when daughter of pt went outside found pt "slumped over in chair" and minimally responsive for a few minutes, called EMS and pt slowly came to. Before this was in usual state of health. Pt denies CP , palpitations .. ,mild SOB yesterday and naseous today but no vomitting or diarreah , no abdominal pain and no urinary sx .   no fever or chills   no cough     - syncope :   cont tele   cardio inpu t appreciated   orthostatics : positive : s/p hydration : compression stockings ..    RA: cont meds     - dysphagia :  evaluated with MBS now on regular diet     - presumed UTI : GNR in urine.. completed  ceftriaxione   Ecoli sensitive : complete three days       - RA : cont steroids     - DM: uncontrolled :  fu with endo     - ADINA : likely an element of dehydration   monitor     - tremor : pt on BB     - AS : mild .fu with cardio       - uncontrolled HTN  : discussed with  : added clonidine for supine hypertension   monitor orthostatics     Discussed ACP : pt verbally had expressed she does not want aggressive measures however never signed MOLST ..  will discuss w her re: MOLST form   \

## 2021-05-23 NOTE — SWALLOW BEDSIDE ASSESSMENT ADULT - PHARYNGEAL PHASE
concern for penetration/aspiration given history of silent penetration/deep. No overt s/sx of aspiration on single sips of liquids/Delayed pharyngeal swallow

## 2021-05-23 NOTE — SWALLOW BEDSIDE ASSESSMENT ADULT - COMMENTS
Continued: taken novolog, at least 9U before every meal. Family does not think pt has been eating well for the past few days. Family states pt's sugars are very labile, and can go up into the 300s. Pt denies any chest pain, sob, fevers, chills.    5/22:  Notified by RN that patient desaturated to 87% while asleep. O2sat improved to 98% on 3LNC.  Evaluated the patient at bedside. Patient asymptomatic, denies SOB, CP    Swallowing hx: Pt is known to this service from admission in 2018 and 2020.   2018 MBSS: Pt presents with an oropharyngeal dysphagia characterized by mildly reduced A-P transport, mild to moderate pharyngeal residue post swallow and silent laryngeal penetration with retrieval across consistencies. Laryngeal penetration is deeper into the laryngeal vestibule and of greater amount with thin liquid via straw. Airway protection is improved with small, single cup sips. No aspiration during this exam. Of note, coughing observed throughout exam in absence of laryngeal penetration/aspiration.   Disorders: reduced lingual strength/ROM/Rate of motion, reduced BOT to posterior pharyngeal wall contact, reduced hyo-laryngeal excursion, reduced laryngeal closure, signs of reduced supraglottic sensation. Recommended Consistencies: Regular texture diet. SMALL, SINGLE CUP SIPS. NO STRAWS.    2020 MBS: Pt seen in radiology for modified barium swallow. The swallow sequence is characterized by: mildly reduced A-P transport, slight delay in trigger of pharyngeal swallow, trace to mild pharyngeal residue post swallow and silent laryngeal penetration with retrieval with thin liquids. Airway protection is improved with small, single sips. No aspiration seen on this examination.  Disorders: reduced lingual strength/ROM/Rate of motion, delay in trigger of the swallow reflex, reduced hyo-laryngeal excursion, reduced laryngeal closure, and reduced supraglottic sensation.-->Regular diet, small single sips of liquids only

## 2021-05-23 NOTE — SWALLOW BEDSIDE ASSESSMENT ADULT - SLP PERTINENT HISTORY OF CURRENT PROBLEM
This is a 88 y/o F with hx RA on chronic prednisone, HTN, CKD (baseline Cr ~1.1), ILD, DM2, wheelchairbound, p/w episode of confusion, poss loss of consciousness. History was obtained from pt and pt's  over phone as per MD documentation. Pt reports 1 week of coughs. Otherwise in usual state of health, until this morning, when pt stated she was not feeling well. Pt had half a bagel, then, while transferring from bed to wheelchair, had an episode of poss loss of consciousness, shaking of her head, eyes rolling back in head, fall. Pt was reportedly unconscious for only a few seconds, then came back. Pt denies losing consciousness at this point, but unable to give a good history as to what happened. Pt has not taking any basaglar at least for the past couple days, as her evening sugars have been in the 90s. Pt has only

## 2021-05-23 NOTE — PROGRESS NOTE ADULT - ASSESSMENT
syncope  likely in setting of hypoglycemia  pt has history of O.H    Orthostatic hypotension   on BB  off afterload reducers     DM II  Hypoglycemia  plan as per endo     AS  mild

## 2021-05-23 NOTE — PROGRESS NOTE ADULT - SUBJECTIVE AND OBJECTIVE BOX
DATE OF SERVICE: 05-23-21 @ 11:33    Subjective: Patient seen and examined. No new events except as noted.     SUBJECTIVE/ROS:    NAD    MEDICATIONS:  MEDICATIONS  (STANDING):  cefTRIAXone   IVPB 1000 milliGRAM(s) IV Intermittent every 24 hours  dextrose 40% Gel 15 Gram(s) Oral once  dextrose 5%. 1000 milliLiter(s) (50 mL/Hr) IV Continuous <Continuous>  dextrose 5%. 1000 milliLiter(s) (100 mL/Hr) IV Continuous <Continuous>  dextrose 50% Injectable 25 Gram(s) IV Push once  dextrose 50% Injectable 12.5 Gram(s) IV Push once  dextrose 50% Injectable 25 Gram(s) IV Push once  glucagon  Injectable 1 milliGRAM(s) IntraMuscular once  insulin glargine Injectable (LANTUS) 6 Unit(s) SubCutaneous at bedtime  insulin lispro (ADMELOG) corrective regimen sliding scale   SubCutaneous three times a day before meals  insulin lispro (ADMELOG) corrective regimen sliding scale   SubCutaneous at bedtime  magnesium sulfate  IVPB 1 Gram(s) IV Intermittent once  metoprolol succinate ER 25 milliGRAM(s) Oral daily  pantoprazole    Tablet 40 milliGRAM(s) Oral before breakfast  predniSONE   Tablet 5 milliGRAM(s) Oral two times a day  sodium chloride 0.9%. 1000 milliLiter(s) (50 mL/Hr) IV Continuous <Continuous>      PHYSICAL EXAM:  T(C): 36.6 (05-23-21 @ 04:12), Max: 36.8 (05-22-21 @ 11:55)  HR: 80 (05-23-21 @ 04:12) (76 - 80)  BP: 183/91 (05-23-21 @ 04:12) (138/76 - 183/91)  RR: 18 (05-23-21 @ 04:12) (18 - 18)  SpO2: 97% (05-23-21 @ 04:12) (94% - 97%)  Wt(kg): --  I&O's Summary    22 May 2021 07:01  -  23 May 2021 07:00  --------------------------------------------------------  IN: 680 mL / OUT: 1000 mL / NET: -320 mL            JVP: Normal  Neck: supple  Lung: clear   CV: S1 S2 , Murmur:  Abd: soft  Ext: No edema  neuro: Awake / alert  Psych: flat affect  Skin: normal``    LABS/DATA:    CARDIAC MARKERS:                                9.1    9.39  )-----------( 279      ( 23 May 2021 06:37 )             29.4     05-23    140  |  108  |  32<H>  ----------------------------<  191<H>  4.3   |  19<L>  |  1.26    Ca    8.5      23 May 2021 06:37  Phos  3.0     05-23  Mg     1.7     05-23    TPro  6.9  /  Alb  3.1<L>  /  TBili  0.4  /  DBili  x   /  AST  11  /  ALT  7<L>  /  AlkPhos  64  05-22    proBNP:   Lipid Profile:   HgA1c:   TSH: Thyroid Stimulating Hormone, Serum: 1.89 uIU/mL (05-23 @ 07:52)      TELE:  EKG:

## 2021-05-24 LAB
-  AMIKACIN: SIGNIFICANT CHANGE UP
-  AMOXICILLIN/CLAVULANIC ACID: SIGNIFICANT CHANGE UP
-  AMPICILLIN/SULBACTAM: SIGNIFICANT CHANGE UP
-  AMPICILLIN: SIGNIFICANT CHANGE UP
-  AZTREONAM: SIGNIFICANT CHANGE UP
-  CEFAZOLIN: SIGNIFICANT CHANGE UP
-  CEFEPIME: SIGNIFICANT CHANGE UP
-  CEFOXITIN: SIGNIFICANT CHANGE UP
-  CEFTRIAXONE: SIGNIFICANT CHANGE UP
-  CIPROFLOXACIN: SIGNIFICANT CHANGE UP
-  ERTAPENEM: SIGNIFICANT CHANGE UP
-  GENTAMICIN: SIGNIFICANT CHANGE UP
-  IMIPENEM: SIGNIFICANT CHANGE UP
-  LEVOFLOXACIN: SIGNIFICANT CHANGE UP
-  MEROPENEM: SIGNIFICANT CHANGE UP
-  NITROFURANTOIN: SIGNIFICANT CHANGE UP
-  PIPERACILLIN/TAZOBACTAM: SIGNIFICANT CHANGE UP
-  TIGECYCLINE: SIGNIFICANT CHANGE UP
-  TOBRAMYCIN: SIGNIFICANT CHANGE UP
-  TRIMETHOPRIM/SULFAMETHOXAZOLE: SIGNIFICANT CHANGE UP
ANION GAP SERPL CALC-SCNC: 13 MMOL/L — SIGNIFICANT CHANGE UP (ref 5–17)
BUN SERPL-MCNC: 32 MG/DL — HIGH (ref 7–23)
CALCIUM SERPL-MCNC: 8.9 MG/DL — SIGNIFICANT CHANGE UP (ref 8.4–10.5)
CHLORIDE SERPL-SCNC: 106 MMOL/L — SIGNIFICANT CHANGE UP (ref 96–108)
CO2 SERPL-SCNC: 20 MMOL/L — LOW (ref 22–31)
CREAT SERPL-MCNC: 1.11 MG/DL — SIGNIFICANT CHANGE UP (ref 0.5–1.3)
CULTURE RESULTS: SIGNIFICANT CHANGE UP
GLUCOSE BLDC GLUCOMTR-MCNC: 156 MG/DL — HIGH (ref 70–99)
GLUCOSE BLDC GLUCOMTR-MCNC: 157 MG/DL — HIGH (ref 70–99)
GLUCOSE BLDC GLUCOMTR-MCNC: 171 MG/DL — HIGH (ref 70–99)
GLUCOSE BLDC GLUCOMTR-MCNC: 172 MG/DL — HIGH (ref 70–99)
GLUCOSE SERPL-MCNC: 173 MG/DL — HIGH (ref 70–99)
HCT VFR BLD CALC: 32.1 % — LOW (ref 34.5–45)
HGB BLD-MCNC: 10.1 G/DL — LOW (ref 11.5–15.5)
MCHC RBC-ENTMCNC: 27.3 PG — SIGNIFICANT CHANGE UP (ref 27–34)
MCHC RBC-ENTMCNC: 31.5 GM/DL — LOW (ref 32–36)
MCV RBC AUTO: 86.8 FL — SIGNIFICANT CHANGE UP (ref 80–100)
METHOD TYPE: SIGNIFICANT CHANGE UP
NRBC # BLD: 0 /100 WBCS — SIGNIFICANT CHANGE UP (ref 0–0)
ORGANISM # SPEC MICROSCOPIC CNT: SIGNIFICANT CHANGE UP
ORGANISM # SPEC MICROSCOPIC CNT: SIGNIFICANT CHANGE UP
PLATELET # BLD AUTO: 302 K/UL — SIGNIFICANT CHANGE UP (ref 150–400)
POTASSIUM SERPL-MCNC: 3.8 MMOL/L — SIGNIFICANT CHANGE UP (ref 3.5–5.3)
POTASSIUM SERPL-SCNC: 3.8 MMOL/L — SIGNIFICANT CHANGE UP (ref 3.5–5.3)
RBC # BLD: 3.7 M/UL — LOW (ref 3.8–5.2)
RBC # FLD: 14.1 % — SIGNIFICANT CHANGE UP (ref 10.3–14.5)
SODIUM SERPL-SCNC: 139 MMOL/L — SIGNIFICANT CHANGE UP (ref 135–145)
SPECIMEN SOURCE: SIGNIFICANT CHANGE UP
WBC # BLD: 11.5 K/UL — HIGH (ref 3.8–10.5)
WBC # FLD AUTO: 11.5 K/UL — HIGH (ref 3.8–10.5)

## 2021-05-24 PROCEDURE — 74230 X-RAY XM SWLNG FUNCJ C+: CPT | Mod: 26

## 2021-05-24 RX ADMIN — Medication 1: at 17:05

## 2021-05-24 RX ADMIN — Medication 5 MILLIGRAM(S): at 17:05

## 2021-05-24 RX ADMIN — PANTOPRAZOLE SODIUM 40 MILLIGRAM(S): 20 TABLET, DELAYED RELEASE ORAL at 04:47

## 2021-05-24 RX ADMIN — CEFTRIAXONE 100 MILLIGRAM(S): 500 INJECTION, POWDER, FOR SOLUTION INTRAMUSCULAR; INTRAVENOUS at 22:01

## 2021-05-24 RX ADMIN — Medication 1: at 11:48

## 2021-05-24 RX ADMIN — Medication 25 MILLIGRAM(S): at 04:47

## 2021-05-24 RX ADMIN — INSULIN GLARGINE 6 UNIT(S): 100 INJECTION, SOLUTION SUBCUTANEOUS at 21:57

## 2021-05-24 RX ADMIN — Medication 5 MILLIGRAM(S): at 04:47

## 2021-05-24 RX ADMIN — Medication 1: at 07:31

## 2021-05-24 NOTE — SWALLOW VFSS/MBS ASSESSMENT ADULT - ADDITIONAL INFORMATION
Pt received in [facility] chair with lateral views taken. SLP and radiologist present for this study. Pt was on RA in NAD. She was able to follow 1 step commands. Offered pt Varibar thin liquids via single cup sips and serial cup sips as well as single sip via straw as well as cookie impregnated with barium. She self presented all trials via cup, straw and bite.

## 2021-05-24 NOTE — PROGRESS NOTE ADULT - ASSESSMENT
syncope  likely in setting of hypoglycemia  pt has history of O.H    Orthostatic hypotension   on BB  off afterload reducers     DM II  Hypoglycemia  plan as per endo     AS  mild    DC planning as per primary team

## 2021-05-24 NOTE — SWALLOW VFSS/MBS ASSESSMENT ADULT - ORAL PREP COMMENTS
Adequate orientation/reception via adequate labial seal for cup drinking, able to bite through a hard solid, and able to generate effective negative pressure for bolus extraction via straw. Adequate containment.

## 2021-05-24 NOTE — SWALLOW VFSS/MBS ASSESSMENT ADULT - COMMENTS
Continued: taken novolog, at least 9U before every meal. Family does not think pt has been eating well for the past few days. Family states pt's sugars are very labile, and can go up into the 300s. Pt denies any chest pain, sob, fevers, chills.    5/22:  Notified by RN that patient desaturated to 87% while asleep. O2sat improved to 98% on 3LNC.  Evaluated the patient at bedside. Patient asymptomatic, denies SOB, CP    Swallowing hx: Pt is known to this service from admission in 2018 and 2020.   2018 MBSS: Pt presents with an oropharyngeal dysphagia characterized by mildly reduced A-P transport, mild to moderate pharyngeal residue post swallow and silent laryngeal penetration with retrieval across consistencies. Laryngeal penetration is deeper into the laryngeal vestibule and of greater amount with thin liquid via straw. Airway protection is improved with small, single cup sips. No aspiration during this exam. Of note, coughing observed throughout exam in absence of laryngeal penetration/aspiration.   Disorders: reduced lingual strength/ROM/Rate of motion, reduced BOT to posterior pharyngeal wall contact, reduced hyo-laryngeal excursion, reduced laryngeal closure, signs of reduced supraglottic sensation. Recommended Consistencies: Regular texture diet. SMALL, SINGLE CUP SIPS. NO STRAWS.    2020 MBS: Pt seen in radiology for modified barium swallow. The swallow sequence is characterized by: mildly reduced A-P transport, slight delay in trigger of pharyngeal swallow, trace to mild pharyngeal residue post swallow and silent laryngeal penetration with retrieval with thin liquids. Airway protection is improved with small, single sips. No aspiration seen on this examination.  Disorders: reduced lingual strength/ROM/Rate of motion, delay in trigger of the swallow reflex, reduced hyo-laryngeal excursion, reduced laryngeal closure, and reduced supraglottic sensation.-->Regular diet, small single sips of liquids only    Seen 5/23 with rec for dysphagia 3/nectar TL and MBS

## 2021-05-24 NOTE — SWALLOW VFSS/MBS ASSESSMENT ADULT - PHARYNGEAL PHASE COMMENTS
Pharyngeal phase c/b latency in the swallow trigger to the level of the valleculae with solids. Appreciated spillover from the valleculae to the level of the pyriform sinuses with thin liquids.  Base of tongue/BoT retraction and pharyngeal constriction were mildly reduced and resulted in reduced pharyngeal bolus propulsion. Mild to trace vallecular residue present which cleared with additional (independent) dry swallow.  Hyolaryngeal elevation and excursion were mildly reduced. Adequate epiglottic inversion. Adequate relaxation of UES. Trace penetration with thin liquids with full retrieval. Of note, pt with larger bolus size and an increase in reduced control of thin liquids with the straw vs cup. Noted pt with mild throat clear following use of straw.     To note, high shoulder girdle and motion artifact obscured viewing throughout this study despite SLP support and re-direction.

## 2021-05-24 NOTE — PROGRESS NOTE ADULT - SUBJECTIVE AND OBJECTIVE BOX
Date of service: 21 @ 21:43      Patient is a 89y old  Female who presents with a chief complaint of loss of consciousness (24 May 2021 12:27)                                                               INTERVAL HPI/OVERNIGHT EVENTS:    REVIEW OF SYSTEMS:     CONSTITUTIONAL: No weakness, fevers or chills  EYES/ENT: No visual changes , no ear ache   NECK: No pain or stiffness  RESPIRATORY: No cough, wheezing,  No shortness of breath  CARDIOVASCULAR: No chest pain or palpitations  GASTROINTESTINAL: No abdominal pain  . No nausea, vomiting, or hematemesis; No diarrhea or constipation. No melena or hematochezia.  GENITOURINARY: No dysuria, frequency or hematuria  NEUROLOGICAL: No numbness or weakness  SKIN: No itching, burning, rashes, or lesions                                                                                                                                                                                                                                                                                 Medications:  MEDICATIONS  (STANDING):  cefTRIAXone   IVPB 1000 milliGRAM(s) IV Intermittent every 24 hours  dextrose 40% Gel 15 Gram(s) Oral once  dextrose 5%. 1000 milliLiter(s) (50 mL/Hr) IV Continuous <Continuous>  dextrose 5%. 1000 milliLiter(s) (100 mL/Hr) IV Continuous <Continuous>  dextrose 50% Injectable 25 Gram(s) IV Push once  dextrose 50% Injectable 12.5 Gram(s) IV Push once  dextrose 50% Injectable 25 Gram(s) IV Push once  glucagon  Injectable 1 milliGRAM(s) IntraMuscular once  insulin glargine Injectable (LANTUS) 6 Unit(s) SubCutaneous at bedtime  insulin lispro (ADMELOG) corrective regimen sliding scale   SubCutaneous three times a day before meals  insulin lispro (ADMELOG) corrective regimen sliding scale   SubCutaneous at bedtime  metoprolol succinate ER 25 milliGRAM(s) Oral daily  pantoprazole    Tablet 40 milliGRAM(s) Oral before breakfast  predniSONE   Tablet 5 milliGRAM(s) Oral two times a day  sodium chloride 0.9%. 1000 milliLiter(s) (50 mL/Hr) IV Continuous <Continuous>    MEDICATIONS  (PRN):       Allergies    No Known Allergies    Intolerances      Vital Signs Last 24 Hrs  T(C): 36.7 (24 May 2021 20:49), Max: 36.7 (24 May 2021 18:02)  T(F): 98.1 (24 May 2021 20:49), Max: 98.1 (24 May 2021 20:49)  HR: 85 (24 May 2021 21:04) (78 - 94)  BP: 173/85 (24 May 2021 21:04) (148/85 - 195/96)  BP(mean): --  RR: 18 (24 May 2021 20:49) (18 - 18)  SpO2: 98% (24 May 2021 20:49) (95% - 98%)  CAPILLARY BLOOD GLUCOSE      POCT Blood Glucose.: 156 mg/dL (24 May 2021 21:30)  POCT Blood Glucose.: 171 mg/dL (24 May 2021 17:00)  POCT Blood Glucose.: 157 mg/dL (24 May 2021 11:39)  POCT Blood Glucose.: 172 mg/dL (24 May 2021 07:14)  POCT Blood Glucose.: 193 mg/dL (23 May 2021 23:35)  POCT Blood Glucose.: 186 mg/dL (23 May 2021 22:14)       @ 07:  -   @ 07:00  --------------------------------------------------------  IN: 722 mL / OUT: 700 mL / NET: 22 mL     @ 07: @ 21:43  --------------------------------------------------------  IN: 600 mL / OUT: 200 mL / NET: 400 mL      Physical Exam:    Daily     Daily Weight in k (24 May 2021 11:19)  General:  Well appearing, NAD, not cachetic  HEENT:  Nonicteric, PERRLA  CV:  RRR, S1S2   Lungs:  CTA B/L, no wheezes, rales, rhonchi  Abdomen:  Soft, non-tender, no distended, positive BS  Extremities:  2+ pulses, no c/c, no edema  Skin:  Warm and dry, no rashes  :  No mason  Neuro:  AAOx3, non-focal, grossly intact                                                                                                                                                                                                                                                                                                LABS:                               10.1   11.50 )-----------( 302      ( 24 May 2021 06:14 )             32.1                      05-24    139  |  106  |  32<H>  ----------------------------<  173<H>  3.8   |  20<L>  |  1.11    Ca    8.9      24 May 2021 06:14  Phos  3.0     -  Mg     1.7                              RADIOLOGY & ADDITIONAL TESTS         I personally reviewed: [  ]EKG   [  ]CXR    [  ] CT      A/P:         Discussed with :     [facility]fani consultants' Notes   Time spent :

## 2021-05-24 NOTE — PHYSICAL THERAPY INITIAL EVALUATION ADULT - ADDITIONAL COMMENTS
PTA pt lives in private home w/  & 3 hrs/7days HHA. +Chair lift access into home, no steps within. Pt requires assist w/ all self-care and functional ADLs. pt requires assist & RW for transfers to/from bed to w/c. pt primarily spends her day in w/c. Pt also owns, hospital bed, walk in shower w/ bench, raised toilet seat, grab bars, commode. hearing/vision intact

## 2021-05-24 NOTE — PROGRESS NOTE ADULT - ASSESSMENT
Assessment  DMT2: 89y Female with DM T2 with hyperglycemia admitted with hypoglycemia, patient was insulin and PO meds at home, now on low-dose basal insulin and coverage, blood sugars are improving and in overall acceptable range, no new hypoglycemic episodes, c-peptide 5., eating meals, appears comfortable.  Syncope: Being worked up, monitored, stable.  CKD: labs, chart reviewed.  HTN: On medications, controlled.                Karan Akbar MD  Cell: [phone]  Office: [phone]               Assessment  DMT2: 89y Female with DM T2 with hyperglycemia admitted with hypoglycemia, patient was insulin and PO meds at home,  now on low-dose basal insulin and coverage, blood sugars are improving and in overall acceptable range, no new hypoglycemic episodes, c-peptide 5., eating meals, appears comfortable.  Syncope: Being worked up, monitored, stable.  CKD: labs, chart reviewed.  HTN: On medications, controlled.                Karan Akbar MD  Cell: [phone]  Office: [phone]

## 2021-05-24 NOTE — PHYSICAL THERAPY INITIAL EVALUATION ADULT - PERTINENT HX OF CURRENT PROBLEM, REHAB EVAL
Pt is a 89F c hx RA on chronic prednisone, HTN, CKD (baseline Cr ~1.1), ILD, DM2, wheelchairbound, admitted on 5/21 pw episode of confusion, poss loss of consciousness. (+)Xray chest Redemonstration of linear reticular opacities bilaterally, chronic. No focal consolidations.

## 2021-05-24 NOTE — SWALLOW VFSS/MBS ASSESSMENT ADULT - ORAL PHASE COMMENTS
Oral phase c/b reduced control of bolus leading to premature spillover in the oropharynx with all textures. Notable increased control with more viscous textures as well as with cup vs straw. Leading to spillover in the hypopharynx. Prolonged mastication of solid however suspected 2/2 distaste for barium given pt grimace and verbal report 'I hate this stuff'.

## 2021-05-24 NOTE — SWALLOW VFSS/MBS ASSESSMENT ADULT - ROSENBEK'S PENETRATION ASPIRATION SCALE
thin liquids ; 1 for solids/(2) contrast enters airway, remains above the vocal cords, no residue remains (penetration)

## 2021-05-24 NOTE — SWALLOW VFSS/MBS ASSESSMENT ADULT - DIAGNOSTIC IMPRESSIONS
Pt p/w a mild-moderate oropharyngeal dysphagia in the presence of reduced control of the bolus leading  to premature spillage, latency in the swallow trigger, reduced hyolaryngeal elevation/excursion leading to trace penetration with retrieval.

## 2021-05-24 NOTE — PROGRESS NOTE ADULT - SUBJECTIVE AND OBJECTIVE BOX
Chief complaint  Patient is a 89y old  Female who presents with a chief complaint of loss of consciousness (24 May 2021 07:08)   Review of systems  Patient in bed, looks comfortable, no hypoglycemic episodes.    Labs and Fingersticks  CAPILLARY BLOOD GLUCOSE      POCT Blood Glucose.: 157 mg/dL (24 May 2021 11:39)  POCT Blood Glucose.: 172 mg/dL (24 May 2021 07:14)  POCT Blood Glucose.: 193 mg/dL (23 May 2021 23:35)  POCT Blood Glucose.: 186 mg/dL (23 May 2021 22:14)  POCT Blood Glucose.: 191 mg/dL (23 May 2021 17:06)      Anion Gap, Serum: 13 (05-24 @ 06:14)  Anion Gap, Serum: 13 (05-23 @ 06:37)      Calcium, Total Serum: 8.9 (05-24 @ 06:14)  Calcium, Total Serum: 8.5 (05-23 @ 06:37)          05-24    139  |  106  |  32<H>  ----------------------------<  173<H>  3.8   |  20<L>  |  1.11    Ca    8.9      24 May 2021 06:14  Phos  3.0     05-23  Mg     1.7     05-23                          10.1   11.50 )-----------( 302      ( 24 May 2021 06:14 )             32.1     Medications  MEDICATIONS  (STANDING):  cefTRIAXone   IVPB 1000 milliGRAM(s) IV Intermittent every 24 hours  dextrose 40% Gel 15 Gram(s) Oral once  dextrose 5%. 1000 milliLiter(s) (50 mL/Hr) IV Continuous <Continuous>  dextrose 5%. 1000 milliLiter(s) (100 mL/Hr) IV Continuous <Continuous>  dextrose 50% Injectable 25 Gram(s) IV Push once  dextrose 50% Injectable 12.5 Gram(s) IV Push once  dextrose 50% Injectable 25 Gram(s) IV Push once  glucagon  Injectable 1 milliGRAM(s) IntraMuscular once  insulin glargine Injectable (LANTUS) 6 Unit(s) SubCutaneous at bedtime  insulin lispro (ADMELOG) corrective regimen sliding scale   SubCutaneous three times a day before meals  insulin lispro (ADMELOG) corrective regimen sliding scale   SubCutaneous at bedtime  metoprolol succinate ER 25 milliGRAM(s) Oral daily  pantoprazole    Tablet 40 milliGRAM(s) Oral before breakfast  predniSONE   Tablet 5 milliGRAM(s) Oral two times a day  sodium chloride 0.9%. 1000 milliLiter(s) (50 mL/Hr) IV Continuous <Continuous>      Physical Exam  General: Patient comfortable in bed  Vital Signs Last 12 Hrs  T(F): 97.7 (05-24-21 @ 12:08), Max: 97.7 (05-24-21 @ 04:43)  HR: 78 (05-24-21 @ 12:08) (78 - 82)  BP: 150/87 (05-24-21 @ 12:08) (150/87 - 184/92)  BP(mean): --  RR: 18 (05-24-21 @ 12:08) (18 - 18)  SpO2: 96% (05-24-21 @ 12:08) (96% - 96%)  Neck: No palpable thyroid nodules.  CVS: S1S2, No murmurs  Respiratory: No wheezing, no crepitations  GI: Abdomen soft, bowel sounds positive  Musculoskeletal:  edema lower extremities.   Skin: No skin rashes, no ecchymosis    Diagnostics             Chief complaint  Patient is a 89y old  Female who presents with a chief complaint of loss of consciousness (24 May 2021 07:08)   Review of systems  Patient in bed, looks comfortable, no hypoglycemic episodes.    Labs and Fingersticks  CAPILLARY BLOOD GLUCOSE    POCT Blood Glucose.: 157 mg/dL (24 May 2021 11:39)  POCT Blood Glucose.: 172 mg/dL (24 May 2021 07:14)  POCT Blood Glucose.: 193 mg/dL (23 May 2021 23:35)  POCT Blood Glucose.: 186 mg/dL (23 May 2021 22:14)  POCT Blood Glucose.: 191 mg/dL (23 May 2021 17:06)      Anion Gap, Serum: 13 (05-24 @ 06:14)  Anion Gap, Serum: 13 (05-23 @ 06:37)      Calcium, Total Serum: 8.9 (05-24 @ 06:14)  Calcium, Total Serum: 8.5 (05-23 @ 06:37)          05-24    139  |  106  |  32<H>  ----------------------------<  173<H>  3.8   |  20<L>  |  1.11    Ca    8.9      24 May 2021 06:14  Phos  3.0     05-23  Mg     1.7     05-23                          10.1   11.50 )-----------( 302      ( 24 May 2021 06:14 )             32.1     Medications  MEDICATIONS  (STANDING):  cefTRIAXone   IVPB 1000 milliGRAM(s) IV Intermittent every 24 hours  dextrose 40% Gel 15 Gram(s) Oral once  dextrose 5%. 1000 milliLiter(s) (50 mL/Hr) IV Continuous <Continuous>  dextrose 5%. 1000 milliLiter(s) (100 mL/Hr) IV Continuous <Continuous>  dextrose 50% Injectable 25 Gram(s) IV Push once  dextrose 50% Injectable 12.5 Gram(s) IV Push once  dextrose 50% Injectable 25 Gram(s) IV Push once  glucagon  Injectable 1 milliGRAM(s) IntraMuscular once  insulin glargine Injectable (LANTUS) 6 Unit(s) SubCutaneous at bedtime  insulin lispro (ADMELOG) corrective regimen sliding scale   SubCutaneous three times a day before meals  insulin lispro (ADMELOG) corrective regimen sliding scale   SubCutaneous at bedtime  metoprolol succinate ER 25 milliGRAM(s) Oral daily  pantoprazole    Tablet 40 milliGRAM(s) Oral before breakfast  predniSONE   Tablet 5 milliGRAM(s) Oral two times a day  sodium chloride 0.9%. 1000 milliLiter(s) (50 mL/Hr) IV Continuous <Continuous>      Physical Exam  General: Patient comfortable in bed  Vital Signs Last 12 Hrs  T(F): 97.7 (05-24-21 @ 12:08), Max: 97.7 (05-24-21 @ 04:43)  HR: 78 (05-24-21 @ 12:08) (78 - 82)  BP: 150/87 (05-24-21 @ 12:08) (150/87 - 184/92)  BP(mean): --  RR: 18 (05-24-21 @ 12:08) (18 - 18)  SpO2: 96% (05-24-21 @ 12:08) (96% - 96%)  Neck: No palpable thyroid nodules.  CVS: S1S2, No murmurs  Respiratory: No wheezing, no crepitations  GI: Abdomen soft, bowel sounds positive  Musculoskeletal:  edema lower extremities.   Skin: No skin rashes, no ecchymosis    Diagnostics

## 2021-05-24 NOTE — PROGRESS NOTE ADULT - SUBJECTIVE AND OBJECTIVE BOX
DATE OF SERVICE: 05-24-21 @ 07:09    Subjective: Patient seen and examined. No new events except as noted.     SUBJECTIVE/ROS:  feels ok       MEDICATIONS:  MEDICATIONS  (STANDING):  cefTRIAXone   IVPB 1000 milliGRAM(s) IV Intermittent every 24 hours  dextrose 40% Gel 15 Gram(s) Oral once  dextrose 5%. 1000 milliLiter(s) (50 mL/Hr) IV Continuous <Continuous>  dextrose 5%. 1000 milliLiter(s) (100 mL/Hr) IV Continuous <Continuous>  dextrose 50% Injectable 25 Gram(s) IV Push once  dextrose 50% Injectable 12.5 Gram(s) IV Push once  dextrose 50% Injectable 25 Gram(s) IV Push once  glucagon  Injectable 1 milliGRAM(s) IntraMuscular once  insulin glargine Injectable (LANTUS) 6 Unit(s) SubCutaneous at bedtime  insulin lispro (ADMELOG) corrective regimen sliding scale   SubCutaneous three times a day before meals  insulin lispro (ADMELOG) corrective regimen sliding scale   SubCutaneous at bedtime  metoprolol succinate ER 25 milliGRAM(s) Oral daily  pantoprazole    Tablet 40 milliGRAM(s) Oral before breakfast  predniSONE   Tablet 5 milliGRAM(s) Oral two times a day  sodium chloride 0.9%. 1000 milliLiter(s) (50 mL/Hr) IV Continuous <Continuous>      PHYSICAL EXAM:  T(C): 36.5 (05-24-21 @ 04:43), Max: 36.5 (05-23-21 @ 12:11)  HR: 82 (05-24-21 @ 04:43) (73 - 85)  BP: 184/91 (05-24-21 @ 04:43) (159/76 - 195/96)  RR: 18 (05-24-21 @ 04:43) (18 - 18)  SpO2: 96% (05-24-21 @ 04:43) (95% - 96%)  Wt(kg): --  I&O's Summary    23 May 2021 07:01  -  24 May 2021 07:00  --------------------------------------------------------  IN: 722 mL / OUT: 700 mL / NET: 22 mL            JVP: Normal  Neck: supple  Lung: clear   CV: S1 S2 , Murmur:  Abd: soft  Ext: No edema  neuro: Awake / alert  Psych: flat affect  Skin: normal``    LABS/DATA:    CARDIAC MARKERS:                                10.1   11.50 )-----------( 302      ( 24 May 2021 06:14 )             32.1     05-24    139  |  106  |  32<H>  ----------------------------<  173<H>  3.8   |  20<L>  |  1.11    Ca    8.9      24 May 2021 06:14  Phos  3.0     05-23  Mg     1.7     05-23    TPro  6.9  /  Alb  3.1<L>  /  TBili  0.4  /  DBili  x   /  AST  11  /  ALT  7<L>  /  AlkPhos  64  05-22    proBNP:   Lipid Profile:   HgA1c:   TSH: Thyroid Stimulating Hormone, Serum: 1.89 uIU/mL (05-23 @ 07:52)      TELE:  EKG:

## 2021-05-24 NOTE — SWALLOW VFSS/MBS ASSESSMENT ADULT - ORAL PHASE
Delayed oral transit time/Uncontrolled bolus / spillover in cristela-pharynx/Uncontrolled bolus / spillover in hypopharynx

## 2021-05-25 ENCOUNTER — TRANSCRIPTION ENCOUNTER (OUTPATIENT)
Age: 86
End: 2021-05-25

## 2021-05-25 LAB
ANION GAP SERPL CALC-SCNC: 13 MMOL/L — SIGNIFICANT CHANGE UP (ref 5–17)
BASOPHILS # BLD AUTO: 0.04 K/UL — SIGNIFICANT CHANGE UP (ref 0–0.2)
BASOPHILS NFR BLD AUTO: 0.3 % — SIGNIFICANT CHANGE UP (ref 0–2)
BUN SERPL-MCNC: 30 MG/DL — HIGH (ref 7–23)
CALCIUM SERPL-MCNC: 8.9 MG/DL — SIGNIFICANT CHANGE UP (ref 8.4–10.5)
CHLORIDE SERPL-SCNC: 106 MMOL/L — SIGNIFICANT CHANGE UP (ref 96–108)
CO2 SERPL-SCNC: 20 MMOL/L — LOW (ref 22–31)
CREAT SERPL-MCNC: 1.06 MG/DL — SIGNIFICANT CHANGE UP (ref 0.5–1.3)
EOSINOPHIL # BLD AUTO: 0.3 K/UL — SIGNIFICANT CHANGE UP (ref 0–0.5)
EOSINOPHIL NFR BLD AUTO: 2.2 % — SIGNIFICANT CHANGE UP (ref 0–6)
GLUCOSE BLDC GLUCOMTR-MCNC: 114 MG/DL — HIGH (ref 70–99)
GLUCOSE BLDC GLUCOMTR-MCNC: 159 MG/DL — HIGH (ref 70–99)
GLUCOSE BLDC GLUCOMTR-MCNC: 234 MG/DL — HIGH (ref 70–99)
GLUCOSE BLDC GLUCOMTR-MCNC: 259 MG/DL — HIGH (ref 70–99)
GLUCOSE SERPL-MCNC: 161 MG/DL — HIGH (ref 70–99)
HCT VFR BLD CALC: 33.1 % — LOW (ref 34.5–45)
HGB BLD-MCNC: 10.4 G/DL — LOW (ref 11.5–15.5)
IMM GRANULOCYTES NFR BLD AUTO: 0.6 % — SIGNIFICANT CHANGE UP (ref 0–1.5)
LYMPHOCYTES # BLD AUTO: 24.2 % — SIGNIFICANT CHANGE UP (ref 13–44)
LYMPHOCYTES # BLD AUTO: 3.28 K/UL — SIGNIFICANT CHANGE UP (ref 1–3.3)
MCHC RBC-ENTMCNC: 27 PG — SIGNIFICANT CHANGE UP (ref 27–34)
MCHC RBC-ENTMCNC: 31.4 GM/DL — LOW (ref 32–36)
MCV RBC AUTO: 86 FL — SIGNIFICANT CHANGE UP (ref 80–100)
MONOCYTES # BLD AUTO: 1.08 K/UL — HIGH (ref 0–0.9)
MONOCYTES NFR BLD AUTO: 8 % — SIGNIFICANT CHANGE UP (ref 2–14)
NEUTROPHILS # BLD AUTO: 8.8 K/UL — HIGH (ref 1.8–7.4)
NEUTROPHILS NFR BLD AUTO: 64.7 % — SIGNIFICANT CHANGE UP (ref 43–77)
NRBC # BLD: 0 /100 WBCS — SIGNIFICANT CHANGE UP (ref 0–0)
PLATELET # BLD AUTO: 313 K/UL — SIGNIFICANT CHANGE UP (ref 150–400)
POTASSIUM SERPL-MCNC: 3.9 MMOL/L — SIGNIFICANT CHANGE UP (ref 3.5–5.3)
POTASSIUM SERPL-SCNC: 3.9 MMOL/L — SIGNIFICANT CHANGE UP (ref 3.5–5.3)
RBC # BLD: 3.85 M/UL — SIGNIFICANT CHANGE UP (ref 3.8–5.2)
RBC # FLD: 14 % — SIGNIFICANT CHANGE UP (ref 10.3–14.5)
SODIUM SERPL-SCNC: 139 MMOL/L — SIGNIFICANT CHANGE UP (ref 135–145)
WBC # BLD: 13.58 K/UL — HIGH (ref 3.8–10.5)
WBC # FLD AUTO: 13.58 K/UL — HIGH (ref 3.8–10.5)

## 2021-05-25 RX ADMIN — Medication 5 MILLIGRAM(S): at 05:12

## 2021-05-25 RX ADMIN — Medication 0.1 MILLIGRAM(S): at 18:32

## 2021-05-25 RX ADMIN — Medication 1: at 08:19

## 2021-05-25 RX ADMIN — Medication 3: at 12:18

## 2021-05-25 RX ADMIN — PANTOPRAZOLE SODIUM 40 MILLIGRAM(S): 20 TABLET, DELAYED RELEASE ORAL at 05:11

## 2021-05-25 RX ADMIN — CEFTRIAXONE 100 MILLIGRAM(S): 500 INJECTION, POWDER, FOR SOLUTION INTRAMUSCULAR; INTRAVENOUS at 22:06

## 2021-05-25 RX ADMIN — Medication 25 MILLIGRAM(S): at 05:10

## 2021-05-25 RX ADMIN — Medication 5 MILLIGRAM(S): at 18:32

## 2021-05-25 RX ADMIN — INSULIN GLARGINE 6 UNIT(S): 100 INJECTION, SOLUTION SUBCUTANEOUS at 22:03

## 2021-05-25 NOTE — PROGRESS NOTE ADULT - ASSESSMENT
Assessment  DMT2: 89y Female with DM T2, A1C 6.8%, was on insulin and PO meds at home, admitted with hypoglycemia, now on low-dose basal insulin and coverage, blood sugars are improved and trending within overall acceptable range, no new hypoglycemic episodes, c-peptide 5. Patient is s/p BMBS, now on regular diet, appears comfortable.  Syncope: Being worked up, monitored, stable.  CKD: labs, chart reviewed.  HTN: On medications, controlled.      Karan Akbar MD  Cell: [phone]  Office: [phone]       Assessment  DMT2: 89y Female with DM T2, A1C 6.8%, was on insulin and PO meds at home, admitted with hypoglycemia, now on low-dose basal insulin and coverage, blood sugars  are improved and trending within overall acceptable range, no new hypoglycemic episodes, c-peptide 5. Patient is s/p BMBS, now on regular diet, appears comfortable.  Syncope: Being worked up, monitored, stable.  CKD: labs, chart reviewed.  HTN: On medications, controlled.      Karan Akbar MD  Cell: [phone]  Office: [phone]

## 2021-05-25 NOTE — DISCHARGE NOTE PROVIDER - NSDCMRMEDTOKEN_GEN_ALL_CORE_FT
Cabreraaglar KwikPen 100 units/mL subcutaneous solution: unit(s) subcutaneous given about once a week when sugars are &quot;high&quot;  folic acid 1 mg oral tablet: 2 tab(s) orally once a day  Glumetza 500 mg oral tablet, extended release: 1 tab(s) orally once a day  Januvia 50 mg oral tablet: 1 tab(s) orally once a day   losartan 50 mg oral tablet: 1 tab(s) orally once a day  metoprolol succinate 50 mg oral tablet, extended release: 1 tab(s) orally once a day  NovoLOG 100 units/mL subcutaneous solution: 9 unit(s) subcutaneous 3 times a day  pantoprazole 40 mg oral delayed release tablet: 1 tab(s) orally 2 times a day  predniSONE 5 mg oral tablet: 1 tab(s) orally 2 times a day  Vitamin D2 50,000 intl units (1.25 mg) oral capsule: 1 cap(s) orally once a week   Eduard Gonzales 100 units/mL subcutaneous solution: 8 unit(s) subcutaneous once a day  cloNIDine 0.1 mg oral tablet: 1 tab(s) orally 2 times a day    Dr. POONAM OSORIO NPLUDIVINA #[id number]  folic acid 1 mg oral tablet: 2 tab(s) orally once a day  Januvia 50 mg oral tablet: 1 tab(s) orally once a day   metoprolol succinate 25 mg oral tablet, extended release: 1 tab(s) orally once a day    Dr. POONAM OSORIO NPLUDIVINA #[id number]  pantoprazole 40 mg oral delayed release tablet: 1 tab(s) orally 2 times a day  predniSONE 5 mg oral tablet: 1 tab(s) orally 2 times a day  Vitamin D2 50,000 intl units (1.25 mg) oral capsule: 1 cap(s) orally once a week

## 2021-05-25 NOTE — DISCHARGE NOTE PROVIDER - NSDCFUADDAPPT_GEN_ALL_CORE_FT
Please follow up with your PCP and Cards in a week for further workup and management  Please follow up with your PCP and Cards in a week for further workup and management.   follow up with endocrine in 4 weeks.

## 2021-05-25 NOTE — DISCHARGE NOTE PROVIDER - PROVIDER TOKENS
PROVIDER:[TOKEN:[[id number]],FOLLOWUP:[1 week]],PROVIDER:[TOKEN:[[id number]],FOLLOWUP:[1 week]] PROVIDER:[TOKEN:[[id number]],FOLLOWUP:[1 week]],PROVIDER:[TOKEN:[[id number]],FOLLOWUP:[1 week]],PROVIDER:[TOKEN:[[id number]],FOLLOWUP:[1 month]]

## 2021-05-25 NOTE — DISCHARGE NOTE PROVIDER - HOSPITAL COURSE
87F with PMHx of rheumatoid arthritis, diabetes mellitus, HTN, moderate aortic stenosis, CKD2, and interstitial lung disease admitted with syncope. Noted to be hypoglycemic, FS of 57. FS improved. She also found to have orthostatic and UTI. s/p IV Abx as per ID with clinical improvement. Orthostatic s/p IV hydration and compression stockings. Cardiac meds adjusted. Patient seen by PT, recommending home with home PT. Patient remains stable for DC with close follow up with PCP as per Dr. Davis.   87F with PMHx of rheumatoid arthritis, diabetes mellitus, HTN, moderate aortic stenosis, CKD2, and interstitial lung disease admitted with syncope. Noted to be hypoglycemic, FS of 57. FS improved. seen by endo. hyperglycemic agents adjsted. She also found to have orthostatic and UTI. s/p IV Abx as per ID with clinical improvement. Orthostatic s/p IV hydration and compression stockings. hypertension urgency. s/p BP meds adjustment. BP improved. Patient seen by PT, recommending home with home PT. Patient remains stable for DC with close follow up with PCP as per Dr. Davis.

## 2021-05-25 NOTE — DISCHARGE NOTE PROVIDER - CARE PROVIDER_API CALL
Yuriy Tobias (MD)  Family Medicine  [address] [facility], [address] [place] [address]  Phone: [phone]  Fax: [phone]  Follow Up Time: 1 week    Jared Decker  CARDIOVASCULAR DISEASE  [address] [facility] [facility] [address]  [place] [address]  Phone: [phone]  Fax: [phone]  Follow Up Time: 1 week   Yuriy Tobias)  Family Medicine  [address] [facility], [address] [place] [address]  Phone: [phone]  Fax: [phone]  Follow Up Time: 1 week    Jared Decker  CARDIOVASCULAR DISEASE  [address] [facility] [address]  [place] [address]  Phone: [phone]  Fax: [phone]  Follow Up Time: 1 week    Karan Akbar)  EndocrinologyMetabDiabetes; Internal Medicine  [address] [place] [address]  Phone: [phone]  Fax: [phone]  Follow Up Time: 1 month

## 2021-05-25 NOTE — PROGRESS NOTE ADULT - ASSESSMENT
87F with PMHx of rheumatoid arthritis, diabetes mellitus, HTN, moderate aortic stenosis, CKD2, and interstitial lung disease admitted wiht syncope/.. pt is nt able to porivde evenets excepts saying taht room was too " warm ": and she was trying to get up and next thing she knew ambulance was there., As francisco javier chart : as per granddaughter when daughter of pt went outside found pt "slumped over in chair" and minimally responsive for a few minutes, called EMS and pt slowly came to. Before this was in usual state of health. Pt denies CP , palpitations .. ,mild SOB yesterday and naseous today but no vomitting or diarreah , no abdominal pain and no urinary sx .   no fever or chills   no cough     - syncope :   cont tele   cardio inpu t appreciated   orthostatics : positive : s/p hydration : compression stockings ..    RA: cont meds     - dysphagia :  evaluated with MBS now on regular diet     - presumed UTI : GNR in urine.. complete  ceftriaxione    leukocytosis likley sec to steroids   discussed with Dr. Kay       - RA : cont steroids     - DM: uncontrolled :  fu with endo     - ADINA : likely an element of dehydration   monitor     - tremor : pt on BB     - AS : mild .fu with cardio       - uncontrolled HTN  : clonidine for supine hypertension   monitor orthostatics     Discussed ACP : pt verbally had expressed she does not want aggressive measures however never signed MOLST ..  will discuss w her re: MOLST form

## 2021-05-25 NOTE — CONSULT NOTE ADULT - SUBJECTIVE AND OBJECTIVE BOX
CHIEF COMPLAINT:Patient is a 89y old  Female who presents with a chief complaint of loss of consciousness (22 May 2021 08:51)      HISTORY OF PRESENT ILLNESS:    89 female known to me from office with history as below admitted with syncope found to have hypoglycemia   pt does not remember the event   no cp   no so  no dizziness     PAST MEDICAL & SURGICAL HISTORY:  HTN (hypertension), benign    DM (diabetes mellitus), type 2    Asthma    RA (rheumatoid arthritis)    Aortic stenosis    History of interstitial lung disease    Tubal occlusion    After-cataract of left eye            MEDICATIONS:  metoprolol succinate ER 25 milliGRAM(s) Oral daily    cefTRIAXone   IVPB 1000 milliGRAM(s) IV Intermittent every 24 hours        pantoprazole    Tablet 40 milliGRAM(s) Oral before breakfast    dextrose 40% Gel 15 Gram(s) Oral once  dextrose 50% Injectable 25 Gram(s) IV Push once  dextrose 50% Injectable 12.5 Gram(s) IV Push once  dextrose 50% Injectable 25 Gram(s) IV Push once  glucagon  Injectable 1 milliGRAM(s) IntraMuscular once  insulin glargine Injectable (LANTUS) 6 Unit(s) SubCutaneous at bedtime  insulin lispro (ADMELOG) corrective regimen sliding scale   SubCutaneous three times a day before meals  insulin lispro (ADMELOG) corrective regimen sliding scale   SubCutaneous at bedtime  predniSONE   Tablet 5 milliGRAM(s) Oral two times a day    dextrose 5%. 1000 milliLiter(s) IV Continuous <Continuous>  dextrose 5%. 1000 milliLiter(s) IV Continuous <Continuous>  sodium chloride 0.9%. 1000 milliLiter(s) IV Continuous <Continuous>      FAMILY HISTORY:  No pertinent family history in first degree relatives        Non-contributory    SOCIAL HISTORY:    No tobacco, drugs or etoh    Allergies    No Known Allergies    Intolerances    	    REVIEW OF SYSTEMS:  as above  The rest of the 14 points ROS reviewed and except above they are unremarkable.        PHYSICAL EXAM:  T(C): 36.8 (05-22-21 @ 11:55), Max: 36.8 (05-22-21 @ 11:55)  HR: 76 (05-22-21 @ 11:55) (72 - 80)  BP: 138/76 (05-22-21 @ 11:55) (138/76 - 155/74)  RR: 18 (05-22-21 @ 11:55) (18 - 18)  SpO2: 94% (05-22-21 @ 11:55) (87% - 98%)  Wt(kg): --  I&O's Summary    21 May 2021 07:01  -  22 May 2021 07:00  --------------------------------------------------------  IN: 0 mL / OUT: 100 mL / NET: -100 mL    22 May 2021 07:01  -  22 May 2021 15:15  --------------------------------------------------------  IN: 440 mL / OUT: 350 mL / NET: 90 mL      JVP: Normal  Neck: supple  Lung: clear   CV: S1 S2 , Murmur: pos nubia   Abd: soft  Ext: No edema  neuro: Awake / alert  Psych: flat affect  Skin: normal    LABS/DATA:    TELEMETRY: 	    ECG:  	   	  CARDIAC MARKERS:                        17 <<== 05-22-21 @ 07:26                18 <<== 05-21-21 @ 15:48                              10.0   11.31 )-----------( 290      ( 22 May 2021 07:22 )             32.2     05-22    139  |  106  |  35<H>  ----------------------------<  145<H>  4.1   |  19<L>  |  1.34<H>    Ca    9.3      22 May 2021 07:26  Phos  3.4     05-22  Mg     1.6     05-22    TPro  6.9  /  Alb  3.1<L>  /  TBili  0.4  /  DBili  x   /  AST  11  /  ALT  7<L>  /  AlkPhos  64  05-22    proBNP: Serum Pro-Brain Natriuretic Peptide: 436 pg/mL (05-21 @ 15:48)    Lipid Profile:   HgA1c:   TSH:       < from: Transthoracic Echocardiogram (07.07.20 @ 09:20) >  YOB: 1931   Age: 88 (F)   MR#: [id number]  Study Date: 7/7/2020  Location: [address] [place], [place] [id number]: ANAYELI Goode  Study quality: Technically fair  Referring Physician: Linda Davis MD  Blood Pressure: 160/74 mmHg  Height: 152 cm  Weight: 64 kg  BSA: 1.6 m2  ------------------------------------------------------------------------  PROCEDURE: Transthoracic echocardiogram with 2-D, M-Mode  and complete spectral and color flow Doppler.  INDICATION:Abnormal electrocardiogram  (R94.31 )  ------------------------------------------------------------------------  Dimensions:    Normal Values:  LA:     3.1    2.0 - 4.0 cm  Ao:     2.7    2.0 - 3.8 cm  SEPTUM: 1.0    0.6 - 1.2 cm  PWT:    1.0    0.6 - 1.1 cm  LVIDd:  3.6    3.0 - 5.6 cm  LVIDs:  2.4    1.8 - 4.0 cm  Derived variables:  LVMI: 67 g/m2  RWT: 0.55  EF (Visual Estimate): 75 %  Doppler Peak Velocity (m/sec): AoV=2.2 TV=2.1  ------------------------------------------------------------------------  Observations:  Mitral Valve: Mitral annular calcification.  Aortic Valve/Aorta: Fibrocalcific aortic valve without  stenosis.  Minimal aortic regurgitation.  Normal aortic root size.  Left Atrium: Normal left atrium.  Left Ventricle: Normal left ventricular internal dimensions  and wall thicknesses.  Hyperdynamic left ventricle.  Impaired LV-relaxation with normal filling pressure.  Right Heart: Normal right atrium. Normal right ventricular  size and systolic function.  Normal tricuspid valve. Minimal tricuspid regurgitation.  Normal pulmonic valve.  Pericardium/Pleura: Trace pericardial effusion.  Hemodynamic: Estimated right atrial pressure is normal.  No evidence of pulmonary hypertension.  No PFO seen with color Doppler.  ------------------------------------------------------------------------  Conclusions:  Hyperdynamic left ventricle.  ------------------------------------------------------------------------  Confirmed on  7/7/2020 - 14:53:48 by Dwaine Oliveira MD, FASE  ------------------------------------------------------------------------    < end of copied text >      
HPI:   Patient is a 89y female with RA on chronic prednisone, dm, ILD admitted a few days ago after found slumped in her chair with brief loc. She was found hypoglycemic but is having syncope work up. She felt weak that day but otherwise has felt fine. SHe has a positive urine culture with ecoli and mild pyuria so ceftriaxone commenced now day 5. She currently denies any dysuria and she has no flank pain or fever. She has mild leukocytosis but otherwise doing well.     REVIEW OF SYSTEMS:  All other review of systems negative (Comprehensive ROS)    PAST MEDICAL & SURGICAL HISTORY:  HTN (hypertension), benign    DM (diabetes mellitus), type 2    Asthma    RA (rheumatoid arthritis)    Aortic stenosis    History of interstitial lung disease    Tubal occlusion    After-cataract of left eye        Allergies    No Known Allergies    Intolerances        Antimicrobials Day #  :5/5  cefTRIAXone   IVPB 1000 milliGRAM(s) IV Intermittent every 24 hours    Other Medications:  cloNIDine 0.1 milliGRAM(s) Oral two times a day  dextrose 40% Gel 15 Gram(s) Oral once  dextrose 5%. 1000 milliLiter(s) IV Continuous <Continuous>  dextrose 5%. 1000 milliLiter(s) IV Continuous <Continuous>  dextrose 50% Injectable 25 Gram(s) IV Push once  dextrose 50% Injectable 12.5 Gram(s) IV Push once  dextrose 50% Injectable 25 Gram(s) IV Push once  glucagon  Injectable 1 milliGRAM(s) IntraMuscular once  insulin glargine Injectable (LANTUS) 6 Unit(s) SubCutaneous at bedtime  insulin lispro (ADMELOG) corrective regimen sliding scale   SubCutaneous three times a day before meals  insulin lispro (ADMELOG) corrective regimen sliding scale   SubCutaneous at bedtime  metoprolol succinate ER 25 milliGRAM(s) Oral daily  pantoprazole    Tablet 40 milliGRAM(s) Oral before breakfast  predniSONE   Tablet 5 milliGRAM(s) Oral two times a day  sodium chloride 0.9%. 1000 milliLiter(s) IV Continuous <Continuous>      FAMILY HISTORY:  No pertinent family history in first degree relatives        SOCIAL HISTORY:  Smoking: [ ]Yes [x ]No  ETOH: [ ]Yes [xx ]No  Drug Use: [ ]Yes [ ]No   x[ ] Single[ ]    T(F): 98.2 (05-25-21 @ 05:11), Max: 98.2 (05-25-21 @ 05:11)  HR: 91 (05-25-21 @ 05:11)  BP: 184/89 (05-25-21 @ 05:11)  RR: 18 (05-25-21 @ 05:11)  SpO2: 96% (05-25-21 @ 05:11)  Wt(kg): --    PHYSICAL EXAM:  General: alert, no acute distress  Eyes:  anicteric, no conjunctival injection, no discharge  Oropharynx: no lesions or injection 	  Neck: supple, without adenopathy  Lungs: basilar rales  to auscultation  Heart: regular rate and rhythm; no murmur, rubs or gallops  Abdomen: soft, nondistended, nontender, without mass or organomegaly  Skin: no lesions  Extremities: no clubbing, cyanosis, or edema  Neurologic: alert, oriented, moves all extremities  anal area no tender  back no cvat  LAB RESULTS:                        10.4   13.58 )-----------( 313      ( 25 May 2021 05:45 )             33.1     05-25    139  |  106  |  30<H>  ----------------------------<  161<H>  3.9   |  20<L>  |  1.06    Ca    8.9      25 May 2021 05:45            MICROBIOLOGY:  RECENT CULTURES:  05-21 @ 23:01 .Urine Clean Catch (Midstream) Escherichia coli    >100,000 CFU/ml Escherichia coli            RADIOLOGY REVIEWED:  < from: Xray Chest 1 View- PORTABLE-Urgent (05.21.21 @ 17:22) >    EXAM:  XR CHEST PORTABLE URGENT 1V                            PROCEDURE DATE:  05/21/2021            INTERPRETATION:  CLINICAL INFORMATION: Chest Pain    EXAM: Chest X-ray, AP View    COMPARISON: Chest x-ray 7/4/2020; CT chest 12/28/2018    FINDINGS:    Redemonstration of linear reticular opacities bilaterally, chronic. Calcified granulomas are again seen. No focal consolidations.    Heart size cannot be accurately assessed in this projection.    No acute osseous findings.      IMPRESSION:    Redemonstration of linear reticular opacities bilaterally, chronic. No focal consolidations.              RASHEED TRAVIS MD; Resident Radiology  This document has been electronically signed.    < end of copied text >          Impression:    Elderly woman with RA on prednisone, ild, dm admitted with brief loc, hypoglycemic, has positive urine culture but no fever, mild pyuria, feels well. Has gotten 5 days of ctx as of today, no support for pyelo and she looks overall well. She has mild fluctuating leukocytosis which has been present during prior admissions and clinically she looks and feels well so it does not represent uncontrolled infection  Recommendations:  stop ceftriaxone after today's dose  discharge planning  culture blood if fever,otherwise just monitor clinically
      HPI:  89F c hx RA on chronic prednisone, HTN, CKD (baseline Cr ~1.1), ILD, DM2, wheelchairbound, pw episode of confusion, poss loss of consciousness.    History from pt and pt's  over phone. Pt reports 1 week of coughs. Otherwise in usual state of health, until this morning, when pt stated she was not feeling well. Pt had half a bagel, then, while transferring from bed to wheelchair, had an episode of poss loss of consciousness, shaking of her head, eyes rolling back in head, fall. Pt was reportedly unconscious for only a few seconds, then came back. Pt denies losing consciousness at this point, but unable to give a good history as to what happened. Pt has not taking any basaglar at least for the past couple days, as her evening sugars have been in the 90s. Pt has only taken novolog, at least 9U before every meal. Family does not think pt has been eating well for the past few days. Family states pt's sugars are very labile, and can go up into the 300s. Pt denies any chest pain, sob, fevers, chills.    VS: Tm 98.9, P 86, /80, R 20, 99% RA  In the Ed, received ceftriaxone, , d50 1amp (21 May 2021 23:03)  Patient has history of diabetes, on insulin at home, recent hypoglycemic episodes. Patient follows up with PCP for diabetes management.    PAST MEDICAL & SURGICAL HISTORY:  HTN (hypertension), benign    DM (diabetes mellitus), type 2    Asthma    RA (rheumatoid arthritis)    Aortic stenosis    History of interstitial lung disease    Tubal occlusion    After-cataract of left eye        FAMILY HISTORY:  No pertinent family history in first degree relatives        Social History:    Outpatient Medications:    MEDICATIONS  (STANDING):  cefTRIAXone   IVPB 1000 milliGRAM(s) IV Intermittent every 24 hours  dextrose 40% Gel 15 Gram(s) Oral once  dextrose 5%. 1000 milliLiter(s) (50 mL/Hr) IV Continuous <Continuous>  dextrose 5%. 1000 milliLiter(s) (100 mL/Hr) IV Continuous <Continuous>  dextrose 50% Injectable 25 Gram(s) IV Push once  dextrose 50% Injectable 12.5 Gram(s) IV Push once  dextrose 50% Injectable 25 Gram(s) IV Push once  glucagon  Injectable 1 milliGRAM(s) IntraMuscular once  insulin glargine Injectable (LANTUS) 6 Unit(s) SubCutaneous at bedtime  insulin lispro (ADMELOG) corrective regimen sliding scale   SubCutaneous three times a day before meals  insulin lispro (ADMELOG) corrective regimen sliding scale   SubCutaneous at bedtime  metoprolol succinate ER 25 milliGRAM(s) Oral daily  pantoprazole    Tablet 40 milliGRAM(s) Oral before breakfast  predniSONE   Tablet 5 milliGRAM(s) Oral two times a day  sodium chloride 0.9%. 1000 milliLiter(s) (50 mL/Hr) IV Continuous <Continuous>    MEDICATIONS  (PRN):      Allergies    No Known Allergies    Intolerances      Review of Systems:  Constitutional: No fever, no chills  Eyes: No blurry vision  Neuro: No tremors  HEENT: No pain, no neck swelling  Cardiovascular: No chest pain, no palpitations  Respiratory: No SOB, no cough  GI: No nausea, vomiting, abdominal pain  : No dysuria  Skin: no rash  MSK: Has leg swelling.  Psych: no depression  Endocrine: no polyuria, polydipsia    UNABLE TO OBTAIN    ALL OTHER SYSTEMS REVIEWED AND NEGATIVE        PHYSICAL EXAM:  VITALS: T(C): 36.7 (05-22-21 @ 04:16)  T(F): 98.1 (05-22-21 @ 04:16), Max: 98.9 (05-21-21 @ 15:14)  HR: 72 (05-22-21 @ 04:16) (72 - 86)  BP: 155/74 (05-22-21 @ 04:16) (131/80 - 155/74)  RR:  (18 - 20)  SpO2:  (87% - 98%)  Wt(kg): --  GENERAL: NAD, well-groomed, well-developed  EYES: No proptosis, no lid lag  HEENT:  Atraumatic, Normocephalic  THYROID: Normal size, no palpable nodules  RESPIRATORY: Clear to auscultation bilaterally; No rales, rhonchi, wheezing  CARDIOVASCULAR: Si S2, No murmurs;  GI: Soft, non distended, normal bowel sounds  SKIN: Dry, intact, No rashes or lesions  MUSCULOSKELETAL: Has BL lower extremity edema.  NEURO:  no tremor, sensation decreased in feet BL,    POCT Blood Glucose.: 203 mg/dL (05-22-21 @ 08:07)  POCT Blood Glucose.: 130 mg/dL (05-21-21 @ 23:43)  POCT Blood Glucose.: 126 mg/dL (05-21-21 @ 18:24)  POCT Blood Glucose.: 124 mg/dL (05-21-21 @ 17:26)  POCT Blood Glucose.: 57 mg/dL (05-21-21 @ 16:12)                            10.0   11.31 )-----------( 290      ( 22 May 2021 07:22 )             32.2       05-22    139  |  106  |  35<H>  ----------------------------<  145<H>  4.1   |  19<L>  |  1.34<H>    EGFR if : 41<L>  EGFR if non : 35<L>    Ca    9.3      05-22  Mg     1.6     05-22  Phos  3.4     05-22    TPro  6.9  /  Alb  3.1<L>  /  TBili  0.4  /  DBili  x   /  AST  11  /  ALT  7<L>  /  AlkPhos  64  05-22      Thyroid Function Tests:              Radiology:

## 2021-05-25 NOTE — PROGRESS NOTE ADULT - SUBJECTIVE AND OBJECTIVE BOX
Date of service: 21 @ 19:57      Patient is a 89y old  Female who presents with a chief complaint of loss of consciousness (25 May 2021 12:33)                                                               INTERVAL HPI/OVERNIGHT EVENTS:    REVIEW OF SYSTEMS:     CONSTITUTIONAL: No weakness, fevers or chills  RESPIRATORY: No cough, wheezing,  No shortness of breath  CARDIOVASCULAR: No chest pain or palpitations  GASTROINTESTINAL: No abdominal pain  . No nausea, vomiting, or hematemesis; No diarrhea or constipation. No melena or hematochezia.  GENITOURINARY: No dysuria, frequency or hematuria  NEUROLOGICAL: No numbness or weakness                                                                                                                                                                                                                                                                                 Medications:  MEDICATIONS  (STANDING):  cefTRIAXone   IVPB 1000 milliGRAM(s) IV Intermittent every 24 hours  cloNIDine 0.1 milliGRAM(s) Oral two times a day  dextrose 40% Gel 15 Gram(s) Oral once  dextrose 5%. 1000 milliLiter(s) (50 mL/Hr) IV Continuous <Continuous>  dextrose 5%. 1000 milliLiter(s) (100 mL/Hr) IV Continuous <Continuous>  dextrose 50% Injectable 25 Gram(s) IV Push once  dextrose 50% Injectable 12.5 Gram(s) IV Push once  dextrose 50% Injectable 25 Gram(s) IV Push once  glucagon  Injectable 1 milliGRAM(s) IntraMuscular once  insulin glargine Injectable (LANTUS) 6 Unit(s) SubCutaneous at bedtime  insulin lispro (ADMELOG) corrective regimen sliding scale   SubCutaneous three times a day before meals  insulin lispro (ADMELOG) corrective regimen sliding scale   SubCutaneous at bedtime  metoprolol succinate ER 25 milliGRAM(s) Oral daily  pantoprazole    Tablet 40 milliGRAM(s) Oral before breakfast  predniSONE   Tablet 5 milliGRAM(s) Oral two times a day  sodium chloride 0.9%. 1000 milliLiter(s) (50 mL/Hr) IV Continuous <Continuous>    MEDICATIONS  (PRN):       Allergies    No Known Allergies    Intolerances      Vital Signs Last 24 Hrs  T(C): 36.4 (25 May 2021 12:47), Max: 36.8 (25 May 2021 05:11)  T(F): 97.6 (25 May 2021 12:47), Max: 98.2 (25 May 2021 05:11)  HR: 88 (25 May 2021 18:30) (79 - 94)  BP: 142/69 (25 May 2021 18:30) (142/69 - 187/89)  BP(mean): --  RR: 18 (25 May 2021 12:47) (18 - 18)  SpO2: 96% (25 May 2021 12:47) (96% - 98%)  CAPILLARY BLOOD GLUCOSE      POCT Blood Glucose.: 114 mg/dL (25 May 2021 17:02)  POCT Blood Glucose.: 259 mg/dL (25 May 2021 11:57)  POCT Blood Glucose.: 159 mg/dL (25 May 2021 07:57)  POCT Blood Glucose.: 156 mg/dL (24 May 2021 21:30)       @ 07:  -   @ 07:00  --------------------------------------------------------  IN: 600 mL / OUT: 200 mL / NET: 400 mL     @ 07:  -   @ 19:57  --------------------------------------------------------  IN: 180 mL / OUT: 300 mL / NET: -120 mL      Physical Exam:    Daily     Daily Weight in k.8 (25 May 2021 07:25)  General:  Well appearing, NAD, not cachetic  HEENT:  Nonicteric, PERRLA  CV:  RRR, S1S2   Lungs:  CTA B/L, no wheezes, rales, rhonchi  Abdomen:  Soft, non-tender, no distended, positive BS  Extremities:  2+ pulses, no c/c, no edema  Skin:  Warm and dry, no rashes  :  No mason  Neuro:  AAOx3, non-focal, grossly intact                                                                                                                                                                                                                                                                                                LABS:                               10.4   13.58 )-----------( 313      ( 25 May 2021 05:45 )             33.1                          139  |  106  |  30<H>  ----------------------------<  161<H>  3.9   |  20<L>  |  1.06    Ca    8.9      25 May 2021 05:45                         RADIOLOGY & ADDITIONAL TESTS         I personally reviewed: [  ]EKG   [  ]CXR    [  ] CT      A/P:         Discussed with :     [employer] consultants' Notes   Time spent :

## 2021-05-25 NOTE — PROGRESS NOTE ADULT - SUBJECTIVE AND OBJECTIVE BOX
Chief complaint  Patient is a 89y old  Female who presents with a chief complaint of loss of consciousness (25 May 2021 06:28)   Review of systems  Patient in bed, looks comfortable, no hypoglycemic episodes.    Labs and Fingersticks  CAPILLARY BLOOD GLUCOSE      POCT Blood Glucose.: 159 mg/dL (25 May 2021 07:57)  POCT Blood Glucose.: 156 mg/dL (24 May 2021 21:30)  POCT Blood Glucose.: 171 mg/dL (24 May 2021 17:00)  POCT Blood Glucose.: 157 mg/dL (24 May 2021 11:39)      Anion Gap, Serum: 13 (05-25 @ 05:45)  Anion Gap, Serum: 13 (05-24 @ 06:14)      Calcium, Total Serum: 8.9 (05-25 @ 05:45)  Calcium, Total Serum: 8.9 (05-24 @ 06:14)          05-25    139  |  106  |  30<H>  ----------------------------<  161<H>  3.9   |  20<L>  |  1.06    Ca    8.9      25 May 2021 05:45                          10.4   13.58 )-----------( 313      ( 25 May 2021 05:45 )             33.1     Medications  MEDICATIONS  (STANDING):  cefTRIAXone   IVPB 1000 milliGRAM(s) IV Intermittent every 24 hours  cloNIDine 0.1 milliGRAM(s) Oral two times a day  dextrose 40% Gel 15 Gram(s) Oral once  dextrose 5%. 1000 milliLiter(s) (50 mL/Hr) IV Continuous <Continuous>  dextrose 5%. 1000 milliLiter(s) (100 mL/Hr) IV Continuous <Continuous>  dextrose 50% Injectable 25 Gram(s) IV Push once  dextrose 50% Injectable 12.5 Gram(s) IV Push once  dextrose 50% Injectable 25 Gram(s) IV Push once  glucagon  Injectable 1 milliGRAM(s) IntraMuscular once  insulin glargine Injectable (LANTUS) 6 Unit(s) SubCutaneous at bedtime  insulin lispro (ADMELOG) corrective regimen sliding scale   SubCutaneous three times a day before meals  insulin lispro (ADMELOG) corrective regimen sliding scale   SubCutaneous at bedtime  metoprolol succinate ER 25 milliGRAM(s) Oral daily  pantoprazole    Tablet 40 milliGRAM(s) Oral before breakfast  predniSONE   Tablet 5 milliGRAM(s) Oral two times a day  sodium chloride 0.9%. 1000 milliLiter(s) (50 mL/Hr) IV Continuous <Continuous>      Physical Exam  General: Patient comfortable in bed  Vital Signs Last 12 Hrs  T(F): 98.2 (05-25-21 @ 05:11), Max: 98.2 (05-25-21 @ 05:11)  HR: 91 (05-25-21 @ 05:11) (91 - 91)  BP: 184/89 (05-25-21 @ 05:11) (184/89 - 184/89)  BP(mean): --  RR: 18 (05-25-21 @ 05:11) (18 - 18)  SpO2: 96% (05-25-21 @ 05:11) (96% - 96%)  Neck: No palpable thyroid nodules.  CVS: S1S2, No murmurs  Respiratory: No wheezing, no crepitations  GI: Abdomen soft, bowel sounds positive  Musculoskeletal:  edema lower extremities.   Skin: No skin rashes, no ecchymosis    Diagnostics             Chief complaint  Patient is a 89y old  Female who presents with a chief complaint of loss of consciousness (25 May 2021 06:28)   Review of systems  Patient in bed, looks comfortable, no hypoglycemic episodes.    Labs and Fingersticks  CAPILLARY BLOOD GLUCOSE      POCT Blood Glucose.: 159 mg/dL (25 May 2021 07:57)  POCT Blood Glucose.: 156 mg/dL (24 May 2021 21:30)  POCT Blood Glucose.: 171 mg/dL (24 May 2021 17:00)  POCT Blood Glucose.: 157 mg/dL (24 May 2021 11:39)      Anion Gap, Serum: 13 (05-25 @ 05:45)  Anion Gap, Serum: 13 (05-24 @ 06:14)      Calcium, Total Serum: 8.9 (05-25 @ 05:45)  Calcium, Total Serum: 8.9 (05-24 @ 06:14)          05-25    139  |  106  |  30<H>  ----------------------------<  161<H>  3.9   |  20<L>  |  1.06    Ca    8.9      25 May 2021 05:45                          10.4   13.58 )-----------( 313      ( 25 May 2021 05:45 )             33.1     Medications  MEDICATIONS  (STANDING):  cefTRIAXone   IVPB 1000 milliGRAM(s) IV Intermittent every 24 hours  cloNIDine 0.1 milliGRAM(s) Oral two times a day  dextrose 40% Gel 15 Gram(s) Oral once  dextrose 5%. 1000 milliLiter(s) (50 mL/Hr) IV Continuous <Continuous>  dextrose 5%. 1000 milliLiter(s) (100 mL/Hr) IV Continuous <Continuous>  dextrose 50% Injectable 25 Gram(s) IV Push once  dextrose 50% Injectable 12.5 Gram(s) IV Push once  dextrose 50% Injectable 25 Gram(s) IV Push once  glucagon  Injectable 1 milliGRAM(s) IntraMuscular once  insulin glargine Injectable (LANTUS) 6 Unit(s) SubCutaneous at bedtime  insulin lispro (ADMELOG) corrective regimen sliding scale   SubCutaneous three times a day before meals  insulin lispro (ADMELOG) corrective regimen sliding scale   SubCutaneous at bedtime  metoprolol succinate ER 25 milliGRAM(s) Oral daily  pantoprazole    Tablet 40 milliGRAM(s) Oral before breakfast  predniSONE   Tablet 5 milliGRAM(s) Oral two times a day  sodium chloride 0.9%. 1000 milliLiter(s) (50 mL/Hr) IV Continuous <Continuous>      Physical Exam  General: Patient comfortable in bed  Vital Signs Last 12 Hrs  T(F): 98.2 (05-25-21 @ 05:11), Max: 98.2 (05-25-21 @ 05:11)  HR: 91 (05-25-21 @ 05:11) (91 - 91)  BP: 184/89 (05-25-21 @ 05:11) (184/89 - 184/89)  BP(mean): --  RR: 18 (05-25-21 @ 05:11) (18 - 18)  SpO2: 96% (05-25-21 @ 05:11) (96% - 96%)  Neck: No palpable thyroid nodules.  CVS: S1S2, No murmurs  Respiratory: No wheezing, no crepitations  GI: Abdomen soft, bowel sounds positive  Musculoskeletal:  edema lower extremities.   Skin: No skin rashes, no ecchymosis    Diagnostics

## 2021-05-25 NOTE — PROGRESS NOTE ADULT - SUBJECTIVE AND OBJECTIVE BOX
DATE OF SERVICE: 05-25-21 @ 06:28    Subjective: Patient seen and examined. No new events except as noted.     SUBJECTIVE/ROS:  NAD      MEDICATIONS:  MEDICATIONS  (STANDING):  cefTRIAXone   IVPB 1000 milliGRAM(s) IV Intermittent every 24 hours  dextrose 40% Gel 15 Gram(s) Oral once  dextrose 5%. 1000 milliLiter(s) (100 mL/Hr) IV Continuous <Continuous>  dextrose 5%. 1000 milliLiter(s) (50 mL/Hr) IV Continuous <Continuous>  dextrose 50% Injectable 25 Gram(s) IV Push once  dextrose 50% Injectable 12.5 Gram(s) IV Push once  dextrose 50% Injectable 25 Gram(s) IV Push once  glucagon  Injectable 1 milliGRAM(s) IntraMuscular once  insulin glargine Injectable (LANTUS) 6 Unit(s) SubCutaneous at bedtime  insulin lispro (ADMELOG) corrective regimen sliding scale   SubCutaneous three times a day before meals  insulin lispro (ADMELOG) corrective regimen sliding scale   SubCutaneous at bedtime  metoprolol succinate ER 25 milliGRAM(s) Oral daily  pantoprazole    Tablet 40 milliGRAM(s) Oral before breakfast  predniSONE   Tablet 5 milliGRAM(s) Oral two times a day  sodium chloride 0.9%. 1000 milliLiter(s) (50 mL/Hr) IV Continuous <Continuous>      PHYSICAL EXAM:  T(C): 36.8 (05-25-21 @ 05:11), Max: 36.8 (05-25-21 @ 05:11)  HR: 91 (05-25-21 @ 05:11) (78 - 94)  BP: 184/89 (05-25-21 @ 05:11) (148/85 - 187/89)  RR: 18 (05-25-21 @ 05:11) (18 - 18)  SpO2: 96% (05-25-21 @ 05:11) (96% - 98%)  Wt(kg): --  I&O's Summary    23 May 2021 07:01  -  24 May 2021 07:00  --------------------------------------------------------  IN: 722 mL / OUT: 700 mL / NET: 22 mL    24 May 2021 07:01  -  25 May 2021 06:28  --------------------------------------------------------  IN: 600 mL / OUT: 200 mL / NET: 400 mL            JVP: Normal  Neck: supple  Lung: clear   CV: S1 S2 , Murmur:  Abd: soft  Ext: No edema  neuro: Awake / alert  Psych: flat affect  Skin: normal``    LABS/DATA:    CARDIAC MARKERS:                                10.4   13.58 )-----------( 313      ( 25 May 2021 05:45 )             33.1     05-25    139  |  106  |  30<H>  ----------------------------<  161<H>  3.9   |  20<L>  |  1.06    Ca    8.9      25 May 2021 05:45  Phos  3.0     05-23  Mg     1.7     05-23      proBNP:   Lipid Profile:   HgA1c:   TSH:     TELE:  EKG:

## 2021-05-25 NOTE — PROGRESS NOTE ADULT - ASSESSMENT
syncope  likely in setting of hypoglycemia  pt has history of O.H    Orthostatic hypotension   on BB  off afterload reducers     HTN  can add clonidine    DM II  Hypoglycemia  plan as per endo     AS  mild

## 2021-05-26 ENCOUNTER — TRANSCRIPTION ENCOUNTER (OUTPATIENT)
Age: 86
End: 2021-05-26

## 2021-05-26 VITALS
DIASTOLIC BLOOD PRESSURE: 68 MMHG | TEMPERATURE: 97 F | SYSTOLIC BLOOD PRESSURE: 158 MMHG | OXYGEN SATURATION: 97 % | HEART RATE: 65 BPM | RESPIRATION RATE: 18 BRPM

## 2021-05-26 LAB
GLUCOSE BLDC GLUCOMTR-MCNC: 189 MG/DL — HIGH (ref 70–99)
GLUCOSE BLDC GLUCOMTR-MCNC: 315 MG/DL — HIGH (ref 70–99)
HCT VFR BLD CALC: 30.7 % — LOW (ref 34.5–45)
HGB BLD-MCNC: 9.9 G/DL — LOW (ref 11.5–15.5)
MCHC RBC-ENTMCNC: 27.8 PG — SIGNIFICANT CHANGE UP (ref 27–34)
MCHC RBC-ENTMCNC: 32.2 GM/DL — SIGNIFICANT CHANGE UP (ref 32–36)
MCV RBC AUTO: 86.2 FL — SIGNIFICANT CHANGE UP (ref 80–100)
NRBC # BLD: 0 /100 WBCS — SIGNIFICANT CHANGE UP (ref 0–0)
PLATELET # BLD AUTO: 285 K/UL — SIGNIFICANT CHANGE UP (ref 150–400)
RBC # BLD: 3.56 M/UL — LOW (ref 3.8–5.2)
RBC # FLD: 14 % — SIGNIFICANT CHANGE UP (ref 10.3–14.5)
WBC # BLD: 10.63 K/UL — HIGH (ref 3.8–10.5)
WBC # FLD AUTO: 10.63 K/UL — HIGH (ref 3.8–10.5)

## 2021-05-26 PROCEDURE — 84132 ASSAY OF SERUM POTASSIUM: CPT

## 2021-05-26 PROCEDURE — 82947 ASSAY GLUCOSE BLOOD QUANT: CPT

## 2021-05-26 PROCEDURE — 84295 ASSAY OF SERUM SODIUM: CPT

## 2021-05-26 PROCEDURE — 80048 BASIC METABOLIC PNL TOTAL CA: CPT

## 2021-05-26 PROCEDURE — 99285 EMERGENCY DEPT VISIT HI MDM: CPT | Mod: 25

## 2021-05-26 PROCEDURE — 92611 MOTION FLUOROSCOPY/SWALLOW: CPT

## 2021-05-26 PROCEDURE — 71045 X-RAY EXAM CHEST 1 VIEW: CPT

## 2021-05-26 PROCEDURE — 96365 THER/PROPH/DIAG IV INF INIT: CPT

## 2021-05-26 PROCEDURE — 82803 BLOOD GASES ANY COMBINATION: CPT

## 2021-05-26 PROCEDURE — 82435 ASSAY OF BLOOD CHLORIDE: CPT

## 2021-05-26 PROCEDURE — 83036 HEMOGLOBIN GLYCOSYLATED A1C: CPT

## 2021-05-26 PROCEDURE — 81001 URINALYSIS AUTO W/SCOPE: CPT

## 2021-05-26 PROCEDURE — 80053 COMPREHEN METABOLIC PANEL: CPT

## 2021-05-26 PROCEDURE — 84681 ASSAY OF C-PEPTIDE: CPT

## 2021-05-26 PROCEDURE — 74230 X-RAY XM SWLNG FUNCJ C+: CPT

## 2021-05-26 PROCEDURE — 85018 HEMOGLOBIN: CPT

## 2021-05-26 PROCEDURE — 86769 SARS-COV-2 COVID-19 ANTIBODY: CPT

## 2021-05-26 PROCEDURE — 87186 SC STD MICRODIL/AGAR DIL: CPT

## 2021-05-26 PROCEDURE — 92610 EVALUATE SWALLOWING FUNCTION: CPT

## 2021-05-26 PROCEDURE — 84443 ASSAY THYROID STIM HORMONE: CPT

## 2021-05-26 PROCEDURE — 82330 ASSAY OF CALCIUM: CPT

## 2021-05-26 PROCEDURE — 92526 ORAL FUNCTION THERAPY: CPT

## 2021-05-26 PROCEDURE — 87086 URINE CULTURE/COLONY COUNT: CPT

## 2021-05-26 PROCEDURE — 84100 ASSAY OF PHOSPHORUS: CPT

## 2021-05-26 PROCEDURE — 96375 TX/PRO/DX INJ NEW DRUG ADDON: CPT

## 2021-05-26 PROCEDURE — 97161 PT EVAL LOW COMPLEX 20 MIN: CPT

## 2021-05-26 PROCEDURE — 83735 ASSAY OF MAGNESIUM: CPT

## 2021-05-26 PROCEDURE — 83880 ASSAY OF NATRIURETIC PEPTIDE: CPT

## 2021-05-26 PROCEDURE — 87635 SARS-COV-2 COVID-19 AMP PRB: CPT

## 2021-05-26 PROCEDURE — 85027 COMPLETE CBC AUTOMATED: CPT

## 2021-05-26 PROCEDURE — 84484 ASSAY OF TROPONIN QUANT: CPT

## 2021-05-26 PROCEDURE — 84439 ASSAY OF FREE THYROXINE: CPT

## 2021-05-26 PROCEDURE — 82962 GLUCOSE BLOOD TEST: CPT

## 2021-05-26 PROCEDURE — 83605 ASSAY OF LACTIC ACID: CPT

## 2021-05-26 PROCEDURE — 85014 HEMATOCRIT: CPT

## 2021-05-26 PROCEDURE — 85025 COMPLETE CBC W/AUTO DIFF WBC: CPT

## 2021-05-26 RX ORDER — LOSARTAN POTASSIUM 100 MG/1
1 TABLET, FILM COATED ORAL
Qty: 0 | Refills: 0 | DISCHARGE

## 2021-05-26 RX ORDER — INSULIN GLARGINE 100 [IU]/ML
8 INJECTION, SOLUTION SUBCUTANEOUS
Qty: 0 | Refills: 0 | DISCHARGE

## 2021-05-26 RX ORDER — INSULIN GLARGINE 100 [IU]/ML
0 INJECTION, SOLUTION SUBCUTANEOUS
Qty: 0 | Refills: 0 | DISCHARGE

## 2021-05-26 RX ORDER — METOPROLOL TARTRATE 50 MG
1 TABLET ORAL
Qty: 0 | Refills: 0 | DISCHARGE

## 2021-05-26 RX ORDER — INSULIN GLARGINE 100 [IU]/ML
8 INJECTION, SOLUTION SUBCUTANEOUS AT BEDTIME
Refills: 0 | Status: DISCONTINUED | OUTPATIENT
Start: 2021-05-26 | End: 2021-05-26

## 2021-05-26 RX ORDER — INSULIN ASPART 100 [IU]/ML
9 INJECTION, SOLUTION SUBCUTANEOUS
Qty: 0 | Refills: 0 | DISCHARGE

## 2021-05-26 RX ORDER — METFORMIN HYDROCHLORIDE 850 MG/1
1 TABLET ORAL
Qty: 0 | Refills: 0 | DISCHARGE

## 2021-05-26 RX ORDER — METOPROLOL TARTRATE 50 MG
1 TABLET ORAL
Qty: 30 | Refills: 0
Start: 2021-05-26 | End: 2021-06-24

## 2021-05-26 RX ADMIN — PANTOPRAZOLE SODIUM 40 MILLIGRAM(S): 20 TABLET, DELAYED RELEASE ORAL at 05:27

## 2021-05-26 RX ADMIN — Medication 4: at 12:40

## 2021-05-26 RX ADMIN — Medication 1: at 07:38

## 2021-05-26 RX ADMIN — Medication 25 MILLIGRAM(S): at 05:27

## 2021-05-26 RX ADMIN — Medication 0.1 MILLIGRAM(S): at 05:27

## 2021-05-26 RX ADMIN — Medication 5 MILLIGRAM(S): at 05:27

## 2021-05-26 NOTE — PROGRESS NOTE ADULT - ASSESSMENT
syncope  likely in setting of hypoglycemia  pt has history of O.H    Orthostatic hypotension   on BB  off afterload reducers     HTN  BP better  cont clonidine     DM II  Hypoglycemia  plan as per endo     AS  mild

## 2021-05-26 NOTE — PROGRESS NOTE ADULT - SUBJECTIVE AND OBJECTIVE BOX
Date of service: 05-26-21 @ 23:02      Patient is a 89y old  Female who presents with a chief complaint of loss of consciousness (26 May 2021 11:12)                                                               INTERVAL HPI/OVERNIGHT EVENTS:    REVIEW OF SYSTEMS:     CONSTITUTIONAL: No weakness, fevers or chills  EYES/ENT: No visual changes , no ear ache   NECK: No pain or stiffness  RESPIRATORY: No cough, wheezing,  No shortness of breath  CARDIOVASCULAR: No chest pain or palpitations  GASTROINTESTINAL: No abdominal pain  . No nausea, vomiting, or hematemesis; No diarrhea or constipation. No melena or hematochezia.  GENITOURINARY: No dysuria, frequency or hematuria  NEUROLOGICAL: No numbness or weakness  SKIN: No itching, burning, rashes, or lesions                                                                                                                                                                                                                                                                                 Medications:  MEDICATIONS  (STANDING):  cloNIDine 0.1 milliGRAM(s) Oral two times a day  dextrose 40% Gel 15 Gram(s) Oral once  dextrose 5%. 1000 milliLiter(s) (50 mL/Hr) IV Continuous <Continuous>  dextrose 5%. 1000 milliLiter(s) (100 mL/Hr) IV Continuous <Continuous>  dextrose 50% Injectable 25 Gram(s) IV Push once  dextrose 50% Injectable 12.5 Gram(s) IV Push once  dextrose 50% Injectable 25 Gram(s) IV Push once  glucagon  Injectable 1 milliGRAM(s) IntraMuscular once  insulin glargine Injectable (LANTUS) 8 Unit(s) SubCutaneous at bedtime  insulin lispro (ADMELOG) corrective regimen sliding scale   SubCutaneous three times a day before meals  insulin lispro (ADMELOG) corrective regimen sliding scale   SubCutaneous at bedtime  metoprolol succinate ER 25 milliGRAM(s) Oral daily  pantoprazole    Tablet 40 milliGRAM(s) Oral before breakfast  predniSONE   Tablet 5 milliGRAM(s) Oral two times a day  sodium chloride 0.9%. 1000 milliLiter(s) (50 mL/Hr) IV Continuous <Continuous>    MEDICATIONS  (PRN):       Allergies    No Known Allergies    Intolerances      Vital Signs Last 24 Hrs  T(C): 36.3 (26 May 2021 12:59), Max: 36.4 (26 May 2021 05:00)  T(F): 97.4 (26 May 2021 12:59), Max: 97.6 (26 May 2021 05:00)  HR: 65 (26 May 2021 12:59) (65 - 69)  BP: 158/68 (26 May 2021 12:59) (155/80 - 158/68)  BP(mean): --  RR: 18 (26 May 2021 12:59) (18 - 18)  SpO2: 97% (26 May 2021 12:59) (94% - 97%)  CAPILLARY BLOOD GLUCOSE      POCT Blood Glucose.: 315 mg/dL (26 May 2021 11:54)  POCT Blood Glucose.: 189 mg/dL (26 May 2021 07:21)      05-25 @ 07:01  -  05-26 @ 07:00  --------------------------------------------------------  IN: 180 mL / OUT: 600 mL / NET: -420 mL    05-26 @ 07:01  -  05-26 @ 23:02  --------------------------------------------------------  IN: 240 mL / OUT: 200 mL / NET: 40 mL      Physical Exam:    Daily     Daily   General:  Well appearing, NAD, not cachetic  HEENT:  Nonicteric, PERRLA  CV:  RRR, S1S2   Lungs:  CTA B/L, no wheezes, rales, rhonchi  Abdomen:  Soft, non-tender, no distended, positive BS  Extremities:  2+ pulses, no c/c, no edema  Skin:  Warm and dry, no rashes  :  No mason  Neuro:  AAOx3, non-focal, grossly intact                                                                                                                                                                                                                                                                                                LABS:                               9.9    10.63 )-----------( 285      ( 26 May 2021 06:35 )             30.7                      05-25    139  |  106  |  30<H>  ----------------------------<  161<H>  3.9   |  20<L>  |  1.06    Ca    8.9      25 May 2021 05:45                         RADIOLOGY & ADDITIONAL TESTS         I personally reviewed: [  ]EKG   [  ]CXR    [  ] CT      A/P:         Discussed with :     [employer] consultants' Notes   Time spent :

## 2021-05-26 NOTE — PROGRESS NOTE ADULT - PROBLEM SELECTOR PLAN 1
Will continue current insulin regimen for now. Will continue monitoring  blood sugars, will Follow up.  Patient counseled for compliance with consistent low carb diet.
Will increase Lantus to 8u at bedtime and continue coverage scale. Will continue monitoring FS and FU.  Patient has low insulin requirement, likely having hypoglycemic episodes as outpatient. Suggest to DC on the following DM regimen:  -Lantus 8u at bedtime  -Continue prior Januvia  -Discontinue ALL other hypoglycemic agents given risk for hypoglycemias  -FU endo 4 weeks  Patient counseled for compliance with consistent low carb diet.
Will continue current insulin regimen for now. Will continue monitoring FS and FU.  Patient has low insulin requirement, likely having hypoglycemic episodes as outpatient. Suggest to DC on the following DM regimen:  -Lantus 6u at bedtime  -Continue prior Januvia  -Discontinue ALL other hypoglycemic agents given risk for hypoglycemias  -FU endo 4 weeks  Patient counseled for compliance with consistent low carb diet.
Will continue current insulin regimen for now. Will continue monitoring FS and FU.  Patient has low insulin requirement, likely having hypoglycemic episodes as outpatient. Suggest to DC on her prior Januvia, discontinue prior hypoglycemic agents/insulin as outpatient. FU endo 4 weeks.  Patient counseled for compliance with consistent low carb diet.

## 2021-05-26 NOTE — DISCHARGE NOTE NURSING/CASE MANAGEMENT/SOCIAL WORK - PATIENT PORTAL LINK FT
You can access the FollowMyHealth Patient Portal offered by [facility] by registering at the following website: [email or web address]. By joining [employer]’s FollowMyHealth portal, you will also be able to view your health information using other applications (apps) compatible with our system.

## 2021-05-26 NOTE — PROGRESS NOTE ADULT - PROBLEM SELECTOR PLAN 3
Suggest to continue medications, monitoring, FU primary team recommendations. .

## 2021-05-26 NOTE — PROGRESS NOTE ADULT - ASSESSMENT
87F with PMHx of rheumatoid arthritis, diabetes mellitus, HTN, moderate aortic stenosis, CKD2, and interstitial lung disease admitted wiht syncope/.. pt is nt able to porivde evenets excepts saying taht room was too " warm ": and she was trying to get up and next thing she knew ambulance was there., As francisco javier chart : as per granddaughter when daughter of pt went outside found pt "slumped over in chair" and minimally responsive for a few minutes, called EMS and pt slowly came to. Before this was in usual state of health. Pt denies CP , palpitations .. ,mild SOB yesterday and naseous today but no vomitting or diarreah , no abdominal pain and no urinary sx .   no fever or chills   no cough     - syncope :   cont tele   cardio inpu t appreciated   orthostatics : positive : s/p hydration : compression stockings ..    RA: cont meds     - dysphagia :  evaluated with MBS now on regular diet     - presumed UTI : GNR in urine.. complete  ceftriaxione    leukocytosis likley sec to steroids   discussed with Dr. Kay       - RA : cont steroids     - DM: uncontrolled :  fu with endo     - ADINA : likely an element of dehydration   monitor     - tremor : pt on BB     - AS : mild .fu with cardio       - uncontrolled HTN  : clonidine for supine hypertension   monitor orthostatics       Discussed ACP : pt verbally had expressed she does not want aggressive measures however never signed MOLST ..  will discuss w her re: MOLST form     stable for Dc   d/w pt ., np , CM

## 2021-05-26 NOTE — PROGRESS NOTE ADULT - SUBJECTIVE AND OBJECTIVE BOX
DATE OF SERVICE: 05-26-21 @ 11:13    Subjective: Patient seen and examined. No new events except as noted.     SUBJECTIVE/ROS:  NAD  No chest pain, dyspnea, palpitation, or dizziness.       MEDICATIONS:  MEDICATIONS  (STANDING):  cloNIDine 0.1 milliGRAM(s) Oral two times a day  dextrose 40% Gel 15 Gram(s) Oral once  dextrose 5%. 1000 milliLiter(s) (50 mL/Hr) IV Continuous <Continuous>  dextrose 5%. 1000 milliLiter(s) (100 mL/Hr) IV Continuous <Continuous>  dextrose 50% Injectable 25 Gram(s) IV Push once  dextrose 50% Injectable 12.5 Gram(s) IV Push once  dextrose 50% Injectable 25 Gram(s) IV Push once  glucagon  Injectable 1 milliGRAM(s) IntraMuscular once  insulin glargine Injectable (LANTUS) 8 Unit(s) SubCutaneous at bedtime  insulin lispro (ADMELOG) corrective regimen sliding scale   SubCutaneous three times a day before meals  insulin lispro (ADMELOG) corrective regimen sliding scale   SubCutaneous at bedtime  metoprolol succinate ER 25 milliGRAM(s) Oral daily  pantoprazole    Tablet 40 milliGRAM(s) Oral before breakfast  predniSONE   Tablet 5 milliGRAM(s) Oral two times a day  sodium chloride 0.9%. 1000 milliLiter(s) (50 mL/Hr) IV Continuous <Continuous>      PHYSICAL EXAM:  T(C): 36.4 (05-26-21 @ 05:00), Max: 36.6 (05-25-21 @ 20:05)  HR: 69 (05-26-21 @ 05:00) (69 - 88)  BP: 155/80 (05-26-21 @ 05:00) (129/76 - 170/75)  RR: 18 (05-26-21 @ 05:00) (18 - 18)  SpO2: 94% (05-26-21 @ 05:00) (94% - 96%)  Wt(kg): --  I&O's Summary    25 May 2021 07:01  -  26 May 2021 07:00  --------------------------------------------------------  IN: 180 mL / OUT: 600 mL / NET: -420 mL    26 May 2021 07:01  -  26 May 2021 11:13  --------------------------------------------------------  IN: 0 mL / OUT: 200 mL / NET: -200 mL            JVP: Normal  Neck: supple  Lung: clear   CV: S1 S2 , Murmur:  Abd: soft  Ext: No edema  neuro: Awake / alert  Psych: flat affect  Skin: normal``    LABS/DATA:    CARDIAC MARKERS:                                9.9    10.63 )-----------( 285      ( 26 May 2021 06:35 )             30.7     05-25    139  |  106  |  30<H>  ----------------------------<  161<H>  3.9   |  20<L>  |  1.06    Ca    8.9      25 May 2021 05:45      proBNP:   Lipid Profile:   HgA1c:   TSH:     TELE:  EKG:

## 2021-05-26 NOTE — PROGRESS NOTE ADULT - PROBLEM SELECTOR PLAN 2
On medications,  no chest pain, stable, monitored and followed up by primary team/cardiology team

## 2021-05-26 NOTE — PROGRESS NOTE ADULT - ASSESSMENT
Assessment  DMT2: 89y Female with DM T2, A1C 6.8%, was on insulin and PO meds at home, admitted with hypoglycemia, now on low-dose basal insulin and coverage, blood sugars are fluctuating with intermittent elevations, no new hypoglycemic episodes, c-peptide 5. Patient is eating meals, appears comfortable, planning DC home today.  Syncope: Being worked up, monitored, stable.  CKD: labs, chart reviewed.  HTN: On medications, controlled.      Karan Akbar MD  Cell: [phone]  Office: [phone]       Assessment  DMT2: 89y Female with DM T2, A1C 6.8%, was on insulin and PO meds at home, admitted with hypoglycemia, now on low-dose basal insulin and coverage, blood sugars are fluctuating with intermittent elevations,  no new hypoglycemic episodes, c-peptide 5. Patient is eating meals, appears comfortable, planning DC home today.  Syncope: Being worked up, monitored, stable.  CKD: labs, chart reviewed.  HTN: On medications, controlled.      Karan Akbar MD  Cell: [phone]  Office: [phone]

## 2021-05-26 NOTE — PROGRESS NOTE ADULT - NSICDXPILOT_GEN_ALL_CORE
[place]

## 2021-05-26 NOTE — PROGRESS NOTE ADULT - SUBJECTIVE AND OBJECTIVE BOX
Chief complaint  Patient is a 89y old  Female who presents with a chief complaint of loss of consciousness (25 May 2021 19:57)   Review of systems  Patient in bed, looks comfortable, no hypoglycemic episodes.    Labs and Fingersticks  CAPILLARY BLOOD GLUCOSE      POCT Blood Glucose.: 189 mg/dL (26 May 2021 07:21)  POCT Blood Glucose.: 234 mg/dL (25 May 2021 21:59)  POCT Blood Glucose.: 114 mg/dL (25 May 2021 17:02)  POCT Blood Glucose.: 259 mg/dL (25 May 2021 11:57)      Anion Gap, Serum: 13 (05-25 @ 05:45)      Calcium, Total Serum: 8.9 (05-25 @ 05:45)          05-25    139  |  106  |  30<H>  ----------------------------<  161<H>  3.9   |  20<L>  |  1.06    Ca    8.9      25 May 2021 05:45                          9.9    10.63 )-----------( 285      ( 26 May 2021 06:35 )             30.7     Medications  MEDICATIONS  (STANDING):  cloNIDine 0.1 milliGRAM(s) Oral two times a day  dextrose 40% Gel 15 Gram(s) Oral once  dextrose 5%. 1000 milliLiter(s) (50 mL/Hr) IV Continuous <Continuous>  dextrose 5%. 1000 milliLiter(s) (100 mL/Hr) IV Continuous <Continuous>  dextrose 50% Injectable 25 Gram(s) IV Push once  dextrose 50% Injectable 12.5 Gram(s) IV Push once  dextrose 50% Injectable 25 Gram(s) IV Push once  glucagon  Injectable 1 milliGRAM(s) IntraMuscular once  insulin glargine Injectable (LANTUS) 8 Unit(s) SubCutaneous at bedtime  insulin lispro (ADMELOG) corrective regimen sliding scale   SubCutaneous three times a day before meals  insulin lispro (ADMELOG) corrective regimen sliding scale   SubCutaneous at bedtime  metoprolol succinate ER 25 milliGRAM(s) Oral daily  pantoprazole    Tablet 40 milliGRAM(s) Oral before breakfast  predniSONE   Tablet 5 milliGRAM(s) Oral two times a day  sodium chloride 0.9%. 1000 milliLiter(s) (50 mL/Hr) IV Continuous <Continuous>      Physical Exam  General: Patient comfortable in bed  Vital Signs Last 12 Hrs  T(F): 97.6 (05-26-21 @ 05:00), Max: 97.6 (05-26-21 @ 05:00)  HR: 69 (05-26-21 @ 05:00) (69 - 69)  BP: 155/80 (05-26-21 @ 05:00) (155/80 - 155/80)  BP(mean): --  RR: 18 (05-26-21 @ 05:00) (18 - 18)  SpO2: 94% (05-26-21 @ 05:00) (94% - 94%)  Neck: No palpable thyroid nodules.  CVS: S1S2, No murmurs  Respiratory: No wheezing, no crepitations  GI: Abdomen soft, bowel sounds positive  Musculoskeletal:  edema lower extremities.   Skin: No skin rashes, no ecchymosis    Diagnostics             Chief complaint  Patient is a 89y old  Female who presents with a chief complaint of loss of consciousness (25 May 2021 19:57)   Review of systems  Patient in bed, looks comfortable, no hypoglycemic episodes.    Labs and Fingersticks  CAPILLARY BLOOD GLUCOSE      POCT Blood Glucose.: 189 mg/dL (26 May 2021 07:21)  POCT Blood Glucose.: 234 mg/dL (25 May 2021 21:59)  POCT Blood Glucose.: 114 mg/dL (25 May 2021 17:02)  POCT Blood Glucose.: 259 mg/dL (25 May 2021 11:57)      Anion Gap, Serum: 13 (05-25 @ 05:45)      Calcium, Total Serum: 8.9 (05-25 @ 05:45)          05-25    139  |  106  |  30<H>  ----------------------------<  161<H>  3.9   |  20<L>  |  1.06    Ca    8.9      25 May 2021 05:45                          9.9    10.63 )-----------( 285      ( 26 May 2021 06:35 )             30.7     Medications  MEDICATIONS  (STANDING):  cloNIDine 0.1 milliGRAM(s) Oral two times a day  dextrose 40% Gel 15 Gram(s) Oral once  dextrose 5%. 1000 milliLiter(s) (50 mL/Hr) IV Continuous <Continuous>  dextrose 5%. 1000 milliLiter(s) (100 mL/Hr) IV Continuous <Continuous>  dextrose 50% Injectable 25 Gram(s) IV Push once  dextrose 50% Injectable 12.5 Gram(s) IV Push once  dextrose 50% Injectable 25 Gram(s) IV Push once  glucagon  Injectable 1 milliGRAM(s) IntraMuscular once  insulin glargine Injectable (LANTUS) 8 Unit(s) SubCutaneous at bedtime  insulin lispro (ADMELOG) corrective regimen sliding scale   SubCutaneous three times a day before meals  insulin lispro (ADMELOG) corrective regimen sliding scale   SubCutaneous at bedtime  metoprolol succinate ER 25 milliGRAM(s) Oral daily  pantoprazole    Tablet 40 milliGRAM(s) Oral before breakfast  predniSONE   Tablet 5 milliGRAM(s) Oral two times a day  sodium chloride 0.9%. 1000 milliLiter(s) (50 mL/Hr) IV Continuous <Continuous>      Physical Exam  General: Patient comfortable in bed  Vital Signs Last 12 Hrs  T(F): 97.6 (05-26-21 @ 05:00), Max: 97.6 (05-26-21 @ 05:00)  HR: 69 (05-26-21 @ 05:00) (69 - 69)  BP: 155/80 (05-26-21 @ 05:00) (155/80 - 155/80)  BP(mean): --  RR: 18 (05-26-21 @ 05:00) (18 - 18)  SpO2: 94% (05-26-21 @ 05:00) (94% - 94%)  Neck: No palpable thyroid nodules.  CVS: S1S2, No murmurs  Respiratory: No wheezing, no crepitations  GI: Abdomen soft, bowel sounds positive  Musculoskeletal:  edema lower extremities.   Skin: No skin rashes, no ecchymosis    Diagnostics

## 2021-05-26 NOTE — PROGRESS NOTE ADULT - PROBLEM SELECTOR PROBLEM 3
HTN (hypertension), benign

## 2021-05-26 NOTE — PROGRESS NOTE ADULT - PROBLEM SELECTOR PROBLEM 1
PDPH after pt underwent a spinal tap to r/o viral meningitis. Report her HA to be 10/10 pain. This is the second visit to ED. Pain resolves substantially after laying down.   Relevant Problems   No relevant active problems       Physical Exam    Airway   Mallampati: II  TM distance: >3 FB  Neck ROM: full       Cardiovascular - normal exam  Rhythm: regular  Rate: normal  (-) murmur     Dental - normal exam           Pulmonary - normal exam  Breath sounds clear to auscultation     Abdominal    Neurological - normal exam                 Anesthesia Plan    ASA 1       Plan - other                     Pertinent diagnostic labs and testing reviewed    Informed Consent:    Anesthetic plan and risks discussed with patient.        
Type 2 diabetes mellitus with other specified complication, with long-term current use of insulin

## 2021-05-26 NOTE — PROGRESS NOTE ADULT - REASON FOR ADMISSION
loss of consciousness
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
## 2021-08-20 NOTE — H&P ADULT - PMH
Asthma    DM (diabetes mellitus), type 2    HTN (hypertension), benign    RA (rheumatoid arthritis) knowledge deficit

## 2021-08-23 NOTE — ED ADULT TRIAGE NOTE - STATUS:
Applied Localized Dermabrasion Text: The patient was draped in routine manner.  Localized dermabrasion using 3 x 17 mm wire brush was performed in routine manner to papillary dermis. This spot dermabrasion is being performed to complete skin cancer reconstruction. It also will eliminate the other sun damaged precancerous cells that are known to be part of the regional effect of a lifetime's worth of sun exposure. This localized dermabrasion is therapeutic and should not be considered cosmetic in any regard. Localized Dermabrasion With Wire Brush Text: The patient was draped in routine manner.  Localized dermabrasion using 3 x 17 mm wire brush was performed in routine manner to papillary dermis. This spot dermabrasion is being performed to complete skin cancer reconstruction. It also will eliminate the other sun damaged precancerous cells that are known to be part of the regional effect of a lifetime's worth of sun exposure. This localized dermabrasion is therapeutic and should not be considered cosmetic in any regard.

## 2022-03-11 NOTE — DIETITIAN INITIAL EVALUATION ADULT. - OTHER INFO
11-Mar-2022 18:32 seen for Nutrition Support consult. "Type 2DM, RA steroid dependent and DMA" pt consuming >75% of meals. last BM yesterday. NKFA

## 2022-09-06 NOTE — PATIENT PROFILE ADULT - BRADEN NUTRITION
Quality 110: Preventive Care And Screening: Influenza Immunization: Influenza Immunization Ordered or Recommended, but not Administered due to system reason Quality 431: Preventive Care And Screening: Unhealthy Alcohol Use - Screening: Patient not identified as an unhealthy alcohol user when screened for unhealthy alcohol use using a systematic screening method Quality 226: Preventive Care And Screening: Tobacco Use: Screening And Cessation Intervention: Patient screened for tobacco use and is an ex/non-smoker Detail Level: Detailed Quality 130: Documentation Of Current Medications In The Medical Record: Current Medications Documented (3) adequate

## 2022-09-12 NOTE — ED ADULT NURSE NOTE - DURATION
Discontinue Regimen: Tretinoin 0.025% cream at night
Otc Regimen: Moisturizer and sunscreen
Initiate Treatment: Arazlo lotion at night
Detail Level: Zone
Initiate Treatment: Triluma cream at night
Plan: Discussed VI peel, Lytera and Micro needling.
week(s)

## 2022-10-05 NOTE — ED PROVIDER NOTE - NS ED MD DISPO DIVISION
Re faxed PT order to [facility] in [facility] at fax# [phone] per patient request saying they didn't receive it. [facility]

## 2022-11-19 NOTE — ED PROVIDER NOTE - GASTROINTESTINAL, MLM
Apixaban/Eliquis is used to treat and prevent blood clots. If you are not able to swallow the tablets whole, they may be crushed and mixed in water, apple juice, or applesauce and promptly taken within four hours. Never skip a dose of Apixaban/Eliquis. If you forget to take your Apixaban/Eliquis, take a dose as soon as you remember. If it is almost time for your next Apixaban/Eliquis dose, wait until then and take a regular dose. DO NOT take an extra pill to ‘catch up’.  NEVER TAKE A DOUBLE DOSE. Notify your doctor that you missed a dose. Take Apixaban/Eliquis at the same time each morning and evening. Apixaban/Eliquis may be taken with other medication or food. Abdomen soft, non-tender, no guarding.

## 2022-11-21 NOTE — PHYSICAL THERAPY INITIAL EVALUATION ADULT - WEIGHT-BEARING RESTRICTIONS: GAIT, REHAB EVAL
Patient would like to schedule her FOB, patient has been seen in [facility] previously. LMP per patient around August. Insurance verified and Face Sheet placed in RN bin, to be reviewed.   full weight-bearing

## 2023-01-16 NOTE — H&P ADULT - NSHPREVIEWOFSYSTEMS_GEN_ALL_CORE
"Last Written Prescription Date:  3/9/22  Last Fill Quantity: 90,  # refills: 3   Last office visit provider:  9/22/22     Requested Prescriptions   Pending Prescriptions Disp Refills     ezetimibe (ZETIA) 10 MG tablet [Pharmacy Med Name: Ezetimibe 10 MG Oral Tablet] 100 tablet 2     Sig: TAKE 1 TABLET BY MOUTH  DAILY       Antihyperlipidemic agents Passed - 1/14/2023  9:18 PM        Passed - Lipid panel on file in past 12 mos     Recent Labs   Lab Test 03/09/22  1415   CHOL 127   TRIG 87   HDL 57   LDL 53               Passed - Normal serum ALT on record in past 12 mos     Recent Labs   Lab Test 08/23/22  1402   ALT 34             Passed - Recent (12 mo) or future (30 days) visit within the authorizing provider's specialty     Patient has had an office visit with the authorizing provider or a provider within the authorizing providers department within the previous 12 mos or has a future within next 30 days. See \"Patient Info\" tab in inbasket, or \"Choose Columns\" in Meds & Orders section of the refill encounter.              Passed - Medication is active on med list        Passed - Patient is age 18 years or older        Passed - No active pregnancy on record        Passed - No positive pregnancy test in past 12 mos             Sarah Bates RN 01/16/23 2:01 PM  " REVIEW OF SYSTEMS    General:  Negative  Skin/Breast:  Negative  Ophthalmologic:  Negative  ENMT:  Negative  Respiratory and Thorax:  Negative  Cardiovascular:  Negative  Gastrointestinal:  Negative  Genitourinary:  Negative  Musculoskeletal:  Negative  Neurological:  Negative  Psychiatric:  Negative  Hematology/Lymphatics:  Negative	  Endocrine:	  Negative  Allergic/Immunologic:	 Negative REVIEW OF SYSTEMS    General:  weakness  Skin/Breast:  Negative  Ophthalmologic:  Negative  ENMT:  Negative  Respiratory and Thorax:  Negative  Cardiovascular:  Negative  Gastrointestinal:  loose stool  Genitourinary:  Negative  Musculoskeletal:  wrist pain  Neurological:  Negative  Psychiatric:  Negative  Hematology/Lymphatics:  Negative	  Endocrine:	  Negative  Allergic/Immunologic:	 Negative

## 2023-01-27 NOTE — PHYSICAL THERAPY INITIAL EVALUATION ADULT - PLANNED THERAPY INTERVENTIONS, PT EVAL
[facility]    OUTPATIENT Physical Therapy ORTHOPEDIC EVALUATION  PLAN OF TREATMENT FOR OUTPATIENT REHABILITATION  (COMPLETE FOR INITIAL CLAIMS ONLY)  Patient's Last Name, First Name, M.I.  YOB: 1992  Rose Ronquillo    Provider s Name:  [facility]   Medical Record No.  [id number]   Start of Care Date:  11/11/22   Onset Date:   10/27/22   Treatment Diagnosis:  Mid Back Pain Medical Diagnosis:  Bilateral thoracic back pain       Goals:     01/27/23 0500   Body Part   Goals listed below are for Mid back Pain   Goal #1   Goal #1 sitting   Previous Functional Level No restrictions   Current Functional Level Hours patient can sit   Performance level no change, continue to work stability   STG Target Performance Minutes patient will be able to sit   Performance level >1 hour with <3/10 pain   Rationale for personal hygiene;to allow rest from standing;for community transportation   Due date 11/25/22   Date Goal Met 12/15/22   LTG Target Performance Hours patient will be able to sit   Performance Level >1 hour with <2/10 pain   Rationale for personal hygiene;to allow rest from standing;for community transportation;for job requirements in their work place   Due date 04/21/23         Therapy Frequency:  1x per week  Predicted Duration of Therapy Intervention:  12 weeks    Shannon Lane, PT                 I CERTIFY THE NEED FOR THESE SERVICES FURNISHED UNDER        THIS PLAN OF TREATMENT AND WHILE UNDER MY CARE     (Physician attestation of this document indicates review and certification of the therapy plan).                     Certification Date From:  01/19/23   Certification Date To:  04/21/23    Referring Provider:  Wong Culp    Initial Assessment        See Epic Evaluation SOC Date: 11/11/22                                                        balance training/independent in all stair training in 2 weeks/strengthening

## 2023-02-06 NOTE — ED PROVIDER NOTE - CROS ED CONS ALL NEG
PROCEDURES:  Exam under anesthesia, anus 06-Feb-2023 10:14:18  Chuy Hester  Skin tag removal 06-Feb-2023 10:14:30  Chuy Hester   - - -

## 2023-02-16 NOTE — PHYSICAL THERAPY INITIAL EVALUATION ADULT - ASSISTIVE DEVICE FOR TRANSFER: GAIT, REHAB EVAL
[facility]    Background: Transitional Care Management program auto-identified and prompting a chart review by [facility] team.    Assessment: Upon chart review, [facility] Team member will Enroll this episode of Transitional Care Management program due to reason below:    Upon chart review, patient is followed by [facility] team who follow their patients closely. [facility] will not conduct outreach to avoid duplication of outreach to patient.     Plan: Transitional Care Management episode enrolled per reason above.     rolling walker

## 2023-03-21 NOTE — ED PROVIDER NOTE - NS ED MD DISPO SPECIAL CONSIDERATION1
Low Suspicion of COVID-19 Low Dose Naltrexone Pregnancy And Lactation Text: Naltrexone is pregnancy category C.  There have been no adequate and well-controlled studies in pregnant women.  It should be used in pregnancy only if the potential benefit justifies the potential risk to the fetus.   Limited data indicates that naltrexone is minimally excreted into breastmilk.

## 2023-05-09 NOTE — SWALLOW VFSS/MBS ASSESSMENT ADULT - RECOMMENDED CONSISTENCY
Chief Complaint   Patient presents with   • Office Visit     2 Month follow up on Recurrent acute otitis media.       Kev Veliz is a 32 month old male presenting with his Mother    Denies Known Latex allergy.  Medications verified.  Tobacco Hx verified and updated, if changes.  Allergies verified.     Patient would like communication of their results via:    [employer]po[employer]    Social History     Tobacco Use   Smoking Status Never   Smokeless Tobacco Never   Vaping Use   Vaping Status never used      Regular texture diet. SMALL, SINGLE CUP SIPS. NO STRAWS.

## 2023-05-12 NOTE — DISCHARGE NOTE NURSING/CASE MANAGEMENT/SOCIAL WORK - NSDCPETBCESMAN_GEN_ALL_CORE
Anesthesia Post Evaluation    Patient: Jackie Garduno    Procedure(s) Performed: Procedure(s) (LRB):   SECTION (Bilateral)    Final Anesthesia Type: epidural      Patient location during evaluation: labor & delivery  Patient participation: Yes- Able to Participate  Level of consciousness: awake and alert and oriented  Post-procedure vital signs: reviewed and stable  Pain management: adequate  Airway patency: patent    PONV status at discharge: No PONV  Anesthetic complications: no      Cardiovascular status: blood pressure returned to baseline and hemodynamically stable  Respiratory status: unassisted, room air and spontaneous ventilation  Hydration status: euvolemic  Follow-up not needed.          Vitals Value Taken Time   /76 23 1742   Temp 37 °C (98.6 °F) 23 1742   Pulse 90 23 1742   Resp 18 23 1742   SpO2 100 % 23         No case tracking events are documented in the log.      Pain/Niall Score: Pain Rating Prior to Med Admin: 2 (2023  6:02 PM)  Pain Rating Post Med Admin: 2 (2023  6:33 PM)        
If you are a smoker, it is important for your health to stop smoking. Please be aware that second hand smoke is also harmful.

## 2023-06-07 NOTE — SWALLOW BEDSIDE ASSESSMENT ADULT - DATE
Called pt and left a message letting her know that I needed to reschedule her appt with Dr Donovan Mello and told her to call back if the new date and time does not work for her
05-Jul-2020

## 2023-07-09 NOTE — ED ADULT NURSE REASSESSMENT NOTE - NS ED NURSE REASSESS COMMENT FT1
infusion of unit #1 in progress with 2 rn's at bedside. signs and symptoms of transfusion reaction explained to patient and , verbalized understanding, consent in chart,
Standing

## 2023-08-09 NOTE — ED ADULT NURSE NOTE - NS ED NURSE LEVEL OF CONSCIOUSNESS SPEECH
Speaking Coherently Fetal Alert  5.2.23: Maternal Hx SSA+ antibodies.Fetal echo done 5/1/23:Fetal heart rhythm during exam: normal fetal rhythm and rate of 138 bpm.After birth, an electrocardiogram (EKG) should be performed on the baby and interpreted by pediatric cardiology prior to hospital discharge.Jeanie Sharpe RN  6.19.23: ADDENDUM:After birth, an electrocardiogram (EKG) should be performed on the baby and interpreted by pediatric cardiology prior to hospital discharge. If EKG is normal, no further follow-up for the baby is needed. Jeanie Sharpe RN

## 2023-08-15 NOTE — PROGRESS NOTE ADULT - PROVIDER SPECIALTY LIST ADULT
Family updated
Cardiology
Internal Medicine
Cardiology
Internal Medicine
Internal Medicine
Cardiology
Cardiology
Internal Medicine
Internal Medicine
Endocrinology

## 2023-11-20 NOTE — DISCHARGE NOTE PROVIDER - NSDCCPCAREPLAN_GEN_ALL_CORE_FT
Yes she did mention this and so I placed referral to cardiology to eval further. Has she scheduled with them yet?    I do see in side effects \"pounding heart beat\" can be noted with rizatriptan but she also states she has the palpitations prior to taking the rizatriptan so would still concur with cardiology eval.     Let's hold  Rizatriptan until cardiac eval and use Nurtec in interim  Since she can get for $0.        PRINCIPAL DISCHARGE DIAGNOSIS  Diagnosis: Syncope  Assessment and Plan of Treatment: Fainting usually is caused by fear, stress, pain, standing too long, over tired, overheated, going to bathroom, or coughing  Blood pressure can drop if you do not drink enough, blood pressure medication, alcohol, bleeding,  Consult with your doctor about driving  Avoid activity or condition that causes your syncope  Lay down with your feet up when you feel like you might faint        SECONDARY DISCHARGE DIAGNOSES  Diagnosis: HTN (hypertension), benign  Assessment and Plan of Treatment: Hypertension, also known simply as "high blood pressure" is very common, however can lead to many significant complications if left uncontrolled. When the blood pressure is elevated, the force the blood puts on the walls of the arteries is high and can lead to artery damage. Also, when the heart muscle has to pump blood against a high blood pressure, it thickens and enlarges, just like any muscle does when it has to do more work (think of a weight ). When the blood pressure is very high, people may feel a headache or tired. Some people can feel pounding in their head or have blurry vision. Hearing the heart beating in the ear especially at night can be a sign of high blood pressure. Eventually, symptoms of stroke, heart attack, heart failure or irregular heartbeats can occur  - Exercise: Doing cardiovascular exercise such as running, biking or swimming at least 30 minutes per day most days of the week is recommended to help keep blood pressure healthy  - Lose weight: Maintaining a normal BMI (body mass index) is very important in keeping blood pressure readings normal   - Avoid salt: Sodium in the diet increases the blood pressure in many ways. Salt comes in many foods, so just because you don't add salt to your food it does not mean that you are eating a low salt diet. Read labels and keep sodium intake to less than 2000 mg per day   - Avoid alcohol: Even 1 or 2 alcoholic drinks can significantly increase blood pressure   - DASH Diet: The DASH diet has been shown to reduce blood pressure   - Take all medication as prescribed.   - Follow up with your medical doctor for routine blood pressure monitoring at your next visit.   - Notify your doctor if you have any of the following symptoms:    - Dizziness, Lightheadedness, Blurry vision, Headache, Chest pain, Shortness of breath      Diagnosis: UTI (urinary tract infection)  Assessment and Plan of Treatment: HOME CARE INSTRUCTIONS  Drink enough water and fluids to keep your urine clear or pale yellow.  Avoid caffeine, tea, and carbonated beverages. They tend to irritate your bladder.  Empty your bladder often. Avoid holding urine for long periods of time.  Empty your bladder before and after sexual intercourse.  After a bowel movement, women should cleanse from front to back. Use each tissue only once.  SEEK MEDICAL CARE IF:  You have back pain.  You develop a fever.  Your symptoms do not begin to resolve within 3 days.  SEEK IMMEDIATE MEDICAL CARE IF:  You have severe back pain or lower abdominal pain.  You develop chills.  You have nausea or vomiting.  You have continued burning or discomfort with urination.      Diagnosis: Hypoglycemia  Assessment and Plan of Treatment: Finger stick of 57 upon admission. Resolved    Diagnosis: Orthostatic hypotension  Assessment and Plan of Treatment: Orthostatic hypotension is a sudden drop in blood pressure with position changes. Blood pressure can drop when you change positions to fast especially from lying to standing. This drop in blood pressure can make you feel dizzy , lightheaded,  burry vision, weakness, or fainting. Please go slowly put feet on the floor and wait five minutes let feet dangle to prevent drop in blood pressure upon standing . medications can cause worsened symptoms . be sure to drink adequate fluids to keep hydrated , continue medications as ordered , report worsening symptoms to your primary care provider, use assistive devices to tranfer and ambulate       PRINCIPAL DISCHARGE DIAGNOSIS  Diagnosis: Syncope  Assessment and Plan of Treatment: Fainting usually is caused by fear, stress, pain, standing too long, over tired, overheated, going to bathroom, or coughing  Blood pressure can drop if you do not drink enough, blood pressure medication, alcohol, bleeding,  Consult with your doctor about driving  Avoid activity or condition that causes your syncope  Lay down with your feet up when you feel like you might faint        SECONDARY DISCHARGE DIAGNOSES  Diagnosis: UTI (urinary tract infection)  Assessment and Plan of Treatment: HOME CARE INSTRUCTIONS  Drink enough water and fluids to keep your urine clear or pale yellow.  Avoid caffeine, tea, and carbonated beverages. They tend to irritate your bladder.  Empty your bladder often. Avoid holding urine for long periods of time.  Empty your bladder before and after sexual intercourse.  After a bowel movement, women should cleanse from front to back. Use each tissue only once.  SEEK MEDICAL CARE IF:  You have back pain.  You develop a fever.  Your symptoms do not begin to resolve within 3 days.  SEEK IMMEDIATE MEDICAL CARE IF:  You have severe back pain or lower abdominal pain.  You develop chills.  You have nausea or vomiting.  You have continued burning or discomfort with urination.      Diagnosis: Hypoglycemia  Assessment and Plan of Treatment: Finger stick of 57 upon admission. Resolved.   Endo recommended to continue with lantus (basaglar) 8units at night and januvia.   please follow up with endocrine in 4 weeks.    Diagnosis: HTN (hypertension), benign  Assessment and Plan of Treatment: Hypertension, also known simply as "high blood pressure" is very common, however can lead to many significant complications if left uncontrolled. When the blood pressure is elevated, the force the blood puts on the walls of the arteries is high and can lead to artery damage. Also, when the heart muscle has to pump blood against a high blood pressure, it thickens and enlarges, just like any muscle does when it has to do more work (think of a weight ). When the blood pressure is very high, people may feel a headache or tired. Some people can feel pounding in their head or have blurry vision. Hearing the heart beating in the ear especially at night can be a sign of high blood pressure. Eventually, symptoms of stroke, heart attack, heart failure or irregular heartbeats can occur  - Exercise: Doing cardiovascular exercise such as running, biking or swimming at least 30 minutes per day most days of the week is recommended to help keep blood pressure healthy  - Lose weight: Maintaining a normal BMI (body mass index) is very important in keeping blood pressure readings normal   - Avoid salt: Sodium in the diet increases the blood pressure in many ways. Salt comes in many foods, so just because you don't add salt to your food it does not mean that you are eating a low salt diet. Read labels and keep sodium intake to less than 2000 mg per day   - Avoid alcohol: Even 1 or 2 alcoholic drinks can significantly increase blood pressure   - DASH Diet: The DASH diet has been shown to reduce blood pressure   - Take all medication as prescribed.   - Follow up with your medical doctor for routine blood pressure monitoring at your next visit.   - Notify your doctor if you have any of the following symptoms:    - Dizziness, Lightheadedness, Blurry vision, Headache, Chest pain, Shortness of breath      Diagnosis: Orthostatic hypotension  Assessment and Plan of Treatment: Orthostatic hypotension is a sudden drop in blood pressure with position changes. Blood pressure can drop when you change positions to fast especially from lying to standing. This drop in blood pressure can make you feel dizzy , lightheaded,  burry vision, weakness, or fainting. Please go slowly put feet on the floor and wait five minutes let feet dangle to prevent drop in blood pressure upon standing . medications can cause worsened symptoms . be sure to drink adequate fluids to keep hydrated , continue medications as ordered , report worsening symptoms to your primary care provider, use assistive devices to tranfer and ambulate

## 2024-01-01 NOTE — ED ADULT TRIAGE NOTE - CADM TRG TX PRIOR TO ARRIVAL
1 Principal Discharge DX:	Single liveborn infant, delivered by   Secondary Diagnosis:	Vacuum-assisted  delivery, delivered, current hospitalization   none

## 2024-01-09 NOTE — ED PROVIDER NOTE - GASTROINTESTINAL, MLM
Render Note In Bullet Format When Appropriate: No
Number Of Freeze-Thaw Cycles: 2 freeze-thaw cycles
Medical Necessity Clause: This procedure was medically necessary because the lesions that were treated were:
Medical Necessity Information: It is in your best interest to select a reason for this procedure from the list below. All of these items fulfill various CMS LCD requirements except the new and changing color options.
Consent: The patient's consent was obtained including but not limited to risks of crusting, scabbing, blistering, scarring, darker or lighter pigmentary change, recurrence, incomplete removal and infection.
Spray Paint Text: The liquid nitrogen was applied to the skin utilizing a spray paint frosting technique.
Abdomen soft, non-tender, no guarding.
Show Spray Paint Technique Variable?: Yes
Post-Care Instructions: I reviewed with the patient in detail post-care instructions. Patient is to wear sunprotection, and avoid picking at any of the treated lesions. Pt may apply Vaseline to crusted or scabbing areas.
Duration Of Freeze Thaw-Cycle (Seconds): 10-15
Detail Level: Zone

## 2024-03-05 NOTE — DISCHARGE NOTE NURSING/CASE MANAGEMENT/SOCIAL WORK - NSSCCONTNUM_GEN_ALL_CORE
oriented to person, place and time , normal sensation , short and long term memory intact
[phone]

## 2024-04-27 NOTE — PROGRESS NOTE ADULT - PROBLEM/PLAN-1
DISPLAY PLAN FREE TEXT
Clear

## 2024-04-30 NOTE — SWALLOW VFSS/MBS ASSESSMENT ADULT - POSITIONING
Problem: Chronic Conditions and Co-morbidities  Goal: Patient's chronic conditions and co-morbidity symptoms are monitored and maintained or improved  4/27/2024 1301 by Gena Porras RN  Outcome: Progressing     Problem: Risk for Elopement  Goal: Patient will not exit the unit/facility without proper excort  4/27/2024 1301 by Gena Porras RN  Outcome: Progressing     Problem: Depression  Goal: Will be euthymic at discharge  Description: INTERVENTIONS:  1. Administer medication as ordered  2. Provide emotional support via 1:1 interaction with staff  3. Encourage involvement in milieu/groups/activities  4. Monitor for social isolation  4/27/2024 1301 by Gena Porras RN  Outcome: Progressing     Problem: Vee  Goal: Will exhibit normal sleep and speech and no impulsivity  Description: INTERVENTIONS:  1. Administer medication as ordered  2. Set limits on impulsive behavior  3. Make attempts to decrease external stimuli as possible  4/27/2024 1301 by Gena Porras RN  Outcome: Progressing     Problem: Psychosis  Goal: Will report no hallucinations or delusions  Description: INTERVENTIONS:  1. Administer medication as  ordered  2. Assist with reality testing to support increasing orientation  3. Assess if patient's hallucinations or delusions are encouraging self harm or harm to others and intervene as appropriate  4/27/2024 1301 by Gena Porras RN  Outcome: Progressing     Problem: Anxiety  Goal: Will report anxiety at manageable levels  Description: INTERVENTIONS:  1. Administer medication as ordered  2. Teach and rehearse alternative coping skills  3. Provide emotional support with 1:1 interaction with staff  4/27/2024 1301 by Gena Porras RN  Outcome: Progressing    Patient denies SI/HI and hallucinations. She is alert to person, place, and time. She is brightened and friendly during assessment. She tells me that she is feeling much better today than she did yesterday. She is taking select 
  Problem: Chronic Conditions and Co-morbidities  Goal: Patient's chronic conditions and co-morbidity symptoms are monitored and maintained or improved  Outcome: Not Progressing     Problem: Depression  Goal: Will be euthymic at discharge  Description: INTERVENTIONS:  1. Administer medication as ordered  2. Provide emotional support via 1:1 interaction with staff  3. Encourage involvement in milieu/groups/activities  4. Monitor for social isolation  Outcome: Not Progressing     Problem: Vee  Goal: Will exhibit normal sleep and speech and no impulsivity  Description: INTERVENTIONS:  1. Administer medication as ordered  2. Set limits on impulsive behavior  3. Make attempts to decrease external stimuli as possible  Outcome: Not Progressing     Problem: Anxiety  Goal: Will report anxiety at manageable levels  Description: INTERVENTIONS:  1. Administer medication as ordered  2. Teach and rehearse alternative coping skills  3. Provide emotional support with 1:1 interaction with staff  Outcome: Not Progressing     Problem: Sleep Disturbance  Goal: Will exhibit normal sleeping pattern  Description: INTERVENTIONS:  1. Administer medication as ordered  2. Decrease environmental stimuli, including noise, as appropriate  3. Discourage social isolation and naps during the day  Outcome: Not Progressing     
  Problem: Chronic Conditions and Co-morbidities  Goal: Patient's chronic conditions and co-morbidity symptoms are monitored and maintained or improved  Outcome: Not Progressing     Problem: Vee  Goal: Will exhibit normal sleep and speech and no impulsivity  Description: INTERVENTIONS:  1. Administer medication as ordered  2. Set limits on impulsive behavior  3. Make attempts to decrease external stimuli as possible  Outcome: Not Progressing     Problem: Psychosis  Goal: Will report no hallucinations or delusions  Description: INTERVENTIONS:  1. Administer medication as  ordered  2. Assist with reality testing to support increasing orientation  3. Assess if patient's hallucinations or delusions are encouraging self harm or harm to others and intervene as appropriate  Outcome: Not Progressing     Problem: Anxiety  Goal: Will report anxiety at manageable levels  Description: INTERVENTIONS:  1. Administer medication as ordered  2. Teach and rehearse alternative coping skills  3. Provide emotional support with 1:1 interaction with staff  Outcome: Not Progressing     Problem: Sleep Disturbance  Goal: Will exhibit normal sleeping pattern  Description: INTERVENTIONS:  1. Administer medication as ordered  2. Decrease environmental stimuli, including noise, as appropriate  3. Discourage social isolation and naps during the day  Outcome: Not Progressing     
  Problem: Chronic Conditions and Co-morbidities  Goal: Patient's chronic conditions and co-morbidity symptoms are monitored and maintained or improved  Outcome: Progressing     Problem: Risk for Elopement  Goal: Patient will not exit the unit/facility without proper excort  Outcome: Progressing     Problem: Involuntary Admit  Goal: Will cooperate with staff recommendations and doctor's orders and will demonstrate appropriate behavior  Description: INTERVENTIONS:  1. Treat underlying conditions and offer medication as ordered  2. Educate regarding involuntary admission procedures and rules  3. Contain excessive/inappropriate behavior per unit and hospital policies  Outcome: Progressing   Patient denies SI/HI/Hallucinations. Patient seen in day room eating all meals. No active distress noted. Patient has been medication compliant and in control of her behaviors. Will continue to monitor and will intervene as needed with close 15 minute rounds.  
  Problem: Depression  Goal: Will be euthymic at discharge  Description: INTERVENTIONS:  1. Administer medication as ordered  2. Provide emotional support via 1:1 interaction with staff  3. Encourage involvement in milieu/groups/activities  4. Monitor for social isolation  4/27/2024 2326 by Umm Vega RN  Outcome: Not Progressing  4/27/2024 1301 by Gena Porras RN  Outcome: Progressing     Problem: Vee  Goal: Will exhibit normal sleep and speech and no impulsivity  Description: INTERVENTIONS:  1. Administer medication as ordered  2. Set limits on impulsive behavior  3. Make attempts to decrease external stimuli as possible  4/27/2024 2326 by Umm Vega RN  Outcome: Not Progressing  4/27/2024 1301 by Gena Porras RN  Outcome: Progressing     Problem: Psychosis  Goal: Will report no hallucinations or delusions  Description: INTERVENTIONS:  1. Administer medication as  ordered  2. Assist with reality testing to support increasing orientation  3. Assess if patient's hallucinations or delusions are encouraging self harm or harm to others and intervene as appropriate  4/27/2024 2326 by Umm Vega RN  Outcome: Not Progressing  4/27/2024 1301 by Gena Porras RN  Outcome: Progressing     Problem: Anxiety  Goal: Will report anxiety at manageable levels  Description: INTERVENTIONS:  1. Administer medication as ordered  2. Teach and rehearse alternative coping skills  3. Provide emotional support with 1:1 interaction with staff  4/27/2024 2326 by Umm Vega RN  Outcome: Not Progressing  4/27/2024 1301 by Gena Porras RN  Outcome: Progressing     Problem: Sleep Disturbance  Goal: Will exhibit normal sleeping pattern  Description: INTERVENTIONS:  1. Administer medication as ordered  2. Decrease environmental stimuli, including noise, as appropriate  3. Discourage social isolation and naps during the day  Outcome: Not Progressing     
  Problem: Depression  Goal: Will be euthymic at discharge  Description: INTERVENTIONS:  1. Administer medication as ordered  2. Provide emotional support via 1:1 interaction with staff  3. Encourage involvement in milieu/groups/activities  4. Monitor for social isolation  4/28/2024 0933 by Haley Pittman RN  Outcome: Progressing  4/27/2024 2326 by Umm Vega RN  Outcome: Not Progressing     Problem: Vee  Goal: Will exhibit normal sleep and speech and no impulsivity  Description: INTERVENTIONS:  1. Administer medication as ordered  2. Set limits on impulsive behavior  3. Make attempts to decrease external stimuli as possible  4/28/2024 0933 by Haley Pittman RN  Outcome: Progressing  4/27/2024 2326 by Umm Vega RN  Outcome: Not Progressing     Problem: Psychosis  Goal: Will report no hallucinations or delusions  Description: INTERVENTIONS:  1. Administer medication as  ordered  2. Assist with reality testing to support increasing orientation  3. Assess if patient's hallucinations or delusions are encouraging self harm or harm to others and intervene as appropriate  4/27/2024 2326 by Umm Vega RN  Outcome: Not Progressing     Problem: Depression  Goal: Will be euthymic at discharge  Description: INTERVENTIONS:  1. Administer medication as ordered  2. Provide emotional support via 1:1 interaction with staff  3. Encourage involvement in milieu/groups/activities  4. Monitor for social isolation  4/28/2024 0933 by Haley Pittman RN  Outcome: Progressing  4/27/2024 2326 by Umm Vega RN  Outcome: Not Progressing     Problem: Vee  Goal: Will exhibit normal sleep and speech and no impulsivity  Description: INTERVENTIONS:  1. Administer medication as ordered  2. Set limits on impulsive behavior  3. Make attempts to decrease external stimuli as possible  4/28/2024 0933 by Haley Pittman RN  Outcome: Progressing  4/27/2024 2326 by Umm Vega RN  Outcome: Not Progressing     Problem: 
  Problem: Depression  Goal: Will be euthymic at discharge  Description: INTERVENTIONS:  1. Administer medication as ordered  2. Provide emotional support via 1:1 interaction with staff  3. Encourage involvement in milieu/groups/activities  4. Monitor for social isolation  Outcome: Progressing     Problem: Anxiety  Goal: Will report anxiety at manageable levels  Description: INTERVENTIONS:  1. Administer medication as ordered  2. Teach and rehearse alternative coping skills  3. Provide emotional support with 1:1 interaction with staff  Outcome: Progressing     
  Problem: Risk for Elopement  Goal: Patient will not exit the unit/facility without proper excort  Outcome: Progressing     Problem: Depression  Goal: Will be euthymic at discharge  Description: INTERVENTIONS:  1. Administer medication as ordered  2. Provide emotional support via 1:1 interaction with staff  3. Encourage involvement in milieu/groups/activities  4. Monitor for social isolation  Outcome: Progressing     Problem: Psychosis  Goal: Will report no hallucinations or delusions  Description: INTERVENTIONS:  1. Administer medication as  ordered  2. Assist with reality testing to support increasing orientation  3. Assess if patient's hallucinations or delusions are encouraging self harm or harm to others and intervene as appropriate  Outcome: Progressing     Problem: Anxiety  Goal: Will report anxiety at manageable levels  Description: INTERVENTIONS:  1. Administer medication as ordered  2. Teach and rehearse alternative coping skills  3. Provide emotional support with 1:1 interaction with staff  Outcome: Progressing     Problem: Involuntary Admit  Goal: Will cooperate with staff recommendations and doctor's orders and will demonstrate appropriate behavior  Description: INTERVENTIONS:  1. Treat underlying conditions and offer medication as ordered  2. Educate regarding involuntary admission procedures and rules  3. Contain excessive/inappropriate behavior per unit and hospital policies  Outcome: Progressing    Patient denies SI/HI and hallucinations. Patient is irritable, tearful, and easily agitated. She is uncooperative with assessment. She tells me that she needs to leave so that she can go home and make pies because she is the only one in her family that can cook. She states \"I am the only [place] around here.\" She is very upset with her son that she reports put her here. She is refusing all prescribed medications without issue. Will continue to offer support and comfort to patient.      
  Problem: Vee  Goal: Will exhibit normal sleep and speech and no impulsivity  Description: INTERVENTIONS:  1. Administer medication as ordered  2. Set limits on impulsive behavior  3. Make attempts to decrease external stimuli as possible  4/29/2024 1028 by Patricia Jackman RN  Outcome: Progressing  4/29/2024 0031 by Umm Vega RN  Outcome: Not Progressing     Problem: Psychosis  Goal: Will report no hallucinations or delusions  Description: INTERVENTIONS:  1. Administer medication as  ordered  2. Assist with reality testing to support increasing orientation  3. Assess if patient's hallucinations or delusions are encouraging self harm or harm to others and intervene as appropriate  4/29/2024 1028 by Patricia Jackman RN  Outcome: Progressing  4/29/2024 0031 by Umm Vega RN  Outcome: Not Progressing     Problem: Anxiety  Goal: Will report anxiety at manageable levels  Description: INTERVENTIONS:  1. Administer medication as ordered  2. Teach and rehearse alternative coping skills  3. Provide emotional support with 1:1 interaction with staff  4/29/2024 1028 by Patricia Jackman RN  Outcome: Progressing  4/29/2024 0031 by Umm Vega RN  Outcome: Not Progressing     Problem: Chronic Conditions and Co-morbidities  Goal: Patient's chronic conditions and co-morbidity symptoms are monitored and maintained or improved  4/29/2024 0031 by Umm Vega RN  Outcome: Not Progressing     Problem: Vee  Goal: Will exhibit normal sleep and speech and no impulsivity  Description: INTERVENTIONS:  1. Administer medication as ordered  2. Set limits on impulsive behavior  3. Make attempts to decrease external stimuli as possible  4/29/2024 1028 by Patricia Jackman RN  Outcome: Progressing  4/29/2024 0031 by Umm Vega RN  Outcome: Not Progressing     Problem: Psychosis  Goal: Will report no hallucinations or delusions  Description: INTERVENTIONS:  1. Administer medication as  ordered  2. Assist with reality 
Lateral

## 2024-12-09 NOTE — ED ADULT NURSE NOTE - [facility] ED SYMPTOMS POS
Refill Request     CONFIRM preferred pharmacy with the patient.    If Mail Order Rx - Pend for 90 day refill.      Last Seen: Last Seen Department: 12/2/2024  Last Seen by PCP: 12/2/2024    Last Written: 9/25/2024 Cyclobenzaprine #60 with 2 refills   9/23/2024 Abilify #30 with 3 refills     If no future appointment scheduled:  Review the last OV with PCP and review information for follow-up visit,  Route STAFF MESSAGE with patient name to the  Pool for scheduling with the following information:            -  Timing of next visit           -  Visit type ie Physical, OV, etc           -  Diagnoses/Reason ie. COPD, HTN - Do not use MEDICATION, Follow-up or CHECK UP - Give reason for visit      Next Appointment:   No future appointments.    Message sent to  to schedule appt with patient?  NO      Requested Prescriptions     Pending Prescriptions Disp Refills    cyclobenzaprine (FLEXERIL) 10 MG tablet [Pharmacy Med Name: CYCLOBENZAPRINE HCL 10 MG ORAL TABLET] 60 tablet 2     Sig: TAKE 1 TABLET BY MOUTH 2 (TWO) TIMES DAILY AS NEEDED FOR MUSCLE SPASMS    ARIPiprazole (ABILIFY) 5 MG tablet [Pharmacy Med Name: ARIPIPRAZOLE 5 MG ORAL TABLET] 30 tablet 3     Sig: TAKE 1 TABLET BY MOUTH ONCE DAILY        WEAKNESS/SHORTNESS OF BREATH

## 2025-03-07 NOTE — PHYSICAL THERAPY INITIAL EVALUATION ADULT - PREDICTED DURATION OF THERAPY (DAYS/WKS), PT EVAL
"Patient Education   Obtain fasting labs, nothing to eat after 8pm , may drink water.     Routine physical for adults   The Basics   Written by the doctors and editors at [facility]   What is a physical? -- A physical is a routine visit, or \"check-up,\" with your doctor. You might also hear it called a \"wellness visit\" or \"preventive visit.\"  During each visit, the doctor will:   Ask about your physical and mental health   Ask about your habits, behaviors, and lifestyle   Do an exam   Give you vaccines if needed   Talk to you about any medicines you take   Give advice about your health   Answer your questions  Getting regular check-ups is an important part of taking care of your health. It can help your doctor find and treat any problems you have. But it's also important for preventing health problems.  A routine physical is different from a \"sick visit.\" A sick visit is when you see a doctor because of a health concern or problem. Since physicals are scheduled ahead of time, you can think about what you want to ask the doctor.  How often should I get a physical? -- It depends on your age and health. In general, for people age 21 years and older:   If you are younger than 50 years, you might be able to get a physical every 3 years.   If you are 50 years or older, your doctor might recommend a physical every year.  If you have an ongoing health condition, like diabetes or high blood pressure, your doctor will probably want to see you more often.  What happens during a physical? -- In general, each visit will include:   Physical exam - The doctor or nurse will check your height, weight, heart rate, and blood pressure. They will also look at your eyes and ears. They will ask about how you are feeling and whether you have any symptoms that bother you.   Medicines - It's a good idea to bring a list of all the medicines you take to each doctor visit. Your doctor will talk to you about your medicines and answer any questions. " "Tell them if you are having any side effects that bother you. You should also tell them if you are having trouble paying for any of your medicines.   Habits and behaviors - This includes:   Your diet   Your exercise habits   Whether you smoke, drink alcohol, or use drugs   Whether you are sexually active   Whether you feel safe at home  Your doctor will talk to you about things you can do to improve your health and lower your risk of health problems. They will also offer help and support. For example, if you want to quit smoking, they can give you advice and might prescribe medicines. If you want to improve your diet or get more physical activity, they can help you with this, too.   Lab tests, if needed - The tests you get will depend on your age and situation. For example, your doctor might want to check your:   Cholesterol   Blood sugar   Iron level   Vaccines - The recommended vaccines will depend on your age, health, and what vaccines you already had. Vaccines are very important because they can prevent certain serious or deadly infections.   Discussion of screening - \"Screening\" means checking for diseases or other health problems before they cause symptoms. Your doctor can recommend screening based on your age, risk, and preferences. This might include tests to check for:   Cancer, such as breast, prostate, cervical, ovarian, colorectal, prostate, lung, or skin cancer   Sexually transmitted infections, such as chlamydia and gonorrhea   Mental health conditions like depression and anxiety  Your doctor will talk to you about the different types of screening tests. They can help you decide which screenings to have. They can also explain what the results might mean.   Answering questions - The physical is a good time to ask the doctor or nurse questions about your health. If needed, they can refer you to other doctors or specialists, too.  Adults older than 65 years often need other care, too. As you get older, " your doctor will talk to you about:   How to prevent falling at home   Hearing or vision tests   Memory testing   How to take your medicines safely   Making sure that you have the help and support you need at home  All topics are updated as new evidence becomes available and our peer review process is complete.  This topic retrieved from [employer] on: May 02, 2024.  Topic 037075 Version 1.0  Release: 32.4.3 - C32.122  © 2024 UpToDate, Inc. and/or its affiliates. All rights reserved.  Consumer Information Use and Disclaimer   Disclaimer: This generalized information is a limited summary of diagnosis, treatment, and/or medication information. It is not meant to be comprehensive and should be used as a tool to help the user understand and/or assess potential diagnostic and treatment options. It does NOT include all information about conditions, treatments, medications, side effects, or risks that may apply to a specific patient. It is not intended to be medical advice or a substitute for the medical advice, diagnosis, or treatment of a health care provider based on the health care provider's examination and assessment of a patient's specific and unique circumstances. Patients must speak with a health care provider for complete information about their health, medical questions, and treatment options, including any risks or benefits regarding use of medications. This information does not endorse any treatments or medications as safe, effective, or approved for treating a specific patient. UpToDate, Inc. and its affiliates disclaim any warranty or liability relating to this information or the use thereof.The use of this information is governed by the Terms of Use, available at https://www.wolterskluwer.com/en/know/clinical-effectiveness-terms. 2024© UpToDate, Inc. and its affiliates and/or licensors. All rights reserved.  Copyright   © 2024 [employer]Date, Inc. and/or its affiliates. All rights reserved.     2 weeks

## 2025-03-16 NOTE — ED ADULT TRIAGE NOTE - WEIGHT METHOD
Problem: Adult Inpatient Plan of Care  Goal: Plan of Care Review  3/16/2025 1442 by Olamide Cash, RN  Outcome: Met  3/16/2025 0729 by Olamide Cash RN  Outcome: Progressing  Goal: Patient-Specific Goal (Individualized)  Outcome: Met  Goal: Absence of Hospital-Acquired Illness or Injury  Outcome: Met  Goal: Optimal Comfort and Wellbeing  Outcome: Met  Goal: Readiness for Transition of Care  Outcome: Met     Problem: Bariatric Environmental Safety  Goal: Safety Maintained with Care  Outcome: Met     Problem: Infection  Goal: Absence of Infection Signs and Symptoms  Outcome: Met     Problem: Confusion Acute  Goal: Optimal Cognitive Function  Outcome: Met     Problem: Hypertension Acute  Goal: Blood Pressure Within Desired Range  Outcome: Met     Problem: Wound  Goal: Optimal Coping  Outcome: Met  Goal: Optimal Functional Ability  Outcome: Met  Goal: Absence of Infection Signs and Symptoms  Outcome: Met  Goal: Improved Oral Intake  Outcome: Met  Goal: Optimal Pain Control and Function  Outcome: Met  Goal: Skin Health and Integrity  Outcome: Met  Goal: Optimal Wound Healing  Outcome: Met      stated

## 2025-04-22 NOTE — PHYSICAL THERAPY INITIAL EVALUATION ADULT - SIT-TO-STAND BALANCE
Encounter addended by: Viraj Evans, LICSW on: 4/22/2025 11:35 AM   Actions taken: Charge Capture section accepted well developed at rolling walker cg/fair minus

## 2025-05-06 NOTE — ED PROVIDER NOTE - PLAN OF CARE
"Subjective   Patient ID: Gonzalez Jason is a 52 y.o. female who presents for Hip Pain.    HPI     Complains of bilateral left greater than right groin/hip pain.  Waxing and waning for weeks.  Increased with activity.  Radiates to anterior thigh.  Some low back pain.  Under increased stress.  Endorses chronic increased stress.  Denies fevers chills.  Denies nausea or vomiting.  Denies night sweats.  No rash.  No red or swollen joints.  Minimal relief with over-the-counter pain medicines.  Fatigue is a fairly significant issue.  Some difficulty sleeping.    Review of Systems  All systems reviewed and negative except as per history of present illness  Objective   /80   Temp 36.4 °C (97.5 °F)   Ht 1.727 m (5' 8\")   Wt 138 kg (304 lb 3.2 oz)   BMI 46.25 kg/m²     Physical Exam    Lab Results   Component Value Date    WBC 5.1 10/18/2024    HGB 14.2 10/18/2024    HCT 43.4 10/18/2024     10/18/2024    CHOL 182 10/18/2024    TRIG 75 10/18/2024    HDL 60.3 10/18/2024    ALT 14 10/18/2024    AST 13 10/18/2024     10/18/2024    K 5.0 10/18/2024     10/18/2024    CREATININE 0.57 10/18/2024    BUN 20 10/18/2024    CO2 32 10/18/2024    TSH 1.94 10/18/2024    HGBA1C 5.4 10/18/2024       Assessment/Plan   Assessment & Plan  Bilateral hip pain    Orders:    Arthritis Panel (CMS); Future    Referral to Physical Therapy; Future    methylPREDNISolone (Medrol Dospak) 4 mg tablets; Take as directed on package.    XR hip left with pelvis when performed 2 or 3 views; Future    B12 deficiency    Orders:    Vitamin B12; Future    Vitamin D deficiency    Orders:    Vitamin D 25-Hydroxy,Total (for eval of Vitamin D levels); Future    H/O gastric bypass    Orders:    TSH with reflex to Free T4 if abnormal; Future    Iron and TIBC; Future    Elevated liver enzymes    Orders:    Comprehensive Metabolic Panel; Future    Impaired fasting blood sugar    Orders:    Hemoglobin A1C; Future    Essential " hypertension    Orders:    CBC; Future    Comprehensive Metabolic Panel; Future    Other fatigue    Orders:    Ferritin; Future    Hyperlipidemia, unspecified hyperlipidemia type    Orders:    Lipid Panel; Future    Class 3 severe obesity with serious comorbidity and body mass index (BMI) of 40.0 to 44.9 in adult, unspecified obesity type    Orders:    tirzepatide, weight loss, (Zepbound) 2.5 mg/0.5 mL injection; Inject 2.5 mg under the skin every 7 days.            Bilateral groin pain.  Seems most consistent with a hip issue.  Suspect tendinitis/muscular.  Some concern over low back issue.  Will start with hip x-rays and physical therapy.  Short course of prednisone.  Follow-up 2 weeks.  Asked patient to go to ED any progressive symptoms.    #1 obesity- reviewed.  Diet and exercise reviewed.  Spent significant time.   Will try and begin GLP-1 medicine.  Orders entered.  #2 s/p sleeve surgery- 2021. signif wt loss, now trending back up.. follows w/ surgery in [place].  Repeat labs   #3 IFBS-good on last test. Patient will obtain labs. Continue lifestyle.  Labs    #4 htn-good without treatment and surgery  #5 GERD-controlled with PPI.  #6 depression/anxiety-significant increase stress.  Overall doing well.  But certainly an issue.  No significant benefit from increase venlafaxine.  Reduce back to 150.  Follow as we add trazodone for insomnia.  Reviewed use risk and side effects.  Did discuss risks of serotonin syndrome.  #7 hepatic hemangioma-  Per hepatobiliary surgery no follow-up recommended. Will consider imaging next visit.  #8 ADD-follow-up psychiatry  #9 fatigue-mild BOBBY.  Will discuss further next visit.  #10 LBP-     [place] 2023.  lipids-retest (+fhx CAD). c/s repeat CACS . Strong family history of CAD. Consider low-dose statin   Follow up with Dr. Jay tomorrow morning.  Return to the ED if you have any fever, chills, chest pain, abdominal pain, or any other concerning symptom.  Take tylenol 650mg every 6-8 hours as needed for pain. Follow up with Dr. Jay tomorrow morning.  Return to the ED if you have any fever, chills, chest pain, abdominal pain, or any other concerning symptom.  Follow up with your medical doctor in 2-3 days or call our clinic at [phone] and state you were seen in the Emergency Department and would like to be seen in clinic.     Take Tylenol 1 g every six hours and supplement (if allowed by your physician) with ibuprofen 600 mg, with food, every six hours which can be taken three hours apart from the Tylenol to have a layered effect.    Drink at least 2 Liters or 64 Ounces of water each day (UNLESS you are supposed to restrict fluids or have a history of congestive heart failure (CHF)).    Return for any persistent, worsening symptoms, or ANY concerns at all.